# Patient Record
Sex: FEMALE | Race: WHITE | NOT HISPANIC OR LATINO | Employment: OTHER | URBAN - METROPOLITAN AREA
[De-identification: names, ages, dates, MRNs, and addresses within clinical notes are randomized per-mention and may not be internally consistent; named-entity substitution may affect disease eponyms.]

---

## 2017-02-10 RX ORDER — SITAGLIPTIN 100 MG/1
TABLET, FILM COATED ORAL
Qty: 30 TABLET | Refills: 1 | Status: SHIPPED | OUTPATIENT
Start: 2017-02-10 | End: 2017-06-22 | Stop reason: SDUPTHER

## 2017-02-15 ENCOUNTER — RESULTS ENCOUNTER (OUTPATIENT)
Dept: INTERNAL MEDICINE | Facility: CLINIC | Age: 82
End: 2017-02-15

## 2017-02-15 DIAGNOSIS — E11.9 TYPE 2 DIABETES MELLITUS WITHOUT COMPLICATION, WITHOUT LONG-TERM CURRENT USE OF INSULIN (HCC): ICD-10-CM

## 2017-02-15 DIAGNOSIS — E78.5 HYPERLIPIDEMIA, UNSPECIFIED HYPERLIPIDEMIA TYPE: ICD-10-CM

## 2017-02-16 LAB
CHOLEST SERPL-MCNC: 149 MG/DL (ref 100–199)
HBA1C MFR BLD: 7.6 % (ref 4.8–5.6)
HDLC SERPL-MCNC: 51 MG/DL
LDLC SERPL CALC-MCNC: 65 MG/DL (ref 0–99)
TRIGL SERPL-MCNC: 163 MG/DL (ref 0–149)
VLDLC SERPL CALC-MCNC: 33 MG/DL (ref 5–40)

## 2017-02-21 ENCOUNTER — OFFICE VISIT (OUTPATIENT)
Dept: INTERNAL MEDICINE | Facility: CLINIC | Age: 82
End: 2017-02-21

## 2017-02-21 ENCOUNTER — HOSPITAL ENCOUNTER (INPATIENT)
Facility: HOSPITAL | Age: 82
LOS: 2 days | Discharge: HOME | End: 2017-02-24
Attending: INTERNAL MEDICINE | Admitting: INTERNAL MEDICINE

## 2017-02-21 ENCOUNTER — APPOINTMENT (OUTPATIENT)
Dept: GENERAL RADIOLOGY | Facility: HOSPITAL | Age: 82
End: 2017-02-21
Attending: INTERNAL MEDICINE

## 2017-02-21 ENCOUNTER — APPOINTMENT (OUTPATIENT)
Dept: CARDIOLOGY | Facility: HOSPITAL | Age: 82
End: 2017-02-21
Attending: INTERNAL MEDICINE

## 2017-02-21 VITALS
DIASTOLIC BLOOD PRESSURE: 62 MMHG | TEMPERATURE: 97.9 F | WEIGHT: 133.6 LBS | SYSTOLIC BLOOD PRESSURE: 142 MMHG | BODY MASS INDEX: 26.23 KG/M2 | HEIGHT: 60 IN | HEART RATE: 94 BPM | RESPIRATION RATE: 16 BRPM

## 2017-02-21 DIAGNOSIS — K21.9 GASTROESOPHAGEAL REFLUX DISEASE, ESOPHAGITIS PRESENCE NOT SPECIFIED: ICD-10-CM

## 2017-02-21 DIAGNOSIS — Z79.899 MEDICATION MANAGEMENT: ICD-10-CM

## 2017-02-21 DIAGNOSIS — E11.9 TYPE 2 DIABETES MELLITUS WITHOUT COMPLICATION, WITHOUT LONG-TERM CURRENT USE OF INSULIN (HCC): Primary | ICD-10-CM

## 2017-02-21 DIAGNOSIS — I10 ESSENTIAL HYPERTENSION: ICD-10-CM

## 2017-02-21 DIAGNOSIS — N18.30 CKD (CHRONIC KIDNEY DISEASE) STAGE 3, GFR 30-59 ML/MIN (HCC): ICD-10-CM

## 2017-02-21 DIAGNOSIS — R55 NEAR SYNCOPE: ICD-10-CM

## 2017-02-21 DIAGNOSIS — E78.5 HYPERLIPIDEMIA, UNSPECIFIED HYPERLIPIDEMIA TYPE: ICD-10-CM

## 2017-02-21 DIAGNOSIS — I25.10 CORONARY ARTERY DISEASE INVOLVING NATIVE CORONARY ARTERY OF NATIVE HEART WITHOUT ANGINA PECTORIS: ICD-10-CM

## 2017-02-21 PROBLEM — I49.5 SICK SINUS SYNDROME: Status: ACTIVE | Noted: 2017-02-21

## 2017-02-21 PROBLEM — I49.8 JUNCTIONAL RHYTHM: Status: ACTIVE | Noted: 2017-02-21

## 2017-02-21 LAB
ALBUMIN SERPL-MCNC: 4.3 G/DL (ref 3.5–5.2)
ALBUMIN/GLOB SERPL: 1.4 G/DL
ALP SERPL-CCNC: 178 U/L (ref 39–117)
ALT SERPL W P-5'-P-CCNC: 37 U/L (ref 1–33)
ANION GAP SERPL CALCULATED.3IONS-SCNC: 14 MMOL/L
AST SERPL-CCNC: 37 U/L (ref 1–32)
BASOPHILS # BLD AUTO: 0.04 10*3/MM3 (ref 0–0.2)
BASOPHILS NFR BLD AUTO: 0.6 % (ref 0–1.5)
BILIRUB SERPL-MCNC: 0.6 MG/DL (ref 0.1–1.2)
BUN BLD-MCNC: 18 MG/DL (ref 8–23)
BUN/CREAT SERPL: 17.5 (ref 7–25)
CALCIUM SPEC-SCNC: 9.8 MG/DL (ref 8.6–10.5)
CHLORIDE SERPL-SCNC: 101 MMOL/L (ref 98–107)
CO2 SERPL-SCNC: 27 MMOL/L (ref 22–29)
CREAT BLD-MCNC: 1.03 MG/DL (ref 0.57–1)
D DIMER PPP FEU-MCNC: 0.5 MCGFEU/ML (ref 0–0.49)
DEPRECATED RDW RBC AUTO: 45.1 FL (ref 37–54)
EOSINOPHIL # BLD AUTO: 0.28 10*3/MM3 (ref 0–0.7)
EOSINOPHIL NFR BLD AUTO: 3.9 % (ref 0.3–6.2)
ERYTHROCYTE [DISTWIDTH] IN BLOOD BY AUTOMATED COUNT: 13.3 % (ref 11.7–13)
GFR SERPL CREATININE-BSD FRML MDRD: 52 ML/MIN/1.73
GLOBULIN UR ELPH-MCNC: 3.1 GM/DL
GLUCOSE BLD-MCNC: 142 MG/DL (ref 65–99)
GLUCOSE BLDC GLUCOMTR-MCNC: 253 MG/DL (ref 70–130)
HCT VFR BLD AUTO: 43.9 % (ref 35.6–45.5)
HGB BLD-MCNC: 14.7 G/DL (ref 11.9–15.5)
IMM GRANULOCYTES # BLD: 0.02 10*3/MM3 (ref 0–0.03)
IMM GRANULOCYTES NFR BLD: 0.3 % (ref 0–0.5)
LYMPHOCYTES # BLD AUTO: 1.54 10*3/MM3 (ref 0.9–4.8)
LYMPHOCYTES NFR BLD AUTO: 21.3 % (ref 19.6–45.3)
MAGNESIUM SERPL-MCNC: 2 MG/DL (ref 1.6–2.4)
MCH RBC QN AUTO: 31.5 PG (ref 26.9–32)
MCHC RBC AUTO-ENTMCNC: 33.5 G/DL (ref 32.4–36.3)
MCV RBC AUTO: 94 FL (ref 80.5–98.2)
MONOCYTES # BLD AUTO: 0.5 10*3/MM3 (ref 0.2–1.2)
MONOCYTES NFR BLD AUTO: 6.9 % (ref 5–12)
NEUTROPHILS # BLD AUTO: 4.86 10*3/MM3 (ref 1.9–8.1)
NEUTROPHILS NFR BLD AUTO: 67 % (ref 42.7–76)
PLATELET # BLD AUTO: 236 10*3/MM3 (ref 140–500)
PMV BLD AUTO: 10.8 FL (ref 6–12)
POTASSIUM BLD-SCNC: 4.1 MMOL/L (ref 3.5–5.2)
PROT SERPL-MCNC: 7.4 G/DL (ref 6–8.5)
RBC # BLD AUTO: 4.67 10*6/MM3 (ref 3.9–5.2)
SODIUM BLD-SCNC: 142 MMOL/L (ref 136–145)
T4 FREE SERPL-MCNC: 1.22 NG/DL (ref 0.93–1.7)
TSH SERPL DL<=0.05 MIU/L-ACNC: 1.97 MIU/ML (ref 0.27–4.2)
WBC NRBC COR # BLD: 7.24 10*3/MM3 (ref 4.5–10.7)

## 2017-02-21 PROCEDURE — 71020 HC CHEST PA AND LATERAL: CPT

## 2017-02-21 PROCEDURE — 84443 ASSAY THYROID STIM HORMONE: CPT | Performed by: INTERNAL MEDICINE

## 2017-02-21 PROCEDURE — G0378 HOSPITAL OBSERVATION PER HR: HCPCS

## 2017-02-21 PROCEDURE — 93226 XTRNL ECG REC<48 HR SCAN A/R: CPT

## 2017-02-21 PROCEDURE — 99220 PR INITIAL OBSERVATION CARE/DAY 70 MINUTES: CPT | Performed by: INTERNAL MEDICINE

## 2017-02-21 PROCEDURE — 93000 ELECTROCARDIOGRAM COMPLETE: CPT | Performed by: INTERNAL MEDICINE

## 2017-02-21 PROCEDURE — 85379 FIBRIN DEGRADATION QUANT: CPT | Performed by: INTERNAL MEDICINE

## 2017-02-21 PROCEDURE — 99203 OFFICE O/P NEW LOW 30 MIN: CPT | Performed by: INTERNAL MEDICINE

## 2017-02-21 PROCEDURE — 93010 ELECTROCARDIOGRAM REPORT: CPT | Performed by: INTERNAL MEDICINE

## 2017-02-21 PROCEDURE — 85025 COMPLETE CBC W/AUTO DIFF WBC: CPT | Performed by: INTERNAL MEDICINE

## 2017-02-21 PROCEDURE — 93005 ELECTROCARDIOGRAM TRACING: CPT | Performed by: INTERNAL MEDICINE

## 2017-02-21 PROCEDURE — 80053 COMPREHEN METABOLIC PANEL: CPT | Performed by: INTERNAL MEDICINE

## 2017-02-21 PROCEDURE — 84439 ASSAY OF FREE THYROXINE: CPT | Performed by: INTERNAL MEDICINE

## 2017-02-21 PROCEDURE — 93225 XTRNL ECG REC<48 HRS REC: CPT

## 2017-02-21 PROCEDURE — 83735 ASSAY OF MAGNESIUM: CPT | Performed by: INTERNAL MEDICINE

## 2017-02-21 PROCEDURE — 82962 GLUCOSE BLOOD TEST: CPT | Performed by: INTERNAL MEDICINE

## 2017-02-21 RX ORDER — ALLOPURINOL 300 MG/1
300 TABLET ORAL DAILY
Status: DISCONTINUED | OUTPATIENT
Start: 2017-02-21 | End: 2017-02-24 | Stop reason: HOSPADM

## 2017-02-21 RX ORDER — PROMETHAZINE HYDROCHLORIDE 25 MG/ML
12.5 INJECTION, SOLUTION INTRAMUSCULAR; INTRAVENOUS EVERY 6 HOURS PRN
Status: DISCONTINUED | OUTPATIENT
Start: 2017-02-21 | End: 2017-02-24 | Stop reason: HOSPADM

## 2017-02-21 RX ORDER — HYDROCODONE BITARTRATE AND ACETAMINOPHEN 5; 325 MG/1; MG/1
1 TABLET ORAL EVERY 4 HOURS PRN
Status: DISCONTINUED | OUTPATIENT
Start: 2017-02-21 | End: 2017-02-24 | Stop reason: HOSPADM

## 2017-02-21 RX ORDER — CLOPIDOGREL BISULFATE 75 MG/1
75 TABLET ORAL DAILY
Status: DISCONTINUED | OUTPATIENT
Start: 2017-02-21 | End: 2017-02-24 | Stop reason: HOSPADM

## 2017-02-21 RX ORDER — PROMETHAZINE HYDROCHLORIDE 25 MG/1
12.5 TABLET ORAL EVERY 6 HOURS PRN
Status: DISCONTINUED | OUTPATIENT
Start: 2017-02-21 | End: 2017-02-24 | Stop reason: HOSPADM

## 2017-02-21 RX ORDER — PROMETHAZINE HYDROCHLORIDE 12.5 MG/1
12.5 SUPPOSITORY RECTAL EVERY 6 HOURS PRN
Status: DISCONTINUED | OUTPATIENT
Start: 2017-02-21 | End: 2017-02-24 | Stop reason: HOSPADM

## 2017-02-21 RX ORDER — LORAZEPAM 1 MG/1
1 TABLET ORAL EVERY 6 HOURS PRN
Status: DISCONTINUED | OUTPATIENT
Start: 2017-02-21 | End: 2017-02-24 | Stop reason: HOSPADM

## 2017-02-21 RX ORDER — DOCUSATE SODIUM 100 MG/1
100 CAPSULE, LIQUID FILLED ORAL 2 TIMES DAILY
Status: DISCONTINUED | OUTPATIENT
Start: 2017-02-21 | End: 2017-02-24 | Stop reason: HOSPADM

## 2017-02-21 RX ORDER — ACETAMINOPHEN 325 MG/1
650 TABLET ORAL EVERY 4 HOURS PRN
Status: DISCONTINUED | OUTPATIENT
Start: 2017-02-21 | End: 2017-02-24 | Stop reason: HOSPADM

## 2017-02-21 RX ORDER — MAGNESIUM HYDROXIDE/ALUMINUM HYDROXICE/SIMETHICONE 120; 1200; 1200 MG/30ML; MG/30ML; MG/30ML
30 SUSPENSION ORAL EVERY 6 HOURS PRN
Status: DISCONTINUED | OUTPATIENT
Start: 2017-02-21 | End: 2017-02-24 | Stop reason: HOSPADM

## 2017-02-21 RX ORDER — ASPIRIN 325 MG
325 TABLET ORAL DAILY
Status: DISCONTINUED | OUTPATIENT
Start: 2017-02-21 | End: 2017-02-24 | Stop reason: HOSPADM

## 2017-02-21 RX ORDER — LOSARTAN POTASSIUM AND HYDROCHLOROTHIAZIDE 25; 100 MG/1; MG/1
1 TABLET ORAL DAILY
Status: DISCONTINUED | OUTPATIENT
Start: 2017-02-21 | End: 2017-02-24 | Stop reason: HOSPADM

## 2017-02-21 RX ORDER — SODIUM CHLORIDE 0.9 % (FLUSH) 0.9 %
1-10 SYRINGE (ML) INJECTION AS NEEDED
Status: DISCONTINUED | OUTPATIENT
Start: 2017-02-21 | End: 2017-02-24 | Stop reason: HOSPADM

## 2017-02-21 RX ORDER — ATORVASTATIN CALCIUM 80 MG/1
80 TABLET, FILM COATED ORAL DAILY
Status: DISCONTINUED | OUTPATIENT
Start: 2017-02-21 | End: 2017-02-24 | Stop reason: HOSPADM

## 2017-02-21 RX ADMIN — METFORMIN HYDROCHLORIDE 1000 MG: 1000 TABLET ORAL at 17:34

## 2017-02-21 RX ADMIN — DOCUSATE SODIUM 100 MG: 100 CAPSULE, LIQUID FILLED ORAL at 17:34

## 2017-02-21 NOTE — CONSULTS
[facility]iology [facility] Consult            Patient Name: Tamie Peter  Age/Sex: 81 y.o. female  : 1935  MRN: [id number]    Date of Admission: 2017  Date of Encounter Visit: 17  Encounter Provider: Juvenal Zhong MD  Referring Provider: Mohinder Lester MD  Place of Service: [facility] CARDIOLOGY  Patient Care Team:  Mohinder Lester MD as PCP - General (Internal Medicine)  No Known Provider as PCP - Family Medicine      Subjective:   EP Consultation for: napoleon    Chief Complaint: faint and decreased exercise capacity     History of Present Illness:  Tamie Peter is a 81 y.o. female who is adm by dr lester for napoleon and syncope in his office       Palp none  Dur na   Triggers na   Chest pain none   Bonilla a little   Soa none   Syncope yes see dr lester's note -- also had near syncope recently at home    Exercise tolerance gradual decline over months     On metoprolol but very low dose.              Past Medical History:  Past Medical History   Diagnosis Date   • Coronary artery disease    • Diabetes mellitus    • Hypertension        Past Surgical History   Procedure Laterality Date   • Cataract extraction     • Coronary artery bypass graft       ,    • Tubal abdominal ligation     • Cardiac surgery     • Abdominal surgery         Home Medications:   Prescriptions Prior to Admission   Medication Sig Dispense Refill Last Dose   • allopurinol (ZYLOPRIM) 300 MG tablet Take 300 mg by mouth daily.   2017 at Unknown time   • aspirin 325 MG tablet Take 325 mg by mouth daily.   Taking   • atorvastatin (LIPITOR) 80 MG tablet TAKE ONE TABLET BY MOUTH DAILY 90 tablet 0 Taking   • clopidogrel (PLAVIX) 75 MG tablet Take 75 mg by mouth daily.   Taking   • JANUVIA 100 MG tablet TAKE ONE TABLET BY MOUTH DAILY 30 tablet 1 Taking   • losartan-hydrochlorothiazide (HYZAAR) 100-25 MG per tablet Take 1 tablet by mouth daily.   Taking   • metFORMIN (GLUCOPHAGE) 500 MG tablet  Take 2 tablets by mouth 2 (Two) Times a Day With Meals. 360 tablet 1 Taking   • metoprolol tartrate (LOPRESSOR) 50 MG tablet Take 50 mg by mouth 2 (two) times a day.   Taking       Allergies:  Allergies   Allergen Reactions   • Pneumococcal Vaccines    • Potassium-Containing Compounds        Past Social History:  Social History     Social History   • Marital status: Unknown     Spouse name: N/A   • Number of children: N/A   • Years of education: N/A     Occupational History   • Not on file.     Social History Main Topics   • Smoking status: Never Smoker   • Smokeless tobacco: Not on file   • Alcohol use No   • Drug use: Not on file   • Sexual activity: Not on file     Other Topics Concern   • Not on file     Social History Narrative       Past Family History:  Family History   Problem Relation Age of Onset   • Coronary artery disease Mother    • Heart attack Sister        Review of Systems: All systems reviewed. Pertinent positives identified in HPI. All other systems are negative.     14 point ROS was performed and is negative except as outlined in HPI.     Objective:     Objective:  Vital Signs (last 24 hours)       02/20 0700  -  02/21 0659 02/21 0700  -  02/21 1837   Most Recent    Temp (°F)   97.9 -  98     98 (36.7)    Heart Rate   (!)37 -  94     (!) 40    Resp   16 -  20     20    BP   142/62 -  (!) 198/58     (!) 198/58    SpO2 (%)   96 -  98     96        Temp:  [97.9 °F (36.6 °C)-98 °F (36.7 °C)] 98 °F (36.7 °C)  Heart Rate:  [37-94] 40  Resp:  [16-20] 20  BP: (142-198)/(58-78) 198/58  Body mass index is 25.19 kg/(m^2).        Physical Exam:   Physical Exam   Constitutional: She is oriented to person, place, and time.   HENT:   Head: Normocephalic and atraumatic.   Eyes: Right eye exhibits no discharge. Left eye exhibits no discharge.   Neck: No JVD present. No thyromegaly present.   Cardiovascular: Regular rhythm.  Bradycardia present.  Exam reveals no gallop and no friction rub.    No murmur  heard.  Pulmonary/Chest: Effort normal and breath sounds normal. She has no rales.   Abdominal: Soft. Bowel sounds are normal. There is no tenderness.   Musculoskeletal: Normal range of motion. She exhibits no edema or deformity.   Neurological: She is alert and oriented to person, place, and time. She exhibits normal muscle tone.   Skin: Skin is warm and dry. No erythema.   Psychiatric: She has a normal mood and affect. Her behavior is normal. Thought content normal.        Labs:   Lab Review:       Results from last 7 days  Lab Units 02/21/17  1251   SODIUM mmol/L 142   POTASSIUM mmol/L 4.1   CHLORIDE mmol/L 101   TOTAL CO2 mmol/L 27.0   BUN mg/dL 18   CREATININE mg/dL 1.03*   GLUCOSE mg/dL 142*   CALCIUM mg/dL 9.8   AST (SGOT) U/L 37*   ALT (SGPT) U/L 37*           Results from last 7 days  Lab Units 02/21/17  1251   WBC 10*3/mm3 7.24   HEMOGLOBIN g/dL 14.7   HEMATOCRIT % 43.9   PLATELETS 10*3/mm3 236               Results from last 7 days  Lab Units 02/21/17  1251   MAGNESIUM mg/dL 2.0       Results from last 7 days  Lab Units 02/15/17  1208   TRIGLYCERIDES mg/dL 163*   HDL CHOL mg/dL 51   LDL CHOL mg/dL 65                       Imaging:     Imaging Results (most recent)     Procedure Component Value Units Date/Time    XR Chest PA & Lateral [[id number]] Collected:  02/21/17 1736     Updated:  02/21/17 1743    Narrative:       CHEST X-RAY     HISTORY: Near syncope     Chest x-ray consisting of 2 images is provided. There is no previous  exam for comparison.     FINDINGS: There are sternal wires with vascular stent material  projecting over the chest. The cardiomediastinal silhouette is normal.  Vascular volume is normal. The lungs are clear and the costophrenic  sulci are dry.       Impression:       Negative chest x-ray.     This report was finalized on 2/21/2017 5:40 PM by Dr. Juvenal Rose MD.                EKG:   Junctional napoleon       I personally viewed and interpreted the patient's EKG/Telemetry  data.    Assessment:     Principal Problem:    Near syncope  Active Problems:    CAD (coronary artery disease)    Hypertension    GERD (gastroesophageal reflux disease)    Diabetes mellitus    CKD (chronic kidney disease) stage 3, GFR 30-59 ml/min    Junctional rhythm    Sick sinus syndrome        Plan:   She has symptomatic bradycardia and syncope     BB did not do this     Birthdays and multiple heart surgeries did     Clear indic for pacing     Risks disc     Thank you.     Will proceed in the afternoon tomorrow      Thank you for allowing me to participate in the care of Tamie Peter. Feel free to contact me directly with any further questions or concerns.    Juvenal Zhong MD  [facility]  02/21/17  6:40 PM

## 2017-02-21 NOTE — H&P
Patient Care Team:  Mohinder Barbosa MD as PCP - General (Internal Medicine)  No Known Provider as PCP - Family Medicine    Chief complaint     Subjective     Patient is a 81 y.o. female presents for a routine in three-month visit to the office to monitor her hyperlipidemia, diabetes mellitus, hypertension, and CAD.  She mentioned that one week prior to admission she was at the kitchen sink and felt that her vision went black.  She was lightheaded.  The symptoms just lasted a few seconds.  He did not fall to the floor or lose posture.  Lose consciousness.  Has had no further spells until today.  In the office when her blood pressure is being checked at check-in she had another more severe spell.  Her vision dimmed and went black.  He was lightheaded.  She thought she might faint.  The symptoms are resolved within a few seconds.  An EKG was later performed which demonstrated a pretty bradycardia that was felt to be a zenaida bradycardia.  Heart rate was 37.  She is now socially admitted for further observation.  She's got a history of CAD with a distant history of bypass surgery ×3 grafts.  She had a positive Cardiolite test in November 2015 at which time a cardiac catheterization was performed.  The bypass vessels were all patent.  There was some narrowing and then obtuse marginal and circumflex that were felt to perhaps lead to some functional ischemia and the false positive stress test just based upon their small size.  She's had no prior history of syncope.      Review of Systems  Denies any chest pain or shortness of breath.  No abdominal symptoms.  No extremity symptoms.  All other pertinent items are noted in HPI, all other systems reviewed and negative    History    Current Facility-Administered Medications:   •  acetaminophen (TYLENOL) tablet 650 mg, 650 mg, Oral, Q4H PRN, Mohinder Barbosa MD  •  allopurinol (ZYLOPRIM) tablet 300 mg, 300 mg, Oral, Daily, Mohinder Barbosa MD  •  aluminum-magnesium  hydroxide-simethicone (MAALOX/MYLANTA) suspension 30 mL, 30 mL, Oral, Q6H PRN, Mohinder Barbosa MD  •  aspirin tablet 325 mg, 325 mg, Oral, Daily, Mohinder Barbosa MD  •  atorvastatin (LIPITOR) tablet 80 mg, 80 mg, Oral, Daily, Mohinder Barbosa MD  •  clopidogrel (PLAVIX) tablet 75 mg, 75 mg, Oral, Daily, Mohinder Barbosa MD  •  docusate sodium (COLACE) capsule 100 mg, 100 mg, Oral, BID, Mohinder Barbosa MD  •  HYDROcodone-acetaminophen (NORCO) 5-325 MG per tablet 1 tablet, 1 tablet, Oral, Q4H PRN, Mohinder Barbosa MD  •  LORazepam (ATIVAN) tablet 1 mg, 1 mg, Oral, Q6H PRN, Mohinder Barbosa MD  •  losartan-hydrochlorothiazide (HYZAAR) 100-25 MG per tablet 1 tablet, 1 tablet, Oral, Daily, Mohinder Barbosa MD  •  magnesium hydroxide (MILK OF MAGNESIA) suspension 2400 mg/10mL 10 mL, 10 mL, Oral, Daily PRN, Mohinder Barbosa MD  •  metFORMIN (GLUCOPHAGE) tablet 1,000 mg, 1,000 mg, Oral, BID With Meals, Mohinder Barbosa MD  •  promethazine (PHENERGAN) tablet 12.5 mg, 12.5 mg, Oral, Q6H PRN **OR** promethazine (PHENERGAN) injection 12.5 mg, 12.5 mg, Intramuscular, Q6H PRN **OR** promethazine (PHENERGAN) suppository 12.5 mg, 12.5 mg, Rectal, Q6H PRN, Mohinder Barbosa MD  •  SITagliptin (JANUVIA) tablet 100 mg, 100 mg, Oral, Daily, Mohinder Barbosa MD  •  sodium chloride 0.9 % flush 1-10 mL, 1-10 mL, Intravenous, PRN, Mohinder Barbosa MD  Past Medical History   Diagnosis Date   • Coronary artery disease    • Diabetes mellitus    • Hypertension      Past Surgical History   Procedure Laterality Date   • Cataract extraction     • Coronary artery bypass graft       1992, 2009   • Tubal abdominal ligation     • Cardiac surgery     • Abdominal surgery       Family History   Problem Relation Age of Onset   • Coronary artery disease Mother    • Heart attack Sister      Social History   Substance Use Topics   • Smoking status: Never Smoker   • Smokeless tobacco: Not on file   • Alcohol use No     Prescriptions Prior to Admission   Medication Sig Dispense  Refill Last Dose   • allopurinol (ZYLOPRIM) 300 MG tablet Take 300 mg by mouth daily.   Taking   • aspirin 325 MG tablet Take 325 mg by mouth daily.   Taking   • atorvastatin (LIPITOR) 80 MG tablet TAKE ONE TABLET BY MOUTH DAILY 90 tablet 0 Taking   • clopidogrel (PLAVIX) 75 MG tablet Take 75 mg by mouth daily.   Taking   • JANUVIA 100 MG tablet TAKE ONE TABLET BY MOUTH DAILY 30 tablet 1 Taking   • losartan-hydrochlorothiazide (HYZAAR) 100-25 MG per tablet Take 1 tablet by mouth daily.   Taking   • metFORMIN (GLUCOPHAGE) 500 MG tablet Take 2 tablets by mouth 2 (Two) Times a Day With Meals. 360 tablet 1 Taking   • metoprolol tartrate (LOPRESSOR) 50 MG tablet Take 50 mg by mouth 2 (two) times a day.   Taking     Allergies:  Pneumococcal vaccines and Potassium-containing compounds    Objective     Vital Signs  Temp:  [97.9 °F (36.6 °C)-98 °F (36.7 °C)] 98 °F (36.7 °C)  Heart Rate:  [37-94] 37  Resp:  [16] 16  BP: (142-195)/(62-78) 195/78    Physical Exam:      General Appearance:    Alert, cooperative, in no acute distress   Head:    Normocephalic, without obvious abnormality, atraumatic   Eyes:            Lids and lashes normal, conjunctivae and sclerae normal, no   icterus, no pallor, corneas clear, PERRLA   Ears:    Ears appear intact with no abnormalities noted   Throat:   No oral lesions, no thrush, oral mucosa moist   Neck:   No adenopathy, supple, trachea midline, no thyromegaly, no     carotid bruit, no JVD   Back:     No kyphosis present, no scoliosis present, no skin lesions,       erythema or scars, no tenderness to percussion or                   palpation,   range of motion normal   Lungs:     Clear to auscultation,respirations regular, even and                   unlabored    Heart:    Regular rhythm and normal rate, normal S1 and S2, no            murmur, no gallop, no rub, no click   Breast Exam:    Deferred   Abdomen:     Normal bowel sounds, no masses, no organomegaly, soft        non-tender,  non-distended, no guarding, no rebound                 tenderness   Genitalia:    Deferred   Extremities:   Moves all extremities well, no edema, no cyanosis, no              redness   Pulses:   Pulses palpable and equal bilaterally   Skin:   No bleeding, bruising or rash   Lymph nodes:   No palpable adenopathy   Neurologic:   Cranial nerves 2 - 12 grossly intact, sensation intact, DTR        present and equal bilaterally         Results Review:    I reviewed the patient's new clinical results.  I personally viewed and interpreted the patient's EKG/Telemetry data  Discussed with The patient    Assessment/Plan     Active Problems:    Near syncope    Genaro bradycardia on EKG.  Symptoms certainly suspicious for cardiac near-syncope with this symptoms occurring in the seated position in the office and the slow heart rate.  The plan is to admit her for further observation.  She'll be monitored.  We'll hold her metoprolol.  Recheck her labs and electrolytes.  Get cardiology opinion.     Mohinder Barbosa MD  02/21/17  12:41 PM    Dragon disclaimer:   Much of this encounter note is an electronic transcription/translation of spoken language to printed text. The electronic translation of spoken language may permit erroneous, or at times, nonsensical words or phrases to be inadvertently transcribed; Although I have reviewed the note for such errors, some may still exist.

## 2017-02-21 NOTE — PLAN OF CARE
Problem: Patient Care Overview (Adult)  Goal: Plan of Care Review  Outcome: Ongoing (interventions implemented as appropriate)    02/21/17 1221   Coping/Psychosocial Response Interventions   Plan Of Care Reviewed With patient       Goal: Adult Individualization and Mutuality  Outcome: Ongoing (interventions implemented as appropriate)  Goal: Discharge Needs Assessment  Outcome: Ongoing (interventions implemented as appropriate)    Problem: Arrhythmia/Dysrhythmia (Symptomatic) (Adult)  Goal: Signs and Symptoms of Listed Potential Problems Will be Absent or Manageable (Arrhythmia/Dysrhythmia)  Outcome: Ongoing (interventions implemented as appropriate)    Problem: Fall Risk (Adult)  Goal: Identify Related Risk Factors and Signs and Symptoms  Outcome: Ongoing (interventions implemented as appropriate)  Goal: Absence of Falls  Outcome: Ongoing (interventions implemented as appropriate)

## 2017-02-21 NOTE — PROGRESS NOTES
Subjective   Tamie Peter is a 81 y.o. female.     Chief Complaint   Patient presents with   • Hypertension   • Hyperlipidemia   • Diabetes   • Dizziness     Pt reports of having episodes of dizziness where her vision goes black         Hypertension   This is a chronic problem. The current episode started more than 1 year ago. The problem is unchanged. The problem is controlled. Associated symptoms include peripheral edema (right foot in summer only). Pertinent negatives include no chest pain, orthopnea, palpitations or shortness of breath (in evenings only). There are no associated agents to hypertension. Risk factors for coronary artery disease include diabetes mellitus, dyslipidemia and post-menopausal state. Past treatments include ACE inhibitors, beta blockers and diuretics. Hypertensive end-organ damage includes kidney disease and CAD/MI. There is no history of angina, CVA, heart failure or retinopathy. Identifiable causes of hypertension include chronic renal disease.   Hyperlipidemia   This is a chronic problem. The current episode started more than 1 year ago. The problem is controlled. Recent lipid tests were reviewed and are normal. Exacerbating diseases include chronic renal disease. Factors aggravating her hyperlipidemia include thiazides and beta blockers. Pertinent negatives include no chest pain, myalgias or shortness of breath (in evenings only). Current antihyperlipidemic treatment includes statins and bile acid squestrants. The current treatment provides significant improvement of lipids. There are no compliance problems.  Risk factors for coronary artery disease include dyslipidemia and post-menopausal.   Diabetes   She presents for her follow-up diabetic visit. She has type 2 diabetes mellitus. No MedicAlert identification noted. Her disease course has been stable. Hypoglycemia symptoms include dizziness. Pertinent negatives for diabetes include no chest pain and no fatigue. Symptoms are  stable. Diabetic complications include nephropathy. Pertinent negatives for diabetic complications include no autonomic neuropathy, CVA, heart disease, peripheral neuropathy or retinopathy. Risk factors for coronary artery disease include diabetes mellitus, dyslipidemia and post-menopausal. Current diabetic treatment includes oral agent (dual therapy). She is compliant with treatment all of the time. Meal planning includes ADA exchanges. She has had a previous visit with a dietitian. She participates in exercise daily. An ACE inhibitor/angiotensin II receptor blocker is being taken. She does not see a podiatrist.Eye exam is current.   Dizziness   Pertinent negatives include no abdominal pain, chest pain, chills, coughing, fatigue, fever, myalgias, nausea or vomiting.        The following portions of the patient's history were reviewed and updated as appropriate: allergies, current medications, past social history and problem list.    Outpatient Prescriptions Marked as Taking for the 2/21/17 encounter (Office Visit) with Mohinder Barbosa MD   Medication Sig Dispense Refill   • allopurinol (ZYLOPRIM) 300 MG tablet Take 300 mg by mouth daily.     • aspirin 325 MG tablet Take 325 mg by mouth daily.     • atorvastatin (LIPITOR) 80 MG tablet TAKE ONE TABLET BY MOUTH DAILY 90 tablet 0   • clopidogrel (PLAVIX) 75 MG tablet Take 75 mg by mouth daily.     • JANUVIA 100 MG tablet TAKE ONE TABLET BY MOUTH DAILY 30 tablet 1   • losartan-hydrochlorothiazide (HYZAAR) 100-25 MG per tablet Take 1 tablet by mouth daily.     • metFORMIN (GLUCOPHAGE) 500 MG tablet Take 2 tablets by mouth 2 (Two) Times a Day With Meals. 360 tablet 1   • metoprolol tartrate (LOPRESSOR) 50 MG tablet Take 50 mg by mouth 2 (two) times a day.         Review of Systems   Constitutional: Negative for chills, fatigue and fever.   Respiratory: Negative for cough, chest tightness, shortness of breath (in evenings only) and wheezing.    Cardiovascular: Negative for  chest pain, palpitations, orthopnea and leg swelling.   Gastrointestinal: Negative for abdominal pain, constipation, diarrhea, nausea and vomiting.   Musculoskeletal: Negative for myalgias.   Neurological: Positive for dizziness.       Objective   Vitals:    02/21/17 0953   BP: 142/62   Pulse: 94   Resp: 16   Temp: 97.9 °F (36.6 °C)      Last Weight    02/21/17  0953   Weight: 133 lb 9.6 oz (60.6 kg)    [unfilled]  Body mass index is 26.09 kg/(m^2).      Physical Exam   Constitutional: She appears well-developed and well-nourished. No distress.   HENT:   Head: Normocephalic and atraumatic.   Neck: Carotid bruit is not present. No thyromegaly present.   Cardiovascular: Normal rate, regular rhythm, normal heart sounds and intact distal pulses.  Exam reveals no gallop.    No murmur heard.  Pulmonary/Chest: Effort normal and breath sounds normal. No respiratory distress. She has no wheezes. She has no rales.   Abdominal: Soft. Bowel sounds are normal. She exhibits no mass. There is no tenderness. There is no guarding.   Musculoskeletal: She exhibits no edema.         Problem List Items Addressed This Visit        Cardiovascular and Mediastinum    CAD (coronary artery disease)    Hypertension    Hyperlipidemia    Relevant Orders    Lipid Panel       Digestive    GERD (gastroesophageal reflux disease)       Endocrine    Diabetes mellitus - Primary    Relevant Orders    POCT Glucose (Completed)    Hemoglobin A1c       Genitourinary    CKD (chronic kidney disease) stage 3, GFR 30-59 ml/min      Other Visit Diagnoses     Medication management        Relevant Orders    CBC & Differential    Comprehensive Metabolic Panel    Near syncope            Assessment/Plan   In for recheck of htn, lipids, DM, Ckd and CAD.  Saw cardiologist 4/16 with good check up.  Pretty much asymptomatic at present.  Annual labs 4/16.  Glu, a1c, lipids q 3 mos.  Still due C scope.  Having some intermittent pain down left lateral leg and slight  sciatica.  She has had two use lightheaded spells.  One was a week ago while standing at the kitchen sink.  One was here in the office in a seated position getting blood pressure taken.  Vision went black for just a second.  Did not lose posture or consciousness however.  EKG checked today.  Heart rate is 37 on EKG and what looks like a genaro rhythm.  We'll need to admit for some monitoring and to stop her beta blocker.  Potentially may need a pacemaker depending on how things evolve..      ECG 12 Lead  Date/Time: 2/21/2017 12:00 PM  Performed by: MADISYN SINGLETARY  Authorized by: MADISYN SINGLETARY   Comparison: not compared with previous ECG   Previous ECG: no previous ECG available  Rate: bradycardic  Conduction: conduction normal  ST Segments: ST segments normal  T Waves: T waves normal  QRS axis: normal  Other: no other findings  Clinical impression: abnormal ECG  Comments: Genaro bradycardia with heart rate of 37.  Nonspecific ST changes diffusely but mostly anterolateral.                 Dragon disclaimer:   Much of this encounter note is an electronic transcription/translation of spoken language to printed text. The electronic translation of spoken language may permit erroneous, or at times, nonsensical words or phrases to be inadvertently transcribed; Although I have reviewed the note for such errors, some may still exist.

## 2017-02-22 ENCOUNTER — APPOINTMENT (OUTPATIENT)
Dept: GENERAL RADIOLOGY | Facility: HOSPITAL | Age: 82
End: 2017-02-22

## 2017-02-22 LAB
CHOLEST SERPL-MCNC: 132 MG/DL (ref 0–200)
HBA1C MFR BLD: 7.3 % (ref 4.8–5.6)
HDLC SERPL-MCNC: 42 MG/DL (ref 40–60)
LDLC SERPL CALC-MCNC: 57 MG/DL (ref 0–100)
LDLC/HDLC SERPL: 1.35 {RATIO}
TRIGL SERPL-MCNC: 167 MG/DL (ref 0–150)
VLDLC SERPL-MCNC: 33.4 MG/DL (ref 5–40)

## 2017-02-22 PROCEDURE — 25010000002 VANCOMYCIN PER 500 MG: Performed by: INTERNAL MEDICINE

## 2017-02-22 PROCEDURE — 02HK3JZ INSERTION OF PACEMAKER LEAD INTO RIGHT VENTRICLE, PERCUTANEOUS APPROACH: ICD-10-PCS | Performed by: INTERNAL MEDICINE

## 2017-02-22 PROCEDURE — 71010 HC CHEST PA OR AP: CPT

## 2017-02-22 PROCEDURE — G0378 HOSPITAL OBSERVATION PER HR: HCPCS

## 2017-02-22 PROCEDURE — 99232 SBSQ HOSP IP/OBS MODERATE 35: CPT | Performed by: INTERNAL MEDICINE

## 2017-02-22 PROCEDURE — 33208 INSRT HEART PM ATRIAL & VENT: CPT | Performed by: INTERNAL MEDICINE

## 2017-02-22 PROCEDURE — 25010000002 MIDAZOLAM PER 1 MG: Performed by: INTERNAL MEDICINE

## 2017-02-22 PROCEDURE — 83036 HEMOGLOBIN GLYCOSYLATED A1C: CPT | Performed by: INTERNAL MEDICINE

## 2017-02-22 PROCEDURE — 02H63JZ INSERTION OF PACEMAKER LEAD INTO RIGHT ATRIUM, PERCUTANEOUS APPROACH: ICD-10-PCS | Performed by: INTERNAL MEDICINE

## 2017-02-22 PROCEDURE — C1894 INTRO/SHEATH, NON-LASER: HCPCS | Performed by: INTERNAL MEDICINE

## 2017-02-22 PROCEDURE — C1892 INTRO/SHEATH,FIXED,PEEL-AWAY: HCPCS | Performed by: INTERNAL MEDICINE

## 2017-02-22 PROCEDURE — C1898 LEAD, PMKR, OTHER THAN TRANS: HCPCS | Performed by: INTERNAL MEDICINE

## 2017-02-22 PROCEDURE — 0JH606Z INSERTION OF PACEMAKER, DUAL CHAMBER INTO CHEST SUBCUTANEOUS TISSUE AND FASCIA, OPEN APPROACH: ICD-10-PCS | Performed by: INTERNAL MEDICINE

## 2017-02-22 PROCEDURE — 25010000002 FENTANYL CITRATE (PF) 100 MCG/2ML SOLUTION: Performed by: INTERNAL MEDICINE

## 2017-02-22 PROCEDURE — C1785 PMKR, DUAL, RATE-RESP: HCPCS | Performed by: INTERNAL MEDICINE

## 2017-02-22 PROCEDURE — 93005 ELECTROCARDIOGRAM TRACING: CPT | Performed by: INTERNAL MEDICINE

## 2017-02-22 PROCEDURE — 93010 ELECTROCARDIOGRAM REPORT: CPT | Performed by: INTERNAL MEDICINE

## 2017-02-22 PROCEDURE — 80061 LIPID PANEL: CPT | Performed by: INTERNAL MEDICINE

## 2017-02-22 PROCEDURE — C1887 CATHETER, GUIDING: HCPCS | Performed by: INTERNAL MEDICINE

## 2017-02-22 DEVICE — IMPLANTABLE DEVICE: Type: IMPLANTABLE DEVICE | Status: FUNCTIONAL

## 2017-02-22 DEVICE — GEN PM ADAPTA DR MVP 45.4MM ADDRL1: Type: IMPLANTABLE DEVICE | Status: FUNCTIONAL

## 2017-02-22 DEVICE — LD PM CAPSUREFIX NOVUS5076 45CM: Type: IMPLANTABLE DEVICE | Status: FUNCTIONAL

## 2017-02-22 RX ORDER — MIDAZOLAM HYDROCHLORIDE 1 MG/ML
INJECTION INTRAMUSCULAR; INTRAVENOUS AS NEEDED
Status: DISCONTINUED | OUTPATIENT
Start: 2017-02-22 | End: 2017-02-22 | Stop reason: HOSPADM

## 2017-02-22 RX ORDER — VANCOMYCIN HYDROCHLORIDE 1 G/200ML
1000 INJECTION, SOLUTION INTRAVENOUS
Status: DISCONTINUED | OUTPATIENT
Start: 2017-02-22 | End: 2017-02-22 | Stop reason: HOSPADM

## 2017-02-22 RX ORDER — FENTANYL CITRATE 50 UG/ML
INJECTION, SOLUTION INTRAMUSCULAR; INTRAVENOUS AS NEEDED
Status: DISCONTINUED | OUTPATIENT
Start: 2017-02-22 | End: 2017-02-22 | Stop reason: HOSPADM

## 2017-02-22 RX ORDER — SODIUM CHLORIDE 0.9 % (FLUSH) 0.9 %
1-10 SYRINGE (ML) INJECTION AS NEEDED
Status: DISCONTINUED | OUTPATIENT
Start: 2017-02-22 | End: 2017-02-24 | Stop reason: HOSPADM

## 2017-02-22 RX ORDER — METOPROLOL TARTRATE 50 MG/1
50 TABLET, FILM COATED ORAL 2 TIMES DAILY
Status: DISCONTINUED | OUTPATIENT
Start: 2017-02-22 | End: 2017-02-23

## 2017-02-22 RX ORDER — HYDROCODONE BITARTRATE AND ACETAMINOPHEN 10; 325 MG/1; MG/1
1 TABLET ORAL EVERY 4 HOURS PRN
Status: DISCONTINUED | OUTPATIENT
Start: 2017-02-22 | End: 2017-02-24 | Stop reason: HOSPADM

## 2017-02-22 RX ORDER — OXYCODONE AND ACETAMINOPHEN 7.5; 325 MG/1; MG/1
1 TABLET ORAL EVERY 4 HOURS PRN
Status: DISCONTINUED | OUTPATIENT
Start: 2017-02-22 | End: 2017-02-24 | Stop reason: HOSPADM

## 2017-02-22 RX ORDER — LIDOCAINE HYDROCHLORIDE AND EPINEPHRINE 10; 10 MG/ML; UG/ML
INJECTION, SOLUTION INFILTRATION; PERINEURAL AS NEEDED
Status: DISCONTINUED | OUTPATIENT
Start: 2017-02-22 | End: 2017-02-22 | Stop reason: HOSPADM

## 2017-02-22 RX ORDER — VANCOMYCIN HYDROCHLORIDE 1 G/200ML
INJECTION, SOLUTION INTRAVENOUS CONTINUOUS PRN
Status: DISCONTINUED | OUTPATIENT
Start: 2017-02-22 | End: 2017-02-22 | Stop reason: HOSPADM

## 2017-02-22 RX ORDER — NALOXONE HCL 0.4 MG/ML
0.4 VIAL (ML) INJECTION
Status: DISCONTINUED | OUTPATIENT
Start: 2017-02-22 | End: 2017-02-24 | Stop reason: HOSPADM

## 2017-02-22 RX ORDER — ONDANSETRON 2 MG/ML
4 INJECTION INTRAMUSCULAR; INTRAVENOUS EVERY 6 HOURS PRN
Status: DISCONTINUED | OUTPATIENT
Start: 2017-02-22 | End: 2017-02-24 | Stop reason: HOSPADM

## 2017-02-22 RX ORDER — METOPROLOL TARTRATE 5 MG/5ML
INJECTION INTRAVENOUS AS NEEDED
Status: DISCONTINUED | OUTPATIENT
Start: 2017-02-22 | End: 2017-02-22 | Stop reason: HOSPADM

## 2017-02-22 RX ORDER — SODIUM CHLORIDE 9 MG/ML
INJECTION, SOLUTION INTRAVENOUS CONTINUOUS PRN
Status: DISCONTINUED | OUTPATIENT
Start: 2017-02-22 | End: 2017-02-22 | Stop reason: HOSPADM

## 2017-02-22 RX ADMIN — METOPROLOL TARTRATE 50 MG: 50 TABLET ORAL at 20:25

## 2017-02-22 RX ADMIN — DOCUSATE SODIUM 100 MG: 100 CAPSULE, LIQUID FILLED ORAL at 09:34

## 2017-02-22 RX ADMIN — LOSARTAN POTASSIUM AND HYDROCHLOROTHIAZIDE 1 TABLET: 100; 25 TABLET, FILM COATED ORAL at 09:35

## 2017-02-22 RX ADMIN — ATORVASTATIN CALCIUM 80 MG: 80 TABLET, FILM COATED ORAL at 09:35

## 2017-02-22 RX ADMIN — HYDROCODONE BITARTRATE AND ACETAMINOPHEN 1 TABLET: 10; 325 TABLET ORAL at 19:28

## 2017-02-22 RX ADMIN — DOCUSATE SODIUM 100 MG: 100 CAPSULE, LIQUID FILLED ORAL at 20:24

## 2017-02-22 RX ADMIN — ALLOPURINOL 300 MG: 300 TABLET ORAL at 09:35

## 2017-02-22 NOTE — POST-PROCEDURE NOTE
PTX noted on Xray  I used micropuncture needle for access. It wasn't super easy   She is not symptomatic at this point.   I have discussed with Dr Ward.   This PTX is small but it 's not super small.

## 2017-02-22 NOTE — PROGRESS NOTES
"  Internal Medicine Note    Tamie Peter  81 y.o.      Chief Complaint:  No chief complaint on file.      Subjective       Patient Complaints: No complaints today.  Feels fine.  No more syncope or presyncope.  No other cardiovascular complaints.  She's been seen and evaluated by Dr. hammond.    Objective     Vital Signs  Temp:  [97.4 °F (36.3 °C)-98.1 °F (36.7 °C)] 98.1 °F (36.7 °C)  Heart Rate:  [37-94] 41  Resp:  [16-20] 16  BP: (142-198)/(58-78) 168/67    Flowsheet Rows         First Filed Value    Admission Height  60\" (152.4 cm) Documented at 02/21/2017 1216    Admission Weight  129 lb (58.5 kg) Documented at 02/21/2017 1216        134 lb 3.2 oz (60.9 kg)    Intake/Output Summary (Last 24 hours) at 02/22/17 0748  Last data filed at 02/22/17 0557   Gross per 24 hour   Intake    120 ml   Output    700 ml   Net   -580 ml       Physical Exam:   General Appearance:    Alert, cooperative, in no acute distress       Eyes:            Conjunctivae and sclerae normal, no   icterus, PERRLA   Neck:   No adenopathy, supple, trachea midline, no thyromegaly, no   carotid bruit, no JVD       Lungs:     Clear to auscultation,respirations regular, even and                  unlabored    Heart:    bradycardic rhythm, normal S1 and S2, no murmur, no gallop, no rub, no click       Abdomen:     Normal bowel sounds, no masses, no organomegaly, soft        non-tender, non-distended, no guarding, no rebound                tenderness       Extremities:   Moves all extremities well, no edema, no cyanosis   Pulses:   Pulses palpable and equal bilaterally       Lymph nodes:   No palpable adenopathy        Results Review:     I reviewed the patient's new clinical results.  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  Thyroid function tests are normal.  Absolute no change off of metoprolol.  Appreciate Dr. hammond's input.  We have clear indications for a permanent pacemaker and had his to be placed today.  Discharge planning will be per Dr. hammond, likely tomorrow morning.    Mohinder Barbosa MD  02/22/17  7:48 AM    Dragon disclaimer:   Much of this encounter note is an electronic transcription/translation of spoken language to printed text. The electronic translation of spoken language may permit erroneous, or at times, nonsensical words or phrases to be inadvertently transcribed; Although I have reviewed the note for such errors, some may still exist.

## 2017-02-22 NOTE — PLAN OF CARE
Problem: Patient Care Overview (Adult)  Goal: Plan of Care Review  Outcome: Ongoing (interventions implemented as appropriate)    02/22/17 1700   Coping/Psychosocial Response Interventions   Plan Of Care Reviewed With patient;family   Patient Care Overview   Progress improving   Outcome Evaluation   Outcome Summary/Follow up Plan ready for transfer       Goal: Adult Individualization and Mutuality  Outcome: Ongoing (interventions implemented as appropriate)  Goal: Discharge Needs Assessment  Outcome: Ongoing (interventions implemented as appropriate)    Problem: Arrhythmia/Dysrhythmia (Symptomatic) (Adult)  Goal: Signs and Symptoms of Listed Potential Problems Will be Absent or Manageable (Arrhythmia/Dysrhythmia)  Outcome: Ongoing (interventions implemented as appropriate)

## 2017-02-22 NOTE — PROGRESS NOTES
Discharge Planning Assessment  [facility]     Patient Name: Tamie Peter  MRN: [id number]  Today's Date: 2/22/2017    Admit Date: 2/21/2017          Discharge Needs Assessment       02/22/17 1104    Living Environment    Lives With alone    Living Arrangements house    Provides Primary Care For no one    Quality Of Family Relationships supportive    Able to Return to Prior Living Arrangements yes    Discharge Needs Assessment    Concerns To Be Addressed no discharge needs identified;denies needs/concerns at this time    Readmission Within The Last 30 Days no previous admission in last 30 days    Anticipated Changes Related to Illness none    Equipment Currently Used at Home grab bar;other (see comments)   currently has shower seat, BSC, rolling walker, cane that she isn't using    Equipment Needed After Discharge none    Transportation Available car;family or friend will provide            Discharge Plan       02/22/17 1105    Case Management/Social Work Plan    Plan Home with family support    Patient/Family In Agreement With Plan yes    Additional Comments Introduced self and explained role of CCP, facesheet verified and Obs acknowlegment explained to patient and form signed by patient who is alert and oreinted.   Patient states she lives alone and has a son with his wife and a daughter that live next to her and checks on her frequently during the day.  Patient states she currently has a rolling walker, BSC, cane, grab bars and a shower chair that she is not using and denies need for any equipment at [facility] and denies need for home health at discharge.  Patient states she is independent with all ADLs.  Patient states she plans on returning home at discharge with her children's support/assistance if needed.  Patient states she  uses [facility] on [address], [place] and denies any issues with affording or obtaining medications.  CCP will continue to follow and assist as needed....Latisha  HAVEN Duarte,[facility]         Discharge Placement     No information found                Demographic Summary       02/22/17 1103    Referral Information    Admission Type observation    Arrived From home or self-care    Contact Information    Permission Granted to Share Information With family/designee   Melinda Ochoa(daughter) [phone]    Primary Care Physician Information    Name Mohinder Barbosa MD            Functional Status       02/22/17 1103    Functional Status Current    Ambulation 2-->assistive person    Transferring 0-->independent    Toileting 0-->independent    Bathing 0-->independent    Dressing 0-->independent    Eating 0-->independent    Communication 0-->understands/communicates without difficulty    Change in Functional Status Since Onset of Current Illness/Injury no    Functional Status Prior    Ambulation 0-->independent    Transferring 0-->independent    Toileting 0-->independent    Bathing 0-->independent    Dressing 0-->independent    Eating 0-->independent    Communication 0-->understands/communicates without difficulty    IADL    Medications independent    Meal Preparation independent    Housekeeping independent    Laundry independent    Shopping independent    Oral Care independent    Activity Tolerance    Current Activity Limitations none    Usual Activity Tolerance good    Current Activity Tolerance moderate    Cognitive/Perceptual/Developmental    Current Mental Status/Cognitive Functioning no deficits noted    Recent Changes in Mental Status/Cognitive Functioning no changes    Developmental Stage (Eriksson's Stages of Development) Stage 8 (65 years-death/Late Adulthood) Integrity vs. Despair            Psychosocial     None            Abuse/Neglect     None            Legal     None            Substance Abuse     None            Patient Forms     None          Mary Duarte RN

## 2017-02-22 NOTE — PLAN OF CARE
Problem: Patient Care Overview (Adult)  Goal: Plan of Care Review  Outcome: Ongoing (interventions implemented as appropriate)    02/22/17 4211   Coping/Psychosocial Response Interventions   Plan Of Care Reviewed With patient   Outcome Evaluation   Outcome Summary/Follow up Plan arrived this evening from cath lab post pacemaker placement. Denies pain at present. Incision line left chest/liquid skin. Taking PO food and fluids well. Paced rhythm and sinus rhythm. AMB from stretcher to bed and tolerated it very well.       Goal: Adult Individualization and Mutuality  Outcome: Ongoing (interventions implemented as appropriate)  Goal: Discharge Needs Assessment  Outcome: Ongoing (interventions implemented as appropriate)    Problem: Arrhythmia/Dysrhythmia (Symptomatic) (Adult)  Goal: Signs and Symptoms of Listed Potential Problems Will be Absent or Manageable (Arrhythmia/Dysrhythmia)  Outcome: Ongoing (interventions implemented as appropriate)    Problem: Fall Risk (Adult)  Goal: Absence of Falls  Outcome: Ongoing (interventions implemented as appropriate)

## 2017-02-22 NOTE — PLAN OF CARE
Problem: Patient Care Overview (Adult)  Goal: Plan of Care Review  Outcome: Ongoing (interventions implemented as appropriate)    02/22/17 0424   Coping/Psychosocial Response Interventions   Plan Of Care Reviewed With patient   Patient Care Overview   Progress no change   Outcome Evaluation   Outcome Summary/Follow up Plan patient is on the schedule for a pacemaker implantation tomorrow       Goal: Adult Individualization and Mutuality  Outcome: Ongoing (interventions implemented as appropriate)  Goal: Discharge Needs Assessment  Outcome: Ongoing (interventions implemented as appropriate)    Problem: Arrhythmia/Dysrhythmia (Symptomatic) (Adult)  Goal: Signs and Symptoms of Listed Potential Problems Will be Absent or Manageable (Arrhythmia/Dysrhythmia)  Outcome: Ongoing (interventions implemented as appropriate)    Problem: Fall Risk (Adult)  Goal: Identify Related Risk Factors and Signs and Symptoms  Outcome: Outcome(s) achieved Date Met:  02/22/17  Goal: Absence of Falls  Outcome: Ongoing (interventions implemented as appropriate)

## 2017-02-23 ENCOUNTER — APPOINTMENT (OUTPATIENT)
Dept: GENERAL RADIOLOGY | Facility: HOSPITAL | Age: 82
End: 2017-02-23

## 2017-02-23 ENCOUNTER — APPOINTMENT (OUTPATIENT)
Dept: GENERAL RADIOLOGY | Facility: HOSPITAL | Age: 82
End: 2017-02-23
Attending: INTERNAL MEDICINE

## 2017-02-23 PROCEDURE — 71010 HC CHEST PA OR AP: CPT

## 2017-02-23 PROCEDURE — 93010 ELECTROCARDIOGRAM REPORT: CPT | Performed by: INTERNAL MEDICINE

## 2017-02-23 PROCEDURE — 93005 ELECTROCARDIOGRAM TRACING: CPT | Performed by: INTERNAL MEDICINE

## 2017-02-23 PROCEDURE — 93227 XTRNL ECG REC<48 HR R&I: CPT | Performed by: INTERNAL MEDICINE

## 2017-02-23 PROCEDURE — 99232 SBSQ HOSP IP/OBS MODERATE 35: CPT | Performed by: INTERNAL MEDICINE

## 2017-02-23 RX ORDER — METOPROLOL TARTRATE 100 MG/1
100 TABLET ORAL 2 TIMES DAILY
Status: DISCONTINUED | OUTPATIENT
Start: 2017-02-23 | End: 2017-02-24 | Stop reason: HOSPADM

## 2017-02-23 RX ADMIN — HYDROCODONE BITARTRATE AND ACETAMINOPHEN 1 TABLET: 5; 325 TABLET ORAL at 08:31

## 2017-02-23 RX ADMIN — METOPROLOL TARTRATE 50 MG: 50 TABLET ORAL at 08:24

## 2017-02-23 RX ADMIN — DOCUSATE SODIUM 100 MG: 100 CAPSULE, LIQUID FILLED ORAL at 17:30

## 2017-02-23 RX ADMIN — ASPIRIN 325 MG: 325 TABLET ORAL at 08:24

## 2017-02-23 RX ADMIN — METFORMIN HYDROCHLORIDE 1000 MG: 1000 TABLET ORAL at 08:24

## 2017-02-23 RX ADMIN — SITAGLIPTIN 100 MG: 100 TABLET, FILM COATED ORAL at 08:24

## 2017-02-23 RX ADMIN — ATORVASTATIN CALCIUM 80 MG: 80 TABLET, FILM COATED ORAL at 08:24

## 2017-02-23 RX ADMIN — CLOPIDOGREL 75 MG: 75 TABLET, FILM COATED ORAL at 08:24

## 2017-02-23 RX ADMIN — METOPROLOL TARTRATE 100 MG: 100 TABLET ORAL at 18:57

## 2017-02-23 RX ADMIN — LOSARTAN POTASSIUM AND HYDROCHLOROTHIAZIDE 1 TABLET: 100; 25 TABLET, FILM COATED ORAL at 08:24

## 2017-02-23 RX ADMIN — ALLOPURINOL 300 MG: 300 TABLET ORAL at 08:24

## 2017-02-23 RX ADMIN — DOCUSATE SODIUM 100 MG: 100 CAPSULE, LIQUID FILLED ORAL at 08:24

## 2017-02-23 RX ADMIN — METFORMIN HYDROCHLORIDE 1000 MG: 1000 TABLET ORAL at 17:30

## 2017-02-23 NOTE — PROGRESS NOTES
"  Internal Medicine Note    Tamie Peter  81 y.o.      Chief Complaint:  No chief complaint on file.      Subjective       Patient Complaints: No complaints today.  Feels fine.  No more syncope or presyncope.  No other cardiovascular complaints.  She's been seen and evaluated by Dr. hamomnd.  Pacemaker placed last evening.  Procedure complicated by a small pneumothorax.  Is now being seen by pulmonary.  She has minimal chest pain in the left lower lateral chest.  Hard to know if this is new or old.  She is not at all short of breath.    Objective     Vital Signs  Temp:  [97.8 °F (36.6 °C)-98.5 °F (36.9 °C)] 97.8 °F (36.6 °C)  Heart Rate:  [60-68] 60  Resp:  [15-16] 16  BP: (158-167)/(68-82) 158/81    Flowsheet Rows         First Filed Value    Admission Height  60\" (152.4 cm) Documented at 02/21/2017 1216    Admission Weight  129 lb (58.5 kg) Documented at 02/21/2017 1216        129 lb 3 oz (58.6 kg)  No intake or output data in the 24 hours ending 02/23/17 1645    Physical Exam:   General Appearance:    Alert, cooperative, in no acute distress       Eyes:            Conjunctivae and sclerae normal, no   icterus, PERRLA   Neck:   No adenopathy, supple, trachea midline, no thyromegaly, no   carotid bruit, no JVD       Lungs:     Clear to auscultation,respirations regular, even and                  unlabored    Heart:    bradycardic rhythm, normal S1 and S2, no murmur, no gallop, no rub, no click       Abdomen:     Normal bowel sounds, no masses, no organomegaly, soft        non-tender, non-distended, no guarding, no rebound                tenderness       Extremities:   Moves all extremities well, no edema, no cyanosis   Pulses:   Pulses palpable and equal bilaterally       Lymph nodes:   No palpable adenopathy        Results Review:     I reviewed the patient's new clinical results.  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  Now status post permanent demand pacemaker.  She is pacing nicely at 60 bpm.  Pacemaker has been complicated by a small pneumothorax.  It was fractionally bigger this morning than last night.  Follow-up chest x-rays pending late this afternoon.  Discharge planning dependent upon any progressive worsening.  Likely will be discharged in the morning if she is stable overnight.  Blood pressures been running a little high and will adjust her antihypertensives.    Mohinder Barbosa MD  02/23/17  4:45 PM    Dragon disclaimer:   Much of this encounter note is an electronic transcription/translation of spoken language to printed text. The electronic translation of spoken language may permit erroneous, or at times, nonsensical words or phrases to be inadvertently transcribed; Although I have reviewed the note for such errors, some may still exist.

## 2017-02-23 NOTE — PROGRESS NOTES
Pulmonary follow-up chest x-rays a little more lordotic view but I think overall it is stable from this morning radiology's review is pending I think I would like to just keep her overnight and check an x-ray again in the morning

## 2017-02-23 NOTE — PROGRESS NOTES
LOS: 1 day   Patient Care Team:  Mohinder Barbosa MD as PCP - General (Internal Medicine)  No Known Provider as PCP - Family Medicine    Subjective: no SOB only pain at incision site and better than yesterday            Objective     Vital Signs  Temp:  [97.8 °F (36.6 °C)-98.5 °F (36.9 °C)] 97.8 °F (36.6 °C)  Heart Rate:  [44-68] 60  Resp:  [15-17] 16  BP: (134-199)/(64-93) 161/81  Body mass index is 25.23 kg/(m^2).    Intake/Output Summary (Last 24 hours) at 02/23/17 0714  Last data filed at 02/22/17 1630   Gross per 24 hour   Intake    740 ml   Output    400 ml   Net    340 ml          Physical Exam:  General Appearance: resting in bed no acute distress  ENT: mmdry  Neck: trachea midline now JVD no crepitance  Lungs: few crackle dry left base otherwise equal and symetric expansion nonlabored  Cardiac: RRR pacer site looks good  Abdomen: S/NT no organomegaly  Extremities/P Vascular: left arm in sling  MSE: good spirits        Labs:  WBC No results found for: WBCS   HGB HEMOGLOBIN   Date Value Ref Range Status   02/21/2017 14.7 11.9 - 15.5 g/dL Final      HCT HEMATOCRIT   Date Value Ref Range Status   02/21/2017 43.9 35.6 - 45.5 % Final      Platlets No results found for: LABPLAT     PT/INR:  No results found for: PROTIME/No results found for: INR    Sodium SODIUM   Date Value Ref Range Status   02/21/2017 142 136 - 145 mmol/L Final      Potassium POTASSIUM   Date Value Ref Range Status   02/21/2017 4.1 3.5 - 5.2 mmol/L Final      Chloride CHLORIDE   Date Value Ref Range Status   02/21/2017 101 98 - 107 mmol/L Final      Bicarbonate No results found for: PLASMABICARB   BUN BUN   Date Value Ref Range Status   02/21/2017 18 8 - 23 mg/dL Final      Creatinine CREATININE   Date Value Ref Range Status   02/21/2017 1.03 (H) 0.57 - 1.00 mg/dL Final      Calcium CALCIUM   Date Value Ref Range Status   02/21/2017 9.8 8.6 - 10.5 mg/dL Final      Magnesium MAGNESIUM   Date Value Ref Range Status   02/21/2017 2.0 1.6 -  2.4 mg/dL Final            pH No results found for: PHART   pO2 No results found for: PO2ART   pCO2 No results found for: OXF0KFF   HCO3 No results found for: EUF9TJG       allopurinol 300 mg Oral Daily   aspirin 325 mg Oral Daily   atorvastatin 80 mg Oral Daily   clopidogrel 75 mg Oral Daily   docusate sodium 100 mg Oral BID   losartan-hydrochlorothiazide 1 tablet Oral Daily   metFORMIN 1,000 mg Oral BID With Meals   metoprolol tartrate 50 mg Oral BID   SITagliptin 100 mg Oral Daily          Diagnostics:  Imaging Results (last 24 hours)     Procedure Component Value Units Date/Time    XR Chest 1 View [[id number]] Collected:  02/22/17 1741     Updated:  02/22/17 2126    Narrative:       PORTABLE CHEST X-RAY     HISTORY: Postoperative pacemaker placement.      Portable chest x-ray is correlated with a chest x-ray from yesterday.     FINDINGS: There is a new dual-lead cardiac pacing device. Sternal wires  and left upper mediastinal vascular stent material is again observed.     There is a 12 mm left apical pneumothorax. The pneumothorax measures  about 7 mm laterally at the left lung base. There is no pneumothorax on  the right. The lungs are clear.       Impression:       There is a new cardiac pacing device and a small left  pneumothorax as described.     I notified Dr. Zhong at 4:54 PM.     This report was finalized on 2/22/2017 9:23 PM by Dr. Juvenal Rose MD.       XR Chest 1 View [[id number]] Collected:  02/23/17 0538     Updated:  02/23/17 0538    Narrative:       X-RAY CHEST 1 VIEW.     HISTORY: Pneumothorax, follow-up.     COMPARISON: 2/22/2017.     FINDINGS:  Cardiomediastinal silhouette is within normal limits. Chronic lung  changes are stable.     Small left pneumothorax, there may be slight increase in the  pneumothorax at the left lung base, apical portion of the pneumothorax  is probably unchanged.          Impression:       Slight interval increase in previously seen left pneumothorax.     Findings  were called to patient's nurse Kayy, 5:38 AM.                 CXR reviewed and agree with radiology above        Assessment/Plan     Patient Active Problem List   Diagnosis Code   • CAD (coronary artery disease) I25.10   • Hypertension I10   • GERD (gastroesophageal reflux disease) K21.9   • Diabetes mellitus E11.9   • CKD (chronic kidney disease) stage 3, GFR 30-59 ml/min N18.3   • Gout M10.9   • Hyperlipidemia E78.5   • PUD (peptic ulcer disease) K27.9   • Near syncope R55   • Junctional rhythm I49.8   • Sick sinus syndrome I49.5       Impression #1 iatrogenic small left pneumothorax patient is asymptomatic no history of lung disease she is oxygenating well slight increase size overnight but non need therapy just observe would like to see stable for at least 12 hours before DC home and patient fine with this repeat CXR this afternoon and possibly in am depending on folow and if wants to go home this evening  #2 symptomatic bradycardia post permanent pacemaker placement to 2/22/17  #3 coronary artery disease  #4 diabetes mellitus  #5 hypertension    Plan for disposition:    Ruben Ward MD  02/23/17  7:14 AM    Time:

## 2017-02-23 NOTE — PLAN OF CARE
Problem: Patient Care Overview (Adult)  Goal: Plan of Care Review  Outcome: Ongoing (interventions implemented as appropriate)  Goal: Adult Individualization and Mutuality  Outcome: Ongoing (interventions implemented as appropriate)  Goal: Discharge Needs Assessment  Outcome: Ongoing (interventions implemented as appropriate)    Problem: Arrhythmia/Dysrhythmia (Symptomatic) (Adult)  Goal: Signs and Symptoms of Listed Potential Problems Will be Absent or Manageable (Arrhythmia/Dysrhythmia)  Outcome: Ongoing (interventions implemented as appropriate)    Problem: Fall Risk (Adult)  Goal: Absence of Falls  Outcome: Ongoing (interventions implemented as appropriate)

## 2017-02-23 NOTE — CONSULTS
Patient Care Team:  Mohinder Barbosa MD as PCP - General (Internal Medicine)  No Known Provider as PCP - Family Medicine    Chief complaint near syncope    Subjective     Patient is a 81 y.o. female.  Whom I'm asked to see regarding pneumothorax.  Patient was in seeing her primary care physician Dr. Barbosa and was getting her vital signs and had a near syncopal episode EKG showed a junctional bradycardia.  She had history of some similar but less severe episodes prior.  She was admitted to the hospital and evaluated and Dr. López placed the pacemaker today and post procedure she was found to have a small left pneumothorax.  She has no history of lung disease she was never smoker she is expressing no shortness of breath she has some pain but it's mostly in her left arm.  An pain is not worse with deep respiration.      Review of Systems  Constitution: No recent fevers chills sweats  Eyes: No Acute visual changes  ENT: No sore throat or runny nose  Respiratory: History of lung disease or asthma and the only time she's had shortness of breath really ordered she had her MIs years ago  Cardiovascular: Unaware disease she's had several MIs she's had coronary bypass grafting about 25 years ago and about 7 years ago.  She has long-standing hypertension  Gastrointestinal: No ulcers no liver disease  Genitourinary: Reviewed  Integument: Not reviewed  Hematologic/Lymphatic: She had a history of severe thrombophlebitis following her third pregnancy no clots since  Musculoskeletal: Not reviewed  Neurological: No Seizure strokes  Behavioral/Psych: Her smoker  Endocrine: Straight diabetes  Allergies/Immunologic: Reaction to the pneumococcal vaccine reported    History  Past Medical History   Diagnosis Date   • Coronary artery disease    • Diabetes mellitus    • Hypertension      Past Surgical History   Procedure Laterality Date   • Cataract extraction     • Coronary artery bypass graft       1992, 2009   • Tubal  abdominal ligation     • Cardiac surgery     • Abdominal surgery       Family History   Problem Relation Age of Onset   • Coronary artery disease Mother    • Heart attack Sister      Social History     Social History   • Marital status: Unknown     Spouse name: N/A   • Number of children: N/A   • Years of education: N/A     Social History Main Topics   • Smoking status: Never Smoker   • Smokeless tobacco: Not on file   • Alcohol use No   • Drug use: Not on file   • Sexual activity: Not on file     Other Topics Concern   • Not on file     Social History Narrative       Allergies:  Pneumococcal vaccines and Potassium-containing compounds    Medications:  Prior to Admission medications    Medication Sig Start Date End Date Taking? Authorizing Provider   allopurinol (ZYLOPRIM) 300 MG tablet Take 300 mg by mouth daily.   Yes Historical Provider, MD   aspirin 325 MG tablet Take 325 mg by mouth daily.    Historical Provider, MD   atorvastatin (LIPITOR) 80 MG tablet TAKE ONE TABLET BY MOUTH DAILY 9/12/16   Mohinder Barbosa MD   clopidogrel (PLAVIX) 75 MG tablet Take 75 mg by mouth daily.    Historical Provider, MD   JANUVIA 100 MG tablet TAKE ONE TABLET BY MOUTH DAILY 2/10/17   Mohinder Barbosa MD   losartan-hydrochlorothiazide (HYZAAR) 100-25 MG per tablet Take 1 tablet by mouth daily.    Historical Provider, MD   metFORMIN (GLUCOPHAGE) 500 MG tablet Take 2 tablets by mouth 2 (Two) Times a Day With Meals. 10/6/16   Mohinder Barbosa MD   metoprolol tartrate (LOPRESSOR) 50 MG tablet Take 50 mg by mouth 2 (two) times a day.    Historical Provider, MD       allopurinol 300 mg Oral Daily   aspirin 325 mg Oral Daily   atorvastatin 80 mg Oral Daily   clopidogrel 75 mg Oral Daily   docusate sodium 100 mg Oral BID   losartan-hydrochlorothiazide 1 tablet Oral Daily   metFORMIN 1,000 mg Oral BID With Meals   metoprolol tartrate 50 mg Oral BID   SITagliptin 100 mg Oral Daily          Objective     Vital Signs  Temp:  [98.1 °F (36.7 °C)-98.5  "°F (36.9 °C)] 98.5 °F (36.9 °C)  Heart Rate:  [41-68] 68  Resp:  [16-17] 16  BP: (134-199)/(64-93) 167/68  Body mass index is 25.23 kg/(m^2).    Intake/Output Summary (Last 24 hours) at 02/22/17 2152  Last data filed at 02/22/17 1630   Gross per 24 hour   Intake    740 ml   Output    700 ml   Net     40 ml          Flowsheet Rows         First Filed Value    Admission Height  60\" (152.4 cm) Documented at 02/21/2017 1216    Admission Weight  129 lb (58.5 kg) Documented at 02/21/2017 1216           Physical Exam:  General Appearance: Developed white female she was sleeping on my arrival saturations are 97% on room air  Eyes: Conjunctiva are clear and anicteric  ENT: Mucous membranes are moist no erythema or exudates  Neck: Adenopathy or thyromegaly no jugular venous distention trachea is midline there is no crepitance in the neck are symmetrical clavicular region  Lungs: Breath sounds sound pretty equal bilaterally I can't tell a difference.  She is nonlabored  Cardiac: Rate and rhythm on the monitor she is paced she does have a left upper anterior chest the incision a subcutaneous foreign body consistent with a pacemaker  Abdomen: Soft nontender no palpable organomegaly  : Not examined  Musc/Skel: Grossly normal  Skin: No jaundice, no rashes, no petechiae  Neuro: Oriented pleasant cooperative doesn't appear in any acute distress  Extremities/P Vascular: Left arm is in a sling radial pulses bilaterally  MSE: Seems to be in reasonably good spirits      Labs:    Results from last 7 days  Lab Units 02/21/17  1251   WBC 10*3/mm3 7.24   HEMOGLOBIN g/dL 14.7   PLATELETS 10*3/mm3 236         Radiographic Imaging:  Imaging Results (last 24 hours)     Procedure Component Value Units Date/Time    XR Chest 1 View [[id number]] Collected:  02/22/17 1741     Updated:  02/22/17 2126    Narrative:       PORTABLE CHEST X-RAY     HISTORY: Postoperative pacemaker placement.      Portable chest x-ray is correlated with a chest x-ray from " yesterday.     FINDINGS: There is a new dual-lead cardiac pacing device. Sternal wires  and left upper mediastinal vascular stent material is again observed.     There is a 12 mm left apical pneumothorax. The pneumothorax measures  about 7 mm laterally at the left lung base. There is no pneumothorax on  the right. The lungs are clear.       Impression:       There is a new cardiac pacing device and a small left  pneumothorax as described.     I notified Dr. Zhong at 4:54 PM.     This report was finalized on 2/22/2017 9:23 PM by Dr. Juvenal Rose MD.             Reviewed the chest x-ray and there is a small pneumothorax is noted above    Assessment/Plan     Impression #1 iatrogenic small left pneumothorax patient is asymptomatic no history of lung disease she is oxygenating well we will discontinue follow will check a chest x-ray in the morning and hopefully she will not need a chest tube  #2 symptomatic bradycardia post permanent pacemaker placement to 2/22/17   #3 coronary artery disease  #4 diabetes mellitus  #5 hypertension      Ruben Ward MD  02/22/17  9:52 PM    Time:

## 2017-02-23 NOTE — PLAN OF CARE
Problem: Patient Care Overview (Adult)  Goal: Plan of Care Review  Outcome: Ongoing (interventions implemented as appropriate)    02/23/17 1811   Coping/Psychosocial Response Interventions   Plan Of Care Reviewed With patient   Patient Care Overview   Progress improving   Outcome Evaluation   Outcome Summary/Follow up Plan S/P PPM. Left chest incision JOYCE with liquid skin. No swelling, edema, or drainage present. Repeat chest x-ray done, results pending. No complaints during the day today. VSS. Will continue to monitor.

## 2017-02-24 ENCOUNTER — APPOINTMENT (OUTPATIENT)
Dept: GENERAL RADIOLOGY | Facility: HOSPITAL | Age: 82
End: 2017-02-24

## 2017-02-24 VITALS
HEART RATE: 61 BPM | OXYGEN SATURATION: 94 % | DIASTOLIC BLOOD PRESSURE: 69 MMHG | TEMPERATURE: 98 F | WEIGHT: 129.19 LBS | BODY MASS INDEX: 25.36 KG/M2 | HEIGHT: 60 IN | SYSTOLIC BLOOD PRESSURE: 125 MMHG | RESPIRATION RATE: 18 BRPM

## 2017-02-24 PROCEDURE — 93005 ELECTROCARDIOGRAM TRACING: CPT | Performed by: INTERNAL MEDICINE

## 2017-02-24 PROCEDURE — 93010 ELECTROCARDIOGRAM REPORT: CPT | Performed by: INTERNAL MEDICINE

## 2017-02-24 PROCEDURE — 71010 HC CHEST PA OR AP: CPT

## 2017-02-24 PROCEDURE — 99239 HOSP IP/OBS DSCHRG MGMT >30: CPT | Performed by: INTERNAL MEDICINE

## 2017-02-24 RX ORDER — METOPROLOL TARTRATE 50 MG/1
100 TABLET, FILM COATED ORAL 2 TIMES DAILY
Qty: 120 TABLET | Refills: 0 | Status: SHIPPED | OUTPATIENT
Start: 2017-02-24 | End: 2017-04-03 | Stop reason: SDUPTHER

## 2017-02-24 RX ADMIN — CLOPIDOGREL 75 MG: 75 TABLET, FILM COATED ORAL at 08:05

## 2017-02-24 RX ADMIN — SITAGLIPTIN 100 MG: 100 TABLET, FILM COATED ORAL at 08:06

## 2017-02-24 RX ADMIN — LOSARTAN POTASSIUM AND HYDROCHLOROTHIAZIDE 1 TABLET: 100; 25 TABLET, FILM COATED ORAL at 08:05

## 2017-02-24 RX ADMIN — ASPIRIN 325 MG: 325 TABLET ORAL at 08:05

## 2017-02-24 RX ADMIN — ATORVASTATIN CALCIUM 80 MG: 80 TABLET, FILM COATED ORAL at 08:05

## 2017-02-24 RX ADMIN — METOPROLOL TARTRATE 100 MG: 100 TABLET ORAL at 08:05

## 2017-02-24 RX ADMIN — DOCUSATE SODIUM 100 MG: 100 CAPSULE, LIQUID FILLED ORAL at 08:06

## 2017-02-24 RX ADMIN — ALLOPURINOL 300 MG: 300 TABLET ORAL at 08:05

## 2017-02-24 RX ADMIN — METFORMIN HYDROCHLORIDE 1000 MG: 1000 TABLET ORAL at 08:05

## 2017-02-24 NOTE — PLAN OF CARE
Problem: Patient Care Overview (Adult)  Goal: Plan of Care Review  Outcome: Ongoing (interventions implemented as appropriate)    02/23/17 2013   Coping/Psychosocial Response Interventions   Plan Of Care Reviewed With patient;family   Patient Care Overview   Progress progress toward functional goals as expected       Goal: Adult Individualization and Mutuality  Outcome: Ongoing (interventions implemented as appropriate)  Goal: Discharge Needs Assessment  Outcome: Ongoing (interventions implemented as appropriate)    02/22/17 1104 02/23/17 2013   Discharge Needs Assessment   Concerns To Be Addressed no discharge needs identified;denies needs/concerns at this time --    Readmission Within The Last 30 Days no previous admission in last 30 days --    Equipment Needed After Discharge none --    Discharge Disposition --  still a patient   Current Health   Anticipated Changes Related to Illness none --    Living Environment   Transportation Available --  family or friend will provide   Self-Care   Equipment Currently Used at Home grab bar;other (see comments)  (currently has shower seat, BSC, rolling walker, cane that she isn't using) --          Problem: Arrhythmia/Dysrhythmia (Symptomatic) (Adult)  Goal: Signs and Symptoms of Listed Potential Problems Will be Absent or Manageable (Arrhythmia/Dysrhythmia)  Outcome: Ongoing (interventions implemented as appropriate)    02/23/17 2013   Arrhythmia/Dysrhythmia (Symptomatic)   Problems Assessed (Arrhythmia/Dysrhythmia) all   Problems Present (Arrhythmia/Dysrhythmia) none         Problem: Fall Risk (Adult)  Goal: Absence of Falls  Outcome: Ongoing (interventions implemented as appropriate)    02/23/17 2013   Fall Risk (Adult)   Absence of Falls making progress toward outcome

## 2017-02-24 NOTE — PROGRESS NOTES
LOS: 1 day   Patient Care Team:  Mohinder Barbosa MD as PCP - General (Internal Medicine)  No Known Provider as PCP - Family Medicine    Subjective: no SOB only pain at incision site and better than yesterday she's been up walking with no shortness of breath            Objective     Vital Signs  Temp:  [97.8 °F (36.6 °C)-98 °F (36.7 °C)] 98 °F (36.7 °C)  Heart Rate:  [60] 60  Resp:  [15-16] 16  BP: (137-158)/(66-81) 157/75  Body mass index is 25.23 kg/(m^2).  No intake or output data in the 24 hours ending 02/24/17 0640       Physical Exam:  General Appearance: resting in bed no acute distress  ENT: mmdry  Neck: trachea midline, no JVD no crepitance  Lungs: Nonlabored symmetric expansion I hear no wheezes rales or rhonchi there is no difference between the left and right chest on auscultation.    Cardiac: RRR pacer site looks good  Abdomen: S/NT no organomegaly  Extremities/P Vascular: No clubbing cyanosis or edema palpable radial dorsalis pedis pulses  MSE: good spirits        Labs:  WBC No results found for: WBCS   HGB HEMOGLOBIN   Date Value Ref Range Status   02/21/2017 14.7 11.9 - 15.5 g/dL Final      HCT HEMATOCRIT   Date Value Ref Range Status   02/21/2017 43.9 35.6 - 45.5 % Final      Platlets No results found for: LABPLAT     PT/INR:  No results found for: PROTIME/No results found for: INR    Sodium SODIUM   Date Value Ref Range Status   02/21/2017 142 136 - 145 mmol/L Final      Potassium POTASSIUM   Date Value Ref Range Status   02/21/2017 4.1 3.5 - 5.2 mmol/L Final      Chloride CHLORIDE   Date Value Ref Range Status   02/21/2017 101 98 - 107 mmol/L Final      Bicarbonate No results found for: PLASMABICARB   BUN BUN   Date Value Ref Range Status   02/21/2017 18 8 - 23 mg/dL Final      Creatinine CREATININE   Date Value Ref Range Status   02/21/2017 1.03 (H) 0.57 - 1.00 mg/dL Final      Calcium CALCIUM   Date Value Ref Range Status   02/21/2017 9.8 8.6 - 10.5 mg/dL Final      Magnesium MAGNESIUM    Date Value Ref Range Status   02/21/2017 2.0 1.6 - 2.4 mg/dL Final            pH No results found for: PHART   pO2 No results found for: PO2ART   pCO2 No results found for: WMG8WPS   HCO3 No results found for: EXJ4XXS       allopurinol 300 mg Oral Daily   aspirin 325 mg Oral Daily   atorvastatin 80 mg Oral Daily   clopidogrel 75 mg Oral Daily   docusate sodium 100 mg Oral BID   losartan-hydrochlorothiazide 1 tablet Oral Daily   metFORMIN 1,000 mg Oral BID With Meals   metoprolol tartrate 100 mg Oral BID   SITagliptin 100 mg Oral Daily          Diagnostics:  Imaging Results (last 24 hours)     Procedure Component Value Units Date/Time    XR Chest 1 View [[id number]] Collected:  02/22/17 1741     Updated:  02/22/17 2126    Narrative:       PORTABLE CHEST X-RAY     HISTORY: Postoperative pacemaker placement.      Portable chest x-ray is correlated with a chest x-ray from yesterday.     FINDINGS: There is a new dual-lead cardiac pacing device. Sternal wires  and left upper mediastinal vascular stent material is again observed.     There is a 12 mm left apical pneumothorax. The pneumothorax measures  about 7 mm laterally at the left lung base. There is no pneumothorax on  the right. The lungs are clear.       Impression:       There is a new cardiac pacing device and a small left  pneumothorax as described.     I notified Dr. Zhong at 4:54 PM.     This report was finalized on 2/22/2017 9:23 PM by Dr. Juvenal Rose MD.       XR Chest 1 View [[id number]] Collected:  02/23/17 0538     Updated:  02/23/17 0538    Narrative:       X-RAY CHEST 1 VIEW.     HISTORY: Pneumothorax, follow-up.     COMPARISON: 2/22/2017.     FINDINGS:  Cardiomediastinal silhouette is within normal limits. Chronic lung  changes are stable.     Small left pneumothorax, there may be slight increase in the  pneumothorax at the left lung base, apical portion of the pneumothorax  is probably unchanged.          Impression:       Slight interval  increase in previously seen left pneumothorax.     Findings were called to patient's nurse Kayy, 5:38 AM.                 CXR reviewed and agree with radiology above        Assessment/Plan     Patient Active Problem List   Diagnosis Code   • CAD (coronary artery disease) I25.10   • Hypertension I10   • GERD (gastroesophageal reflux disease) K21.9   • Diabetes mellitus E11.9   • CKD (chronic kidney disease) stage 3, GFR 30-59 ml/min N18.3   • Gout M10.9   • Hyperlipidemia E78.5   • PUD (peptic ulcer disease) K27.9   • Near syncope R55   • Junctional rhythm I49.8   • Sick sinus syndrome I49.5       Impression #1 iatrogenic small left pneumothorax patient is asymptomatic no history of lung disease she is oxygenating well slight.  Pneumothorax is stable to improved over the last 24 hours.  It's okay from a pulmonary standpoint to discharge home I'll recommend a follow-up chest x-ray in about 1 week to 10 days to ensure resolution.  I recommend she return immediately to the emergency room for any acute shortness of breath or chest pain.  To avoid Valsalva type maneuvers for the next 1-2 weeks and no airline flights or major barometric pressure change activities for 6 weeks.  Patient can follow-up with Dr. Barbosa or we will be glad to see her in the office for a follow-up chest x-ray I will defer this to Dr. Barbosa and the patient  #2 symptomatic bradycardia post permanent pacemaker placement  2/22/17  #3 coronary artery disease  #4 diabetes mellitus  #5 hypertension    Plan for disposition:    Ruben Ward MD  02/24/17  6:40 AM    Time:

## 2017-02-24 NOTE — DISCHARGE SUMMARY
Date of Admission: 2/21/2017  Date of Discharge:  2/24/2017    Discharge Diagnosis:   Other Diagnoses: Principal Problem:    Near syncope  Active Problems:    CAD (coronary artery disease)    Hypertension    GERD (gastroesophageal reflux disease)    Diabetes mellitus    CKD (chronic kidney disease) stage 3, GFR 30-59 ml/min    Junctional rhythm    Sick sinus syndrome    Pneumothorax related to pacemaker insertion    Consults:   Consults     Date and Time Order Name Status Description    2/22/2017 1711 Inpatient Consult to Pulmonology      2/21/2017 1539 Inpatient Consult to Cardiology            Procedures Performed  Procedure(s):  Pacemaker DC new       DETAILS OF HOSPITAL STAY     Hospital Course  Patient is a 81 y.o. female presented with near syncope on 2 occasions and evidence of a junctional rhythm on EKG.  Heart rate was mid 30s.  She was seen in consultation by Dr. hammond and a permanent pacemaker was felt to be dictated.  This was placed with difficulty.  The cephalic vein could not be cannulated and despite micropuncture techniques a small pneumothorax complicating the procedure.  The pacemaker is working well.  The pneumothorax was observed for 36 hours without change.  She had some mild pleuritic left chest pain from this.  She was seen in pulmonary consultation by Dr. Ward health the patient was stable to be discharged observed at home.  She's instructed to come to the emergency room immediately for severe pain or shortness of breath.  Otherwise she'll follow-up in the office in one week at which time I will do a chest x-ray and see if the pneumothorax is resolved.  She will be followed in the pacemaker clinic by Dr. hammond.  Ultimately the pacemaker care may be transferred to her regular attending cardiologist in [facility].      Discharge Medications   Tamie Peter   Home Medication Instructions TABITHA:[id number]    Printed on:02/24/17 1231   Medication Information                     "  allopurinol (ZYLOPRIM) 300 MG tablet  Take 300 mg by mouth daily.             aspirin 325 MG tablet  Take 325 mg by mouth daily.             atorvastatin (LIPITOR) 80 MG tablet  TAKE ONE TABLET BY MOUTH DAILY             clopidogrel (PLAVIX) 75 MG tablet  Take 75 mg by mouth daily.             JANUVIA 100 MG tablet  TAKE ONE TABLET BY MOUTH DAILY             losartan-hydrochlorothiazide (HYZAAR) 100-25 MG per tablet  Take 1 tablet by mouth daily.             metFORMIN (GLUCOPHAGE) 500 MG tablet  Take 2 tablets by mouth 2 (Two) Times a Day With Meals.             metoprolol tartrate (LOPRESSOR) 50 MG tablet  Take 2 tablets by mouth 2 (Two) Times a Day.                   Pertinent Test Results:     Condition on Discharge:  Good.  Stable.      Vital Signs  Temp:  [97.8 °F (36.6 °C)-98.1 °F (36.7 °C)] 98 °F (36.7 °C)  Heart Rate:  [60-61] 61  Resp:  [15-18] 18  BP: (125-169)/(66-86) 125/69    Flowsheet Rows         First Filed Value    Admission Height  60\" (152.4 cm) Documented at 02/21/2017 1216    Admission Weight  129 lb (58.5 kg) Documented at 02/21/2017 1216            Discharge Disposition  Home or Self Care      Discharge Diet:   1800-calorie ADA no added salt diet    Activity at Discharge:   Okay for low level activity.  No lifting.  No flying or scuba diving for 6 weeks.  No Valsalva maneuvers.    Follow-up Appointments  Future Appointments  Date Time Provider Department Center   3/3/2017 10:30 AM [facility] None     Additional Instructions for the Follow-ups that You Need to Schedule     Discharge Follow-up with Specialty    As directed    Follow Up Details:  Follow up with [facility] on 3/3- Please call and arrange at [phone]                 Test Results Pending at Discharge       Greater than 30 minutes spent coordinating patient's discharge.    Mohinder Barbosa MD  02/24/17  12:31 PM    Dragon disclaimer:   Much of this encounter note is an electronic transcription/translation of " spoken language to printed text. The electronic translation of spoken language may permit erroneous, or at times, nonsensical words or phrases to be inadvertently transcribed; Although I have reviewed the note for such errors, some may still exist.

## 2017-02-24 NOTE — PLAN OF CARE
Problem: Patient Care Overview (Adult)  Goal: Plan of Care Review  Outcome: Ongoing (interventions implemented as appropriate)    02/24/17 8222   Coping/Psychosocial Response Interventions   Plan Of Care Reviewed With patient   Patient Care Overview   Progress improving   Outcome Evaluation   Outcome Summary/Follow up Plan Repeat chest x-ray shows a decrease in size of the pneumothorax. Pt with no c/o's of SOA or chest pain. VSS. Will continue to monitor.

## 2017-03-03 ENCOUNTER — CLINICAL SUPPORT NO REQUIREMENTS (OUTPATIENT)
Dept: CARDIOLOGY | Facility: CLINIC | Age: 82
End: 2017-03-03

## 2017-03-03 ENCOUNTER — HOSPITAL ENCOUNTER (OUTPATIENT)
Dept: GENERAL RADIOLOGY | Facility: HOSPITAL | Age: 82
Discharge: HOME | End: 2017-03-03
Attending: INTERNAL MEDICINE | Admitting: INTERNAL MEDICINE

## 2017-03-03 ENCOUNTER — OFFICE VISIT (OUTPATIENT)
Dept: INTERNAL MEDICINE | Facility: CLINIC | Age: 82
End: 2017-03-03

## 2017-03-03 VITALS
OXYGEN SATURATION: 96 % | HEART RATE: 68 BPM | DIASTOLIC BLOOD PRESSURE: 60 MMHG | BODY MASS INDEX: 25.25 KG/M2 | TEMPERATURE: 97.2 F | WEIGHT: 128.6 LBS | HEIGHT: 60 IN | RESPIRATION RATE: 16 BRPM | SYSTOLIC BLOOD PRESSURE: 112 MMHG

## 2017-03-03 DIAGNOSIS — I10 ESSENTIAL HYPERTENSION: Primary | ICD-10-CM

## 2017-03-03 DIAGNOSIS — J95.811 POSTPROCEDURAL PNEUMOTHORAX: ICD-10-CM

## 2017-03-03 DIAGNOSIS — R79.89 ABNORMAL LIVER FUNCTION TESTS: ICD-10-CM

## 2017-03-03 DIAGNOSIS — I25.10 CORONARY ARTERY DISEASE INVOLVING NATIVE CORONARY ARTERY OF NATIVE HEART WITHOUT ANGINA PECTORIS: ICD-10-CM

## 2017-03-03 DIAGNOSIS — E78.5 HYPERLIPIDEMIA, UNSPECIFIED HYPERLIPIDEMIA TYPE: ICD-10-CM

## 2017-03-03 DIAGNOSIS — E11.9 TYPE 2 DIABETES MELLITUS WITHOUT COMPLICATION, WITHOUT LONG-TERM CURRENT USE OF INSULIN (HCC): ICD-10-CM

## 2017-03-03 DIAGNOSIS — R55 NEAR SYNCOPE: ICD-10-CM

## 2017-03-03 DIAGNOSIS — I49.5 SICK SINUS SYNDROME (HCC): Primary | ICD-10-CM

## 2017-03-03 PROCEDURE — 99213 OFFICE O/P EST LOW 20 MIN: CPT | Performed by: INTERNAL MEDICINE

## 2017-03-03 PROCEDURE — 71020 HC CHEST PA AND LATERAL: CPT

## 2017-03-03 PROCEDURE — 93280 PM DEVICE PROGR EVAL DUAL: CPT | Performed by: INTERNAL MEDICINE

## 2017-03-03 RX ORDER — AMLODIPINE BESYLATE 5 MG/1
5 TABLET ORAL DAILY
COMMUNITY
Start: 2017-02-10 | End: 2020-09-14

## 2017-03-03 NOTE — PROGRESS NOTES
Subjective   Tamie Peter is a 81 y.o. female.     Chief Complaint   Patient presents with   • Loss of Consciousness     follow up hospital d/c         HPI Comments: In for recheck from hospitalization.  She presented with near syncope.  Had a junctional heart rhythm.  Got an atrial sensed permanent demand pacemaker.  She's had no symptoms since then.  She has small pneumothorax complicating the procedure.  It was a difficult insertion.  A lot of scar tissue from her previous surgeries  Cephalic vein was atretic.  Yesterday she started feeling tremendously better.  Her pleuritic chest pain has resolved.         The following portions of the patient's history were reviewed and updated as appropriate: allergies, current medications, past social history and problem list.    Outpatient Prescriptions Marked as Taking for the 3/3/17 encounter (Office Visit) with Mohinder Barbosa MD   Medication Sig Dispense Refill   • allopurinol (ZYLOPRIM) 300 MG tablet Take 300 mg by mouth daily.     • amLODIPine (NORVASC) 5 MG tablet Take 5 mg by mouth Daily.     • aspirin 325 MG tablet Take 325 mg by mouth daily.     • atorvastatin (LIPITOR) 80 MG tablet TAKE ONE TABLET BY MOUTH DAILY 90 tablet 0   • clopidogrel (PLAVIX) 75 MG tablet Take 75 mg by mouth daily.     • JANUVIA 100 MG tablet TAKE ONE TABLET BY MOUTH DAILY 30 tablet 1   • losartan-hydrochlorothiazide (HYZAAR) 100-25 MG per tablet Take 1 tablet by mouth daily.     • metFORMIN (GLUCOPHAGE) 500 MG tablet Take 2 tablets by mouth 2 (Two) Times a Day With Meals. 360 tablet 1   • metoprolol tartrate (LOPRESSOR) 50 MG tablet Take 2 tablets by mouth 2 (Two) Times a Day. 120 tablet 0       Review of Systems   Constitutional: Negative for fatigue and fever.   Respiratory: Positive for cough. Negative for shortness of breath.    Cardiovascular: Negative for chest pain, palpitations and leg swelling.       Objective   Vitals:    03/03/17 1448   BP: 112/60   Pulse: 68   Resp: 16    Temp: 97.2 °F (36.2 °C)   SpO2: 96%      Last Weight    03/03/17  1448   Weight: 128 lb 9.6 oz (58.3 kg)    [unfilled]  Body mass index is 25.12 kg/(m^2).      Physical Exam   Constitutional: She appears well-developed and well-nourished. No distress.   HENT:   Head: Normocephalic and atraumatic.   Neck: Carotid bruit is not present.   Cardiovascular: Normal rate, regular rhythm, normal heart sounds and intact distal pulses.  Exam reveals no gallop.    No murmur heard.  Pulmonary/Chest: Effort normal and breath sounds normal. No respiratory distress. She has no wheezes. She has no rales.   Skin: Skin is warm and dry.         Problem List Items Addressed This Visit        Cardiovascular and Mediastinum    CAD (coronary artery disease)    Relevant Medications    amLODIPine (NORVASC) 5 MG tablet    Hypertension - Primary    Relevant Medications    amLODIPine (NORVASC) 5 MG tablet    Hyperlipidemia    Relevant Orders    Lipid Panel    Near syncope       Endocrine    Diabetes mellitus    Relevant Orders    Hemoglobin A1c      Other Visit Diagnoses     Abnormal liver function tests        Relevant Orders    Hepatic Function Panel (6) ([facility])    Postprocedural pneumothorax        Relevant Orders    XR Chest 2 View        Assessment/Plan   In for follow-up from hospitalization.  Near-syncope.  Got a permanent demand pacemaker.  Atrial sensed.  This was complicated by a tiny pneumothorax.  Her chest symptoms per se have completely resolved since her pacemaker one week ago.  States she just started feeling very good.  We will check a follow-up chest x-ray today to follow-up on the pneumothorax.  He had a discussion on her CKD which is minimal and she is reassured about that.  Follow-up 3 months with glucose, A1c, and lipids.  We'll also check a hepatic profile.  She had a few minor liver function test abnormalities in the hospital.  Annual lab work in the hospital February 2017.         Dragon disclaimer:   Much of  this encounter note is an electronic transcription/translation of spoken language to printed text. The electronic translation of spoken language may permit erroneous, or at times, nonsensical words or phrases to be inadvertently transcribed; Although I have reviewed the note for such errors, some may still exist.

## 2017-03-03 NOTE — NURSING NOTE
"Phone call to Dr. Barbosa with Chest Xray results read per Dr. Graff as \"essentially resolved.\" This RN to notify Dr. Barbosa.  "

## 2017-03-13 ENCOUNTER — CLINICAL SUPPORT NO REQUIREMENTS (OUTPATIENT)
Dept: CARDIOLOGY | Facility: CLINIC | Age: 82
End: 2017-03-13

## 2017-03-13 DIAGNOSIS — I49.5 SICK SINUS SYNDROME (HCC): Primary | ICD-10-CM

## 2017-03-29 RX ORDER — ATORVASTATIN CALCIUM 80 MG/1
TABLET, FILM COATED ORAL
Qty: 90 TABLET | Refills: 0 | Status: SHIPPED | OUTPATIENT
Start: 2017-03-29 | End: 2017-09-07 | Stop reason: SDUPTHER

## 2017-04-03 RX ORDER — METOPROLOL TARTRATE 50 MG/1
100 TABLET, FILM COATED ORAL 2 TIMES DAILY
Qty: 120 TABLET | Refills: 1 | Status: SHIPPED | OUTPATIENT
Start: 2017-04-03 | End: 2018-08-05 | Stop reason: SDUPTHER

## 2017-05-07 ENCOUNTER — RESULTS ENCOUNTER (OUTPATIENT)
Dept: INTERNAL MEDICINE | Facility: CLINIC | Age: 82
End: 2017-05-07

## 2017-05-07 DIAGNOSIS — Z79.899 MEDICATION MANAGEMENT: ICD-10-CM

## 2017-05-07 DIAGNOSIS — E78.5 HYPERLIPIDEMIA, UNSPECIFIED HYPERLIPIDEMIA TYPE: ICD-10-CM

## 2017-05-07 DIAGNOSIS — E11.9 TYPE 2 DIABETES MELLITUS WITHOUT COMPLICATION, WITHOUT LONG-TERM CURRENT USE OF INSULIN (HCC): ICD-10-CM

## 2017-05-19 ENCOUNTER — RESULTS ENCOUNTER (OUTPATIENT)
Dept: INTERNAL MEDICINE | Facility: CLINIC | Age: 82
End: 2017-05-19

## 2017-05-19 DIAGNOSIS — E78.5 HYPERLIPIDEMIA, UNSPECIFIED HYPERLIPIDEMIA TYPE: ICD-10-CM

## 2017-05-19 DIAGNOSIS — E11.9 TYPE 2 DIABETES MELLITUS WITHOUT COMPLICATION, WITHOUT LONG-TERM CURRENT USE OF INSULIN (HCC): ICD-10-CM

## 2017-05-19 DIAGNOSIS — R79.89 ABNORMAL LIVER FUNCTION TESTS: ICD-10-CM

## 2017-05-31 LAB
ALBUMIN SERPL-MCNC: 4.1 G/DL (ref 3.5–4.8)
ALBUMIN/GLOB SERPL: 1.5 {RATIO} (ref 1.2–2.2)
ALP SERPL-CCNC: 109 IU/L (ref 39–117)
ALT SERPL-CCNC: 19 IU/L (ref 0–32)
AST SERPL-CCNC: 21 IU/L (ref 0–40)
BASOPHILS # BLD AUTO: 0.1 X10E3/UL (ref 0–0.2)
BASOPHILS NFR BLD AUTO: 1 %
BILIRUB SERPL-MCNC: 0.5 MG/DL (ref 0–1.2)
BUN SERPL-MCNC: 20 MG/DL (ref 8–27)
BUN/CREAT SERPL: 18 (ref 12–28)
CALCIUM SERPL-MCNC: 9.7 MG/DL (ref 8.7–10.3)
CHLORIDE SERPL-SCNC: 97 MMOL/L (ref 96–106)
CHOLEST SERPL-MCNC: 180 MG/DL (ref 100–199)
CO2 SERPL-SCNC: 23 MMOL/L (ref 18–29)
CREAT SERPL-MCNC: 1.11 MG/DL (ref 0.57–1)
EOSINOPHIL # BLD AUTO: 0.2 X10E3/UL (ref 0–0.4)
EOSINOPHIL NFR BLD AUTO: 3 %
ERYTHROCYTE [DISTWIDTH] IN BLOOD BY AUTOMATED COUNT: 14.4 % (ref 12.3–15.4)
GLOBULIN SER CALC-MCNC: 2.7 G/DL (ref 1.5–4.5)
GLUCOSE SERPL-MCNC: 350 MG/DL (ref 65–99)
HBA1C MFR BLD: 7.8 % (ref 4.8–5.6)
HCT VFR BLD AUTO: 39.6 % (ref 34–46.6)
HDLC SERPL-MCNC: 54 MG/DL
HGB BLD-MCNC: 13.6 G/DL (ref 11.1–15.9)
IMM GRANULOCYTES # BLD: 0 X10E3/UL (ref 0–0.1)
IMM GRANULOCYTES NFR BLD: 0 %
LDLC SERPL CALC-MCNC: 82 MG/DL (ref 0–99)
LYMPHOCYTES # BLD AUTO: 1.3 X10E3/UL (ref 0.7–3.1)
LYMPHOCYTES NFR BLD AUTO: 22 %
MCH RBC QN AUTO: 31.3 PG (ref 26.6–33)
MCHC RBC AUTO-ENTMCNC: 34.3 G/DL (ref 31.5–35.7)
MCV RBC AUTO: 91 FL (ref 79–97)
MONOCYTES # BLD AUTO: 0.4 X10E3/UL (ref 0.1–0.9)
MONOCYTES NFR BLD AUTO: 7 %
NEUTROPHILS # BLD AUTO: 4 X10E3/UL (ref 1.4–7)
NEUTROPHILS NFR BLD AUTO: 67 %
PLATELET # BLD AUTO: 246 X10E3/UL (ref 150–379)
POTASSIUM SERPL-SCNC: 5.1 MMOL/L (ref 3.5–5.2)
PROT SERPL-MCNC: 6.8 G/DL (ref 6–8.5)
RBC # BLD AUTO: 4.34 X10E6/UL (ref 3.77–5.28)
SODIUM SERPL-SCNC: 139 MMOL/L (ref 134–144)
TRIGL SERPL-MCNC: 218 MG/DL (ref 0–149)
VLDLC SERPL CALC-MCNC: 44 MG/DL (ref 5–40)
WBC # BLD AUTO: 6.1 X10E3/UL (ref 3.4–10.8)

## 2017-06-05 ENCOUNTER — OFFICE VISIT (OUTPATIENT)
Dept: INTERNAL MEDICINE | Facility: CLINIC | Age: 82
End: 2017-06-05

## 2017-06-05 VITALS
OXYGEN SATURATION: 95 % | BODY MASS INDEX: 26.03 KG/M2 | TEMPERATURE: 97.9 F | HEART RATE: 71 BPM | SYSTOLIC BLOOD PRESSURE: 132 MMHG | DIASTOLIC BLOOD PRESSURE: 64 MMHG | RESPIRATION RATE: 16 BRPM | WEIGHT: 132.6 LBS | HEIGHT: 60 IN

## 2017-06-05 DIAGNOSIS — N18.30 CKD (CHRONIC KIDNEY DISEASE) STAGE 3, GFR 30-59 ML/MIN (HCC): ICD-10-CM

## 2017-06-05 DIAGNOSIS — I25.10 CORONARY ARTERY DISEASE INVOLVING NATIVE CORONARY ARTERY OF NATIVE HEART WITHOUT ANGINA PECTORIS: ICD-10-CM

## 2017-06-05 DIAGNOSIS — E78.5 HYPERLIPIDEMIA, UNSPECIFIED HYPERLIPIDEMIA TYPE: ICD-10-CM

## 2017-06-05 DIAGNOSIS — I10 ESSENTIAL HYPERTENSION: Primary | ICD-10-CM

## 2017-06-05 DIAGNOSIS — E11.9 TYPE 2 DIABETES MELLITUS WITHOUT COMPLICATION, WITHOUT LONG-TERM CURRENT USE OF INSULIN (HCC): ICD-10-CM

## 2017-06-05 PROCEDURE — 99214 OFFICE O/P EST MOD 30 MIN: CPT | Performed by: INTERNAL MEDICINE

## 2017-06-05 NOTE — PROGRESS NOTES
Subjective   Tamie Peter is a 81 y.o. female.     Chief Complaint   Patient presents with   • Diabetes     CAD   • Hyperlipidemia   • Hypertension         Diabetes   She presents for her follow-up diabetic visit. She has type 2 diabetes mellitus. No MedicAlert identification noted. Her disease course has been stable. Hypoglycemia symptoms include dizziness. Pertinent negatives for diabetes include no chest pain and no fatigue. Symptoms are stable. Diabetic complications include nephropathy. Pertinent negatives for diabetic complications include no autonomic neuropathy, CVA, heart disease, peripheral neuropathy or retinopathy. Risk factors for coronary artery disease include diabetes mellitus, dyslipidemia and post-menopausal. Current diabetic treatment includes oral agent (dual therapy). She is compliant with treatment all of the time. Meal planning includes ADA exchanges. She has had a previous visit with a dietitian. She participates in exercise daily. An ACE inhibitor/angiotensin II receptor blocker is being taken. She does not see a podiatrist.Eye exam is current.   Hyperlipidemia   This is a chronic problem. The current episode started more than 1 year ago. The problem is controlled. Recent lipid tests were reviewed and are normal. Exacerbating diseases include chronic renal disease. Factors aggravating her hyperlipidemia include thiazides and beta blockers. Pertinent negatives include no chest pain, myalgias or shortness of breath (in evenings only). Current antihyperlipidemic treatment includes statins and bile acid squestrants. The current treatment provides significant improvement of lipids. There are no compliance problems.  Risk factors for coronary artery disease include dyslipidemia and post-menopausal.   Hypertension   This is a chronic problem. The current episode started more than 1 year ago. The problem is unchanged. The problem is controlled. Associated symptoms include peripheral edema (right  foot in summer only). Pertinent negatives include no chest pain, orthopnea, palpitations or shortness of breath (in evenings only). There are no associated agents to hypertension. Risk factors for coronary artery disease include diabetes mellitus, dyslipidemia and post-menopausal state. Past treatments include ACE inhibitors, beta blockers and diuretics. Hypertensive end-organ damage includes kidney disease and CAD/MI. There is no history of angina, CVA, heart failure or retinopathy. Identifiable causes of hypertension include chronic renal disease.   Dizziness   Pertinent negatives include no abdominal pain, chest pain, chills, coughing, fatigue, fever, myalgias, nausea or vomiting.        The following portions of the patient's history were reviewed and updated as appropriate: allergies, current medications, past social history and problem list.    Outpatient Prescriptions Marked as Taking for the 6/5/17 encounter (Office Visit) with Mohinder Barbosa MD   Medication Sig Dispense Refill   • allopurinol (ZYLOPRIM) 300 MG tablet Take 300 mg by mouth daily.     • amLODIPine (NORVASC) 5 MG tablet Take 5 mg by mouth Daily.     • aspirin 325 MG tablet Take 325 mg by mouth daily.     • atorvastatin (LIPITOR) 80 MG tablet TAKE ONE TABLET BY MOUTH DAILY 90 tablet 0   • clopidogrel (PLAVIX) 75 MG tablet Take 75 mg by mouth daily.     • JANUVIA 100 MG tablet TAKE ONE TABLET BY MOUTH DAILY 30 tablet 1   • losartan-hydrochlorothiazide (HYZAAR) 100-25 MG per tablet Take 1 tablet by mouth daily.     • metFORMIN (GLUCOPHAGE) 500 MG tablet Take 2 tablets by mouth 2 (Two) Times a Day With Meals. 360 tablet 1   • metoprolol tartrate (LOPRESSOR) 50 MG tablet Take 2 tablets by mouth 2 (Two) Times a Day. 120 tablet 1       Review of Systems   Constitutional: Negative for chills, fatigue and fever.   Respiratory: Negative for cough, chest tightness, shortness of breath (in evenings only) and wheezing.    Cardiovascular: Negative for chest  pain, palpitations, orthopnea and leg swelling.   Gastrointestinal: Negative for abdominal pain, constipation, diarrhea, nausea and vomiting.   Musculoskeletal: Negative for myalgias.   Neurological: Positive for dizziness.       Objective   Vitals:    06/05/17 1015   BP: 132/64   Pulse: 71   Resp: 16   Temp: 97.9 °F (36.6 °C)   SpO2: 95%      Last Weight    06/05/17  1015   Weight: 132 lb 9.6 oz (60.1 kg)    [unfilled]  Body mass index is 25.9 kg/(m^2).      Physical Exam   Constitutional: She appears well-developed and well-nourished. No distress.   HENT:   Head: Normocephalic and atraumatic.   Neck: Carotid bruit is not present. No thyromegaly present.   Cardiovascular: Normal rate, regular rhythm, normal heart sounds and intact distal pulses.  Exam reveals no gallop.    No murmur heard.  Pulmonary/Chest: Effort normal and breath sounds normal. No respiratory distress. She has no wheezes. She has no rales.   Abdominal: Soft. Bowel sounds are normal. She exhibits no mass. There is no tenderness. There is no guarding.   Musculoskeletal: She exhibits no edema.    Tamie had a diabetic foot exam performed today.   During the foot exam she had a monofilament test performed.    Neurological Sensory Findings -  Unaltered sharp/dull right ankle/foot discrimination and unaltered sharp/dull left ankle/foot discrimination. No right ankle/foot altered proprioception and no left ankle/foot altered proprioception    Vascular Status -  Her exam exhibits right foot vasculature abnormal and no right foot edema. Her exam exhibits left foot vasculature abnormal and no left foot edema.   Skin Integrity  -  Her right foot skin is not intact.   Tamie's left foot skin is not intact. .        Problem List Items Addressed This Visit        Cardiovascular and Mediastinum    CAD (coronary artery disease)    Hypertension - Primary    Hyperlipidemia       Endocrine    Diabetes mellitus       Genitourinary    CKD (chronic kidney disease)  stage 3, GFR 30-59 ml/min        Assessment/Plan   In for recheck of htn, lipids, DM, Ckd and CAD.  Pretty much asymptomatic at present.  Annual labs today 6/17.  Glu, a1c, lipids q 3 mos.  Still due C scope.  Having some intermittent pain down left lateral leg and slight sciatica.       Procedures         Dragon disclaimer:   Much of this encounter note is an electronic transcription/translation of spoken language to printed text. The electronic translation of spoken language may permit erroneous, or at times, nonsensical words or phrases to be inadvertently transcribed; Although I have reviewed the note for such errors, some may still exist.

## 2017-06-22 RX ORDER — SITAGLIPTIN 100 MG/1
TABLET, FILM COATED ORAL
Qty: 30 TABLET | Refills: 0 | Status: SHIPPED | OUTPATIENT
Start: 2017-06-22 | End: 2017-07-17 | Stop reason: SDUPTHER

## 2017-07-17 RX ORDER — SITAGLIPTIN 100 MG/1
TABLET, FILM COATED ORAL
Qty: 30 TABLET | Refills: 0 | Status: SHIPPED | OUTPATIENT
Start: 2017-07-17 | End: 2017-09-07 | Stop reason: SDUPTHER

## 2017-08-15 ENCOUNTER — APPOINTMENT (OUTPATIENT)
Dept: WOMENS IMAGING | Facility: HOSPITAL | Age: 82
End: 2017-08-15

## 2017-08-15 PROCEDURE — G0202 SCR MAMMO BI INCL CAD: HCPCS | Performed by: RADIOLOGY

## 2017-08-15 PROCEDURE — 77063 BREAST TOMOSYNTHESIS BI: CPT | Performed by: RADIOLOGY

## 2017-08-22 ENCOUNTER — RESULTS ENCOUNTER (OUTPATIENT)
Dept: INTERNAL MEDICINE | Facility: CLINIC | Age: 82
End: 2017-08-22

## 2017-08-22 DIAGNOSIS — E78.5 HYPERLIPIDEMIA, UNSPECIFIED HYPERLIPIDEMIA TYPE: ICD-10-CM

## 2017-08-22 DIAGNOSIS — E11.9 TYPE 2 DIABETES MELLITUS WITHOUT COMPLICATION, WITHOUT LONG-TERM CURRENT USE OF INSULIN (HCC): ICD-10-CM

## 2017-09-07 RX ORDER — SITAGLIPTIN 100 MG/1
TABLET, FILM COATED ORAL
Qty: 30 TABLET | Refills: 0 | Status: SHIPPED | OUTPATIENT
Start: 2017-09-07 | End: 2017-11-06 | Stop reason: SDUPTHER

## 2017-09-07 RX ORDER — ATORVASTATIN CALCIUM 80 MG/1
TABLET, FILM COATED ORAL
Qty: 90 TABLET | Refills: 0 | Status: SHIPPED | OUTPATIENT
Start: 2017-09-07 | End: 2018-01-18 | Stop reason: SDUPTHER

## 2017-09-08 LAB
CHOLEST SERPL-MCNC: 160 MG/DL (ref 100–199)
HBA1C MFR BLD: 7.5 % (ref 4.8–5.6)
HDLC SERPL-MCNC: 46 MG/DL
LDLC SERPL CALC-MCNC: 57 MG/DL (ref 0–99)
TRIGL SERPL-MCNC: 287 MG/DL (ref 0–149)
VLDLC SERPL CALC-MCNC: 57 MG/DL (ref 5–40)

## 2017-09-14 ENCOUNTER — OFFICE VISIT (OUTPATIENT)
Dept: INTERNAL MEDICINE | Facility: CLINIC | Age: 82
End: 2017-09-14

## 2017-09-14 VITALS
TEMPERATURE: 98.3 F | HEIGHT: 60 IN | RESPIRATION RATE: 16 BRPM | SYSTOLIC BLOOD PRESSURE: 126 MMHG | OXYGEN SATURATION: 94 % | WEIGHT: 130.8 LBS | DIASTOLIC BLOOD PRESSURE: 70 MMHG | BODY MASS INDEX: 25.68 KG/M2 | HEART RATE: 66 BPM

## 2017-09-14 DIAGNOSIS — E11.9 TYPE 2 DIABETES MELLITUS WITHOUT COMPLICATION, WITHOUT LONG-TERM CURRENT USE OF INSULIN (HCC): ICD-10-CM

## 2017-09-14 DIAGNOSIS — E78.5 HYPERLIPIDEMIA, UNSPECIFIED HYPERLIPIDEMIA TYPE: ICD-10-CM

## 2017-09-14 DIAGNOSIS — I25.10 CORONARY ARTERY DISEASE INVOLVING NATIVE CORONARY ARTERY OF NATIVE HEART WITHOUT ANGINA PECTORIS: ICD-10-CM

## 2017-09-14 DIAGNOSIS — N18.30 CKD (CHRONIC KIDNEY DISEASE) STAGE 3, GFR 30-59 ML/MIN (HCC): ICD-10-CM

## 2017-09-14 DIAGNOSIS — I10 ESSENTIAL HYPERTENSION: Primary | ICD-10-CM

## 2017-09-14 DIAGNOSIS — Z23 NEED FOR INFLUENZA VACCINATION: ICD-10-CM

## 2017-09-14 PROBLEM — M81.0 OSTEOPOROSIS: Status: ACTIVE | Noted: 2017-09-14

## 2017-09-14 PROCEDURE — 99214 OFFICE O/P EST MOD 30 MIN: CPT | Performed by: INTERNAL MEDICINE

## 2017-09-14 PROCEDURE — G0008 ADMIN INFLUENZA VIRUS VAC: HCPCS | Performed by: INTERNAL MEDICINE

## 2017-09-14 RX ORDER — ALENDRONATE SODIUM 70 MG/1
70 TABLET ORAL WEEKLY
COMMUNITY
Start: 2017-08-12

## 2017-09-14 NOTE — PROGRESS NOTES
Subjective   Tamie Peter is a 81 y.o. female.     Chief Complaint   Patient presents with   • Hypertension   • Hyperlipidemia         Hypertension   This is a chronic problem. The current episode started more than 1 year ago. The problem is unchanged. The problem is controlled. Associated symptoms include peripheral edema (right foot in summer only). Pertinent negatives include no chest pain, headaches, orthopnea, palpitations or shortness of breath (in evenings only). There are no associated agents to hypertension. Risk factors for coronary artery disease include diabetes mellitus, dyslipidemia and post-menopausal state. Past treatments include ACE inhibitors, beta blockers and diuretics. Hypertensive end-organ damage includes kidney disease and CAD/MI. There is no history of angina, CVA, heart failure or retinopathy. Identifiable causes of hypertension include chronic renal disease.   Hyperlipidemia   This is a chronic problem. The current episode started more than 1 year ago. The problem is controlled. Recent lipid tests were reviewed and are normal. Exacerbating diseases include chronic renal disease. Factors aggravating her hyperlipidemia include thiazides and beta blockers. Pertinent negatives include no chest pain or shortness of breath (in evenings only). Current antihyperlipidemic treatment includes statins and bile acid squestrants. The current treatment provides significant improvement of lipids. There are no compliance problems.  Risk factors for coronary artery disease include dyslipidemia and post-menopausal.   Diabetes   She presents for her follow-up diabetic visit. She has type 2 diabetes mellitus. No MedicAlert identification noted. Her disease course has been stable. Pertinent negatives for hypoglycemia include no headaches. Pertinent negatives for diabetes include no chest pain. Symptoms are stable. Diabetic complications include nephropathy. Pertinent negatives for diabetic complications  include no autonomic neuropathy, CVA, heart disease, peripheral neuropathy or retinopathy. Risk factors for coronary artery disease include diabetes mellitus, dyslipidemia and post-menopausal. Current diabetic treatment includes oral agent (dual therapy). She is compliant with treatment all of the time. Meal planning includes ADA exchanges. She has had a previous visit with a dietitian. She participates in exercise daily. An ACE inhibitor/angiotensin II receptor blocker is being taken. She does not see a podiatrist.Eye exam is current.        The following portions of the patient's history were reviewed and updated as appropriate: allergies, current medications, past social history and problem list.    Outpatient Prescriptions Marked as Taking for the 9/14/17 encounter (Office Visit) with Mohinder Barbosa MD   Medication Sig Dispense Refill   • alendronate (FOSAMAX) 70 MG tablet Take 70 mg by mouth 1 (One) Time Per Week.     • allopurinol (ZYLOPRIM) 300 MG tablet Take 300 mg by mouth daily.     • amLODIPine (NORVASC) 5 MG tablet Take 5 mg by mouth Daily.     • aspirin 325 MG tablet Take 325 mg by mouth daily.     • atorvastatin (LIPITOR) 80 MG tablet TAKE ONE TABLET BY MOUTH DAILY 90 tablet 0   • JANUVIA 100 MG tablet TAKE ONE TABLET BY MOUTH DAILY 30 tablet 0   • losartan-hydrochlorothiazide (HYZAAR) 100-25 MG per tablet Take 1 tablet by mouth daily.     • metFORMIN (GLUCOPHAGE) 500 MG tablet TAKE TWO TABLETS BY MOUTH TWICE A DAY WITH MEALS 360 tablet 0   • metoprolol tartrate (LOPRESSOR) 50 MG tablet Take 2 tablets by mouth 2 (Two) Times a Day. 120 tablet 1       Review of Systems   Respiratory: Negative for chest tightness, shortness of breath (in evenings only) and wheezing.    Cardiovascular: Negative for chest pain, palpitations, orthopnea and leg swelling.   Gastrointestinal: Negative for constipation and diarrhea.   Neurological: Negative for headaches.       Objective   Vitals:    09/14/17 1058   BP: 126/70    Pulse: 66   Resp: 16   Temp: 98.3 °F (36.8 °C)   SpO2: 94%      Last Weight    09/14/17  1058   Weight: 130 lb 12.8 oz (59.3 kg)    [unfilled]  Body mass index is 25.55 kg/(m^2).      Physical Exam   Constitutional: She appears well-developed and well-nourished. No distress.   HENT:   Head: Normocephalic and atraumatic.   Neck: Carotid bruit is not present.   Cardiovascular: Normal rate, regular rhythm, normal heart sounds and intact distal pulses.  Exam reveals no gallop.    No murmur heard.  Pulmonary/Chest: Effort normal and breath sounds normal. No respiratory distress. She has no wheezes. She has no rales.   Skin: Skin is warm and dry.         Problem List Items Addressed This Visit        Cardiovascular and Mediastinum    CAD (coronary artery disease)    Hypertension - Primary    Hyperlipidemia       Endocrine    Diabetes mellitus       Genitourinary    CKD (chronic kidney disease) stage 3, GFR 30-59 ml/min        Assessment/Plan   In for recheck of htn, lipids, DM, Ckd and CAD.  Pretty much asymptomatic at present.  Blood pressure control is excellent.  Diabetes needs improvement.  A1c is 7.5.  CKD is stable.  CAD is asymptomatic.  Annual labs 6/17.  Glu, a1c, lipids q 3 mos.  Still due C scope.  Having some intermittent pain down left lateral leg and slight sciatica.  Annual wellness visit today.  Flu shot today.  She's going to try to push her diet for now.  If no response will consider adding Farxiga.         Dragon disclaimer:   Much of this encounter note is an electronic transcription/translation of spoken language to printed text. The electronic translation of spoken language may permit erroneous, or at times, nonsensical words or phrases to be inadvertently transcribed; Although I have reviewed the note for such errors, some may still exist.

## 2017-09-14 NOTE — PATIENT INSTRUCTIONS
"Influenza (Flu) Vaccine (Inactivated or Recombinant):   1. Why get vaccinated?  Influenza (\"flu\") is a contagious disease that spreads around the [place] every year, usually between October and May.  Flu is caused by influenza viruses, and is spread mainly by coughing, sneezing, and close contact.  Anyone can get flu. Flu strikes suddenly and can last several days. Symptoms vary by age, but can include:  · fever/chills  · sore throat  · muscle aches  · fatigue  · cough  · headache  · runny or stuffy nose  Flu can also lead to pneumonia and blood infections, and cause diarrhea and seizures in children. If you have a medical condition, such as heart or lung disease, flu can make it worse.  Flu is more dangerous for some people. Infants and young children, people 65 years of age and older, pregnant women, and people with certain health conditions or a weakened immune system are at greatest risk.  Each year thousands of people in the [place] die from flu, and many more are hospitalized.  Flu vaccine can:  · keep you from getting flu,  · make flu less severe if you do get it, and  · keep you from spreading flu to your family and other people.  2. Inactivated and recombinant flu vaccines  A dose of flu vaccine is recommended every flu season. Children 6 months through 8 years of age may need two doses during the same flu season. Everyone else needs only one dose each flu season.  Some inactivated flu vaccines contain a very small amount of a mercury-based preservative called thimerosal. Studies have not shown thimerosal in vaccines to be harmful, but flu vaccines that do not contain thimerosal are available.  There is no live flu virus in flu shots. They cannot cause the flu.  There are many flu viruses, and they are always changing. Each year a new flu vaccine is made to protect against three or four viruses that are likely to cause disease in the upcoming flu season. But even when the vaccine doesn't exactly " match these viruses, it may still provide some protection.  Flu vaccine cannot prevent:  · flu that is caused by a virus not covered by the vaccine, or  · illnesses that look like flu but are not.  It takes about 2 weeks for protection to develop after vaccination, and protection lasts through the flu season.  3. Some people should not get this vaccine  Tell the person who is giving you the vaccine:  · If you have any severe, life-threatening allergies. If you ever had a life-threatening allergic reaction after a dose of flu vaccine, or have a severe allergy to any part of this vaccine, you may be advised not to get vaccinated. Most, but not all, types of flu vaccine contain a small amount of egg protein.  · If you ever had Guillain-[place] Syndrome (also called GBS). Some people with a history of GBS should not get this vaccine. This should be discussed with your doctor.  · If you are not feeling well. It is usually okay to get flu vaccine when you have a mild illness, but you might be asked to come back when you feel better.  4. Risks of a vaccine reaction  With any medicine, including vaccines, there is a chance of reactions. These are usually mild and go away on their own, but serious reactions are also possible.  Most people who get a flu shot do not have any problems with it.  Minor problems following a flu shot include:  · soreness, redness, or swelling where the shot was given  · hoarseness  · sore, red or itchy eyes  · cough  · fever  · aches  · headache  · itching  · fatigue  If these problems occur, they usually begin soon after the shot and last 1 or 2 days.  More serious problems following a flu shot can include the following:  · There may be a small increased risk of Guillain-[place] Syndrome (GBS) after inactivated flu vaccine. This risk has been estimated at 1 or 2 additional cases per million people vaccinated. This is much lower than the risk of severe complications from flu, which can be prevented by  flu vaccine.  · Young children who get the flu shot along with pneumococcal vaccine (PCV13) and/or DTaP vaccine at the same time might be slightly more likely to have a seizure caused by fever. Ask your doctor for more information. Tell your doctor if a child who is getting flu vaccine has ever had a seizure.  Problems that could happen after any injected vaccine:  · People sometimes faint after a medical procedure, including vaccination. Sitting or lying down for about 15 minutes can help prevent fainting, and injuries caused by a fall. Tell your doctor if you feel dizzy, or have vision changes or ringing in the ears.  · Some people get severe pain in the shoulder and have difficulty moving the arm where a shot was given. This happens very rarely.  · Any medication can cause a severe allergic reaction. Such reactions from a vaccine are very rare, estimated at about 1 in a million doses, and would happen within a few minutes to a few hours after the vaccination.  As with any medicine, there is a very remote chance of a vaccine causing a serious injury or death.  The safety of vaccines is always being monitored. For more information, visit: www.cdc.gov/vaccinesafety/  5. What if there is a serious reaction?  What should I look for?  · Look for anything that concerns you, such as signs of a severe allergic reaction, very high fever, or unusual behavior.  Signs of a severe allergic reaction can include hives, swelling of the face and throat, difficulty breathing, a fast heartbeat, dizziness, and weakness. These would start a few minutes to a few hours after the vaccination.  What should I do?  · If you think it is a severe allergic reaction or other emergency that can't wait, call 9-1-1 and get the person to the nearest hospital. Otherwise, call your doctor.  · Reactions should be reported to the Vaccine Adverse Event Reporting System (VAERS). Your doctor should file this report, or you can do it yourself through the  VAERS web site at www.vaers.[facility].gov, or by calling 1-627.388.7626.  VAERS does not give medical advice.  6. The National Vaccine Injury Compensation Program  The National Vaccine Injury Compensation Program (VICP) is a federal program that was created to compensate people who may have been injured by certain vaccines.  Persons who believe they may have been injured by a vaccine can learn about the program and about filing a claim by calling 1-957.528.2196 or visiting the VICP website at www.[facility].gov/vaccinecompensation. There is a time limit to file a claim for compensation.  7. How can I learn more?  · Ask your healthcare provider. He or she can give you the vaccine package insert or suggest other sources of information.  · Call your local or state health department.  · Contact the Centers for Disease Control and Prevention (CDC):    Call 1-275.689.6288 (2-818-JIB-INFO) or    Visit CDC's website at www.cdc.gov/flu  Vaccine Information Statement Inactivated Influenza Vaccine (08/07/2015)     This information is not intended to replace advice given to you by your health care provider. Make sure you discuss any questions you have with your health care provider.     Document Released: 10/12/2007 Document Revised: 01/08/2016 Document Reviewed: 08/10/2015  [employer] Interactive Patient Education ©2017 [employer]

## 2017-11-06 RX ORDER — SITAGLIPTIN 100 MG/1
TABLET, FILM COATED ORAL
Qty: 30 TABLET | Refills: 0 | Status: SHIPPED | OUTPATIENT
Start: 2017-11-06 | End: 2017-12-06 | Stop reason: SDUPTHER

## 2017-12-01 ENCOUNTER — RESULTS ENCOUNTER (OUTPATIENT)
Dept: INTERNAL MEDICINE | Facility: CLINIC | Age: 82
End: 2017-12-01

## 2017-12-01 DIAGNOSIS — E11.9 TYPE 2 DIABETES MELLITUS WITHOUT COMPLICATION, WITHOUT LONG-TERM CURRENT USE OF INSULIN (HCC): ICD-10-CM

## 2017-12-01 DIAGNOSIS — E78.5 HYPERLIPIDEMIA, UNSPECIFIED HYPERLIPIDEMIA TYPE: ICD-10-CM

## 2017-12-06 RX ORDER — SITAGLIPTIN 100 MG/1
TABLET, FILM COATED ORAL
Qty: 30 TABLET | Refills: 0 | Status: SHIPPED | OUTPATIENT
Start: 2017-12-06 | End: 2018-01-18 | Stop reason: SDUPTHER

## 2018-01-18 RX ORDER — SITAGLIPTIN 100 MG/1
TABLET, FILM COATED ORAL
Qty: 30 TABLET | Refills: 0 | Status: SHIPPED | OUTPATIENT
Start: 2018-01-18 | End: 2018-03-11 | Stop reason: SDUPTHER

## 2018-01-18 RX ORDER — ATORVASTATIN CALCIUM 80 MG/1
TABLET, FILM COATED ORAL
Qty: 90 TABLET | Refills: 0 | Status: SHIPPED | OUTPATIENT
Start: 2018-01-18 | End: 2018-04-18 | Stop reason: SDUPTHER

## 2018-01-26 LAB
CHOLEST SERPL-MCNC: 212 MG/DL (ref 100–199)
HBA1C MFR BLD: 7.9 % (ref 4.8–5.6)
HDLC SERPL-MCNC: 49 MG/DL
LDLC SERPL CALC-MCNC: 106 MG/DL (ref 0–99)
TRIGL SERPL-MCNC: 287 MG/DL (ref 0–149)
VLDLC SERPL CALC-MCNC: 57 MG/DL (ref 5–40)

## 2018-01-30 ENCOUNTER — OFFICE VISIT (OUTPATIENT)
Dept: INTERNAL MEDICINE | Facility: CLINIC | Age: 83
End: 2018-01-30

## 2018-01-30 VITALS
SYSTOLIC BLOOD PRESSURE: 134 MMHG | RESPIRATION RATE: 16 BRPM | OXYGEN SATURATION: 94 % | HEIGHT: 60 IN | TEMPERATURE: 98.4 F | DIASTOLIC BLOOD PRESSURE: 70 MMHG | HEART RATE: 72 BPM | WEIGHT: 133 LBS | BODY MASS INDEX: 26.11 KG/M2

## 2018-01-30 DIAGNOSIS — K21.9 GASTROESOPHAGEAL REFLUX DISEASE, ESOPHAGITIS PRESENCE NOT SPECIFIED: ICD-10-CM

## 2018-01-30 DIAGNOSIS — E11.9 TYPE 2 DIABETES MELLITUS WITHOUT COMPLICATION, WITHOUT LONG-TERM CURRENT USE OF INSULIN (HCC): ICD-10-CM

## 2018-01-30 DIAGNOSIS — I10 ESSENTIAL HYPERTENSION: Primary | ICD-10-CM

## 2018-01-30 DIAGNOSIS — E78.5 HYPERLIPIDEMIA, UNSPECIFIED HYPERLIPIDEMIA TYPE: ICD-10-CM

## 2018-01-30 DIAGNOSIS — I25.10 CORONARY ARTERY DISEASE INVOLVING NATIVE CORONARY ARTERY OF NATIVE HEART WITHOUT ANGINA PECTORIS: ICD-10-CM

## 2018-01-30 DIAGNOSIS — N18.30 CKD (CHRONIC KIDNEY DISEASE) STAGE 3, GFR 30-59 ML/MIN (HCC): ICD-10-CM

## 2018-01-30 PROBLEM — I49.8 JUNCTIONAL RHYTHM: Status: RESOLVED | Noted: 2017-02-21 | Resolved: 2018-01-30

## 2018-01-30 PROBLEM — R55 NEAR SYNCOPE: Status: RESOLVED | Noted: 2017-02-21 | Resolved: 2018-01-30

## 2018-01-30 PROBLEM — Z95.0 PACEMAKER: Status: ACTIVE | Noted: 2018-01-30

## 2018-01-30 LAB
BILIRUB BLD-MCNC: NEGATIVE MG/DL
CLARITY, POC: CLEAR
COLOR UR: YELLOW
GLUCOSE UR STRIP-MCNC: ABNORMAL MG/DL
KETONES UR QL: NEGATIVE
LEUKOCYTE EST, POC: NEGATIVE
NITRITE UR-MCNC: NEGATIVE MG/ML
PH UR: 5.5 [PH] (ref 5–8)
POC CREATININE URINE: 200
POC MICROALBUMIN URINE: 150
PROT UR STRIP-MCNC: ABNORMAL MG/DL
RBC # UR STRIP: NEGATIVE /UL
SP GR UR: 1.03 (ref 1–1.03)
UROBILINOGEN UR QL: NORMAL

## 2018-01-30 PROCEDURE — G0439 PPPS, SUBSEQ VISIT: HCPCS | Performed by: INTERNAL MEDICINE

## 2018-01-30 PROCEDURE — 99214 OFFICE O/P EST MOD 30 MIN: CPT | Performed by: INTERNAL MEDICINE

## 2018-01-30 PROCEDURE — 81003 URINALYSIS AUTO W/O SCOPE: CPT | Performed by: INTERNAL MEDICINE

## 2018-01-30 PROCEDURE — 82044 UR ALBUMIN SEMIQUANTITATIVE: CPT | Performed by: INTERNAL MEDICINE

## 2018-01-30 RX ORDER — EZETIMIBE 10 MG/1
10 TABLET ORAL DAILY
Qty: 90 TABLET | Refills: 1 | Status: SHIPPED | OUTPATIENT
Start: 2018-01-30 | End: 2019-03-27 | Stop reason: SDUPTHER

## 2018-01-30 RX ORDER — GLIPIZIDE 5 MG/1
5 TABLET ORAL DAILY
Qty: 90 TABLET | Refills: 1 | Status: SHIPPED | OUTPATIENT
Start: 2018-01-30 | End: 2018-11-30 | Stop reason: SDUPTHER

## 2018-01-30 NOTE — PROGRESS NOTES
QUICK REFERENCE INFORMATION:  The ABCs of the Annual Wellness Visit    Subsequent Medicare Wellness Visit    HEALTH RISK ASSESSMENT    1935    Recent Hospitalizations:  No hospitalization(s) within the last year..        Current Medical Providers:  Patient Care Team:  Mohinder Barbosa MD as PCP - Internal Medicine (Internal Medicine)  José Miguel Fam MD as PCP - Claims Attributed        Smoking Status:  History   Smoking Status   • Never Smoker   Smokeless Tobacco   • Never Used       Alcohol Consumption:  History   Alcohol Use No       Depression Screen:   PHQ-2/PHQ-9 Depression Screening 1/30/2018   Little interest or pleasure in doing things 0   Feeling down, depressed, or hopeless 0   Trouble falling or staying asleep, or sleeping too much 0   Feeling tired or having little energy 0   Poor appetite or overeating 0   Feeling bad about yourself - or that you are a failure or have let yourself or your family down 0   Trouble concentrating on things, such as reading the newspaper or watching television 0   Moving or speaking so slowly that other people could have noticed. Or the opposite - being so fidgety or restless that you have been moving around a lot more than usual 0   Thoughts that you would be better off dead, or of hurting yourself in some way 0   Total Score 0   If you checked off any problems, how difficult have these problems made it for you to do your work, take care of things at home, or get along with other people? -       Health Habits and Functional and Cognitive Screening:  Functional & Cognitive Status 1/30/2018   Do you have difficulty preparing food and eating? No   Do you have difficulty bathing yourself, getting dressed or grooming yourself? No   Do you have difficulty using the toilet? No   Do you have difficulty moving around from place to place? No   Do you have trouble with steps or getting out of a bed or a chair? No   In the past year have you fallen or experienced a near fall? No    Current Diet Well Balanced Diet   Dental Exam Up to date   Eye Exam Up to date   Exercise (times per week) 7 times per week   Current Exercise Activities Include Walking   Do you need help using the phone?  No   Are you deaf or do you have serious difficulty hearing?  No   Do you need help with transportation? No   Do you need help shopping? No   Do you need help preparing meals?  No   Do you need help with housework?  No   Do you need help with laundry? No   Do you need help taking your medications? No   Do you need help managing money? No   Have you felt unusual stress, anger or loneliness in the last month? No   Who do you live with? Alone   If you need help, do you have trouble finding someone available to you? No   Have you been bothered in the last four weeks by sexual problems? No   Do you have difficulty concentrating, remembering or making decisions? No           Does the patient have evidence of cognitive impairment? No    Aspirin use counseling: Taking ASA appropriately as indicated      Recent Lab Results:  CMP:  Lab Results   Component Value Date     (H) 05/30/2017    BUN 20 05/30/2017    CREATININE 1.11 (H) 05/30/2017    EGFRIFNONA 47 (L) 05/30/2017    EGFRIFAFRI 55 (L) 05/30/2017    BCR 18 05/30/2017     05/30/2017    K 5.1 05/30/2017    CO2 23 05/30/2017    CALCIUM 9.7 05/30/2017    PROTENTOTREF 6.8 05/30/2017    ALBUMIN 4.1 05/30/2017    LABGLOBREF 2.7 05/30/2017    LABIL2 1.5 05/30/2017    BILITOT 0.5 05/30/2017    ALKPHOS 109 05/30/2017    AST 21 05/30/2017    ALT 19 05/30/2017     Lipid Panel:  Lab Results   Component Value Date    CHOL 132 02/22/2017    TRIG 287 (H) 01/25/2018    HDL 49 01/25/2018    VLDL 57 (H) 01/25/2018    LDLCALC 57 02/22/2017    LDLHDL 1.35 02/22/2017     HbA1c:  Lab Results   Component Value Date    HGBA1C 7.9 (H) 01/25/2018       Visual Acuity:  No exam data present    Age-appropriate Screening Schedule:  Refer to the list below for future screening  recommendations based on patient's age, sex and/or medical conditions. Orders for these recommended tests are listed in the plan section. The patient has been provided with a written plan.    Health Maintenance   Topic Date Due   • URINE MICROALBUMIN  07/26/2016   • DIABETIC FOOT EXAM  06/05/2018   • DIABETIC EYE EXAM  06/19/2018   • HEMOGLOBIN A1C  07/25/2018   • LIPID PANEL  01/25/2019   • DXA SCAN  08/11/2019   • TDAP/TD VACCINES (2 - Td) 07/21/2025   • INFLUENZA VACCINE  Completed   • PNEUMOCOCCAL VACCINES (65+ LOW/MEDIUM RISK)  Completed   • ZOSTER VACCINE  Completed        Subjective   History of Present Illness    Tamie Peter is a 82 y.o. female who presents for an Subsequent Wellness Visit.    The following portions of the patient's history were reviewed and updated as appropriate: allergies, current medications, past family history, past medical history, past social history, past surgical history and problem list.    Outpatient Medications Prior to Visit   Medication Sig Dispense Refill   • alendronate (FOSAMAX) 70 MG tablet Take 70 mg by mouth 1 (One) Time Per Week.     • allopurinol (ZYLOPRIM) 300 MG tablet Take 300 mg by mouth daily.     • amLODIPine (NORVASC) 5 MG tablet Take 5 mg by mouth Daily.     • aspirin 325 MG tablet Take 325 mg by mouth daily.     • atorvastatin (LIPITOR) 80 MG tablet TAKE ONE TABLET BY MOUTH DAILY 90 tablet 0   • clopidogrel (PLAVIX) 75 MG tablet Take 75 mg by mouth daily.     • diclofenac (VOLTAREN) 1 % gel gel Apply 4 g topically 4 (Four) Times a Day. 1 tube 3   • JANUVIA 100 MG tablet TAKE ONE TABLET BY MOUTH DAILY 30 tablet 0   • losartan-hydrochlorothiazide (HYZAAR) 100-25 MG per tablet Take 1 tablet by mouth daily.     • metFORMIN (GLUCOPHAGE) 500 MG tablet TAKE TWO TABLETS BY MOUTH TWICE A DAY WITH MEALS 360 tablet 0   • metoprolol tartrate (LOPRESSOR) 50 MG tablet Take 2 tablets by mouth 2 (Two) Times a Day. 120 tablet 1     No facility-administered medications  "prior to visit.        Patient Active Problem List   Diagnosis   • CAD (coronary artery disease)   • Hypertension   • GERD (gastroesophageal reflux disease)   • Diabetes mellitus   • CKD (chronic kidney disease) stage 3, GFR 30-59 ml/min   • Gout   • Hyperlipidemia   • PUD (peptic ulcer disease)   • Near syncope   • Junctional rhythm   • Sick sinus syndrome   • Disorder of rotator cuff   • Osteoporosis       Advance Care Planning:  has an advance directive - a copy HAS NOT been provided    Identification of Risk Factors:  Risk factors include: hypertension, diabetes.    Review of Systems    Compared to one year ago, the patient feels her physical health is the same.  Compared to one year ago, the patient feels her mental health is the same.    Objective     Physical Exam    Vitals:    01/30/18 0924   BP: 134/70   BP Location: Left arm   Patient Position: Sitting   Pulse: 72   Resp: 16   SpO2: 94%   Weight: 60.3 kg (133 lb)   Height: 152.4 cm (60\")       Body mass index is 25.97 kg/(m^2).  Discussed the patient's BMI with her. BMI is within normal parameters. No follow-up required.    Assessment/Plan   Patient Self-Management and Personalized Health Advice  The patient has been provided with information about: managing htn and preventive services including:   · Diabetes screening, see lab orders, Fall Risk assessment done.    Visit Diagnoses:  No diagnosis found.    No orders of the defined types were placed in this encounter.      Outpatient Encounter Prescriptions as of 1/30/2018   Medication Sig Dispense Refill   • alendronate (FOSAMAX) 70 MG tablet Take 70 mg by mouth 1 (One) Time Per Week.     • allopurinol (ZYLOPRIM) 300 MG tablet Take 300 mg by mouth daily.     • amLODIPine (NORVASC) 5 MG tablet Take 5 mg by mouth Daily.     • aspirin 325 MG tablet Take 325 mg by mouth daily.     • atorvastatin (LIPITOR) 80 MG tablet TAKE ONE TABLET BY MOUTH DAILY 90 tablet 0   • clopidogrel (PLAVIX) 75 MG tablet Take 75 mg by " mouth daily.     • diclofenac (VOLTAREN) 1 % gel gel Apply 4 g topically 4 (Four) Times a Day. 1 tube 3   • JANUVIA 100 MG tablet TAKE ONE TABLET BY MOUTH DAILY 30 tablet 0   • losartan-hydrochlorothiazide (HYZAAR) 100-25 MG per tablet Take 1 tablet by mouth daily.     • metFORMIN (GLUCOPHAGE) 500 MG tablet TAKE TWO TABLETS BY MOUTH TWICE A DAY WITH MEALS 360 tablet 0   • metoprolol tartrate (LOPRESSOR) 50 MG tablet Take 2 tablets by mouth 2 (Two) Times a Day. 120 tablet 1     No facility-administered encounter medications on file as of 1/30/2018.        Reviewed use of high risk medication in the elderly: yes  Reviewed for potential of harmful drug interactions in the elderly: yes    Follow Up:  No Follow-up on file.     An After Visit Summary and PPPS with all of these plans were given to the patient.

## 2018-01-30 NOTE — PROGRESS NOTES
Subjective   Tamie Peter is a 82 y.o. female.     Chief Complaint   Patient presents with   • Hypertension   • Hyperlipidemia   • Diabetes         Hypertension   This is a chronic problem. The current episode started more than 1 year ago. The problem is unchanged. The problem is controlled. Associated symptoms include peripheral edema (right foot in summer only). Pertinent negatives include no chest pain, headaches, orthopnea, palpitations or shortness of breath (in evenings only). There are no associated agents to hypertension. Risk factors for coronary artery disease include diabetes mellitus, dyslipidemia and post-menopausal state. Past treatments include ACE inhibitors, beta blockers and diuretics. Hypertensive end-organ damage includes kidney disease and CAD/MI. There is no history of angina, CVA, heart failure or retinopathy. Identifiable causes of hypertension include chronic renal disease.   Hyperlipidemia   This is a chronic problem. The current episode started more than 1 year ago. The problem is controlled. Recent lipid tests were reviewed and are normal. Exacerbating diseases include chronic renal disease. Factors aggravating her hyperlipidemia include thiazides and beta blockers. Pertinent negatives include no chest pain or shortness of breath (in evenings only). Current antihyperlipidemic treatment includes statins and bile acid squestrants. The current treatment provides significant improvement of lipids. There are no compliance problems.  Risk factors for coronary artery disease include dyslipidemia and post-menopausal.   Diabetes   She presents for her follow-up diabetic visit. She has type 2 diabetes mellitus. No MedicAlert identification noted. Her disease course has been stable. Pertinent negatives for hypoglycemia include no headaches. Pertinent negatives for diabetes include no chest pain. Symptoms are stable. Diabetic complications include nephropathy. Pertinent negatives for diabetic  complications include no autonomic neuropathy, CVA, heart disease, peripheral neuropathy or retinopathy. Risk factors for coronary artery disease include diabetes mellitus, dyslipidemia and post-menopausal. Current diabetic treatment includes oral agent (dual therapy). She is compliant with treatment all of the time. Meal planning includes ADA exchanges. She has had a previous visit with a dietitian. She participates in exercise daily. An ACE inhibitor/angiotensin II receptor blocker is being taken. She does not see a podiatrist.Eye exam is current.        The following portions of the patient's history were reviewed and updated as appropriate: allergies, current medications, past social history and problem list.    Outpatient Prescriptions Marked as Taking for the 1/30/18 encounter (Office Visit) with Mohinder Barbosa MD   Medication Sig Dispense Refill   • alendronate (FOSAMAX) 70 MG tablet Take 70 mg by mouth 1 (One) Time Per Week.     • allopurinol (ZYLOPRIM) 300 MG tablet Take 300 mg by mouth daily.     • amLODIPine (NORVASC) 5 MG tablet Take 5 mg by mouth Daily.     • aspirin 325 MG tablet Take 325 mg by mouth daily.     • atorvastatin (LIPITOR) 80 MG tablet TAKE ONE TABLET BY MOUTH DAILY 90 tablet 0   • clopidogrel (PLAVIX) 75 MG tablet Take 75 mg by mouth daily.     • diclofenac (VOLTAREN) 1 % gel gel Apply 4 g topically 4 (Four) Times a Day. 1 tube 3   • JANUVIA 100 MG tablet TAKE ONE TABLET BY MOUTH DAILY 30 tablet 0   • losartan-hydrochlorothiazide (HYZAAR) 100-25 MG per tablet Take 1 tablet by mouth daily.     • metFORMIN (GLUCOPHAGE) 500 MG tablet TAKE TWO TABLETS BY MOUTH TWICE A DAY WITH MEALS 360 tablet 0   • metoprolol tartrate (LOPRESSOR) 50 MG tablet Take 2 tablets by mouth 2 (Two) Times a Day. 120 tablet 1       Review of Systems   Respiratory: Negative for chest tightness, shortness of breath (in evenings only) and wheezing.    Cardiovascular: Negative for chest pain, palpitations, orthopnea and  leg swelling.   Gastrointestinal: Negative for constipation and diarrhea.   Neurological: Negative for headaches.       Objective   Vitals:    01/30/18 0924   BP: 134/70   Pulse: 72   Resp: 16   Temp: 98.4 °F (36.9 °C)   SpO2: 94%      Last Weight    01/30/18 0924   Weight: 60.3 kg (133 lb)    [unfilled]  Body mass index is 25.97 kg/(m^2).      Physical Exam   Constitutional: She appears well-developed and well-nourished. No distress.   HENT:   Head: Normocephalic and atraumatic.   Neck: Carotid bruit is not present.   Cardiovascular: Normal rate, regular rhythm, normal heart sounds and intact distal pulses.  Exam reveals no gallop.    No murmur heard.  Pulmonary/Chest: Effort normal and breath sounds normal. No respiratory distress. She has no wheezes. She has no rales.   Skin: Skin is warm and dry.         Problem List Items Addressed This Visit        Cardiovascular and Mediastinum    CAD (coronary artery disease)    Hypertension - Primary    Hyperlipidemia       Digestive    GERD (gastroesophageal reflux disease)       Endocrine    Diabetes mellitus       Genitourinary    CKD (chronic kidney disease) stage 3, GFR 30-59 ml/min        Assessment/Plan   In for recheck of htn, lipids, DM, Ckd and CAD.  Pretty much asymptomatic at present.  Blood pressure control is excellent.  Diabetes needs improvement.  A1c is 7.9.  CKD is stable.  CAD is asymptomatic.  Lipids elevated today.  Annual labs 6/17.  Glu, a1c, lipids q 3 mos.  Still due C scope.  She's due for urinalysis and microalbumin today.  We'll plan to add for glipizide 5 mg daily to improve glycemic control.  Add Zetia 10 mg per day to improve lipid control.         Dragon disclaimer:   Much of this encounter note is an electronic transcription/translation of spoken language to printed text. The electronic translation of spoken language may permit erroneous, or at times, nonsensical words or phrases to be inadvertently transcribed; Although I have reviewed  the note for such errors, some may still exist.

## 2018-01-30 NOTE — PATIENT INSTRUCTIONS
Type 2 Diabetes Mellitus, Self Care, Adult  When you have type 2 diabetes (type 2 diabetes mellitus), you must keep your blood sugar (glucose) under control. You can do this with:  · Nutrition.  · Exercise.  · Lifestyle changes.  · Medicines or insulin, if needed.  · Support from your doctors and others.  How do I manage my blood sugar?  · Check your blood sugar level every day, as often as told.  · Call your doctor if your blood sugar is above your goal numbers for 2 tests in a row.  · Have your A1c (hemoglobin A1c) level checked at least two times a year. Have it checked more often if your doctor tells you to.  Your doctor will set treatment goals for you. Generally, you should have these blood sugar levels:  · Before meals (preprandial):  mg/dL (4.4-7.2 mmol/L).  · After meals (postprandial): lower than 180 mg/dL (10 mmol/L).  · A1c level: less than 7%.  What do I need to know about high blood sugar?  High blood sugar is called hyperglycemia. Know the signs of high blood sugar. Signs may include:  · Feeling:  ¨ Thirsty.  ¨ Hungry.  ¨ Very tired.  · Needing to pee (urinate) more than usual.  · Blurry vision.  What do I need to know about low blood sugar?  Low blood sugar is called hypoglycemia. This is when blood sugar is at or below 70 mg/dL (3.9 mmol/L). Symptoms may include:  · Feeling:  ¨ Hungry.  ¨ Worried or nervous (anxious).  ¨ Sweaty and clammy.  ¨ Confused.  ¨ Dizzy.  ¨ Sleepy.  ¨ Sick to your stomach (nauseous).  · Having:  ¨ A fast heartbeat (palpitations).  ¨ A headache.  ¨ A change in your vision.  ¨ Jerky movements that you cannot control (seizure).  ¨ Nightmares.  ¨ Tingling or no feeling (numbness) around the mouth, lips, or tongue.  · Having trouble with:  ¨ Talking.  ¨ Paying attention (concentrating).  ¨ Moving (coordination).  ¨ Sleeping.  · Shaking.  · Passing out (fainting).  · Getting upset easily (irritability).  Treating low blood sugar   To treat low blood sugar, eat or drink  something sugary right away. If you can think clearly and swallow safely, follow the 15:15 rule:  · Take 15 grams of a fast-acting carb (carbohydrate). Some fast-acting carbs are:  ¨ 1 tube of glucose gel.  ¨ 3 sugar tablets (glucose pills).  ¨ 6-8 pieces of hard candy.  ¨ 4 oz (120 mL) of fruit juice.  ¨ 4 oz (120 mL) regular (not diet) soda.  · Check your blood sugar 15 minutes after you take the carb.  · If your blood sugar is still at or below 70 mg/dL (3.9 mmol/L), take 15 grams of a carb again.  · If your blood sugar does not go above 70 mg/dL (3.9 mmol/L) after 3 tries, get help right away.  · After your blood sugar goes back to normal, eat a meal or a snack within 1 hour.  Treating very low blood sugar   If your blood sugar is at or below 54 mg/dL (3 mmol/L), you have very low blood sugar (severe hypoglycemia). This is an emergency. Do not wait to see if the symptoms will go away. Get medical help right away. Call your local emergency services (911 in the [place] Do not drive yourself to the hospital.  If you have very low blood sugar and you cannot eat or drink, you may need a glucagon shot (injection). A family member or friend should learn how to check your blood sugar and how to give you a glucagon shot. Ask your doctor if you need to have a glucagon shot kit at home.  What else is important to manage my diabetes?  Medicine   Follow these instructions about insulin and diabetes medicines:  · Take them as told by your doctor.  · Adjust them as told by your doctor.  · Do not run out of them.  Having diabetes can raise your risk for other long-term conditions. These include heart or kidney disease. Your doctor may prescribe medicines to help prevent problems from diabetes.  Food   · Make healthy food choices. These include:  ¨ Chicken, fish, egg whites, and beans.  ¨ Oats, whole wheat, bulgur, brown rice, quinoa, and millet.  ¨ Fresh fruits and vegetables.  ¨ Low-fat dairy products.  ¨ Nuts, avocado, olive  oil, and canola oil.  · Make a food plan with a specialist (dietitian).  · Follow instructions from your doctor about what you cannot eat or drink.  · Drink enough fluid to keep your pee (urine) clear or pale yellow.  · Eat healthy snacks between healthy meals.  · Keep track of carbs that you eat. Read food labels. Learn food serving sizes.  · Follow your sick day plan when you cannot eat or drink normally. Make this plan with your doctor so it is ready to use.  Activity  · Exercise at least 3 times a week.  · Do not go more than 2 days without exercising.  · Talk with your doctor before you start a new exercise. Your doctor may need to adjust your insulin, medicines, or food.  Lifestyle   · Do not use any tobacco products. These include cigarettes, chewing tobacco, and e-cigarettes.If you need help quitting, ask your doctor.  · Ask your doctor how much alcohol is safe for you.  · Learn to deal with stress. If you need help with this, ask your doctor.  Body care  · Stay up to date with your shots (immunizations).  · Have your eyes and feet checked by a doctor as often as told.  · Check your skin and feet every day. Check for cuts, bruises, redness, blisters, or sores.  · Brush your teeth and gums two times a day.  · Floss at least one time a day.  · Go to the dentist least one time every 6 months.  · Stay at a healthy weight.  General instructions   · Take over-the-counter and prescription medicines only as told by your doctor.  · Share your diabetes care plan with:  ¨ Your work or school.  ¨ People you live with.  · Check your pee (urine) for ketones:  ¨ When you are sick.  ¨ As told by your doctor.  · Carry a card or wear jewelry that says that you have diabetes.  · Ask your doctor:  ¨ Do I need to meet with a diabetes educator?  ¨ Where can I find a support group for people with diabetes?  · Keep all follow-up visits as told by your doctor. This is important.  Where to find more information:  To learn more about  "diabetes, visit:  · American Diabetes Association: www.diabetes.org  · American Association of Diabetes Educators: www.diabeteseducator.org/patient-resources  This information is not intended to replace advice given to you by your health care provider. Make sure you discuss any questions you have with your health care provider.  Document Released: 04/10/2017 Document Revised: 05/25/2017 Document Reviewed: 01/20/2017  Elsevier Interactive Patient Education © 2017 [employer]  Managing Your Hypertension  Hypertension is commonly called high blood pressure. Blood pressure is a measurement of how strongly your blood is pressing against the walls of your arteries. Arteries are blood vessels that carry blood from your heart throughout your body. Blood pressure does not stay the same. It rises when you are active, excited, or nervous. It lowers when you are sleeping or relaxed.  If the numbers that measure your blood pressure stay above normal most of the time, you are at risk for health problems. Hypertension is a long-term (chronic) condition in which blood pressure is elevated. This condition often has no signs or symptoms. The cause of the condition is usually not known.  What are blood pressure readings?  A blood pressure reading is recorded as two numbers, such as \"120 over 80\" (or 120/80). The first (\"top\") number is called the systolic pressure. It is a measure of the pressure in your arteries as the heart beats. The second (\"bottom\") number is called the diastolic pressure. It is a measure of the pressure in your arteries as the heart relaxes between beats.  What does my blood pressure reading mean?  Blood pressure is classified into four stages. Based on your blood pressure reading, your health care provider may use the following stages to determine what type of treatment, if any, is needed. Systolic pressure and diastolic pressure are measured in a unit called mm Hg.  Normal  · Systolic pressure: below " 120.  · Diastolic pressure: below 80.  Prehypertension  · Systolic pressure: 120-139.  · Diastolic pressure: 80-89.  Hypertension stage 1  · Systolic pressure: 140-159.  · Diastolic pressure: 90-99.  Hypertension stage 2  · Systolic pressure: 160 or above.  · Diastolic pressure: 100 or above.  What health risks are associated with hypertension?  Managing your hypertension is an important responsibility. Uncontrolled hypertension can lead to:  · A heart attack.  · A stroke.  · A weakened blood vessel (aneurysm).  · Heart failure.  · Kidney damage.  · Eye damage.  · Metabolic syndrome.  · Memory and concentration problems.  What changes can I make to manage my hypertension?  Hypertension can be managed effectively by making lifestyle changes and possibly by taking medicines. Your health care provider will help you come up with a plan to bring your blood pressure within a normal range. Your plan should include the following:  Monitoring  · Monitor your blood pressure at home as told by your health care provider. Your personal target blood pressure may vary depending on your medical conditions, your age, and other factors.  · Have your blood pressure rechecked as told by your health care provider.  Lifestyle  · Lose weight if necessary.  · Get at least 30-45 minutes of aerobic exercise at least 4 times a week.  · Do not use any products that contain nicotine or tobacco, such as cigarettes and e-cigarettes. If you need help quitting, ask your health care provider.  · Learn ways to reduce stress.  · Control any chronic conditions, such as high cholesterol or diabetes.  Eating and drinking  · Follow the DASH diet. This diet is high in fruits, vegetables, and whole grains. It is low in salt, red meat, and added sugars.  · Keep your sodium intake below 2,300 mg per day.  · Limit alcoholic beverages.  Communication  · Review all the medicines you take with your health care provider because there may be side effects or  interactions.  · Talk with your health care provider about your diet, exercise habits, and other lifestyle factors that may be contributing to hypertension.  · See your health care provider regularly. Your health care provider can help you create and adjust your plan for managing hypertension.  Will I need medicine to control my blood pressure?  Your health care provider may prescribe medicine if lifestyle changes are not enough to get your blood pressure under control, and if one of the following is true:  · You are 18-59 years of age, and your systolic blood pressure is 140 or higher.  · You are 60 years of age or older, and your systolic blood pressure is 150 or higher.  · Your diastolic blood pressure is 90 or higher.  · You have diabetes, and your systolic blood pressure is over 140 or your diastolic blood pressure is over 90.  · You have kidney disease, and your blood pressure is above 140/90.  · You have heart disease or a history of stroke, and your blood pressure is 140/90 or higher.  Take medicines only as told by your health care provider. Follow the directions carefully. Blood pressure medicines must be taken as prescribed. The medicine does not work as well when you skip doses. Skipping doses also puts you at risk for problems.  Contact a health care provider if:  · You think you are having a reaction to medicines you have taken.  · You have repeated (recurrent) headaches.  · You feel dizzy.  · You have swelling in your ankles.  · You have trouble with your vision.  Get help right away if:  · You develop a severe headache or confusion.  · You have unusual weakness or numbness, or you feel faint.  · You have severe pain in your chest or abdomen.  · You vomit repeatedly.  · You have trouble breathing.  This information is not intended to replace advice given to you by your health care provider. Make sure you discuss any questions you have with your health care provider.  Document Released: 09/11/2013  Document Revised: 08/22/2017 Document Reviewed: 03/17/2017  [employer] Interactive Patient Education © 2017 [employer] Inc[employer]

## 2018-03-12 RX ORDER — SITAGLIPTIN 100 MG/1
TABLET, FILM COATED ORAL
Qty: 30 TABLET | Refills: 0 | Status: SHIPPED | OUTPATIENT
Start: 2018-03-12 | End: 2018-04-16 | Stop reason: SDUPTHER

## 2018-04-05 RX ORDER — CLOPIDOGREL BISULFATE 75 MG/1
75 TABLET ORAL DAILY
Qty: 90 TABLET | Refills: 1 | Status: SHIPPED | OUTPATIENT
Start: 2018-04-05 | End: 2018-12-19 | Stop reason: SDUPTHER

## 2018-04-16 RX ORDER — SITAGLIPTIN 100 MG/1
TABLET, FILM COATED ORAL
Qty: 30 TABLET | Refills: 0 | Status: SHIPPED | OUTPATIENT
Start: 2018-04-16 | End: 2018-06-09 | Stop reason: SDUPTHER

## 2018-04-18 RX ORDER — ATORVASTATIN CALCIUM 80 MG/1
TABLET, FILM COATED ORAL
Qty: 90 TABLET | Refills: 0 | Status: SHIPPED | OUTPATIENT
Start: 2018-04-18 | End: 2019-03-27 | Stop reason: SDUPTHER

## 2018-05-18 DIAGNOSIS — E11.9 TYPE 2 DIABETES MELLITUS WITHOUT COMPLICATION, WITHOUT LONG-TERM CURRENT USE OF INSULIN (HCC): Primary | ICD-10-CM

## 2018-05-18 DIAGNOSIS — E78.5 HYPERLIPIDEMIA, UNSPECIFIED HYPERLIPIDEMIA TYPE: ICD-10-CM

## 2018-05-19 LAB
CHOLEST SERPL-MCNC: 198 MG/DL (ref 100–199)
GLUCOSE P FAST SERPL-MCNC: 143 MG/DL (ref 65–99)
HBA1C MFR BLD: 7.2 % (ref 4.8–5.6)
HDLC SERPL-MCNC: 53 MG/DL
LDLC SERPL CALC-MCNC: 106 MG/DL (ref 0–99)
TRIGL SERPL-MCNC: 197 MG/DL (ref 0–149)
VLDLC SERPL CALC-MCNC: 39 MG/DL (ref 5–40)

## 2018-05-22 ENCOUNTER — OFFICE VISIT (OUTPATIENT)
Dept: INTERNAL MEDICINE | Facility: CLINIC | Age: 83
End: 2018-05-22

## 2018-05-22 VITALS
BODY MASS INDEX: 26.31 KG/M2 | OXYGEN SATURATION: 96 % | HEART RATE: 76 BPM | DIASTOLIC BLOOD PRESSURE: 64 MMHG | HEIGHT: 60 IN | SYSTOLIC BLOOD PRESSURE: 110 MMHG | RESPIRATION RATE: 16 BRPM | WEIGHT: 134 LBS | TEMPERATURE: 98.7 F

## 2018-05-22 DIAGNOSIS — I25.10 CORONARY ARTERY DISEASE INVOLVING NATIVE CORONARY ARTERY OF NATIVE HEART WITHOUT ANGINA PECTORIS: ICD-10-CM

## 2018-05-22 DIAGNOSIS — N18.30 CKD (CHRONIC KIDNEY DISEASE) STAGE 3, GFR 30-59 ML/MIN (HCC): ICD-10-CM

## 2018-05-22 DIAGNOSIS — E78.5 HYPERLIPIDEMIA, UNSPECIFIED HYPERLIPIDEMIA TYPE: ICD-10-CM

## 2018-05-22 DIAGNOSIS — E11.9 TYPE 2 DIABETES MELLITUS WITHOUT COMPLICATION, WITHOUT LONG-TERM CURRENT USE OF INSULIN (HCC): ICD-10-CM

## 2018-05-22 DIAGNOSIS — I10 ESSENTIAL HYPERTENSION: Primary | ICD-10-CM

## 2018-05-22 DIAGNOSIS — Z79.899 MEDICATION MANAGEMENT: ICD-10-CM

## 2018-05-22 PROCEDURE — 99214 OFFICE O/P EST MOD 30 MIN: CPT | Performed by: INTERNAL MEDICINE

## 2018-05-22 NOTE — PROGRESS NOTES
Subjective   Tamie Peter is a 82 y.o. female.     Chief Complaint   Patient presents with   • Hypertension   • Hyperlipidemia         Hypertension   This is a chronic problem. The current episode started more than 1 year ago. The problem is unchanged. The problem is controlled. Associated symptoms include peripheral edema (right foot in summer only). Pertinent negatives include no chest pain, headaches, orthopnea, palpitations or shortness of breath (in evenings only). There are no associated agents to hypertension. Risk factors for coronary artery disease include diabetes mellitus, dyslipidemia and post-menopausal state. Current antihypertension treatment includes ACE inhibitors, beta blockers and diuretics. Hypertensive end-organ damage includes kidney disease and CAD/MI. There is no history of angina, CVA, heart failure or retinopathy. Identifiable causes of hypertension include chronic renal disease.   Hyperlipidemia   This is a chronic problem. The current episode started more than 1 year ago. The problem is controlled. Recent lipid tests were reviewed and are normal. Exacerbating diseases include chronic renal disease. Factors aggravating her hyperlipidemia include thiazides and beta blockers. Pertinent negatives include no chest pain or shortness of breath (in evenings only). Current antihyperlipidemic treatment includes statins and bile acid squestrants. The current treatment provides significant improvement of lipids. There are no compliance problems.  Risk factors for coronary artery disease include dyslipidemia and post-menopausal.   Diabetes   She presents for her follow-up diabetic visit. She has type 2 diabetes mellitus. No MedicAlert identification noted. Her disease course has been stable. Pertinent negatives for hypoglycemia include no headaches. Pertinent negatives for diabetes include no chest pain. Symptoms are stable. Diabetic complications include nephropathy. Pertinent negatives for  diabetic complications include no autonomic neuropathy, CVA, heart disease, peripheral neuropathy or retinopathy. Risk factors for coronary artery disease include diabetes mellitus, dyslipidemia and post-menopausal. Current diabetic treatment includes oral agent (dual therapy). She is compliant with treatment all of the time. Meal planning includes ADA exchanges. She has had a previous visit with a dietitian. She participates in exercise daily. An ACE inhibitor/angiotensin II receptor blocker is being taken. She does not see a podiatrist.Eye exam is current.        The following portions of the patient's history were reviewed and updated as appropriate: allergies, current medications, past social history and problem list.    Outpatient Prescriptions Marked as Taking for the 5/22/18 encounter (Office Visit) with Mohinder Barbosa MD   Medication Sig Dispense Refill   • alendronate (FOSAMAX) 70 MG tablet Take 70 mg by mouth 1 (One) Time Per Week.     • allopurinol (ZYLOPRIM) 300 MG tablet Take 300 mg by mouth daily.     • amLODIPine (NORVASC) 5 MG tablet Take 5 mg by mouth Daily.     • aspirin 325 MG tablet Take 325 mg by mouth daily.     • atorvastatin (LIPITOR) 80 MG tablet TAKE ONE TABLET BY MOUTH DAILY 90 tablet 0   • clopidogrel (PLAVIX) 75 MG tablet Take 1 tablet by mouth Daily. 90 tablet 1   • diclofenac (VOLTAREN) 1 % gel gel Apply 4 g topically 4 (Four) Times a Day. 1 tube 3   • ezetimibe (ZETIA) 10 MG tablet Take 1 tablet by mouth Daily. 90 tablet 1   • glipiZIDE (GLUCOTROL) 5 MG tablet Take 1 tablet by mouth Daily. 90 tablet 1   • JANUVIA 100 MG tablet TAKE ONE TABLET BY MOUTH DAILY 30 tablet 0   • losartan-hydrochlorothiazide (HYZAAR) 100-25 MG per tablet Take 1 tablet by mouth daily.     • metFORMIN (GLUCOPHAGE) 500 MG tablet TAKE TWO TABLETS BY MOUTH TWICE A DAY WITH MEALS 360 tablet 0   • metoprolol tartrate (LOPRESSOR) 50 MG tablet Take 2 tablets by mouth 2 (Two) Times a Day. 120 tablet 1       Review of  Systems   Respiratory: Negative for chest tightness, shortness of breath (in evenings only) and wheezing.    Cardiovascular: Negative for chest pain, palpitations, orthopnea and leg swelling.   Gastrointestinal: Negative for constipation and diarrhea.   Neurological: Negative for headaches.       Objective   Vitals:    05/22/18 0851   BP: 110/64   Pulse: 76   Resp: 16   Temp: 98.7 °F (37.1 °C)   SpO2: 96%      1    05/22/18  0851   Weight: 60.8 kg (134 lb)    [unfilled]  Body mass index is 26.17 kg/m².      Physical Exam   Constitutional: She appears well-developed and well-nourished. No distress.   HENT:   Head: Normocephalic and atraumatic.   Neck: Carotid bruit is not present.   Cardiovascular: Normal rate, regular rhythm, normal heart sounds and intact distal pulses.  Exam reveals no gallop.    No murmur heard.  Pulmonary/Chest: Effort normal and breath sounds normal. No respiratory distress. She has no wheezes. She has no rales.   Skin: Skin is warm and dry.         Problem List Items Addressed This Visit        Cardiovascular and Mediastinum    CAD (coronary artery disease)    Hypertension - Primary    Hyperlipidemia       Endocrine    Diabetes mellitus       Genitourinary    CKD (chronic kidney disease) stage 3, GFR 30-59 ml/min        Assessment/Plan   In for recheck of htn, lipids, DM, Ckd and CAD.  Pretty much asymptomatic at present.  Blood pressure control is excellent.  Diabetes is much better.  A1c is 7.2.  CAD is asymptomatic.  Lipids better today.  Annual labs next time 8/18.  Glu, a1c, lipids q 3 mos.  Due for [place] disclaimer:   Much of this encounter note is an electronic transcription/translation of spoken language to printed text. The electronic translation of spoken language may permit erroneous, or at times, nonsensical words or phrases to be inadvertently transcribed; Although I have reviewed the note for such errors, some may still exist.

## 2018-06-11 RX ORDER — SITAGLIPTIN 100 MG/1
TABLET, FILM COATED ORAL
Qty: 90 TABLET | Refills: 0 | Status: SHIPPED | OUTPATIENT
Start: 2018-06-11 | End: 2018-10-19 | Stop reason: SDUPTHER

## 2018-06-18 RX ORDER — METOPROLOL TARTRATE 50 MG/1
TABLET, FILM COATED ORAL
Qty: 120 TABLET | Refills: 0 | Status: SHIPPED | OUTPATIENT
Start: 2018-06-18 | End: 2018-08-28 | Stop reason: SDUPTHER

## 2018-08-06 RX ORDER — METOPROLOL TARTRATE 50 MG/1
TABLET, FILM COATED ORAL
Qty: 120 TABLET | Refills: 0 | Status: SHIPPED | OUTPATIENT
Start: 2018-08-06 | End: 2018-10-24 | Stop reason: SDUPTHER

## 2018-08-08 ENCOUNTER — RESULTS ENCOUNTER (OUTPATIENT)
Dept: INTERNAL MEDICINE | Facility: CLINIC | Age: 83
End: 2018-08-08

## 2018-08-08 DIAGNOSIS — E78.5 HYPERLIPIDEMIA, UNSPECIFIED HYPERLIPIDEMIA TYPE: ICD-10-CM

## 2018-08-08 DIAGNOSIS — Z79.899 MEDICATION MANAGEMENT: ICD-10-CM

## 2018-08-08 DIAGNOSIS — E11.9 TYPE 2 DIABETES MELLITUS WITHOUT COMPLICATION, WITHOUT LONG-TERM CURRENT USE OF INSULIN (HCC): ICD-10-CM

## 2018-08-28 ENCOUNTER — APPOINTMENT (OUTPATIENT)
Dept: WOMENS IMAGING | Facility: HOSPITAL | Age: 83
End: 2018-08-28

## 2018-08-28 ENCOUNTER — OFFICE VISIT (OUTPATIENT)
Dept: INTERNAL MEDICINE | Facility: CLINIC | Age: 83
End: 2018-08-28

## 2018-08-28 VITALS
BODY MASS INDEX: 26.55 KG/M2 | HEART RATE: 78 BPM | OXYGEN SATURATION: 96 % | TEMPERATURE: 98.7 F | SYSTOLIC BLOOD PRESSURE: 122 MMHG | HEIGHT: 60 IN | RESPIRATION RATE: 16 BRPM | DIASTOLIC BLOOD PRESSURE: 62 MMHG | WEIGHT: 135.2 LBS

## 2018-08-28 DIAGNOSIS — I10 ESSENTIAL HYPERTENSION: Primary | ICD-10-CM

## 2018-08-28 DIAGNOSIS — N18.30 CKD (CHRONIC KIDNEY DISEASE) STAGE 3, GFR 30-59 ML/MIN (HCC): ICD-10-CM

## 2018-08-28 DIAGNOSIS — K21.9 GASTROESOPHAGEAL REFLUX DISEASE, ESOPHAGITIS PRESENCE NOT SPECIFIED: ICD-10-CM

## 2018-08-28 DIAGNOSIS — E78.2 MIXED HYPERLIPIDEMIA: ICD-10-CM

## 2018-08-28 DIAGNOSIS — I25.10 CORONARY ARTERY DISEASE INVOLVING NATIVE CORONARY ARTERY OF NATIVE HEART WITHOUT ANGINA PECTORIS: ICD-10-CM

## 2018-08-28 DIAGNOSIS — E11.9 TYPE 2 DIABETES MELLITUS WITHOUT COMPLICATION, WITHOUT LONG-TERM CURRENT USE OF INSULIN (HCC): ICD-10-CM

## 2018-08-28 LAB
ALBUMIN SERPL-MCNC: 4.3 G/DL (ref 3.5–4.7)
ALBUMIN/GLOB SERPL: 1.5 {RATIO} (ref 1.2–2.2)
ALP SERPL-CCNC: 120 IU/L (ref 39–117)
ALT SERPL-CCNC: 25 IU/L (ref 0–32)
AST SERPL-CCNC: 29 IU/L (ref 0–40)
BASOPHILS # BLD AUTO: 0.1 X10E3/UL (ref 0–0.2)
BASOPHILS NFR BLD AUTO: 1 %
BILIRUB SERPL-MCNC: 0.5 MG/DL (ref 0–1.2)
BUN SERPL-MCNC: 21 MG/DL (ref 8–27)
BUN/CREAT SERPL: 18 (ref 12–28)
CALCIUM SERPL-MCNC: 9.5 MG/DL (ref 8.7–10.3)
CHLORIDE SERPL-SCNC: 100 MMOL/L (ref 96–106)
CHOLEST SERPL-MCNC: 194 MG/DL (ref 100–199)
CO2 SERPL-SCNC: 24 MMOL/L (ref 20–29)
CREAT SERPL-MCNC: 1.15 MG/DL (ref 0.57–1)
EOSINOPHIL # BLD AUTO: 0.3 X10E3/UL (ref 0–0.4)
EOSINOPHIL NFR BLD AUTO: 5 %
ERYTHROCYTE [DISTWIDTH] IN BLOOD BY AUTOMATED COUNT: 14.1 % (ref 12.3–15.4)
GLOBULIN SER CALC-MCNC: 2.8 G/DL (ref 1.5–4.5)
GLUCOSE SERPL-MCNC: 180 MG/DL (ref 65–99)
HBA1C MFR BLD: 7.3 % (ref 4.8–5.6)
HCT VFR BLD AUTO: 42.2 % (ref 34–46.6)
HDLC SERPL-MCNC: 46 MG/DL
HGB BLD-MCNC: 13.9 G/DL (ref 11.1–15.9)
IMM GRANULOCYTES # BLD: 0 X10E3/UL (ref 0–0.1)
IMM GRANULOCYTES NFR BLD: 0 %
LDLC SERPL CALC-MCNC: 87 MG/DL (ref 0–99)
LYMPHOCYTES # BLD AUTO: 1.4 X10E3/UL (ref 0.7–3.1)
LYMPHOCYTES NFR BLD AUTO: 20 %
MCH RBC QN AUTO: 31.5 PG (ref 26.6–33)
MCHC RBC AUTO-ENTMCNC: 32.9 G/DL (ref 31.5–35.7)
MCV RBC AUTO: 96 FL (ref 79–97)
MONOCYTES # BLD AUTO: 0.4 X10E3/UL (ref 0.1–0.9)
MONOCYTES NFR BLD AUTO: 6 %
NEUTROPHILS # BLD AUTO: 4.9 X10E3/UL (ref 1.4–7)
NEUTROPHILS NFR BLD AUTO: 68 %
PLATELET # BLD AUTO: 249 X10E3/UL (ref 150–379)
POTASSIUM SERPL-SCNC: 5.2 MMOL/L (ref 3.5–5.2)
PROT SERPL-MCNC: 7.1 G/DL (ref 6–8.5)
RBC # BLD AUTO: 4.41 X10E6/UL (ref 3.77–5.28)
SODIUM SERPL-SCNC: 139 MMOL/L (ref 134–144)
TRIGL SERPL-MCNC: 304 MG/DL (ref 0–149)
VLDLC SERPL CALC-MCNC: 61 MG/DL (ref 5–40)
WBC # BLD AUTO: 7.1 X10E3/UL (ref 3.4–10.8)

## 2018-08-28 PROCEDURE — 77063 BREAST TOMOSYNTHESIS BI: CPT | Performed by: RADIOLOGY

## 2018-08-28 PROCEDURE — 77067 SCR MAMMO BI INCL CAD: CPT | Performed by: RADIOLOGY

## 2018-08-28 PROCEDURE — 99214 OFFICE O/P EST MOD 30 MIN: CPT | Performed by: INTERNAL MEDICINE

## 2018-08-28 NOTE — PATIENT INSTRUCTIONS
BMI for Adults  Body mass index (BMI) is a number that is calculated from a person's weight and height. In most adults, the number is used to find how much of an adult's weight is made up of fat. BMI is not as accurate as a direct measure of body fat.  How is BMI calculated?  BMI is calculated by dividing weight in kilograms by height in meters squared. It can also be calculated by dividing weight in pounds by height in inches squared, then multiplying the resulting number by 703. Charts are available to help you find your BMI quickly and easily without doing this calculation.  How is BMI interpreted?  Health care professionals use BMI charts to identify whether an adult is underweight, at a normal weight, or overweight based on the following guidelines:  · Underweight: BMI less than 18.5.  · Normal weight: BMI between 18.5 and 24.9.  · Overweight: BMI between 25 and 29.9.  · Obese: BMI of 30 and above.    BMI is usually interpreted the same for males and females.  Weight includes both fat and muscle, so someone with a muscular build, such as an athlete, may have a BMI that is higher than 24.9. In cases like these, BMI may not accurately depict body fat. To determine if excess body fat is the cause of a BMI of 25 or higher, further assessments may need to be done by a health care provider.  Why is BMI a useful tool?  BMI is used to identify a possible weight problem that may be related to a medical problem or may increase the risk for medical problems. BMI can also be used to promote changes to reach a healthy weight.  This information is not intended to replace advice given to you by your health care provider. Make sure you discuss any questions you have with your health care provider.  Document Released: 08/29/2005 Document Revised: 04/27/2017 Document Reviewed: 05/15/2015  [employer] Interactive Patient Education © 2018 [employer]  Exercising to Lose Weight  Exercising can help you to lose weight. In order to  lose weight through exercise, you need to do vigorous-intensity exercise. You can tell that you are exercising with vigorous intensity if you are breathing very hard and fast and cannot hold a conversation while exercising.  Moderate-intensity exercise helps to maintain your current weight. You can tell that you are exercising at a moderate level if you have a higher heart rate and faster breathing, but you are still able to hold a conversation.  How often should I exercise?  Choose an activity that you enjoy and set realistic goals. Your health care provider can help you to make an activity plan that works for you. Exercise regularly as directed by your health care provider. This may include:  · Doing resistance training twice each week, such as:  ? Push-ups.  ? Sit-ups.  ? Lifting weights.  ? Using resistance bands.  · Doing a given intensity of exercise for a given amount of time. Choose from these options:  ? 150 minutes of moderate-intensity exercise every week.  ? 75 minutes of vigorous-intensity exercise every week.  ? A mix of moderate-intensity and vigorous-intensity exercise every week.    Children, pregnant women, people who are out of shape, people who are overweight, and older adults may need to consult a health care provider for individual recommendations. If you have any sort of medical condition, be sure to consult your health care provider before starting a new exercise program.  What are some activities that can help me to lose weight?  · Walking at a rate of at least 4.5 miles an hour.  · Jogging or running at a rate of 5 miles per hour.  · Biking at a rate of at least 10 miles per hour.  · Lap swimming.  · Roller-skating or in-line skating.  · Cross-country skiing.  · Vigorous competitive sports, such as football, basketball, and soccer.  · Jumping rope.  · Aerobic dancing.  How can I be more active in my day-to-day activities?  · Use the stairs instead of the elevator.  · Take a walk during your  lunch break.  · If you drive, park your car farther away from work or school.  · If you take public transportation, get off one stop early and walk the rest of the way.  · Make all of your phone calls while standing up and walking around.  · Get up, stretch, and walk around every 30 minutes throughout the day.  What guidelines should I follow while exercising?  · Do not exercise so much that you hurt yourself, feel dizzy, or get very short of breath.  · Consult your health care provider prior to starting a new exercise program.  · Wear comfortable clothes and shoes with good support.  · Drink plenty of water while you exercise to prevent dehydration or heat stroke. Body water is lost during exercise and must be replaced.  · Work out until you breathe faster and your heart beats faster.  This information is not intended to replace advice given to you by your health care provider. Make sure you discuss any questions you have with your health care provider.  Document Released: 01/20/2012 Document Revised: 05/25/2017 Document Reviewed: 05/21/2015  [employer] Interactive Patient Education © 2018 [employer]

## 2018-08-28 NOTE — PROGRESS NOTES
Subjective   Tamie Peter is a 82 y.o. female.     Chief Complaint   Patient presents with   • Hypertension     CAD, CKD   • Hyperlipidemia   • Diabetes         Hypertension   This is a chronic problem. The current episode started more than 1 year ago. The problem is unchanged. The problem is controlled. Associated symptoms include peripheral edema (right foot in summer only). Pertinent negatives include no chest pain, headaches, orthopnea, palpitations or shortness of breath (in evenings only). There are no associated agents to hypertension. Risk factors for coronary artery disease include diabetes mellitus, dyslipidemia and post-menopausal state. Current antihypertension treatment includes ACE inhibitors, beta blockers and diuretics. Hypertensive end-organ damage includes kidney disease and CAD/MI. There is no history of angina, CVA, heart failure or retinopathy. Identifiable causes of hypertension include chronic renal disease.   Hyperlipidemia   This is a chronic problem. The current episode started more than 1 year ago. The problem is controlled. Recent lipid tests were reviewed and are normal. Exacerbating diseases include chronic renal disease. Factors aggravating her hyperlipidemia include thiazides and beta blockers. Pertinent negatives include no chest pain or shortness of breath (in evenings only). Current antihyperlipidemic treatment includes statins and bile acid squestrants. The current treatment provides significant improvement of lipids. There are no compliance problems.  Risk factors for coronary artery disease include dyslipidemia and post-menopausal.   Diabetes   She presents for her follow-up diabetic visit. She has type 2 diabetes mellitus. No MedicAlert identification noted. Her disease course has been stable. Pertinent negatives for hypoglycemia include no headaches. Pertinent negatives for diabetes include no chest pain. Symptoms are stable. Diabetic complications include nephropathy.  Pertinent negatives for diabetic complications include no autonomic neuropathy, CVA, heart disease, peripheral neuropathy or retinopathy. Risk factors for coronary artery disease include diabetes mellitus, dyslipidemia and post-menopausal. Current diabetic treatment includes oral agent (dual therapy). She is compliant with treatment all of the time. Meal planning includes ADA exchanges. She has had a previous visit with a dietitian. She participates in exercise daily. An ACE inhibitor/angiotensin II receptor blocker is being taken. She does not see a podiatrist.Eye exam is current.        The following portions of the patient's history were reviewed and updated as appropriate: allergies, current medications, past social history and problem list.    Outpatient Prescriptions Marked as Taking for the 8/28/18 encounter (Office Visit) with Mohinder Barbosa MD   Medication Sig Dispense Refill   • alendronate (FOSAMAX) 70 MG tablet Take 70 mg by mouth 1 (One) Time Per Week.     • allopurinol (ZYLOPRIM) 300 MG tablet Take 300 mg by mouth daily.     • amLODIPine (NORVASC) 5 MG tablet Take 5 mg by mouth Daily.     • aspirin 325 MG tablet Take 325 mg by mouth daily.     • atorvastatin (LIPITOR) 80 MG tablet TAKE ONE TABLET BY MOUTH DAILY 90 tablet 0   • clopidogrel (PLAVIX) 75 MG tablet Take 1 tablet by mouth Daily. 90 tablet 1   • diclofenac (VOLTAREN) 1 % gel gel Apply 4 g topically 4 (Four) Times a Day. 1 tube 3   • ezetimibe (ZETIA) 10 MG tablet Take 1 tablet by mouth Daily. 90 tablet 1   • glipiZIDE (GLUCOTROL) 5 MG tablet Take 1 tablet by mouth Daily. 90 tablet 1   • JANUVIA 100 MG tablet TAKE ONE TABLET BY MOUTH DAILY 90 tablet 0   • losartan-hydrochlorothiazide (HYZAAR) 100-25 MG per tablet Take 1 tablet by mouth daily.     • metFORMIN (GLUCOPHAGE) 500 MG tablet TAKE TWO TABLETS BY MOUTH TWICE A DAY WITH MEALS 360 tablet 0   • metoprolol tartrate (LOPRESSOR) 50 MG tablet TAKE TWO TABLETS BY MOUTH TWICE A  tablet  0       Review of Systems   Respiratory: Negative for chest tightness, shortness of breath (in evenings only) and wheezing.    Cardiovascular: Negative for chest pain, palpitations, orthopnea and leg swelling.   Gastrointestinal: Negative for constipation and diarrhea.   Neurological: Negative for headaches.       Objective   Vitals:    08/28/18 0902   BP: 122/62   Pulse: 78   Resp: 16   Temp: 98.7 °F (37.1 °C)   SpO2: 96%      1    08/28/18 0902   Weight: 61.3 kg (135 lb 3.2 oz)    [unfilled]  Body mass index is 26.4 kg/m².      Physical Exam   Constitutional: She appears well-developed and well-nourished. No distress.   HENT:   Head: Normocephalic and atraumatic.   Neck: Carotid bruit is not present.   Cardiovascular: Normal rate, regular rhythm, normal heart sounds and intact distal pulses.  Exam reveals no gallop.    No murmur heard.  Pulmonary/Chest: Effort normal and breath sounds normal. No respiratory distress. She has no wheezes. She has no rales.    Tamie had a diabetic foot exam performed today.   During the foot exam she had a monofilament test performed.    Neurological Sensory Findings -  Unaltered sharp/dull right ankle/foot discrimination and unaltered sharp/dull left ankle/foot discrimination. No right ankle/foot altered proprioception and no left ankle/foot altered proprioception  Vascular Status -  Her right foot exhibits abnormal foot edema. Her right foot exhibits normal foot vasculature . Her left foot exhibits abnormal foot edema. Her left foot exhibits normal foot vasculature .   Foot Structure and Biomechanics -   Her left foot exhibits hammer toes.  Skin: Skin is warm and dry.         Problem List Items Addressed This Visit        Cardiovascular and Mediastinum    CAD (coronary artery disease)    Hypertension - Primary    Hyperlipidemia       Digestive    GERD (gastroesophageal reflux disease)       Endocrine    Diabetes mellitus (CMS/[facility])       Genitourinary    CKD (chronic kidney  disease) stage 3, GFR 30-59 ml/min        Assessment/Plan   In for recheck of htn, lipids, DM, Ckd and CAD.  Pretty much asymptomatic at present.  Blood pressure control is excellent.  Diabetes is much better.  A1c is 7.3.  CAD is asymptomatic.  Lipids worse today.  Annual labs today 8/18.  Glu, a1c, lipids q 3 mos.  Due for [place] disclaimer:   Much of this encounter note is an electronic transcription/translation of spoken language to printed text. The electronic translation of spoken language may permit erroneous, or at times, nonsensical words or phrases to be inadvertently transcribed; Although I have reviewed the note for such errors, some may still exist.

## 2018-10-04 ENCOUNTER — CLINICAL SUPPORT (OUTPATIENT)
Dept: INTERNAL MEDICINE | Facility: CLINIC | Age: 83
End: 2018-10-04

## 2018-10-04 DIAGNOSIS — Z23 NEED FOR IMMUNIZATION AGAINST INFLUENZA: Primary | ICD-10-CM

## 2018-10-04 PROCEDURE — 90674 CCIIV4 VAC NO PRSV 0.5 ML IM: CPT | Performed by: INTERNAL MEDICINE

## 2018-10-04 PROCEDURE — G0008 ADMIN INFLUENZA VIRUS VAC: HCPCS | Performed by: INTERNAL MEDICINE

## 2018-10-04 NOTE — PATIENT INSTRUCTIONS
Influenza Virus Vaccine injection  What is this medicine?  INFLUENZA VIRUS VACCINE (in [facility] EN [facility] SEEN) helps to reduce the risk of getting influenza also known as the flu. The vaccine only helps protect you against some strains of the flu.  This medicine may be used for other purposes; ask your health care provider or pharmacist if you have questions.  COMMON BRAND NAME(S): Afluria, Agriflu, Alfuria, FLUAD, Fluarix, Fluarix Quadrivalent, Flublok, Flublok Quadrivalent, FLUCELVAX, Flulaval, Fluvirin, Fluzone, Fluzone High-Dose, Fluzone Intradermal  What should I tell my health care provider before I take this medicine?  They need to know if you have any of these conditions:  -bleeding disorder like hemophilia  -fever or infection  -Guillain-[place] syndrome or other neurological problems  -immune system problems  -infection with the human immunodeficiency virus (HIV) or AIDS  -low blood platelet counts  -multiple sclerosis  -an unusual or allergic reaction to influenza virus vaccine, latex, other medicines, foods, dyes, or preservatives. Different brands of vaccines contain different allergens. Some may contain latex or eggs. Talk to your doctor about your allergies to make sure that you get the right vaccine.  -pregnant or trying to get pregnant  -breast-feeding  How should I use this medicine?  This vaccine is for injection into a muscle or under the skin. It is given by a health care professional.  A copy of Vaccine Information Statements will be given before each vaccination. Read this sheet carefully each time. The sheet may change frequently.  Talk to your healthcare provider to see which vaccines are right for you. Some vaccines should not be used in all age groups.  Overdosage: If you think you have taken too much of this medicine contact a poison control center or emergency room at once.  NOTE: This medicine is only for you. Do not share this medicine with others.  What if I miss a dose?  This  does not apply.  What may interact with this medicine?  -chemotherapy or radiation therapy  -medicines that lower your immune system like etanercept, anakinra, infliximab, and adalimumab  -medicines that treat or prevent blood clots like warfarin  -phenytoin  -steroid medicines like prednisone or cortisone  -theophylline  -vaccines  This list may not describe all possible interactions. Give your health care provider a list of all the medicines, herbs, non-prescription drugs, or dietary supplements you use. Also tell them if you smoke, drink alcohol, or use illegal drugs. Some items may interact with your medicine.  What should I watch for while using this medicine?  Report any side effects that do not go away within 3 days to your doctor or health care professional. Call your health care provider if any unusual symptoms occur within 6 weeks of receiving this vaccine.  You may still catch the flu, but the illness is not usually as bad. You cannot get the flu from the vaccine. The vaccine will not protect against colds or other illnesses that may cause fever. The vaccine is needed every year.  What side effects may I notice from receiving this medicine?  Side effects that you should report to your doctor or health care professional as soon as possible:  -allergic reactions like skin rash, itching or hives, swelling of the face, lips, or tongue  Side effects that usually do not require medical attention (report to your doctor or health care professional if they continue or are bothersome):  -fever  -headache  -muscle aches and pains  -pain, tenderness, redness, or swelling at the injection site  -tiredness  This list may not describe all possible side effects. Call your doctor for medical advice about side effects. You may report side effects to FDA at 0-512-FDA-0936.  Where should I keep my medicine?  The vaccine will be given by a health care professional in a clinic, pharmacy, doctor's office, or other health care  setting. You will not be given vaccine doses to store at home.  NOTE: This sheet is a summary. It may not cover all possible information. If you have questions about this medicine, talk to your doctor, pharmacist, or health care provider.  © 2018 [employer] (2016-07-08 10:07:28)

## 2018-10-19 RX ORDER — SITAGLIPTIN 100 MG/1
TABLET, FILM COATED ORAL
Qty: 30 TABLET | Refills: 0 | Status: SHIPPED | OUTPATIENT
Start: 2018-10-19 | End: 2018-11-28 | Stop reason: SDUPTHER

## 2018-10-24 RX ORDER — METOPROLOL TARTRATE 50 MG/1
TABLET, FILM COATED ORAL
Qty: 120 TABLET | Refills: 0 | Status: SHIPPED | OUTPATIENT
Start: 2018-10-24 | End: 2018-11-30 | Stop reason: SDUPTHER

## 2018-11-14 ENCOUNTER — RESULTS ENCOUNTER (OUTPATIENT)
Dept: INTERNAL MEDICINE | Facility: CLINIC | Age: 83
End: 2018-11-14

## 2018-11-14 DIAGNOSIS — E11.9 TYPE 2 DIABETES MELLITUS WITHOUT COMPLICATION, WITHOUT LONG-TERM CURRENT USE OF INSULIN (HCC): ICD-10-CM

## 2018-11-14 DIAGNOSIS — E78.2 MIXED HYPERLIPIDEMIA: ICD-10-CM

## 2018-11-28 RX ORDER — SITAGLIPTIN 100 MG/1
TABLET, FILM COATED ORAL
Qty: 30 TABLET | Refills: 2 | Status: SHIPPED | OUTPATIENT
Start: 2018-11-28 | End: 2019-01-10 | Stop reason: SDUPTHER

## 2018-11-30 RX ORDER — GLIPIZIDE 5 MG/1
TABLET ORAL
Qty: 90 TABLET | Refills: 0 | Status: SHIPPED | OUTPATIENT
Start: 2018-11-30 | End: 2019-01-08 | Stop reason: SDUPTHER

## 2018-11-30 RX ORDER — METOPROLOL TARTRATE 50 MG/1
TABLET, FILM COATED ORAL
Qty: 120 TABLET | Refills: 1 | Status: SHIPPED | OUTPATIENT
Start: 2018-11-30 | End: 2019-03-03 | Stop reason: SDUPTHER

## 2018-12-19 RX ORDER — CLOPIDOGREL BISULFATE 75 MG/1
TABLET ORAL
Qty: 90 TABLET | Refills: 0 | Status: SHIPPED | OUTPATIENT
Start: 2018-12-19 | End: 2019-03-20 | Stop reason: SDUPTHER

## 2019-01-08 ENCOUNTER — OFFICE VISIT (OUTPATIENT)
Dept: INTERNAL MEDICINE | Facility: CLINIC | Age: 84
End: 2019-01-08

## 2019-01-08 VITALS
HEART RATE: 80 BPM | DIASTOLIC BLOOD PRESSURE: 64 MMHG | TEMPERATURE: 98.9 F | OXYGEN SATURATION: 96 % | HEIGHT: 60 IN | RESPIRATION RATE: 16 BRPM | SYSTOLIC BLOOD PRESSURE: 116 MMHG | BODY MASS INDEX: 26.31 KG/M2 | WEIGHT: 134 LBS

## 2019-01-08 DIAGNOSIS — I25.10 CORONARY ARTERY DISEASE INVOLVING NATIVE CORONARY ARTERY OF NATIVE HEART WITHOUT ANGINA PECTORIS: ICD-10-CM

## 2019-01-08 DIAGNOSIS — E11.9 TYPE 2 DIABETES MELLITUS WITHOUT COMPLICATION, WITHOUT LONG-TERM CURRENT USE OF INSULIN (HCC): ICD-10-CM

## 2019-01-08 DIAGNOSIS — E78.2 MIXED HYPERLIPIDEMIA: ICD-10-CM

## 2019-01-08 DIAGNOSIS — I49.5 SICK SINUS SYNDROME (HCC): ICD-10-CM

## 2019-01-08 DIAGNOSIS — N18.30 CKD (CHRONIC KIDNEY DISEASE) STAGE 3, GFR 30-59 ML/MIN (HCC): ICD-10-CM

## 2019-01-08 DIAGNOSIS — I10 ESSENTIAL HYPERTENSION: Primary | ICD-10-CM

## 2019-01-08 DIAGNOSIS — K21.9 GASTROESOPHAGEAL REFLUX DISEASE, ESOPHAGITIS PRESENCE NOT SPECIFIED: ICD-10-CM

## 2019-01-08 LAB
CHOLEST SERPL-MCNC: 155 MG/DL (ref 100–199)
GLUCOSE P FAST SERPL-MCNC: 318 MG/DL (ref 65–99)
HBA1C MFR BLD: 7.9 % (ref 4.8–5.6)
HDLC SERPL-MCNC: 43 MG/DL
LDLC SERPL CALC-MCNC: 62 MG/DL (ref 0–99)
TRIGL SERPL-MCNC: 249 MG/DL (ref 0–149)
VLDLC SERPL CALC-MCNC: 50 MG/DL (ref 5–40)

## 2019-01-08 PROCEDURE — 99214 OFFICE O/P EST MOD 30 MIN: CPT | Performed by: INTERNAL MEDICINE

## 2019-01-08 RX ORDER — GLIPIZIDE 5 MG/1
10 TABLET ORAL DAILY
Qty: 180 TABLET | Refills: 0 | Status: SHIPPED | OUTPATIENT
Start: 2019-01-08 | End: 2019-07-15 | Stop reason: SDUPTHER

## 2019-01-08 NOTE — PROGRESS NOTES
Subjective   Tamie Peter is a 83 y.o. female.     Chief Complaint   Patient presents with   • Hypertension   • Hyperlipidemia   • Diabetes         Hypertension   This is a chronic problem. The current episode started more than 1 year ago. The problem is unchanged. The problem is controlled. Associated symptoms include peripheral edema (right foot in summer only). Pertinent negatives include no chest pain, headaches, orthopnea, palpitations or shortness of breath (in evenings only). There are no associated agents to hypertension. Risk factors for coronary artery disease include diabetes mellitus, dyslipidemia and post-menopausal state. Current antihypertension treatment includes ACE inhibitors, beta blockers and diuretics. Hypertensive end-organ damage includes kidney disease and CAD/MI. There is no history of angina, CVA, heart failure or retinopathy. Identifiable causes of hypertension include chronic renal disease.   Hyperlipidemia   This is a chronic problem. The current episode started more than 1 year ago. The problem is controlled. Recent lipid tests were reviewed and are normal. Exacerbating diseases include chronic renal disease. Factors aggravating her hyperlipidemia include thiazides and beta blockers. Pertinent negatives include no chest pain or shortness of breath (in evenings only). Current antihyperlipidemic treatment includes statins and bile acid squestrants. The current treatment provides significant improvement of lipids. There are no compliance problems.  Risk factors for coronary artery disease include dyslipidemia and post-menopausal.   Diabetes   She presents for her follow-up diabetic visit. She has type 2 diabetes mellitus. No MedicAlert identification noted. Her disease course has been stable. Pertinent negatives for hypoglycemia include no headaches. Pertinent negatives for diabetes include no chest pain. Symptoms are stable. Diabetic complications include nephropathy. Pertinent  negatives for diabetic complications include no autonomic neuropathy, CVA, heart disease, peripheral neuropathy or retinopathy. Risk factors for coronary artery disease include diabetes mellitus, dyslipidemia and post-menopausal. Current diabetic treatment includes oral agent (dual therapy). She is compliant with treatment all of the time. Meal planning includes ADA exchanges. She has had a previous visit with a dietitian. She participates in exercise daily. An ACE inhibitor/angiotensin II receptor blocker is being taken. She does not see a podiatrist.Eye exam is current.        The following portions of the patient's history were reviewed and updated as appropriate: allergies, current medications, past social history and problem list.    Outpatient Medications Marked as Taking for the 1/8/19 encounter (Office Visit) with Mohinder Barbosa MD   Medication Sig Dispense Refill   • alendronate (FOSAMAX) 70 MG tablet Take 70 mg by mouth 1 (One) Time Per Week.     • allopurinol (ZYLOPRIM) 300 MG tablet Take 300 mg by mouth daily.     • amLODIPine (NORVASC) 5 MG tablet Take 5 mg by mouth Daily.     • aspirin 325 MG tablet Take 325 mg by mouth daily.     • atorvastatin (LIPITOR) 80 MG tablet TAKE ONE TABLET BY MOUTH DAILY 90 tablet 0   • clopidogrel (PLAVIX) 75 MG tablet TAKE ONE TABLET BY MOUTH DAILY 90 tablet 0   • diclofenac (VOLTAREN) 1 % gel gel Apply 4 g topically 4 (Four) Times a Day. 1 tube 3   • ezetimibe (ZETIA) 10 MG tablet Take 1 tablet by mouth Daily. 90 tablet 1   • glipiZIDE (GLUCOTROL) 5 MG tablet TAKE ONE TABLET BY MOUTH DAILY 90 tablet 0   • losartan-hydrochlorothiazide (HYZAAR) 100-25 MG per tablet Take 1 tablet by mouth daily.     • metFORMIN (GLUCOPHAGE) 500 MG tablet TAKE TWO TABLETS BY MOUTH TWICE A DAY WITH MEALS 360 tablet 0   • metoprolol tartrate (LOPRESSOR) 50 MG tablet TAKE TWO TABLETS BY MOUTH TWICE A  tablet 1       Review of Systems   Respiratory: Negative for chest tightness, shortness  of breath (in evenings only) and wheezing.    Cardiovascular: Negative for chest pain, palpitations, orthopnea and leg swelling.   Gastrointestinal: Negative for constipation and diarrhea.   Neurological: Negative for headaches.       Objective   Vitals:    01/08/19 1012   BP: 116/64   Pulse: 80   Resp: 16   Temp: 98.9 °F (37.2 °C)   SpO2: 96%          01/08/19  1012   Weight: 60.8 kg (134 lb)    [unfilled]  Body mass index is 26.17 kg/m².      Physical Exam   Constitutional: She appears well-developed and well-nourished. No distress.   HENT:   Head: Normocephalic and atraumatic.   Neck: Carotid bruit is not present.   Cardiovascular: Normal rate, regular rhythm, normal heart sounds and intact distal pulses. Exam reveals no gallop.   No murmur heard.  Pulmonary/Chest: Effort normal and breath sounds normal. No respiratory distress. She has no wheezes. She has no rales.   Skin: Skin is warm and dry.         Problem List Items Addressed This Visit        Cardiovascular and Mediastinum    CAD (coronary artery disease)    Hypertension - Primary    Hyperlipidemia    Sick sinus syndrome (CMS/HCC)       Digestive    GERD (gastroesophageal reflux disease)       Endocrine    Diabetes mellitus (CMS/HCC)       Genitourinary    CKD (chronic kidney disease) stage 3, GFR 30-59 ml/min (CMS/[facility])        Assessment/Plan   In for recheck of htn, lipids, DM, Ckd and CAD.  Pretty much asymptomatic at present.  Blood pressure control is excellent.  Diabetes is much better.  A1c is 7.3.  CAD is asymptomatic.  Lipids worse today.  Annual labs today 8/18.  Glu, a1c, lipids q 3 mos.  Due for Shingrix.  Blood sugar is worse this time but she ran out of Januvia 1 month ago due to her doughnut hole.  She will now resume the Januvia.  We'll also begin an increase of glipizide from 5 mg daily to 10 mg daily.         [employer] disclaimer:   Much of this encounter note is an electronic transcription/translation of spoken language to printed text.  The electronic translation of spoken language may permit erroneous, or at times, nonsensical words or phrases to be inadvertently transcribed; Although I have reviewed the note for such errors, some may still exist.

## 2019-03-04 RX ORDER — METOPROLOL TARTRATE 50 MG/1
TABLET, FILM COATED ORAL
Qty: 120 TABLET | Refills: 0 | Status: SHIPPED | OUTPATIENT
Start: 2019-03-04 | End: 2019-04-14 | Stop reason: SDUPTHER

## 2019-03-20 RX ORDER — CLOPIDOGREL BISULFATE 75 MG/1
TABLET ORAL
Qty: 90 TABLET | Refills: 0 | Status: SHIPPED | OUTPATIENT
Start: 2019-03-20 | End: 2019-07-10 | Stop reason: SDUPTHER

## 2019-03-27 RX ORDER — ATORVASTATIN CALCIUM 80 MG/1
TABLET, FILM COATED ORAL
Qty: 90 TABLET | Refills: 0 | Status: SHIPPED | OUTPATIENT
Start: 2019-03-27 | End: 2020-03-31

## 2019-03-27 RX ORDER — EZETIMIBE 10 MG/1
TABLET ORAL
Qty: 90 TABLET | Refills: 0 | Status: SHIPPED | OUTPATIENT
Start: 2019-03-27 | End: 2020-03-31

## 2019-04-15 RX ORDER — METOPROLOL TARTRATE 50 MG/1
TABLET, FILM COATED ORAL
Qty: 120 TABLET | Refills: 0 | Status: SHIPPED | OUTPATIENT
Start: 2019-04-15 | End: 2019-06-16 | Stop reason: SDUPTHER

## 2019-04-23 ENCOUNTER — OFFICE VISIT (OUTPATIENT)
Dept: INTERNAL MEDICINE | Facility: CLINIC | Age: 84
End: 2019-04-23

## 2019-04-23 VITALS
DIASTOLIC BLOOD PRESSURE: 68 MMHG | RESPIRATION RATE: 16 BRPM | SYSTOLIC BLOOD PRESSURE: 128 MMHG | HEIGHT: 60 IN | BODY MASS INDEX: 26.46 KG/M2 | WEIGHT: 134.8 LBS | OXYGEN SATURATION: 98 % | HEART RATE: 52 BPM | TEMPERATURE: 98.4 F

## 2019-04-23 DIAGNOSIS — M1A.00X0 IDIOPATHIC CHRONIC GOUT WITHOUT TOPHUS, UNSPECIFIED SITE: ICD-10-CM

## 2019-04-23 DIAGNOSIS — E11.9 TYPE 2 DIABETES MELLITUS WITHOUT COMPLICATION, WITHOUT LONG-TERM CURRENT USE OF INSULIN (HCC): ICD-10-CM

## 2019-04-23 DIAGNOSIS — N18.30 CKD (CHRONIC KIDNEY DISEASE) STAGE 3, GFR 30-59 ML/MIN (HCC): ICD-10-CM

## 2019-04-23 DIAGNOSIS — I25.10 CORONARY ARTERY DISEASE INVOLVING NATIVE CORONARY ARTERY OF NATIVE HEART WITHOUT ANGINA PECTORIS: ICD-10-CM

## 2019-04-23 DIAGNOSIS — K21.9 GASTROESOPHAGEAL REFLUX DISEASE, ESOPHAGITIS PRESENCE NOT SPECIFIED: ICD-10-CM

## 2019-04-23 DIAGNOSIS — E78.2 MIXED HYPERLIPIDEMIA: ICD-10-CM

## 2019-04-23 DIAGNOSIS — I10 ESSENTIAL HYPERTENSION: Primary | ICD-10-CM

## 2019-04-23 DIAGNOSIS — Z79.899 MEDICATION MANAGEMENT: ICD-10-CM

## 2019-04-23 LAB
BILIRUB BLD-MCNC: NEGATIVE MG/DL
CHOLEST SERPL-MCNC: 185 MG/DL (ref 100–199)
CLARITY, POC: CLEAR
COLOR UR: YELLOW
GLUCOSE P FAST SERPL-MCNC: 175 MG/DL (ref 65–99)
GLUCOSE UR STRIP-MCNC: ABNORMAL MG/DL
HBA1C MFR BLD: 7.2 % (ref 4.8–5.6)
HDLC SERPL-MCNC: 49 MG/DL
KETONES UR QL: NEGATIVE
LDLC SERPL CALC-MCNC: 97 MG/DL (ref 0–99)
LEUKOCYTE EST, POC: NEGATIVE
NITRITE UR-MCNC: NEGATIVE MG/ML
PH UR: 5 [PH] (ref 5–8)
POC CREATININE URINE: 300
POC MICROALBUMIN URINE: 80
PROT UR STRIP-MCNC: ABNORMAL MG/DL
RBC # UR STRIP: NEGATIVE /UL
SP GR UR: 1.02 (ref 1–1.03)
TRIGL SERPL-MCNC: 196 MG/DL (ref 0–149)
UROBILINOGEN UR QL: NORMAL
VLDLC SERPL CALC-MCNC: 39 MG/DL (ref 5–40)

## 2019-04-23 PROCEDURE — 82044 UR ALBUMIN SEMIQUANTITATIVE: CPT | Performed by: INTERNAL MEDICINE

## 2019-04-23 PROCEDURE — G0439 PPPS, SUBSEQ VISIT: HCPCS | Performed by: INTERNAL MEDICINE

## 2019-04-23 PROCEDURE — 99214 OFFICE O/P EST MOD 30 MIN: CPT | Performed by: INTERNAL MEDICINE

## 2019-04-23 PROCEDURE — 81003 URINALYSIS AUTO W/O SCOPE: CPT | Performed by: INTERNAL MEDICINE

## 2019-04-23 NOTE — PROGRESS NOTES
QUICK REFERENCE INFORMATION:  The ABCs of the Annual Wellness Visit    Subsequent Medicare Wellness Visit     HEALTH RISK ASSESSMENT    : 1935    Recent Hospitalizations:  No hospitalization(s) within the last year..  ccc      Current Medical Providers:  Patient Care Team:  Mohinder Barbosa MD as PCP - General (Internal Medicine)  Mohinder Barbosa MD as PCP - Internal Medicine (Internal Medicine)  Mohinder Barbosa MD as PCP - Claims [facility]  Doc Parekh MD as Consulting Physician (Cardiology)        Smoking Status:  Social History     Tobacco Use   Smoking Status Never Smoker   Smokeless Tobacco Never Used       Alcohol Consumption:  Social History     Substance and Sexual Activity   Alcohol Use No       Depression Screen:   PHQ-2/PHQ-9 Depression Screening 2019   Little interest or pleasure in doing things 0   Feeling down, depressed, or hopeless 0   Trouble falling or staying asleep, or sleeping too much 0   Feeling tired or having little energy 0   Poor appetite or overeating 0   Feeling bad about yourself - or that you are a failure or have let yourself or your family down 0   Trouble concentrating on things, such as reading the newspaper or watching television 0   Moving or speaking so slowly that other people could have noticed. Or the opposite - being so fidgety or restless that you have been moving around a lot more than usual 0   Thoughts that you would be better off dead, or of hurting yourself in some way 0   Total Score 0   If you checked off any problems, how difficult have these problems made it for you to do your work, take care of things at home, or get along with other people? -       Health Habits and Functional and Cognitive Screening:  Functional & Cognitive Status 2019   Do you have difficulty preparing food and eating? No   Do you have difficulty bathing yourself, getting dressed or grooming yourself? No   Do you have difficulty using the toilet? No   Do you have  difficulty moving around from place to place? No   Do you have trouble with steps or getting out of a bed or a chair? No   In the past year have you fallen or experienced a near fall? No   Current Diet Well Balanced Diet   Dental Exam Up to date   Eye Exam Up to date   Exercise (times per week) 7 times per week   Current Exercise Activities Include Walking   Do you need help using the phone?  No   Are you deaf or do you have serious difficulty hearing?  No   Do you need help with transportation? No   Do you need help shopping? No   Do you need help preparing meals?  No   Do you need help with housework?  No   Do you need help with laundry? No   Do you need help taking your medications? No   Do you need help managing money? No   Do you ever drive or ride in a car without wearing a seat belt? No   Have you felt unusual stress, anger or loneliness in the last month? No   Who do you live with? Alone   If you need help, do you have trouble finding someone available to you? No   Have you been bothered in the last four weeks by sexual problems? No   Do you have difficulty concentrating, remembering or making decisions? No           Does the patient have evidence of cognitive impairment? No    Asiprin use counseling: Taking ASA appropriately as indicated      Recent Lab Results:    Lab Results   Component Value Date     (H) 08/27/2018     Lab Results   Component Value Date    HGBA1C 7.2 (H) 04/22/2019     Lab Results   Component Value Date    CHOL 132 02/22/2017    TRIG 196 (H) 04/22/2019    HDL 49 04/22/2019    VLDL 39 04/22/2019    LDLHDL 1.35 02/22/2017           Age-appropriate Screening Schedule:  Refer to the list below for future screening recommendations based on patient's age, sex and/or medical conditions. Orders for these recommended tests are listed in the plan section. The patient has been provided with a written plan.    Health Maintenance   Topic Date Due   • URINE MICROALBUMIN  01/30/2019   • DIABETIC  EYE EXAM  07/17/2019   • INFLUENZA VACCINE  08/01/2019   • DXA SCAN  08/11/2019   • DIABETIC FOOT EXAM  08/28/2019   • HEMOGLOBIN A1C  10/22/2019   • LIPID PANEL  04/22/2020   • TDAP/TD VACCINES (2 - Td) 07/21/2025   • PNEUMOCOCCAL VACCINES (65+ LOW/MEDIUM RISK)  Completed   • ZOSTER VACCINE  Completed        Subjective   History of Present Illness    Tamie Peter is a 83 y.o. female who presents for an Annual Wellness Visit.    The following portions of the patient's history were reviewed and updated as appropriate: allergies, current medications, past family history, past medical history, past social history, past surgical history and problem list.    Outpatient Medications Prior to Visit   Medication Sig Dispense Refill   • alendronate (FOSAMAX) 70 MG tablet Take 70 mg by mouth 1 (One) Time Per Week.     • allopurinol (ZYLOPRIM) 300 MG tablet Take 300 mg by mouth daily.     • amLODIPine (NORVASC) 5 MG tablet Take 5 mg by mouth Daily.     • aspirin 325 MG tablet Take 325 mg by mouth daily.     • atorvastatin (LIPITOR) 80 MG tablet TAKE ONE TABLET BY MOUTH DAILY 90 tablet 0   • clopidogrel (PLAVIX) 75 MG tablet TAKE ONE TABLET BY MOUTH DAILY 90 tablet 0   • diclofenac (VOLTAREN) 1 % gel gel Apply 4 g topically 4 (Four) Times a Day. 1 tube 3   • ezetimibe (ZETIA) 10 MG tablet TAKE ONE TABLET BY MOUTH DAILY 90 tablet 0   • glipiZIDE (GLUCOTROL) 5 MG tablet Take 2 tablets by mouth Daily. 180 tablet 0   • losartan-hydrochlorothiazide (HYZAAR) 100-25 MG per tablet Take 1 tablet by mouth daily.     • metFORMIN (GLUCOPHAGE) 500 MG tablet TAKE TWO TABLETS BY MOUTH TWICE A DAY WITH MEALS 360 tablet 0   • metoprolol tartrate (LOPRESSOR) 50 MG tablet TAKE TWO TABLETS BY MOUTH TWICE A  tablet 0   • SITagliptin (JANUVIA) 100 MG tablet Take 1 tablet by mouth Daily. 30 tablet 2     No facility-administered medications prior to visit.        Patient Active Problem List   Diagnosis   • CAD (coronary artery disease)   •  "Hypertension   • GERD (gastroesophageal reflux disease)   • Diabetes mellitus (CMS/[facility])   • CKD (chronic kidney disease) stage 3, GFR 30-59 ml/min (CMS/[facility])   • Gout   • Hyperlipidemia   • PUD (peptic ulcer disease)   • Sick sinus syndrome (CMS/[facility])   • Disorder of rotator cuff   • Osteoporosis   • Pacemaker       Advance Care Planning:  Patient has an advance directive - a copy has not been provided. Have asked the patient to send this to us to add to record    Identification of Risk Factors:  Risk factors include: CAD & CKD.    Review of Systems    Compared to one year ago, the patient feels her physical health is better.  Compared to one year ago, the patient feels her mental health is better.    Objective     Physical Exam    Vitals:    04/23/19 1037   BP: 128/68   BP Location: Left arm   Patient Position: Sitting   Pulse: 52   Resp: 16   Temp: 98.4 °F (36.9 °C)   SpO2: 98%   Weight: 61.1 kg (134 lb 12.8 oz)   Height: 152.4 cm (60\")   PainSc: 0-No pain       Patient's Body mass index is 26.33 kg/m². BMI is within normal parameters. No follow-up required..      Assessment/Plan   Patient Self-Management and Personalized Health Advice  The patient has been provided with information about: N/A and preventive services including:   · Fall Risk assessment done.    Visit Diagnoses:  No diagnosis found.    No orders of the defined types were placed in this encounter.      Outpatient Encounter Medications as of 4/23/2019   Medication Sig Dispense Refill   • alendronate (FOSAMAX) 70 MG tablet Take 70 mg by mouth 1 (One) Time Per Week.     • allopurinol (ZYLOPRIM) 300 MG tablet Take 300 mg by mouth daily.     • amLODIPine (NORVASC) 5 MG tablet Take 5 mg by mouth Daily.     • aspirin 325 MG tablet Take 325 mg by mouth daily.     • atorvastatin (LIPITOR) 80 MG tablet TAKE ONE TABLET BY MOUTH DAILY 90 tablet 0   • clopidogrel (PLAVIX) 75 MG tablet TAKE ONE TABLET BY MOUTH DAILY 90 tablet 0   • diclofenac (VOLTAREN) 1 % gel gel " Apply 4 g topically 4 (Four) Times a Day. 1 tube 3   • ezetimibe (ZETIA) 10 MG tablet TAKE ONE TABLET BY MOUTH DAILY 90 tablet 0   • glipiZIDE (GLUCOTROL) 5 MG tablet Take 2 tablets by mouth Daily. 180 tablet 0   • losartan-hydrochlorothiazide (HYZAAR) 100-25 MG per tablet Take 1 tablet by mouth daily.     • metFORMIN (GLUCOPHAGE) 500 MG tablet TAKE TWO TABLETS BY MOUTH TWICE A DAY WITH MEALS 360 tablet 0   • metoprolol tartrate (LOPRESSOR) 50 MG tablet TAKE TWO TABLETS BY MOUTH TWICE A  tablet 0   • SITagliptin (JANUVIA) 100 MG tablet Take 1 tablet by mouth Daily. 30 tablet 2     No facility-administered encounter medications on file as of 4/23/2019.        Reviewed use of high risk medication in the elderly: yes  Reviewed for potential of harmful drug interactions in the elderly: yes    Follow Up:  No Follow-up on file.     An After Visit Summary and PPPS with all of these plans were given to the patient.

## 2019-04-23 NOTE — PROGRESS NOTES
Subjective   Tamie Peter is a 83 y.o. female.     Chief Complaint   Patient presents with   • Hypertension   • Hyperlipidemia   • Coronary Artery Disease   • Diabetes   • Chronic Kidney Disease         Hypertension   This is a chronic problem. The current episode started more than 1 year ago. The problem is unchanged. The problem is controlled. Associated symptoms include peripheral edema (right foot in summer only). Pertinent negatives include no headaches or orthopnea. There are no associated agents to hypertension. Risk factors for coronary artery disease include diabetes mellitus, dyslipidemia and post-menopausal state. Current antihypertension treatment includes ACE inhibitors, beta blockers and diuretics. Hypertensive end-organ damage includes kidney disease and CAD/MI. There is no history of angina, CVA, heart failure or retinopathy. Identifiable causes of hypertension include chronic renal disease.   Hyperlipidemia   This is a chronic problem. The current episode started more than 1 year ago. The problem is controlled. Recent lipid tests were reviewed and are normal. Exacerbating diseases include chronic renal disease. Factors aggravating her hyperlipidemia include thiazides and beta blockers. Current antihyperlipidemic treatment includes statins and bile acid squestrants. The current treatment provides significant improvement of lipids. There are no compliance problems.  Risk factors for coronary artery disease include dyslipidemia and post-menopausal.   Coronary Artery Disease   Risk factors include hyperlipidemia.   Diabetes   She presents for her follow-up diabetic visit. She has type 2 diabetes mellitus. No MedicAlert identification noted. Her disease course has been stable. Pertinent negatives for hypoglycemia include no headaches. Symptoms are stable. Diabetic complications include nephropathy. Pertinent negatives for diabetic complications include no autonomic neuropathy, CVA, heart disease,  peripheral neuropathy or retinopathy. Risk factors for coronary artery disease include diabetes mellitus, dyslipidemia and post-menopausal. Current diabetic treatment includes oral agent (dual therapy). She is compliant with treatment all of the time. Meal planning includes ADA exchanges. She has had a previous visit with a dietitian. She participates in exercise daily. An ACE inhibitor/angiotensin II receptor blocker is being taken. She does not see a podiatrist.Eye exam is current.   Chronic Kidney Disease   Pertinent negatives include no headaches.        The following portions of the patient's history were reviewed and updated as appropriate: allergies, current medications, past social history and problem list.    Outpatient Medications Marked as Taking for the 4/23/19 encounter (Office Visit) with Mohinder Barbosa MD   Medication Sig Dispense Refill   • alendronate (FOSAMAX) 70 MG tablet Take 70 mg by mouth 1 (One) Time Per Week.     • allopurinol (ZYLOPRIM) 300 MG tablet Take 300 mg by mouth daily.     • amLODIPine (NORVASC) 5 MG tablet Take 5 mg by mouth Daily.     • aspirin 325 MG tablet Take 325 mg by mouth daily.     • atorvastatin (LIPITOR) 80 MG tablet TAKE ONE TABLET BY MOUTH DAILY 90 tablet 0   • clopidogrel (PLAVIX) 75 MG tablet TAKE ONE TABLET BY MOUTH DAILY 90 tablet 0   • diclofenac (VOLTAREN) 1 % gel gel Apply 4 g topically 4 (Four) Times a Day. 1 tube 3   • ezetimibe (ZETIA) 10 MG tablet TAKE ONE TABLET BY MOUTH DAILY 90 tablet 0   • glipiZIDE (GLUCOTROL) 5 MG tablet Take 2 tablets by mouth Daily. 180 tablet 0   • losartan-hydrochlorothiazide (HYZAAR) 100-25 MG per tablet Take 1 tablet by mouth daily.     • metFORMIN (GLUCOPHAGE) 500 MG tablet TAKE TWO TABLETS BY MOUTH TWICE A DAY WITH MEALS 360 tablet 0   • metoprolol tartrate (LOPRESSOR) 50 MG tablet TAKE TWO TABLETS BY MOUTH TWICE A  tablet 0   • SITagliptin (JANUVIA) 100 MG tablet Take 1 tablet by mouth Daily. 30 tablet 2       Review  of Systems   Respiratory: Negative for wheezing.    Cardiovascular: Negative for orthopnea.   Gastrointestinal: Negative for constipation and diarrhea.   Neurological: Negative for headaches.       Objective   Vitals:    04/23/19 1037   BP: 128/68   Pulse: 52   Resp: 16   Temp: 98.4 °F (36.9 °C)   SpO2: 98%          04/23/19  1037   Weight: 61.1 kg (134 lb 12.8 oz)    [unfilled]  Body mass index is 26.33 kg/m².      Physical Exam   Constitutional: She appears well-developed and well-nourished. No distress.   HENT:   Head: Normocephalic and atraumatic.   Neck: Carotid bruit is not present.   Cardiovascular: Normal rate, regular rhythm, normal heart sounds and intact distal pulses. Exam reveals no gallop.   No murmur heard.  Pulmonary/Chest: Effort normal and breath sounds normal. No respiratory distress. She has no wheezes. She has no rales.   Skin: Skin is warm and dry.         Problem List Items Addressed This Visit        Cardiovascular and Mediastinum    CAD (coronary artery disease)    Hypertension - Primary    Hyperlipidemia       Digestive    GERD (gastroesophageal reflux disease)       Endocrine    Diabetes mellitus (CMS/[facility])       Genitourinary    CKD (chronic kidney disease) stage 3, GFR 30-59 ml/min (CMS/[facility])       Other    Gout        Assessment/Plan   In for recheck of htn, lipids, DM, Ckd and CAD.  Pretty much asymptomatic at present.  Blood pressure control is excellent.  Diabetes is much better.  A1c is 7.2.  CAD is asymptomatic.  Lipids worse today.  Annual labs 8/18.  Glu, a1c, lipids q 3 mos.  Blood sugar was worse last time when she ran out of Januvia due to her doughnut hole.  She is now back on the [employer] and a higher dose of glipizide.  Urine microalbumin today.  Annual wellness visit today.       Dragon disclaimer:   Much of this encounter note is an electronic transcription/translation of spoken language to printed text. The electronic translation of spoken language may permit erroneous,  or at times, nonsensical words or phrases to be inadvertently transcribed; Although I have reviewed the note for such errors, some may still exist.

## 2019-06-17 RX ORDER — METOPROLOL TARTRATE 50 MG/1
TABLET, FILM COATED ORAL
Qty: 120 TABLET | Refills: 0 | Status: SHIPPED | OUTPATIENT
Start: 2019-06-17 | End: 2019-08-02 | Stop reason: SDUPTHER

## 2019-07-10 RX ORDER — CLOPIDOGREL BISULFATE 75 MG/1
TABLET ORAL
Qty: 90 TABLET | Refills: 0 | Status: SHIPPED | OUTPATIENT
Start: 2019-07-10 | End: 2019-10-29 | Stop reason: SDUPTHER

## 2019-07-15 RX ORDER — GLIPIZIDE 5 MG/1
TABLET ORAL
Qty: 180 TABLET | Refills: 0 | Status: SHIPPED | OUTPATIENT
Start: 2019-07-15 | End: 2021-08-20 | Stop reason: SDUPTHER

## 2019-07-15 RX ORDER — SITAGLIPTIN 100 MG/1
TABLET, FILM COATED ORAL
Qty: 90 TABLET | Refills: 1 | Status: SHIPPED | OUTPATIENT
Start: 2019-07-15 | End: 2020-03-31

## 2019-07-27 LAB
ALBUMIN SERPL-MCNC: 4.5 G/DL (ref 3.5–4.7)
ALBUMIN/GLOB SERPL: 1.3 {RATIO} (ref 1.2–2.2)
ALP SERPL-CCNC: 107 IU/L (ref 39–117)
ALT SERPL-CCNC: 17 IU/L (ref 0–32)
AST SERPL-CCNC: 24 IU/L (ref 0–40)
BASOPHILS # BLD AUTO: 0.1 X10E3/UL (ref 0–0.2)
BASOPHILS NFR BLD AUTO: 1 %
BILIRUB SERPL-MCNC: 0.5 MG/DL (ref 0–1.2)
BUN SERPL-MCNC: 21 MG/DL (ref 8–27)
BUN/CREAT SERPL: 19 (ref 12–28)
CALCIUM SERPL-MCNC: 10 MG/DL (ref 8.7–10.3)
CHLORIDE SERPL-SCNC: 100 MMOL/L (ref 96–106)
CHOLEST SERPL-MCNC: 180 MG/DL (ref 100–199)
CO2 SERPL-SCNC: 23 MMOL/L (ref 20–29)
CREAT SERPL-MCNC: 1.12 MG/DL (ref 0.57–1)
EOSINOPHIL # BLD AUTO: 0.3 X10E3/UL (ref 0–0.4)
EOSINOPHIL NFR BLD AUTO: 4 %
ERYTHROCYTE [DISTWIDTH] IN BLOOD BY AUTOMATED COUNT: 14.8 % (ref 12.3–15.4)
GLOBULIN SER CALC-MCNC: 3.4 G/DL (ref 1.5–4.5)
GLUCOSE SERPL-MCNC: 121 MG/DL (ref 65–99)
HBA1C MFR BLD: 7.2 % (ref 4.8–5.6)
HCT VFR BLD AUTO: 45 % (ref 34–46.6)
HDLC SERPL-MCNC: 56 MG/DL
HGB BLD-MCNC: 14.6 G/DL (ref 11.1–15.9)
IMM GRANULOCYTES # BLD AUTO: 0 X10E3/UL (ref 0–0.1)
IMM GRANULOCYTES NFR BLD AUTO: 0 %
LDLC SERPL CALC-MCNC: 87 MG/DL (ref 0–99)
LYMPHOCYTES # BLD AUTO: 1.8 X10E3/UL (ref 0.7–3.1)
LYMPHOCYTES NFR BLD AUTO: 21 %
MCH RBC QN AUTO: 30.4 PG (ref 26.6–33)
MCHC RBC AUTO-ENTMCNC: 32.4 G/DL (ref 31.5–35.7)
MCV RBC AUTO: 94 FL (ref 79–97)
MONOCYTES # BLD AUTO: 0.7 X10E3/UL (ref 0.1–0.9)
MONOCYTES NFR BLD AUTO: 7 %
NEUTROPHILS # BLD AUTO: 5.9 X10E3/UL (ref 1.4–7)
NEUTROPHILS NFR BLD AUTO: 67 %
PLATELET # BLD AUTO: 276 X10E3/UL (ref 150–450)
POTASSIUM SERPL-SCNC: 4.6 MMOL/L (ref 3.5–5.2)
PROT SERPL-MCNC: 7.9 G/DL (ref 6–8.5)
RBC # BLD AUTO: 4.81 X10E6/UL (ref 3.77–5.28)
SODIUM SERPL-SCNC: 139 MMOL/L (ref 134–144)
TRIGL SERPL-MCNC: 185 MG/DL (ref 0–149)
URATE SERPL-MCNC: 5 MG/DL (ref 2.5–7.1)
VLDLC SERPL CALC-MCNC: 37 MG/DL (ref 5–40)
WBC # BLD AUTO: 8.8 X10E3/UL (ref 3.4–10.8)

## 2019-07-30 ENCOUNTER — OFFICE VISIT (OUTPATIENT)
Dept: INTERNAL MEDICINE | Facility: CLINIC | Age: 84
End: 2019-07-30

## 2019-07-30 VITALS
HEIGHT: 60 IN | WEIGHT: 132.6 LBS | RESPIRATION RATE: 16 BRPM | SYSTOLIC BLOOD PRESSURE: 148 MMHG | HEART RATE: 71 BPM | DIASTOLIC BLOOD PRESSURE: 72 MMHG | OXYGEN SATURATION: 98 % | BODY MASS INDEX: 26.03 KG/M2 | TEMPERATURE: 98.3 F

## 2019-07-30 DIAGNOSIS — E78.2 MIXED HYPERLIPIDEMIA: ICD-10-CM

## 2019-07-30 DIAGNOSIS — I25.10 CORONARY ARTERY DISEASE INVOLVING NATIVE CORONARY ARTERY OF NATIVE HEART WITHOUT ANGINA PECTORIS: ICD-10-CM

## 2019-07-30 DIAGNOSIS — N18.30 CKD (CHRONIC KIDNEY DISEASE) STAGE 3, GFR 30-59 ML/MIN (HCC): ICD-10-CM

## 2019-07-30 DIAGNOSIS — E11.9 TYPE 2 DIABETES MELLITUS WITHOUT COMPLICATION, WITHOUT LONG-TERM CURRENT USE OF INSULIN (HCC): ICD-10-CM

## 2019-07-30 DIAGNOSIS — I10 ESSENTIAL HYPERTENSION: Primary | ICD-10-CM

## 2019-07-30 PROCEDURE — 99214 OFFICE O/P EST MOD 30 MIN: CPT | Performed by: INTERNAL MEDICINE

## 2019-07-30 NOTE — PROGRESS NOTES
Subjective   Tamie Peter is a 83 y.o. female.     No chief complaint on file.        Hypertension   This is a chronic problem. The current episode started more than 1 year ago. The problem is unchanged. The problem is controlled. Associated symptoms include peripheral edema (right foot in summer only). Pertinent negatives include no headaches or orthopnea. There are no associated agents to hypertension. Risk factors for coronary artery disease include diabetes mellitus, dyslipidemia and post-menopausal state. Current antihypertension treatment includes ACE inhibitors, beta blockers and diuretics. Hypertensive end-organ damage includes kidney disease and CAD/MI. There is no history of angina, CVA, heart failure or retinopathy. Identifiable causes of hypertension include chronic renal disease.   Hyperlipidemia   This is a chronic problem. The current episode started more than 1 year ago. The problem is controlled. Recent lipid tests were reviewed and are normal. Exacerbating diseases include chronic renal disease. Factors aggravating her hyperlipidemia include thiazides and beta blockers. Current antihyperlipidemic treatment includes statins and bile acid squestrants. The current treatment provides significant improvement of lipids. There are no compliance problems.  Risk factors for coronary artery disease include dyslipidemia and post-menopausal.   Coronary Artery Disease   Risk factors include hyperlipidemia.   Diabetes   She presents for her follow-up diabetic visit. She has type 2 diabetes mellitus. No MedicAlert identification noted. Her disease course has been stable. Pertinent negatives for hypoglycemia include no headaches. Symptoms are stable. Diabetic complications include nephropathy. Pertinent negatives for diabetic complications include no autonomic neuropathy, CVA, heart disease, peripheral neuropathy or retinopathy. Risk factors for coronary artery disease include diabetes mellitus, dyslipidemia  and post-menopausal. Current diabetic treatment includes oral agent (dual therapy). She is compliant with treatment all of the time. Meal planning includes ADA exchanges. She has had a previous visit with a dietitian. She participates in exercise daily. An ACE inhibitor/angiotensin II receptor blocker is being taken. She does not see a podiatrist.Eye exam is current.   Chronic Kidney Disease   Pertinent negatives include no headaches.        The following portions of the patient's history were reviewed and updated as appropriate: allergies, current medications, past social history and problem list.    Outpatient Medications Marked as Taking for the 7/30/19 encounter (Office Visit) with Mohinder Barbosa MD   Medication Sig Dispense Refill   • alendronate (FOSAMAX) 70 MG tablet Take 70 mg by mouth 1 (One) Time Per Week.     • allopurinol (ZYLOPRIM) 300 MG tablet Take 300 mg by mouth daily.     • amLODIPine (NORVASC) 5 MG tablet Take 5 mg by mouth Daily.     • aspirin 325 MG tablet Take 325 mg by mouth daily.     • atorvastatin (LIPITOR) 80 MG tablet TAKE ONE TABLET BY MOUTH DAILY 90 tablet 0   • clopidogrel (PLAVIX) 75 MG tablet TAKE ONE TABLET BY MOUTH DAILY 90 tablet 0   • diclofenac (VOLTAREN) 1 % gel gel Apply 4 g topically 4 (Four) Times a Day. 1 tube 3   • ezetimibe (ZETIA) 10 MG tablet TAKE ONE TABLET BY MOUTH DAILY 90 tablet 0   • glipiZIDE (GLUCOTROL) 5 MG tablet TAKE TWO TABLETS BY MOUTH DAILY 180 tablet 0   • JANUVIA 100 MG tablet TAKE ONE TABLET BY MOUTH DAILY 90 tablet 1   • losartan-hydrochlorothiazide (HYZAAR) 100-25 MG per tablet Take 1 tablet by mouth daily.     • metFORMIN (GLUCOPHAGE) 500 MG tablet TAKE TWO TABLETS BY MOUTH TWICE A DAY WITH MEALS 360 tablet 0   • metoprolol tartrate (LOPRESSOR) 50 MG tablet TAKE TWO TABLETS BY MOUTH TWICE A  tablet 0       Review of Systems   Respiratory: Negative for wheezing.    Cardiovascular: Negative for orthopnea.   Gastrointestinal: Negative for  constipation and diarrhea.   Neurological: Negative for headaches.       Objective   Vitals:    07/30/19 0924   BP: 150/70   Pulse: 71   Resp: 16   Temp: 98.3 °F (36.8 °C)   SpO2: 98%          07/30/19 0924   Weight: 60.1 kg (132 lb 9.6 oz)    [unfilled]  Body mass index is 25.9 kg/m².      Physical Exam   Constitutional: She appears well-developed and well-nourished. No distress.   HENT:   Head: Normocephalic and atraumatic.   Neck: Carotid bruit is not present.   Cardiovascular: Normal rate, regular rhythm, normal heart sounds and intact distal pulses. Exam reveals no gallop.   No murmur heard.  Pulmonary/Chest: Effort normal and breath sounds normal. No respiratory distress. She has no wheezes. She has no rales.   Skin: Skin is warm and dry.         Problem List Items Addressed This Visit        Cardiovascular and Mediastinum    CAD (coronary artery disease)    Hyperlipidemia    Hypertension - Primary       Endocrine    Diabetes mellitus (CMS/[facility])       Genitourinary    CKD (chronic kidney disease) stage 3, GFR 30-59 ml/min (CMS/[facility])        Assessment/Plan   In for recheck of htn, lipids, DM, Ckd and CAD.  Pretty much asymptomatic at present.  Blood pressure control is worse but generally excellent.  We will give her a past today.  There is been a lot of increased activity at home.  Diabetes is much better.  A1c is 7.2.  CAD is asymptomatic.  Lipids worse today.  Annual labs today, 7/2019.  Glu, a1c, lipids q 3 mos.         Malorie disclaimer:   Much of this encounter note is an electronic transcription/translation of spoken language to printed text. The electronic translation of spoken language may permit erroneous, or at times, nonsensical words or phrases to be inadvertently transcribed; Although I have reviewed the note for such errors, some may still exist.

## 2019-08-02 RX ORDER — METOPROLOL TARTRATE 50 MG/1
TABLET, FILM COATED ORAL
Qty: 120 TABLET | Refills: 0 | Status: SHIPPED | OUTPATIENT
Start: 2019-08-02 | End: 2019-09-25 | Stop reason: SDUPTHER

## 2019-08-22 ENCOUNTER — OFFICE VISIT (OUTPATIENT)
Dept: INTERNAL MEDICINE | Facility: CLINIC | Age: 84
End: 2019-08-22

## 2019-08-22 VITALS
OXYGEN SATURATION: 93 % | TEMPERATURE: 97.9 F | SYSTOLIC BLOOD PRESSURE: 138 MMHG | HEART RATE: 65 BPM | WEIGHT: 132.4 LBS | HEIGHT: 60 IN | RESPIRATION RATE: 16 BRPM | BODY MASS INDEX: 26 KG/M2 | DIASTOLIC BLOOD PRESSURE: 64 MMHG

## 2019-08-22 DIAGNOSIS — M54.2 NECK ACHE: Primary | ICD-10-CM

## 2019-08-22 PROCEDURE — 99213 OFFICE O/P EST LOW 20 MIN: CPT | Performed by: INTERNAL MEDICINE

## 2019-08-22 NOTE — PROGRESS NOTES
Subjective   Tamie Peter is a 83 y.o. female.     Chief Complaint   Patient presents with   • Neck Pain   • Shoulder Pain   • Jaw Pain         Neck Pain    This is a new problem. The current episode started in the past 7 days. The problem occurs constantly. The pain is present in the occipital region. The quality of the pain is described as aching and stabbing. The pain is severe. The symptoms are aggravated by twisting and bending. The pain is same all the time. Associated symptoms include headaches. Pertinent negatives include no fever, numbness, paresis, tingling or weakness. She has tried acetaminophen for the symptoms. The treatment provided no relief.        The following portions of the patient's history were reviewed and updated as appropriate: allergies, current medications, past social history and problem list.    Outpatient Medications Marked as Taking for the 8/22/19 encounter (Office Visit) with Mohinder Barbosa MD   Medication Sig Dispense Refill   • alendronate (FOSAMAX) 70 MG tablet Take 70 mg by mouth 1 (One) Time Per Week.     • allopurinol (ZYLOPRIM) 300 MG tablet Take 300 mg by mouth daily.     • amLODIPine (NORVASC) 5 MG tablet Take 5 mg by mouth Daily.     • aspirin 325 MG tablet Take 325 mg by mouth daily.     • atorvastatin (LIPITOR) 80 MG tablet TAKE ONE TABLET BY MOUTH DAILY 90 tablet 0   • clopidogrel (PLAVIX) 75 MG tablet TAKE ONE TABLET BY MOUTH DAILY 90 tablet 0   • diclofenac (VOLTAREN) 1 % gel gel Apply 4 g topically 4 (Four) Times a Day. 1 tube 3   • ezetimibe (ZETIA) 10 MG tablet TAKE ONE TABLET BY MOUTH DAILY 90 tablet 0   • glipiZIDE (GLUCOTROL) 5 MG tablet TAKE TWO TABLETS BY MOUTH DAILY 180 tablet 0   • JANUVIA 100 MG tablet TAKE ONE TABLET BY MOUTH DAILY 90 tablet 1   • losartan-hydrochlorothiazide (HYZAAR) 100-25 MG per tablet Take 1 tablet by mouth daily.     • metFORMIN (GLUCOPHAGE) 500 MG tablet TAKE TWO TABLETS BY MOUTH TWICE A DAY WITH MEALS 360 tablet 0   • metoprolol  tartrate (LOPRESSOR) 50 MG tablet TAKE TWO TABLETS BY MOUTH TWICE A  tablet 0       Review of Systems   Constitutional: Negative for chills and fever.   Musculoskeletal: Positive for neck pain.   Neurological: Positive for headaches. Negative for tingling, speech difficulty, weakness, light-headedness and numbness.       Objective   Vitals:    08/22/19 1424   BP: 138/64   Pulse: 65   Resp: 16   Temp: 97.9 °F (36.6 °C)   SpO2: 93%      Wt Readings from Last 3 Encounters:   08/22/19 60.1 kg (132 lb 6.4 oz)   07/30/19 60.1 kg (132 lb 9.6 oz)   04/23/19 61.1 kg (134 lb 12.8 oz)    Body mass index is 25.86 kg/m².      Physical Exam   Constitutional: She is oriented to person, place, and time. She appears well-developed and well-nourished.   HENT:   Head: Normocephalic and atraumatic.   Neurological: She is alert and oriented to person, place, and time. She has normal strength and normal reflexes. She exhibits normal muscle tone.         Problem List Items Addressed This Visit     None      Visit Diagnoses     Neck ache    -  Primary        Assessment/Plan   In today with 4 days of severe headache and neck ache.  It is all posterior.  She has difficulty moving her neck in any direction.  She is developed some ache in the left shoulder and scapular area.  It is hard to open her mouth completely due to pain in her jaw all regions.  No fever or rash.  She had some insect bites about 2 weeks ago.  She is concerned about West Nile virus infection.  Certainly that would be in the differential.  Some type of encephalomeningitis.  May all be related to cervical disc disease as well.  We will check a titer today.  CBC.  Sed rate and CRP.  For now she will treat with a heating pad and Tylenol.  Foam cervical collar.             Dragon disclaimer:   Much of this encounter note is an electronic transcription/translation of spoken language to printed text. The electronic translation of spoken language may permit erroneous, or  at times, nonsensical words or phrases to be inadvertently transcribed; Although I have reviewed the note for such errors, some may still exist.

## 2019-08-29 ENCOUNTER — APPOINTMENT (OUTPATIENT)
Dept: WOMENS IMAGING | Facility: HOSPITAL | Age: 84
End: 2019-08-29

## 2019-08-29 PROCEDURE — 77063 BREAST TOMOSYNTHESIS BI: CPT | Performed by: RADIOLOGY

## 2019-08-29 PROCEDURE — 77067 SCR MAMMO BI INCL CAD: CPT | Performed by: RADIOLOGY

## 2019-09-08 LAB
BASOPHILS # BLD AUTO: 0.07 10*3/MM3 (ref 0–0.2)
BASOPHILS NFR BLD AUTO: 0.6 % (ref 0–1.5)
CRP SERPL-MCNC: 4.04 MG/DL (ref 0–0.5)
EOSINOPHIL # BLD AUTO: 0.08 10*3/MM3 (ref 0–0.4)
EOSINOPHIL NFR BLD AUTO: 0.7 % (ref 0.3–6.2)
ERYTHROCYTE [DISTWIDTH] IN BLOOD BY AUTOMATED COUNT: 14.5 % (ref 12.3–15.4)
ERYTHROCYTE [SEDIMENTATION RATE] IN BLOOD BY WESTERGREN METHOD: 36 MM/HR (ref 0–30)
HCT VFR BLD AUTO: 44.4 % (ref 34–46.6)
HGB BLD-MCNC: 13.8 G/DL (ref 12–15.9)
IMM GRANULOCYTES # BLD AUTO: 0.04 10*3/MM3 (ref 0–0.05)
IMM GRANULOCYTES NFR BLD AUTO: 0.3 % (ref 0–0.5)
LYMPHOCYTES # BLD AUTO: 1.41 10*3/MM3 (ref 0.7–3.1)
LYMPHOCYTES NFR BLD AUTO: 11.6 % (ref 19.6–45.3)
MCH RBC QN AUTO: 30.3 PG (ref 26.6–33)
MCHC RBC AUTO-ENTMCNC: 31.1 G/DL (ref 31.5–35.7)
MCV RBC AUTO: 97.6 FL (ref 79–97)
MONOCYTES # BLD AUTO: 0.85 10*3/MM3 (ref 0.1–0.9)
MONOCYTES NFR BLD AUTO: 7 % (ref 5–12)
NEUTROPHILS # BLD AUTO: 9.73 10*3/MM3 (ref 1.7–7)
NEUTROPHILS NFR BLD AUTO: 79.8 % (ref 42.7–76)
NRBC BLD AUTO-RTO: 0 /100 WBC (ref 0–0.2)
PCR AMPLIFICATION + DETECTION: NORMAL
PLATELET # BLD AUTO: 281 10*3/MM3 (ref 140–450)
RBC # BLD AUTO: 4.55 10*6/MM3 (ref 3.77–5.28)
WBC # BLD AUTO: 12.18 10*3/MM3 (ref 3.4–10.8)
WNV RNA SPEC QL NAA+PROBE: NEGATIVE

## 2019-09-25 RX ORDER — ALLOPURINOL 300 MG/1
300 TABLET ORAL DAILY
Qty: 90 TABLET | Refills: 1 | Status: SHIPPED | OUTPATIENT
Start: 2019-09-25 | End: 2020-12-09

## 2019-09-25 RX ORDER — METOPROLOL TARTRATE 50 MG/1
TABLET, FILM COATED ORAL
Qty: 360 TABLET | Refills: 1 | Status: SHIPPED | OUTPATIENT
Start: 2019-09-25 | End: 2020-06-22 | Stop reason: SDUPTHER

## 2019-10-29 RX ORDER — CLOPIDOGREL BISULFATE 75 MG/1
TABLET ORAL
Qty: 90 TABLET | Refills: 0 | Status: SHIPPED | OUTPATIENT
Start: 2019-10-29 | End: 2020-01-22

## 2019-11-09 LAB
CHOLEST SERPL-MCNC: 192 MG/DL (ref 100–199)
GLUCOSE P FAST SERPL-MCNC: 145 MG/DL (ref 65–99)
HBA1C MFR BLD: 7.4 % (ref 4.8–5.6)
HDLC SERPL-MCNC: 46 MG/DL
LDLC SERPL CALC-MCNC: 99 MG/DL (ref 0–99)
TRIGL SERPL-MCNC: 237 MG/DL (ref 0–149)
VLDLC SERPL CALC-MCNC: 47 MG/DL (ref 5–40)

## 2019-11-22 ENCOUNTER — OFFICE VISIT (OUTPATIENT)
Dept: INTERNAL MEDICINE | Facility: CLINIC | Age: 84
End: 2019-11-22

## 2019-11-22 VITALS
RESPIRATION RATE: 16 BRPM | DIASTOLIC BLOOD PRESSURE: 86 MMHG | TEMPERATURE: 97.7 F | WEIGHT: 131.2 LBS | SYSTOLIC BLOOD PRESSURE: 142 MMHG | OXYGEN SATURATION: 95 % | HEIGHT: 60 IN | HEART RATE: 86 BPM | BODY MASS INDEX: 25.76 KG/M2

## 2019-11-22 DIAGNOSIS — E11.9 TYPE 2 DIABETES MELLITUS WITHOUT COMPLICATION, WITHOUT LONG-TERM CURRENT USE OF INSULIN (HCC): ICD-10-CM

## 2019-11-22 DIAGNOSIS — I10 ESSENTIAL HYPERTENSION: Primary | ICD-10-CM

## 2019-11-22 DIAGNOSIS — N18.30 CKD (CHRONIC KIDNEY DISEASE) STAGE 3, GFR 30-59 ML/MIN (HCC): ICD-10-CM

## 2019-11-22 DIAGNOSIS — K21.9 GASTROESOPHAGEAL REFLUX DISEASE, ESOPHAGITIS PRESENCE NOT SPECIFIED: ICD-10-CM

## 2019-11-22 DIAGNOSIS — E78.2 MIXED HYPERLIPIDEMIA: ICD-10-CM

## 2019-11-22 DIAGNOSIS — I25.10 CORONARY ARTERY DISEASE INVOLVING NATIVE CORONARY ARTERY OF NATIVE HEART WITHOUT ANGINA PECTORIS: ICD-10-CM

## 2019-11-22 DIAGNOSIS — M81.0 AGE-RELATED OSTEOPOROSIS WITHOUT CURRENT PATHOLOGICAL FRACTURE: ICD-10-CM

## 2019-11-22 PROCEDURE — 99214 OFFICE O/P EST MOD 30 MIN: CPT | Performed by: INTERNAL MEDICINE

## 2019-11-22 PROCEDURE — G0008 ADMIN INFLUENZA VIRUS VAC: HCPCS | Performed by: INTERNAL MEDICINE

## 2019-11-22 PROCEDURE — 90653 IIV ADJUVANT VACCINE IM: CPT | Performed by: INTERNAL MEDICINE

## 2019-11-22 NOTE — PROGRESS NOTES
Subjective   Tamie Peter is a 83 y.o. female.     Chief Complaint   Patient presents with   • Hypertension   • Hyperlipidemia   • Chronic Kidney Disease   • Diabetes         Hypertension   This is a chronic problem. The current episode started more than 1 year ago. The problem is unchanged. The problem is controlled. Associated symptoms include peripheral edema (right foot in summer only). Pertinent negatives include no headaches or orthopnea. There are no associated agents to hypertension. Risk factors for coronary artery disease include diabetes mellitus, dyslipidemia and post-menopausal state. Current antihypertension treatment includes ACE inhibitors, beta blockers and diuretics. Hypertensive end-organ damage includes kidney disease and CAD/MI. There is no history of angina, CVA, heart failure or retinopathy. Identifiable causes of hypertension include chronic renal disease.   Hyperlipidemia   This is a chronic problem. The current episode started more than 1 year ago. The problem is controlled. Recent lipid tests were reviewed and are normal. Exacerbating diseases include chronic renal disease. Factors aggravating her hyperlipidemia include thiazides and beta blockers. Current antihyperlipidemic treatment includes statins and bile acid squestrants. The current treatment provides significant improvement of lipids. There are no compliance problems.  Risk factors for coronary artery disease include dyslipidemia and post-menopausal.   Coronary Artery Disease   Presents for follow-up visit. Risk factors include hyperlipidemia.   Diabetes   She presents for her follow-up diabetic visit. She has type 2 diabetes mellitus. No MedicAlert identification noted. Her disease course has been stable. Pertinent negatives for hypoglycemia include no headaches. Symptoms are stable. Diabetic complications include nephropathy. Pertinent negatives for diabetic complications include no autonomic neuropathy, CVA, heart disease,  peripheral neuropathy or retinopathy. Risk factors for coronary artery disease include diabetes mellitus, dyslipidemia and post-menopausal. Current diabetic treatment includes oral agent (dual therapy). She is compliant with treatment all of the time. Meal planning includes ADA exchanges. She has had a previous visit with a dietitian. She participates in exercise daily. An ACE inhibitor/angiotensin II receptor blocker is being taken. She does not see a podiatrist.Eye exam is current.   Chronic Kidney Disease   This is a chronic problem. The current episode started more than 1 year ago. The problem occurs constantly. The problem has been unchanged. Pertinent negatives include no headaches.        The following portions of the patient's history were reviewed and updated as appropriate: allergies, current medications, past social history and problem list.    Outpatient Medications Marked as Taking for the 11/22/19 encounter (Office Visit) with Mohinder Barbosa MD   Medication Sig Dispense Refill   • alendronate (FOSAMAX) 70 MG tablet Take 70 mg by mouth 1 (One) Time Per Week.     • allopurinol (ZYLOPRIM) 300 MG tablet Take 1 tablet by mouth Daily. 90 tablet 1   • amLODIPine (NORVASC) 5 MG tablet Take 5 mg by mouth Daily.     • aspirin 325 MG tablet Take 325 mg by mouth daily.     • atorvastatin (LIPITOR) 80 MG tablet TAKE ONE TABLET BY MOUTH DAILY 90 tablet 0   • clopidogrel (PLAVIX) 75 MG tablet TAKE ONE TABLET BY MOUTH DAILY 90 tablet 0   • diclofenac (VOLTAREN) 1 % gel gel Apply 4 g topically 4 (Four) Times a Day. 1 tube 3   • ezetimibe (ZETIA) 10 MG tablet TAKE ONE TABLET BY MOUTH DAILY 90 tablet 0   • glipiZIDE (GLUCOTROL) 5 MG tablet TAKE TWO TABLETS BY MOUTH DAILY 180 tablet 0   • JANUVIA 100 MG tablet TAKE ONE TABLET BY MOUTH DAILY 90 tablet 1   • losartan-hydrochlorothiazide (HYZAAR) 100-25 MG per tablet Take 1 tablet by mouth daily.     • metFORMIN (GLUCOPHAGE) 500 MG tablet TAKE TWO TABLETS BY MOUTH TWICE A  DAY WITH MEALS 360 tablet 1   • metoprolol tartrate (LOPRESSOR) 50 MG tablet TAKE TWO TABLETS BY MOUTH TWICE A  tablet 1       Review of Systems   Respiratory: Negative for wheezing.    Cardiovascular: Negative for orthopnea.   Gastrointestinal: Negative for constipation and diarrhea.   Neurological: Negative for headaches.       Objective   Vitals:    11/22/19 1009   BP: 142/86   Pulse: 86   Resp: 16   Temp: 97.7 °F (36.5 °C)   SpO2: 95%          11/22/19  1009   Weight: 59.5 kg (131 lb 3.2 oz)    [unfilled]  Body mass index is 25.62 kg/m².      Physical Exam   Constitutional: She appears well-developed and well-nourished. No distress.   HENT:   Head: Normocephalic and atraumatic.   Neck: Carotid bruit is not present.   Cardiovascular: Normal rate, regular rhythm, normal heart sounds and intact distal pulses. Exam reveals no gallop.   No murmur heard.  Pulmonary/Chest: Effort normal and breath sounds normal. No respiratory distress. She has no wheezes. She has no rales.    Tamie had a diabetic foot exam performed today.   During the foot exam she had a monofilament test performed.    Neurological Sensory Findings -  Unaltered sharp/dull right ankle/foot discrimination and unaltered sharp/dull left ankle/foot discrimination. No right ankle/foot altered proprioception and no left ankle/foot altered proprioception  Vascular Status -  Her right foot exhibits normal foot vasculature  and no edema. Her left foot exhibits normal foot vasculature  and no edema.  Skin Integrity  -  Her right foot skin is intact.Her left foot skin is intact..   Foot Structure and Biomechanics -   Her left foot exhibits hammer toes.  Skin: Skin is warm and dry.         Problem List Items Addressed This Visit        Cardiovascular and Mediastinum    CAD (coronary artery disease)    Hyperlipidemia    Relevant Orders    Lipid Panel    Hypertension - Primary       Digestive    GERD (gastroesophageal reflux disease)       Endocrine     Diabetes mellitus (CMS/[facility])    Relevant Orders    Glucose, Plasma ([facility])    Hemoglobin A1c       Musculoskeletal and Integument    Osteoporosis       Genitourinary    CKD (chronic kidney disease) stage 3, GFR 30-59 ml/min (CMS/[facility])        Assessment/Plan   In for recheck of htn, lipids, DM, Ckd and CAD.  Pretty much asymptomatic at present.  Blood pressure control is worse but generally excellent.  We will give her a past today.  Diabetes is a little worse.  A1c is 7.4.  Needs to do better job with her diet.  CAD is asymptomatic.  Lipids worse today.  Annual labs 7/2019.  Glu, a1c, lipids q 3 mos.  She has a hammertoe deformity of the left second toe that may be posttraumatic.  She is due for a DEXA scan regarding her osteoporosis.       Dragon disclaimer:   Much of this encounter note is an electronic transcription/translation of spoken language to printed text. The electronic translation of spoken language may permit erroneous, or at times, nonsensical words or phrases to be inadvertently transcribed; Although I have reviewed the note for such errors, some may still exist.

## 2019-11-22 NOTE — PATIENT INSTRUCTIONS
Influenza (Flu) Vaccine (Inactivated or Recombinant): What You Need to Know  1. Why get vaccinated?  Influenza vaccine can prevent influenza (flu).  Flu is a contagious disease that spreads around the [place] every year, usually between October and May. Anyone can get the flu, but it is more dangerous for some people. Infants and young children, people 65 years of age and older, pregnant women, and people with certain health conditions or a weakened immune system are at greatest risk of flu complications.  Pneumonia, bronchitis, sinus infections and ear infections are examples of flu-related complications. If you have a medical condition, such as heart disease, cancer or diabetes, flu can make it worse.  Flu can cause fever and chills, sore throat, muscle aches, fatigue, cough, headache, and runny or stuffy nose. Some people may have vomiting and diarrhea, though this is more common in children than adults.  Each year thousands of people in the [place] die from flu, and many more are hospitalized. Flu vaccine prevents millions of illnesses and flu-related visits to the doctor each year.  2. Influenza vaccine  CDC recommends everyone 6 months of age and older get vaccinated every flu season. Children 6 months through 8 years of age may need 2 doses during a single flu season. Everyone else needs only 1 dose each flu season.  It takes about 2 weeks for protection to develop after vaccination.  There are many flu viruses, and they are always changing. Each year a new flu vaccine is made to protect against three or four viruses that are likely to cause disease in the upcoming flu season. Even when the vaccine doesn't exactly match these viruses, it may still provide some protection.  Influenza vaccine does not cause flu.  Influenza vaccine may be given at the same time as other vaccines.  3. Talk with your health care provider  Tell your vaccine provider if the person getting the vaccine:  · Has had an  allergic reaction after a previous dose of influenza vaccine, or has any severe, life-threatening allergies.  · Has ever had Guillain-Barré Syndrome (also called GBS).  In some cases, your health care provider may decide to postpone influenza vaccination to a future visit.  People with minor illnesses, such as a cold, may be vaccinated. People who are moderately or severely ill should usually wait until they recover before getting influenza vaccine.  Your health care provider can give you more information.  4. Risks of a vaccine reaction  · Soreness, redness, and swelling where shot is given, fever, muscle aches, and headache can happen after influenza vaccine.  · There may be a very small increased risk of Guillain-Barré Syndrome (GBS) after inactivated influenza vaccine (the flu shot).  Young children who get the flu shot along with pneumococcal vaccine (PCV13), and/or DTaP vaccine at the same time might be slightly more likely to have a seizure caused by fever. Tell your health care provider if a child who is getting flu vaccine has ever had a seizure.  People sometimes faint after medical procedures, including vaccination. Tell your provider if you feel dizzy or have vision changes or ringing in the ears.  As with any medicine, there is a very remote chance of a vaccine causing a severe allergic reaction, other serious injury, or death.  5. What if there is a serious problem?  An allergic reaction could occur after the vaccinated person leaves the clinic. If you see signs of a severe allergic reaction (hives, swelling of the face and throat, difficulty breathing, a fast heartbeat, dizziness, or weakness), call 9-1-1 and get the person to the nearest hospital.  For other signs that concern you, call your health care provider.  Adverse reactions should be reported to the Vaccine Adverse Event Reporting System (VAERS). Your health care provider will usually file this report, or you can do it yourself. Visit the  VAERS website at www.vaers.[facility].gov or call 1-907.248.3935.VAERS is only for reporting reactions, and VAERS staff do not give medical advice.  6. The National Vaccine Injury Compensation Program  The National Vaccine Injury Compensation Program (VICP) is a federal program that was created to compensate people who may have been injured by certain vaccines. Visit the VICP website at www[facility].gov/vaccinecompensation or call 1-822.544.3701 to learn about the program and about filing a claim. There is a time limit to file a claim for compensation.  7. How can I learn more?  · Ask your healthcare provider.  · Call your local or state health department.  · Contact the Centers for Disease Control and Prevention (CDC):  ? Call 1-990.295.4086 (2-599-BNX-INFO) or  ? Visit CDC's www.cdc.gov/flu  Vaccine Information Statement (Interim) Inactivated Influenza Vaccine (8/15/2019)  This information is not intended to replace advice given to you by your health care provider. Make sure you discuss any questions you have with your health care provider.  Document Released: 10/12/2007 Document Revised: 08/19/2019 Document Reviewed: 08/19/2019  Elsevier Interactive Patient Education © 2019 [employer]

## 2020-01-16 ENCOUNTER — APPOINTMENT (OUTPATIENT)
Dept: BONE DENSITY | Facility: HOSPITAL | Age: 85
End: 2020-01-16

## 2020-01-22 RX ORDER — CLOPIDOGREL BISULFATE 75 MG/1
TABLET ORAL
Qty: 90 TABLET | Refills: 1 | Status: SHIPPED | OUTPATIENT
Start: 2020-01-22 | End: 2020-06-29

## 2020-03-31 RX ORDER — ATORVASTATIN CALCIUM 80 MG/1
TABLET, FILM COATED ORAL
Qty: 90 TABLET | Refills: 0 | Status: SHIPPED | OUTPATIENT
Start: 2020-03-31 | End: 2020-06-29

## 2020-03-31 RX ORDER — SITAGLIPTIN 100 MG/1
TABLET, FILM COATED ORAL
Qty: 30 TABLET | Refills: 0 | Status: SHIPPED | OUTPATIENT
Start: 2020-03-31 | End: 2020-05-08

## 2020-03-31 RX ORDER — EZETIMIBE 10 MG/1
TABLET ORAL
Qty: 90 TABLET | Refills: 0 | Status: SHIPPED | OUTPATIENT
Start: 2020-03-31 | End: 2020-06-29

## 2020-04-13 ENCOUNTER — OFFICE VISIT (OUTPATIENT)
Dept: INTERNAL MEDICINE | Facility: CLINIC | Age: 85
End: 2020-04-13

## 2020-04-13 DIAGNOSIS — N30.01 ACUTE CYSTITIS WITH HEMATURIA: Primary | ICD-10-CM

## 2020-04-13 PROCEDURE — 99441 PR PHYS/QHP TELEPHONE EVALUATION 5-10 MIN: CPT | Performed by: INTERNAL MEDICINE

## 2020-04-13 RX ORDER — CEPHALEXIN 500 MG/1
500 CAPSULE ORAL 3 TIMES DAILY
Qty: 15 CAPSULE | Refills: 0 | Status: SHIPPED | OUTPATIENT
Start: 2020-04-13 | End: 2020-09-14

## 2020-04-13 NOTE — PROGRESS NOTES
You have chosen to receive care through a telephone visit. Do you consent to use a telephone visit for your medical care today? Yes    Advance Care Planning   ACP discussion was held with the patient during this visit. Patient has an advance directive (not in EMR), copy requested.

## 2020-04-13 NOTE — PROGRESS NOTES
Subjective   Tamie Peter is a 84 y.o. female.     Chief Complaint   Patient presents with   • Difficulty Urinating   • Blood in Urine     x since 1 day         Difficulty Urinating   This is a new problem. The current episode started yesterday. The problem occurs constantly. The problem has been unchanged. Associated symptoms include urinary symptoms. Pertinent negatives include no chills, fever, nausea or vomiting. She has tried nothing for the symptoms.   Blood in Urine   This is a new problem. The current episode started today. The problem is unchanged. She describes the hematuria as gross hematuria. The pain is mild. She describes her urine color as light pink. Irritative symptoms include frequency. Pertinent negatives include no chills, fever, nausea or vomiting.        The following portions of the patient's history were reviewed and updated as appropriate: allergies, current medications, past social history and problem list.    Outpatient Medications Marked as Taking for the 4/13/20 encounter (Office Visit) with Mohinder Barbosa MD   Medication Sig Dispense Refill   • alendronate (FOSAMAX) 70 MG tablet Take 70 mg by mouth 1 (One) Time Per Week.     • allopurinol (ZYLOPRIM) 300 MG tablet Take 1 tablet by mouth Daily. 90 tablet 1   • amLODIPine (NORVASC) 5 MG tablet Take 5 mg by mouth Daily.     • aspirin 325 MG tablet Take 325 mg by mouth daily.     • atorvastatin (LIPITOR) 80 MG tablet TAKE ONE TABLET BY MOUTH DAILY 90 tablet 0   • clopidogrel (PLAVIX) 75 MG tablet TAKE ONE TABLET BY MOUTH DAILY 90 tablet 1   • diclofenac (VOLTAREN) 1 % gel gel Apply 4 g topically 4 (Four) Times a Day. 1 tube 3   • ezetimibe (ZETIA) 10 MG tablet TAKE ONE TABLET BY MOUTH DAILY 90 tablet 0   • glipiZIDE (GLUCOTROL) 5 MG tablet TAKE TWO TABLETS BY MOUTH DAILY 180 tablet 0   • JANUVIA 100 MG tablet TAKE ONE TABLET BY MOUTH DAILY 30 tablet 0   • losartan-hydrochlorothiazide (HYZAAR) 100-25 MG per tablet Take 1 tablet by mouth  daily.     • metFORMIN (GLUCOPHAGE) 500 MG tablet TAKE TWO TABLETS BY MOUTH TWICE A DAY WITH MEALS 360 tablet 1   • metoprolol tartrate (LOPRESSOR) 50 MG tablet TAKE TWO TABLETS BY MOUTH TWICE A  tablet 1       Review of Systems   Constitutional: Negative for chills and fever.   Gastrointestinal: Negative for nausea and vomiting.   Genitourinary: Positive for difficulty urinating, frequency and hematuria.       Objective   There were no vitals filed for this visit.   Wt Readings from Last 3 Encounters:   11/22/19 59.5 kg (131 lb 3.2 oz)   08/22/19 60.1 kg (132 lb 6.4 oz)   07/30/19 60.1 kg (132 lb 9.6 oz)    There is no height or weight on file to calculate BMI.      Physical Exam   Psychiatric: She has a normal mood and affect. Her behavior is normal. Judgment and thought content normal.         Problem List Items Addressed This Visit     None      Visit Diagnoses     Acute cystitis with hematuria    -  Primary        Assessment/Plan   Audio visit today due to the COVID-19 pandemic.  Developed frequency and dysuria yesterday.  This morning she had some gross hematuria.  Little bit of pink blood on her pad.  Has had no systemic symptoms.  Will begin on Keflex 500 mg 3 times daily for 5 days.  Skip any urinalysis or culture at this point in time.  Spent 8 minutes with patient today on the phone call..  Advise she can also add some Pyridium as needed.             Dragon disclaimer:   Much of this encounter note is an electronic transcription/translation of spoken language to printed text. The electronic translation of spoken language may permit erroneous, or at times, nonsensical words or phrases to be inadvertently transcribed; Although I have reviewed the note for such errors, some may still exist.

## 2020-05-08 RX ORDER — SITAGLIPTIN 100 MG/1
TABLET, FILM COATED ORAL
Qty: 30 TABLET | Refills: 5 | Status: SHIPPED | OUTPATIENT
Start: 2020-05-08 | End: 2020-10-22

## 2020-06-22 RX ORDER — METOPROLOL TARTRATE 50 MG/1
100 TABLET, FILM COATED ORAL 2 TIMES DAILY
Qty: 360 TABLET | Refills: 1 | Status: SHIPPED | OUTPATIENT
Start: 2020-06-22 | End: 2021-06-21

## 2020-06-29 RX ORDER — ATORVASTATIN CALCIUM 80 MG/1
TABLET, FILM COATED ORAL
Qty: 90 TABLET | Refills: 1 | Status: SHIPPED | OUTPATIENT
Start: 2020-06-29

## 2020-06-29 RX ORDER — EZETIMIBE 10 MG/1
TABLET ORAL
Qty: 90 TABLET | Refills: 1 | Status: SHIPPED | OUTPATIENT
Start: 2020-06-29 | End: 2022-12-02

## 2020-06-29 RX ORDER — CLOPIDOGREL BISULFATE 75 MG/1
TABLET ORAL
Qty: 90 TABLET | Refills: 1 | Status: SHIPPED | OUTPATIENT
Start: 2020-06-29 | End: 2021-03-09

## 2020-09-14 ENCOUNTER — OFFICE VISIT (OUTPATIENT)
Dept: INTERNAL MEDICINE | Facility: CLINIC | Age: 85
End: 2020-09-14

## 2020-09-14 VITALS
OXYGEN SATURATION: 98 % | HEIGHT: 60 IN | SYSTOLIC BLOOD PRESSURE: 142 MMHG | HEART RATE: 75 BPM | BODY MASS INDEX: 25.32 KG/M2 | WEIGHT: 129 LBS | DIASTOLIC BLOOD PRESSURE: 92 MMHG | RESPIRATION RATE: 16 BRPM | TEMPERATURE: 96.9 F

## 2020-09-14 DIAGNOSIS — N18.30 CKD (CHRONIC KIDNEY DISEASE) STAGE 3, GFR 30-59 ML/MIN (HCC): ICD-10-CM

## 2020-09-14 DIAGNOSIS — E11.9 TYPE 2 DIABETES MELLITUS WITHOUT COMPLICATION, WITHOUT LONG-TERM CURRENT USE OF INSULIN (HCC): ICD-10-CM

## 2020-09-14 DIAGNOSIS — Z79.899 MEDICATION MANAGEMENT: ICD-10-CM

## 2020-09-14 DIAGNOSIS — I10 ESSENTIAL HYPERTENSION: ICD-10-CM

## 2020-09-14 DIAGNOSIS — E78.2 MIXED HYPERLIPIDEMIA: Primary | ICD-10-CM

## 2020-09-14 DIAGNOSIS — I25.10 CORONARY ARTERY DISEASE INVOLVING NATIVE CORONARY ARTERY OF NATIVE HEART WITHOUT ANGINA PECTORIS: ICD-10-CM

## 2020-09-14 DIAGNOSIS — Z23 NEED FOR VACCINATION: ICD-10-CM

## 2020-09-14 LAB
ALBUMIN SERPL-MCNC: 4.4 G/DL (ref 3.5–5.2)
ALBUMIN/GLOB SERPL: 1.8 G/DL
ALP SERPL-CCNC: 128 U/L (ref 39–117)
ALT SERPL-CCNC: 18 U/L (ref 1–33)
AST SERPL-CCNC: 21 U/L (ref 1–32)
BASOPHILS # BLD AUTO: 0.1 10*3/MM3 (ref 0–0.2)
BASOPHILS NFR BLD AUTO: 1 % (ref 0–1.5)
BILIRUB SERPL-MCNC: 0.3 MG/DL (ref 0–1.2)
BUN SERPL-MCNC: 21 MG/DL (ref 8–23)
BUN/CREAT SERPL: 17.8 (ref 7–25)
CALCIUM SERPL-MCNC: 9.8 MG/DL (ref 8.6–10.5)
CHLORIDE SERPL-SCNC: 101 MMOL/L (ref 98–107)
CHOLEST SERPL-MCNC: 260 MG/DL (ref 0–200)
CHOLEST/HDLC SERPL: 5 {RATIO}
CLARITY, POC: CLEAR
CO2 SERPL-SCNC: 25.7 MMOL/L (ref 22–29)
COLOR UR: YELLOW
CREAT SERPL-MCNC: 1.18 MG/DL (ref 0.57–1)
EOSINOPHIL # BLD AUTO: 0.22 10*3/MM3 (ref 0–0.4)
EOSINOPHIL NFR BLD AUTO: 2.2 % (ref 0.3–6.2)
ERYTHROCYTE [DISTWIDTH] IN BLOOD BY AUTOMATED COUNT: 13.1 % (ref 12.3–15.4)
GLOBULIN SER CALC-MCNC: 2.5 GM/DL
GLUCOSE SERPL-MCNC: 142 MG/DL (ref 65–99)
HBA1C MFR BLD: 6.7 % (ref 4.8–5.6)
HCT VFR BLD AUTO: 44.1 % (ref 34–46.6)
HDLC SERPL-MCNC: 52 MG/DL (ref 40–60)
HGB BLD-MCNC: 14.4 G/DL (ref 12–15.9)
IMM GRANULOCYTES # BLD AUTO: 0.03 10*3/MM3 (ref 0–0.05)
IMM GRANULOCYTES NFR BLD AUTO: 0.3 % (ref 0–0.5)
LDLC SERPL CALC-MCNC: 137 MG/DL (ref 0–100)
LYMPHOCYTES # BLD AUTO: 1.67 10*3/MM3 (ref 0.7–3.1)
LYMPHOCYTES NFR BLD AUTO: 16.5 % (ref 19.6–45.3)
MCH RBC QN AUTO: 30.8 PG (ref 26.6–33)
MCHC RBC AUTO-ENTMCNC: 32.7 G/DL (ref 31.5–35.7)
MCV RBC AUTO: 94.4 FL (ref 79–97)
MONOCYTES # BLD AUTO: 0.65 10*3/MM3 (ref 0.1–0.9)
MONOCYTES NFR BLD AUTO: 6.4 % (ref 5–12)
NEUTROPHILS # BLD AUTO: 7.46 10*3/MM3 (ref 1.7–7)
NEUTROPHILS NFR BLD AUTO: 73.6 % (ref 42.7–76)
NRBC BLD AUTO-RTO: 0 /100 WBC (ref 0–0.2)
PH UR: 6 [PH] (ref 5–8)
PLATELET # BLD AUTO: 330 10*3/MM3 (ref 140–450)
POC CREATININE URINE: 100
POC MICROALBUMIN URINE: 150
POTASSIUM SERPL-SCNC: 4.7 MMOL/L (ref 3.5–5.2)
PROT SERPL-MCNC: 6.9 G/DL (ref 6–8.5)
PROT UR STRIP-MCNC: ABNORMAL MG/DL
RBC # BLD AUTO: 4.67 10*6/MM3 (ref 3.77–5.28)
RBC # UR STRIP: NEGATIVE /UL
SODIUM SERPL-SCNC: 139 MMOL/L (ref 136–145)
SP GR UR: 1.02 (ref 1–1.03)
TRIGL SERPL-MCNC: 353 MG/DL (ref 0–150)
VLDLC SERPL CALC-MCNC: 70.6 MG/DL
WBC # BLD AUTO: 10.13 10*3/MM3 (ref 3.4–10.8)

## 2020-09-14 PROCEDURE — 90471 IMMUNIZATION ADMIN: CPT | Performed by: INTERNAL MEDICINE

## 2020-09-14 PROCEDURE — 81003 URINALYSIS AUTO W/O SCOPE: CPT | Performed by: INTERNAL MEDICINE

## 2020-09-14 PROCEDURE — 90686 IIV4 VACC NO PRSV 0.5 ML IM: CPT | Performed by: INTERNAL MEDICINE

## 2020-09-14 PROCEDURE — G0439 PPPS, SUBSEQ VISIT: HCPCS | Performed by: INTERNAL MEDICINE

## 2020-09-14 PROCEDURE — 82044 UR ALBUMIN SEMIQUANTITATIVE: CPT | Performed by: INTERNAL MEDICINE

## 2020-09-14 PROCEDURE — 99214 OFFICE O/P EST MOD 30 MIN: CPT | Performed by: INTERNAL MEDICINE

## 2020-09-14 RX ORDER — AMLODIPINE BESYLATE 10 MG/1
10 TABLET ORAL DAILY
Qty: 90 TABLET | Refills: 1 | Status: SHIPPED | OUTPATIENT
Start: 2020-09-14

## 2020-09-14 RX ORDER — AMLODIPINE BESYLATE 10 MG/1
10 TABLET ORAL DAILY
Qty: 90 TABLET | Refills: 1 | Status: SHIPPED | OUTPATIENT
Start: 2020-09-14 | End: 2020-09-14 | Stop reason: SDUPTHER

## 2020-09-14 NOTE — PROGRESS NOTES
Subjective   Tamie Peter is a 84 y.o. female.     Chief Complaint   Patient presents with   • Hypertension   • Hyperlipidemia   • Diabetes   • Medicare Wellness-subsequent   • Chronic Kidney Disease         Hypertension  This is a chronic problem. The current episode started more than 1 year ago. The problem is unchanged. The problem is controlled. Associated symptoms include headaches. Pertinent negatives include no orthopnea or peripheral edema (right foot in summer only). There are no associated agents to hypertension. Risk factors for coronary artery disease include diabetes mellitus, dyslipidemia and post-menopausal state. Current antihypertension treatment includes ACE inhibitors, beta blockers and diuretics. Hypertensive end-organ damage includes kidney disease and CAD/MI. There is no history of angina, CVA, heart failure or retinopathy. Identifiable causes of hypertension include chronic renal disease.   Hyperlipidemia  This is a chronic problem. The current episode started more than 1 year ago. The problem is controlled. Recent lipid tests were reviewed and are normal. Exacerbating diseases include chronic renal disease. Factors aggravating her hyperlipidemia include thiazides and beta blockers. Current antihyperlipidemic treatment includes statins and bile acid squestrants. The current treatment provides significant improvement of lipids. There are no compliance problems.  Risk factors for coronary artery disease include dyslipidemia and post-menopausal.   Diabetes  She presents for her follow-up diabetic visit. She has type 2 diabetes mellitus. No MedicAlert identification noted. Her disease course has been stable. Hypoglycemia symptoms include headaches. Symptoms are stable. Diabetic complications include nephropathy. Pertinent negatives for diabetic complications include no autonomic neuropathy, CVA, heart disease, peripheral neuropathy or retinopathy. Risk factors for coronary artery disease  include diabetes mellitus, dyslipidemia and post-menopausal. Current diabetic treatment includes oral agent (dual therapy). She is compliant with treatment all of the time. Meal planning includes ADA exchanges. She has had a previous visit with a dietitian. She participates in exercise daily. An ACE inhibitor/angiotensin II receptor blocker is being taken. She does not see a podiatrist.Eye exam is current.   Chronic Kidney Disease  This is a chronic problem. The current episode started more than 1 year ago. The problem occurs constantly. The problem has been unchanged. Associated symptoms include headaches.   Coronary Artery Disease  Presents for follow-up visit. Risk factors include hyperlipidemia.        The following portions of the patient's history were reviewed and updated as appropriate: allergies, current medications, past social history and problem list.    Outpatient Medications Marked as Taking for the 9/14/20 encounter (Office Visit) with Mohinder Barbosa MD   Medication Sig Dispense Refill   • alendronate (FOSAMAX) 70 MG tablet Take 70 mg by mouth 1 (One) Time Per Week.     • allopurinol (ZYLOPRIM) 300 MG tablet Take 1 tablet by mouth Daily. 90 tablet 1   • amLODIPine (NORVASC) 5 MG tablet Take 5 mg by mouth Daily.     • aspirin 325 MG tablet Take 325 mg by mouth daily.     • atorvastatin (LIPITOR) 80 MG tablet TAKE ONE TABLET BY MOUTH DAILY 90 tablet 1   • clopidogrel (PLAVIX) 75 MG tablet TAKE ONE TABLET BY MOUTH DAILY 90 tablet 1   • diclofenac (VOLTAREN) 1 % gel gel Apply 4 g topically 4 (Four) Times a Day. 1 tube 3   • ezetimibe (ZETIA) 10 MG tablet TAKE ONE TABLET BY MOUTH DAILY 90 tablet 1   • glipiZIDE (GLUCOTROL) 5 MG tablet TAKE TWO TABLETS BY MOUTH DAILY 180 tablet 0   • JANUVIA 100 MG tablet TAKE ONE TABLET BY MOUTH DAILY 30 tablet 5   • metFORMIN (GLUCOPHAGE) 500 MG tablet TAKE TWO TABLETS BY MOUTH TWICE A DAY WITH MEALS 360 tablet 1   • metoprolol tartrate (LOPRESSOR) 50 MG tablet Take 2  tablets by mouth 2 (Two) Times a Day. 360 tablet 1       Review of Systems   Respiratory: Negative for wheezing.    Cardiovascular: Negative for orthopnea.   Gastrointestinal: Negative for constipation and diarrhea.   Neurological: Positive for headaches.       Objective   Vitals:    09/14/20 0911   BP: 142/92   Pulse: 75   Resp: 16   Temp: 96.9 °F (36.1 °C)   SpO2: 98%          09/14/20 0911   Weight: 58.5 kg (129 lb)    [unfilled]  Body mass index is 25.19 kg/m².      Physical Exam   Constitutional: She appears well-developed. No distress.   HENT:   Head: Normocephalic and atraumatic.   Neck: Carotid bruit is not present.   Cardiovascular: Normal rate, regular rhythm and normal heart sounds. Exam reveals no gallop.   No murmur heard.  Pulmonary/Chest: Effort normal and breath sounds normal. No respiratory distress. She has no wheezes. She has no rales.   Skin: Skin is warm and dry.         Problem List Items Addressed This Visit        Cardiovascular and Mediastinum    CAD (coronary artery disease)    Hyperlipidemia - Primary    Relevant Orders    Lipid Panel With / Chol / HDL Ratio    Hypertension       Endocrine    Diabetes mellitus (CMS/HCC)    Relevant Orders    Hemoglobin A1c       Genitourinary    CKD (chronic kidney disease) stage 3, GFR 30-59 ml/min (CMS/[facility])      Other Visit Diagnoses     Need for vaccination        Relevant Orders    Fluarix/Fluzone/Afluria Quad>6 Months (Completed)    Medication management        Relevant Orders    CBC & Differential    Comprehensive Metabolic Panel        Assessment/Plan   In for recheck of htn, lipids, DM, Ckd and CAD.  Pretty much asymptomatic at present.  Blood pressure control is worse but generally excellent.  We will increase amlodipine from 5 mg daily to 10 mg today.  Pretty well maxed out on her other blood pressure meds.  Next step would be to add some hydralazine oral clonidine.  Probably the clonidine on high-dose hydralazine.  Labs are pending today.   She is full needs to do better job with her diet.  CAD is asymptomatic.  Annual labs today September 2020 including CBC, CMP, lipids, A1c, UA.  Glu, a1c, lipids q 3 mos. flu shot updated today.  She has been having headaches for 2 weeks it sounds like cervical in origin.  Rotating from side to side and into the upper neck.  Annual wellness visit today September 2020.  Urine microalbumin today.  Flu shot updated today.  She is 3.45 HPP today.    PPE today included face mask and eye shield.         Dragon disclaimer:   Much of this encounter note is an electronic transcription/translation of spoken language to printed text. The electronic translation of spoken language may permit erroneous, or at times, nonsensical words or phrases to be inadvertently transcribed; Although I have reviewed the note for such errors, some may still exist.

## 2020-09-14 NOTE — PATIENT INSTRUCTIONS
Fall Prevention in the Home, Adult  Falls can cause injuries and can affect people from all age groups. There are many simple things that you can do to make your home safe and to help prevent falls. Ask for help when making these changes, if needed.  What actions can I take to prevent falls?  General instructions  · Use good lighting in all rooms. Replace any light bulbs that burn out.  · Turn on lights if it is dark. Use night-lights.  · Place frequently used items in easy-to-reach places. Lower the shelves around your home if necessary.  · Set up furniture so that there are clear paths around it. Avoid moving your furniture around.  · Remove throw rugs and other tripping hazards from the floor.  · Avoid walking on wet floors.  · Fix any uneven floor surfaces.  · Add color or contrast paint or tape to grab bars and handrails in your home. Place contrasting color strips on the first and last steps of stairways.  · When you use a stepladder, make sure that it is completely opened and that the sides are firmly locked. Have someone hold the ladder while you are using it. Do not climb a closed stepladder.  · Be aware of any and all pets.  What can I do in the bathroom?         · Keep the floor dry. Immediately clean up any water that spills onto the floor.  · Remove soap buildup in the tub or shower on a regular basis.  · Use non-skid mats or decals on the floor of the tub or shower.  · Attach bath mats securely with double-sided, non-slip rug tape.  · If you need to sit down while you are in the shower, use a plastic, non-slip stool.  · Install grab bars by the toilet and in the tub and shower. Do not use towel bars as grab bars.  What can I do in the bedroom?  · Make sure that a bedside light is easy to reach.  · Do not use oversized bedding that drapes onto the floor.  · Have a firm chair that has side arms to use for getting dressed.  What can I do in the kitchen?  · Clean up any spills right away.  · If you need to  reach for something above you, use a sturdy step stool that has a grab bar.  · Keep electrical cables out of the way.  · Do not use floor polish or wax that makes floors slippery. If you must use wax, make sure that it is non-skid floor wax.  What can I do in the stairways?  · Do not leave any items on the stairs.  · Make sure that you have a light switch at the top of the stairs and the bottom of the stairs. Have them installed if you do not have them.  · Make sure that there are handrails on both sides of the stairs. Fix handrails that are broken or loose. Make sure that handrails are as long as the stairways.  · Install non-slip stair treads on all stairs in your home.  · Avoid having throw rugs at the top or bottom of stairways, or secure the rugs with carpet tape to prevent them from moving.  · Choose a carpet design that does not hide the edge of steps on the stairway.  · Check any carpeting to make sure that it is firmly attached to the stairs. Fix any carpet that is loose or worn.  What can I do on the outside of my home?  · Use bright outdoor lighting.  · Regularly repair the edges of walkways and driveways and fix any cracks.  · Remove high doorway thresholds.  · Trim any shrubbery on the main path into your home.  · Regularly check that handrails are securely fastened and in good repair. Both sides of any steps should have handrails.  · Install guardrails along the edges of any raised decks or porches.  · Clear walkways of debris and clutter, including tools and rocks.  · Have leaves, snow, and ice cleared regularly.  · Use sand or salt on walkways during winter months.  · In the garage, clean up any spills right away, including grease or oil spills.  What other actions can I take?  · Wear closed-toe shoes that fit well and support your feet. Wear shoes that have rubber soles or low heels.  · Use mobility aids as needed, such as canes, walkers, scooters, and crutches.  · Review your medicines with your  health care provider. Some medicines can cause dizziness or changes in blood pressure, which increase your risk of falling.  Talk with your health care provider about other ways that you can decrease your risk of falls. This may include working with a physical therapist or  to improve your strength, balance, and endurance.  Where to find more information  · Centers for Disease Control and Prevention, [place]: https://www.cdc.gov  · National [place] on Aging: https://mx8ejzj.cassidy.nih.gov  Contact a health care provider if:  · You are afraid of falling at home.  · You feel weak, drowsy, or dizzy at home.  · You fall at home.  Summary  · There are many simple things that you can do to make your home safe and to help prevent falls.  · Ways to make your home safe include removing tripping hazards and installing grab bars in the bathroom.  · Ask for help when making these changes in your home.  This information is not intended to replace advice given to you by your health care provider. Make sure you discuss any questions you have with your health care provider.  Document Released: 12/08/2003 Document Revised: 11/30/2018 Document Reviewed: 08/02/2018  [employer] Patient Education © 2020 [employer]  Aspirin and Your Heart    Aspirin is a medicine that prevents the cells in the blood that are used for clotting, called platelets, from sticking together. Aspirin can be used to help reduce the risk of blood clots, heart attacks, and other heart-related problems.  Can I take aspirin?  Your health care provider will help you determine whether it is safe and beneficial for you to take aspirin daily. Taking aspirin daily may be helpful if you:  · Have had a heart attack or chest pain.  · Are at risk for a heart attack.  · Have undergone open-heart surgery, such as coronary artery bypass surgery (CABG).  · Have had coronary angioplasty or a stent.  · Have had certain types of stroke or transient ischemic attack  (TIA).  · Have peripheral artery disease (PAD).  · Have chronic heart rhythm problems such as atrial fibrillation and cannot take an anticoagulant.  · Have valve disease or have had surgery on a valve.  What are the risks?  Daily use of aspirin can cause side effects. Some of these include:  · Bleeding. Bleeding problems can be minor or serious. An example of a minor problem is a cut that does not stop bleeding. An example of a more serious problem is stomach bleeding or, rarely, bleeding into the brain. Your risk of bleeding is increased if you are also taking non-steroidal anti-inflammatory drugs (NSAIDs).  · Increased bruising.  · Upset stomach.  · An allergic reaction. People who have nasal polyps have an increased risk of developing an aspirin allergy.  General guidelines  · Take aspirin only as told by your health care provider. Make sure that you understand how much you should take and what form you should take. The two forms of aspirin are:  ? Non-enteric-coated.This type of aspirin does not have a coating and is absorbed quickly. This type of aspirin also comes in a chewable form.  ? Enteric-coated. This type of aspirin has a coating that releases the medicine very slowly. Enteric-coated aspirin might cause less stomach upset than non-enteric-coated aspirin. This type of aspirin should not be chewed or crushed.  · Limit alcohol intake to no more than 1 drink a day for nonpregnant women and 2 drinks a day for men. Drinking alcohol increases your risk of bleeding. One drink equals 12 oz of beer, 5 oz of wine, or 1½ oz of hard liquor.  Contact a health care provider if you:  · Have unusual bleeding or bruising.  · Have stomach pain or nausea.  · Have ringing in your ears.  · Have an allergic reaction that causes:  ? Hives.  ? Itchy skin.  ? Swelling of the lips, tongue, or face.  Get help right away if you:  · Notice that your bowel movements are bloody, dark red, or black in color.  · Vomit or cough up  blood.  · Have blood in your urine.  · Cough, have noisy breathing (wheeze), or feel short of breath.  · Have chest pain, especially if the pain spreads to the arms, back, neck, or jaw.  · Have a severe headache, or a headache with confusion, or dizziness.  These symptoms may represent a serious problem that is an emergency. Do not wait to see if the symptoms will go away. Get medical help right away. Call your local emergency services (911 in the [place] Do not drive yourself to the hospital.  Summary  · Aspirin can be used to help reduce the risk of blood clots, heart attacks, and other heart-related problems.  · Daily use of aspirin can increase your risk of side effects. Your health care provider will help you determine whether it is safe and beneficial for you to take aspirin daily.  · Take aspirin only as told by your health care provider. Make sure that you understand how much you can take and what form you can take.  This information is not intended to replace advice given to you by your health care provider. Make sure you discuss any questions you have with your health care provider.  Document Released: 11/30/2009 Document Revised: 10/18/2018 Document Reviewed: 10/18/2018  [employer] Patient Education © 2020 [employer]  Advance Directive    Advance directives are legal documents that let you make choices ahead of time about your health care and medical treatment in case you become unable to communicate for yourself. Advance directives are a way for you to communicate your wishes to family, friends, and health care providers. This can help convey your decisions about end-of-life care if you become unable to communicate.  Discussing and writing advance directives should happen over time rather than all at once. Advance directives can be changed depending on your situation and what you want, even after you have signed the advance directives.  If you do not have an advance directive, some states assign family  decision makers to act on your behalf based on how closely you are related to them. Each state has its own laws regarding advance directives. You may want to check with your health care provider, , or state representative about the laws in your state. There are different types of advance directives, such as:  · Medical power of .  · Living will.  · Do not resuscitate (DNR) or do not attempt resuscitation (DNAR) order.  Health care proxy and medical power of   A health care proxy, also called a health care agent, is a person who is appointed to make medical decisions for you in cases in which you are unable to make the decisions yourself. Generally, people choose someone they know well and trust to represent their preferences. Make sure to ask this person for an agreement to act as your proxy. A proxy may have to exercise judgment in the event of a medical decision for which your wishes are not known.  A medical power of  is a legal document that names your health care proxy. Depending on the laws in your state, after the document is written, it may also need to be:  · Signed.  · Notarized.  · Dated.  · Copied.  · Witnessed.  · Incorporated into your medical record.  You may also want to appoint someone to manage your financial affairs in a situation in which you are unable to do so. This is called a durable power of  for finances. It is a separate legal document from the durable power of  for health care. You may choose the same person or someone different from your health care proxy to act as your agent in financial matters.  If you do not appoint a proxy, or if there is a concern that the proxy is not acting in your best interests, a court-appointed guardian may be designated to act on your behalf.  Living will  A living will is a set of instructions documenting your wishes about medical care when you cannot express them yourself. Health care providers should keep a  copy of your living will in your medical record. You may want to give a copy to family members or friends. To alert caregivers in case of an emergency, you can place a card in your wallet to let them know that you have a living will and where they can find it. A living will is used if you become:  · Terminally ill.  · Incapacitated.  · Unable to communicate or make decisions.  Items to consider in your living will include:  · The use or non-use of life-sustaining equipment, such as dialysis machines and breathing machines (ventilators).  · A DNR or DNAR order, which is the instruction not to use cardiopulmonary resuscitation (CPR) if breathing or heartbeat stops.  · The use or non-use of tube feeding.  · Withholding of food and fluids.  · Comfort (palliative) care when the goal becomes comfort rather than a cure.  · Organ and tissue donation.  A living will does not give instructions for distributing your money and property if you should pass away. It is recommended that you seek the advice of a  when writing a will. Decisions about taxes, beneficiaries, and asset distribution will be legally binding. This process can relieve your family and friends of any concerns surrounding disputes or questions that may come up about the distribution of your assets.  DNR or DNAR  A DNR or DNAR order is a request not to have CPR in the event that your heart stops beating or you stop breathing. If a DNR or DNAR order has not been made and shared, a health care provider will try to help any patient whose heart has stopped or who has stopped breathing. If you plan to have surgery, talk with your health care provider about how your DNR or DNAR order will be followed if problems occur.  Summary  · Advance directives are the legal documents that allow you to make choices ahead of time about your health care and medical treatment in case you become unable to communicate for yourself.  · The process of discussing and writing  advance directives should happen over time. You can change the advance directives, even after you have signed them.  · Advance directives include DNR or DNAR orders, living maldonado, and designating an agent as your medical power of .  This information is not intended to replace advice given to you by your health care provider. Make sure you discuss any questions you have with your health care provider.  Document Released: 03/26/2009 Document Revised: 01/22/2020 Document Reviewed: 11/06/2017  [employer] Patient Education © 2020 [employer]  Influenza Virus Vaccine injection  What is this medicine?  INFLUENZA VIRUS VACCINE (in [facility]oo EN [facility] SEEN) helps to reduce the risk of getting influenza also known as the flu. The vaccine only helps protect you against some strains of the flu.  This medicine may be used for other purposes; ask your health care provider or pharmacist if you have questions.  COMMON BRAND NAME(S): Afluria, Afluria Quadrivalent, Agriflu, Alfuria, FLUAD, FLUAD Quadrivalent, Fluarix, Fluarix Quadrivalent, Flublok, Flublok Quadrivalent, FLUCELVAX, Flulaval, Flulaval Quadrivalent, Fluvirin, Fluzone, Fluzone High-Dose, Fluzone Intradermal  What should I tell my health care provider before I take this medicine?  They need to know if you have any of these conditions:  · bleeding disorder like hemophilia  · fever or infection  · Guillain-Humarock syndrome or other neurological problems  · immune system problems  · infection with the human immunodeficiency virus (HIV) or AIDS  · low blood platelet counts  · multiple sclerosis  · an unusual or allergic reaction to influenza virus vaccine, latex, other medicines, foods, dyes, or preservatives. Different brands of vaccines contain different allergens. Some may contain latex or eggs. Talk to your doctor about your allergies to make sure that you get the right vaccine.  · pregnant or trying to get pregnant  · breast-feeding  How should I use this  medicine?  This vaccine is for injection into a muscle or under the skin. It is given by a health care professional.  A copy of Vaccine Information Statements will be given before each vaccination. Read this sheet carefully each time. The sheet may change frequently.  Talk to your healthcare provider to see which vaccines are right for you. Some vaccines should not be used in all age groups.  Overdosage: If you think you have taken too much of this medicine contact a poison control center or emergency room at once.  NOTE: This medicine is only for you. Do not share this medicine with others.  What if I miss a dose?  This does not apply.  What may interact with this medicine?  · chemotherapy or radiation therapy  · medicines that lower your immune system like etanercept, anakinra, infliximab, and adalimumab  · medicines that treat or prevent blood clots like warfarin  · phenytoin  · steroid medicines like prednisone or cortisone  · theophylline  · vaccines  This list may not describe all possible interactions. Give your health care provider a list of all the medicines, herbs, non-prescription drugs, or dietary supplements you use. Also tell them if you smoke, drink alcohol, or use illegal drugs. Some items may interact with your medicine.  What should I watch for while using this medicine?  Report any side effects that do not go away within 3 days to your doctor or health care professional. Call your health care provider if any unusual symptoms occur within 6 weeks of receiving this vaccine.  You may still catch the flu, but the illness is not usually as bad. You cannot get the flu from the vaccine. The vaccine will not protect against colds or other illnesses that may cause fever. The vaccine is needed every year.  What side effects may I notice from receiving this medicine?  Side effects that you should report to your doctor or health care professional as soon as possible:  · allergic reactions like skin rash,  itching or hives, swelling of the face, lips, or tongue  Side effects that usually do not require medical attention (report to your doctor or health care professional if they continue or are bothersome):  · fever  · headache  · muscle aches and pains  · pain, tenderness, redness, or swelling at the injection site  · tiredness  This list may not describe all possible side effects. Call your doctor for medical advice about side effects. You may report side effects to FDA at 7-768-OGG-9937.  Where should I keep my medicine?  The vaccine will be given by a health care professional in a clinic, pharmacy, doctor's office, or other health care setting. You will not be given vaccine doses to store at home.  NOTE: This sheet is a summary. It may not cover all possible information. If you have questions about this medicine, talk to your doctor, pharmacist, or health care provider.  © 2020 [employer] (2019-11-12 08:45:43)

## 2020-09-14 NOTE — PROGRESS NOTES
The ABCs of the Annual Wellness Visit  Subsequent Medicare Wellness Visit    Chief Complaint   Patient presents with   • Hypertension   • Hyperlipidemia   • Diabetes   • Coronary Artery Disease   • Medicare Wellness-subsequent       Subjective   History of Present Illness:  Tamie Peter is a 84 y.o. female who presents for a Subsequent Medicare Wellness Visit.    HEALTH RISK ASSESSMENT    Recent Hospitalizations:  No hospitalization(s) within the last year.    Current Medical Providers:  Patient Care Team:  Mohinder Barbosa MD as PCP - General (Internal Medicine)  Mohinder Barbosa MD as PCP - Internal Medicine (Internal Medicine)  Mohinder Barbosa MD as PCP - Claims Attributed  Doc Parekh MD as Consulting Physician (Cardiology)    Smoking Status:  Social History     Tobacco Use   Smoking Status Never Smoker   Smokeless Tobacco Never Used       Alcohol Consumption:  Social History     Substance and Sexual Activity   Alcohol Use Never   • Frequency: Never       Depression Screen:   PHQ-2/PHQ-9 Depression Screening 9/14/2020   Little interest or pleasure in doing things 0   Feeling down, depressed, or hopeless 0   Trouble falling or staying asleep, or sleeping too much 0   Feeling tired or having little energy 0   Poor appetite or overeating 0   Feeling bad about yourself - or that you are a failure or have let yourself or your family down 0   Trouble concentrating on things, such as reading the newspaper or watching television 0   Moving or speaking so slowly that other people could have noticed. Or the opposite - being so fidgety or restless that you have been moving around a lot more than usual 0   Thoughts that you would be better off dead, or of hurting yourself in some way 0   Total Score 0   If you checked off any problems, how difficult have these problems made it for you to do your work, take care of things at home, or get along with other people? Not difficult at all       Fall Risk Screen:  NOAH  Fall Risk Assessment was completed, and patient is at LOW risk for falls.Assessment completed on:9/14/2020    Health Habits and Functional and Cognitive Screening:  Functional & Cognitive Status 9/14/2020   Do you have difficulty preparing food and eating? No   Do you have difficulty bathing yourself, getting dressed or grooming yourself? No   Do you have difficulty using the toilet? No   Do you have difficulty moving around from place to place? No   Do you have trouble with steps or getting out of a bed or a chair? No   Current Diet Well Balanced Diet   Dental Exam Up to date   Eye Exam Up to date   Exercise (times per week) 7 times per week   Current Exercise Activities Include Walking   Do you need help using the phone?  No   Are you deaf or do you have serious difficulty hearing?  No   Do you need help with transportation? No   Do you need help shopping? No   Do you need help preparing meals?  No   Do you need help with housework?  No   Do you need help with laundry? No   Do you need help taking your medications? No   Do you need help managing money? No   Do you ever drive or ride in a car without wearing a seat belt? No   Have you felt unusual stress, anger or loneliness in the last month? Yes   Who do you live with? Alone   If you need help, do you have trouble finding someone available to you? No   Have you been bothered in the last four weeks by sexual problems? No   Do you have difficulty concentrating, remembering or making decisions? No         Does the patient have evidence of cognitive impairment? No    Asprin use counseling:Taking ASA appropriately as indicated    Age-appropriate Screening Schedule:  Refer to the list below for future screening recommendations based on patient's age, sex and/or medical conditions. Orders for these recommended tests are listed in the plan section. The patient has been provided with a written plan.    Health Maintenance   Topic Date Due   • DXA SCAN  08/11/2019   • URINE  MICROALBUMIN  04/23/2020   • HEMOGLOBIN A1C  05/08/2020   • INFLUENZA VACCINE  08/01/2020   • LIPID PANEL  11/08/2020   • DIABETIC FOOT EXAM  11/22/2020   • DIABETIC EYE EXAM  07/27/2021   • TDAP/TD VACCINES (2 - Td) 07/21/2025   • ZOSTER VACCINE  Completed          The following portions of the patient's history were reviewed and updated as appropriate: allergies, current medications, past family history, past medical history, past social history, past surgical history and problem list.    Outpatient Medications Prior to Visit   Medication Sig Dispense Refill   • alendronate (FOSAMAX) 70 MG tablet Take 70 mg by mouth 1 (One) Time Per Week.     • allopurinol (ZYLOPRIM) 300 MG tablet Take 1 tablet by mouth Daily. 90 tablet 1   • amLODIPine (NORVASC) 5 MG tablet Take 5 mg by mouth Daily.     • aspirin 325 MG tablet Take 325 mg by mouth daily.     • atorvastatin (LIPITOR) 80 MG tablet TAKE ONE TABLET BY MOUTH DAILY 90 tablet 1   • clopidogrel (PLAVIX) 75 MG tablet TAKE ONE TABLET BY MOUTH DAILY 90 tablet 1   • diclofenac (VOLTAREN) 1 % gel gel Apply 4 g topically 4 (Four) Times a Day. 1 tube 3   • ezetimibe (ZETIA) 10 MG tablet TAKE ONE TABLET BY MOUTH DAILY 90 tablet 1   • glipiZIDE (GLUCOTROL) 5 MG tablet TAKE TWO TABLETS BY MOUTH DAILY 180 tablet 0   • JANUVIA 100 MG tablet TAKE ONE TABLET BY MOUTH DAILY 30 tablet 5   • metFORMIN (GLUCOPHAGE) 500 MG tablet TAKE TWO TABLETS BY MOUTH TWICE A DAY WITH MEALS 360 tablet 1   • metoprolol tartrate (LOPRESSOR) 50 MG tablet Take 2 tablets by mouth 2 (Two) Times a Day. 360 tablet 1   • losartan-hydrochlorothiazide (HYZAAR) 100-25 MG per tablet Take 1 tablet by mouth daily.     • cephalexin (Keflex) 500 MG capsule Take 1 capsule by mouth 3 (Three) Times a Day. 15 capsule 0     No facility-administered medications prior to visit.        Patient Active Problem List   Diagnosis   • CAD (coronary artery disease)   • Hypertension   • GERD (gastroesophageal reflux disease)   •  "Diabetes mellitus (CMS/[facility])   • CKD (chronic kidney disease) stage 3, GFR 30-59 ml/min (CMS/[facility])   • Gout   • Hyperlipidemia   • PUD (peptic ulcer disease)   • Sick sinus syndrome (CMS/[facility])   • Disorder of rotator cuff   • Osteoporosis   • Pacemaker       Advanced Care Planning:  ACP discussion was held with the patient during this visit. Patient has an advance directive (not in EMR), copy requested.    Review of Systems    Compared to one year ago, the patient feels her physical health is the same.  Compared to one year ago, the patient feels her mental health is the same.    Reviewed chart for potential of high risk medication in the elderly: yes  Reviewed chart for potential of harmful drug interactions in the elderly:yes    Objective         Vitals:    09/14/20 0911   BP: 142/92   Pulse: 75   Resp: 16   Temp: 96.9 °F (36.1 °C)   TempSrc: Infrared   SpO2: 98%   Weight: 58.5 kg (129 lb)   Height: 152.4 cm (60\")   PainSc: 0-No pain       Body mass index is 25.19 kg/m².  Discussed the patient's BMI with her. The BMI is in the acceptable range.    Physical Exam          Assessment/Plan   Medicare Risks and Personalized Health Plan  CMS Preventative Services Quick Reference  Advance Directive Discussion  Fall Risk  Immunizations Discussed/Encouraged (specific immunizations; Influenza )    The above risks/problems have been discussed with the patient.  Pertinent information has been shared with the patient in the After Visit Summary.  Follow up plans and orders are seen below in the Assessment/Plan Section.    There are no diagnoses linked to this encounter.  Follow Up:  No follow-ups on file.     An After Visit Summary and PPPS were given to the patient.             "

## 2020-09-28 ENCOUNTER — APPOINTMENT (OUTPATIENT)
Dept: WOMENS IMAGING | Facility: HOSPITAL | Age: 85
End: 2020-09-28

## 2020-09-28 PROCEDURE — 77067 SCR MAMMO BI INCL CAD: CPT | Performed by: RADIOLOGY

## 2020-09-28 PROCEDURE — 77063 BREAST TOMOSYNTHESIS BI: CPT | Performed by: RADIOLOGY

## 2020-09-30 ENCOUNTER — TELEPHONE (OUTPATIENT)
Dept: INTERNAL MEDICINE | Facility: CLINIC | Age: 85
End: 2020-09-30

## 2020-09-30 NOTE — TELEPHONE ENCOUNTER
Called patient to follow up on DEXA scan that has not been completed. She did not complete DEXA w/ mammo. She would like to postpone a few months due to COVID. Not comfortable going out and about.

## 2020-10-22 RX ORDER — SITAGLIPTIN 100 MG/1
TABLET, FILM COATED ORAL
Qty: 90 TABLET | Refills: 1 | Status: SHIPPED | OUTPATIENT
Start: 2020-10-22 | End: 2021-05-19

## 2020-12-09 RX ORDER — ALLOPURINOL 300 MG/1
TABLET ORAL
Qty: 90 TABLET | Refills: 1 | Status: SHIPPED | OUTPATIENT
Start: 2020-12-09 | End: 2022-12-02

## 2021-01-26 ENCOUNTER — OFFICE VISIT (OUTPATIENT)
Dept: INTERNAL MEDICINE | Facility: CLINIC | Age: 86
End: 2021-01-26

## 2021-01-26 VITALS
HEIGHT: 60 IN | BODY MASS INDEX: 25.32 KG/M2 | TEMPERATURE: 96.9 F | WEIGHT: 129 LBS | RESPIRATION RATE: 16 BRPM | OXYGEN SATURATION: 98 % | HEART RATE: 84 BPM | DIASTOLIC BLOOD PRESSURE: 96 MMHG | SYSTOLIC BLOOD PRESSURE: 140 MMHG

## 2021-01-26 DIAGNOSIS — I10 ESSENTIAL HYPERTENSION: Primary | Chronic | ICD-10-CM

## 2021-01-26 DIAGNOSIS — E78.2 MIXED HYPERLIPIDEMIA: Chronic | ICD-10-CM

## 2021-01-26 DIAGNOSIS — E11.9 TYPE 2 DIABETES MELLITUS WITHOUT COMPLICATION, WITHOUT LONG-TERM CURRENT USE OF INSULIN (HCC): Chronic | ICD-10-CM

## 2021-01-26 DIAGNOSIS — I25.10 CORONARY ARTERY DISEASE INVOLVING NATIVE CORONARY ARTERY OF NATIVE HEART WITHOUT ANGINA PECTORIS: Chronic | ICD-10-CM

## 2021-01-26 DIAGNOSIS — N18.32 STAGE 3B CHRONIC KIDNEY DISEASE (HCC): Chronic | ICD-10-CM

## 2021-01-26 PROCEDURE — 99214 OFFICE O/P EST MOD 30 MIN: CPT | Performed by: INTERNAL MEDICINE

## 2021-01-26 NOTE — PROGRESS NOTES
Subjective   Tamie Peter is a 85 y.o. female.     Chief Complaint   Patient presents with   • Hypertension   • Hyperlipidemia   • Diabetes   • Chronic Kidney Disease         Hypertension  This is a chronic problem. The current episode started more than 1 year ago. The problem is unchanged. The problem is controlled. Pertinent negatives include no chest pain, headaches, orthopnea, palpitations, peripheral edema (right foot in summer only) or shortness of breath. There are no associated agents to hypertension. Risk factors for coronary artery disease include diabetes mellitus, dyslipidemia and post-menopausal state. Current antihypertension treatment includes ACE inhibitors, beta blockers and diuretics. Hypertensive end-organ damage includes kidney disease and CAD/MI. There is no history of angina, CVA, heart failure or retinopathy. Identifiable causes of hypertension include chronic renal disease.   Hyperlipidemia  This is a chronic problem. The current episode started more than 1 year ago. The problem is controlled. Recent lipid tests were reviewed and are normal. Exacerbating diseases include chronic renal disease. Factors aggravating her hyperlipidemia include thiazides and beta blockers. Pertinent negatives include no chest pain or shortness of breath. Current antihyperlipidemic treatment includes statins and bile acid squestrants. The current treatment provides significant improvement of lipids. There are no compliance problems.  Risk factors for coronary artery disease include dyslipidemia and post-menopausal.   Diabetes  She presents for her follow-up diabetic visit. She has type 2 diabetes mellitus. No MedicAlert identification noted. Her disease course has been stable. Pertinent negatives for hypoglycemia include no headaches. Pertinent negatives for diabetes include no chest pain. Symptoms are stable. Diabetic complications include nephropathy. Pertinent negatives for diabetic complications include no  autonomic neuropathy, CVA, heart disease, peripheral neuropathy or retinopathy. Risk factors for coronary artery disease include diabetes mellitus, dyslipidemia and post-menopausal. Current diabetic treatment includes oral agent (dual therapy). She is compliant with treatment all of the time. Meal planning includes ADA exchanges. She has had a previous visit with a dietitian. She participates in exercise daily. An ACE inhibitor/angiotensin II receptor blocker is being taken. She does not see a podiatrist.Eye exam is current.   Chronic Kidney Disease  This is a chronic problem. The current episode started more than 1 year ago. The problem occurs constantly. The problem has been unchanged. Pertinent negatives include no chest pain or headaches.   Coronary Artery Disease  Presents for follow-up visit. Pertinent negatives include no chest pain, palpitations or shortness of breath. Risk factors include hyperlipidemia.        The following portions of the patient's history were reviewed and updated as appropriate: allergies, current medications, past social history and problem list.    Outpatient Medications Marked as Taking for the 1/26/21 encounter (Office Visit) with Mohinder Barbosa MD   Medication Sig Dispense Refill   • alendronate (FOSAMAX) 70 MG tablet Take 70 mg by mouth 1 (One) Time Per Week.     • allopurinol (ZYLOPRIM) 300 MG tablet TAKE ONE TABLET BY MOUTH DAILY 90 tablet 1   • amLODIPine (NORVASC) 10 MG tablet Take 1 tablet by mouth Daily. 90 tablet 1   • aspirin 325 MG tablet Take 325 mg by mouth daily.     • atorvastatin (LIPITOR) 80 MG tablet TAKE ONE TABLET BY MOUTH DAILY 90 tablet 1   • clopidogrel (PLAVIX) 75 MG tablet TAKE ONE TABLET BY MOUTH DAILY 90 tablet 1   • diclofenac (VOLTAREN) 1 % gel gel APPLY 4 GRAMS OF GEL TO AFFECTED AREA FOUR TIMES A  g 2   • ezetimibe (ZETIA) 10 MG tablet TAKE ONE TABLET BY MOUTH DAILY 90 tablet 1   • glipiZIDE (GLUCOTROL) 5 MG tablet TAKE TWO TABLETS BY MOUTH  DAILY 180 tablet 0   • Januvia 100 MG tablet TAKE ONE TABLET BY MOUTH DAILY 90 tablet 1   • losartan-hydrochlorothiazide (HYZAAR) 100-25 MG per tablet Take 1 tablet by mouth daily.     • metFORMIN (GLUCOPHAGE) 500 MG tablet TAKE TWO TABLETS BY MOUTH TWICE A DAY WITH MEALS 360 tablet 1   • metoprolol tartrate (LOPRESSOR) 50 MG tablet Take 2 tablets by mouth 2 (Two) Times a Day. 360 tablet 1       Review of Systems   Respiratory: Negative for shortness of breath and wheezing.    Cardiovascular: Negative for chest pain, palpitations and orthopnea.   Gastrointestinal: Negative for constipation and diarrhea.   Neurological: Negative for headaches.       Objective   Vitals:    01/26/21 0853   BP: 140/96   Pulse: 84   Resp: 16   Temp: 96.9 °F (36.1 °C)   SpO2: 98%          01/26/21 0853   Weight: 58.5 kg (129 lb)    [unfilled]  Body mass index is 25.19 kg/m².      Physical Exam   Constitutional: She appears well-developed. No distress.   Cardiovascular: Normal rate, regular rhythm and normal heart sounds. Exam reveals no gallop.   No murmur heard.  Pulmonary/Chest: Effort normal and breath sounds normal. No respiratory distress. She has no wheezes. She has no rales.   Abdominal: Normal appearance.    Tamie had a diabetic foot exam performed today.   During the foot exam she had a monofilament test performed.    Neurological Sensory Findings -  Unaltered sharp/dull right ankle/foot discrimination and unaltered sharp/dull left ankle/foot discrimination. No right ankle/foot altered proprioception and no left ankle/foot altered proprioception  Vascular Status -  Her right foot exhibits normal foot vasculature  and no edema. Her left foot exhibits normal foot vasculature  and no edema.   Foot Structure and Biomechanics -   Her left foot exhibits hammer toes.  Neurological: She is alert.   Skin: Skin is warm and dry.         Problems Addressed this Visit        Cardiac and Vasculature    CAD (coronary artery disease)  (Chronic)    Hypertension - Primary (Chronic)    Hyperlipidemia (Chronic)       Endocrine and Metabolic    Diabetes mellitus (CMS/[facility]) (Chronic)       Genitourinary and Reproductive     CKD (chronic kidney disease) stage 3, GFR 30-59 ml/min (CMS/[facility]) (Chronic)      Diagnoses       Codes Comments    Essential hypertension    -  Primary ICD-10-CM: I10  ICD-9-CM: 401.9     Mixed hyperlipidemia     ICD-10-CM: E78.2  ICD-9-CM: 272.2     Coronary artery disease involving native coronary artery of native heart without angina pectoris     ICD-10-CM: I25.10  ICD-9-CM: 414.01     Type 2 diabetes mellitus without complication, without long-term current use of insulin (CMS/[facility])     ICD-10-CM: E11.9  ICD-9-CM: 250.00     Stage 3b chronic kidney disease (CMS/[facility])     ICD-10-CM: N18.32  ICD-9-CM: 585.3         Assessment/Plan   In for recheck of htn, lipids, DM, Ckd and CAD.  Pretty much asymptomatic at present.  Blood pressure control is still up but generally excellent.  Pretty well maxed out on her other blood pressure meds.  Next step would be to add some hydralazine or clonidine.  Could also go up on metoprolol.  We will add hydralazine 25 mg twice daily today.  Labs are pending today.  She is full needs to do better job with her diet.  CAD is asymptomatic.  Annual labs September 2020 including CBC, CMP, lipids, A1c, UA.  Glu, a1c, lipids q 3 mos. she is 4.5 HPP today.  Annual wellness visit September 2020.      PPE today included face mask and eye shield.         Dragon disclaimer:   Much of this encounter note is an electronic transcription/translation of spoken language to printed text. The electronic translation of spoken language may permit erroneous, or at times, nonsensical words or phrases to be inadvertently transcribed; Although I have reviewed the note for such errors, some may still exist.

## 2021-01-27 LAB
CHOLEST SERPL-MCNC: 231 MG/DL (ref 0–200)
CHOLEST/HDLC SERPL: 4.91 {RATIO}
GLUCOSE SERPL-MCNC: 151 MG/DL (ref 65–99)
HBA1C MFR BLD: 7.1 % (ref 4.8–5.6)
HDLC SERPL-MCNC: 47 MG/DL (ref 40–60)
LDLC SERPL CALC-MCNC: 125 MG/DL (ref 0–100)
TRIGL SERPL-MCNC: 332 MG/DL (ref 0–150)
VLDLC SERPL CALC-MCNC: 59 MG/DL (ref 5–40)

## 2021-01-28 ENCOUNTER — TELEPHONE (OUTPATIENT)
Dept: INTERNAL MEDICINE | Facility: CLINIC | Age: 86
End: 2021-01-28

## 2021-01-28 RX ORDER — HYDRALAZINE HYDROCHLORIDE 25 MG/1
25 TABLET, FILM COATED ORAL 2 TIMES DAILY
Qty: 60 TABLET | Refills: 1 | Status: SHIPPED | OUTPATIENT
Start: 2021-01-28 | End: 2021-05-19

## 2021-01-28 NOTE — TELEPHONE ENCOUNTER
PT CALLED STATING THE RX THAT DR SINGLETARY WAS SENDING IN FOR HER BLOOD PRESSURE HAS NOT ARRIVED AT HER PHARMACY.    CONFIRMED PHARMACY: [address] AT [address] [place] [address] - [phone]  - [phone] [phone]    CALLBACK NUMBER: [phone]

## 2021-01-28 NOTE — TELEPHONE ENCOUNTER
Done.    You started patient on Hydralazine 25 mg bid at last office visit. Medication mistakenly was never sent in. Med is attached pending your approval.

## 2021-02-05 ENCOUNTER — TELEPHONE (OUTPATIENT)
Dept: INTERNAL MEDICINE | Facility: CLINIC | Age: 86
End: 2021-02-05

## 2021-02-05 NOTE — TELEPHONE ENCOUNTER
Overdue Result Notes: DEXA Scan     2/5/21: Patient is still postponing due to Covid. She has no interest in completing the Dexa scan any time soon. Will complete off of overdue results and make a note on her next appointment in April.     9/30/20: Patient did not complete DEXA w/ mammo. She would like to postpone a few months due to COVID. Not comfortable going out and about. alc    1/20/20: Spoke w/ patient. She plans to complete DEXA along with Mammo which is due 8/2020. Nothing scheduled yet. Will postpone out to 8/2020

## 2021-03-09 RX ORDER — CLOPIDOGREL BISULFATE 75 MG/1
TABLET ORAL
Qty: 90 TABLET | Refills: 1 | Status: SHIPPED | OUTPATIENT
Start: 2021-03-09 | End: 2021-09-27

## 2021-04-27 ENCOUNTER — OFFICE VISIT (OUTPATIENT)
Dept: INTERNAL MEDICINE | Facility: CLINIC | Age: 86
End: 2021-04-27

## 2021-04-27 VITALS
HEIGHT: 60 IN | WEIGHT: 131 LBS | SYSTOLIC BLOOD PRESSURE: 160 MMHG | OXYGEN SATURATION: 99 % | TEMPERATURE: 96.9 F | RESPIRATION RATE: 16 BRPM | BODY MASS INDEX: 25.72 KG/M2 | DIASTOLIC BLOOD PRESSURE: 92 MMHG | HEART RATE: 91 BPM

## 2021-04-27 DIAGNOSIS — N18.32 STAGE 3B CHRONIC KIDNEY DISEASE (HCC): Chronic | ICD-10-CM

## 2021-04-27 DIAGNOSIS — I10 ESSENTIAL HYPERTENSION: Primary | Chronic | ICD-10-CM

## 2021-04-27 DIAGNOSIS — I25.10 CORONARY ARTERY DISEASE INVOLVING NATIVE CORONARY ARTERY OF NATIVE HEART WITHOUT ANGINA PECTORIS: Chronic | ICD-10-CM

## 2021-04-27 DIAGNOSIS — E78.2 MIXED HYPERLIPIDEMIA: Chronic | ICD-10-CM

## 2021-04-27 DIAGNOSIS — E11.9 TYPE 2 DIABETES MELLITUS WITHOUT COMPLICATION, WITHOUT LONG-TERM CURRENT USE OF INSULIN (HCC): Chronic | ICD-10-CM

## 2021-04-27 PROCEDURE — 99214 OFFICE O/P EST MOD 30 MIN: CPT | Performed by: INTERNAL MEDICINE

## 2021-04-27 NOTE — PROGRESS NOTES
Subjective   Tmaie Peter is a 85 y.o. female.     Chief Complaint   Patient presents with   • Hypertension   • Hyperlipidemia   • Diabetes   • Chronic Kidney Disease         Hypertension  This is a chronic problem. The current episode started more than 1 year ago. The problem is unchanged. The problem is controlled. Pertinent negatives include no chest pain, headaches, orthopnea, palpitations, peripheral edema (right foot in summer only) or shortness of breath. There are no associated agents to hypertension. Risk factors for coronary artery disease include diabetes mellitus, dyslipidemia and post-menopausal state. Current antihypertension treatment includes ACE inhibitors, beta blockers and diuretics. Hypertensive end-organ damage includes kidney disease and CAD/MI. There is no history of angina, CVA, heart failure or retinopathy. Identifiable causes of hypertension include chronic renal disease.   Hyperlipidemia  This is a chronic problem. The current episode started more than 1 year ago. The problem is controlled. Recent lipid tests were reviewed and are normal. Exacerbating diseases include chronic renal disease. Factors aggravating her hyperlipidemia include thiazides and beta blockers. Pertinent negatives include no chest pain or shortness of breath. Current antihyperlipidemic treatment includes statins and bile acid squestrants. The current treatment provides significant improvement of lipids. There are no compliance problems.  Risk factors for coronary artery disease include dyslipidemia and post-menopausal.   Diabetes  She presents for her follow-up diabetic visit. She has type 2 diabetes mellitus. No MedicAlert identification noted. Her disease course has been stable. Pertinent negatives for hypoglycemia include no headaches. Pertinent negatives for diabetes include no chest pain. Symptoms are stable. Diabetic complications include nephropathy. Pertinent negatives for diabetic complications include no  autonomic neuropathy, CVA, heart disease, peripheral neuropathy or retinopathy. Risk factors for coronary artery disease include diabetes mellitus, dyslipidemia and post-menopausal. Current diabetic treatment includes oral agent (dual therapy). She is compliant with treatment all of the time. Meal planning includes ADA exchanges. She has had a previous visit with a dietitian. She participates in exercise daily. An ACE inhibitor/angiotensin II receptor blocker is being taken. She does not see a podiatrist.Eye exam is current.   Chronic Kidney Disease  This is a chronic problem. The current episode started more than 1 year ago. The problem occurs constantly. The problem has been unchanged. Pertinent negatives include no chest pain or headaches.   Coronary Artery Disease  Presents for follow-up visit. Pertinent negatives include no chest pain, palpitations or shortness of breath. Risk factors include hyperlipidemia.        The following portions of the patient's history were reviewed and updated as appropriate: allergies, current medications, past social history and problem list.    Outpatient Medications Marked as Taking for the 4/27/21 encounter (Office Visit) with Mohinder Barbosa MD   Medication Sig Dispense Refill   • alendronate (FOSAMAX) 70 MG tablet Take 70 mg by mouth 1 (One) Time Per Week.     • allopurinol (ZYLOPRIM) 300 MG tablet TAKE ONE TABLET BY MOUTH DAILY 90 tablet 1   • amLODIPine (NORVASC) 10 MG tablet Take 1 tablet by mouth Daily. 90 tablet 1   • aspirin 325 MG tablet Take 325 mg by mouth daily.     • atorvastatin (LIPITOR) 80 MG tablet TAKE ONE TABLET BY MOUTH DAILY 90 tablet 1   • clopidogrel (PLAVIX) 75 MG tablet TAKE ONE TABLET BY MOUTH DAILY 90 tablet 1   • diclofenac (VOLTAREN) 1 % gel gel APPLY 4 GRAMS OF GEL TO AFFECTED AREA FOUR TIMES A  g 2   • ezetimibe (ZETIA) 10 MG tablet TAKE ONE TABLET BY MOUTH DAILY 90 tablet 1   • glipiZIDE (GLUCOTROL) 5 MG tablet TAKE TWO TABLETS BY MOUTH  DAILY 180 tablet 0   • hydrALAZINE (APRESOLINE) 25 MG tablet Take 1 tablet by mouth 2 (two) times a day. 60 tablet 1   • Januvia 100 MG tablet TAKE ONE TABLET BY MOUTH DAILY 90 tablet 1   • losartan-hydrochlorothiazide (HYZAAR) 100-25 MG per tablet Take 1 tablet by mouth daily.     • metFORMIN (GLUCOPHAGE) 500 MG tablet TAKE TWO TABLETS BY MOUTH TWICE A DAY WITH MEALS 360 tablet 1   • metoprolol tartrate (LOPRESSOR) 50 MG tablet Take 2 tablets by mouth 2 (Two) Times a Day. 360 tablet 1       Review of Systems   Respiratory: Negative for shortness of breath and wheezing.    Cardiovascular: Negative for chest pain, palpitations and orthopnea.   Gastrointestinal: Negative for constipation and diarrhea.   Neurological: Negative for headaches.       Objective   Vitals:    04/27/21 0859   BP: 160/92   Pulse: 91   Resp: 16   Temp: 96.9 °F (36.1 °C)   SpO2: 99%          04/27/21  0859   Weight: 59.4 kg (131 lb)    [unfilled]  Body mass index is 25.58 kg/m².      Physical Exam   Constitutional: She appears well-developed.   Cardiovascular: Normal rate, regular rhythm and normal heart sounds. Exam reveals no gallop.   No murmur heard.  Pulmonary/Chest: Effort normal and breath sounds normal. No respiratory distress. She has no wheezes. She has no rales.   Abdominal: Normal appearance.    Tamie had a diabetic foot exam performed today.   During the foot exam she had a monofilament test performed.    Neurological Sensory Findings -  Unaltered sharp/dull right ankle/foot discrimination and unaltered sharp/dull left ankle/foot discrimination. No right ankle/foot altered proprioception and no left ankle/foot altered proprioception  Vascular Status -  Her right foot exhibits normal foot vasculature  and no edema. Her left foot exhibits normal foot vasculature  and no edema.   Foot Structure and Biomechanics -   Her left foot exhibits hammer toes.  Neurological: She is alert.   Skin: Skin is warm and dry.         Problems  Addressed this Visit        Cardiac and Vasculature    CAD (coronary artery disease) (Chronic)    Hypertension - Primary (Chronic)    Hyperlipidemia (Chronic)       Endocrine and Metabolic    Diabetes mellitus (CMS/[facility]) (Chronic)       Genitourinary and Reproductive     CKD (chronic kidney disease) stage 3, GFR 30-59 ml/min (CMS/[facility]) (Chronic)      Diagnoses       Codes Comments    Essential hypertension    -  Primary ICD-10-CM: I10  ICD-9-CM: 401.9     Mixed hyperlipidemia     ICD-10-CM: E78.2  ICD-9-CM: 272.2     Coronary artery disease involving native coronary artery of native heart without angina pectoris     ICD-10-CM: I25.10  ICD-9-CM: 414.01     Type 2 diabetes mellitus without complication, without long-term current use of insulin (CMS/[facility])     ICD-10-CM: E11.9  ICD-9-CM: 250.00     Stage 3b chronic kidney disease (CMS/[facility])     ICD-10-CM: N18.32  ICD-9-CM: 585.3         Assessment/Plan   In for recheck of htn, lipids, DM, Ckd and CAD.  Pretty much asymptomatic at present.  Blood pressure control is still up.  However she has had some lightheadedness on and off since starting hydralazine last time which is concerning for some low blood pressure readings.  We will try to get a better cuff for home monitoring.  Right now she is using a wrist cuff.  Next step would be to increase hydralazine or switch to clonidine.  Could also go up on metoprolol.  We added hydralazine 25 mg twice in January 2021.  Labs are pending today.  CAD is asymptomatic.  Annual labs September 2020 including CBC, CMP, lipids, A1c, UA.  Glu, a1c, lipids q 3 mos. she is 4.5 HPP today.  Annual wellness visit September 2021.  She is 3 HPP today.    The above information was reviewed again today 04/27/21.  It continues to be accurate as reflected above and is unchanged.  History, physical and review of systems all reviewed and are unchanged.  Medications were reviewed today and continue the current dosing.    PPE today includes face mask and  eye shield.         Dragon disclaimer:   Much of this encounter note is an electronic transcription/translation of spoken language to printed text. The electronic translation of spoken language may permit erroneous, or at times, nonsensical words or phrases to be inadvertently transcribed; Although I have reviewed the note for such errors, some may still exist.

## 2021-04-28 LAB
CHOLEST SERPL-MCNC: 256 MG/DL (ref 0–200)
CHOLEST/HDLC SERPL: 5.45 {RATIO}
GLUCOSE SERPL-MCNC: 198 MG/DL (ref 65–99)
HBA1C MFR BLD: 7 % (ref 4.8–5.6)
HDLC SERPL-MCNC: 47 MG/DL (ref 40–60)
LDLC SERPL CALC-MCNC: 152 MG/DL (ref 0–100)
TRIGL SERPL-MCNC: 306 MG/DL (ref 0–150)
VLDLC SERPL CALC-MCNC: 57 MG/DL (ref 5–40)

## 2021-05-19 RX ORDER — HYDRALAZINE HYDROCHLORIDE 25 MG/1
TABLET, FILM COATED ORAL
Qty: 60 TABLET | Refills: 1 | Status: SHIPPED | OUTPATIENT
Start: 2021-05-19 | End: 2021-11-23

## 2021-05-19 RX ORDER — SITAGLIPTIN 100 MG/1
TABLET, FILM COATED ORAL
Qty: 90 TABLET | Refills: 1 | Status: SHIPPED | OUTPATIENT
Start: 2021-05-19 | End: 2021-08-20

## 2021-06-21 RX ORDER — METOPROLOL TARTRATE 50 MG/1
TABLET, FILM COATED ORAL
Qty: 360 TABLET | Refills: 1 | Status: SHIPPED | OUTPATIENT
Start: 2021-06-21 | End: 2022-07-12

## 2021-07-27 ENCOUNTER — OFFICE VISIT (OUTPATIENT)
Dept: INTERNAL MEDICINE | Facility: CLINIC | Age: 86
End: 2021-07-27

## 2021-07-27 VITALS
WEIGHT: 128.34 LBS | OXYGEN SATURATION: 99 % | BODY MASS INDEX: 25.19 KG/M2 | SYSTOLIC BLOOD PRESSURE: 130 MMHG | HEART RATE: 87 BPM | RESPIRATION RATE: 16 BRPM | HEIGHT: 60 IN | DIASTOLIC BLOOD PRESSURE: 86 MMHG | TEMPERATURE: 96.8 F

## 2021-07-27 DIAGNOSIS — E11.9 TYPE 2 DIABETES MELLITUS WITHOUT COMPLICATION, WITHOUT LONG-TERM CURRENT USE OF INSULIN (HCC): Chronic | ICD-10-CM

## 2021-07-27 DIAGNOSIS — I10 ESSENTIAL HYPERTENSION: Chronic | ICD-10-CM

## 2021-07-27 DIAGNOSIS — E78.2 MIXED HYPERLIPIDEMIA: Chronic | ICD-10-CM

## 2021-07-27 DIAGNOSIS — K21.9 GASTROESOPHAGEAL REFLUX DISEASE, UNSPECIFIED WHETHER ESOPHAGITIS PRESENT: Chronic | ICD-10-CM

## 2021-07-27 DIAGNOSIS — Z78.0 MENOPAUSE: Primary | ICD-10-CM

## 2021-07-27 DIAGNOSIS — I25.10 CORONARY ARTERY DISEASE INVOLVING NATIVE CORONARY ARTERY OF NATIVE HEART WITHOUT ANGINA PECTORIS: Chronic | ICD-10-CM

## 2021-07-27 PROCEDURE — 99214 OFFICE O/P EST MOD 30 MIN: CPT | Performed by: INTERNAL MEDICINE

## 2021-07-27 NOTE — PROGRESS NOTES
Subjective   Tamie Peter is a 85 y.o. female.     Chief Complaint   Patient presents with   • Hypertension   • Hyperlipidemia   • Chronic Kidney Disease   • Coronary Artery Disease   • Sciatica     Left side x 2 wks         Hypertension  This is a chronic problem. The current episode started more than 1 year ago. The problem is unchanged. The problem is controlled. Pertinent negatives include no chest pain, headaches, orthopnea, palpitations, peripheral edema (right foot in summer only) or shortness of breath. There are no associated agents to hypertension. Risk factors for coronary artery disease include diabetes mellitus, dyslipidemia and post-menopausal state. Current antihypertension treatment includes ACE inhibitors, beta blockers and diuretics. Hypertensive end-organ damage includes kidney disease and CAD/MI. There is no history of angina, CVA, heart failure or retinopathy. Identifiable causes of hypertension include chronic renal disease.   Hyperlipidemia  This is a chronic problem. The current episode started more than 1 year ago. The problem is controlled. Recent lipid tests were reviewed and are normal. Exacerbating diseases include chronic renal disease. Factors aggravating her hyperlipidemia include thiazides and beta blockers. Pertinent negatives include no chest pain or shortness of breath. Current antihyperlipidemic treatment includes statins and bile acid squestrants. The current treatment provides significant improvement of lipids. There are no compliance problems.  Risk factors for coronary artery disease include dyslipidemia and post-menopausal.   Chronic Kidney Disease  This is a chronic problem. The current episode started more than 1 year ago. The problem occurs constantly. The problem has been unchanged. Pertinent negatives include no chest pain or headaches.   Coronary Artery Disease  Presents for follow-up visit. Pertinent negatives include no chest pain, palpitations or shortness of  breath. Risk factors include hyperlipidemia.   Sciatica  This is a recurrent problem. Pertinent negatives include no chest pain or headaches.   Diabetes  She presents for her follow-up diabetic visit. She has type 2 diabetes mellitus. No MedicAlert identification noted. Her disease course has been stable. Pertinent negatives for hypoglycemia include no headaches. Pertinent negatives for diabetes include no chest pain. Symptoms are stable. Diabetic complications include nephropathy. Pertinent negatives for diabetic complications include no autonomic neuropathy, CVA, heart disease, peripheral neuropathy or retinopathy. Risk factors for coronary artery disease include diabetes mellitus, dyslipidemia and post-menopausal. Current diabetic treatment includes oral agent (dual therapy). She is compliant with treatment all of the time. Meal planning includes ADA exchanges. She has had a previous visit with a dietitian. She participates in exercise daily. An ACE inhibitor/angiotensin II receptor blocker is being taken. She does not see a podiatrist.Eye exam is current.        The following portions of the patient's history were reviewed and updated as appropriate: allergies, current medications, past social history and problem list.    Outpatient Medications Marked as Taking for the 7/27/21 encounter (Office Visit) with Mohinder Barbosa MD   Medication Sig Dispense Refill   • alendronate (FOSAMAX) 70 MG tablet Take 70 mg by mouth 1 (One) Time Per Week.     • allopurinol (ZYLOPRIM) 300 MG tablet TAKE ONE TABLET BY MOUTH DAILY 90 tablet 1   • amLODIPine (NORVASC) 10 MG tablet Take 1 tablet by mouth Daily. 90 tablet 1   • aspirin 325 MG tablet Take 325 mg by mouth daily.     • atorvastatin (LIPITOR) 80 MG tablet TAKE ONE TABLET BY MOUTH DAILY 90 tablet 1   • clopidogrel (PLAVIX) 75 MG tablet TAKE ONE TABLET BY MOUTH DAILY 90 tablet 1   • diclofenac (VOLTAREN) 1 % gel gel APPLY 4 GRAMS OF GEL TO AFFECTED AREA FOUR TIMES A   g 2   • ezetimibe (ZETIA) 10 MG tablet TAKE ONE TABLET BY MOUTH DAILY 90 tablet 1   • glipiZIDE (GLUCOTROL) 5 MG tablet TAKE TWO TABLETS BY MOUTH DAILY 180 tablet 0   • hydrALAZINE (APRESOLINE) 25 MG tablet TAKE ONE TABLET BY MOUTH TWICE A DAY 60 tablet 1   • Januvia 100 MG tablet TAKE ONE TABLET BY MOUTH DAILY 90 tablet 1   • losartan-hydrochlorothiazide (HYZAAR) 100-25 MG per tablet Take 1 tablet by mouth daily.     • metFORMIN (GLUCOPHAGE) 500 MG tablet TAKE TWO TABLETS BY MOUTH TWICE A DAY WITH MEALS 360 tablet 1   • metoprolol tartrate (LOPRESSOR) 50 MG tablet TAKE TWO TABLETS BY MOUTH TWICE A  tablet 1       Review of Systems   Respiratory: Negative for shortness of breath and wheezing.    Cardiovascular: Negative for chest pain, palpitations and orthopnea.   Gastrointestinal: Negative for constipation and diarrhea.   Neurological: Negative for headaches.       Objective   Vitals:    07/27/21 1004   BP: 130/86   Pulse: 87   Resp: 16   Temp: 96.8 °F (36 °C)   SpO2: 99%          07/27/21  1004   Weight: 58.2 kg (128 lb 5.4 oz)    [unfilled]  Body mass index is 25.06 kg/m².      Physical Exam   Constitutional: She appears well-developed.   Cardiovascular: Normal rate, regular rhythm and normal heart sounds. Exam reveals no gallop.   No murmur heard.  Pulmonary/Chest: Effort normal and breath sounds normal. No respiratory distress. She has no wheezes. She has no rales.   Abdominal: Normal appearance.    Tamie had a diabetic foot exam performed today.   During the foot exam she had a monofilament test performed.    Neurological Sensory Findings -  Unaltered sharp/dull right ankle/foot discrimination and unaltered sharp/dull left ankle/foot discrimination. No right ankle/foot altered proprioception and no left ankle/foot altered proprioception  Vascular Status -  Her right foot exhibits normal foot vasculature  and no edema. Her left foot exhibits normal foot vasculature  and no edema.   Foot Structure  and Biomechanics -   Her left foot exhibits hammer toes.  Neurological: She is alert.   Skin: Skin is warm and dry.         Problems Addressed this Visit        Cardiac and Vasculature    CAD (coronary artery disease) (Chronic)    Hypertension (Chronic)    Hyperlipidemia (Chronic)       Endocrine and Metabolic    Diabetes mellitus (CMS/HCC) (Chronic)       Gastrointestinal Abdominal     GERD (gastroesophageal reflux disease) (Chronic)      Other Visit Diagnoses     Menopause    -  Primary    Relevant Orders    DEXA Bone Density Axial      Diagnoses       Codes Comments    Menopause    -  Primary ICD-10-CM: Z78.0  ICD-9-CM: 627.2     Essential hypertension     ICD-10-CM: I10  ICD-9-CM: 401.9     Mixed hyperlipidemia     ICD-10-CM: E78.2  ICD-9-CM: 272.2     Coronary artery disease involving native coronary artery of native heart without angina pectoris     ICD-10-CM: I25.10  ICD-9-CM: 414.01     Type 2 diabetes mellitus without complication, without long-term current use of insulin (CMS/HCC)     ICD-10-CM: E11.9  ICD-9-CM: 250.00     Gastroesophageal reflux disease, unspecified whether esophagitis present     ICD-10-CM: K21.9  ICD-9-CM: 530.81         Assessment/Plan   In for recheck of htn, lipids, DM, Ckd and CAD.  Pretty much asymptomatic at present.  Blood pressure control is good.  Next step would be to increase hydralazine or switch to clonidine.  Could also go up on metoprolol.  We added hydralazine 25 mg twice in January 2021.  Labs are pending today.  CAD is asymptomatic.  Annual labs September 2020 including CBC, CMP, lipids, A1c, UA.  Gets Glu, a1c, lipids q 3 mos. she is 3.5 HPP today.  Annual wellness visit September 2021. Recent eye check up was fantastic with Dr. Dow as well as cardiology visit.. Recent flareup of her sciatica. She is working on stretches and can add some Tylenol for that. Medication management is going to be tricky with her CKD and diabetes. Dancing for 7 days straight may have  caused the flare.    The above information was reviewed again today 07/27/21.  It continues to be accurate as reflected above and is unchanged.  History, physical and review of systems all reviewed and are unchanged.  Medications were reviewed today and continue the current dosing.    PPE today includes face mask and eye shield.           Dragon disclaimer:   Much of this encounter note is an electronic transcription/translation of spoken language to printed text. The electronic translation of spoken language may permit erroneous, or at times, nonsensical words or phrases to be inadvertently transcribed; Although I have reviewed the note for such errors, some may still exist.

## 2021-07-27 NOTE — PATIENT INSTRUCTIONS
Bone Density Test  The bone density test uses a special type of X-ray to measure the amount of calcium and other minerals in your bones. It can measure bone density in the hip and the spine. The test procedure is similar to having a regular X-ray. This test may also be called:  · Bone densitometry.  · Bone mineral density test.  · Dual-energy X-ray absorptiometry (DEXA).  You may have this test to:  · Diagnose a condition that causes weak or thin bones (osteoporosis).  · Screen you for osteoporosis.  · Predict your risk for a broken bone (fracture).  · Determine how well your osteoporosis treatment is working.  Tell a health care provider about:  · Any allergies you have.  · All medicines you are taking, including vitamins, herbs, eye drops, creams, and over-the-counter medicines.  · Any problems you or family members have had with anesthetic medicines.  · Any blood disorders you have.  · Any surgeries you have had.  · Any medical conditions you have.  · Whether you are pregnant or may be pregnant.  · Any medical tests you have had within the past 14 days that used contrast material.  What are the risks?  Generally, this is a safe procedure. However, it does expose you to a small amount of radiation, which can slightly increase your cancer risk.  What happens before the procedure?  · Do not take any calcium supplements starting 24 hours before your test.  · Remove all metal jewelry, eyeglasses, dental appliances, and any other metal objects.  What happens during the procedure?    · You will lie down on an exam table. There will be an X-ray generator below you and an imaging device above you.  · Other devices, such as boxes or braces, may be used to position your body properly for the scan.  · The machine will slowly scan your body. You will need to keep still.  · The images will show up on a screen in the room. Images will be examined by a specialist after your test is done.  The procedure may vary among health care  providers and hospitals.  What happens after the procedure?  · It is up to you to get your test results. Ask your health care provider, or the department that is doing the test, when your results will be ready.  Summary  · A bone density test is an imaging test that uses a type of X-ray to measure the amount of calcium and other minerals in your bones.  · The test may be used to diagnose or screen you for a condition that causes weak or thin bones (osteoporosis), predict your risk for a broken bone (fracture), or determine how well your osteoporosis treatment is working.  Do not take any calcium suppleme  Exercising to Stay Healthy  To become healthy and stay healthy, it is recommended that you do moderate-intensity and vigorous-intensity exercise. You can tell that you are exercising at a moderate intensity if your heart starts beating faster and you start breathing faster but can still hold a conversation. You can tell that you are exercising at a vigorous intensity if you are breathing much harder and faster and cannot hold a conversation while exercising.  Exercising regularly is important. It has many health benefits, such as:  · Improving overall fitness, flexibility, and endurance.  · Increasing bone density.  · Helping with weight control.  · Decreasing body fat.  · Increasing muscle strength.  · Reducing stress and tension.  · Improving overall health.  How often should I exercise?  Choose an activity that you enjoy, and set realistic goals. Your health care provider can help you make an activity plan that works for you.  Exercise regularly as told by your health care provider. This may include:  · Doing strength training two times a week, such as:  ? Lifting weights.  ? Using resistance bands.  ? Push-ups.  ? Sit-ups.  ? Yoga.  · Doing a certain intensity of exercise for a given amount of time. Choose from these options:  ? A total of 150 minutes of moderate-intensity exercise every week.  ? A total of 75  minutes of vigorous-intensity exercise every week.  ? A mix of moderate-intensity and vigorous-intensity exercise every week.  Children, pregnant women, people who have not exercised regularly, people who are overweight, and older adults may need to talk with a health care provider about what activities are safe to do. If you have a medical condition, be sure to talk with your health care provider before you start a new exercise program.  What are some exercise ideas?  Moderate-intensity exercise ideas include:  · Walking 1 mile (1.6 km) in about 15 minutes.  · Biking.  · Hiking.  · Golfing.  · Dancing.  · Water aerobics.  Vigorous-intensity exercise ideas include:  · Walking 4.5 miles (7.2 km) or more in about 1 hour.  · Jogging or running 5 miles (8 km) in about 1 hour.  · Biking 10 miles (16.1 km) or more in about 1 hour.  · Lap swimming.  · Roller-skating or in-line skating.  · Cross-country skiing.  · Vigorous competitive sports, such as football, basketball, and soccer.  · Jumping rope.  · Aerobic dancing.  What are some everyday activities that can help me to get exercise?  · Yard work, such as:  ? Pushing a .  ? Raking and bagging leaves.  · Washing your car.  · Pushing a stroller.  · Shoveling snow.  · Gardening.  · Washing windows or floors.  How can I be more active in my day-to-day activities?  · Use stairs instead of an elevator.  · Take a walk during your lunch break.  · If you drive, park your car farther away from your work or school.  · If you take public transportation, get off one stop early and walk the rest of the way.  · Stand up or walk around during all of your indoor phone calls.  · Get up, stretch, and walk around every 30 minutes throughout the day.  · Enjoy exercise with a friend. Support to continue exercising will help you keep a regular routine of activity.  What guidelines can I follow while exercising?  · Before you start a new exercise program, talk with your health care  provider.  · Do not exercise so much that you hurt yourself, feel dizzy, or get very short of breath.  · Wear comfortable clothes and wear shoes with good support.  · Drink plenty of water while you exercise to prevent dehydration or heat stroke.  · Work out until your breathing and your heartbeat get faster.  Where to find more information  · [place] Department of Health and Human Services: www.hhs.gov  · Centers for Disease Control and Prevention (CDC): www.cdc.gov  Summary  · Exercising regularly is important. It will improve your overall fitness, flexibility, and endurance.  · Regular exercise also will improve your overall health. It can help you control your weight, reduce stress, and improve your bone density.  · Do not exercise so much that you hurt yourself, feel dizzy, or get very short of breath.  · Before you start a new exercise program, talk with your health care provider.  This information is not intended to replace advice given to you by your health care provider. Make sure you discuss any questions you have with your health care provider.  Document Revised: 11/30/2018 Document Reviewed: 11/08/2018  [employer] Patient Education © 2021 [employer]    Calorie Counting for Weight Loss  Calories are units of energy. Your body needs a certain number of calories from food to keep going throughout the day. When you eat or drink more calories than your body needs, your body stores the extra calories mostly as fat. When you eat or drink fewer calories than your body needs, your body burns fat to get the energy it needs.  Calorie counting means keeping track of how many calories you eat and drink each day. Calorie counting can be helpful if you need to lose weight. If you eat fewer calories than your body needs, you should lose weight. Ask your health care provider what a healthy weight is for you.  For calorie counting to work, you will need to eat the right number of calories each day to lose a healthy amount  of weight per week. A dietitian can help you figure out how many calories you need in a day and will suggest ways to reach your calorie goal.  · A healthy amount of weight to lose each week is usually 1-2 lb (0.5-0.9 kg). This usually means that your daily calorie intake should be reduced by 500-750 calories.  · Eating 1,200-1,500 calories a day can help most women lose weight.  · Eating 1,500-1,800 calories a day can help most men lose weight.  What do I need to know about calorie counting?  Work with your health care provider or dietitian to determine how many calories you should get each day. To meet your daily calorie goal, you will need to:  · Find out how many calories are in each food that you would like to eat. Try to do this before you eat.  · Decide how much of the food you plan to eat.  · Keep a food log. Do this by writing down what you ate and how many calories it had.  To successfully lose weight, it is important to balance calorie counting with a healthy lifestyle that includes regular activity.  Where do I find calorie information?    The number of calories in a food can be found on a Nutrition Facts label. If a food does not have a Nutrition Facts label, try to look up the calories online or ask your dietitian for help.  Remember that calories are listed per serving. If you choose to have more than one serving of a food, you will have to multiply the calories per serving by the number of servings you plan to eat. For example, the label on a package of bread might say that a serving size is 1 slice and that there are 90 calories in a serving. If you eat 1 slice, you will have eaten 90 calories. If you eat 2 slices, you will have eaten 180 calories.  How do I keep a food log?  After each time that you eat, record the following in your food log as soon as possible:  · What you ate. Be sure to include toppings, sauces, and other extras on the food.  · How much you ate. This can be measured in cups,  ounces, or number of items.  · How many calories were in each food and drink.  · The total number of calories in the food you ate.  Keep your food log near you, such as in a pocket-sized notebook or on an esteban or website on your mobile phone. Some programs will calculate calories for you and show you how many calories you have left to meet your daily goal.  What are some portion-control tips?  · Know how many calories are in a serving. This will help you know how many servings you can have of a certain food.  · Use a measuring cup to measure serving sizes. You could also try weighing out portions on a kitchen scale. With time, you will be able to estimate serving sizes for some foods.  · Take time to put servings of different foods on your favorite plates or in your favorite bowls and cups so you know what a serving looks like.  · Try not to eat straight from a food's packaging, such as from a bag or box. Eating straight from the package makes it hard to see how much you are eating and can lead to overeating. Put the amount you would like to eat in a cup or on a plate to make sure you are eating the right portion.  · Use smaller plates, glasses, and bowls for smaller portions and to prevent overeating.  · Try not to multitask. For example, avoid watching TV or using your computer while eating. If it is time to eat, sit down at a table and enjoy your food. This will help you recognize when you are full. It will also help you be more mindful of what and how much you are eating.  What are tips for following this plan?  Reading food labels  · Check the calorie count compared with the serving size. The serving size may be smaller than what you are used to eating.  · Check the source of the calories. Try to choose foods that are high in protein, fiber, and vitamins, and low in saturated fat, trans fat, and sodium.  Shopping  · Read nutrition labels while you shop. This will help you make healthy decisions about which foods  to buy.  · Pay attention to nutrition labels for low-fat or fat-free foods. These foods sometimes have the same number of calories or more calories than the full-fat versions. They also often have added sugar, starch, or salt to make up for flavor that was removed with the fat.  · Make a grocery list of lower-calorie foods and stick to it.  Cooking  · Try to cook your favorite foods in a healthier way. For example, try baking instead of frying.  · Use low-fat dairy products.  Meal planning  · Use more fruits and vegetables. One-half of your plate should be fruits and vegetables.  · Include lean proteins, such as chicken, turkey, and fish.  Lifestyle  Each week, aim to do one of the following:  · 150 minutes of moderate exercise, such as walking.  · 75 minutes of vigorous exercise, such as running.  General information  · Know how many calories are in the foods you eat most often. This will help you calculate calorie counts faster.  · Find a way of tracking calories that works for you. Get creative. Try different apps or programs if writing down calories does not work for you.  What foods should I eat?    · Eat nutritious foods. It is better to have a nutritious, high-calorie food, such as an avocado, than a food with few nutrients, such as a bag of potato chips.  · Use your calories on foods and drinks that will fill you up and will not leave you hungry soon after eating.  ? Examples of foods that fill you up are nuts and nut butters, vegetables, lean proteins, and high-fiber foods such as whole grains. High-fiber foods are foods with more than 5 g of fiber per serving.  · Pay attention to calories in drinks. Low-calorie drinks include water and unsweetened drinks.  The items listed above may not be a complete list of foods and beverages you can eat. Contact a dietitian for more information.  What foods should I limit?  Limit foods or drinks that are not good sources of vitamins, minerals, or protein or that are  high in unhealthy fats. These include:  · Candy.  · Other sweets.  · Sodas, specialty coffee drinks, alcohol, and juice.  The items listed above may not be a complete list of foods and beverages you should avoid. Contact a dietitian for more information.  How do I count calories when eating out?  · Pay attention to portions. Often, portions are much larger when eating out. Try these tips to keep portions smaller:  ? Consider sharing a meal instead of getting your own.  ? If you get your own meal, eat only half of it. Before you start eating, ask for a container and put half of your meal into it.  ? When available, consider ordering smaller portions from the menu instead of full portions.  · Pay attention to your food and drink choices. Knowing the way food is cooked and what is included with the meal can help you eat fewer calories.  ? If calories are listed on the menu, choose the lower-calorie options.  ? Choose dishes that include vegetables, fruits, whole grains, low-fat dairy products, and lean proteins.  ? Choose items that are boiled, broiled, grilled, or steamed. Avoid items that are buttered, battered, fried, or served with cream sauce. Items labeled as crispy are usually fried, unless stated otherwise.  ? Choose water, low-fat milk, unsweetened iced tea, or other drinks without added sugar. If you want an alcoholic beverage, choose a lower-calorie option, such as a glass of wine or light beer.  ? Ask for dressings, sauces, and syrups on the side. These are usually high in calories, so you should limit the amount you eat.  ? If you want a salad, choose a garden salad and ask for grilled meats. Avoid extra toppings such as rose, cheese, or fried items. Ask for the dressing on the side, or ask for olive oil and vinegar or lemon to use as dressing.  · Estimate how many servings of a food you are given. Knowing serving sizes will help you be aware of how much food you are eating at restaurants.  Where to find  more information  · Centers for Disease Control and Prevention: www.cdc.gov  · [place] Department of Agriculture: myplate.gov  Summary  · Calorie counting means keeping track of how many calories you eat and drink each day. If you eat fewer calories than your body needs, you should lose weight.  · A healthy amount of weight to lose per week is usually 1-2 lb (0.5-0.9 kg). This usually means reducing your daily calorie intake by 500-750 calories.  · The number of calories in a food can be found on a Nutrition Facts label. If a food does not have a Nutrition Facts label, try to look up the calories online or ask your dietitian for help.  · Use smaller plates, glasses, and bowls for smaller portions and to prevent overeating.  · Use your calories on foods and drinks that will fill you up and not leave you hungry shortly after a meal.  This information is not intended to replace advice given to you by your health care provider. Make sure you discuss any questions you have with your health care provider.  Document Revised: 01/28/2021 Document Reviewed: 01/28/2021  [employer] Patient Education © 2021 [employer]    BMI for Adults  What is BMI?  Body mass index (BMI) is a number that is calculated from a person's weight and height. BMI can help estimate how much of a person's weight is composed of fat. BMI does not measure body fat directly. Rather, it is an alternative to procedures that directly measure body fat, which can be difficult and expensive.  BMI can help identify people who may be at higher risk for certain medical problems.  What are BMI measurements used for?  BMI is used as a screening tool to identify possible weight problems. It helps determine whether a person is obese, overweight, a healthy weight, or underweight.  BMI is useful for:  · Identifying a weight problem that may be related to a medical condition or may increase the risk for medical problems.  · Promoting changes, such as changes in diet and  "exercise, to help reach a healthy weight. BMI screening can be repeated to see if these changes are working.  How is BMI calculated?  BMI involves measuring your weight in relation to your height. Both height and weight are measured, and the BMI is calculated from those numbers. This can be done either in English ([place] or metric measurements. Note that charts and online BMI calculators are available to help you find your BMI quickly and easily without having to do these calculations yourself.  To calculate your BMI in English ([place] measurements:    1. Measure your weight in pounds (lb).  2. Multiply the number of pounds by 703.  ? For example, for a person who weighs 180 lb, multiply that number by 703, which equals 126,540.  3. Measure your height in inches. Then multiply that number by itself to get a measurement called \"inches squared.\"  ? For example, for a person who is 70 inches tall, the \"inches squared\" measurement is 70 inches x 70 inches, which equals 4,900 inches squared.  4. Divide the total from step 2 (number of lb x 703) by the total from step 3 (inches squared): 126,540 ÷ 4,900 = 25.8. This is your BMI.  To calculate your BMI in metric measurements:  1. Measure your weight in kilograms (kg).  2. Measure your height in meters (m). Then multiply that number by itself to get a measurement called \"meters squared.\"  ? For example, for a person who is 1.75 m tall, the \"meters squared\" measurement is 1.75 m x 1.75 m, which is equal to 3.1 meters squared.  3. Divide the number of kilograms (your weight) by the meters squared number. In this example: 70 ÷ 3.1 = 22.6. This is your BMI.  What do the results mean?  BMI charts are used to identify whether you are underweight, normal weight, overweight, or obese. The following guidelines will be used:  · Underweight: BMI less than 18.5.  · Normal weight: BMI between 18.5 and 24.9.  · Overweight: BMI between 25 and 29.9.  · Obese: BMI of 30 or above.  Keep these " notes in mind:  · Weight includes both fat and muscle, so someone with a muscular build, such as an athlete, may have a BMI that is higher than 24.9. In cases like these, BMI is not an accurate measure of body fat.  · To determine if excess body fat is the cause of a BMI of 25 or higher, further assessments may need to be done by a health care provider.  · BMI is usually interpreted in the same way for men and women.  Where to find more information  For more information about BMI, including tools to quickly calculate your BMI, go to these websites:  · Centers for Disease Control and Prevention: www.cdc.gov  · American Heart Association: www.heart.org  · National Heart, Lung, [facility] [place]: www.nhlbi.nih.gov  Summary  · Body mass index (BMI) is a number that is calculated from a person's weight and height.  · BMI may help estimate how much of a person's weight is composed of fat. BMI can help identify those who may be at higher risk for certain medical problems.  · BMI can be measured using English measurements or metric measurements.  · BMI charts are used to identify whether you are underweight, normal weight, overweight, or obese.  This information is not intended to replace advice given to you by your health care provider. Make sure you discuss any questions you have with your health care provider.  Document Revised: 09/09/2020 Document Reviewed: 07/17/2020  [employer] Patient Education © 2021 [employer]  · ts starting 24 hours before your test.  · Ask your health care provider, or the department that is doing the test, when your results will be ready.  This information is not intended to replace advice given to you by your health care provider. Make sure you discuss any questions you have with your health care provider.  Document Revised: 01/03/2019 Document Reviewed: 10/22/2018  [employer] Patient Education © 2021 [employer]

## 2021-07-28 LAB
CHOLEST SERPL-MCNC: 254 MG/DL (ref 0–200)
CHOLEST/HDLC SERPL: 6.35 {RATIO}
GLUCOSE SERPL-MCNC: 179 MG/DL (ref 65–99)
HBA1C MFR BLD: 7.1 % (ref 4.8–5.6)
HDLC SERPL-MCNC: 40 MG/DL (ref 40–60)
LDLC SERPL CALC-MCNC: 141 MG/DL (ref 0–100)
TRIGL SERPL-MCNC: 399 MG/DL (ref 0–150)
VLDLC SERPL CALC-MCNC: 73 MG/DL (ref 5–40)

## 2021-08-20 ENCOUNTER — TELEPHONE (OUTPATIENT)
Dept: INTERNAL MEDICINE | Facility: CLINIC | Age: 86
End: 2021-08-20

## 2021-08-20 RX ORDER — GLIPIZIDE 5 MG/1
TABLET ORAL
Qty: 270 TABLET | Refills: 1 | Status: SHIPPED | OUTPATIENT
Start: 2021-08-20 | End: 2022-07-08

## 2021-08-20 NOTE — TELEPHONE ENCOUNTER
All of the drug choices going forward are expensive.  Nonetheless we have a little room to move with her glipizide.  Currently she is on 5 mg twice a day.  She can go to 3 tablets a day, 2 with breakfast and 1 with supper.  Top dose of glipizide would be 4 tablets a day.  20 mg total.  Also she can work with improving her diet which would help.

## 2021-08-20 NOTE — TELEPHONE ENCOUNTER
Caller: Tamie Peter    Relationship: Self    Best call back number: [phone] (H)    What medication are you requesting: SOMETHING ELSE IN THE SAME FAMILY OF Januvia 100 MG tablet    What are your current symptoms:     How long have you been experiencing symptoms:     Have you had these symptoms before:    [] Yes  [] No    Have you been treated for these symptoms before:   [] Yes  [] No    If a prescription is needed, what is your preferred pharmacy and phone number:  [address] AT [address] [place] [address] [phone] [facility] [phone]         Additional notes: PATIENT CALLED TO ADVISE THAT THE JANUVIA IS TOO EXPENSIVE FOR TO AFFORD IT WILL COST HER $365.10. PATIENT IS WANTING TO KNOW IF THERE IS SOMETHING ELSE THAT CAN BE PRESCRIBED THAT SHE CAN AFFORD IN THE SAME FAMILY AS JANUVIA.      THANKS

## 2021-08-26 ENCOUNTER — TELEPHONE (OUTPATIENT)
Dept: INTERNAL MEDICINE | Facility: CLINIC | Age: 86
End: 2021-08-26

## 2021-08-26 DIAGNOSIS — M54.50 LOW BACK PAIN, UNSPECIFIED BACK PAIN LATERALITY, UNSPECIFIED CHRONICITY, UNSPECIFIED WHETHER SCIATICA PRESENT: Primary | ICD-10-CM

## 2021-08-26 NOTE — TELEPHONE ENCOUNTER
PATIENT CALLED FOR ORTHO REFERRAL FOR THE PAIN IN HER LEFT SIDE LOWER BACK RADIATING  DOWN HER LEFT LEG TO HER FOOT. SHE HAS TINGLING WITH THIS AS WELL.   SHE HAS SPOKEN TO DR. SINGLETARY ABOUT THIS AND THE TYLENOL IS NOT HELPING.     SHE WOULD LIKE TO SEE SOMEONE WHO IS CLOSE TO DR. SINGLETARY'S OFFICE    CALL BACK NUMBER [phone]

## 2021-09-10 NOTE — PROGRESS NOTES
"Subjective   Patient ID: Tamie Peter is a 85 y.o. female is being seen for consultation today at the request of Mohinder Barbosa MD for back and leg pain.      History of Present Illness  Patient reports in the past she had sciatica on the right side and was relieved with some exercises. Patient reports 2.5 months of left lower back with a \"knot\" and posterior left leg that began insideously. Patient reports numbness and tingling from the hip down to toes. Patient denies any weakness. Patient denies any bowel/bladder incontinence. Patient denies any recent conservative treatments except HEP and Voltaren gel on low back with some improvement. Patient continues to do the exercises from the past.  Non-smoker.  No history of cancer. No prior spine surgery. She is on Fosamax for OP. 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 She has a non MRI compatible pacemaker.     The following portions of the patient's history were reviewed and updated as appropriate: allergies, current medications, past family history, past medical history, past social history, past surgical history and problem list.    Review of Systems   Constitutional: Positive for activity change. Negative for fever.   HENT: Negative for congestion.    Eyes: Negative for visual disturbance.   Respiratory: Negative for chest tightness and shortness of breath.    Cardiovascular: Negative for chest pain.   Gastrointestinal: Negative for nausea.   Endocrine: Negative for cold intolerance and heat intolerance.   Genitourinary: Negative for difficulty urinating and enuresis.   Musculoskeletal: Positive for back pain.   Skin: Negative for rash.   Allergic/Immunologic: Negative for environmental allergies.   Neurological: Positive for numbness. Negative for weakness.   Hematological: Bruises/bleeds easily.   Psychiatric/Behavioral: Negative for sleep disturbance.         Objective     Vitals:    09/22/21 0835   BP: 140/84   Temp: 98.4 °F (36.9 °C) " "  Weight: 58.1 kg (128 lb)   Height: 152.4 cm (60\")     Body mass index is 25 kg/m².      Physical Exam  Vitals reviewed.   Constitutional:       Appearance: Normal appearance.   Pulmonary:      Effort: Pulmonary effort is normal.   Musculoskeletal:      Thoracic back: No tenderness or bony tenderness. Scoliosis present.      Lumbar back: Tenderness ( low back ) present. No bony tenderness. Normal range of motion. Negative right straight leg raise test and negative left straight leg raise test. Scoliosis present.   Skin:     General: Skin is warm and dry.   Neurological:      General: No focal deficit present.      Mental Status: She is alert.      Coordination: Romberg Test normal.      Gait: Gait is intact.      Deep Tendon Reflexes:      Reflex Scores:       Patellar reflexes are 2+ on the right side and 2+ on the left side.       Achilles reflexes are 2+ on the right side and 2+ on the left side.  Psychiatric:         Mood and Affect: Mood normal.         Speech: Speech normal.         Thought Content: Thought content normal.       Neurologic Exam     Mental Status   Speech: speech is normal   Level of consciousness: alert  Knowledge: good.   Normal comprehension.     Motor Exam   Muscle bulk: normal  Right leg tone: normal  Left leg tone: normal  5/5 garland LEs     Sensory Exam   Right leg light touch: normal  Left leg light touch: normal    Gait, Coordination, and Reflexes     Gait  Gait: normal    Coordination   Romberg: negative    Reflexes   Right patellar: 2+  Left patellar: 2+  Right achilles: 2+  Left achilles: 2+  Right ankle clonus: absent  Left pendular knee jerk: absent          Assessment/Plan   Independent Review of Radiographic Studies:      I personally reviewed the images from the following studies.    No recent spine imaging    Medical Decision Making:      Resents for evaluation of back and left leg pain has been present for about 2 and half months.  In the past she has had some pain in the right " leg that she was able to resolve with some exercises.  Unfortunately this time her home exercise program has not helped.  She is on Voltaren gel for pain foot issue and tried it on her low back and has seemed to help her some.  On exam she has no neurologic red flags.  Good strength, sensation and gait.  Negative straight leg raise.  Tenderness in her low back with some scoliosis both thoracic and lumbar spine.  Suspect she has some nerve root compression that is resulting in L5/S1 compression based on her complaints.  We will try some physical therapy, Medrol Dosepak, (with GI coverage recommended Pepcid), and lumbar x-rays.  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  Advised her that I do not have any reason for her to not dance but that she will need to adjust her activity to her discomfort level.  We will see her back in 6 weeks or sooner if her symptoms progress.  She has a non-MRI compatible pacemaker.  Consider further imaging if symptoms persist    Diagnoses and all orders for this visit:    1. Acute left-sided low back pain with left-sided sciatica (Primary)  -     XR Spine Lumbar Complete With Flex & Ext; Future  -     Ambulatory Referral to Physical Therapy Evaluate and treat    Other orders  -     methylPREDNISolone (MEDROL) 4 MG dose pack; Take as directed on package instructions.  Dispense: 21 tablet; Refill: 0      Return in about 6 weeks (around 11/3/2021) for Follow-up with [facility], with imaging, After PT.

## 2021-09-21 ENCOUNTER — TELEPHONE (OUTPATIENT)
Dept: INTERNAL MEDICINE | Facility: CLINIC | Age: 86
End: 2021-09-21

## 2021-09-21 NOTE — TELEPHONE ENCOUNTER
Pharmacy Name:  [facility] [address] AT [address] [place] [address] - [phone] [facility] [phone]  (Pharmacy)    Pharmacy representative name: TOÑO    Pharmacy representative phone number:  [phone]    What medication are you calling in regards to: Diclofenac Sodium (VOLTAREN) 1 % gel gel    What question does the pharmacy have: PHARMACY NEEDS A VERIFICATION ON THE QUANTITY OF THIS MEDICATION    Who is the provider that prescribed the medication: DR SINGLETARY    Additional notes: PLEASE CALL AND ADVISE

## 2021-09-21 NOTE — TELEPHONE ENCOUNTER
Spoke to pharmacy to clarify dispensing quantity from #30 g to #100 g, since that is the only size tube available.

## 2021-09-22 ENCOUNTER — OFFICE VISIT (OUTPATIENT)
Dept: NEUROSURGERY | Facility: CLINIC | Age: 86
End: 2021-09-22

## 2021-09-22 ENCOUNTER — TELEPHONE (OUTPATIENT)
Dept: NEUROSURGERY | Facility: CLINIC | Age: 86
End: 2021-09-22

## 2021-09-22 VITALS
DIASTOLIC BLOOD PRESSURE: 84 MMHG | HEIGHT: 60 IN | BODY MASS INDEX: 25.13 KG/M2 | TEMPERATURE: 98.4 F | WEIGHT: 128 LBS | SYSTOLIC BLOOD PRESSURE: 140 MMHG

## 2021-09-22 DIAGNOSIS — M54.42 ACUTE LEFT-SIDED LOW BACK PAIN WITH LEFT-SIDED SCIATICA: Primary | ICD-10-CM

## 2021-09-22 PROCEDURE — 99204 OFFICE O/P NEW MOD 45 MIN: CPT | Performed by: NURSE PRACTITIONER

## 2021-09-22 RX ORDER — METHYLPREDNISOLONE 4 MG/1
TABLET ORAL
Qty: 21 TABLET | Refills: 0 | Status: SHIPPED | OUTPATIENT
Start: 2021-09-22 | End: 2021-10-01

## 2021-09-27 RX ORDER — CLOPIDOGREL BISULFATE 75 MG/1
TABLET ORAL
Qty: 90 TABLET | Refills: 1 | Status: SHIPPED | OUTPATIENT
Start: 2021-09-27 | End: 2022-05-16

## 2021-09-30 ENCOUNTER — TELEPHONE (OUTPATIENT)
Dept: NEUROSURGERY | Facility: CLINIC | Age: 86
End: 2021-09-30

## 2021-09-30 ENCOUNTER — HOSPITAL ENCOUNTER (OUTPATIENT)
Dept: GENERAL RADIOLOGY | Facility: HOSPITAL | Age: 86
Discharge: HOME | End: 2021-09-30
Admitting: NURSE PRACTITIONER

## 2021-09-30 ENCOUNTER — APPOINTMENT (OUTPATIENT)
Dept: WOMENS IMAGING | Facility: HOSPITAL | Age: 86
End: 2021-09-30

## 2021-09-30 DIAGNOSIS — M54.42 ACUTE LEFT-SIDED LOW BACK PAIN WITH LEFT-SIDED SCIATICA: ICD-10-CM

## 2021-09-30 PROCEDURE — 72114 X-RAY EXAM L-S SPINE BENDING: CPT

## 2021-09-30 PROCEDURE — 77063 BREAST TOMOSYNTHESIS BI: CPT | Performed by: RADIOLOGY

## 2021-09-30 PROCEDURE — 77067 SCR MAMMO BI INCL CAD: CPT | Performed by: RADIOLOGY

## 2021-09-30 NOTE — TELEPHONE ENCOUNTER
----- Message from DEIRDRE Anthony sent at 9/30/2021 11:34 AM EDT -----  Please advise patient degenerative changes seen at several disc spaces and joints.  Nothing unexpected.  Normal motion with flexion and extension.  We will follow with the plan as discussed at the office visit.

## 2021-10-01 ENCOUNTER — TELEPHONE (OUTPATIENT)
Dept: INTERNAL MEDICINE | Facility: CLINIC | Age: 86
End: 2021-10-01

## 2021-10-01 RX ORDER — HYDROCHLOROTHIAZIDE 12.5 MG/1
12.5 TABLET ORAL DAILY
Qty: 90 TABLET | Refills: 1 | Status: SHIPPED | OUTPATIENT
Start: 2021-10-01 | End: 2022-07-08

## 2021-10-01 NOTE — TELEPHONE ENCOUNTER
PATIENT IS CALLING TO GIVE HER MEDICATION LIST.    METFORMIN 500MG 2X DAY    METOPROLOL 50MG 2X DAY    CLOPIDOGREL 75MG 1X DAY    GLIPIZIDE 5MG 2X DAY    HYDRALAZINE 25MG 2X DAY    EZETIMIBE 10MG 1X DAY    AMLODIPINE 10MG 1X DAY    ALLOPURINOL 300MG 1X DAY    ATORVASTATIN 80MG 1X DAY    ASPIRIN 325MG

## 2021-10-01 NOTE — TELEPHONE ENCOUNTER
She needs to be on a diuretic like HCTZ 12.5 mg once daily #90x1.  Is very difficult to control significant hypertension without a diuretic.

## 2021-10-01 NOTE — TELEPHONE ENCOUNTER
"Pt is at having elevated BP during PT today at \"210/67\" & \"205/82\" & \"185/79\"  No chest pain, dizziness. Pt advised to go home & call us back to review med list. If she becomes symptomatic she is to go to ER.  "

## 2021-10-14 ENCOUNTER — TELEPHONE (OUTPATIENT)
Dept: INTERNAL MEDICINE | Facility: CLINIC | Age: 86
End: 2021-10-14

## 2021-10-14 NOTE — TELEPHONE ENCOUNTER
Melinda notified that we do not have any samples. She will try one other way, but knows to call the office if still having issues.

## 2021-10-14 NOTE — TELEPHONE ENCOUNTER
Caller: Melinda Ochoa    Relationship: Emergency Contact    Best call back number: [phone] (H)    What is the best time to reach you: ANY TIME     Who are you requesting to speak with (clinical staff, provider,  specific staff member): CLINICAL STAFF     What was the call regarding:     PATIENT'S DAUGHTER CALLED REQUESTING SAMPLES FOR JANUVIA 100 MG TABLETS.  PATIENT CANT AFFORD THESE, AND DOESN'T QUALIFY FOR THE DISCOUNTS THROUGH PHARMACY AND WE NORMALLY SET HER UP WITH SAMPLES. PATIENT HAS A WELLNESS VISIT ON 10/28/21 AND DAUGHTERS WANTING TO COME GRAB SOME IF SHE CAN IF WE HAVE THEM IN STOCK, EITHER TODAY, OR FIRST AVAILABLE, OR DURING THE APPOINTMENT ON THE 28 TH.     PLEASE CALL AND ADVISE MELINDA AT PHONE NUMBER ABOVE OR THROUGH HER JOB:[phone] (NEUROSURGERY WHERE MELINDA WORKS)    Do you require a callback: YES

## 2021-10-28 ENCOUNTER — OFFICE VISIT (OUTPATIENT)
Dept: INTERNAL MEDICINE | Facility: CLINIC | Age: 86
End: 2021-10-28

## 2021-10-28 VITALS
DIASTOLIC BLOOD PRESSURE: 72 MMHG | SYSTOLIC BLOOD PRESSURE: 142 MMHG | RESPIRATION RATE: 16 BRPM | HEIGHT: 60 IN | BODY MASS INDEX: 25.52 KG/M2 | TEMPERATURE: 97.1 F | HEART RATE: 86 BPM | OXYGEN SATURATION: 98 % | WEIGHT: 130 LBS

## 2021-10-28 DIAGNOSIS — I25.10 CORONARY ARTERY DISEASE INVOLVING NATIVE CORONARY ARTERY OF NATIVE HEART WITHOUT ANGINA PECTORIS: Chronic | ICD-10-CM

## 2021-10-28 DIAGNOSIS — E78.2 MIXED HYPERLIPIDEMIA: Chronic | ICD-10-CM

## 2021-10-28 DIAGNOSIS — I10 PRIMARY HYPERTENSION: Chronic | ICD-10-CM

## 2021-10-28 DIAGNOSIS — Z79.899 MEDICATION MANAGEMENT: ICD-10-CM

## 2021-10-28 DIAGNOSIS — E11.9 TYPE 2 DIABETES MELLITUS WITHOUT COMPLICATION, WITHOUT LONG-TERM CURRENT USE OF INSULIN (HCC): Chronic | ICD-10-CM

## 2021-10-28 DIAGNOSIS — Z23 NEED FOR VACCINATION: Primary | ICD-10-CM

## 2021-10-28 DIAGNOSIS — N18.32 STAGE 3B CHRONIC KIDNEY DISEASE (HCC): Chronic | ICD-10-CM

## 2021-10-28 LAB
POC CREATININE URINE: 50
POC MICROALBUMIN URINE: 80

## 2021-10-28 PROCEDURE — 90662 IIV NO PRSV INCREASED AG IM: CPT | Performed by: INTERNAL MEDICINE

## 2021-10-28 PROCEDURE — 1170F FXNL STATUS ASSESSED: CPT | Performed by: INTERNAL MEDICINE

## 2021-10-28 PROCEDURE — 1160F RVW MEDS BY RX/DR IN RCRD: CPT | Performed by: INTERNAL MEDICINE

## 2021-10-28 PROCEDURE — G0439 PPPS, SUBSEQ VISIT: HCPCS | Performed by: INTERNAL MEDICINE

## 2021-10-28 PROCEDURE — 99214 OFFICE O/P EST MOD 30 MIN: CPT | Performed by: INTERNAL MEDICINE

## 2021-10-28 PROCEDURE — 1126F AMNT PAIN NOTED NONE PRSNT: CPT | Performed by: INTERNAL MEDICINE

## 2021-10-28 PROCEDURE — G0008 ADMIN INFLUENZA VIRUS VAC: HCPCS | Performed by: INTERNAL MEDICINE

## 2021-10-28 PROCEDURE — 82044 UR ALBUMIN SEMIQUANTITATIVE: CPT | Performed by: INTERNAL MEDICINE

## 2021-10-28 NOTE — PROGRESS NOTES
Subjective   Tamie Peter is a 85 y.o. female.     Chief Complaint   Patient presents with   • Medicare Wellness-subsequent   • Hypertension   • Hyperlipidemia   • Diabetes   • Chronic Kidney Disease         Hypertension  This is a chronic problem. The current episode started more than 1 year ago. The problem is unchanged. The problem is controlled. Pertinent negatives include no chest pain, headaches, orthopnea, palpitations, peripheral edema (right foot in summer only) or shortness of breath. Hypertensive end-organ damage includes kidney disease and CAD/MI. There is no history of angina or heart failure.   Hyperlipidemia  This is a chronic problem. The current episode started more than 1 year ago. The problem is controlled. Recent lipid tests were reviewed and are normal. Pertinent negatives include no chest pain or shortness of breath.   Diabetes  She presents for her follow-up diabetic visit. She has type 2 diabetes mellitus. No MedicAlert identification noted. Her disease course has been stable. Pertinent negatives for hypoglycemia include no headaches. Pertinent negatives for diabetes include no chest pain. Symptoms are stable.   Chronic Kidney Disease  This is a chronic problem. The current episode started more than 1 year ago. The problem occurs constantly. The problem has been unchanged. Pertinent negatives include no chest pain or headaches.   Coronary Artery Disease  Presents for follow-up visit. Pertinent negatives include no chest pain, leg swelling, palpitations or shortness of breath. Risk factors include hyperlipidemia.   Sciatica  This is a recurrent problem. Pertinent negatives include no chest pain or headaches.        The following portions of the patient's history were reviewed and updated as appropriate: allergies, current medications, past social history and problem list.    Outpatient Medications Marked as Taking for the 10/28/21 encounter (Office Visit) with Mohinder Barbosa MD    Medication Sig Dispense Refill   • allopurinol (ZYLOPRIM) 300 MG tablet TAKE ONE TABLET BY MOUTH DAILY 90 tablet 1   • amLODIPine (NORVASC) 10 MG tablet Take 1 tablet by mouth Daily. 90 tablet 1   • aspirin 325 MG tablet Take 325 mg by mouth daily.     • atorvastatin (LIPITOR) 80 MG tablet TAKE ONE TABLET BY MOUTH DAILY 90 tablet 1   • clopidogrel (PLAVIX) 75 MG tablet TAKE ONE TABLET BY MOUTH DAILY 90 tablet 1   • Diclofenac Sodium (VOLTAREN) 1 % gel gel APPLY 4 GRAMS OF GEL TO AFFECTED AREA FOUR TIMES A DAY 30 g 1   • ezetimibe (ZETIA) 10 MG tablet TAKE ONE TABLET BY MOUTH DAILY 90 tablet 1   • glipizide (GLUCOTROL) 5 MG tablet Take 2 tablets by mouth Daily With Breakfast AND 1 tablet Every Evening. 270 tablet 1   • hydrALAZINE (APRESOLINE) 25 MG tablet TAKE ONE TABLET BY MOUTH TWICE A DAY 60 tablet 1   • hydroCHLOROthiazide (HYDRODIURIL) 12.5 MG tablet Take 1 tablet by mouth Daily. 90 tablet 1   • metFORMIN (GLUCOPHAGE) 500 MG tablet TAKE TWO TABLETS BY MOUTH TWICE A DAY WITH MEALS 360 tablet 1   • metoprolol tartrate (LOPRESSOR) 50 MG tablet TAKE TWO TABLETS BY MOUTH TWICE A  tablet 1       Review of Systems   Respiratory: Negative for shortness of breath and wheezing.    Cardiovascular: Negative for chest pain, palpitations, orthopnea and leg swelling.   Gastrointestinal: Negative for constipation and diarrhea.   Neurological: Negative for headaches.       Objective   Vitals:    10/28/21 0843   BP: 142/72   Pulse: 86   Resp: 16   Temp: 97.1 °F (36.2 °C)   SpO2: 98%          10/28/21  0843   Weight: 59 kg (130 lb)    [unfilled]  Body mass index is 25.39 kg/m².      Physical Exam   Constitutional: She appears well-developed.   Cardiovascular: Normal rate, regular rhythm and normal heart sounds. Exam reveals no gallop.   No murmur heard.  Pulmonary/Chest: Effort normal and breath sounds normal. No respiratory distress. She has no wheezes. She has no rales.   Abdominal: Normal appearance.    Tamie had  a diabetic foot exam performed today.   During the foot exam she had a monofilament test performed.    Neurological Sensory Findings -  Unaltered sharp/dull right ankle/foot discrimination and unaltered sharp/dull left ankle/foot discrimination. No right ankle/foot altered proprioception and no left ankle/foot altered proprioception  Vascular Status -  Her right foot exhibits normal foot vasculature  and no edema. Her left foot exhibits normal foot vasculature  and no edema.   Foot Structure and Biomechanics -   Her left foot exhibits hammer toes.  Neurological: She is alert.         Problems Addressed this Visit        Cardiac and Vasculature    CAD (coronary artery disease) (Chronic)    Hypertension (Chronic)    Hyperlipidemia (Chronic)       Endocrine and Metabolic    Diabetes mellitus (HCC) (Chronic)       Genitourinary and Reproductive     CKD (chronic kidney disease) stage 3, GFR 30-59 ml/min (CMS/HCC) (Chronic)      Other Visit Diagnoses     Need for vaccination    -  Primary    Relevant Orders    Fluzone High-Dose 65+yrs (1836-1415) (Completed)      Diagnoses       Codes Comments    Need for vaccination    -  Primary ICD-10-CM: Z23  ICD-9-CM: V05.9     Primary hypertension     ICD-10-CM: I10  ICD-9-CM: 401.9     Mixed hyperlipidemia     ICD-10-CM: E78.2  ICD-9-CM: 272.2     Coronary artery disease involving native coronary artery of native heart without angina pectoris     ICD-10-CM: I25.10  ICD-9-CM: 414.01     Type 2 diabetes mellitus without complication, without long-term current use of insulin (HCC)     ICD-10-CM: E11.9  ICD-9-CM: 250.00     Stage 3b chronic kidney disease (HCC)     ICD-10-CM: N18.32  ICD-9-CM: 585.3         Assessment/Plan   In for recheck of htn, lipids, DM, Ckd and CAD.  Pretty much asymptomatic at present.  Blood pressure control is OK.  Next step would be to increase hydralazine or switch to clonidine.  Could also go up on metoprolol.  Labs are pending today.  CAD is asymptomatic.   Annual labs today October 2021 including CBC, CMP, lipids, A1c, UA.  Gets Glu, a1c, lipids q 3 mos.  Annual wellness visit today October 2021. Eye check up this year was fantastic with Dr. Dow as well as cardiology visit.. Flu shot updated today.  Annual wellness performed today October 2021.  Urine microalbumin today.  She is 3 HPP today.    The above information was reviewed again today 10/28/21.  It continues to be accurate as reflected above and is unchanged.  History, physical and review of systems all reviewed and are unchanged.  Medications were reviewed today and continue the current dosing.    PPE today includes face mask and eye shield.         Dragon disclaimer:   Much of this encounter note is an electronic transcription/translation of spoken language to printed text. The electronic translation of spoken language may permit erroneous, or at times, nonsensical words or phrases to be inadvertently transcribed; Although I have reviewed the note for such errors, some may still exist.

## 2021-10-28 NOTE — PATIENT INSTRUCTIONS
Today's To Do List:  -Check if you are taking Fosamax 70 mg once a week.  -Complete Bone density test    Bone Density Test  A bone density test uses a type of X-ray to measure the amount of calcium and other minerals in a person's bones. It can measure bone density in the hip and the spine. The test is similar to having a regular X-ray. This test may also be called:  · Bone densitometry.  · Bone mineral density test.  · Dual-energy X-ray absorptiometry (DEXA).  You may have this test to:  · Diagnose a condition that causes weak or thin bones (osteoporosis).  · Screen you for osteoporosis.  · Predict your risk for a broken bone (fracture).  · Determine how well your osteoporosis treatment is working.  Tell a health care provider about:  · Any allergies you have.  · All medicines you are taking, including vitamins, herbs, eye drops, creams, and over-the-counter medicines.  · Any problems you or family members have had with anesthetic medicines.  · Any blood disorders you have.  · Any surgeries you have had.  · Any medical conditions you have.  · Whether you are pregnant or may be pregnant.  · Any medical tests you have had within the past 14 days that used contrast material.  What are the risks?  Generally, this is a safe test. However, it does expose you to a small amount of radiation, which can slightly increase your cancer risk.  What happens before the test?  · Do not take any calcium supplements within the 24 hours before your test.  · You will need to remove all metal jewelry, eyeglasses, removable dental appliances, and any other metal objects on your body.  What happens during the test?    · You will lie down on an exam table. There will be an X-ray generator below you and an imaging device above you.  · Other devices, such as boxes or braces, may be used to position your body properly for the scan.  · The machine will slowly scan your body. You will need to keep very still while the machine does the  scan.  · The images will show up on a screen in the room. Images will be examined by a specialist after your test is finished.  The procedure may vary among health care providers and hospitals.  What can I expect after the test?  It is up to you to get the results of your test. Ask your health care provider, or the department that is doing the test, when your results will be ready.  Summary  · A bone density test is an imaging test that uses a type of X-ray to measure the amount of calcium and other minerals in your bones.  · The test may be used to diagnose or screen you for a condition that causes weak or thin bones (osteoporosis), predict your risk for a broken bone (fracture), or determine how well your osteoporosis treatment is working.  · Do not take any calcium supplements within 24 hours before your test.  · Ask your health care provider, or the department that is doing the test, when your results will be ready.  This information is not intended to replace advice given to you by your health care provider. Make sure you discuss any questions you have with your health care provider.  Document Revised: 06/03/2021 Document Reviewed: 06/03/2021  [employer] Patient Education © 2021 [employer]  Influenza (Flu) Vaccine (Inactivated or Recombinant): What You Need to Know  1. Why get vaccinated?  Influenza vaccine can prevent influenza (flu).  Flu is a contagious disease that spreads around the [place] every year, usually between October and May. Anyone can get the flu, but it is more dangerous for some people. Infants and young children, people 65 years of age and older, pregnant women, and people with certain health conditions or a weakened immune system are at greatest risk of flu complications.  Pneumonia, bronchitis, sinus infections and ear infections are examples of flu-related complications. If you have a medical condition, such as heart disease, cancer or diabetes, flu can make it worse.  Flu can cause  fever and chills, sore throat, muscle aches, fatigue, cough, headache, and runny or stuffy nose. Some people may have vomiting and diarrhea, though this is more common in children than adults.  Each year thousands of people in the [place] die from flu, and many more are hospitalized. Flu vaccine prevents millions of illnesses and flu-related visits to the doctor each year.  2. Influenza vaccine  CDC recommends everyone 6 months of age and older get vaccinated every flu season. Children 6 months through 8 years of age may need 2 doses during a single flu season. Everyone else needs only 1 dose each flu season.  It takes about 2 weeks for protection to develop after vaccination.  There are many flu viruses, and they are always changing. Each year a new flu vaccine is made to protect against three or four viruses that are likely to cause disease in the upcoming flu season. Even when the vaccine doesn't exactly match these viruses, it may still provide some protection.  Influenza vaccine does not cause flu.  Influenza vaccine may be given at the same time as other vaccines.  3. Talk with your health care provider  Tell your vaccine provider if the person getting the vaccine:  · Has had an allergic reaction after a previous dose of influenza vaccine, or has any severe, life-threatening allergies.  · Has ever had Guillain-Barré Syndrome (also called GBS).  In some cases, your health care provider may decide to postpone influenza vaccination to a future visit.  People with minor illnesses, such as a cold, may be vaccinated. People who are moderately or severely ill should usually wait until they recover before getting influenza vaccine.  Your health care provider can give you more information.  4. Risks of a vaccine reaction  · Soreness, redness, and swelling where shot is given, fever, muscle aches, and headache can happen after influenza vaccine.  · There may be a very small increased risk of Guillain-Barré  Syndrome (GBS) after inactivated influenza vaccine (the flu shot).  Young children who get the flu shot along with pneumococcal vaccine (PCV13), and/or DTaP vaccine at the same time might be slightly more likely to have a seizure caused by fever. Tell your health care provider if a child who is getting flu vaccine has ever had a seizure.  People sometimes faint after medical procedures, including vaccination. Tell your provider if you feel dizzy or have vision changes or ringing in the ears.  As with any medicine, there is a very remote chance of a vaccine causing a severe allergic reaction, other serious injury, or death.  5. What if there is a serious problem?  An allergic reaction could occur after the vaccinated person leaves the clinic. If you see signs of a severe allergic reaction (hives, swelling of the face and throat, difficulty breathing, a fast heartbeat, dizziness, or weakness), call 9-1-1 and get the person to the nearest hospital.  For other signs that concern you, call your health care provider.  Adverse reactions should be reported to the Vaccine Adverse Event Reporting System (VAERS). Your health care provider will usually file this report, or you can do it yourself. Visit the VAERS website at www.vaers.hhs.gov or call 1-500.614.1487. VAERS is only for reporting reactions, and VAERS staff do not give medical advice.  6. The National Vaccine Injury Compensation Program  The National Vaccine Injury Compensation Program (VICP) is a federal program that was created to compensate people who may have been injured by certain vaccines. Visit the VICP website at www.hrsa.gov/vaccinecompensation or call 1-357.239.9708 to learn about the program and about filing a claim. There is a time limit to file a claim for compensation.  7. How can I learn more?  · Ask your healthcare provider.  · Call your local or state health department.  · Contact the Centers for Disease Control and Prevention (CDC):  ? Call  6-204-895-6373 (3-450-BYO-INFO) or  ? Visit CDC's www.cdc.gov/flu  Vaccine Information Statement (Interim) Inactivated Influenza Vaccine (8/15/2019)  This information is not intended to replace advice given to you by your health care provider. Make sure you discuss any questions you have with your health care provider.  Document Revised: 12/09/2020 Document Reviewed: 12/09/2020  [employer] Patient Education © 2021 [employer]  Fall Prevention in the Home, Adult  Falls can cause injuries and can affect people from all age groups. There are many simple things that you can do to make your home safe and to help prevent falls. Ask for help when making these changes, if needed.  What actions can I take to prevent falls?  General instructions  · Use good lighting in all rooms. Replace any light bulbs that burn out.  · Turn on lights if it is dark. Use night-lights.  · Place frequently used items in easy-to-reach places. Lower the shelves around your home if necessary.  · Set up furniture so that there are clear paths around it. Avoid moving your furniture around.  · Remove throw rugs and other tripping hazards from the floor.  · Avoid walking on wet floors.  · Fix any uneven floor surfaces.  · Add color or contrast paint or tape to grab bars and handrails in your home. Place contrasting color strips on the first and last steps of stairways.  · When you use a stepladder, make sure that it is completely opened and that the sides are firmly locked. Have someone hold the ladder while you are using it. Do not climb a closed stepladder.  · Be aware of any and all pets.  What can I do in the bathroom?         · Keep the floor dry. Immediately clean up any water that spills onto the floor.  · Remove soap buildup in the tub or shower on a regular basis.  · Use non-skid mats or decals on the floor of the tub or shower.  · Attach bath mats securely with double-sided, non-slip rug tape.  · If you need to sit down while you are in the  shower, use a plastic, non-slip stool.  · Install grab bars by the toilet and in the tub and shower. Do not use towel bars as grab bars.  What can I do in the bedroom?  · Make sure that a bedside light is easy to reach.  · Do not use oversized bedding that drapes onto the floor.  · Have a firm chair that has side arms to use for getting dressed.  What can I do in the kitchen?  · Clean up any spills right away.  · If you need to reach for something above you, use a sturdy step stool that has a grab bar.  · Keep electrical cables out of the way.  · Do not use floor polish or wax that makes floors slippery. If you must use wax, make sure that it is non-skid floor wax.  What can I do in the stairways?  · Do not leave any items on the stairs.  · Make sure that you have a light switch at the top of the stairs and the bottom of the stairs. Have them installed if you do not have them.  · Make sure that there are handrails on both sides of the stairs. Fix handrails that are broken or loose. Make sure that handrails are as long as the stairways.  · Install non-slip stair treads on all stairs in your home.  · Avoid having throw rugs at the top or bottom of stairways, or secure the rugs with carpet tape to prevent them from moving.  · Choose a carpet design that does not hide the edge of steps on the stairway.  · Check any carpeting to make sure that it is firmly attached to the stairs. Fix any carpet that is loose or worn.  What can I do on the outside of my home?  · Use bright outdoor lighting.  · Regularly repair the edges of walkways and driveways and fix any cracks.  · Remove high doorway thresholds.  · Trim any shrubbery on the main path into your home.  · Regularly check that handrails are securely fastened and in good repair. Both sides of any steps should have handrails.  · Install guardrails along the edges of any raised decks or porches.  · Clear walkways of debris and clutter, including tools and rocks.  · Have  leaves, snow, and ice cleared regularly.  · Use sand or salt on walkways during winter months.  · In the garage, clean up any spills right away, including grease or oil spills.  What other actions can I take?  · Wear closed-toe shoes that fit well and support your feet. Wear shoes that have rubber soles or low heels.  · Use mobility aids as needed, such as canes, walkers, scooters, and crutches.  · Review your medicines with your health care provider. Some medicines can cause dizziness or changes in blood pressure, which increase your risk of falling.  Talk with your health care provider about other ways that you can decrease your risk of falls. This may include working with a physical therapist or  to improve your strength, balance, and endurance.  Where to find more information  · Centers for Disease Control and Prevention, [place]: https://www.cdc.gov  · National [place] on Aging: https://yh1xbxa.cassidy.nih.gov  Contact a health care provider if:  · You are afraid of falling at home.  · You feel weak, drowsy, or dizzy at home.  · You fall at home.  Summary  · There are many simple things that you can do to make your home safe and to help prevent falls.  · Ways to make your home safe include removing tripping hazards and installing grab bars in the bathroom.  · Ask for help when making these changes in your home.  This information is not intended to replace advice given to you by your health care provider. Make sure you discuss any questions you have with your health care provider.  Document Revised: 11/30/2018 Document Reviewed: 08/02/2018  [employer] Patient Education © 2021 [employer]  Exercising to Stay Healthy  To become healthy and stay healthy, it is recommended that you do moderate-intensity and vigorous-intensity exercise. You can tell that you are exercising at a moderate intensity if your heart starts beating faster and you start breathing faster but can still hold a conversation. You can tell  that you are exercising at a vigorous intensity if you are breathing much harder and faster and cannot hold a conversation while exercising.  Exercising regularly is important. It has many health benefits, such as:  · Improving overall fitness, flexibility, and endurance.  · Increasing bone density.  · Helping with weight control.  · Decreasing body fat.  · Increasing muscle strength.  · Reducing stress and tension.  · Improving overall health.  How often should I exercise?  Choose an activity that you enjoy, and set realistic goals. Your health care provider can help you make an activity plan that works for you.  Exercise regularly as told by your health care provider. This may include:  · Doing strength training two times a week, such as:  ? Lifting weights.  ? Using resistance bands.  ? Push-ups.  ? Sit-ups.  ? Yoga.  · Doing a certain intensity of exercise for a given amount of time. Choose from these options:  ? A total of 150 minutes of moderate-intensity exercise every week.  ? A total of 75 minutes of vigorous-intensity exercise every week.  ? A mix of moderate-intensity and vigorous-intensity exercise every week.  Children, pregnant women, people who have not exercised regularly, people who are overweight, and older adults may need to talk with a health care provider about what activities are safe to do. If you have a medical condition, be sure to talk with your health care provider before you start a new exercise program.  What are some exercise ideas?  Moderate-intensity exercise ideas include:  · Walking 1 mile (1.6 km) in about 15 minutes.  · Biking.  · Hiking.  · Golfing.  · Dancing.  · Water aerobics.  Vigorous-intensity exercise ideas include:  · Walking 4.5 miles (7.2 km) or more in about 1 hour.  · Jogging or running 5 miles (8 km) in about 1 hour.  · Biking 10 miles (16.1 km) or more in about 1 hour.  · Lap swimming.  · Roller-skating or in-line skating.  · Cross-country skiing.  · Vigorous  competitive sports, such as football, basketball, and soccer.  · Jumping rope.  · Aerobic dancing.  What are some everyday activities that can help me to get exercise?  · Yard work, such as:  ? Pushing a .  ? Raking and bagging leaves.  · Washing your car.  · Pushing a stroller.  · Shoveling snow.  · Gardening.  · Washing windows or floors.  How can I be more active in my day-to-day activities?  · Use stairs instead of an elevator.  · Take a walk during your lunch break.  · If you drive, park your car farther away from your work or school.  · If you take public transportation, get off one stop early and walk the rest of the way.  · Stand up or walk around during all of your indoor phone calls.  · Get up, stretch, and walk around every 30 minutes throughout the day.  · Enjoy exercise with a friend. Support to continue exercising will help you keep a regular routine of activity.  What guidelines can I follow while exercising?  · Before you start a new exercise program, talk with your health care provider.  · Do not exercise so much that you hurt yourself, feel dizzy, or get very short of breath.  · Wear comfortable clothes and wear shoes with good support.  · Drink plenty of water while you exercise to prevent dehydration or heat stroke.  · Work out until your breathing and your heartbeat get faster.  Where to find more information  · [place] Department of Health and Human Services: www.hhs.gov  · Centers for Disease Control and Prevention (CDC): www.cdc.gov  Summary  · Exercising regularly is important. It will improve your overall fitness, flexibility, and endurance.  · Regular exercise also will improve your overall health. It can help you control your weight, reduce stress, and improve your bone density.  · Do not exercise so much that you hurt yourself, feel dizzy, or get very short of breath.  · Before you start a new exercise program, talk with your health care provider.  This information is not intended  to replace advice given to you by your health care provider. Make sure you discuss any questions you have with your health care provider.  Document Revised: 11/30/2018 Document Reviewed: 11/08/2018  [employer] Patient Education © 2021 [employer]  Calorie Counting for Weight Loss  Calories are units of energy. Your body needs a certain number of calories from food to keep going throughout the day. When you eat or drink more calories than your body needs, your body stores the extra calories mostly as fat. When you eat or drink fewer calories than your body needs, your body burns fat to get the energy it needs.  Calorie counting means keeping track of how many calories you eat and drink each day. Calorie counting can be helpful if you need to lose weight. If you eat fewer calories than your body needs, you should lose weight. Ask your health care provider what a healthy weight is for you.  For calorie counting to work, you will need to eat the right number of calories each day to lose a healthy amount of weight per week. A dietitian can help you figure out how many calories you need in a day and will suggest ways to reach your calorie goal.  · A healthy amount of weight to lose each week is usually 1-2 lb (0.5-0.9 kg). This usually means that your daily calorie intake should be reduced by 500-750 calories.  · Eating 1,200-1,500 calories a day can help most women lose weight.  · Eating 1,500-1,800 calories a day can help most men lose weight.  What do I need to know about calorie counting?  Work with your health care provider or dietitian to determine how many calories you should get each day. To meet your daily calorie goal, you will need to:  · Find out how many calories are in each food that you would like to eat. Try to do this before you eat.  · Decide how much of the food you plan to eat.  · Keep a food log. Do this by writing down what you ate and how many calories it had.  To successfully lose weight, it is  important to balance calorie counting with a healthy lifestyle that includes regular activity.  Where do I find calorie information?    The number of calories in a food can be found on a Nutrition Facts label. If a food does not have a Nutrition Facts label, try to look up the calories online or ask your dietitian for help.  Remember that calories are listed per serving. If you choose to have more than one serving of a food, you will have to multiply the calories per serving by the number of servings you plan to eat. For example, the label on a package of bread might say that a serving size is 1 slice and that there are 90 calories in a serving. If you eat 1 slice, you will have eaten 90 calories. If you eat 2 slices, you will have eaten 180 calories.  How do I keep a food log?  After each time that you eat, record the following in your food log as soon as possible:  · What you ate. Be sure to include toppings, sauces, and other extras on the food.  · How much you ate. This can be measured in cups, ounces, or number of items.  · How many calories were in each food and drink.  · The total number of calories in the food you ate.  Keep your food log near you, such as in a pocket-sized notebook or on an esteban or website on your mobile phone. Some programs will calculate calories for you and show you how many calories you have left to meet your daily goal.  What are some portion-control tips?  · Know how many calories are in a serving. This will help you know how many servings you can have of a certain food.  · Use a measuring cup to measure serving sizes. You could also try weighing out portions on a kitchen scale. With time, you will be able to estimate serving sizes for some foods.  · Take time to put servings of different foods on your favorite plates or in your favorite bowls and cups so you know what a serving looks like.  · Try not to eat straight from a food's packaging, such as from a bag or box. Eating straight  from the package makes it hard to see how much you are eating and can lead to overeating. Put the amount you would like to eat in a cup or on a plate to make sure you are eating the right portion.  · Use smaller plates, glasses, and bowls for smaller portions and to prevent overeating.  · Try not to multitask. For example, avoid watching TV or using your computer while eating. If it is time to eat, sit down at a table and enjoy your food. This will help you recognize when you are full. It will also help you be more mindful of what and how much you are eating.  What are tips for following this plan?  Reading food labels  · Check the calorie count compared with the serving size. The serving size may be smaller than what you are used to eating.  · Check the source of the calories. Try to choose foods that are high in protein, fiber, and vitamins, and low in saturated fat, trans fat, and sodium.  Shopping  · Read nutrition labels while you shop. This will help you make healthy decisions about which foods to buy.  · Pay attention to nutrition labels for low-fat or fat-free foods. These foods sometimes have the same number of calories or more calories than the full-fat versions. They also often have added sugar, starch, or salt to make up for flavor that was removed with the fat.  · Make a grocery list of lower-calorie foods and stick to it.  Cooking  · Try to cook your favorite foods in a healthier way. For example, try baking instead of frying.  · Use low-fat dairy products.  Meal planning  · Use more fruits and vegetables. One-half of your plate should be fruits and vegetables.  · Include lean proteins, such as chicken, turkey, and fish.  Lifestyle  Each week, aim to do one of the following:  · 150 minutes of moderate exercise, such as walking.  · 75 minutes of vigorous exercise, such as running.  General information  · Know how many calories are in the foods you eat most often. This will help you calculate calorie  counts faster.  · Find a way of tracking calories that works for you. Get creative. Try different apps or programs if writing down calories does not work for you.  What foods should I eat?    · Eat nutritious foods. It is better to have a nutritious, high-calorie food, such as an avocado, than a food with few nutrients, such as a bag of potato chips.  · Use your calories on foods and drinks that will fill you up and will not leave you hungry soon after eating.  ? Examples of foods that fill you up are nuts and nut butters, vegetables, lean proteins, and high-fiber foods such as whole grains. High-fiber foods are foods with more than 5 g of fiber per serving.  · Pay attention to calories in drinks. Low-calorie drinks include water and unsweetened drinks.  The items listed above may not be a complete list of foods and beverages you can eat. Contact a dietitian for more information.  What foods should I limit?  Limit foods or drinks that are not good sources of vitamins, minerals, or protein or that are high in unhealthy fats. These include:  · Candy.  · Other sweets.  · Sodas, specialty coffee drinks, alcohol, and juice.  The items listed above may not be a complete list of foods and beverages you should avoid. Contact a dietitian for more information.  How do I count calories when eating out?  · Pay attention to portions. Often, portions are much larger when eating out. Try these tips to keep portions smaller:  ? Consider sharing a meal instead of getting your own.  ? If you get your own meal, eat only half of it. Before you start eating, ask for a container and put half of your meal into it.  ? When available, consider ordering smaller portions from the menu instead of full portions.  · Pay attention to your food and drink choices. Knowing the way food is cooked and what is included with the meal can help you eat fewer calories.  ? If calories are listed on the menu, choose the lower-calorie options.  ? Choose  dishes that include vegetables, fruits, whole grains, low-fat dairy products, and lean proteins.  ? Choose items that are boiled, broiled, grilled, or steamed. Avoid items that are buttered, battered, fried, or served with cream sauce. Items labeled as crispy are usually fried, unless stated otherwise.  ? Choose water, low-fat milk, unsweetened iced tea, or other drinks without added sugar. If you want an alcoholic beverage, choose a lower-calorie option, such as a glass of wine or light beer.  ? Ask for dressings, sauces, and syrups on the side. These are usually high in calories, so you should limit the amount you eat.  ? If you want a salad, choose a garden salad and ask for grilled meats. Avoid extra toppings such as rose, cheese, or fried items. Ask for the dressing on the side, or ask for olive oil and vinegar or lemon to use as dressing.  · Estimate how many servings of a food you are given. Knowing serving sizes will help you be aware of how much food you are eating at restaurants.  Where to find more information  · Centers for Disease Control and Prevention: www.cdc.gov  · [place] Department of Agriculture: myplate.gov  Summary  · Calorie counting means keeping track of how many calories you eat and drink each day. If you eat fewer calories than your body needs, you should lose weight.  · A healthy amount of weight to lose per week is usually 1-2 lb (0.5-0.9 kg). This usually means reducing your daily calorie intake by 500-750 calories.  · The number of calories in a food can be found on a Nutrition Facts label. If a food does not have a Nutrition Facts label, try to look up the calories online or ask your dietitian for help.  · Use smaller plates, glasses, and bowls for smaller portions and to prevent overeating.  · Use your calories on foods and drinks that will fill you up and not leave you hungry shortly after a meal.  This information is not intended to replace advice given to you by your health care  "provider. Make sure you discuss any questions you have with your health care provider.  Document Revised: 01/28/2021 Document Reviewed: 01/28/2021  [employer] Patient Education © 2021 [employer]  BMI for Adults  What is BMI?  Body mass index (BMI) is a number that is calculated from a person's weight and height. BMI can help estimate how much of a person's weight is composed of fat. BMI does not measure body fat directly. Rather, it is an alternative to procedures that directly measure body fat, which can be difficult and expensive.  BMI can help identify people who may be at higher risk for certain medical problems.  What are BMI measurements used for?  BMI is used as a screening tool to identify possible weight problems. It helps determine whether a person is obese, overweight, a healthy weight, or underweight.  BMI is useful for:  · Identifying a weight problem that may be related to a medical condition or may increase the risk for medical problems.  · Promoting changes, such as changes in diet and exercise, to help reach a healthy weight. BMI screening can be repeated to see if these changes are working.  How is BMI calculated?  BMI involves measuring your weight in relation to your height. Both height and weight are measured, and the BMI is calculated from those numbers. This can be done either in English ([place] or metric measurements. Note that charts and online BMI calculators are available to help you find your BMI quickly and easily without having to do these calculations yourself.  To calculate your BMI in English ([place] measurements:    1. Measure your weight in pounds (lb).  2. Multiply the number of pounds by 703.  ? For example, for a person who weighs 180 lb, multiply that number by 703, which equals 126,540.  3. Measure your height in inches. Then multiply that number by itself to get a measurement called \"inches squared.\"  ? For example, for a person who is 70 inches tall, the \"inches squared\" " "measurement is 70 inches x 70 inches, which equals 4,900 inches squared.  4. Divide the total from step 2 (number of lb x 703) by the total from step 3 (inches squared): 126,540 ÷ 4,900 = 25.8. This is your BMI.    To calculate your BMI in metric measurements:  1. Measure your weight in kilograms (kg).  2. Measure your height in meters (m). Then multiply that number by itself to get a measurement called \"meters squared.\"  ? For example, for a person who is 1.75 m tall, the \"meters squared\" measurement is 1.75 m x 1.75 m, which is equal to 3.1 meters squared.  3. Divide the number of kilograms (your weight) by the meters squared number. In this example: 70 ÷ 3.1 = 22.6. This is your BMI.  What do the results mean?  BMI charts are used to identify whether you are underweight, normal weight, overweight, or obese. The following guidelines will be used:  · Underweight: BMI less than 18.5.  · Normal weight: BMI between 18.5 and 24.9.  · Overweight: BMI between 25 and 29.9.  · Obese: BMI of 30 or above.  Keep these notes in mind:  · Weight includes both fat and muscle, so someone with a muscular build, such as an athlete, may have a BMI that is higher than 24.9. In cases like these, BMI is not an accurate measure of body fat.  · To determine if excess body fat is the cause of a BMI of 25 or higher, further assessments may need to be done by a health care provider.  · BMI is usually interpreted in the same way for men and women.  Where to find more information  For more information about BMI, including tools to quickly calculate your BMI, go to these websites:  · Centers for Disease Control and Prevention: www.cdc.gov  · American Heart Association: www.heart.org  · National Heart, Lung, and Blood [place]: www.nhlbi.nih.gov  Summary  · Body mass index (BMI) is a number that is calculated from a person's weight and height.  · BMI may help estimate how much of a person's weight is composed of fat. BMI can help identify those " who may be at higher risk for certain medical problems.  · BMI can be measured using English measurements or metric measurements.  · BMI charts are used to identify whether you are underweight, normal weight, overweight, or obese.  This information is not intended to replace advice given to you by your health care provider. Make sure you discuss any questions you have with your health care provider.  Document Revised: 09/09/2020 Document Reviewed: 07/17/2020  Elsevier Patient Education © 2021 [employer]  Aspirin and Your Heart  Aspirin is a medicine that prevents the platelets in your blood from sticking together. Platelets are the cells that your blood uses for clotting. Aspirin can be used to help reduce the risk of blood clots, heart attacks, and other heart-related problems.  What are the risks?  Daily use of aspirin can cause side effects. Some of these include:  · Bleeding. Bleeding can be minor or serious. An example of minor bleeding is bleeding from a cut, and the bleeding does not stop. An example of more serious bleeding is stomach bleeding or, rarely, bleeding into the brain. Your risk of bleeding increases if you are also taking NSAIDs, such as ibuprofen.  · Increased bruising.  · Upset stomach.  · An allergic reaction. People who have growths inside the nose (nasal polyps) have an increased risk of developing an aspirin allergy.  How to use aspirin to care for your heart    · Take aspirin only as told by your health care provider. Make sure that you understand how much to take and what form to take. The two forms of aspirin are:  ? Non-enteric-coated.This type of aspirin does not have a coating and is absorbed quickly. This type of aspirin also comes in a chewable form.  ? Enteric-coated. This type of aspirin has a coating that releases the medicine very slowly. Enteric-coated aspirin might cause less stomach upset than non-enteric-coated aspirin. This type of aspirin should not be chewed or  crushed.  · Work with your health care provider to find out whether it is safe and beneficial for you to take aspirin daily. Taking aspirin daily may be helpful if:  ? You have had a heart attack or chest pain, or you are at risk for a heart attack.  ? You have a condition in which certain heart vessels are blocked (coronary artery disease), and you have had a procedure to treat it. Examples are:  § Open-heart surgery, such as coronary artery bypass surgery (CABG).  § Coronary angioplasty,which is done to widen a blood vessel of your heart.  § Having a small mesh tube, or stent, placed in your coronary artery.  ? You have had certain types of stroke or a mini-stroke known as a transient ischemic attack (TIA).  ? You have a narrowing of the arteries that supply the limbs (peripheral artery disease, or PAD).  ? You have long-term (chronic) heart rhythm problems, such as atrial fibrillation, and your health care provider thinks aspirin may help.  ? You have valve disease or have had surgery on a valve.  ? You are considered at increased risk of developing coronary artery disease or PAD.  Follow these instructions at home  Medicines  · Take over-the-counter and prescription medicines only as told by your health care provider.  · If you are taking blood thinners:  ? Talk with your health care provider before you take any medicines that contain aspirin or NSAIDs, such as ibuprofen. These medicines increase your risk for dangerous bleeding.  ? Take your medicine exactly as told, at the same time every day.  ? Avoid activities that could cause injury or bruising, and follow instructions about how to prevent falls.  ? Wear a medical alert bracelet or carry a card that lists what medicines you take.  General instructions  · Do not drink alcohol if:  ? Your health care provider tells you not to drink.  ? You are pregnant, may be pregnant, or are planning to become pregnant.  · If you drink alcohol:  ? Limit how much you use  "to:  § 0-1 drink a day for women.  § 0-2 drinks a day for men.  ? Be aware of how much alcohol is in your drink. In the [place] one drink equals one 12 oz bottle of beer (355 mL), one 5 oz glass of wine (148 mL), or one 1½ oz glass of hard liquor (44 mL).  · Keep all follow-up visits as told by your health care provider. This is important.  Where to find more information  · The American Heart Association: www.heart.org  Contact a health care provider if you have:  · Unusual bleeding or bruising.  · Stomach pain or nausea.  · Ringing in your ears.  · An allergic reaction that causes hives, itchy skin, or swelling of the lips, tongue, or face.  Get help right away if:  · You notice that your bowel movements are bloody, or dark red or black in color.  · You vomit or cough up blood.  · You have blood in your urine.  · You cough, breathe loudly (wheeze), or feel short of breath.  · You have chest pain, especially if the pain spreads to your arms, back, neck, or jaw.  · You have a headache with confusion.  You have any symptoms of a stroke. \"BE FAST\" is an easy way to remember the main warning signs of a stroke:  · B - Balance. Signs are dizziness, sudden trouble walking, or loss of balance.  · E - Eyes. Signs are trouble seeing or a sudden change in vision.  · F - Face. Signs are sudden weakness or numbness of the face, or the face or eyelid drooping on one side.  · A - Arms. Signs are weakness or numbness in an arm. This happens suddenly and usually on one side of the body.  · S - Speech. Signs are sudden trouble speaking, slurred speech, or trouble understanding what people say.  · T - Time. Time to call emergency services. Write down what time symptoms started.  You have other signs of a stroke, such as:  · A sudden, severe headache with no known cause.  · Nausea or vomiting.  · Seizure.  These symptoms may represent a serious problem that is an emergency. Do not wait to see if the symptoms will go away. Get medical " help right away. Call your local emergency services (911 in the [place] Do not drive yourself to the hospital.  Summary  · Aspirin use can help reduce the risk of blood clots, heart attacks, and other heart-related problems.  · Daily use of aspirin can cause side effects.  · Take aspirin only as told by your health care provider. Make sure that you understand how much to take and what form to take.  · Your health care provider will help you determine whether it is safe and beneficial for you to take aspirin daily.  This information is not intended to replace advice given to you by your health care provider. Make sure you discuss any questions you have with your health care provider.  Document Revised: 09/21/2020 Document Reviewed: 09/21/2020  [employer] Patient Education © 2021 [employer]    Advance Care Planning and Advance Directives     You make decisions on a daily basis - decisions about where you want to live, your career, your home, your life. Perhaps one of the most important decisions you face is your choice for future medical care. Take time to talk with your family and your healthcare team and start planning today.  Advance Care Planning is a process that can help you:  · Understand possible future healthcare decisions in light of your own experiences  · Reflect on those decision in light of your goals and values  · Discuss your decisions with those closest to you and the healthcare professionals that care for you  · Make a plan by creating a document that reflects your wishes    Surrogate Decision Maker  In the event of a medical emergency, which has left you unable to communicate or to make your own decisions, you would need someone to make decisions for you.  It is important to discuss your preferences for medical treatment with this person while you are in good health.     Qualities of a surrogate decision maker:  • Willing to take on this role and responsibility  • Knows what you want for future  medical care  • Willing to follow your wishes even if they don't agree with them  • Able to make difficult medical decisions under stressful circumstances    Advance Directives  These are legal documents you can create that will guide your healthcare team and decision maker(s) when needed. These documents can be stored in the electronic medical record.    · Living Will - a legal document to guide your care if you have a terminal condition or a serious illness and are unable to communicate. States vary by statute in document names/types, but most forms may include one or more of the following:        -  Directions regarding life-prolonging treatments        -  Directions regarding artificially provided nutrition/hydration        -  Choosing a healthcare decision maker        -  Direction regarding organ/tissue donation    · Durable Power of  for Healthcare - this document names an -in-fact to make medical decisions for you, but it may also allow this person to make personal and financial decisions for you. Please seek the advice of an  if you need this type of document.    **Advance Directives are not required and no one may discriminate against you if you do not sign one.    Medical Orders  Many states allow specific forms/orders signed by your physician to record your wishes for medical treatment in your current state of health. This form, signed in personal communication with your physician, addresses resuscitation and other medical interventions that you may or may not want.      For more information or to schedule a time with a [facility] Advance Care Planning Facilitator contact: [email or web address] or call [phone] and someone will contact you directly.

## 2021-10-28 NOTE — PROGRESS NOTES
The ABCs of the Annual Wellness Visit  Subsequent Medicare Wellness Visit    Chief Complaint   Patient presents with   • Medicare Wellness-subsequent   • Hypertension   • Hyperlipidemia   • Diabetes   • Chronic Kidney Disease      Subjective    History of Present Illness:  Tamie Peter is a 85 y.o. female who presents for a Subsequent Medicare Wellness Visit.    The following portions of the patient's history were reviewed and   updated as appropriate: allergies, current medications, past family history, past medical history, past social history, past surgical history and problem list.    Compared to one year ago, the patient feels her physical   health is the same.    Compared to one year ago, the patient feels her mental   health is the same.    Recent Hospitalizations:  She was not admitted to the hospital during the last year.       Current Medical Providers:  Patient Care Team:  Mohinder Barbosa MD as PCP - General (Internal Medicine)  Mohinder Barbosa MD as PCP - Internal Medicine (Internal Medicine)  Doc Parekh MD as Consulting Physician (Cardiology)    Outpatient Medications Prior to Visit   Medication Sig Dispense Refill   • allopurinol (ZYLOPRIM) 300 MG tablet TAKE ONE TABLET BY MOUTH DAILY 90 tablet 1   • amLODIPine (NORVASC) 10 MG tablet Take 1 tablet by mouth Daily. 90 tablet 1   • aspirin 325 MG tablet Take 325 mg by mouth daily.     • atorvastatin (LIPITOR) 80 MG tablet TAKE ONE TABLET BY MOUTH DAILY 90 tablet 1   • clopidogrel (PLAVIX) 75 MG tablet TAKE ONE TABLET BY MOUTH DAILY 90 tablet 1   • Diclofenac Sodium (VOLTAREN) 1 % gel gel APPLY 4 GRAMS OF GEL TO AFFECTED AREA FOUR TIMES A DAY 30 g 1   • ezetimibe (ZETIA) 10 MG tablet TAKE ONE TABLET BY MOUTH DAILY 90 tablet 1   • glipizide (GLUCOTROL) 5 MG tablet Take 2 tablets by mouth Daily With Breakfast AND 1 tablet Every Evening. 270 tablet 1   • hydrALAZINE (APRESOLINE) 25 MG tablet TAKE ONE TABLET BY MOUTH TWICE A DAY 60 tablet 1   •  "hydroCHLOROthiazide (HYDRODIURIL) 12.5 MG tablet Take 1 tablet by mouth Daily. 90 tablet 1   • metFORMIN (GLUCOPHAGE) 500 MG tablet TAKE TWO TABLETS BY MOUTH TWICE A DAY WITH MEALS 360 tablet 1   • metoprolol tartrate (LOPRESSOR) 50 MG tablet TAKE TWO TABLETS BY MOUTH TWICE A  tablet 1   • alendronate (FOSAMAX) 70 MG tablet Take 70 mg by mouth 1 (One) Time Per Week.       No facility-administered medications prior to visit.       No opioid medication identified on active medication list. I have reviewed chart for other potential  high risk medication/s and harmful drug interactions in the elderly.          Aspirin is on active medication list. Aspirin use is indicated based on review of current medical condition/s. Pros and cons of this therapy have been discussed today. Benefits of this medication outweigh potential harm.  Patient has been encouraged to continue taking this medication.  .      Patient Active Problem List   Diagnosis   • CAD (coronary artery disease)   • Hypertension   • GERD (gastroesophageal reflux disease)   • Diabetes mellitus ([facility])   • CKD (chronic kidney disease) stage 3, GFR 30-59 ml/min (CMS/[facility])   • Gout   • Hyperlipidemia   • PUD (peptic ulcer disease)   • Sick sinus syndrome ([facility])   • Disorder of rotator cuff   • Osteoporosis   • Pacemaker   • Acute left-sided low back pain with left-sided sciatica     Advance Care Planning  Advance Directive is not on file.  ACP discussion was held with the patient during this visit. Patient has an advance directive (not in EMR), copy requested.          Objective    Vitals:    10/28/21 0843   BP: 142/72   Pulse: 86   Resp: 16   Temp: 97.1 °F (36.2 °C)   TempSrc: Infrared   SpO2: 98%   Weight: 59 kg (130 lb)   Height: 152.4 cm (60\")   PainSc: 0-No pain   PainLoc: Back     BMI Readings from Last 1 Encounters:   10/28/21 25.39 kg/m²   BMI is above normal parameters. Recommendations include: educational material, exercise counseling and nutrition " counseling    Does the patient have evidence of cognitive impairment? No    Physical Exam            HEALTH RISK ASSESSMENT    Smoking Status:  Social History     Tobacco Use   Smoking Status Never Smoker   Smokeless Tobacco Never Used     Alcohol Consumption:  Social History     Substance and Sexual Activity   Alcohol Use Never     Fall Risk Screen:    DEALUIS ENRIQUE Fall Risk Assessment was completed, and patient is at LOW risk for falls.Assessment completed on:10/28/2021    Depression Screening:  PHQ-2/PHQ-9 Depression Screening 10/28/2021   Little interest or pleasure in doing things 0   Feeling down, depressed, or hopeless 0   Trouble falling or staying asleep, or sleeping too much 1   Feeling tired or having little energy 0   Poor appetite or overeating 0   Feeling bad about yourself - or that you are a failure or have let yourself or your family down 0   Trouble concentrating on things, such as reading the newspaper or watching television 0   Moving or speaking so slowly that other people could have noticed. Or the opposite - being so fidgety or restless that you have been moving around a lot more than usual 0   Thoughts that you would be better off dead, or of hurting yourself in some way 0   Total Score 1   If you checked off any problems, how difficult have these problems made it for you to do your work, take care of things at home, or get along with other people? Not difficult at all       Health Habits and Functional and Cognitive Screening:  Functional & Cognitive Status 10/28/2021   Do you have difficulty preparing food and eating? No   Do you have difficulty bathing yourself, getting dressed or grooming yourself? No   Do you have difficulty using the toilet? No   Do you have difficulty moving around from place to place? No   Do you have trouble with steps or getting out of a bed or a chair? No   Current Diet Well Balanced Diet   Dental Exam Up to date   Eye Exam Up to date   Exercise (times per week) 7 times  per week   Current Exercises Include Walking;Dancing;House Cleaning   Current Exercise Activities Include -   Do you need help using the phone?  No   Are you deaf or do you have serious difficulty hearing?  No   Do you need help with transportation? No   Do you need help shopping? Yes   Do you need help preparing meals?  No   Do you need help with housework?  No   Do you need help with laundry? No   Do you need help taking your medications? No   Do you need help managing money? No   Do you ever drive or ride in a car without wearing a seat belt? No   Have you felt unusual stress, anger or loneliness in the last month? No   Who do you live with? Alone   If you need help, do you have trouble finding someone available to you? No   Have you been bothered in the last four weeks by sexual problems? No   Do you have difficulty concentrating, remembering or making decisions? No       Age-appropriate Screening Schedule:  Refer to the list below for future screening recommendations based on patient's age, sex and/or medical conditions. Orders for these recommended tests are listed in the plan section. The patient has been provided with a written plan.    Health Maintenance   Topic Date Due   • DXA SCAN  08/11/2019   • URINE MICROALBUMIN  09/14/2021   • HEMOGLOBIN A1C  01/27/2022   • DIABETIC EYE EXAM  07/06/2022   • DIABETIC FOOT EXAM  07/27/2022   • LIPID PANEL  07/27/2022   • TDAP/TD VACCINES (2 - Td or Tdap) 07/21/2025   • INFLUENZA VACCINE  Completed   • ZOSTER VACCINE  Completed              Assessment/Plan   CMS Preventative Services Quick Reference  Risk Factors Identified During Encounter  Fall Risk-High or Moderate  Immunizations Discussed/Encouraged (specific Immunizations; Influenza  Inactivity/Sedentary  The above risks/problems have been discussed with the patient.  Follow up actions/plans if indicated are seen below in the Assessment/Plan Section.  Pertinent information has been shared with the patient in the  After Visit Summary.    Diagnoses and all orders for this visit:    1. Need for vaccination (Primary)  -     Fluzone High-Dose 65+yrs ([id number])        Follow Up:   No follow-ups on file.     An After Visit Summary and PPPS were made available to the patient.        I spent 15 minutes caring for Tamie on this date of service. This time includes time spent by me in the following activities:preparing for the visit and reviewing tests

## 2021-10-29 DIAGNOSIS — E87.5 POTASSIUM SERUM INCREASED: Primary | ICD-10-CM

## 2021-10-29 LAB
ALBUMIN SERPL-MCNC: 4.7 G/DL (ref 3.6–4.6)
ALBUMIN/GLOB SERPL: 1.7 {RATIO} (ref 1.2–2.2)
ALP SERPL-CCNC: 87 IU/L (ref 44–121)
ALT SERPL-CCNC: 16 IU/L (ref 0–32)
AST SERPL-CCNC: 22 IU/L (ref 0–40)
BASOPHILS # BLD AUTO: 0.1 X10E3/UL (ref 0–0.2)
BASOPHILS NFR BLD AUTO: 1 %
BILIRUB SERPL-MCNC: 0.6 MG/DL (ref 0–1.2)
BUN SERPL-MCNC: 23 MG/DL (ref 8–27)
BUN/CREAT SERPL: 19 (ref 12–28)
CALCIUM SERPL-MCNC: 10.7 MG/DL (ref 8.7–10.3)
CHLORIDE SERPL-SCNC: 99 MMOL/L (ref 96–106)
CHOLEST SERPL-MCNC: 148 MG/DL (ref 100–199)
CHOLEST/HDLC SERPL: 3 RATIO (ref 0–4.4)
CO2 SERPL-SCNC: 24 MMOL/L (ref 20–29)
CREAT SERPL-MCNC: 1.18 MG/DL (ref 0.57–1)
EOSINOPHIL # BLD AUTO: 0.3 X10E3/UL (ref 0–0.4)
EOSINOPHIL NFR BLD AUTO: 3 %
ERYTHROCYTE [DISTWIDTH] IN BLOOD BY AUTOMATED COUNT: 13.3 % (ref 11.7–15.4)
GLOBULIN SER CALC-MCNC: 2.7 G/DL (ref 1.5–4.5)
GLUCOSE SERPL-MCNC: 201 MG/DL (ref 65–99)
HBA1C MFR BLD: 7.2 % (ref 4.8–5.6)
HCT VFR BLD AUTO: 45.6 % (ref 34–46.6)
HDLC SERPL-MCNC: 49 MG/DL
HGB BLD-MCNC: 15.1 G/DL (ref 11.1–15.9)
IMM GRANULOCYTES # BLD AUTO: 0 X10E3/UL (ref 0–0.1)
IMM GRANULOCYTES NFR BLD AUTO: 0 %
LDLC SERPL CALC-MCNC: 72 MG/DL (ref 0–99)
LYMPHOCYTES # BLD AUTO: 1.6 X10E3/UL (ref 0.7–3.1)
LYMPHOCYTES NFR BLD AUTO: 18 %
MCH RBC QN AUTO: 31.2 PG (ref 26.6–33)
MCHC RBC AUTO-ENTMCNC: 33.1 G/DL (ref 31.5–35.7)
MCV RBC AUTO: 94 FL (ref 79–97)
MONOCYTES # BLD AUTO: 0.5 X10E3/UL (ref 0.1–0.9)
MONOCYTES NFR BLD AUTO: 5 %
NEUTROPHILS # BLD AUTO: 6.8 X10E3/UL (ref 1.4–7)
NEUTROPHILS NFR BLD AUTO: 73 %
PLATELET # BLD AUTO: 338 X10E3/UL (ref 150–450)
POTASSIUM SERPL-SCNC: 5.7 MMOL/L (ref 3.5–5.2)
PROT SERPL-MCNC: 7.4 G/DL (ref 6–8.5)
RBC # BLD AUTO: 4.84 X10E6/UL (ref 3.77–5.28)
SODIUM SERPL-SCNC: 137 MMOL/L (ref 134–144)
TRIGL SERPL-MCNC: 159 MG/DL (ref 0–149)
VLDLC SERPL CALC-MCNC: 27 MG/DL (ref 5–40)
WBC # BLD AUTO: 9.2 X10E3/UL (ref 3.4–10.8)

## 2021-11-01 ENCOUNTER — LAB (OUTPATIENT)
Dept: LAB | Facility: HOSPITAL | Age: 86
End: 2021-11-01

## 2021-11-01 LAB
ALBUMIN SERPL-MCNC: 4.5 G/DL (ref 3.5–5.2)
ANION GAP SERPL CALCULATED.3IONS-SCNC: 12.4 MMOL/L (ref 5–15)
BUN SERPL-MCNC: 23 MG/DL (ref 8–23)
BUN/CREAT SERPL: 21.1 (ref 7–25)
CALCIUM SPEC-SCNC: 9.8 MG/DL (ref 8.6–10.5)
CHLORIDE SERPL-SCNC: 101 MMOL/L (ref 98–107)
CO2 SERPL-SCNC: 25.6 MMOL/L (ref 22–29)
CREAT SERPL-MCNC: 1.09 MG/DL (ref 0.57–1)
GFR SERPL CREATININE-BSD FRML MDRD: 48 ML/MIN/1.73
GLUCOSE SERPL-MCNC: 192 MG/DL (ref 65–99)
PHOSPHATE SERPL-MCNC: 3.9 MG/DL (ref 2.5–4.5)
POTASSIUM SERPL-SCNC: 4.6 MMOL/L (ref 3.5–5.2)
SODIUM SERPL-SCNC: 139 MMOL/L (ref 136–145)

## 2021-11-01 PROCEDURE — 36415 COLL VENOUS BLD VENIPUNCTURE: CPT | Performed by: INTERNAL MEDICINE

## 2021-11-01 PROCEDURE — 80069 RENAL FUNCTION PANEL: CPT | Performed by: INTERNAL MEDICINE

## 2021-11-02 NOTE — PROGRESS NOTES
"Subjective   Patient ID: Tamie Peter is a 85 y.o. female is here today for follow-up for back and leg pain. Her last office visit was with Karen on 9/22/21 and she was referred to physical therapy. Imaging was also ordered at that visit. She presents to the office today for a follow-up visit following physical therapy with new XRs.     History of Present Illness     Today Ms. Peter reports physical therapy is providing relief of back and left buttock/leg pain, but not 100% resolved. She reports being discharged from physical therapy on 10/29/21.  She is still doing the exercises at home. She reports increased pain in the left buttock with prolonged walking however she has been able to resume tap dancing and is tolerating it well.  She reports intermittent numbness and tingling in her back that radiates down her left leg and into her left foot.     The following portions of the patient's history were reviewed and updated as appropriate: allergies, current medications, past family history, past medical history, past social history, past surgical history and problem list.    Review of Systems   Constitutional: Positive for activity change.   Musculoskeletal: Positive for back pain.   Neurological: Positive for numbness. Negative for weakness.   Psychiatric/Behavioral: Negative for sleep disturbance.     Objective     Vitals:    11/05/21 0932   BP: 140/70   Pulse: 85   Temp: 97.8 °F (36.6 °C)   SpO2: 99%   Weight: 59 kg (130 lb)   Height: 152.4 cm (60\")     Body mass index is 25.39 kg/m².  Patient's Body mass index is 25.39 kg/m². indicating that she is within normal range (BMI 18.5-24.9). No BMI management plan needed..      Physical Exam  Vitals reviewed.   Constitutional:       General: She is not in acute distress.     Appearance: She is well-developed. She is not diaphoretic.   HENT:      Head: Normocephalic and atraumatic.   Eyes:      General:         Right eye: No discharge.         Left eye: No " discharge.      Conjunctiva/sclera: Conjunctivae normal.   Neck:      Trachea: No tracheal deviation.   Cardiovascular:      Rate and Rhythm: Normal rate.   Pulmonary:      Effort: Pulmonary effort is normal. No respiratory distress.   Abdominal:      General: There is no distension.      Palpations: Abdomen is soft.      Tenderness: There is no abdominal tenderness.   Musculoskeletal:         General: No tenderness. Normal range of motion.      Cervical back: Normal range of motion and neck supple.      Comments: Positive Gaenslen's on the left.   Skin:     General: Skin is warm and dry.      Findings: No erythema.   Neurological:      Mental Status: She is alert and oriented to person, place, and time.      GCS: GCS eye subscore is 4. GCS verbal subscore is 5. GCS motor subscore is 6.      Sensory: No sensory deficit.      Motor: No abnormal muscle tone.      Coordination: Coordination normal.      Deep Tendon Reflexes: Reflexes are normal and symmetric. Reflexes normal.      Comments: No motor or sensory deficits. DTR's normal. SLR and Edson's negative bilaterally.  Able to bear weight on heels and toes bilaterally.     Psychiatric:         Behavior: Behavior is cooperative.         Thought Content: Thought content normal.         Assessment/Plan   Independent Review of Radiographic Studies:      I personally reviewed the images from the following studies.    Lumbar x-rays completed with flexion and extension views dated September 30, 2021 reviewed today in the office.  There is degenerative changes with disc space narrowing, osteophytic spurs at L2-3 and L5-S1.  The disc space narrowing is a bit less severe at L4-5.  There is also involvement of the facet joints throughout the lumbar spine.  Is no evidence of motion with flexion or extension views.  There is also evidence of degenerative inflammation in the sacroiliac joints.    Medical Decision Making:      Ms. Peter returns the office today for  reevaluation with new lumbar x-rays.  I went over the x-ray results with her.  She is quite tender on exam over the sacroiliac joint on the left.  Her x-rays also show inflammation of the iliac joint in addition to degenerative changes in the lumbar spine.    I mention the idea of sacroiliac injection however Ms. Peter prefers to hold off on that for now.  She stated that she had an elevation in her blood pressure while taking the oral steroid pack.  Ms. Peter prefers to continue with her home exercises and call us if her symptoms worsen.  We will see her as needed from here.       Diagnoses and all orders for this visit:    1. Lumbar degenerative disc disease (Primary)    2. Sacroiliac joint pain      Return if symptoms worsen or fail to improve.

## 2021-11-05 ENCOUNTER — OFFICE VISIT (OUTPATIENT)
Dept: NEUROSURGERY | Facility: CLINIC | Age: 86
End: 2021-11-05

## 2021-11-05 VITALS
DIASTOLIC BLOOD PRESSURE: 70 MMHG | HEIGHT: 60 IN | BODY MASS INDEX: 25.52 KG/M2 | OXYGEN SATURATION: 99 % | SYSTOLIC BLOOD PRESSURE: 140 MMHG | TEMPERATURE: 97.8 F | HEART RATE: 85 BPM | WEIGHT: 130 LBS

## 2021-11-05 DIAGNOSIS — M51.36 LUMBAR DEGENERATIVE DISC DISEASE: Primary | ICD-10-CM

## 2021-11-05 DIAGNOSIS — M53.3 SACROILIAC JOINT PAIN: ICD-10-CM

## 2021-11-05 PROCEDURE — 99212 OFFICE O/P EST SF 10 MIN: CPT | Performed by: NURSE PRACTITIONER

## 2021-11-23 RX ORDER — HYDRALAZINE HYDROCHLORIDE 25 MG/1
TABLET, FILM COATED ORAL
Qty: 60 TABLET | Refills: 1 | Status: SHIPPED | OUTPATIENT
Start: 2021-11-23 | End: 2022-05-16

## 2022-03-25 ENCOUNTER — OFFICE VISIT (OUTPATIENT)
Dept: INTERNAL MEDICINE | Facility: CLINIC | Age: 87
End: 2022-03-25

## 2022-03-25 VITALS
HEIGHT: 60 IN | OXYGEN SATURATION: 99 % | TEMPERATURE: 97.6 F | RESPIRATION RATE: 16 BRPM | WEIGHT: 126 LBS | SYSTOLIC BLOOD PRESSURE: 112 MMHG | BODY MASS INDEX: 24.74 KG/M2 | DIASTOLIC BLOOD PRESSURE: 80 MMHG | HEART RATE: 67 BPM

## 2022-03-25 DIAGNOSIS — E11.9 TYPE 2 DIABETES MELLITUS WITHOUT COMPLICATION, WITHOUT LONG-TERM CURRENT USE OF INSULIN: ICD-10-CM

## 2022-03-25 DIAGNOSIS — E78.2 MIXED HYPERLIPIDEMIA: Chronic | ICD-10-CM

## 2022-03-25 DIAGNOSIS — I10 PRIMARY HYPERTENSION: Primary | Chronic | ICD-10-CM

## 2022-03-25 DIAGNOSIS — M54.42 ACUTE LEFT-SIDED LOW BACK PAIN WITH LEFT-SIDED SCIATICA: ICD-10-CM

## 2022-03-25 DIAGNOSIS — N18.32 STAGE 3B CHRONIC KIDNEY DISEASE: Chronic | ICD-10-CM

## 2022-03-25 DIAGNOSIS — I25.10 CORONARY ARTERY DISEASE INVOLVING NATIVE CORONARY ARTERY OF NATIVE HEART WITHOUT ANGINA PECTORIS: Chronic | ICD-10-CM

## 2022-03-25 PROCEDURE — 99214 OFFICE O/P EST MOD 30 MIN: CPT | Performed by: INTERNAL MEDICINE

## 2022-03-25 NOTE — PROGRESS NOTES
Subjective   Tamie Peter is a 86 y.o. female.     Chief Complaint   Patient presents with   • Hyperlipidemia   • Hypertension   • Diabetes   • Chronic Kidney Disease   • Back Pain     Patient states that she is having back pain for months now, and she is unable to sleep due to the pain   • Difficulty Swallowing     Patient states that at time she is choking on her own saliva with drinking fluids and  eating foods for about 1 month.          Hyperlipidemia  This is a chronic problem. The current episode started more than 1 year ago. The problem is controlled. Recent lipid tests were reviewed and are normal. Pertinent negatives include no chest pain or shortness of breath.   Hypertension  This is a chronic problem. The current episode started more than 1 year ago. The problem is unchanged. The problem is controlled. Pertinent negatives include no chest pain, headaches, palpitations, peripheral edema (right foot in summer only) or shortness of breath.   Diabetes  She presents for her follow-up diabetic visit. She has type 2 diabetes mellitus. No MedicAlert identification noted. Her disease course has been stable. Pertinent negatives for hypoglycemia include no headaches. Pertinent negatives for diabetes include no chest pain. Symptoms are stable.   Chronic Kidney Disease  This is a chronic problem. The current episode started more than 1 year ago. The problem occurs constantly. The problem has been unchanged. Pertinent negatives include no chest pain or headaches.   Coronary Artery Disease  Presents for follow-up visit. Pertinent negatives include no chest pain, leg swelling, palpitations or shortness of breath. Risk factors include hyperlipidemia.   Sciatica  This is a recurrent problem. Pertinent negatives include no chest pain or headaches.        The following portions of the patient's history were reviewed and updated as appropriate: allergies, current medications, past social history and problem  list.    Outpatient Medications Marked as Taking for the 3/25/22 encounter (Office Visit) with Mohinder Barbosa MD   Medication Sig Dispense Refill   • alendronate (FOSAMAX) 70 MG tablet Take 70 mg by mouth 1 (One) Time Per Week.     • allopurinol (ZYLOPRIM) 300 MG tablet TAKE ONE TABLET BY MOUTH DAILY 90 tablet 1   • amLODIPine (NORVASC) 10 MG tablet Take 1 tablet by mouth Daily. 90 tablet 1   • aspirin 325 MG tablet Take 325 mg by mouth daily.     • atorvastatin (LIPITOR) 80 MG tablet TAKE ONE TABLET BY MOUTH DAILY 90 tablet 1   • clopidogrel (PLAVIX) 75 MG tablet TAKE ONE TABLET BY MOUTH DAILY 90 tablet 1   • Diclofenac Sodium (VOLTAREN) 1 % gel gel APPLY 4 GRAMS OF GEL TO AFFECTED AREA FOUR TIMES A DAY 30 g 1   • ezetimibe (ZETIA) 10 MG tablet TAKE ONE TABLET BY MOUTH DAILY 90 tablet 1   • glipizide (GLUCOTROL) 5 MG tablet Take 2 tablets by mouth Daily With Breakfast AND 1 tablet Every Evening. 270 tablet 1   • hydrALAZINE (APRESOLINE) 25 MG tablet TAKE ONE TABLET BY MOUTH TWICE A DAY 60 tablet 1   • hydroCHLOROthiazide (HYDRODIURIL) 12.5 MG tablet Take 1 tablet by mouth Daily. 90 tablet 1   • metFORMIN (GLUCOPHAGE) 500 MG tablet TAKE TWO TABLETS BY MOUTH TWICE A DAY WITH MEALS 360 tablet 1   • metoprolol tartrate (LOPRESSOR) 50 MG tablet TAKE TWO TABLETS BY MOUTH TWICE A  tablet 1       Review of Systems   Respiratory: Negative for shortness of breath and wheezing.    Cardiovascular: Negative for chest pain, palpitations and leg swelling.   Gastrointestinal: Negative for constipation and diarrhea.   Neurological: Negative for headaches.       Objective   Vitals:    03/25/22 1053   BP: 112/80   Pulse: 67   Resp: 16   Temp: 97.6 °F (36.4 °C)   SpO2: 99%          03/25/22  1053   Weight: 57.2 kg (126 lb)    [unfilled]  Body mass index is 24.61 kg/m².      Physical Exam   Constitutional: She appears well-developed.   Cardiovascular: Normal rate, regular rhythm and normal heart sounds. Exam reveals no  gallop.   No murmur heard.  Pulmonary/Chest: Effort normal and breath sounds normal. No respiratory distress. She has no wheezes. She has no rales.   Abdominal: Normal appearance.    Tamie had a diabetic foot exam performed today.   During the foot exam she had a monofilament test performed.    Neurological Sensory Findings -  Unaltered sharp/dull right ankle/foot discrimination and unaltered sharp/dull left ankle/foot discrimination. No right ankle/foot altered proprioception and no left ankle/foot altered proprioception  Vascular Status -  Her right foot exhibits normal foot vasculature  and no edema. Her left foot exhibits normal foot vasculature  and no edema.   Foot Structure and Biomechanics -   Her left foot exhibits hammer toes.  Neurological: She is alert.         Problems Addressed this Visit        Cardiac and Vasculature    CAD (coronary artery disease) (Chronic)    Hypertension - Primary (Chronic)    Hyperlipidemia (Chronic)       Genitourinary and Reproductive     CKD (chronic kidney disease) stage 3, GFR 30-59 ml/min (CMS/[facility]) (Chronic)      Diagnoses       Codes Comments    Primary hypertension    -  Primary ICD-10-CM: I10  ICD-9-CM: 401.9     Mixed hyperlipidemia     ICD-10-CM: E78.2  ICD-9-CM: 272.2     Coronary artery disease involving native coronary artery of native heart without angina pectoris     ICD-10-CM: I25.10  ICD-9-CM: 414.01     Stage 3b chronic kidney disease (HCC)     ICD-10-CM: N18.32  ICD-9-CM: 585.3         Assessment/Plan   In for recheck of hypertension, hyperlipidemia, diabetes mellitus, CKD and CAD.  Occasionally she gets choked on her saliva for the past 2 months or so.  No problem at all with dysphagia or taking solids and.  She has had some problems with sciatica on the left side for 8 months.  Started July 2021.  She feels a knot near the SI region and pain going down the left leg.  She is actually feeling the superior iliac landmark area.  Physical therapy helped a little  bit as far as massage.  She has been seen by neurosurgery in the past year.  Having trouble hearing out of left ear.  Needs some relief for her leg pain prior to an upcoming trip to [place] in October 2022.  Neurosurgery recommended an injection which she refused at the time but I think that is the way to go for her.  Blood pressure control is OK.  CAD is asymptomatic.  Annual labs October 2021 including CBC, CMP, lipids, A1c, UA.  Gets BMP, a1c, lipids today and q 3 mos.  Annual wellness visit October 2021. Eye check up this year was fantastic with Dr. Dow as well as cardiology visit.  Annual wellness performed today October 2021.  She is 5 HPP today.    The above information was reviewed again today 03/25/22.  It continues to be accurate as reflected above and is unchanged.  History, physical and review of systems all reviewed and are unchanged.  Medications were reviewed today and continue the current dosing.    PPE today includes face mask and eye shield.           Dragon disclaimer:   Much of this encounter note is an electronic transcription/translation of spoken language to printed text. The electronic translation of spoken language may permit erroneous, or at times, nonsensical words or phrases to be inadvertently transcribed; Although I have reviewed the note for such errors, some may still exist.

## 2022-03-26 LAB
BUN SERPL-MCNC: 28 MG/DL (ref 8–27)
BUN/CREAT SERPL: 24 (ref 12–28)
CALCIUM SERPL-MCNC: 10.5 MG/DL (ref 8.7–10.3)
CHLORIDE SERPL-SCNC: 99 MMOL/L (ref 96–106)
CHOLEST SERPL-MCNC: 250 MG/DL (ref 100–199)
CHOLEST/HDLC SERPL: 5.4 RATIO (ref 0–4.4)
CO2 SERPL-SCNC: 23 MMOL/L (ref 20–29)
CREAT SERPL-MCNC: 1.18 MG/DL (ref 0.57–1)
EGFRCR SERPLBLD CKD-EPI 2021: 46 ML/MIN/1.73
GLUCOSE SERPL-MCNC: 102 MG/DL (ref 65–99)
HBA1C MFR BLD: 7.4 % (ref 4.8–5.6)
HDLC SERPL-MCNC: 46 MG/DL
LDLC SERPL CALC-MCNC: 138 MG/DL (ref 0–99)
POTASSIUM SERPL-SCNC: 4.4 MMOL/L (ref 3.5–5.2)
SODIUM SERPL-SCNC: 139 MMOL/L (ref 134–144)
TRIGL SERPL-MCNC: 365 MG/DL (ref 0–149)
VLDLC SERPL CALC-MCNC: 66 MG/DL (ref 5–40)

## 2022-05-04 NOTE — H&P (VIEW-ONLY)
"The patient has a pain history of the following:  Acute left-sided low back pain    Previous interventions that the patient has received include:   None    Pain medications include:  Voltaren gel     Other conservative modalities which the patient reports using include:  Physical Therapy: yes  Chiropractor: no  Massage Therapy: yes  TENS: yes  Neck or back surgery: no  Past pain management: no    Past Significant Surgical History:  None     HPI:       CHIEF COMPLAINT: Back Pain    Tamie Peter is a 86 y.o. female referred here by Mohinder Barbosa MD. Tamie Peter presents to the office for evaluation and treatment of Back Pain      Onset:  July 2021  Inciting Event:  Nothing in particular  Location:  Left low back/buttock  Pain: Pain described as numbness, tingling, burning and \"Paralyzing\". Located in the left low back and does radiate into the posterior lower extremity to the foot.  Severity:  Pain rated as a 8 /10.  Apportions pain as 90%  back pain and 10% extremity pain.  Symptoms have been constant.  Exacerbation:  Walking, standing, lying flat in bed.   Alleviation:  Sitting.  Associated Symptoms:   She denies any new onset of bowel or bladder weakness, significant leg weakness or saddle anesthesia. Admits to balance problems or lower extremity incoordination.  Ambulates: Without assistive device   Limitations: This pain limits the patient from walking and tap dancing performances.     She is here today with her daughter who helps provide history.        PEG Assessment   What number best describes your pain on average in the past week?7  What number best describes how, during the past week, pain has interfered with your enjoyment of life?9  What number best describes how, during the past week, pain has interfered with your general activity?  9        Current Outpatient Medications:   •  alendronate (FOSAMAX) 70 MG tablet, Take 70 mg by mouth 1 (One) Time Per Week., Disp: , Rfl:   •  allopurinol (ZYLOPRIM) " 300 MG tablet, TAKE ONE TABLET BY MOUTH DAILY, Disp: 90 tablet, Rfl: 1  •  amLODIPine (NORVASC) 10 MG tablet, Take 1 tablet by mouth Daily., Disp: 90 tablet, Rfl: 1  •  aspirin 325 MG tablet, Take 325 mg by mouth daily., Disp: , Rfl:   •  atorvastatin (LIPITOR) 80 MG tablet, TAKE ONE TABLET BY MOUTH DAILY, Disp: 90 tablet, Rfl: 1  •  clopidogrel (PLAVIX) 75 MG tablet, TAKE ONE TABLET BY MOUTH DAILY, Disp: 90 tablet, Rfl: 1  •  Diclofenac Sodium (VOLTAREN) 1 % gel gel, APPLY 4 GRAMS OF GEL TO AFFECTED AREA FOUR TIMES A DAY, Disp: 30 g, Rfl: 1  •  ezetimibe (ZETIA) 10 MG tablet, TAKE ONE TABLET BY MOUTH DAILY, Disp: 90 tablet, Rfl: 1  •  glipizide (GLUCOTROL) 5 MG tablet, Take 2 tablets by mouth Daily With Breakfast AND 1 tablet Every Evening., Disp: 270 tablet, Rfl: 1  •  hydrALAZINE (APRESOLINE) 25 MG tablet, TAKE ONE TABLET BY MOUTH TWICE A DAY, Disp: 60 tablet, Rfl: 1  •  hydroCHLOROthiazide (HYDRODIURIL) 12.5 MG tablet, Take 1 tablet by mouth Daily., Disp: 90 tablet, Rfl: 1  •  metFORMIN (GLUCOPHAGE) 500 MG tablet, TAKE TWO TABLETS BY MOUTH TWICE A DAY WITH MEALS, Disp: 360 tablet, Rfl: 1  •  metoprolol tartrate (LOPRESSOR) 50 MG tablet, TAKE TWO TABLETS BY MOUTH TWICE A DAY, Disp: 360 tablet, Rfl: 1    The following portions of the patient's history were reviewed and updated as appropriate: allergies, current medications, past family history, past medical history, past social history, past surgical history and problem list.      REVIEW OF PERTINENT MEDICAL DATA    9/30/21 SEVEN VIEW LUMBAR SPINE WITH LATERAL FLEXION AND EXTENSION     HISTORY: Low back pain and left sciatica.     FINDINGS: There is considerable disc space narrowing and spurring at  L2-3 and L5-S1 and some minimal disc space narrowing at L4-5. There is  some spurring of the posterior facets throughout the lumbar spine but no  subluxation occurs during flexion and extension. There are very slight  degenerative changes at both sacroiliac joints.    "  This report was finalized on 9/30/2021 10:51 AM by Dr. Jose R Haider M.D.    3/25/22 Creatinine 1.18    10/28/21 Platelets 338 (10*3)    Review of Systems   Constitutional: Negative for fatigue.   HENT: Negative for congestion.    Eyes: Negative for visual disturbance.   Respiratory: Negative for shortness of breath.    Cardiovascular: Negative for chest pain.   Gastrointestinal: Negative for constipation and diarrhea.   Genitourinary: Negative for difficulty urinating.   Musculoskeletal: Positive for back pain.   Neurological: Positive for numbness (left leg). Negative for weakness.   Psychiatric/Behavioral: Negative for sleep disturbance and suicidal ideas. The patient is nervous/anxious.      I have reviewed and confirmed the accuracy of the ROS as documented by the MA/LPN/RN Yasmine Gaming MD      Vitals:    05/05/22 0940   BP: 174/86   Pulse: 85   Resp: 18   Temp: 96.8 °F (36 °C)   SpO2: 98%   Weight: 57.6 kg (127 lb)   Height: 152.4 cm (60\")   PainSc:   8   PainLoc: Back         Objective   Physical Exam  Vitals reviewed.   Constitutional:       General: She is not in acute distress.  Pulmonary:      Effort: Pulmonary effort is normal. No respiratory distress.   Musculoskeletal:      Comments: Ambulation: Without assistive device   Lumbar Exam:  Appearance: Scoliotic curve absent and scarring absent  Palpated over lumbosacral paravertebral regions and transverse processes with negative tenderness appreciated, Bilateral.   Sacroiliac joints are tender, Left.  Trochanteric bursa are not tender, Bilateral.  Straight leg raise is positive radiculopathy, Left. Facet loading is negative for pain, Bilateral.  Paraspinal/adjacent lumbar musculature are not tender to palpation, Bilateral.   Skin:     General: Skin is warm and dry.   Neurological:      General: No focal deficit present.      Mental Status: She is alert.      Sensory: Sensory deficit:  Left lower posterior leg.   Psychiatric:         Mood and " Affect: Mood normal.         Thought Content: Thought content normal.         Assessment/Plan   Diagnoses and all orders for this visit:    1. Arthritis of left sacroiliac joint (Primary)  -     Case Request        - Pertinent labs reviewed.   - Pertinent imaging reviewed.   - Will schedule for left sacroiliac joint injection.  Risks discussed including but not limited to bleeding, bruising, infection, damage to surrounding structures, headache, and rare things such as being paralyzed, seizure, stroke, heart attack and death.  The risk of steroid medications include but are not limited to immunosuppression, which can increase the risk of jhon an infectious disease as well as decrease the immune response to a vaccine.    - Advised to continue blood thinner for injection.   - Tamie Peter reports a pain score of 8.  Given her pain assessment as noted, treatment options were discussed and the following options were decided upon as a follow-up plan to address the patient's pain: steroid injections.  - If she has no results, we will obtain lumbar CT to evaluate for cause of left S1 radicular symptom and consider Epidural steroid injection.  If she has short-term relief, we will determine whether to repeat SI joint injection versus Radiofrequency ablation (thermal, non-pulsed) for lateral branches.     --- Follow-up for left sacroiliac joint injection and office visit 4-6 weeks.        While examining this patient, I wore protective equipment including a mask, eye shield and gloves.  I washed my hands before and after this patient encounter.  The patient wore a mask throughout the visit as well.     Yasmine Gaming MD  Pain Management

## 2022-05-05 ENCOUNTER — OFFICE VISIT (OUTPATIENT)
Dept: PAIN MEDICINE | Facility: CLINIC | Age: 87
End: 2022-05-05

## 2022-05-05 ENCOUNTER — PREP FOR SURGERY (OUTPATIENT)
Dept: SURGERY | Facility: SURGERY CENTER | Age: 87
End: 2022-05-05

## 2022-05-05 ENCOUNTER — TRANSCRIBE ORDERS (OUTPATIENT)
Dept: SURGERY | Facility: SURGERY CENTER | Age: 87
End: 2022-05-05

## 2022-05-05 VITALS
RESPIRATION RATE: 18 BRPM | DIASTOLIC BLOOD PRESSURE: 86 MMHG | HEIGHT: 60 IN | HEART RATE: 85 BPM | OXYGEN SATURATION: 98 % | BODY MASS INDEX: 24.94 KG/M2 | SYSTOLIC BLOOD PRESSURE: 174 MMHG | WEIGHT: 127 LBS | TEMPERATURE: 96.8 F

## 2022-05-05 DIAGNOSIS — M47.818 ARTHRITIS OF LEFT SACROILIAC JOINT: Primary | ICD-10-CM

## 2022-05-05 PROBLEM — M46.1 ARTHRITIS OF LEFT SACROILIAC JOINT: Status: ACTIVE | Noted: 2022-05-05

## 2022-05-05 PROCEDURE — 99204 OFFICE O/P NEW MOD 45 MIN: CPT | Performed by: ANESTHESIOLOGY

## 2022-05-05 RX ORDER — SODIUM CHLORIDE 0.9 % (FLUSH) 0.9 %
10 SYRINGE (ML) INJECTION AS NEEDED
Status: CANCELLED | OUTPATIENT
Start: 2022-05-05

## 2022-05-05 RX ORDER — SODIUM CHLORIDE 0.9 % (FLUSH) 0.9 %
10 SYRINGE (ML) INJECTION EVERY 12 HOURS SCHEDULED
Status: CANCELLED | OUTPATIENT
Start: 2022-05-05

## 2022-05-05 NOTE — PATIENT INSTRUCTIONS
What to expect if we're setting up an injection/procedure    - I have placed the order today, we'll start speaking to your insurance for authorization (this can sometimes take a few weeks).   - You should be scheduled for your procedure before you leave the office.  If you were not, please call our office to schedule.   - A COVID test may be required for your procedure.  We will let you know if this needs to be scheduled.  - LIGHT Intravenous (IV) sedation is offered for some procedures: you will NOT be put to sleep.  If you plan to have sedation, do not eat or drink anything on the day of your injection.   - Most procedures require having someone drive you.  Please make sure you arrange a  unless told otherwise.   - If you take a blood thinner and you were not instructed whether to continue or hold it, please contact us with any questions.      If you have NO RELIEF from the injection after 2 weeks, call us and ask for the CT scan.

## 2022-05-05 NOTE — DISCHARGE INSTRUCTIONS
[facility] - Post-procedure Instructions          --  While there are no absolute restrictions, it is recommended that you do not perform strenuous activity today. In the morning, you may resume your level of activity as before your block.    --  If you have a band-aid at your injection site, please remove it later today. Observe the area for any redness, swelling, pus-like drainage, or a temperature over 101°. If any of these symptoms occur, please call your doctor at [phone]. If after office hours, leave a message and the on-call provider will return your call.    --  Ice may be applied to your injection site. It is recommended you avoid direct heat (heating pad; hot tub) for 1-2 days.    --  Call [facility] at [phone] if you experience persistent headache, persistent bleeding from the injection site, or severe pain not relieved by heat or oral medication.    --  Do not make important decisions today.    --  Due to the effects of the block and/or the I.V. Sedation, DO NOT drive or operate hazardous machinery for 12 hours.  Local anesthetics may cause numbness after procedure and precautions must be taken with regards to operating equipment as well as with walking, even if ambulating with assistance of another person or with an assistive device.    --  Do not drink alcohol for 12 hours.    -- You may return to work tomorrow, or as directed by your referring doctor.    --  Occasionally you may notice a slight increase in your pain after the procedure. This should start to improve within the next 24-48 hours. Radiofrequency ablation procedure pain may last 3-4 weeks.    --  It may take as long as 3-4 days before you notice a gradual improvement in your pain and/or other symptoms.    -- You may continue to take your prescribed pain medication as needed.    --  Some normal possible side effects of steroid use could include fluid retention, increased blood sugar, dull headache,  increased sweating, increased appetite, mood swings and flushing.    --  Diabetics are recommended to watch their blood glucose level closely for 24-48 hours after the injection.    --  Must stay in PACU for 20 min upon arrival and prove no leg weakness before being discharged.    --  IN THE EVENT OF A LIFE THREATENING EMERGENCY, (CHEST PAIN, BREATHING DIFFICULTIES, PARALYSIS…) YOU SHOULD GO TO YOUR NEAREST EMERGENCY ROOM.    --  You should be contacted by our office within 2-3 days to schedule follow up or next appointment date.  If not contacted within 7 days, please call the office at [phone]

## 2022-05-09 ENCOUNTER — HOSPITAL ENCOUNTER (OUTPATIENT)
Facility: SURGERY CENTER | Age: 87
Setting detail: HOSPITAL OUTPATIENT SURGERY
Discharge: HOME | End: 2022-05-09
Attending: ANESTHESIOLOGY | Admitting: ANESTHESIOLOGY

## 2022-05-09 ENCOUNTER — HOSPITAL ENCOUNTER (OUTPATIENT)
Dept: GENERAL RADIOLOGY | Facility: SURGERY CENTER | Age: 87
Setting detail: HOSPITAL OUTPATIENT SURGERY
End: 2022-05-09

## 2022-05-09 VITALS
TEMPERATURE: 97.8 F | SYSTOLIC BLOOD PRESSURE: 196 MMHG | OXYGEN SATURATION: 96 % | DIASTOLIC BLOOD PRESSURE: 87 MMHG | HEART RATE: 64 BPM | BODY MASS INDEX: 24.94 KG/M2 | HEIGHT: 60 IN | RESPIRATION RATE: 16 BRPM | WEIGHT: 127 LBS

## 2022-05-09 DIAGNOSIS — M47.818 ARTHRITIS OF LEFT SACROILIAC JOINT: ICD-10-CM

## 2022-05-09 PROCEDURE — 25010000002 DEXAMETHASONE SODIUM PHOSPHATE 100 MG/10ML SOLUTION 10 ML VIAL: Performed by: ANESTHESIOLOGY

## 2022-05-09 PROCEDURE — G0260 INJ FOR SACROILIAC JT ANESTH: HCPCS | Performed by: ANESTHESIOLOGY

## 2022-05-09 PROCEDURE — 76000 FLUOROSCOPY <1 HR PHYS/QHP: CPT

## 2022-05-09 PROCEDURE — 27096 INJECT SACROILIAC JOINT: CPT | Performed by: ANESTHESIOLOGY

## 2022-05-09 PROCEDURE — 77002 NEEDLE LOCALIZATION BY XRAY: CPT

## 2022-05-09 RX ORDER — SODIUM CHLORIDE 0.9 % (FLUSH) 0.9 %
10 SYRINGE (ML) INJECTION AS NEEDED
Status: DISCONTINUED | OUTPATIENT
Start: 2022-05-09 | End: 2022-05-09 | Stop reason: HOSPADM

## 2022-05-09 RX ORDER — SODIUM CHLORIDE 0.9 % (FLUSH) 0.9 %
10 SYRINGE (ML) INJECTION EVERY 12 HOURS SCHEDULED
Status: DISCONTINUED | OUTPATIENT
Start: 2022-05-09 | End: 2022-05-09 | Stop reason: HOSPADM

## 2022-05-09 NOTE — OP NOTE
Left Sacroiliac Joint Injection  [facility]      PREOPERATIVE DIAGNOSIS:   Sacroiliac joint dysfunction    POSTOPERATIVE DIAGNOSIS:  Sacroiliac joint dysfunction    PROCEDURE:  Sacroiliac Joint Injection, with fluoroscopic guidance [facility] [id number] -LT    PRE-PROCEDURE DISCUSSION WITH PATIENT:    Risks and complications were discussed with the patient prior to starting the procedure and informed consent was obtained.  We discussed various topics including but not limited to bleeding, infection, injury, postprocedural site soreness, painful flareup, worsening of clinical picture, paralysis, coma, and death.     SURGEON:  Yasmine Gaming MD    REASON FOR PROCEDURE:    Patient has pain consistent with SI pathology on history and physical exam.    SEDATION:  Patient declined administration of moderate sedation    ANESTHETIC AGENT:  Lidocaine 1%  STEROID AGENT:  7.5mg Dexamethasone    DESCRIPTON OF PROCEDURE:  After obtaining informed consent, IV access was obtained in the preoperative area.  The patient was transported to the operative suite and placed in the prone position with a pillow under the pelvic area. EKG, blood pressure, and pulse oximeter were monitored. The lumbosacral area was prepped with Chloraprep and draped in a sterile fashion. Under fluoroscopic guidance the inferior most portion of the left SI joint was identified. The overlying skin and subcutaneous tissue was anesthetized with 1% lidocaine. A 22-gauge spinal needle was then advanced under fluoroscopic guidance in a coaxial view into the joint space.  After negative aspiration, a volume of 3ml of the above medication was injected.    Needle was removed intact.      Vital signs remained stable.  The onset of analgesia was noted.      ESTIMATED BLOOD LOSS:  minimal  SPECIMENS:  None  COMPLICATIONS:  No complications were noted. and positive aspiration for blood.  Needle was redirected until negative aspiration and the medication was  injected.    TOLERANCE & DISCHARGE CONDITION:    The patient tolerated the procedure well.  The patient was transported to the recovery area without difficulties.  The patient was discharged to home under the care of family in stable and satisfactory condition.    PLAN OF CARE:  1. The patient was given our standard instruction sheet and will resume all medications as per the medication reconciliation sheet.  2. The patient will Return to clinic 4-6 wks.  3. The patient is instructed to keep an account of pain relief after the procedure.

## 2022-05-16 RX ORDER — CLOPIDOGREL BISULFATE 75 MG/1
TABLET ORAL
Qty: 90 TABLET | Refills: 1 | Status: SHIPPED | OUTPATIENT
Start: 2022-05-16 | End: 2022-08-15

## 2022-05-16 RX ORDER — HYDRALAZINE HYDROCHLORIDE 25 MG/1
TABLET, FILM COATED ORAL
Qty: 60 TABLET | Refills: 1 | Status: SHIPPED | OUTPATIENT
Start: 2022-05-16 | End: 2022-12-02

## 2022-05-20 ENCOUNTER — TELEPHONE (OUTPATIENT)
Dept: PAIN MEDICINE | Facility: CLINIC | Age: 87
End: 2022-05-20

## 2022-05-20 DIAGNOSIS — M54.10 RADICULITIS: Primary | ICD-10-CM

## 2022-05-23 ENCOUNTER — TELEPHONE (OUTPATIENT)
Dept: INTERNAL MEDICINE | Facility: CLINIC | Age: 87
End: 2022-05-23

## 2022-05-23 NOTE — TELEPHONE ENCOUNTER
Called spoke with patient, instructed to continue our her 5 day quarantine period, followed by 10 days of masking, patient stated no current symptoms, verbally understood

## 2022-05-23 NOTE — TELEPHONE ENCOUNTER
Caller: Tamie Petre    Relationship to patient: Self    Best call back number: [phone]       Date of exposure: N/A    Date of positive COVID19 test: 5/21/22    Date if possible COVID19 exposure: N/A    COVID19 symptoms: COUGH    Date of initial quarantine: 5/21/22    Additional information or concerns: PATIENT TESTED POSITIVE 5/21/22 AND ISN'T SHOWING ANY SYMPTOMS AT THIS TIME BESIDES A COUGH.     PATIENTS COUGH  STARTED 5/18/22      PATIENT WOULD LIKE TO KNOW WHAT HER NEXT STEPS SHOULD BE     PLEASE CALL AND ADVISE     What is the patients preferred pharmacy: [facility] [address] AT [address] [place] [address] [phone] [facility] [phone] FX

## 2022-06-03 ENCOUNTER — HOSPITAL ENCOUNTER (OUTPATIENT)
Dept: CT IMAGING | Facility: HOSPITAL | Age: 87
Discharge: HOME | End: 2022-06-03
Admitting: ANESTHESIOLOGY

## 2022-06-03 DIAGNOSIS — M54.10 RADICULITIS: ICD-10-CM

## 2022-06-03 PROCEDURE — 72131 CT LUMBAR SPINE W/O DYE: CPT

## 2022-06-06 ENCOUNTER — PREP FOR SURGERY (OUTPATIENT)
Dept: SURGERY | Facility: SURGERY CENTER | Age: 87
End: 2022-06-06

## 2022-06-06 ENCOUNTER — OFFICE VISIT (OUTPATIENT)
Dept: PAIN MEDICINE | Facility: CLINIC | Age: 87
End: 2022-06-06

## 2022-06-06 VITALS
OXYGEN SATURATION: 98 % | TEMPERATURE: 96.4 F | BODY MASS INDEX: 24.23 KG/M2 | RESPIRATION RATE: 12 BRPM | WEIGHT: 123.4 LBS | HEART RATE: 87 BPM | HEIGHT: 60 IN | SYSTOLIC BLOOD PRESSURE: 170 MMHG | DIASTOLIC BLOOD PRESSURE: 70 MMHG

## 2022-06-06 DIAGNOSIS — M47.818 ARTHRITIS OF LEFT SACROILIAC JOINT: Primary | ICD-10-CM

## 2022-06-06 DIAGNOSIS — M48.061 SPINAL STENOSIS OF LUMBAR REGION, UNSPECIFIED WHETHER NEUROGENIC CLAUDICATION PRESENT: ICD-10-CM

## 2022-06-06 DIAGNOSIS — G57.02 PIRIFORMIS SYNDROME OF LEFT SIDE: ICD-10-CM

## 2022-06-06 PROCEDURE — 99215 OFFICE O/P EST HI 40 MIN: CPT | Performed by: NURSE PRACTITIONER

## 2022-06-06 RX ORDER — SODIUM CHLORIDE 0.9 % (FLUSH) 0.9 %
10 SYRINGE (ML) INJECTION AS NEEDED
Status: CANCELLED | OUTPATIENT
Start: 2022-06-06

## 2022-06-06 RX ORDER — SODIUM CHLORIDE 0.9 % (FLUSH) 0.9 %
10 SYRINGE (ML) INJECTION EVERY 12 HOURS SCHEDULED
Status: CANCELLED | OUTPATIENT
Start: 2022-06-06

## 2022-06-06 NOTE — PROGRESS NOTES
"CHIEF COMPLAINT  PROCEDURE FOLLOW UP SACROILIAC INJECTION - Left 5/09/2022.    Subjective   Tamie Peter is a 86 y.o. female  who presents to the office for follow-up of procedure.  She completed a Left SI joint injection    on  5/9/2022 performed by Dr. Gaming for management of back pain. Patient reports 80% relief from the procedure x 2 days \"the first 2 days I was really happy and thought this is going to work\".     Today her pain is 8/10VAS in severity. She has no pain with sitting.  Her pain is worsened by standing and walking.  Her pain remains in her left low back as well as left buttock and numbness and tingling radiating into her posterior left thigh, calf, and foot.     Her primary complaint is pain that is radiating down her posterior left leg into the posterior thigh, calf, and into the foot. She reports numbness and tingling downt he back of her leg as well.  She denies any weakness in her left lower extremity, she denies any changes to bowel or bladder.     Patient remained masked during entire encounter. No cough present. I donned a mask and eye protection throughout entire visit. Prior to donning mask and eye protection, hand hygiene was performed, as well as when it was doffed.  I was closer than 6 feet, but not for an extended period of time. No obvious exposure to any bodily fluids.    Back Pain  This is a chronic problem. The current episode started more than 1 year ago. The problem occurs intermittently. The pain is present in the lumbar spine and sacro-iliac. The quality of the pain is described as burning (numbing, tingling pain). The pain radiates to the left thigh and left foot (left calf). The pain is at a severity of 5/10 (this pain will increase to a 9/10VAS in severity). The symptoms are aggravated by standing (walking). Associated symptoms include numbness (left leg). Pertinent negatives include no abdominal pain, chest pain, dysuria, fever, headaches or weakness. She has tried heat " "and ice (PT (not helpful), Tylenol) for the symptoms.      PEG Assessment   What number best describes your pain on average in the past week?9  What number best describes how, during the past week, pain has interfered with your enjoyment of life?9  What number best describes how, during the past week, pain has interfered with your general activity?  9    Review of Pertinent Medical Data ---  Office visit from 5/5/2022 with Dr. Gaming reviewed.  Patient was referred by Dr. CARTER for evaluation treatment of back pain.  Her pain started in July 2021 with no inciting event.  She describes her pain as numbing, tingling, burning, and \"paralyzing\" pain.  This is 90% back pain, 10% extremity pain.  Pain is worsened by walking, standing, lying flat in bed.  She has completed physical therapy, massage therapy, use of TENS unit.  She has not seen a chiropractor, had neck or back surgery, or been to pain management in the past.  Plan for left SI joint injection.  If diagnostically negative obtain lumbar CT to evaluate because of left SI 1 radicular symptoms and consider an epidural steroid injection.  If she has short-term relief consider repeating SI joint injection versus RFA.    CT LUMBAR SPINE WITHOUT CONTRAST     HISTORY: Back pain, radiculopathy.     COMPARISON: None.     FINDINGS:  Extensive vascular calcification is noted. There is mild  fusiform enlargement of the infrarenal abdominal aorta which measures  approximately 2.5 cm in diameter.     There is severe loss of disc height at L2-L3 and to a slightly lesser  extent L5-S1. Moderate loss of disc height is appreciated at L4-L5.  Vacuum disc effect is noted at the same levels. There is a grade 1  retrolisthesis of L2 upon L3 and L4 upon L5. Moderate endplate  degenerative changes are present at L2-L3, more prominent laterally to  the right. Moderate endplate degenerative changes involving the superior  endplate of S1 are noted. Mild levoscoliosis of the lumbar spine " is  appreciated with apex at the level of L2-L3.     L1-L2: There is no evidence of disc bulge or herniation.     L2-L3: A mild central disc osteophyte complex is present resulting in  mild flattening of the ventral surface of the thecal sac. Mild foraminal  stenosis is appreciated bilaterally secondary to loss of disc height and  extension of a disc osteophyte complex into the neural foramen.     L3-L4: There is a mild broad-based disc osteophyte complex with no  evidence of herniation. Mild foraminal stenosis is present on the left  secondary to extension of a disc osteophyte complex into the neural  foramen.     L4-L5: There is moderate to severe if not severe central canal stenosis  secondary to the retrolisthesis of L4 upon L5 and a central disc  osteophyte complex when combined with moderate facet and ligamentum  flavum hypertrophy. Mild foraminal stenosis is present bilaterally  secondary to extension of a disc osteophyte complex into the neural  foramen.     L5-S1: A central disc osteophyte complex is present with no evidence of  herniation. Mild-to-moderate and moderate foraminal stenosis is present  on the right and left respectively secondary to loss of disc height and  extension of a disc osteophyte complex into the neural foramen.     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 Mild grade 1 retrolisthesis of  L2 upon L3 and L4 upon L5 is appreciated. There is no evidence of a  focal herniation. The degenerative disease does includes moderate to  severe if not severe canal stenosis at L4-L5 secondary to a central disc  osteophyte complex, posterior element of degenerative disease and a mild  grade 1 retrolisthesis of L4 upon L5. Mild-to-moderate and moderate  foraminal stenosis is present on the right and left respectively at  L5-S1. See above. A mild eccentric  "infrarenal abdominal aortic aneurysm  measuring 2.5 cm in size is noted.     Radiation dose reduction techniques were utilized, including automated  exposure control and exposure modulation based on body size.         This result has not been signed. Information might be incomplete.         The following portions of the patient's history were reviewed and updated as appropriate: allergies, current medications, past family history, past medical history, past social history, past surgical history and problem list.    Review of Systems   Constitutional: Negative for activity change, fatigue and fever.   HENT: Negative for congestion.    Eyes: Negative for visual disturbance.   Respiratory: Positive for cough. Negative for chest tightness.    Cardiovascular: Negative for chest pain.   Gastrointestinal: Negative for abdominal pain, constipation and diarrhea.   Genitourinary: Negative for difficulty urinating and dysuria.   Musculoskeletal: Positive for back pain.   Neurological: Positive for numbness (left leg). Negative for dizziness, weakness, light-headedness and headaches.   Psychiatric/Behavioral: Positive for agitation. Negative for sleep disturbance and suicidal ideas. The patient is not nervous/anxious.      --  The aforementioned information the Chief Complaint section and above subjective data including any HPI data, and also the Review of Systems data, has been personally reviewed and affirmed.  --     Vitals:    06/06/22 1355   BP: 170/70   BP Location: Left arm   Patient Position: Sitting   Pulse: 87   Resp: 12   Temp: 96.4 °F (35.8 °C)   SpO2: 98%   Weight: 56 kg (123 lb 6.4 oz)   Height: 152.4 cm (60\")   PainSc:   8   PainLoc: Back     Objective   Physical Exam  Vitals and nursing note reviewed.   Constitutional:       Appearance: Normal appearance. She is well-developed.   Eyes:      General: Lids are normal.   Cardiovascular:      Rate and Rhythm: Normal rate.   Pulmonary:      Effort: Pulmonary effort " is normal.   Musculoskeletal:      Cervical back: Normal range of motion.      Lumbar back: Tenderness present. Decreased range of motion. Negative right straight leg raise test and negative left straight leg raise test.      Comments: Neg slump test, Left  Equivocal Tay, Left  Equivocal thigh thrust, Left  Pain present with compression of left piriformis   Neurological:      Mental Status: She is alert and oriented to person, place, and time.      Motor: No weakness.      Gait: Gait normal.   Psychiatric:         Attention and Perception: Attention normal.         Mood and Affect: Mood normal.         Speech: Speech normal.         Behavior: Behavior normal.         Judgment: Judgment normal.       Assessment & Plan   Diagnoses and all orders for this visit:    1. Arthritis of left sacroiliac joint (Primary)    2. Piriformis syndrome of left side    3. Spinal stenosis of lumbar region, unspecified whether neurogenic claudication present      --- I spent 43 minutes caring for Tamie on this date of service. This time includes time spent by me in the following activities: preparing for the visit, reviewing tests, obtaining and/or reviewing a separately obtained history, performing a medically appropriate examination and/or evaluation, counseling and educating the patient/family/caregiver, ordering medications, tests, or procedures and documenting information in the medical record       --- Repeat Left SI injection with goal of achieving therapeutic benefit, will also add on a Left Piriformis injection   Reviewed the procedure at length with the patient.  Included in the review was expectations, complications, risk and benefits.The procedure was described in detail and the risks, benefits and alternatives were discussed with the patient (including but not limited to: bleeding, infection, nerve damage, worsening of pain, inability to perform injection, paralysis, seizures, coma, no pain relief and death) who agreed to  proceed.  Discussed the potential for sedation if warranted/wanted.  The procedure will plan to be performed at [facility] with fluoroscopic guidance(unless ultrasound is indicated) and could potentially have steroids and contrast dye used. Questions were answered and in a way the patient could understand.  Patient verbalized understanding and wishes to proceed.  This intervention will be ordered.  Discussed with patient that all procedures are part of a multimodal plan of care and include either formal PT or a home exercise program.  Patient has no evidence of coagulopathy or current infection.    --- If left lower extremity symptoms are not improved with the piriformis injection then recommend a LESI at L5-S1 left of midline vs. Left S1 TFESI   --- Follow-up after procedure     PREMA REPORT    As the clinician, I personally reviewed the PREMA from 6/6/2022 while the patient was in the office today.    Dictated utilizing Dragon dictation.      This document is intended for medical expert use only. Reading of this document by patients and/or patient's family without participating medical staff guidance may result in misinterpretation and unintended morbidity.   Any interpretation of such data is the responsibility of the patient and/or family member responsible for the patient in concert with their primary or specialist providers, not to be left for sources of online searches such as [employer], [employer] or similar queries. Relying on these approaches to knowledge may result in misinterpretation, misguided goals of care and even death should patients or family members try recommendations outside of the realm of professional medical care in a supervised way.

## 2022-06-20 ENCOUNTER — OFFICE VISIT (OUTPATIENT)
Dept: INTERNAL MEDICINE | Facility: CLINIC | Age: 87
End: 2022-06-20

## 2022-06-20 ENCOUNTER — TELEPHONE (OUTPATIENT)
Dept: PAIN MEDICINE | Facility: CLINIC | Age: 87
End: 2022-06-20

## 2022-06-20 ENCOUNTER — LAB (OUTPATIENT)
Dept: LAB | Facility: HOSPITAL | Age: 87
End: 2022-06-20

## 2022-06-20 VITALS
RESPIRATION RATE: 16 BRPM | OXYGEN SATURATION: 98 % | WEIGHT: 123 LBS | TEMPERATURE: 97.9 F | HEART RATE: 69 BPM | BODY MASS INDEX: 24.15 KG/M2 | DIASTOLIC BLOOD PRESSURE: 76 MMHG | SYSTOLIC BLOOD PRESSURE: 134 MMHG | HEIGHT: 60 IN

## 2022-06-20 DIAGNOSIS — E78.2 MIXED HYPERLIPIDEMIA: Chronic | ICD-10-CM

## 2022-06-20 DIAGNOSIS — E11.9 TYPE 2 DIABETES MELLITUS WITHOUT COMPLICATION, WITHOUT LONG-TERM CURRENT USE OF INSULIN: Chronic | ICD-10-CM

## 2022-06-20 DIAGNOSIS — I10 PRIMARY HYPERTENSION: Primary | Chronic | ICD-10-CM

## 2022-06-20 DIAGNOSIS — I25.10 CORONARY ARTERY DISEASE INVOLVING NATIVE CORONARY ARTERY OF NATIVE HEART WITHOUT ANGINA PECTORIS: Chronic | ICD-10-CM

## 2022-06-20 DIAGNOSIS — Z79.899 MEDICATION MANAGEMENT: ICD-10-CM

## 2022-06-20 DIAGNOSIS — N18.32 STAGE 3B CHRONIC KIDNEY DISEASE: ICD-10-CM

## 2022-06-20 LAB
ALBUMIN SERPL-MCNC: 4.4 G/DL (ref 3.5–5.2)
ALBUMIN/GLOB SERPL: 1.6 G/DL
ALP SERPL-CCNC: 99 U/L (ref 39–117)
ALT SERPL W P-5'-P-CCNC: 14 U/L (ref 1–33)
ANION GAP SERPL CALCULATED.3IONS-SCNC: 11 MMOL/L (ref 5–15)
AST SERPL-CCNC: 18 U/L (ref 1–32)
BASOPHILS # BLD AUTO: 0.05 10*3/MM3 (ref 0–0.2)
BASOPHILS NFR BLD AUTO: 0.5 % (ref 0–1.5)
BILIRUB SERPL-MCNC: 0.4 MG/DL (ref 0–1.2)
BUN SERPL-MCNC: 28 MG/DL (ref 8–23)
BUN/CREAT SERPL: 24.8 (ref 7–25)
CALCIUM SPEC-SCNC: 10 MG/DL (ref 8.6–10.5)
CHLORIDE SERPL-SCNC: 101 MMOL/L (ref 98–107)
CHOLEST SERPL-MCNC: 174 MG/DL (ref 0–200)
CO2 SERPL-SCNC: 27 MMOL/L (ref 22–29)
CREAT SERPL-MCNC: 1.13 MG/DL (ref 0.57–1)
DEPRECATED RDW RBC AUTO: 43.3 FL (ref 37–54)
EGFRCR SERPLBLD CKD-EPI 2021: 48.1 ML/MIN/1.73
EOSINOPHIL # BLD AUTO: 0.29 10*3/MM3 (ref 0–0.4)
EOSINOPHIL NFR BLD AUTO: 3.2 % (ref 0.3–6.2)
ERYTHROCYTE [DISTWIDTH] IN BLOOD BY AUTOMATED COUNT: 13.1 % (ref 12.3–15.4)
GLOBULIN UR ELPH-MCNC: 2.8 GM/DL
GLUCOSE SERPL-MCNC: 113 MG/DL (ref 65–99)
HBA1C MFR BLD: 7.4 % (ref 4.8–5.6)
HCT VFR BLD AUTO: 42.3 % (ref 34–46.6)
HDLC SERPL QL: 3.7
HDLC SERPL-MCNC: 47 MG/DL (ref 40–60)
HGB BLD-MCNC: 14.1 G/DL (ref 12–15.9)
IMM GRANULOCYTES # BLD AUTO: 0.03 10*3/MM3 (ref 0–0.05)
IMM GRANULOCYTES NFR BLD AUTO: 0.3 % (ref 0–0.5)
LDLC SERPL CALC-MCNC: 77 MG/DL (ref 0–100)
LYMPHOCYTES # BLD AUTO: 1.62 10*3/MM3 (ref 0.7–3.1)
LYMPHOCYTES NFR BLD AUTO: 17.7 % (ref 19.6–45.3)
MCH RBC QN AUTO: 30.6 PG (ref 26.6–33)
MCHC RBC AUTO-ENTMCNC: 33.3 G/DL (ref 31.5–35.7)
MCV RBC AUTO: 91.8 FL (ref 79–97)
MONOCYTES # BLD AUTO: 0.56 10*3/MM3 (ref 0.1–0.9)
MONOCYTES NFR BLD AUTO: 6.1 % (ref 5–12)
NEUTROPHILS NFR BLD AUTO: 6.59 10*3/MM3 (ref 1.7–7)
NEUTROPHILS NFR BLD AUTO: 72.2 % (ref 42.7–76)
NRBC BLD AUTO-RTO: 0 /100 WBC (ref 0–0.2)
PLATELET # BLD AUTO: 277 10*3/MM3 (ref 140–450)
PMV BLD AUTO: 10.1 FL (ref 6–12)
POTASSIUM SERPL-SCNC: 3.7 MMOL/L (ref 3.5–5.2)
PROT SERPL-MCNC: 7.2 G/DL (ref 6–8.5)
RBC # BLD AUTO: 4.61 10*6/MM3 (ref 3.77–5.28)
SODIUM SERPL-SCNC: 139 MMOL/L (ref 136–145)
TRIGL SERPL-MCNC: 314 MG/DL (ref 0–150)
VLDLC SERPL-MCNC: 50 MG/DL (ref 5–40)
WBC NRBC COR # BLD: 9.14 10*3/MM3 (ref 3.4–10.8)

## 2022-06-20 PROCEDURE — 83036 HEMOGLOBIN GLYCOSYLATED A1C: CPT | Performed by: INTERNAL MEDICINE

## 2022-06-20 PROCEDURE — 36415 COLL VENOUS BLD VENIPUNCTURE: CPT | Performed by: INTERNAL MEDICINE

## 2022-06-20 PROCEDURE — 99214 OFFICE O/P EST MOD 30 MIN: CPT | Performed by: INTERNAL MEDICINE

## 2022-06-20 PROCEDURE — 80061 LIPID PANEL: CPT | Performed by: INTERNAL MEDICINE

## 2022-06-20 PROCEDURE — 80053 COMPREHEN METABOLIC PANEL: CPT

## 2022-06-20 PROCEDURE — 85025 COMPLETE CBC W/AUTO DIFF WBC: CPT

## 2022-06-20 NOTE — TELEPHONE ENCOUNTER
Caller: Mandi Mcghee    Relationship to patient: Emergency Contact    Best call back number:     Requested date: ASAP       Additional notes:PATIENT IS CALLING FOR HER MOM WHO IS PAIN AND IS NEED A CORTISONE SHOT IN HER SI JOINT. IS WILLING TO SEE ANYONE! ALSO WANTING TO KNOW WHEN KATY IS GOING TO BE RETURNING SO THEY CAN GET ON THE LIST TO SEE HER. PLEASE CALL TO ASSIST, THANK YOU!

## 2022-06-20 NOTE — PROGRESS NOTES
Subjective   Tamie Peter is a 86 y.o. female.     Chief Complaint   Patient presents with   • Hypertension   • Hyperlipidemia   • Coronary Artery Disease   • Chronic Kidney Disease         Hypertension  This is a chronic problem. The current episode started more than 1 year ago. The problem is unchanged. The problem is controlled. Pertinent negatives include no chest pain, headaches, palpitations, peripheral edema (right foot in summer only) or shortness of breath.   Hyperlipidemia  This is a chronic problem. The current episode started more than 1 year ago. The problem is controlled. Recent lipid tests were reviewed and are normal. Pertinent negatives include no chest pain or shortness of breath.   Coronary Artery Disease  Presents for follow-up visit. Pertinent negatives include no chest pain, leg swelling, palpitations or shortness of breath. Risk factors include hyperlipidemia.   Chronic Kidney Disease  This is a chronic problem. The current episode started more than 1 year ago. The problem occurs constantly. The problem has been unchanged. Pertinent negatives include no chest pain or headaches.   Diabetes  She presents for her follow-up diabetic visit. She has type 2 diabetes mellitus. No MedicAlert identification noted. Her disease course has been stable. Pertinent negatives for hypoglycemia include no headaches. Pertinent negatives for diabetes include no chest pain. Symptoms are stable.   Sciatica  This is a recurrent problem. Pertinent negatives include no chest pain or headaches.        The following portions of the patient's history were reviewed and updated as appropriate: allergies, current medications, past social history and problem list.    Outpatient Medications Marked as Taking for the 6/20/22 encounter (Office Visit) with Mohinder Barbosa MD   Medication Sig Dispense Refill   • alendronate (FOSAMAX) 70 MG tablet Take 70 mg by mouth 1 (One) Time Per Week.     • allopurinol (ZYLOPRIM) 300 MG  tablet TAKE ONE TABLET BY MOUTH DAILY 90 tablet 1   • amLODIPine (NORVASC) 10 MG tablet Take 1 tablet by mouth Daily. 90 tablet 1   • aspirin 325 MG tablet Take 325 mg by mouth daily.     • atorvastatin (LIPITOR) 80 MG tablet TAKE ONE TABLET BY MOUTH DAILY 90 tablet 1   • clopidogrel (PLAVIX) 75 MG tablet TAKE ONE TABLET BY MOUTH DAILY 90 tablet 1   • Diclofenac Sodium (VOLTAREN) 1 % gel gel APPLY 4 GRAMS OF GEL TO AFFECTED AREA FOUR TIMES A DAY 30 g 1   • ezetimibe (ZETIA) 10 MG tablet TAKE ONE TABLET BY MOUTH DAILY 90 tablet 1   • glipizide (GLUCOTROL) 5 MG tablet Take 2 tablets by mouth Daily With Breakfast AND 1 tablet Every Evening. 270 tablet 1   • hydrALAZINE (APRESOLINE) 25 MG tablet TAKE ONE TABLET BY MOUTH TWICE A DAY 60 tablet 1   • hydroCHLOROthiazide (HYDRODIURIL) 12.5 MG tablet Take 1 tablet by mouth Daily. 90 tablet 1   • metFORMIN (GLUCOPHAGE) 500 MG tablet TAKE TWO TABLETS BY MOUTH TWICE A DAY WITH MEALS 360 tablet 1   • metoprolol tartrate (LOPRESSOR) 50 MG tablet TAKE TWO TABLETS BY MOUTH TWICE A  tablet 1       Review of Systems   Respiratory: Negative for shortness of breath and wheezing.    Cardiovascular: Negative for chest pain, palpitations and leg swelling.   Gastrointestinal: Negative for constipation and diarrhea.   Musculoskeletal: Positive for back pain.   Neurological: Negative for headaches.       Objective   Vitals:    06/20/22 1111   BP: 134/76   Pulse: 69   Resp: 16   Temp: 97.9 °F (36.6 °C)   SpO2: 98%          06/20/22  1111   Weight: 55.8 kg (123 lb)    [unfilled]  Body mass index is 24.02 kg/m².      Physical Exam   Constitutional: She appears well-developed.   Cardiovascular: Normal rate, regular rhythm and normal heart sounds. Exam reveals no gallop.   No murmur heard.  Pulmonary/Chest: Effort normal and breath sounds normal. No respiratory distress. She has no wheezes. She has no rales.   Abdominal: Normal appearance.    Tamie had a diabetic foot exam performed  today.   During the foot exam she had a monofilament test performed.    Neurological Sensory Findings -  Unaltered sharp/dull right ankle/foot discrimination and unaltered sharp/dull left ankle/foot discrimination. No right ankle/foot altered proprioception and no left ankle/foot altered proprioception  Vascular Status -  Her right foot exhibits normal foot vasculature  and no edema. Her left foot exhibits normal foot vasculature  and no edema.   Foot Structure and Biomechanics -   Her left foot exhibits hammer toes.  Neurological: She is alert.         Problems Addressed this Visit        Cardiac and Vasculature    CAD (coronary artery disease) (Chronic)    Hypertension - Primary (Chronic)    Hyperlipidemia (Chronic)       Endocrine and Metabolic    Diabetes mellitus (HCC) (Chronic)      Diagnoses       Codes Comments    Primary hypertension    -  Primary ICD-10-CM: I10  ICD-9-CM: 401.9     Mixed hyperlipidemia     ICD-10-CM: E78.2  ICD-9-CM: 272.2     Coronary artery disease involving native coronary artery of native heart without angina pectoris     ICD-10-CM: I25.10  ICD-9-CM: 414.01     Type 2 diabetes mellitus without complication, without long-term current use of insulin (HCC)     ICD-10-CM: E11.9  ICD-9-CM: 250.00         Assessment & Plan   In for recheck of hypertension, hyperlipidemia and diabetes mellitus.  CKD and CAD.  She has had some problems with sciatica on the left side for 8 months.  Started July 2021.  She feels a knot near the SI region and pain going down the left leg.  She is actually feeling the superior iliac landmark area.  Physical therapy helped a little bit as far as massage.  She has been seen by neurosurgery in the past year.  Neurosurgery recommended an injection which she refused at the time but I think that is the way to go for her.  She did get an injection in the left SI joint which helped for only 2 days.  Blood pressure control is OK.  CAD is asymptomatic.  Annual labs October  2021 including CBC, CMP, lipids, A1c, UA.  Gets BMP, a1c, lipids today and q 3 mos.  Annual wellness visit October 2021. Eye check up this year was fantastic with Dr. Dow as well as cardiology visit.  Annual wellness performed October 2021.  She is 5.5 HPP today.    The above information was reviewed again today 06/20/22.  It continues to be accurate as reflected above and is unchanged.  History, physical and review of systems all reviewed and are unchanged.  Medications were reviewed today and continue the current dosing.    PPE today includes face mask and eye shield.         Dragon disclaimer:   Much of this encounter note is an electronic transcription/translation of spoken language to printed text. The electronic translation of spoken language may permit erroneous, or at times, nonsensical words or phrases to be inadvertently transcribed; Although I have reviewed the note for such errors, some may still exist.

## 2022-06-21 RX ORDER — METHYLPREDNISOLONE 4 MG/1
TABLET ORAL
Qty: 21 TABLET | Refills: 0 | Status: SHIPPED | OUTPATIENT
Start: 2022-06-21 | End: 2022-08-16

## 2022-06-21 NOTE — TELEPHONE ENCOUNTER
Dr. Gaming will be returning in July, I am not sure of the exact date. At this time we could trial a steroid dose pack to try and decrease her pain. If this is not helpful then will need to be re-evaluated in the office.

## 2022-06-22 NOTE — TELEPHONE ENCOUNTER
If we were to consider any opioid for her pain she would need to be seen in office first. We could trial low dose gabapentin first. Please review common side effects of drowsiness, confusion, and balance issues as well as swelling and weight gain with patient. If she would like to trial this low dose then I will start her on 100 mg nightly x 7 nights, then increase to BID x 7 days, then increase to TID (as long as not having side effects). Let me know

## 2022-06-22 NOTE — TELEPHONE ENCOUNTER
Called and informed pt of your msg. She sts she will call back this afternoon and schedule a f/u with you. Called pharmacy spoke to Farideh, she deleted MDP Rx.

## 2022-06-22 NOTE — TELEPHONE ENCOUNTER
Please cancel MDP at pharmacy. Will need follow up in office to consider other medications to help control her pain. Thanks

## 2022-06-22 NOTE — TELEPHONE ENCOUNTER
I spoke with Ms. Peter today and she wanted to know if she could do a telephone visit instead of coming in the office. She states that the daughter that brings her in for her appts just had a cast put on her foot yesterday and lives in [place], [place]. She's not sure if she would be able to bring her in for an appt. She also states that she doesn't know how to access her My Chart to get on an do a video visit. Please advise.

## 2022-06-22 NOTE — TELEPHONE ENCOUNTER
Myrtle, can we please add this patient to Dr. Gaming's to be scheduled for a procedure ASAP once she returns? Thanks

## 2022-06-22 NOTE — TELEPHONE ENCOUNTER
Called and spoke with pt. Informed her of your msg. She verbalized understanding.  She sts MDP raised her bp to 200's in the past so she would prefer not to take it. Would you like to offer any other suggestions? Please advise. Thank you.

## 2022-06-27 NOTE — TELEPHONE ENCOUNTER
Patient states that she lives alone and she doesn't feel safe taking the medication. She would like to have the injection as soon as Dr. Gaming has availability.

## 2022-06-30 ENCOUNTER — TRANSCRIBE ORDERS (OUTPATIENT)
Dept: SURGERY | Facility: SURGERY CENTER | Age: 87
End: 2022-06-30

## 2022-06-30 DIAGNOSIS — Z41.9 SURGERY, ELECTIVE: Primary | ICD-10-CM

## 2022-07-08 RX ORDER — HYDROCHLOROTHIAZIDE 12.5 MG/1
TABLET ORAL
Qty: 90 TABLET | Refills: 1 | Status: SHIPPED | OUTPATIENT
Start: 2022-07-08 | End: 2022-12-02

## 2022-07-08 RX ORDER — GLIPIZIDE 5 MG/1
TABLET ORAL
Qty: 270 TABLET | Refills: 1 | Status: SHIPPED | OUTPATIENT
Start: 2022-07-08 | End: 2023-01-04

## 2022-07-12 RX ORDER — METOPROLOL TARTRATE 50 MG/1
TABLET, FILM COATED ORAL
Qty: 360 TABLET | Refills: 1 | Status: SHIPPED | OUTPATIENT
Start: 2022-07-12 | End: 2022-12-02 | Stop reason: DRUGHIGH

## 2022-07-14 NOTE — H&P
[facility]   HISTORY AND PHYSICAL    Patient Name: Tamie Peter  : 1935  MRN: [id number]  Primary Care Physician:  Mohinder Barbosa MD  Date of admission: (Not on file)    Subjective   Subjective     Chief Complaint: Left sacroiliac joint pain    Continued pain in the area of the left sacroiliac joint as well as pain radiating into the left posterior lower extremity described as burning - worse with walking.      Review of Systems   Constitutional: Negative for chills and fever.   Respiratory: Negative for cough and shortness of breath.    Musculoskeletal: Positive for back pain.        Personal History     Past Medical History:   Diagnosis Date   • Arthritis of left sacroiliac joint 2022   • CKD (chronic kidney disease), stage III (HCC)    • Coronary artery disease     cath 11/15. CABG x3 and all grafts widely patent. Very small OM and Cfx and that may be the cause of lateral ischemia on stress test. Non bipassable   • Diabetes mellitus, type II (HCC)    • GERD (gastroesophageal reflux disease)    • Gout    • Hyperlipidemia    • Hypertension    • PUD (peptic ulcer disease)        Past Surgical History:   Procedure Laterality Date   • ABDOMINAL SURGERY     • CARDIAC ELECTROPHYSIOLOGY PROCEDURE N/A 2017    Procedure: Pacemaker DC new;  Surgeon: Juvenal Zhong MD;  Location: [facility] INVASIVE LOCATION;  Service:    • CARDIAC SURGERY     • CATARACT EXTRACTION     • CORONARY ARTERY BYPASS GRAFT      ,    • PACEMAKER IMPLANTATION     • SACROILIAC JOINT INJECTION Left 2022    Procedure: SACROILIAC INJECTION - Left;  Surgeon: Yasmine Gaming MD;  Location: [facility] OR;  Service: Pain Management;  Laterality: Left;   • TUBAL ABDOMINAL LIGATION         Family History: family history includes Coronary artery disease in her mother; Heart attack in her sister. Otherwise pertinent FHx was reviewed and not pertinent to current issue.    Social History:  reports that she has never smoked.  She has never used smokeless tobacco. She reports that she does not drink alcohol and does not use drugs.    Home Medications:  Diclofenac Sodium, alendronate, allopurinol, amLODIPine, aspirin, atorvastatin, clopidogrel, ezetimibe, glipizide, hydrALAZINE, hydroCHLOROthiazide, metFORMIN, methylPREDNISolone, and metoprolol tartrate    Allergies:  Allergies   Allergen Reactions   • Pneumococcal Vaccines Paresthesia       Objective    Objective     Vitals:        Physical Exam  Constitutional:       General: She is not in acute distress.  Pulmonary:      Effort: Pulmonary effort is normal. No respiratory distress.   Neurological:      Mental Status: She is alert.   Psychiatric:         Mood and Affect: Mood normal.         Thought Content: Thought content normal.         Result Review    Result Review:  I have personally reviewed the results from the time of this admission to 7/14/2022 14:10 EDT and agree with these findings:  []  Laboratory list / accordion  []  Microbiology  []  Radiology  []  EKG/Telemetry   []  Cardiology/Vascular   []  Pathology  []  Old records  []  Other:  Most notable findings include: no new       Assessment & Plan   Assessment / Plan     Brief Patient Summary:  Tamie Peter is a 86 y.o. female who continues to have left sacroiliac joint pain as well as pain radiating into the left lower extremity.    Active Hospital Problems:  Active Hospital Problems    Diagnosis    • Piriformis syndrome of left side    • Arthritis of left sacroiliac joint      Added automatically from request for surgery [id number]       Plan: Left sacroiliac joint injection and left piriformis injection      DVT prophylaxis:  No DVT prophylaxis order currently exists.    CODE STATUS:         Yasmine Gaming MD

## 2022-07-14 NOTE — DISCHARGE INSTRUCTIONS
[facility] – [place], [place] Pain Management - Post-procedure Instructions          --  While there are no absolute restrictions, it is recommended that you do not perform strenuous activity today. In the morning, you may resume your level of activity as before your block.    --  If you have a band-aid at your injection site, please remove it later today. Observe the area for any redness, swelling, pus-like drainage, or a temperature over 101°. If any of these symptoms occur, please call your doctor at [phone]. If after office hours, leave a message and the on-call provider will return your call.    --  Ice may be applied to your injection site. It is recommended you avoid direct heat (heating pad; hot tub) for 1-2 days.    --  Call [facility] [place], [place]-Pain Management at [phone] if you experience persistent headache, persistent bleeding from the injection site, or severe pain not relieved by heat or oral medication.    --  Do not make important decisions today.    --  Due to the effects of the block and/or the I.V. Sedation, DO NOT drive or operate hazardous machinery for 12 hours.  Local anesthetics may cause numbness after procedure and precautions must be taken with regards to operating equipment as well as with walking, even if ambulating with assistance of another person or with an assistive device.    --  Do not drink alcohol for 12 hours.    -- You may return to work tomorrow, or as directed by your referring doctor.    --  Occasionally you may notice a slight increase in your pain after the procedure. This should start to improve within the next 24-48 hours. Radiofrequency ablation procedure pain may last 3-4 weeks.    --  It may take as long as 3-4 days before you notice a gradual improvement in your pain and/or other symptoms.    -- You may continue to take your prescribed pain medication as needed.    --  Some normal possible side effects of steroid use could include fluid retention, increased blood sugar, dull headache,  increased sweating, increased appetite, mood swings and flushing.    --  Diabetics are recommended to watch their blood glucose level closely for 24-48 hours after the injection.    --  Must stay in PACU for 20 min upon arrival and prove no leg weakness before being discharged.    --  IN THE EVENT OF A LIFE THREATENING EMERGENCY, (CHEST PAIN, BREATHING DIFFICULTIES, PARALYSIS…) YOU SHOULD GO TO YOUR NEAREST EMERGENCY ROOM.    --  You should be contacted by our office within 2-3 days to schedule follow up or next appointment date.  If not contacted within 7 days, please call the office at [phone]

## 2022-07-15 ENCOUNTER — HOSPITAL ENCOUNTER (OUTPATIENT)
Dept: GENERAL RADIOLOGY | Facility: SURGERY CENTER | Age: 87
Setting detail: HOSPITAL OUTPATIENT SURGERY
End: 2022-07-15

## 2022-07-15 ENCOUNTER — HOSPITAL ENCOUNTER (OUTPATIENT)
Facility: SURGERY CENTER | Age: 87
Setting detail: HOSPITAL OUTPATIENT SURGERY
Discharge: HOME | End: 2022-07-15
Attending: ANESTHESIOLOGY | Admitting: ANESTHESIOLOGY

## 2022-07-15 VITALS
OXYGEN SATURATION: 96 % | HEIGHT: 60 IN | WEIGHT: 125 LBS | HEART RATE: 70 BPM | RESPIRATION RATE: 20 BRPM | BODY MASS INDEX: 24.54 KG/M2 | DIASTOLIC BLOOD PRESSURE: 92 MMHG | SYSTOLIC BLOOD PRESSURE: 192 MMHG | TEMPERATURE: 97.3 F

## 2022-07-15 DIAGNOSIS — G57.02 PIRIFORMIS SYNDROME OF LEFT SIDE: ICD-10-CM

## 2022-07-15 DIAGNOSIS — M47.818 ARTHRITIS OF LEFT SACROILIAC JOINT: ICD-10-CM

## 2022-07-15 DIAGNOSIS — Z41.9 SURGERY, ELECTIVE: ICD-10-CM

## 2022-07-15 LAB — GLUCOSE BLDC GLUCOMTR-MCNC: 188 MG/DL (ref 70–130)

## 2022-07-15 PROCEDURE — 77002 NEEDLE LOCALIZATION BY XRAY: CPT

## 2022-07-15 PROCEDURE — 25010000002 DEXAMETHASONE SODIUM PHOSPHATE 100 MG/10ML SOLUTION 10 ML VIAL: Performed by: ANESTHESIOLOGY

## 2022-07-15 PROCEDURE — 27096 INJECT SACROILIAC JOINT: CPT | Performed by: ANESTHESIOLOGY

## 2022-07-15 PROCEDURE — G0260 INJ FOR SACROILIAC JT ANESTH: HCPCS | Performed by: ANESTHESIOLOGY

## 2022-07-15 PROCEDURE — 20552 NJX 1/MLT TRIGGER POINT 1/2: CPT | Performed by: ANESTHESIOLOGY

## 2022-07-15 RX ORDER — SODIUM CHLORIDE 0.9 % (FLUSH) 0.9 %
10 SYRINGE (ML) INJECTION AS NEEDED
Status: DISCONTINUED | OUTPATIENT
Start: 2022-07-15 | End: 2022-07-15 | Stop reason: HOSPADM

## 2022-07-15 RX ORDER — SODIUM CHLORIDE 0.9 % (FLUSH) 0.9 %
10 SYRINGE (ML) INJECTION EVERY 12 HOURS SCHEDULED
Status: DISCONTINUED | OUTPATIENT
Start: 2022-07-15 | End: 2022-07-15 | Stop reason: HOSPADM

## 2022-07-15 NOTE — OP NOTE
Left Sacroiliac Joint Injection & Left Piriformis Injection  [facility]      PREOPERATIVE DIAGNOSIS:   Sacroiliac joint dysfunction, Piriformis Syndrome    POSTOPERATIVE DIAGNOSIS:  Sacroiliac joint dysfunction, Piriformis Syndrome    PROCEDURE:  Sacroiliac Joint Injection, with fluoroscopic guidance CTP 42987 -LT, Piriformis Muscle Injection 41497, 59364    PRE-PROCEDURE DISCUSSION WITH PATIENT:    Risks and complications were discussed with the patient prior to starting the procedure and informed consent was obtained.  We discussed various topics including but not limited to bleeding, infection, injury, postprocedural site soreness, painful flareup, worsening of clinical picture, paralysis, coma, and death.     SURGEON:  Yasmine Gaming MD    REASON FOR PROCEDURE:    Patient has pain consistent with SI pathology on history and physical exam. Tender to palpation over the affected SI joint. Pain with stretching the piriformis muscle    SEDATION:  Patient declined administration of moderate sedation    ANESTHETIC AGENT:  Lidocaine 1% for Sacroiliac joint, 0.75% Bupivacaine for Piriformis  STEROID AGENT:  10mg Dexamethasone    DESCRIPTON OF PROCEDURE:  After obtaining informed consent, IV access was not obtained in the preoperative area.  The patient was transported to the operative suite and placed in the prone position with a pillow under the pelvic area. EKG, blood pressure, and pulse oximeter were monitored. The lumbosacral area was prepped with Chloraprep and draped in a sterile fashion. Under fluoroscopic guidance the inferior most portion of the Left SI joint was identified. The overlying skin and subcutaneous tissue was anesthetized with 1% lidocaine. A 22-gauge spinal needle was then advanced under fluoroscopic guidance in a coaxial view into the joint space.  After negative aspiration, a volume of 3ml of the above medication was injected.    Needle was removed intact.      Attention was  turned to the left piriformis muscle.  The femoral head on the left side was identified in an AP fluoroscopic image.  The skin and subcutaneous tissue superior to that target was anesthetized with 1% lidocaine. A 22-gauge spinal needle was then advanced percutaneously through the anesthetized skin tract under fluoroscopic guidance into the belly of the piriformis muscle.  After negative aspiration for blood a volume of 2mL of air was injected, producing a pneumogram of the piriformis muscle.  Subsequently, a volume of 5mL consisting of 2.5mg Dexamethasone with 0.75% Bupivacaine was injected without resistance.    Vital signs remained stable.  The onset of analgesia was noted.      ESTIMATED BLOOD LOSS:  minimal  SPECIMENS:  None  COMPLICATIONS:  No complications were noted.    TOLERANCE & DISCHARGE CONDITION:    The patient tolerated the procedure well.  The patient was transported to the recovery area without difficulties.  The patient was discharged to home under the care of family in stable and satisfactory condition.    PLAN OF CARE:  1. The patient was given our standard instruction sheet and will resume all medications as per the medication reconciliation sheet.  2. The patient will Return to clinic 4-6 wks.  3. The patient is instructed to keep an account of pain relief after the procedure.

## 2022-07-19 ENCOUNTER — TELEPHONE (OUTPATIENT)
Dept: PAIN MEDICINE | Facility: CLINIC | Age: 87
End: 2022-07-19

## 2022-08-15 RX ORDER — CLOPIDOGREL BISULFATE 75 MG/1
TABLET ORAL
Qty: 59 TABLET | Refills: 2 | Status: SHIPPED | OUTPATIENT
Start: 2022-08-15 | End: 2022-12-02

## 2022-08-16 ENCOUNTER — OFFICE VISIT (OUTPATIENT)
Dept: PAIN MEDICINE | Facility: CLINIC | Age: 87
End: 2022-08-16

## 2022-08-16 VITALS
SYSTOLIC BLOOD PRESSURE: 149 MMHG | TEMPERATURE: 97.3 F | OXYGEN SATURATION: 99 % | HEART RATE: 61 BPM | WEIGHT: 126.2 LBS | BODY MASS INDEX: 24.77 KG/M2 | DIASTOLIC BLOOD PRESSURE: 77 MMHG | HEIGHT: 60 IN | RESPIRATION RATE: 16 BRPM

## 2022-08-16 DIAGNOSIS — G57.02 PIRIFORMIS SYNDROME OF LEFT SIDE: ICD-10-CM

## 2022-08-16 DIAGNOSIS — M47.818 ARTHRITIS OF LEFT SACROILIAC JOINT: Primary | ICD-10-CM

## 2022-08-16 DIAGNOSIS — M48.061 SPINAL STENOSIS OF LUMBAR REGION, UNSPECIFIED WHETHER NEUROGENIC CLAUDICATION PRESENT: ICD-10-CM

## 2022-08-16 PROCEDURE — 99214 OFFICE O/P EST MOD 30 MIN: CPT | Performed by: NURSE PRACTITIONER

## 2022-08-16 RX ORDER — SPIRONOLACTONE 25 MG/1
TABLET ORAL
COMMUNITY
Start: 2022-08-08 | End: 2022-09-20 | Stop reason: SDUPTHER

## 2022-08-16 NOTE — PROGRESS NOTES
CHIEF COMPLAINT  Procedure follow up     Subjective   Tamie Peter is a 86 y.o. female  who presents to the office for follow-up of procedure.  She completed a left Sacroiliac joint injection and left piriformis injection  on  7/15/2022 performed by Dr. Gaming for management of back pain. Patient reports 80% relief from the procedure x 3-4 days and then her pain became severe, then her pain improved significantly.     Today her pain is 0/10VAS     Procedure list:  5/9/2022-left SI joint injection-80% relief x2 days    Back Pain  This is a chronic problem. The current episode started more than 1 year ago. The problem occurs intermittently. The pain is present in the lumbar spine and sacro-iliac. The quality of the pain is described as burning (numbing, tingling pain). The pain radiates to the left thigh and left foot (left calf). The pain is at a severity of 0/10. The symptoms are aggravated by standing (walking). Pertinent negatives include no abdominal pain, chest pain, dysuria, fever, headaches, numbness (left leg occasionally) or weakness. She has tried heat and ice (PT (not helpful), Tylenol) for the symptoms.      PEG Assessment   What number best describes your pain on average in the past week?8  What number best describes how, during the past week, pain has interfered with your enjoyment of life?9  What number best describes how, during the past week, pain has interfered with your general activity?  9    The following portions of the patient's history were reviewed and updated as appropriate: allergies, current medications, past family history, past medical history, past social history, past surgical history and problem list.    Review of Systems   Constitutional: Negative for fever.   HENT: Negative for congestion.    Eyes: Negative for visual disturbance.   Respiratory: Negative for shortness of breath.    Cardiovascular: Negative for chest pain.   Gastrointestinal: Negative for abdominal pain,  "constipation and diarrhea.   Genitourinary: Negative for difficulty urinating, dyspareunia and dysuria.   Musculoskeletal: Negative for back pain.   Neurological: Negative for weakness, numbness (left leg occasionally) and headaches.   Psychiatric/Behavioral: Positive for sleep disturbance. Negative for suicidal ideas.     --  The aforementioned information the Chief Complaint section and above subjective data including any HPI data, and also the Review of Systems data, has been personally reviewed and affirmed.  --     Vitals:    08/16/22 1138   BP: 149/77   Pulse: 61   Resp: 16   Temp: 97.3 °F (36.3 °C)   SpO2: 99%   Weight: 57.2 kg (126 lb 3.2 oz)   Height: 152.4 cm (60\")   PainSc: 0-No pain     Objective   Physical Exam  Vitals and nursing note reviewed.   Constitutional:       Appearance: Normal appearance. She is well-developed.   Eyes:      General: Lids are normal.   Cardiovascular:      Rate and Rhythm: Normal rate.   Pulmonary:      Effort: Pulmonary effort is normal.   Musculoskeletal:      Lumbar back: Tenderness (sore) present.   Neurological:      Mental Status: She is alert and oriented to person, place, and time.   Psychiatric:         Attention and Perception: Attention normal.         Mood and Affect: Mood normal.         Speech: Speech normal.         Behavior: Behavior normal.         Judgment: Judgment normal.       Assessment & Plan   Diagnoses and all orders for this visit:    1. Arthritis of left sacroiliac joint (Primary)    2. Piriformis syndrome of left side    3. Spinal stenosis of lumbar region, unspecified whether neurogenic claudication present      --- I spent 32 minutes caring for Tamie on this date of service. This time includes time spent by me in the following activities: preparing for the visit, obtaining and/or reviewing a separately obtained history, counseling and educating the patient/family/caregiver and documenting information in the medical record     --- Extensive " discussion with both the patient and her daughter regarding Ms. Peter's pain relief.  Discussed that there is no way to know how long this will last.  Discussed that if her left leg pain were to worsen significantly my recommendation would be to proceed with an LESI as she has significant stenosis, we would need to get clearance to hold her Plavix x7 days Dr. Barbosa prior.      ---Discussed that if it is the left low back pain/sacroiliac joint pain that returns then I would recommend proceeding with left S1-S3 RFA.  Discussed that with regards to increasing activity level I recommend increasing this slowly and listening to her body.  Discussed that if her pain worsens recommend that she contact her office prior to this becoming severe so that we can work towards further interventions if needed.  Patient and daughter state understanding.      --- Follow-up if pain fails to improve or worsen.      Dictated utilizing Dragon dictation.      This document is intended for medical expert use only. Reading of this document by patients and/or patient's family without participating medical staff guidance may result in misinterpretation and unintended morbidity.   Any interpretation of such data is the responsibility of the patient and/or family member responsible for the patient in concert with their primary or specialist providers, not to be left for sources of online searches such as [employer], [employer] or similar queries. Relying on these approaches to knowledge may result in misinterpretation, misguided goals of care and even death should patients or family members try recommendations outside of the realm of professional medical care in a supervised way.    Patient remained masked during entire encounter. No cough present. I donned a mask and eye protection throughout entire visit. Prior to donning mask and eye protection, hand hygiene was performed, as well as when it was doffed.  I was closer than 6 feet, but not for an  extended period of time. No obvious exposure to any bodily fluids.

## 2022-09-12 ENCOUNTER — TELEPHONE (OUTPATIENT)
Dept: INTERNAL MEDICINE | Facility: CLINIC | Age: 87
End: 2022-09-12

## 2022-09-12 NOTE — TELEPHONE ENCOUNTER
Caller: Tamie Peter    Relationship: Self    Best call back number: [phone]      What orders are you requesting (i.e. lab or imaging): LAB ORDERS     In what timeframe would the patient need to come in: WEEK BEFORE APPT 09/22/22    Where will you receive your lab/imaging services: OFFICE    Additional notes: PATIENT IS ASKING TO GET CALLBACK TO SCHEDULE APPT AS SOON AS ORDERS ARE IN.          
Called scheduled labs 9/22  
no

## 2022-09-14 NOTE — PROGRESS NOTES
The patient has a pain history of the following:  Acute left-sided low back pain     Previous interventions that the patient has received include:   Left sacroiliac joint and left piriformis injection 7/15/2022 -80% relief  Left sacroiliac joint injection 5/9/2022 -80% x 2 days     Pain medications include:  Voltaren gel      Other conservative modalities which the patient reports using include:  Physical Therapy: yes  Chiropractor: no  Massage Therapy: yes  TENS: yes  Neck or back surgery: no  Past pain management: no     Past Significant Surgical History:  None     HPI:     CHIEF COMPLAINT Back Pain  F/u back pain. Pt sts pain has worsened since last ov.     Subjective   Tamie Peter is a 86 y.o. female  who presents for follow-up.  She has a history of chronic left-sided low back pain.  At her previous visit it was explained that there is no way to know how long the injections will last.  It was discussed that if her left lower extremity pain were to worsen significantly, consideration will be made for a lumbar epidural steroid injection to treat her significant stenosis.  She would need clearance to hold her Plavix prior to proceeding with this injection.  If her left sacroiliac joint pain returns, may consider left S1-S3 radiofrequency ablation..    History of Present Illness  Ms. Peter presents today with her daughter.  She states that her pain has worsened since her last visit.  The injection lasted approximately 1 week, then the pain returned.  She describes the pain as a burning, stinging sensation that starts in the low back wraps around through her buttocks and hip into the left lower extremity traveling into all of her toes.  The pain is been so severe that it has woken her from sleep at times.  She has a trip planned to [place] next month and would like to have the pain better controlled by then.  She has bought a quad cane to use for this trip because walking is the most painful activity.  She is  hesitant to take any medications, including over-the-counter Tylenol.       PEG Assessment   What number best describes your pain on average in the past week?7  What number best describes how, during the past week, pain has interfered with your enjoyment of life?7  What number best describes how, during the past week, pain has interfered with your general activity?  7    REVIEW OF PERTINENT MEDICAL DATA  6/3/22 CT LUMBAR SPINE WITHOUT CONTRAST     HISTORY: Back pain, radiculopathy.     COMPARISON: None.     FINDINGS:  Extensive vascular calcification is noted. There is mild  fusiform enlargement of the infrarenal abdominal aorta which measures  approximately 2.5 cm in diameter.     There is severe loss of disc height at L2-L3 and to a slightly lesser  extent L5-S1. Moderate loss of disc height is appreciated at L4-L5.  Vacuum disc effect is noted at the same levels. There is a grade 1  retrolisthesis of L2 upon L3 and L4 upon L5. Moderate endplate  degenerative changes are present at L2-L3, more prominent laterally to  the right. Moderate endplate degenerative changes involving the superior  endplate of S1 are noted. Mild levoscoliosis of the lumbar spine is  appreciated with apex at the level of L2-L3.     L1-L2: There is no evidence of disc bulge or herniation.     L2-L3: A mild central disc osteophyte complex is present resulting in  mild flattening of the ventral surface of the thecal sac. Mild foraminal  stenosis is appreciated bilaterally secondary to loss of disc height and  extension of a disc osteophyte complex into the neural foramen.     L3-L4: There is a mild broad-based disc osteophyte complex with no  evidence of herniation. Mild foraminal stenosis is present on the left  secondary to extension of a disc osteophyte complex into the neural  foramen.     L4-L5: There is moderate to severe if not severe central canal stenosis  secondary to the retrolisthesis of L4 upon L5 and a central disc  osteophyte  complex when combined with moderate facet and ligamentum  flavum hypertrophy. Mild foraminal stenosis is present bilaterally  secondary to extension of a disc osteophyte complex into the neural  foramen.     L5-S1: A central disc osteophyte complex is present with no evidence of  herniation. Mild-to-moderate and moderate foraminal stenosis is present  on the right and left respectively secondary to loss of disc height and  extension of a disc osteophyte complex into the neural foramen.     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 Mild grade 1 retrolisthesis of  L2 upon L3 and L4 upon L5 is appreciated. There is no evidence of a  focal herniation. The degenerative disease does includes moderate to  severe if not severe canal stenosis at L4-L5 secondary to a central disc  osteophyte complex, posterior element of degenerative disease and a mild  grade 1 retrolisthesis of L4 upon L5. Mild-to-moderate and moderate  foraminal stenosis is present on the right and left respectively at  L5-S1. See above. A mild eccentric infrarenal abdominal aortic aneurysm  measuring 2.5 cm in size is noted.     Radiation dose reduction techniques were utilized, including automated  exposure control and exposure modulation based on body size.     This report was finalized on 6/7/2022 7:11 AM by Dr. Lawrence Jason M.D.    The following portions of the patient's history were reviewed and updated as appropriate: allergies, current medications, past family history, past medical history, past social history, past surgical history and problem list.    Review of Systems   Constitutional: Positive for activity change (dec). Negative for fatigue and fever.   HENT: Negative for congestion.    Eyes: Negative for visual disturbance.   Respiratory: Negative for cough and shortness of breath.   "  Cardiovascular: Negative for chest pain.   Gastrointestinal: Negative for constipation and diarrhea.   Genitourinary: Negative for difficulty urinating.   Musculoskeletal: Positive for back pain.   Neurological: Negative for dizziness, weakness, light-headedness, numbness and headaches.   Psychiatric/Behavioral: Negative for agitation, sleep disturbance and suicidal ideas. The patient is not nervous/anxious.      I have reviewed and confirmed the accuracy of the ROS as documented by the MA/LPN/RN Yasmine Gaming MD    Vitals:    09/20/22 1138   BP: 175/93   Pulse: 83   Resp: 20   Temp: 97.7 °F (36.5 °C)   SpO2: 97%   Weight: 56.2 kg (124 lb)   Height: 152.4 cm (60\")   PainSc:   7   PainLoc: Back         Objective   Physical Exam  Vitals reviewed.   Constitutional:       General: She is not in acute distress.  Pulmonary:      Effort: Pulmonary effort is normal. No respiratory distress.   Musculoskeletal:      Comments: Positive straight leg test on the left.  Positive Edson Tay's test on the left.   Skin:     General: Skin is warm and dry.   Neurological:      General: No focal deficit present.      Mental Status: She is alert.   Psychiatric:         Mood and Affect: Mood normal.         Thought Content: Thought content normal.             Assessment & Plan   Diagnoses and all orders for this visit:    1. Radiculitis (Primary)  -     Case Request    2. Spinal stenosis of lumbar region, unspecified whether neurogenic claudication present  -     Case Request          - Will reach out to Dr. Barbosa to determine if she can hold her plavix for 7 days prior to and resume 24 hours following an epidural steroid injection.   - ADDENDUM: 9/20/22 Dr. Chelsey castroed plavix hold.  Order placed    **ADDENDUM 9/22/22-- Dr. Barbosa recommends cardiologist approval to hold plavix.  [facility]   -ADDENDUM 9/28/22 -- Dr. Parekh okorion'ed plavix & aspirin hold.  She needs to hold only plavix for this procedure. "   - If she has authorization to hold plavix, we will schedule her for a left L4-5 and left L5-S1 Transforaminal epidural steroid injection.  Risks discussed including but not limited to bleeding, bruising, infection, damage to surrounding structures, headache, and rare things such as being paralyzed, seizure, stroke, heart attack and death.  The risk of steroid medications include but are not limited to immunosuppression, which can increase the risk of jhon an infectious disease as well as decrease the immune response to a vaccine.    - May consider either gabapentin or short-term hydrocodone if needed after injection for pain control for her trip.   - Tamie Peter reports a pain score of 7.  Given her pain assessment as noted, treatment options were discussed and the following options were decided upon as a follow-up plan to address the patient's pain: steroid injections.      --- Follow-up for left L4-5 and left L5-S1 Transforaminal epidural steroid injection if approved to hold plavix and office visit 4-6 weeks after.            While examining this patient, I wore protective equipment including a mask, eye shield and gloves.  I washed my hands before and after this patient encounter.  The patient wore a mask throughout the visit as well.     Yasmine Gaming MD  Pain Management

## 2022-09-20 ENCOUNTER — OFFICE VISIT (OUTPATIENT)
Dept: PAIN MEDICINE | Facility: CLINIC | Age: 87
End: 2022-09-20

## 2022-09-20 ENCOUNTER — PREP FOR SURGERY (OUTPATIENT)
Dept: SURGERY | Facility: SURGERY CENTER | Age: 87
End: 2022-09-20

## 2022-09-20 VITALS
BODY MASS INDEX: 24.35 KG/M2 | HEIGHT: 60 IN | OXYGEN SATURATION: 97 % | TEMPERATURE: 97.7 F | SYSTOLIC BLOOD PRESSURE: 175 MMHG | DIASTOLIC BLOOD PRESSURE: 93 MMHG | RESPIRATION RATE: 20 BRPM | HEART RATE: 83 BPM | WEIGHT: 124 LBS

## 2022-09-20 DIAGNOSIS — M48.061 SPINAL STENOSIS OF LUMBAR REGION, UNSPECIFIED WHETHER NEUROGENIC CLAUDICATION PRESENT: ICD-10-CM

## 2022-09-20 DIAGNOSIS — M54.10 RADICULITIS: Primary | ICD-10-CM

## 2022-09-20 PROCEDURE — 99214 OFFICE O/P EST MOD 30 MIN: CPT | Performed by: ANESTHESIOLOGY

## 2022-09-20 RX ORDER — SODIUM CHLORIDE 0.9 % (FLUSH) 0.9 %
10 SYRINGE (ML) INJECTION EVERY 12 HOURS SCHEDULED
Status: CANCELLED | OUTPATIENT
Start: 2022-09-20

## 2022-09-20 RX ORDER — SPIRONOLACTONE 25 MG/1
25 TABLET ORAL DAILY
COMMUNITY
Start: 2022-08-08 | End: 2022-12-02 | Stop reason: SDUPTHER

## 2022-09-20 RX ORDER — SODIUM CHLORIDE 0.9 % (FLUSH) 0.9 %
10 SYRINGE (ML) INJECTION AS NEEDED
Status: CANCELLED | OUTPATIENT
Start: 2022-09-20

## 2022-09-20 NOTE — PATIENT INSTRUCTIONS
What to expect if we're setting up an injection/procedure    - Once I hear back from Dr. Barbosa, if you're authorized to hold your Plavix, we will call you to schedule the injection.    - You should be scheduled for your procedure before you leave the office.  If you were not, please call our office to schedule.   - If you have any symptoms of an infection or of COVID, please reschedule your procedure.   - LIGHT Intravenous (IV) sedation is offered for some procedures: you will NOT be put to sleep.  If you plan to have sedation, do not eat or drink anything on the day of your injection.   - Most procedures require having someone drive you.  Please make sure you arrange a  unless told otherwise.   - If you take a blood thinner and you were not instructed whether to continue or hold it, please contact us with any questions.     May consider either gabapentin or pain medication for your trip if your pain is not better controlled.

## 2022-09-22 ENCOUNTER — OFFICE VISIT (OUTPATIENT)
Dept: INTERNAL MEDICINE | Facility: CLINIC | Age: 87
End: 2022-09-22

## 2022-09-22 VITALS
WEIGHT: 127 LBS | HEIGHT: 60 IN | HEART RATE: 87 BPM | RESPIRATION RATE: 16 BRPM | DIASTOLIC BLOOD PRESSURE: 72 MMHG | BODY MASS INDEX: 24.94 KG/M2 | SYSTOLIC BLOOD PRESSURE: 130 MMHG | OXYGEN SATURATION: 97 % | TEMPERATURE: 97.9 F

## 2022-09-22 DIAGNOSIS — E78.2 MIXED HYPERLIPIDEMIA: Chronic | ICD-10-CM

## 2022-09-22 DIAGNOSIS — I25.10 CORONARY ARTERY DISEASE INVOLVING NATIVE CORONARY ARTERY OF NATIVE HEART WITHOUT ANGINA PECTORIS: Chronic | ICD-10-CM

## 2022-09-22 DIAGNOSIS — N18.32 STAGE 3B CHRONIC KIDNEY DISEASE: Chronic | ICD-10-CM

## 2022-09-22 DIAGNOSIS — I10 PRIMARY HYPERTENSION: Primary | Chronic | ICD-10-CM

## 2022-09-22 DIAGNOSIS — E11.9 TYPE 2 DIABETES MELLITUS WITHOUT COMPLICATION, WITHOUT LONG-TERM CURRENT USE OF INSULIN: Chronic | ICD-10-CM

## 2022-09-22 DIAGNOSIS — Z79.899 MEDICATION MANAGEMENT: ICD-10-CM

## 2022-09-22 PROCEDURE — 99214 OFFICE O/P EST MOD 30 MIN: CPT | Performed by: INTERNAL MEDICINE

## 2022-09-22 NOTE — PROGRESS NOTES
Subjective   Tamie Peter is a 86 y.o. female.     Chief Complaint   Patient presents with   • Hyperlipidemia   • Hypertension   • Coronary Artery Disease         Hyperlipidemia  This is a chronic problem. The current episode started more than 1 year ago. The problem is controlled. Recent lipid tests were reviewed and are normal. Pertinent negatives include no chest pain or shortness of breath.   Hypertension  This is a chronic problem. The current episode started more than 1 year ago. The problem is unchanged. The problem is controlled. Pertinent negatives include no chest pain, headaches, palpitations, peripheral edema (right foot in summer only) or shortness of breath.   Coronary Artery Disease  Presents for follow-up visit. Pertinent negatives include no chest pain, leg swelling, palpitations or shortness of breath. Risk factors include hyperlipidemia.   Chronic Kidney Disease  This is a chronic problem. The current episode started more than 1 year ago. The problem occurs constantly. The problem has been unchanged. Pertinent negatives include no chest pain or headaches.   Diabetes  She presents for her follow-up diabetic visit. She has type 2 diabetes mellitus. No MedicAlert identification noted. Her disease course has been stable. Pertinent negatives for hypoglycemia include no headaches. Pertinent negatives for diabetes include no chest pain. Symptoms are stable.   Sciatica  This is a recurrent problem. Pertinent negatives include no chest pain or headaches.        The following portions of the patient's history were reviewed and updated as appropriate: allergies, current medications, past social history and problem list.    Outpatient Medications Marked as Taking for the 9/22/22 encounter (Office Visit) with Mohinder Barbosa MD   Medication Sig Dispense Refill   • alendronate (FOSAMAX) 70 MG tablet Take 70 mg by mouth 1 (One) Time Per Week.     • allopurinol (ZYLOPRIM) 300 MG tablet TAKE ONE TABLET BY MOUTH  DAILY 90 tablet 1   • amLODIPine (NORVASC) 10 MG tablet Take 1 tablet by mouth Daily. 90 tablet 1   • aspirin 325 MG tablet Take 325 mg by mouth daily.     • atorvastatin (LIPITOR) 80 MG tablet TAKE ONE TABLET BY MOUTH DAILY 90 tablet 1   • clopidogrel (PLAVIX) 75 MG tablet TAKE ONE TABLET BY MOUTH DAILY 59 tablet 2   • Diclofenac Sodium (VOLTAREN) 1 % gel gel APPLY 4 GRAMS OF GEL TO AFFECTED AREA FOUR TIMES A DAY 30 g 1   • ezetimibe (ZETIA) 10 MG tablet TAKE ONE TABLET BY MOUTH DAILY 90 tablet 1   • glipizide (GLUCOTROL) 5 MG tablet TAKE TWO TABLETS BY MOUTH DAILY WITH BREAKFAST AND TAKE ONE TABLET BY MOUTH EVERY EVENING 270 tablet 1   • hydrALAZINE (APRESOLINE) 25 MG tablet TAKE ONE TABLET BY MOUTH TWICE A DAY 60 tablet 1   • hydroCHLOROthiazide (HYDRODIURIL) 12.5 MG tablet TAKE ONE TABLET BY MOUTH DAILY 90 tablet 1   • metFORMIN (GLUCOPHAGE) 500 MG tablet TAKE TWO TABLETS BY MOUTH TWICE A DAY WITH MEALS 360 tablet 1   • metoprolol tartrate (LOPRESSOR) 50 MG tablet TAKE TWO TABLETS BY MOUTH TWICE A  tablet 1   • spironolactone (ALDACTONE) 25 MG tablet Take 25 mg by mouth Daily.         Review of Systems   Respiratory: Negative for shortness of breath and wheezing.    Cardiovascular: Negative for chest pain, palpitations and leg swelling.   Gastrointestinal: Negative for constipation and diarrhea.   Musculoskeletal: Positive for back pain.   Neurological: Negative for headaches.       Objective   Vitals:    09/22/22 0954   BP: 130/72   Pulse: 87   Resp: 16   Temp: 97.9 °F (36.6 °C)   SpO2: 97%          09/22/22  0954   Weight: 57.6 kg (127 lb)    [unfilled]  Body mass index is 24.8 kg/m².      Physical Exam   Constitutional: She appears well-developed.   Cardiovascular: Normal rate, regular rhythm and normal heart sounds. Exam reveals no gallop.   No murmur heard.  Pulmonary/Chest: Effort normal and breath sounds normal. No respiratory distress. She has no wheezes. She has no rales.   Abdominal: Normal  appearance.    Tamie had a diabetic foot exam performed today.   During the foot exam she had a monofilament test performed.    Neurological Sensory Findings -  Unaltered sharp/dull right ankle/foot discrimination and unaltered sharp/dull left ankle/foot discrimination. No right ankle/foot altered proprioception and no left ankle/foot altered proprioception  Vascular Status -  Her right foot exhibits normal foot vasculature  and no edema. Her left foot exhibits normal foot vasculature  and no edema.   Foot Structure and Biomechanics -   Her left foot exhibits hammer toes.  Neurological: She is alert.         Problems Addressed this Visit        Cardiac and Vasculature    CAD (coronary artery disease) (Chronic)    Hypertension - Primary (Chronic)    Hyperlipidemia (Chronic)       Endocrine and Metabolic    Diabetes mellitus (HCC) (Chronic)       Genitourinary and Reproductive     CKD (chronic kidney disease) stage 3, GFR 30-59 ml/min (CMS/HCC) (Chronic)      Diagnoses       Codes Comments    Primary hypertension    -  Primary ICD-10-CM: I10  ICD-9-CM: 401.9     Mixed hyperlipidemia     ICD-10-CM: E78.2  ICD-9-CM: 272.2     Coronary artery disease involving native coronary artery of native heart without angina pectoris     ICD-10-CM: I25.10  ICD-9-CM: 414.01     Type 2 diabetes mellitus without complication, without long-term current use of insulin (HCC)     ICD-10-CM: E11.9  ICD-9-CM: 250.00     Stage 3b chronic kidney disease (HCC)     ICD-10-CM: N18.32  ICD-9-CM: 585.3         Assessment & Plan   In for recheck of hypertension, hyperlipidemia and diabetes mellitus.  CKD and CAD.  She has had some problems with sciatica on the left side for 8 months.  Started July 2021.  She feels a knot near the SI region and pain going down the left leg.  She is actually feeling the superior iliac landmark area.  Physical therapy helped a little bit as far as massage.  She has been seen by neurosurgery in the past year.   Neurosurgery recommended an injection which she refused at the time but I think that is the way to go for her.  She did get an injection in the left SI joint which helped for only 2 days.  Pain management is now looking in an epidural and wants to know if she can stop her Plavix.  She has had 2 different bypass surgeries as well as 9 different stents over the years.  We will need to have cardiologist sign off on the Plavix.  Blood pressure control is good with the addition of spironolactone recently.  CAD is asymptomatic.  Annual labs October 2021 including CBC, CMP, lipids, A1c, UA.  Gets BMP, a1c, lipids today and q 3 mos.  Eye check up this year was fantastic with Dr. Dow as well as cardiology visit Dr. Iglesia Bryant at [phone].  Annual wellness performed October 2021.  She is 2.5 HPP today.    The above information was reviewed again today 09/22/22.  It continues to be accurate as reflected above and is unchanged.  History, physical and review of systems all reviewed and are unchanged.  Medications were reviewed today and continue the current dosing.    PPE today includes face mask and eye shield.         Dragon disclaimer:   Much of this encounter note is an electronic transcription/translation of spoken language to printed text. The electronic translation of spoken language may permit erroneous, or at times, nonsensical words or phrases to be inadvertently transcribed; Although I have reviewed the note for such errors, some may still exist.

## 2022-09-23 ENCOUNTER — TRANSCRIBE ORDERS (OUTPATIENT)
Dept: SURGERY | Facility: SURGERY CENTER | Age: 87
End: 2022-09-23

## 2022-09-23 DIAGNOSIS — Z41.9 SURGERY, ELECTIVE: Primary | ICD-10-CM

## 2022-09-23 PROBLEM — M54.10 RADICULITIS: Status: ACTIVE | Noted: 2022-09-23

## 2022-09-28 ENCOUNTER — TELEPHONE (OUTPATIENT)
Dept: PAIN MEDICINE | Facility: CLINIC | Age: 87
End: 2022-09-28

## 2022-09-28 NOTE — TELEPHONE ENCOUNTER
We've received cardiac clearance from Dr. Parekh today to hold plavix and aspirin x 7 days prior to procedures. Scanned in media for your review. Please advise. Thank you.

## 2022-10-10 ENCOUNTER — TELEPHONE (OUTPATIENT)
Dept: INTERNAL MEDICINE | Facility: CLINIC | Age: 87
End: 2022-10-10

## 2022-10-10 NOTE — TELEPHONE ENCOUNTER
Pt wanted to confirm which blood thinners she is taking. Informed her that based on med list: plavix and aspirin are the blood thinners.

## 2022-10-10 NOTE — TELEPHONE ENCOUNTER
Caller: Tamie Peter    Relationship: Self    Best call back number: [phone]     What is the best time to reach you: ASAP    Who are you requesting to speak with (clinical staff, provider,  specific staff member): DR SINGLETARY OR HIS MA    Do you know the name of the person who called: TAMIE PETER    What was the call regarding: MED CONCERN:  PT ALSO WANTED TO LET DR SINGLETARY KNOW SHE GOT HIGH DOSE OF FLU SHOT AT [facility] ON 10/07/22.    Do you require a callback: YES

## 2022-10-13 NOTE — DISCHARGE INSTRUCTIONS
[facility] - Post-procedure Instructions          --  While there are no absolute restrictions, it is recommended that you do not perform strenuous activity today. In the morning, you may resume your level of activity as before your block.    --  If you have a band-aid at your injection site, please remove it later today. Observe the area for any redness, swelling, pus-like drainage, or a temperature over 101°. If any of these symptoms occur, please call your doctor at [phone]. If after office hours, leave a message and the on-call provider will return your call.    --  Ice may be applied to your injection site. It is recommended you avoid direct heat (heating pad; hot tub) for 1-2 days.    --  Call [facility] at [phone] if you experience persistent headache, persistent bleeding from the injection site, or severe pain not relieved by heat or oral medication.    --  Do not make important decisions today.    --  Due to the effects of the block and/or the I.V. Sedation, DO NOT drive or operate hazardous machinery for 12 hours.  Local anesthetics may cause numbness after procedure and precautions must be taken with regards to operating equipment as well as with walking, even if ambulating with assistance of another person or with an assistive device.    --  Do not drink alcohol for 12 hours.    -- You may return to work tomorrow, or as directed by your referring doctor.    --  Occasionally you may notice a slight increase in your pain after the procedure. This should start to improve within the next 24-48 hours. Radiofrequency ablation procedure pain may last 3-4 weeks.    --  It may take as long as 3-4 days before you notice a gradual improvement in your pain and/or other symptoms.    -- You may continue to take your prescribed pain medication as needed.    --  Some normal possible side effects of steroid use could include fluid retention, increased blood sugar, dull headache,  increased sweating, increased appetite, mood swings and flushing.    --  Diabetics are recommended to watch their blood glucose level closely for 24-48 hours after the injection.    --  Must stay in PACU for 20 min upon arrival and prove no leg weakness before being discharged.    --  IN THE EVENT OF A LIFE THREATENING EMERGENCY, (CHEST PAIN, BREATHING DIFFICULTIES, PARALYSIS…) YOU SHOULD GO TO YOUR NEAREST EMERGENCY ROOM.    --  You should be contacted by our office within 2-3 days to schedule follow up or next appointment date.  If not contacted within 7 days, please call the office at [phone]    Resume Plavix in 24 hours.

## 2022-10-17 ENCOUNTER — HOSPITAL ENCOUNTER (OUTPATIENT)
Dept: GENERAL RADIOLOGY | Facility: SURGERY CENTER | Age: 87
Setting detail: HOSPITAL OUTPATIENT SURGERY
End: 2022-10-17

## 2022-10-17 ENCOUNTER — HOSPITAL ENCOUNTER (OUTPATIENT)
Facility: SURGERY CENTER | Age: 87
Setting detail: HOSPITAL OUTPATIENT SURGERY
Discharge: HOME | End: 2022-10-17
Attending: ANESTHESIOLOGY | Admitting: ANESTHESIOLOGY

## 2022-10-17 VITALS
SYSTOLIC BLOOD PRESSURE: 169 MMHG | WEIGHT: 124 LBS | BODY MASS INDEX: 24.35 KG/M2 | DIASTOLIC BLOOD PRESSURE: 83 MMHG | TEMPERATURE: 97.5 F | HEIGHT: 60 IN | OXYGEN SATURATION: 98 % | HEART RATE: 66 BPM | RESPIRATION RATE: 16 BRPM

## 2022-10-17 DIAGNOSIS — Z41.9 SURGERY, ELECTIVE: ICD-10-CM

## 2022-10-17 DIAGNOSIS — M54.10 RADICULITIS: ICD-10-CM

## 2022-10-17 DIAGNOSIS — M48.061 SPINAL STENOSIS OF LUMBAR REGION, UNSPECIFIED WHETHER NEUROGENIC CLAUDICATION PRESENT: ICD-10-CM

## 2022-10-17 PROCEDURE — 25010000002 DEXAMETHASONE SODIUM PHOSPHATE 100 MG/10ML SOLUTION 10 ML VIAL: Performed by: ANESTHESIOLOGY

## 2022-10-17 PROCEDURE — 64483 NJX AA&/STRD TFRM EPI L/S 1: CPT | Performed by: ANESTHESIOLOGY

## 2022-10-17 PROCEDURE — 64484 NJX AA&/STRD TFRM EPI L/S EA: CPT | Performed by: ANESTHESIOLOGY

## 2022-10-17 PROCEDURE — 25010000002 IOPAMIDOL 61 % SOLUTION 30 ML VIAL: Performed by: ANESTHESIOLOGY

## 2022-10-17 PROCEDURE — 77002 NEEDLE LOCALIZATION BY XRAY: CPT

## 2022-10-17 PROCEDURE — 76000 FLUOROSCOPY <1 HR PHYS/QHP: CPT

## 2022-10-17 RX ORDER — SODIUM CHLORIDE 0.9 % (FLUSH) 0.9 %
10 SYRINGE (ML) INJECTION EVERY 12 HOURS SCHEDULED
Status: DISCONTINUED | OUTPATIENT
Start: 2022-10-17 | End: 2022-10-17 | Stop reason: HOSPADM

## 2022-10-17 RX ORDER — SODIUM CHLORIDE 0.9 % (FLUSH) 0.9 %
10 SYRINGE (ML) INJECTION AS NEEDED
Status: DISCONTINUED | OUTPATIENT
Start: 2022-10-17 | End: 2022-10-17 | Stop reason: HOSPADM

## 2022-10-17 NOTE — OP NOTE
Lumbar Transforaminal Epidural Steroid Injection Left L3-4 and Left L4-5 Levels  [facility]    PREOPERATIVE DIAGNOSIS:  Lumbar radicular pain, Lumbar Spinal Stenosis unspecified regarding Neurogenic Claudication and Lumbar radiculitis    POSTOPERATIVE DIAGNOSIS:  Same as preop diagnosis    PROCEDURE:    1. CPT 33368 --  Diagnostic & Therapeutic Transforaminal Epidural Steroid Injection at the L4 level, on the left   2. CPT 69958 --  Diagnostic & Therapeutic Transforaminal Epidural Steroid Injection at the L5 level, on the left     PRE-PROCEDURE DISCUSSION WITH PATIENT:    Risks and complications were discussed with the patient prior to starting the procedure and informed consent was obtained.  We discussed various topics including but not limited to bleeding, infection, injury, nerve injury, paralysis, coma, death, postprocedural painful flare-up, postprocedural site soreness, and a lack of pain relief.  We discussed the diagnostic aspect of transforaminal epidural / selective nerve root blockade.    SURGEON:  Yasmine Gaming MD    SEDATION:  Patient declined administration of moderate sedation    ANESTHETIC:  0.5% bupivacaine  STEROID:  15mg dexamethasone    DESCRIPTON OF PROCEDURE:  After obtaining informed consent, an I.V. was not started in the preoperative area. The patient taken to the operating room and was placed in the prone position with a pillow under the abdomen.  All pressure points were well padded.  EKG, blood pressure, and pulse oximeter were monitored.  The lumbar area was prepped with Chloraprep and draped in a sterile fashion.     The AP fluoroscopic image was used to visualize the L4 vertebral body.  The superior endplate was squared off radiographically.  The image was then obliqued towards the patient's left side to maximize visualization of the pedicle. The skin and subcutaneous tissue at the 6 o'clock position of the pedicle was anesthetized with 1% lidocaine. A 22-gauge  spinal needle was then advanced percutaneously through the anesthetized skin tract under fluoroscopic guidance in AP and lateral views until the needle tip lie in the bulmaro-lateral aspect of the epidural space.  After negative aspiration of the needle for blood or CSF, a volume of 1 mL of Isovue was injected producing good epidural spread with no evidence of loculation, vascular run-off, or intrathecal spread. Subsequently, a volume of 3 mL of injectate was administered without resistance.  The needle was removed intact.  Vital signs were stable throughout.      The same procedure was then performed at the left L5-S1 foramen in the exact same fashion.       The total volume injected consisted of 15 mg of dexamethasone with 1 mL of 0.5% bupivacaine and preservative-free normal saline.       ESTIMATED BLOOD LOSS:  <5 mL  SPECIMENS:  none    COMPLICATIONS:   No complications were noted., There was no indication of vascular uptake on live injection of contrast dye. and There was no indication of intrathecal uptake on live injection of contrast dye.    TOLERANCE & DISCHARGE CONDITION:    The patient tolerated the procedure well.  The patient was transported to the recovery area without difficulties.  The patient was discharged to home under the care of family in stable and satisfactory condition.    PLAN OF CARE:  1. The patient was given our standard instruction sheet.  2. The patient will Return to clinic 4-6 wks.  3. The patient will resume all medications as per the medication reconciliation sheet.

## 2022-10-20 ENCOUNTER — TELEPHONE (OUTPATIENT)
Dept: PAIN MEDICINE | Facility: CLINIC | Age: 87
End: 2022-10-20

## 2022-12-02 ENCOUNTER — OFFICE VISIT (OUTPATIENT)
Dept: INTERNAL MEDICINE | Facility: CLINIC | Age: 87
End: 2022-12-02

## 2022-12-02 VITALS
SYSTOLIC BLOOD PRESSURE: 116 MMHG | OXYGEN SATURATION: 97 % | HEART RATE: 63 BPM | TEMPERATURE: 98.1 F | RESPIRATION RATE: 16 BRPM | WEIGHT: 115 LBS | BODY MASS INDEX: 22.58 KG/M2 | HEIGHT: 60 IN | DIASTOLIC BLOOD PRESSURE: 56 MMHG

## 2022-12-02 DIAGNOSIS — E11.9 TYPE 2 DIABETES MELLITUS WITHOUT COMPLICATION, WITHOUT LONG-TERM CURRENT USE OF INSULIN: ICD-10-CM

## 2022-12-02 DIAGNOSIS — Z00.00 ENCOUNTER FOR ANNUAL WELLNESS EXAM IN MEDICARE PATIENT: Primary | ICD-10-CM

## 2022-12-02 PROCEDURE — 1159F MED LIST DOCD IN RCRD: CPT | Performed by: INTERNAL MEDICINE

## 2022-12-02 PROCEDURE — G0439 PPPS, SUBSEQ VISIT: HCPCS | Performed by: INTERNAL MEDICINE

## 2022-12-02 PROCEDURE — 1170F FXNL STATUS ASSESSED: CPT | Performed by: INTERNAL MEDICINE

## 2022-12-02 PROCEDURE — 1160F RVW MEDS BY RX/DR IN RCRD: CPT | Performed by: INTERNAL MEDICINE

## 2022-12-02 PROCEDURE — 1125F AMNT PAIN NOTED PAIN PRSNT: CPT | Performed by: INTERNAL MEDICINE

## 2022-12-02 RX ORDER — NITROGLYCERIN 0.4 MG/1
0.4 TABLET SUBLINGUAL
COMMUNITY
Start: 2022-11-19

## 2022-12-02 RX ORDER — ASPIRIN 81 MG/1
81 TABLET ORAL DAILY
COMMUNITY
Start: 2022-10-20

## 2022-12-02 RX ORDER — METOPROLOL TARTRATE 50 MG/1
50 TABLET, FILM COATED ORAL 2 TIMES DAILY
COMMUNITY
Start: 2022-10-18

## 2022-12-02 RX ORDER — TICAGRELOR 90 MG/1
90 TABLET ORAL DAILY
COMMUNITY
Start: 2022-11-16 | End: 2022-12-13

## 2022-12-02 NOTE — PROGRESS NOTES
The ABCs of the Annual Wellness Visit  Subsequent Medicare Wellness Visit    Chief Complaint   Patient presents with   • Medicare Wellness-subsequent      Subjective    History of Present Illness:  Tamie Peter is a 86 y.o. female who presents for a Subsequent Medicare Wellness Visit.    The following portions of the patient's history were reviewed and   updated as appropriate: allergies, current medications, past family history, past medical history, past social history, past surgical history and problem list.    Compared to one year ago, the patient feels her physical   health is worse.    Compared to one year ago, the patient feels her mental   health is the same.    Recent Hospitalizations:  She was admitted within the past 365 days at [facility].       Current Medical Providers:  Patient Care Team:  Mohinder Barbosa MD as PCP - General (Internal Medicine)  Mohinder Barbosa MD as PCP - Internal Medicine (Internal Medicine)  Doc Parekh MD as Consulting Physician (Cardiology)  Yasmine Gaming MD as Consulting Physician (Pain Medicine)  Earl Castañeda MD as Surgeon (Otolaryngology)  Nahid Hsieh OD (Optometry)    Outpatient Medications Prior to Visit   Medication Sig Dispense Refill   • alendronate (FOSAMAX) 70 MG tablet Take 70 mg by mouth 1 (One) Time Per Week.     • amLODIPine (NORVASC) 10 MG tablet Take 1 tablet by mouth Daily. 90 tablet 1   • aspirin 81 MG EC tablet Take 81 mg by mouth Daily.     • atorvastatin (LIPITOR) 80 MG tablet TAKE ONE TABLET BY MOUTH DAILY 90 tablet 1   • Brilinta 90 MG tablet tablet Take 90 mg by mouth Daily.     • Diclofenac Sodium (VOLTAREN) 1 % gel gel APPLY 4 GRAMS OF GEL TO AFFECTED AREA FOUR TIMES A DAY 30 g 1   • glipizide (GLUCOTROL) 5 MG tablet TAKE TWO TABLETS BY MOUTH DAILY WITH BREAKFAST AND TAKE ONE TABLET BY MOUTH EVERY EVENING 270 tablet 1   • metFORMIN (GLUCOPHAGE) 500 MG tablet Take 500 mg by mouth 2 (Two) Times a Day.     • metoprolol  tartrate (LOPRESSOR) 50 MG tablet Take 50 mg by mouth 2 (Two) Times a Day.     • nitroglycerin (NITROSTAT) 0.4 MG SL tablet 0.4 mg.     • allopurinol (ZYLOPRIM) 300 MG tablet TAKE ONE TABLET BY MOUTH DAILY 90 tablet 1   • aspirin 325 MG tablet Take 325 mg by mouth daily.     • clopidogrel (PLAVIX) 75 MG tablet TAKE ONE TABLET BY MOUTH DAILY 59 tablet 2   • ezetimibe (ZETIA) 10 MG tablet TAKE ONE TABLET BY MOUTH DAILY 90 tablet 1   • hydrALAZINE (APRESOLINE) 25 MG tablet TAKE ONE TABLET BY MOUTH TWICE A DAY 60 tablet 1   • hydroCHLOROthiazide (HYDRODIURIL) 12.5 MG tablet TAKE ONE TABLET BY MOUTH DAILY 90 tablet 1   • metFORMIN (GLUCOPHAGE) 500 MG tablet TAKE TWO TABLETS BY MOUTH TWICE A DAY WITH MEALS 360 tablet 1   • metoprolol tartrate (LOPRESSOR) 50 MG tablet TAKE TWO TABLETS BY MOUTH TWICE A  tablet 1   • spironolactone (ALDACTONE) 25 MG tablet Take 25 mg by mouth Daily.       No facility-administered medications prior to visit.       No opioid medication identified on active medication list. I have reviewed chart for other potential  high risk medication/s and harmful drug interactions in the elderly.          Aspirin is on active medication list. Aspirin use is indicated based on review of current medical condition/s. Pros and cons of this therapy have been discussed today. Benefits of this medication outweigh potential harm.  Patient has been encouraged to continue taking this medication.  .      Patient Active Problem List   Diagnosis   • CAD (coronary artery disease)   • Hypertension   • GERD (gastroesophageal reflux disease)   • Diabetes mellitus ([facility])   • CKD (chronic kidney disease) stage 3, GFR 30-59 ml/min (CMS/[facility])   • Gout   • Hyperlipidemia   • PUD (peptic ulcer disease)   • Sick sinus syndrome (HCC)   • Disorder of rotator cuff   • Osteoporosis   • Pacemaker   • Acute left-sided low back pain with left-sided sciatica   • Arthritis of left sacroiliac joint   • Piriformis syndrome of left side  "  • Spinal stenosis of lumbar region   • Radiculitis     Advance Care Planning  Advance Directive is not on file.  ACP discussion was held with the patient during this visit. Patient has an advance directive (not in EMR), copy requested.          Objective    Vitals:    12/02/22 0938   BP: 116/56   Pulse: 63   Resp: 16   Temp: 98.1 °F (36.7 °C)   TempSrc: Infrared   SpO2: 97%   Weight: 52.2 kg (115 lb)   Height: 152.4 cm (60\")   PainSc:   8     Estimated body mass index is 22.46 kg/m² as calculated from the following:    Height as of this encounter: 152.4 cm (60\").    Weight as of this encounter: 52.2 kg (115 lb).    BMI is within normal parameters. No other follow-up for BMI required.      Does the patient have evidence of cognitive impairment? No    Physical Exam  Lab Results   Component Value Date    CHLPL 229 (H) 09/15/2022    TRIG 237 (H) 09/15/2022    HDL 48 09/15/2022     (H) 09/15/2022    VLDL 43 (H) 09/15/2022    HGBA1C 7.90 (H) 09/15/2022            HEALTH RISK ASSESSMENT    Smoking Status:  Social History     Tobacco Use   Smoking Status Never   Smokeless Tobacco Never     Alcohol Consumption:  Social History     Substance and Sexual Activity   Alcohol Use Never     Fall Risk Screen:    DAEADI Fall Risk Assessment was completed, and patient is at MODERATE risk for falls. Assessment completed on:12/2/2022    Depression Screening:  PHQ-2/PHQ-9 Depression Screening 12/2/2022   Retired PHQ-9 Total Score -   Retired Total Score -   Little Interest or Pleasure in Doing Things 0-->not at all   Feeling Down, Depressed or Hopeless 0-->not at all   Trouble Falling or Staying Asleep, or Sleeping Too Much 1-->several days   Feeling Tired or Having Little Energy 3-->nearly every day   Poor Appetite or Overeating 0-->not at all   Feeling Bad about Yourself - or that You are a Failure or Have Let Yourself or Your Family Down 0-->not at all   Trouble Concentrating on Things, Such as Reading the Newspaper or " Watching Television 0-->not at all   Moving or Speaking So Slowly that Other People Could Have Noticed? Or the Opposite - Being So Fidgety 0-->not at all   Thoughts that You Would be Better Off Dead or of Hurting Yourself in Some Way 0-->not at all   PHQ-9: Brief Depression Severity Measure Score 4   If You Checked Off Any Problems, How Difficult Have These Problems Made It For You to Do Your Work, Take Care of Things at Home, or Get Along with Other People? not difficult at all       Health Habits and Functional and Cognitive Screening:  Functional & Cognitive Status 12/2/2022   Do you have difficulty preparing food and eating? No   Do you have difficulty bathing yourself, getting dressed or grooming yourself? No   Do you have difficulty using the toilet? No   Do you have difficulty moving around from place to place? No   Do you have trouble with steps or getting out of a bed or a chair? No   Current Diet Well Balanced Diet   Dental Exam Up to date   Eye Exam Not up to date   Exercise (times per week) 1 times per week   Current Exercises Include Dancing   Current Exercise Activities Include -   Do you need help using the phone?  No   Are you deaf or do you have serious difficulty hearing?  Yes   Do you need help with transportation? No   Do you need help shopping? No   Do you need help preparing meals?  No   Do you need help with housework?  No   Do you need help with laundry? No   Do you need help taking your medications? No   Do you need help managing money? No   Do you ever drive or ride in a car without wearing a seat belt? No   Have you felt unusual stress, anger or loneliness in the last month? Yes   Who do you live with? Alone   If you need help, do you have trouble finding someone available to you? No   Have you been bothered in the last four weeks by sexual problems? No   Do you have difficulty concentrating, remembering or making decisions? No       Age-appropriate Screening Schedule:  Refer to the list  below for future screening recommendations based on patient's age, sex and/or medical conditions. Orders for these recommended tests are listed in the plan section. The patient has been provided with a written plan.    Health Maintenance   Topic Date Due   • DXA SCAN  08/11/2019   • URINE MICROALBUMIN  10/28/2022   • HEMOGLOBIN A1C  03/15/2023   • DIABETIC EYE EXAM  07/12/2023   • LIPID PANEL  09/15/2023   • DIABETIC FOOT EXAM  09/22/2023   • TDAP/TD VACCINES (2 - Td or Tdap) 07/21/2025   • INFLUENZA VACCINE  Completed   • ZOSTER VACCINE  Completed              Assessment & Plan   CMS Preventative Services Quick Reference  Risk Factors Identified During Encounter  Cardiovascular Disease  Fall Risk-High or Moderate  The above risks/problems have been discussed with the patient.  Follow up actions/plans if indicated are seen below in the Assessment/Plan Section.  Pertinent information has been shared with the patient in the After Visit Summary.    Diagnoses and all orders for this visit:    1. Encounter for annual wellness exam in Medicare patient (Primary)    2. Type 2 diabetes mellitus without complication, without long-term current use of insulin ([facility])  -     POC Microalbumin        Follow Up:   No follow-ups on file.     An After Visit Summary and PPPS were made available to the patient.          I spent 15 minutes caring for Tamie on this date of service. This time includes time spent by me in the following activities:preparing for the visit and reviewing tests

## 2022-12-06 ENCOUNTER — OFFICE VISIT (OUTPATIENT)
Dept: INTERNAL MEDICINE | Facility: CLINIC | Age: 87
End: 2022-12-06

## 2022-12-06 VITALS
BODY MASS INDEX: 22.97 KG/M2 | TEMPERATURE: 97.7 F | OXYGEN SATURATION: 98 % | RESPIRATION RATE: 16 BRPM | WEIGHT: 117 LBS | SYSTOLIC BLOOD PRESSURE: 110 MMHG | DIASTOLIC BLOOD PRESSURE: 60 MMHG | HEART RATE: 83 BPM | HEIGHT: 60 IN

## 2022-12-06 DIAGNOSIS — N18.32 STAGE 3B CHRONIC KIDNEY DISEASE: Chronic | ICD-10-CM

## 2022-12-06 DIAGNOSIS — I25.10 CORONARY ARTERY DISEASE INVOLVING NATIVE CORONARY ARTERY OF NATIVE HEART WITHOUT ANGINA PECTORIS: Chronic | ICD-10-CM

## 2022-12-06 DIAGNOSIS — E78.2 MIXED HYPERLIPIDEMIA: Chronic | ICD-10-CM

## 2022-12-06 DIAGNOSIS — E11.9 TYPE 2 DIABETES MELLITUS WITHOUT COMPLICATION, WITHOUT LONG-TERM CURRENT USE OF INSULIN: ICD-10-CM

## 2022-12-06 DIAGNOSIS — I10 PRIMARY HYPERTENSION: Primary | Chronic | ICD-10-CM

## 2022-12-06 LAB
A/C: 30
POC CREATININE URINE: 200
POC MICROALBUMIN URINE: 80

## 2022-12-06 PROCEDURE — 99214 OFFICE O/P EST MOD 30 MIN: CPT | Performed by: INTERNAL MEDICINE

## 2022-12-06 PROCEDURE — 82044 UR ALBUMIN SEMIQUANTITATIVE: CPT | Performed by: INTERNAL MEDICINE

## 2022-12-06 NOTE — PROGRESS NOTES
Subjective   Tamie Peter is a 86 y.o. female.     Chief Complaint   Patient presents with   • Hypertension   • Hyperlipidemia   • Diabetes         Hypertension  This is a chronic problem. The current episode started more than 1 year ago. The problem is unchanged. The problem is controlled. Pertinent negatives include no chest pain, headaches, palpitations, peripheral edema (right foot in summer only) or shortness of breath.   Hyperlipidemia  This is a chronic problem. The current episode started more than 1 year ago. The problem is controlled. Recent lipid tests were reviewed and are normal. Pertinent negatives include no chest pain or shortness of breath.   Diabetes  She presents for her follow-up diabetic visit. She has type 2 diabetes mellitus. No MedicAlert identification noted. Her disease course has been stable. Pertinent negatives for hypoglycemia include no headaches. Pertinent negatives for diabetes include no chest pain. Symptoms are stable.   Coronary Artery Disease  Presents for follow-up visit. Pertinent negatives include no chest pain, leg swelling, palpitations or shortness of breath. Risk factors include hyperlipidemia.   Chronic Kidney Disease  This is a chronic problem. The current episode started more than 1 year ago. The problem occurs constantly. The problem has been unchanged. Pertinent negatives include no chest pain or headaches.   Sciatica  This is a recurrent problem. Pertinent negatives include no chest pain or headaches.        The following portions of the patient's history were reviewed and updated as appropriate: allergies, current medications, past social history and problem list.    Outpatient Medications Marked as Taking for the 12/6/22 encounter (Office Visit) with Mohinder Barbosa MD   Medication Sig Dispense Refill   • alendronate (FOSAMAX) 70 MG tablet Take 70 mg by mouth 1 (One) Time Per Week.     • amLODIPine (NORVASC) 10 MG tablet Take 1 tablet by mouth Daily. 90 tablet 1    • aspirin 81 MG EC tablet Take 81 mg by mouth Daily.     • atorvastatin (LIPITOR) 80 MG tablet TAKE ONE TABLET BY MOUTH DAILY 90 tablet 1   • Diclofenac Sodium (VOLTAREN) 1 % gel gel APPLY 4 GRAMS OF GEL TO AFFECTED AREA FOUR TIMES A DAY 30 g 1   • glipizide (GLUCOTROL) 5 MG tablet TAKE TWO TABLETS BY MOUTH DAILY WITH BREAKFAST AND TAKE ONE TABLET BY MOUTH EVERY EVENING 270 tablet 1   • metFORMIN (GLUCOPHAGE) 500 MG tablet Take 500 mg by mouth 2 (Two) Times a Day.     • metoprolol tartrate (LOPRESSOR) 50 MG tablet Take 50 mg by mouth 2 (Two) Times a Day.     • nitroglycerin (NITROSTAT) 0.4 MG SL tablet 0.4 mg.         Review of Systems   Respiratory: Negative for shortness of breath and wheezing.    Cardiovascular: Negative for chest pain, palpitations and leg swelling.   Gastrointestinal: Negative for constipation and diarrhea.   Musculoskeletal: Positive for back pain.   Neurological: Negative for headaches.       Objective   Vitals:    12/06/22 0922   BP: 110/60   Pulse: 83   Resp: 16   Temp: 97.7 °F (36.5 °C)   SpO2: 98%          12/06/22 0922   Weight: 53.1 kg (117 lb)    [unfilled]  Body mass index is 22.85 kg/m².      Physical Exam   Constitutional: She appears well-developed.   Cardiovascular: Normal rate, regular rhythm and normal heart sounds. Exam reveals no gallop.   No murmur heard.  Pulmonary/Chest: Effort normal and breath sounds normal. No respiratory distress. She has no wheezes. She has no rales.   Abdominal: Normal appearance.    Tamie had a diabetic foot exam performed today.   During the foot exam she had a monofilament test performed.    Neurological Sensory Findings -  Unaltered sharp/dull right ankle/foot discrimination and unaltered sharp/dull left ankle/foot discrimination. No right ankle/foot altered proprioception and no left ankle/foot altered proprioception  Vascular Status -  Her right foot exhibits normal foot vasculature  and no edema. Her left foot exhibits normal foot  vasculature  and no edema.   Foot Structure and Biomechanics -   Her left foot exhibits hammer toes.  Neurological: She is alert.         Problems Addressed this Visit        Cardiac and Vasculature    CAD (coronary artery disease) (Chronic)    Hypertension - Primary (Chronic)    Hyperlipidemia (Chronic)    Relevant Orders    Lipid Panel With / Chol / HDL Ratio       Endocrine and Metabolic    Diabetes mellitus (HCC) (Chronic)    Relevant Orders    Hemoglobin A1c       Genitourinary and Reproductive     CKD (chronic kidney disease) stage 3, GFR 30-59 ml/min (CMS/HCC) (Chronic)    Relevant Orders    Basic Metabolic Panel   Diagnoses       Codes Comments    Primary hypertension    -  Primary ICD-10-CM: I10  ICD-9-CM: 401.9     Mixed hyperlipidemia     ICD-10-CM: E78.2  ICD-9-CM: 272.2     Coronary artery disease involving native coronary artery of native heart without angina pectoris     ICD-10-CM: I25.10  ICD-9-CM: 414.01     Stage 3b chronic kidney disease (HCC)     ICD-10-CM: N18.32  ICD-9-CM: 585.3     Type 2 diabetes mellitus without complication, without long-term current use of insulin ([facility])     ICD-10-CM: E11.9  ICD-9-CM: 250.00         Assessment & Plan   In for recheck of hypertension, hyperlipidemia and diabetes mellitus.  She had 3 more stents placed in her heart in October 2022.  He had an MI later in the day after an epidural.  She had 13 stents many years ago.  The current 3 stents were placed in a staged fashion 1 week apart.  Has CKD and CAD.  She has had some problems with sciatica on the left side for 17 months.  Started July 2021.  She feels a knot near the SI region and pain going down the left leg.  She is actually feeling the superior iliac landmark area.  Physical therapy helped a little bit as far as massage.  She has been seen by neurosurgery in the past year.  Neurosurgery recommended an injection which she refused at the time.  She did get an injection in the left SI joint which helped for  only 2 days.  She has had 2 different bypass surgeries as well as 13 different stents over the years.  Blood pressure control is good with the addition of spironolactone recently.  CAD is asymptomatic.  She has had a lot of fatigue since the recent MI and stents.  Annual labs October 2022 including CBC, CMP, lipids, A1c, UA.  Gets BMP, a1c, lipids today and q 3 mos.  Eye check up this year was fantastic with Dr. Dow as well as cardiology visit Dr. Iglesia Bryant at [phone].  Annual wellness performed December 2022.  Urine microalbumin today.  Creatinine is up a tad and bicarb is down a tad.  We will add baking soda 1/2 teaspoon daily to her regimen.    The above information was reviewed again today 12/06/22.  It continues to be accurate as reflected above and is unchanged.  History, physical and review of systems all reviewed and are unchanged.  Medications were reviewed today and continue the current dosing.    PPE today includes face mask and eye shield.           Dragon disclaimer:   Much of this encounter note is an electronic transcription/translation of spoken language to printed text. The electronic translation of spoken language may permit erroneous, or at times, nonsensical words or phrases to be inadvertently transcribed; Although I have reviewed the note for such errors, some may still exist.

## 2022-12-12 NOTE — PROGRESS NOTES
The patient has a pain history of the following:  Acute left-sided low back pain  Lumbar radiculopathy  Lumbar stenosis  Sacroiliitis      Previous interventions that the patient has received include:   Left L3-4 and left L4-5 lumbar transforaminal epidural steroid injection 10/17/2022 - no relief   Left sacroiliac joint and left piriformis injection 7/15/2022 -80% relief  Left sacroiliac joint injection 5/9/2022 -80% x 2 days     Pain medications include:  Voltaren gel      Other conservative modalities which the patient reports using include:  Physical Therapy: yes  Chiropractor: no  Massage Therapy: yes  TENS: yes  Neck or back surgery: no  Past pain management: no  Back brace      Past Significant Surgical History:  None     HPI:     CHIEF COMPLAINT Back Pain  F/U back pain-LUMBAR/SACRAL TRANSFORAMINAL EPIDURAL Left L4-5 and left L5-S1- patient states that she had no improvement from the procedure.    Subjective   Tamie Peter is a 87 y.o. female  who presents to the office for follow-up of procedure.  She completed a Left L3-4 and left L4-5 lumbar transforaminal epidural steroid injection 10/17/2022 for management of left lumbar radiculopathy. Patient reports no relief from the procedure.     At her previous visit we discussed either gabapentin or short-term hydrocodone if needed for pain control for her trip.    History of Present Illness  Mrs. Peter presents today with the same complaints of pain.  The evening following her procedure she developed chest pain.  On the 19th she was in the hospital and underwent a cardiac catheterization with PCI placement.  She was still able to go on her trip to [place].  She stated that her left buttock and left lower extremity pain was quite significant during her trip, however she had people help carry her so that she could site see.  When she returned from her trip she underwent cardiac catheterization again, for stent placement.    She is here with her daughter today,  who states that she felt the previous injections prior to the epidural were more beneficial.  She asks what other options she has today.  They tell me that Dr. Barbosa has recommended she avoid injections for the time being, until she has recovered from her stent placement.       PEG Assessment   What number best describes your pain on average in the past week?8  What number best describes how, during the past week, pain has interfered with your enjoyment of life?10  What number best describes how, during the past week, pain has interfered with your general activity?  9    REVIEW OF PERTINENT MEDICAL DATA  6/3/22 CT LUMBAR SPINE WITHOUT CONTRAST     HISTORY: Back pain, radiculopathy.     COMPARISON: None.     FINDINGS:  Extensive vascular calcification is noted. There is mild  fusiform enlargement of the infrarenal abdominal aorta which measures  approximately 2.5 cm in diameter.     There is severe loss of disc height at L2-L3 and to a slightly lesser  extent L5-S1. Moderate loss of disc height is appreciated at L4-L5.  Vacuum disc effect is noted at the same levels. There is a grade 1  retrolisthesis of L2 upon L3 and L4 upon L5. Moderate endplate  degenerative changes are present at L2-L3, more prominent laterally to  the right. Moderate endplate degenerative changes involving the superior  endplate of S1 are noted. Mild levoscoliosis of the lumbar spine is  appreciated with apex at the level of L2-L3.     L1-L2: There is no evidence of disc bulge or herniation.     L2-L3: A mild central disc osteophyte complex is present resulting in  mild flattening of the ventral surface of the thecal sac. Mild foraminal  stenosis is appreciated bilaterally secondary to loss of disc height and  extension of a disc osteophyte complex into the neural foramen.     L3-L4: There is a mild broad-based disc osteophyte complex with no  evidence of herniation. Mild foraminal stenosis is present on the left  secondary to extension of a disc  osteophyte complex into the neural  foramen.     L4-L5: There is moderate to severe if not severe central canal stenosis  secondary to the retrolisthesis of L4 upon L5 and a central disc  osteophyte complex when combined with moderate facet and ligamentum  flavum hypertrophy. Mild foraminal stenosis is present bilaterally  secondary to extension of a disc osteophyte complex into the neural  foramen.     L5-S1: A central disc osteophyte complex is present with no evidence of  herniation. Mild-to-moderate and moderate foraminal stenosis is present  on the right and left respectively secondary to loss of disc height and  extension of a disc osteophyte complex into the neural foramen.     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 Mild grade 1 retrolisthesis of  L2 upon L3 and L4 upon L5 is appreciated. There is no evidence of a  focal herniation. The degenerative disease does includes moderate to  severe if not severe canal stenosis at L4-L5 secondary to a central disc  osteophyte complex, posterior element of degenerative disease and a mild  grade 1 retrolisthesis of L4 upon L5. Mild-to-moderate and moderate  foraminal stenosis is present on the right and left respectively at  L5-S1. See above. A mild eccentric infrarenal abdominal aortic aneurysm  measuring 2.5 cm in size is noted.     Radiation dose reduction techniques were utilized, including automated  exposure control and exposure modulation based on body size.     This report was finalized on 6/7/2022 7:11 AM by Dr. Lawrence Jason M.D.    The following portions of the patient's history were reviewed and updated as appropriate: allergies, current medications, past family history, past medical history, past social history, past surgical history and problem list.    Review of Systems   Constitutional: Positive for  "activity change (decreased) and fatigue. Negative for chills and fever.   HENT: Negative for congestion.    Eyes: Negative for visual disturbance.   Respiratory: Negative for chest tightness and shortness of breath.    Cardiovascular: Negative for chest pain.   Gastrointestinal: Negative for abdominal pain, constipation and diarrhea.   Genitourinary: Negative for difficulty urinating, dyspareunia and dysuria.   Musculoskeletal: Positive for back pain.   Neurological: Positive for numbness (left leg and left foot). Negative for dizziness, weakness, light-headedness and headaches.   Psychiatric/Behavioral: Positive for agitation. Negative for sleep disturbance. The patient is not nervous/anxious.        Vitals:    12/13/22 0753   BP: 143/80   Pulse: 98   Temp: 97.5 °F (36.4 °C)   SpO2: 100%   Weight: 54.2 kg (119 lb 6.4 oz)   Height: 152.4 cm (60\")   PainSc:   8   PainLoc: Back         Objective   Physical Exam  Vitals reviewed.   Constitutional:       General: She is not in acute distress.  Pulmonary:      Effort: Pulmonary effort is normal. No respiratory distress.   Musculoskeletal:      Comments: Tenderness to palpation of the left sacroiliac joint.  Positive straight leg test on the left.  Negative pain with Edson Tay's test on the left.   Skin:     General: Skin is warm and dry.   Neurological:      Mental Status: She is alert.   Psychiatric:         Mood and Affect: Mood normal.         Thought Content: Thought content normal.             Assessment & Plan   Diagnoses and all orders for this visit:    1. Spinal stenosis of lumbar region, unspecified whether neurogenic claudication present (Primary)  -     traMADol (ULTRAM) 50 MG tablet; Take 0.5-1 tablets by mouth 2 (Two) Times a Day As Needed for Moderate Pain.  Dispense: 12 tablet; Refill: 0    2. Radiculitis  -     traMADol (ULTRAM) 50 MG tablet; Take 0.5-1 tablets by mouth 2 (Two) Times a Day As Needed for Moderate Pain.  Dispense: 12 tablet; Refill: " 0    3. Arthritis of left sacroiliac joint  -     traMADol (ULTRAM) 50 MG tablet; Take 0.5-1 tablets by mouth 2 (Two) Times a Day As Needed for Moderate Pain.  Dispense: 12 tablet; Refill: 0        - Will prescribe a back brace to use a few hours a day when she wants to be active. Ordering a custom-fitted back brace to reduce pain by restricting mobility of the spine.  - After long discussion of options and side effects, we will start Tramadol 0.5-1 tablet as needed twice daily for pain.  Discussed medication with the patient.  Included in this discussion was the potential for side effects and adverse events.  Patient verbalized understanding and wished to proceed.  Prescription will be sent to pharmacy.  If she has severe side effects, she will discontinue this medication.  If she benefits from it, she will call for refill if needed.  We will sign a controlled substance agreement at her next visit if she benefits from this medication.  -Briefly discussed gabapentin, however we will hold at this time.  - Tamie Peter reports a pain score of 8.  Given her pain assessment as noted, treatment options were discussed and the following options were decided upon as a follow-up plan to address the patient's pain: prescription for opiod analgesics.    --- Follow-up 1 month office visit                PREMA REPORT    As part of the patient's treatment plan, I am prescribing controlled substances. The patient has been made aware of appropriate use of such medications, including potential risk of somnolence, limited ability to drive and/or work safely, and the potential for dependence or overdose. It has also bee made clear that these medications are for use by this patient only, without concomitant use of alcohol or other substances unless prescribed.     As the clinician, I personally reviewed the PREMA from 12/13/22 while the patient was in the office today.    History and physical exam exhibit continued safe and  appropriate use of controlled substances.       While examining this patient, I wore protective equipment including a mask, eye sheild and gloves.  I washed my hands before and after this patient encounter.  The patient wore a mask throughout the visit as well.     Yasmine Gaming MD  Pain Management

## 2022-12-13 ENCOUNTER — OFFICE VISIT (OUTPATIENT)
Dept: PAIN MEDICINE | Facility: CLINIC | Age: 87
End: 2022-12-13

## 2022-12-13 VITALS
HEART RATE: 98 BPM | BODY MASS INDEX: 23.44 KG/M2 | HEIGHT: 60 IN | WEIGHT: 119.4 LBS | OXYGEN SATURATION: 100 % | SYSTOLIC BLOOD PRESSURE: 143 MMHG | TEMPERATURE: 97.5 F | DIASTOLIC BLOOD PRESSURE: 80 MMHG

## 2022-12-13 DIAGNOSIS — M54.10 RADICULITIS: ICD-10-CM

## 2022-12-13 DIAGNOSIS — M47.818 ARTHRITIS OF LEFT SACROILIAC JOINT: ICD-10-CM

## 2022-12-13 DIAGNOSIS — M48.061 SPINAL STENOSIS OF LUMBAR REGION, UNSPECIFIED WHETHER NEUROGENIC CLAUDICATION PRESENT: Primary | ICD-10-CM

## 2022-12-13 PROCEDURE — 99214 OFFICE O/P EST MOD 30 MIN: CPT | Performed by: ANESTHESIOLOGY

## 2022-12-13 RX ORDER — TRAMADOL HYDROCHLORIDE 50 MG/1
25-50 TABLET ORAL 2 TIMES DAILY PRN
Qty: 12 TABLET | Refills: 0 | Status: SHIPPED | OUTPATIENT
Start: 2022-12-13 | End: 2023-01-26 | Stop reason: SDUPTHER

## 2023-01-03 RX ORDER — HYDROCHLOROTHIAZIDE 12.5 MG/1
TABLET ORAL
Qty: 90 TABLET | Refills: 1 | Status: SHIPPED | OUTPATIENT
Start: 2023-01-03

## 2023-01-04 RX ORDER — GLIPIZIDE 5 MG/1
TABLET ORAL
Qty: 270 TABLET | Refills: 1 | Status: SHIPPED | OUTPATIENT
Start: 2023-01-04

## 2023-01-25 NOTE — PROGRESS NOTES
The patient has a pain history of the following:  Left-sided low back pain  Lumbar radiculopathy  Lumbar stenosis  Sacroiliitis      Previous interventions that the patient has received include:   Left L3-4 and left L4-5 lumbar transforaminal epidural steroid injection 10/17/2022 - no relief   Left sacroiliac joint and left piriformis injection 7/15/2022 -80% relief  Left sacroiliac joint injection 5/9/2022 -80% x 2 days     Pain medications include:  Voltaren gel   Tylenol   Tramadol      Other conservative modalities which the patient reports using include:  Physical Therapy: yes  Chiropractor: no  Massage Therapy: yes  TENS: yes  Neck or back surgery: no  Past pain management: no  Back brace      Past Significant Surgical History:  None     HPI:     CHIEF COMPLAINT Back Pain  F/U back pain- patient states that her pain has worsened since her last visit.     Subjective   Tamie Peter is a 87 y.o. female  who presents for follow-up.  She has a history of chronic, left-sided low back pain.  She did not have significant relief from left lumbar transforaminal epidural steroid injections, however the left sacroiliac joint injections and piriformis injections were beneficial to her.  After her procedure in October, she developed chest pain and underwent cardiac stent placement.  She is now taking Eliquis.  At her visit in December, I prescribed tramadol for her to try for pain management.  We briefly discussed gabapentin, however she wanted to hold at that time.  I also ordered her a back brace.    History of Present Illness  She's taking Tylenol twice a day 1000mg at a time and that helps.  She's taking Tramadol and sometimes it helps and sometimes it doesn't. She denies any side effects from the medication.     Her pain is located in her low back and her left buttock and it throbs and radiates along the back of her thigh and along both sides of her shin to her foot.      Her daughter is with her today to help  provide history.  She states that she was cleared by her cardiologist to hold her Eliquis for injections at any time.       PEG Assessment   What number best describes your pain on average in the past week?8  What number best describes how, during the past week, pain has interfered with your enjoyment of life?9  What number best describes how, during the past week, pain has interfered with your general activity?  9    REVIEW OF PERTINENT MEDICAL DATA  6/3/22 CT LUMBAR SPINE WITHOUT CONTRAST     HISTORY: Back pain, radiculopathy.     COMPARISON: None.     FINDINGS:  Extensive vascular calcification is noted. There is mild  fusiform enlargement of the infrarenal abdominal aorta which measures  approximately 2.5 cm in diameter.     There is severe loss of disc height at L2-L3 and to a slightly lesser  extent L5-S1. Moderate loss of disc height is appreciated at L4-L5.  Vacuum disc effect is noted at the same levels. There is a grade 1  retrolisthesis of L2 upon L3 and L4 upon L5. Moderate endplate  degenerative changes are present at L2-L3, more prominent laterally to  the right. Moderate endplate degenerative changes involving the superior  endplate of S1 are noted. Mild levoscoliosis of the lumbar spine is  appreciated with apex at the level of L2-L3.     L1-L2: There is no evidence of disc bulge or herniation.     L2-L3: A mild central disc osteophyte complex is present resulting in  mild flattening of the ventral surface of the thecal sac. Mild foraminal  stenosis is appreciated bilaterally secondary to loss of disc height and  extension of a disc osteophyte complex into the neural foramen.     L3-L4: There is a mild broad-based disc osteophyte complex with no  evidence of herniation. Mild foraminal stenosis is present on the left  secondary to extension of a disc osteophyte complex into the neural  foramen.     L4-L5: There is moderate to severe if not severe central canal stenosis  secondary to the retrolisthesis  of L4 upon L5 and a central disc  osteophyte complex when combined with moderate facet and ligamentum  flavum hypertrophy. Mild foraminal stenosis is present bilaterally  secondary to extension of a disc osteophyte complex into the neural  foramen.     L5-S1: A central disc osteophyte complex is present with no evidence of  herniation. Mild-to-moderate and moderate foraminal stenosis is present  on the right and left respectively secondary to loss of disc height and  extension of a disc osteophyte complex into the neural foramen.     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 Mild grade 1 retrolisthesis of  L2 upon L3 and L4 upon L5 is appreciated. There is no evidence of a  focal herniation. The degenerative disease does includes moderate to  severe if not severe canal stenosis at L4-L5 secondary to a central disc  osteophyte complex, posterior element of degenerative disease and a mild  grade 1 retrolisthesis of L4 upon L5. Mild-to-moderate and moderate  foraminal stenosis is present on the right and left respectively at  L5-S1. See above. A mild eccentric infrarenal abdominal aortic aneurysm  measuring 2.5 cm in size is noted.     Radiation dose reduction techniques were utilized, including automated  exposure control and exposure modulation based on body size.     This report was finalized on 6/7/2022 7:11 AM by Dr. Lawrence Jason M.D.    The following portions of the patient's history were reviewed and updated as appropriate: allergies, current medications, past family history, past medical history, past social history, past surgical history and problem list.    Review of Systems   Constitutional: Negative for activity change, chills, fatigue and fever.   HENT: Negative for congestion.    Eyes: Negative for visual disturbance.   Respiratory: Negative for chest  "tightness and shortness of breath.    Cardiovascular: Negative for chest pain.   Gastrointestinal: Negative for abdominal pain, constipation and diarrhea.   Genitourinary: Negative for difficulty urinating, dyspareunia and dysuria.   Musculoskeletal: Positive for back pain.   Neurological: Positive for light-headedness, numbness (left leg) and headaches. Negative for dizziness and weakness.   Psychiatric/Behavioral: Positive for agitation and sleep disturbance. Negative for self-injury and suicidal ideas. The patient is nervous/anxious.      I have reviewed and confirmed the accuracy of the ROS as documented by the MA/LPN/RN Yasmine Gaming MD    Vitals:    01/26/23 0917   BP: 130/68   Pulse: 93   Temp: 97.7 °F (36.5 °C)   SpO2: 99%   Weight: 55.2 kg (121 lb 12.8 oz)   Height: 152.4 cm (60\")   PainSc:   8   PainLoc: Back  Comment: and left leg         Objective   Physical Exam  Vitals reviewed.   Constitutional:       General: She is not in acute distress.  Pulmonary:      Effort: Pulmonary effort is normal. No respiratory distress.   Musculoskeletal:      Comments: Tenderness to palpation of the left sacroiliac joint.   Skin:     General: Skin is warm and dry.   Neurological:      General: No focal deficit present.      Mental Status: She is alert.   Psychiatric:         Mood and Affect: Mood normal.         Thought Content: Thought content normal.             Assessment & Plan   Diagnoses and all orders for this visit:    1. Arthritis of left sacroiliac joint (Primary)  -     traMADol (ULTRAM) 50 MG tablet; Take 0.5-1 tablets by mouth 2 (Two) Times a Day As Needed for Moderate Pain.  Dispense: 12 tablet; Refill: 0  -     Case Request    2. Spinal stenosis of lumbar region, unspecified whether neurogenic claudication present  -     traMADol (ULTRAM) 50 MG tablet; Take 0.5-1 tablets by mouth 2 (Two) Times a Day As Needed for Moderate Pain.  Dispense: 12 tablet; Refill: 0    3. Radiculitis  -     traMADol (ULTRAM) 50 " MG tablet; Take 0.5-1 tablets by mouth 2 (Two) Times a Day As Needed for Moderate Pain.  Dispense: 12 tablet; Refill: 0          -Refill provided for tramadol.  Patient appears stable with current regimen. No adverse effects. Regarding continuation of opioids, there is no evidence of aberrant behavior or any red flags.  The patient continues with appropriate response to opioid therapy. ADL's remain intact by self.    -Compounded pain cream sent to pharmacy to use for radicular symptoms.  -Briefly discussed use of TENS unit.  I recommended that she reach out to her cardiologist to determine if it is safe for her to use this on her leg (she has a pacemaker).  -We will schedule for left sacroiliac joint radiofrequency ablation (lateral branch).  Risks discussed including but not limited to bleeding, bruising, infection, damage to surrounding structures, headache, and rare things such as being paralyzed, seizure, stroke, heart attack and death.    -Briefly discussed use of gabapentin.  She is wanting to hold on trying this medication at this time.  - Tamie R Rome reports a pain score of 8.  Given her pain assessment as noted, treatment options were discussed and the following options were decided upon as a follow-up plan to address the patient's pain: prescription for opiod analgesics and ablation.      --- Follow-up for left sacral lateral branch Radiofrequency ablation (thermal, non-pulsed) and office visit 6 weeks following.            PREMA REPORT    As part of the patient's treatment plan, I am prescribing controlled substances. The patient has been made aware of appropriate use of such medications, including potential risk of somnolence, limited ability to drive and/or work safely, and the potential for dependence or overdose. It has also bee made clear that these medications are for use by this patient only, without concomitant use of alcohol or other substances unless prescribed.     As the clinician, I  personally reviewed the PREMA from 1/26/23 .    History and physical exam exhibit continued safe and appropriate use of controlled substances.    While examining this patient, I wore protective equipment including a mask and gloves.  I washed my hands before and after this patient encounter.  The patient wore a mask throughout the visit as well.     Yasmine Gaming MD  Pain Management    EMR Dragon/Transcription disclaimer:   Much of this encounter note is an electronic transcription/translation of spoken language to printed text. The electronic translation of spoken language may permit erroneous, or at times, nonsensical words or phrases to be inadvertently transcribed; Although I have reviewed the note for such errors, some may still exist.

## 2023-01-26 ENCOUNTER — PREP FOR SURGERY (OUTPATIENT)
Dept: SURGERY | Facility: SURGERY CENTER | Age: 88
End: 2023-01-26
Payer: MEDICARE

## 2023-01-26 ENCOUNTER — OFFICE VISIT (OUTPATIENT)
Dept: PAIN MEDICINE | Facility: CLINIC | Age: 88
End: 2023-01-26
Payer: MEDICARE

## 2023-01-26 VITALS
OXYGEN SATURATION: 99 % | BODY MASS INDEX: 23.91 KG/M2 | SYSTOLIC BLOOD PRESSURE: 130 MMHG | HEART RATE: 93 BPM | HEIGHT: 60 IN | DIASTOLIC BLOOD PRESSURE: 68 MMHG | TEMPERATURE: 97.7 F | WEIGHT: 121.8 LBS

## 2023-01-26 DIAGNOSIS — M48.061 SPINAL STENOSIS OF LUMBAR REGION, UNSPECIFIED WHETHER NEUROGENIC CLAUDICATION PRESENT: ICD-10-CM

## 2023-01-26 DIAGNOSIS — M47.818 ARTHRITIS OF LEFT SACROILIAC JOINT: Primary | ICD-10-CM

## 2023-01-26 DIAGNOSIS — M54.10 RADICULITIS: ICD-10-CM

## 2023-01-26 PROCEDURE — 99214 OFFICE O/P EST MOD 30 MIN: CPT | Performed by: ANESTHESIOLOGY

## 2023-01-26 RX ORDER — SODIUM CHLORIDE 0.9 % (FLUSH) 0.9 %
10 SYRINGE (ML) INJECTION AS NEEDED
Status: CANCELLED | OUTPATIENT
Start: 2023-01-26

## 2023-01-26 RX ORDER — TRAMADOL HYDROCHLORIDE 50 MG/1
25-50 TABLET ORAL 2 TIMES DAILY PRN
Qty: 12 TABLET | Refills: 0 | Status: SHIPPED | OUTPATIENT
Start: 2023-01-26 | End: 2023-04-04 | Stop reason: SDUPTHER

## 2023-01-26 RX ORDER — SODIUM CHLORIDE 0.9 % (FLUSH) 0.9 %
10 SYRINGE (ML) INJECTION EVERY 12 HOURS SCHEDULED
Status: CANCELLED | OUTPATIENT
Start: 2023-01-26

## 2023-01-26 NOTE — PATIENT INSTRUCTIONS
"What to expect if we're setting up an injection/procedure    - I have placed the order today, we'll start speaking to your insurance for authorization (this can sometimes take a few weeks).   - You should be scheduled for your procedure before you leave the office.  If you were not, please call our office to schedule.   - If you have any symptoms of an infection or of COVID, please reschedule your procedure.   - LIGHT Intravenous (IV) sedation is offered for some procedures: you will NOT be put to sleep.  If you plan to have sedation, do not eat or drink anything on the day of your injection.   - Most procedures require having someone drive you.  Please make sure you arrange a  unless told otherwise.   - If you take a blood thinner and you were not instructed whether to continue or hold it, please contact us with any questions.  Continue taking your Eliquis - DO NOT HOLD.    Radiofrequency ablation (RFA):     - Radiofrequency ablation is a term used to describe cauterization or \"burning.\"   - In pain management, we can use this technique with a special needle to target and destroy areas that are causing your pain.     After your RFA:   - Because heat is used in this technique, it is common to have soreness after the procedure.  Sometimes \"neuritis\" occurs, which feels like tingling, prickly, or sunburn under the skin sensations.   - Ice packs are helpful in decreasing this soreness and preventing post-procedure \"neuritis\" pain.  Use an ice pack for 20 minutes at a time at least 3 times the day of and the day after your procedure.   - It is common to have arm/leg numbness or weakness the day of your procedure, but this should wear off by the following day.   - It may take up to 6 weeks to gain full benefit from this procedure.    "

## 2023-01-30 ENCOUNTER — TRANSCRIBE ORDERS (OUTPATIENT)
Dept: SURGERY | Facility: SURGERY CENTER | Age: 88
End: 2023-01-30
Payer: MEDICARE

## 2023-01-30 DIAGNOSIS — Z41.9 SURGERY, ELECTIVE: Primary | ICD-10-CM

## 2023-02-17 NOTE — DISCHARGE INSTRUCTIONS
[facility] - Post-procedure Instructions          --  While there are no absolute restrictions, it is recommended that you do not perform strenuous activity today. In the morning, you may resume your level of activity as before your block.    --  If you have a band-aid at your injection site, please remove it later today. Observe the area for any redness, swelling, pus-like drainage, or a temperature over 101°. If any of these symptoms occur, please call your doctor at [phone]. If after office hours, leave a message and the on-call provider will return your call.    --  Ice may be applied to your injection site. It is recommended you avoid direct heat (heating pad; hot tub) for 1-2 days.    --  Call [facility] at [phone] if you experience persistent headache, persistent bleeding from the injection site, or severe pain not relieved by heat or oral medication.    --  Do not make important decisions today.    --  Due to the effects of the block and/or the I.V. Sedation, DO NOT drive or operate hazardous machinery for 12 hours.  Local anesthetics may cause numbness after procedure and precautions must be taken with regards to operating equipment as well as with walking, even if ambulating with assistance of another person or with an assistive device.    --  Do not drink alcohol for 12 hours.    -- You may return to work tomorrow, or as directed by your referring doctor.    --  Occasionally you may notice a slight increase in your pain after the procedure. This should start to improve within the next 24-48 hours. Radiofrequency ablation procedure pain may last 3-4 weeks.    --  It may take as long as 3-4 days before you notice a gradual improvement in your pain and/or other symptoms.    -- You may continue to take your prescribed pain medication as needed.    --  Some normal possible side effects of steroid use could include fluid retention, increased blood sugar, dull headache,  "increased sweating, increased appetite, mood swings and flushing.    --  Diabetics are recommended to watch their blood glucose level closely for 24-48 hours after the injection.    --  Must stay in PACU for 20 min upon arrival and prove no leg weakness before being discharged.    --  IN THE EVENT OF A LIFE THREATENING EMERGENCY, (CHEST PAIN, BREATHING DIFFICULTIES, PARALYSIS…) YOU SHOULD GO TO YOUR NEAREST EMERGENCY ROOM.    --  You should be contacted by our office within 2-3 days to schedule follow up or next appointment date.  If not contacted within 7 days, please call the office at [phone]    Radiofrequency ablation (RFA):     - Radiofrequency ablation is a term used to describe cauterization or \"burning.\"   - In pain management, we can use this technique with a special needle to target and destroy areas that are causing your pain.   - In most cases, you must have TWO successful \"test injections\" before you are a candidate for RFA.    After your RFA:   - Because heat is used in this technique, it is common to have soreness after the procedure.  Sometimes \"neuritis\" occurs, which feels like tingling, prickly, or sunburn under the skin sensations.   - Ice packs are helpful in decreasing this soreness and preventing post-procedure \"neuritis\" pain.  Use an ice pack for 20 minutes at a time at least 3 times the day of and the day after your procedure.   - It is common to have arm/leg numbness or weakness the day of your procedure, but this should wear off by the following day.   - It may take up to 6 weeks to gain full benefit from this procedure.   "

## 2023-02-20 ENCOUNTER — HOSPITAL ENCOUNTER (OUTPATIENT)
Dept: GENERAL RADIOLOGY | Facility: SURGERY CENTER | Age: 88
Setting detail: HOSPITAL OUTPATIENT SURGERY
End: 2023-02-20
Payer: MEDICARE

## 2023-02-20 ENCOUNTER — HOSPITAL ENCOUNTER (OUTPATIENT)
Facility: SURGERY CENTER | Age: 88
Setting detail: HOSPITAL OUTPATIENT SURGERY
Discharge: HOME | End: 2023-02-20
Attending: ANESTHESIOLOGY | Admitting: ANESTHESIOLOGY
Payer: MEDICARE

## 2023-02-20 VITALS
BODY MASS INDEX: 22.58 KG/M2 | SYSTOLIC BLOOD PRESSURE: 123 MMHG | DIASTOLIC BLOOD PRESSURE: 65 MMHG | TEMPERATURE: 98.3 F | RESPIRATION RATE: 20 BRPM | HEIGHT: 60 IN | HEART RATE: 62 BPM | WEIGHT: 115 LBS | OXYGEN SATURATION: 98 %

## 2023-02-20 DIAGNOSIS — M47.818 ARTHRITIS OF LEFT SACROILIAC JOINT: ICD-10-CM

## 2023-02-20 DIAGNOSIS — Z41.9 SURGERY, ELECTIVE: ICD-10-CM

## 2023-02-20 PROCEDURE — 64625 RF ABLTJ NRV NRVTG SI JT: CPT | Performed by: ANESTHESIOLOGY

## 2023-02-20 PROCEDURE — 76000 FLUOROSCOPY <1 HR PHYS/QHP: CPT

## 2023-02-20 RX ORDER — SODIUM CHLORIDE 0.9 % (FLUSH) 0.9 %
10 SYRINGE (ML) INJECTION EVERY 12 HOURS SCHEDULED
Status: DISCONTINUED | OUTPATIENT
Start: 2023-02-20 | End: 2023-02-20 | Stop reason: HOSPADM

## 2023-02-20 RX ORDER — SODIUM CHLORIDE 0.9 % (FLUSH) 0.9 %
10 SYRINGE (ML) INJECTION AS NEEDED
Status: DISCONTINUED | OUTPATIENT
Start: 2023-02-20 | End: 2023-02-20 | Stop reason: HOSPADM

## 2023-02-20 NOTE — OP NOTE
Left Sacral Lateral Branch Radiofrequency Ablation  [facility]      PREOPERATIVE DIAGNOSIS:  Sacroiliac joint dysfunction/pain    POSTOPERATIVE DIAGNOSIS:  Sacroiliac joint dysfunction/pain    PROCEDURE:   Left Sacral Lateral Branch Nerve Radiofrequency ablation, with fluoroscopy:  at the S1, S2, S3 Foramen)   1. [id number] - Sacral lateral branch nerve radiofrequency ablation for the sacroiliac joint    PRE-PROCEDURE DISCUSSION WITH PATIENT:    Risks and complications were discussed with the patient prior to starting the procedure and informed consent was obtained.      SURGEON:  Yasmine Gaming MD    REASON FOR PROCEDURE:    Sacroiliac joint dysfunction, tenderness to palpation    SEDATION:  No sedation was used for this procedure  ANESTHETIC:  2% lidocaine, 0.5% bupivacaine   STEROID:  None  TOTAL VOLUME OF SOLUTION:  4ml    TIME OF PROCEDURE:  The Interoperative procedure time, after administration of the IV sedative, was N/A minutes.    DESCRIPTON OF PROCEDURE:  After obtaining informed consent, IV access was not obtained in the preoperative area.   The patient was taken to the operating room.  The patient was placed in the prone position with a pillow under the abdomen. All pressure points were well padded.  EKG, blood pressure, and pulse oximeter were monitored.  The patient was monitored and sedated by the RN under my direction. The lumbosacral area was prepped with Chloraprep and draped in a sterile fashion.     AP fluoroscopic image was used to visualize the S1, S2 and S3 foramen on the left.  The skin and subcutaneous tissue inferior to each foramen was anesthetized with 1% lidocaine.  A 20-gauge 100mm RF Pruitt needle was then advanced percutaneously through the anesthetized skin tract under fluoroscopic guidance until the non-insulated portion of the needle lie at the lateral edge of each foramen.  An additional needle was placed midway between the S1 and S2 foramen.   Lateral fluoroscopy  was used to verify that the needle tips had not entered the sacral foramen.  Both sensory and motor stimulation were then performed and noted to be within acceptable limits.  There was no motor stimulation of the lower extremities or perineum with motor stimulation up to 2V at any level.  Each level was then anesthetized with 1mL of 2% lidocaine prior to treatment with pulsed radiofrequency thermocoagulation at 42 degrees Celsius.  A parallel thermal lesion was then performed between the two upper and two lower needles at 80 degrees celsius for 60 seconds.  An additional parallel lesion was then created between the 2nd and 3rd needles at the same settings.  Prior to the removal of each needle, a volume of 0.75 mL of 0.5% bupivacaine injectate was administered at each site.      Onset of analgesia was noted.  Vital signs remained stable throughout.      ESTIMATED BLOOD LOSS:  <5 mL  SPECIMENS:  none    COMPLICATIONS:   No complications were noted.    TOLERANCE & DISCHARGE CONDITION:    The patient tolerated the procedure well.  The patient was transported to the recovery area without difficulties.  The patient was discharged to home under the care of family in stable and satisfactory condition.    PLAN:  1.  The patient was given our standard instruction sheet.  2.  The patient will resume all medications per the medication reconciliation sheet.  3.  The patient will return to the [facility] for reevaluation in approximately 6 weeks.

## 2023-02-28 LAB
BUN SERPL-MCNC: 22 MG/DL (ref 8–23)
BUN/CREAT SERPL: 16.5 (ref 7–25)
CALCIUM SERPL-MCNC: 10.1 MG/DL (ref 8.6–10.5)
CHLORIDE SERPL-SCNC: 99 MMOL/L (ref 98–107)
CHOLEST SERPL-MCNC: 205 MG/DL (ref 0–200)
CHOLEST/HDLC SERPL: 3.94 {RATIO}
CO2 SERPL-SCNC: 26.4 MMOL/L (ref 22–29)
CREAT SERPL-MCNC: 1.33 MG/DL (ref 0.57–1)
EGFRCR SERPLBLD CKD-EPI 2021: 39.3 ML/MIN/1.73
GLUCOSE SERPL-MCNC: 148 MG/DL (ref 65–99)
HBA1C MFR BLD: 7.7 % (ref 4.8–5.6)
HDLC SERPL-MCNC: 52 MG/DL (ref 40–60)
LDLC SERPL CALC-MCNC: 117 MG/DL (ref 0–100)
POTASSIUM SERPL-SCNC: 4.2 MMOL/L (ref 3.5–5.2)
SODIUM SERPL-SCNC: 138 MMOL/L (ref 136–145)
TRIGL SERPL-MCNC: 209 MG/DL (ref 0–150)
VLDLC SERPL CALC-MCNC: 36 MG/DL (ref 5–40)

## 2023-03-07 ENCOUNTER — OFFICE VISIT (OUTPATIENT)
Dept: INTERNAL MEDICINE | Facility: CLINIC | Age: 88
End: 2023-03-07
Payer: MEDICARE

## 2023-03-07 VITALS
TEMPERATURE: 97.9 F | DIASTOLIC BLOOD PRESSURE: 68 MMHG | SYSTOLIC BLOOD PRESSURE: 120 MMHG | OXYGEN SATURATION: 99 % | HEIGHT: 60 IN | BODY MASS INDEX: 23.56 KG/M2 | RESPIRATION RATE: 16 BRPM | HEART RATE: 66 BPM | WEIGHT: 120 LBS

## 2023-03-07 DIAGNOSIS — E11.9 TYPE 2 DIABETES MELLITUS WITHOUT COMPLICATION, WITHOUT LONG-TERM CURRENT USE OF INSULIN: Chronic | ICD-10-CM

## 2023-03-07 DIAGNOSIS — I10 PRIMARY HYPERTENSION: Primary | Chronic | ICD-10-CM

## 2023-03-07 DIAGNOSIS — E78.2 MIXED HYPERLIPIDEMIA: Chronic | ICD-10-CM

## 2023-03-07 DIAGNOSIS — I25.10 CORONARY ARTERY DISEASE INVOLVING NATIVE CORONARY ARTERY OF NATIVE HEART WITHOUT ANGINA PECTORIS: Chronic | ICD-10-CM

## 2023-03-07 DIAGNOSIS — N18.32 STAGE 3B CHRONIC KIDNEY DISEASE: Chronic | ICD-10-CM

## 2023-03-07 PROBLEM — M81.0 OSTEOPOROSIS: Chronic | Status: ACTIVE | Noted: 2017-09-14

## 2023-03-07 PROCEDURE — 99214 OFFICE O/P EST MOD 30 MIN: CPT | Performed by: INTERNAL MEDICINE

## 2023-03-07 RX ORDER — DAPAGLIFLOZIN 5 MG/1
5 TABLET, FILM COATED ORAL DAILY
Qty: 30 TABLET | Refills: 2 | Status: SHIPPED | OUTPATIENT
Start: 2023-03-07

## 2023-03-07 NOTE — PROGRESS NOTES
Subjective   Tamie Peter is a 87 y.o. female.     Chief Complaint   Patient presents with   • Hyperlipidemia   • Hypertension   • Diabetes   • Back Pain         Hyperlipidemia  This is a chronic problem. The current episode started more than 1 year ago. The problem is controlled. Recent lipid tests were reviewed and are normal. Pertinent negatives include no chest pain or shortness of breath.   Hypertension  This is a chronic problem. The current episode started more than 1 year ago. The problem is unchanged. The problem is controlled. Pertinent negatives include no chest pain, headaches, palpitations, peripheral edema (right foot in summer only) or shortness of breath.   Diabetes  She presents for her follow-up diabetic visit. She has type 2 diabetes mellitus. No MedicAlert identification noted. Her disease course has been stable. Pertinent negatives for hypoglycemia include no headaches. Pertinent negatives for diabetes include no chest pain. Symptoms are stable.   Back Pain  Pertinent negatives include no chest pain or headaches.   Coronary Artery Disease  Presents for follow-up visit. Pertinent negatives include no chest pain, leg swelling, palpitations or shortness of breath. Risk factors include hyperlipidemia.   Chronic Kidney Disease  This is a chronic problem. The current episode started more than 1 year ago. The problem occurs constantly. The problem has been unchanged. Pertinent negatives include no chest pain or headaches.   Sciatica  This is a recurrent problem. Pertinent negatives include no chest pain or headaches.        The following portions of the patient's history were reviewed and updated as appropriate: allergies, current medications, past social history and problem list.    Outpatient Medications Marked as Taking for the 3/7/23 encounter (Office Visit) with Mohinder Barbosa MD   Medication Sig Dispense Refill   • alendronate (FOSAMAX) 70 MG tablet Take 1 tablet by mouth 1 (One) Time  Per Week.     • amLODIPine (NORVASC) 10 MG tablet Take 1 tablet by mouth Daily. 90 tablet 1   • apixaban (ELIQUIS) 5 MG tablet tablet Take 1 tablet by mouth 2 (Two) Times a Day.     • aspirin 81 MG EC tablet Take 1 tablet by mouth Daily.     • atorvastatin (LIPITOR) 80 MG tablet TAKE ONE TABLET BY MOUTH DAILY 90 tablet 1   • Diclofenac Sodium (VOLTAREN) 1 % gel gel APPLY 4 GRAMS OF GEL TO AFFECTED AREA FOUR TIMES A DAY 30 g 1   • glipizide (GLUCOTROL) 5 MG tablet TAKE TWO TABLETS BY MOUTH DAILY WITH BREAKFAST AND TAKE ONE TABLET BY MOUTH EVERY EVENING 270 tablet 1   • hydroCHLOROthiazide (HYDRODIURIL) 12.5 MG tablet TAKE ONE TABLET BY MOUTH DAILY 90 tablet 1   • metFORMIN (GLUCOPHAGE) 500 MG tablet TAKE TWO TABLETS BY MOUTH TWICE A DAY WITH MEALS 360 tablet 1   • metoprolol tartrate (LOPRESSOR) 50 MG tablet Take 1 tablet by mouth 2 (Two) Times a Day.     • nitroglycerin (NITROSTAT) 0.4 MG SL tablet 1 tablet.     • traMADol (ULTRAM) 50 MG tablet Take 0.5-1 tablets by mouth 2 (Two) Times a Day As Needed for Moderate Pain. 12 tablet 0       Review of Systems   Respiratory: Negative for shortness of breath and wheezing.    Cardiovascular: Negative for chest pain, palpitations and leg swelling.   Gastrointestinal: Negative for constipation and diarrhea.   Musculoskeletal: Positive for back pain.   Neurological: Negative for headaches.       Objective   Vitals:    03/07/23 1416   BP: 120/68   Pulse: 66   Resp: 16   Temp: 97.9 °F (36.6 °C)   SpO2: 99%      Wt Readings from Last 3 Encounters:   03/07/23 54.4 kg (120 lb)   02/20/23 52.2 kg (115 lb)   01/26/23 55.2 kg (121 lb 12.8 oz)    Body mass index is 23.44 kg/m².      Physical Exam  Constitutional:       Appearance: Normal appearance. She is well-developed.   Neck:      Thyroid: No thyromegaly.   Cardiovascular:      Rate and Rhythm: Normal rate and regular rhythm.      Heart sounds: Normal heart sounds. No murmur heard.    No gallop.   Pulmonary:      Effort: Pulmonary  effort is normal. No respiratory distress.      Breath sounds: Normal breath sounds. No wheezing or rales.   Abdominal:      General: Bowel sounds are normal.      Palpations: Abdomen is soft. There is no mass.      Tenderness: There is no abdominal tenderness. There is no guarding.   Neurological:      Mental Status: She is alert.           Problems Addressed this Visit        Cardiac and Vasculature    CAD (coronary artery disease) (Chronic)    Hypertension - Primary (Chronic)    Hyperlipidemia (Chronic)       Endocrine and Metabolic    Diabetes mellitus (HCC) (Chronic)       Genitourinary and Reproductive     CKD (chronic kidney disease) stage 3, GFR 30-59 ml/min (CMS/HCC) (Chronic)   Diagnoses       Codes Comments    Primary hypertension    -  Primary ICD-10-CM: I10  ICD-9-CM: 401.9     Mixed hyperlipidemia     ICD-10-CM: E78.2  ICD-9-CM: 272.2     Coronary artery disease involving native coronary artery of native heart without angina pectoris     ICD-10-CM: I25.10  ICD-9-CM: 414.01     Type 2 diabetes mellitus without complication, without long-term current use of insulin ([facility])     ICD-10-CM: E11.9  ICD-9-CM: 250.00     Stage 3b chronic kidney disease (HCC)     ICD-10-CM: N18.32  ICD-9-CM: 585.3         Assessment & Plan   In for recheck of hypertension, hyperlipidemia and diabetes mellitus today March 2023.  She had 3 more stents placed in her heart in October 2022.  She had an MI later in the day after an epidural.  She had 13 stents many years ago.  The current 3 stents were placed in a staged fashion 1 week apart.  She has CKD and CAD.  She has had some problems with sciatica on the left side for 17 months.  Started July 2021.  She feels a knot near the SI region and pain going down the left leg.  She is actually feeling the superior iliac landmark area.  Physical therapy helped a little bit as far as massage.  She has been seen by neurosurgery in the past year.  Neurosurgery recommended an injection which  she refused at the time.  She did get an injection in the left SI joint which helped for only 2 days.  Since last visit she had a radio frequency ablation which is helped.  She has had 2 different bypass surgeries as well as 13 different stents over the years.  Blood pressure control is good with the addition of spironolactone recently.  CAD is asymptomatic.  Fatigue has improved.  Annual labs October 2022 including CBC, CMP, lipids, A1c, UA.  Gets BMP, a1c, lipids today and q 3 mos.  Eye check up this year was fantastic with Dr. Dow as well as cardiology visit Dr. Iglesia Bryant at [phone].  Annual wellness performed December 2022.  We will add Farxiga 5 mg daily and see if it helps.  It may also have some cardiac protection.    The above information was reviewed again today 03/07/23.  It continues to be accurate as reflected above and is unchanged.  History, physical and review of systems all reviewed and are unchanged.  Medications were reviewed today and continue the current dosing.    PPE today includes face mask and eye shield.             Dragon disclaimer:   Part of this note may be an electronic transcription/translation of spoken language to printed text using the Dragon Dictation System.

## 2023-04-03 NOTE — PROGRESS NOTES
The patient has a pain history of the following:  Left-sided low back pain  Lumbar radiculopathy  Lumbar stenosis  Sacroiliitis   Chronic pain syndrome     Previous interventions that the patient has received include:   Left sacral lateral branch Radiofrequency ablation (thermal, non-pulsed)  2/20/23  Left L3-4 and left L4-5 lumbar transforaminal epidural steroid injection 10/17/2022 - no relief   Left sacroiliac joint and left piriformis injection 7/15/2022 -80% relief  Left sacroiliac joint injection 5/9/2022 -80% x 2 days     Pain medications include:  Voltaren gel   Tylenol   Tramadol   Compounded pain cream - helps      Other conservative modalities which the patient reports using include:  Physical Therapy: yes  Chiropractor: no  Massage Therapy: yes  TENS: yes  Neck or back surgery: no  Past pain management: no  Back brace      Past Significant Surgical History:  None     HPI:     CHIEF COMPLAINT Back Pain      Subjective   Tamie Peter is a 87 y.o. female  who presents to the office for follow-up of procedure.  She completed a Left sacral lateral branch Radiofrequency ablation (thermal, non-pulsed)  2/20/23 for management of left sacroiliac joint pain. Patient reports minimal relief from the procedure.     At her previous visit I refilled her Tramadol and prescribed compounded pain cream.  We briefly discussed gabapentin.     History of Present Illness  She states she has no pain when sitting or lying down.  As soon as she stands, the pain feels like electricity all the way down her left lower extremity.  Previously it was just in the thighs, now it's becoming more frequent traveling below her knee.      She states that she takes the Tramadol when her pain is severe.  She will not drive after taking the medication and she takes it rarely.  She has no side effects of the medication.  She now takes a full tablet (instead of a half a tablet).  She feels that it is not quite as effective as it was when she  first started taking the medication.     She has also been using the compounded pain cream and feels that is helpful for controlling her pain.     She is hoping to participate in dance starting in May, but is concerned because she cannot stand without having pain.         PEG Assessment   What number best describes your pain on average in the past week?8  What number best describes how, during the past week, pain has interfered with your enjoyment of life?9  What number best describes how, during the past week, pain has interfered with your general activity?  9    REVIEW OF PERTINENT MEDICAL DATA  6/3/22 CT LUMBAR SPINE WITHOUT CONTRAST     HISTORY: Back pain, radiculopathy.     COMPARISON: None.     FINDINGS:  Extensive vascular calcification is noted. There is mild  fusiform enlargement of the infrarenal abdominal aorta which measures  approximately 2.5 cm in diameter.     There is severe loss of disc height at L2-L3 and to a slightly lesser  extent L5-S1. Moderate loss of disc height is appreciated at L4-L5.  Vacuum disc effect is noted at the same levels. There is a grade 1  retrolisthesis of L2 upon L3 and L4 upon L5. Moderate endplate  degenerative changes are present at L2-L3, more prominent laterally to  the right. Moderate endplate degenerative changes involving the superior  endplate of S1 are noted. Mild levoscoliosis of the lumbar spine is  appreciated with apex at the level of L2-L3.     L1-L2: There is no evidence of disc bulge or herniation.     L2-L3: A mild central disc osteophyte complex is present resulting in  mild flattening of the ventral surface of the thecal sac. Mild foraminal  stenosis is appreciated bilaterally secondary to loss of disc height and  extension of a disc osteophyte complex into the neural foramen.     L3-L4: There is a mild broad-based disc osteophyte complex with no  evidence of herniation. Mild foraminal stenosis is present on the left  secondary to extension of a disc  osteophyte complex into the neural  foramen.     L4-L5: There is moderate to severe if not severe central canal stenosis  secondary to the retrolisthesis of L4 upon L5 and a central disc  osteophyte complex when combined with moderate facet and ligamentum  flavum hypertrophy. Mild foraminal stenosis is present bilaterally  secondary to extension of a disc osteophyte complex into the neural  foramen.     L5-S1: A central disc osteophyte complex is present with no evidence of  herniation. Mild-to-moderate and moderate foraminal stenosis is present  on the right and left respectively secondary to loss of disc height and  extension of a disc osteophyte complex into the neural foramen.     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 Mild grade 1 retrolisthesis of  L2 upon L3 and L4 upon L5 is appreciated. There is no evidence of a  focal herniation. The degenerative disease does includes moderate to  severe if not severe canal stenosis at L4-L5 secondary to a central disc  osteophyte complex, posterior element of degenerative disease and a mild  grade 1 retrolisthesis of L4 upon L5. Mild-to-moderate and moderate  foraminal stenosis is present on the right and left respectively at  L5-S1. See above. A mild eccentric infrarenal abdominal aortic aneurysm  measuring 2.5 cm in size is noted.     Radiation dose reduction techniques were utilized, including automated  exposure control and exposure modulation based on body size.     This report was finalized on 6/7/2022 7:11 AM by Dr. Lawrence Jason M.D.    The following portions of the patient's history were reviewed and updated as appropriate: allergies, current medications, past family history, past medical history, past social history, past surgical history and problem list.    Review of Systems   Constitutional: Positive for fatigue  "(pain). Negative for activity change and fever.   HENT: Negative for congestion.    Eyes: Negative for visual disturbance.   Respiratory: Negative for cough and choking.    Cardiovascular: Negative for chest pain.   Gastrointestinal: Negative for abdominal pain, constipation and diarrhea.   Genitourinary: Negative for difficulty urinating and dysuria.   Musculoskeletal: Positive for back pain.   Neurological: Positive for light-headedness. Negative for dizziness, weakness, numbness and headaches.   Psychiatric/Behavioral: Positive for agitation (pain) and sleep disturbance (sometimes). Negative for suicidal ideas. The patient is not nervous/anxious.        Vitals:    04/04/23 0943   BP: 118/70   BP Location: Left arm   Patient Position: Sitting   Cuff Size: Adult   Pulse: 79   Resp: 12   Temp: 96.6 °F (35.9 °C)   TempSrc: Temporal   SpO2: 99%   Weight: 54.3 kg (119 lb 12.8 oz)   Height: 152.4 cm (60\")   PainSc:   8         Objective   Physical Exam  Vitals reviewed.   Constitutional:       General: She is not in acute distress.  Pulmonary:      Effort: Pulmonary effort is normal. No respiratory distress.   Musculoskeletal:      Comments: Positive left straight leg test.    Skin:     General: Skin is warm and dry.   Neurological:      General: No focal deficit present.      Mental Status: She is alert.   Psychiatric:         Mood and Affect: Mood normal.         Thought Content: Thought content normal.             Assessment & Plan   Diagnoses and all orders for this visit:    1. Thoracic stenosis (Primary)  -     CT Thoracic Spine Without Contrast; Future    2. Spinal stenosis of lumbar region, unspecified whether neurogenic claudication present  -     traMADol (ULTRAM) 50 MG tablet; Take 1-2 tablets by mouth 2 (Two) Times a Day As Needed for Moderate Pain.  Dispense: 24 tablet; Refill: 2    3. Radiculitis  -     traMADol (ULTRAM) 50 MG tablet; Take 1-2 tablets by mouth 2 (Two) Times a Day As Needed for Moderate " Pain.  Dispense: 24 tablet; Refill: 2    4. Arthritis of left sacroiliac joint  -     traMADol (ULTRAM) 50 MG tablet; Take 1-2 tablets by mouth 2 (Two) Times a Day As Needed for Moderate Pain.  Dispense: 24 tablet; Refill: 2    5. Chronic pain syndrome  -     Ambulatory Referral to Psychology        -CT thoracic spine ordered to evaluate for thoracic stenosis.  -Tramadol refilled today with directions to increase up to 2 tablets as needed for pain.  -Referral placed to psychology for spinal cord stimulator evaluation.  - We will reach out to her cardiologist at [facility] to determine if she can hold her Eliquis for spinal cord stimulator trial.   - We will schedule an education session with [employer], who is the same company that she has for her pacemaker.  -Briefly discussed gabapentin again, however she is still hesitant to try this medication.  - Tamie Peter reports a pain score of 8.  Given her pain assessment as noted, treatment options were discussed and the following options were decided upon as a follow-up plan to address the patient's pain: prescription for opiod analgesics and workup for spinal cord stimulator.    --- Follow-up after imaging, psychology evaluation and clearance to hold eliquis.                PREMA REPORT    As part of the patient's treatment plan, I am prescribing controlled substances. The patient has been made aware of appropriate use of such medications, including potential risk of somnolence, limited ability to drive and/or work safely, and the potential for dependence or overdose. It has also bee made clear that these medications are for use by this patient only, without concomitant use of alcohol or other substances unless prescribed.     Patient has completed prescribing agreement detailing terms of continued prescribing of controlled substances, including monitoring PREMA reports, urine drug screening, and pill counts if necessary. The patient is aware that inappropriate use  will results in cessation of prescribing such medications.    As the clinician, I personally reviewed the PREMA from 4/4/23 while the patient was in the office today.    History and physical exam exhibit continued safe and appropriate use of controlled substances.       While examining this patient, I wore protective equipment including a mask, eye sheild and gloves.  I washed my hands before and after this patient encounter.  The patient wore a mask throughout the visit as well.     Yasmine Gaming MD  Pain Management

## 2023-04-04 ENCOUNTER — OFFICE VISIT (OUTPATIENT)
Dept: PAIN MEDICINE | Facility: CLINIC | Age: 88
End: 2023-04-04
Payer: MEDICARE

## 2023-04-04 VITALS
DIASTOLIC BLOOD PRESSURE: 70 MMHG | OXYGEN SATURATION: 99 % | BODY MASS INDEX: 23.52 KG/M2 | TEMPERATURE: 96.6 F | RESPIRATION RATE: 12 BRPM | HEIGHT: 60 IN | HEART RATE: 79 BPM | SYSTOLIC BLOOD PRESSURE: 118 MMHG | WEIGHT: 119.8 LBS

## 2023-04-04 DIAGNOSIS — M48.061 SPINAL STENOSIS OF LUMBAR REGION, UNSPECIFIED WHETHER NEUROGENIC CLAUDICATION PRESENT: ICD-10-CM

## 2023-04-04 DIAGNOSIS — M48.04 THORACIC STENOSIS: Primary | ICD-10-CM

## 2023-04-04 DIAGNOSIS — M47.818 ARTHRITIS OF LEFT SACROILIAC JOINT: ICD-10-CM

## 2023-04-04 DIAGNOSIS — G89.4 CHRONIC PAIN SYNDROME: ICD-10-CM

## 2023-04-04 DIAGNOSIS — M54.10 RADICULITIS: ICD-10-CM

## 2023-04-04 PROCEDURE — 3078F DIAST BP <80 MM HG: CPT | Performed by: ANESTHESIOLOGY

## 2023-04-04 PROCEDURE — 3074F SYST BP LT 130 MM HG: CPT | Performed by: ANESTHESIOLOGY

## 2023-04-04 PROCEDURE — 99214 OFFICE O/P EST MOD 30 MIN: CPT | Performed by: ANESTHESIOLOGY

## 2023-04-04 PROCEDURE — 1125F AMNT PAIN NOTED PAIN PRSNT: CPT | Performed by: ANESTHESIOLOGY

## 2023-04-04 RX ORDER — TRAMADOL HYDROCHLORIDE 50 MG/1
50-100 TABLET ORAL 2 TIMES DAILY PRN
Qty: 24 TABLET | Refills: 2 | Status: SHIPPED | OUTPATIENT
Start: 2023-04-04

## 2023-04-06 RX ORDER — HYDRALAZINE HYDROCHLORIDE 25 MG/1
TABLET, FILM COATED ORAL
Qty: 118 TABLET | Refills: 0 | Status: SHIPPED | OUTPATIENT
Start: 2023-04-06

## 2023-04-10 ENCOUNTER — TELEPHONE (OUTPATIENT)
Dept: PAIN MEDICINE | Facility: CLINIC | Age: 88
End: 2023-04-10

## 2023-04-10 NOTE — TELEPHONE ENCOUNTER
Caller: Tamie Peter    Relationship: Self    Best call back number:     Who are you requesting to speak with (clinical staff, provider,  specific staff member):DR BURNS/CLINICAL    What was the call regarding: THE PATIENT STATED THAT HER CARDIOLOGIST TOLD HER SHE COULD NOT GO WITHOUT HER BLOOD THINNER. SHE WOULD LIKE TO KNOW IF SHE SHOULD STILL HAVE HER PSYCH EVAL FOR THE SPINAL CORD STIMULATOR IF SHE IS NOT ABLE TO GO OFF HER BLOOD THINNERS.     Do you require a callback: YES

## 2023-04-11 NOTE — TELEPHONE ENCOUNTER
Caller: Tamie Peter    Relationship to patient: Self    Best call back number: [phone]    Patient is needing: PATIENT IS CALLING BACK TO CHECK ON THE STATUS OF HER NOT BEING ABLE TO SEE HER SPINAL CORD STIMULATOR.  PATIENT NEEDS A CALL BACK, BECAUSE SHE WON'T BE ABLE TO SEE DR. CARLIN FOR HER PSYCHOLOGICAL EVALUATION.

## 2023-04-11 NOTE — TELEPHONE ENCOUNTER
She does not need to obtain the thoracic CT scan at this time.  Unfortunately, we have exhausted all interventions.  We have only medications to manage her pain.  I recommend she take the Tramadol that we discussed at her previous visit.  We may consider gabapentin (medication we've previously discussed).  She will need to follow-up in ~1 month for medication assessment.

## 2023-04-11 NOTE — TELEPHONE ENCOUNTER
I called patient and she told me that someone called her from her cardiologist office called her and told her that she cannot hold her plavix. I called Dr. Parekh's office and verifiied with RONALDO Jones. Patient is unable to hold Plavix for at least a year after having stent placed in November. She was wanting to cancel the appointment with Dr. Echeverria because of this so I cancelled the appointment. She would like to know what's the next step now?

## 2023-04-11 NOTE — TELEPHONE ENCOUNTER
This message does not make sense.  Do we need to reschedule her appointment with Dr. Echeverria?  Also, can we check on her blood thinner clearance too?

## 2023-04-20 NOTE — PROGRESS NOTES
Subjective   Patient ID: Tamie Peter is a 87 y.o. female is here today for follow-up. Ms. Peter was last seen on 11-05-21 with complaints of intermittent numbness and tingling in her back that radiates down the left leg into her foot.    Today, Ms. Peter reports lower back pain with radiating pain, numbness and tingling right leg.  She denies loss of bowel/bladder incontinence. She reports use of heat with temporary relief.  She reports current with pain management with lumbar ablation and lumbar epidural injection with no relief.    History of Present Illness     Ms. Peter is an 87-year-old female who was last seen by me in the office November 5, 2021.  At that time she was complaining of severe lumbosacral pain and toward the sacroiliac joints.  She was referred for for SI joint injections.  She has been working with Dr. Yasmine Gaming with [facility].  She is now being seen for recurrent back pain with numbness and tingling in her right leg.  She has been working with Dr. Gaming with pain management.  She has not been able to proceed with any injection therapies due to the cardiologists refusal to permit stopping the Eliquis.  She was given some tramadol by Dr. Gaming however the patient is fearful that will make her too drowsy.  She is very anxious to return to dancing at the [employer] in a few weeks. She has had no new or recent imaging of the lumbar spine.  She is not interested in any surgical considerations nor would she be cleared for those given the inability to come off anticoagulants due to her cardiac history.    The following portions of the patient's history were reviewed and updated as appropriate: allergies, current medications, past family history, past medical history, past social history, past surgical history and problem list.    Review of Systems   Constitutional: Positive for activity change. Negative for chills and fever.   Respiratory: Negative for cough, chest  tightness and shortness of breath.    Cardiovascular: Negative for chest pain, palpitations and leg swelling.   Gastrointestinal: Positive for abdominal pain. Negative for constipation.   Genitourinary: Negative for difficulty urinating and enuresis.   Musculoskeletal: Positive for back pain. Negative for gait problem.   Skin: Negative for rash.   Neurological: Positive for numbness (or tingling). Negative for weakness.   Hematological: Does not bruise/bleed easily.   Psychiatric/Behavioral: Negative for sleep disturbance.   All other systems reviewed and are negative.      Objective     Vitals:    04/24/23 1323   BP: 110/74   Pulse: 66   Temp: 97.2 °F (36.2 °C)   SpO2: 98%   PainSc:   8   PainLoc: Back     There is no height or weight on file to calculate BMI.    Tobacco Use: Low Risk    • Smoking Tobacco Use: Never   • Smokeless Tobacco Use: Never   • Passive Exposure: Not on file        Physical Exam  Vitals reviewed.   Constitutional:       General: She is not in acute distress.     Appearance: She is well-developed. She is not diaphoretic.   HENT:      Head: Normocephalic and atraumatic.   Eyes:      General:         Right eye: No discharge.         Left eye: No discharge.      Conjunctiva/sclera: Conjunctivae normal.   Neck:      Trachea: No tracheal deviation.   Cardiovascular:      Rate and Rhythm: Normal rate.   Pulmonary:      Effort: Pulmonary effort is normal. No respiratory distress.   Abdominal:      General: There is no distension.      Palpations: Abdomen is soft.      Tenderness: There is no abdominal tenderness.   Musculoskeletal:         General: Tenderness (with palpation over the right sacroiliac region. ) present. No signs of injury. Normal range of motion.      Cervical back: Normal range of motion and neck supple.      Right lower leg: No edema.      Left lower leg: No edema.   Skin:     General: Skin is warm and dry.      Findings: No erythema.   Neurological:      General: No focal deficit  present.      Mental Status: She is alert and oriented to person, place, and time.      GCS: GCS eye subscore is 4. GCS verbal subscore is 5. GCS motor subscore is 6.      Sensory: No sensory deficit.      Motor: No weakness or abnormal muscle tone.      Coordination: Coordination normal.      Gait: Gait normal.      Deep Tendon Reflexes: Reflexes are normal and symmetric. Reflexes normal.      Comments: No motor or sensory deficits. DTR's normal. Negative Daniels's; negative clonus. SLR mildly positive on the left at 30 degrees. Edson's negative bilaterally.    Psychiatric:         Behavior: Behavior is cooperative.         Thought Content: Thought content normal.       Assessment & Plan   Independent Review of Radiographic Studies:      I personally reviewed the images from the following studies.    I reviewed the CT lumbar spine from May of last year which revealed multilevel degenerative changes with osteophyte complexes and moderate to severe canal stenosis L4-5.  Mild to moderate right and moderate foraminal stenosis noted on the left at L5-S1.    There has been no new imaging of the spine for review.    Medical Decision Making:      Ms. Peter returns to the office today with her daughter.  She was referred to pain management and has been working with Dr. Gaming for some time.  Due to stent placement, her cardiologist has not allowed her to come off of Eliquis for any type of injection therapies.  The most latest consideration was that of a spinal cord stimulator.  The patient has not been able to undergo work-up for that and is not sure that she is interested in it.  She had been offered gabapentin by Dr. Gaming however the patient was a bit reluctant to consider it.  She has agreed to try tramadol however she is noticing that this is not lasting or completely providing satisfactory relief in her pain.  She was therefore encouraged by Dr. Gaming to try to have the tramadol at a time however the patient has  not yet decided to do that.    I informed both Ms. Sanchez and her daughter that from a neurosurgical standpoint, there is nothing further to offer.  The patient is not at all interested in surgical procedures however I do think that an injection would be of benefit to her may be in the SI joint.  She cannot proceed with that right now until she has been on the Eliquis for 1 year.  In the interim, I encouraged the patient to continue working with Dr. Gaming.  Try increasing the dosage of the tramadol as Dr. Gaming had suggested and even consider mild dosing of the gabapentin such as 100 mg at bedtime to start and slowly increase the dose until the patient's pain is at a tolerable level.  Ms. Peter will discuss these recommendations with Dr. Gaming when she sees her next time.  We will keep it open-ended from here.  Should she change her mind about surgical consideration or have any further questions or concerns, we will be happy to see her again.    Diagnoses and all orders for this visit:    1. Sacroiliac joint pain (Primary)    2. Back pain of lumbosacral region with sciatica      Return if symptoms worsen or fail to improve.

## 2023-04-24 ENCOUNTER — OFFICE VISIT (OUTPATIENT)
Dept: NEUROSURGERY | Facility: CLINIC | Age: 88
End: 2023-04-24
Payer: MEDICARE

## 2023-04-24 VITALS
HEART RATE: 66 BPM | SYSTOLIC BLOOD PRESSURE: 110 MMHG | OXYGEN SATURATION: 98 % | DIASTOLIC BLOOD PRESSURE: 74 MMHG | TEMPERATURE: 97.2 F

## 2023-04-24 DIAGNOSIS — M54.40 BACK PAIN OF LUMBOSACRAL REGION WITH SCIATICA: ICD-10-CM

## 2023-04-24 DIAGNOSIS — M53.3 SACROILIAC JOINT PAIN: Primary | ICD-10-CM

## 2023-04-24 PROCEDURE — 99213 OFFICE O/P EST LOW 20 MIN: CPT | Performed by: NURSE PRACTITIONER

## 2023-04-24 PROCEDURE — 1159F MED LIST DOCD IN RCRD: CPT | Performed by: NURSE PRACTITIONER

## 2023-04-24 PROCEDURE — 3074F SYST BP LT 130 MM HG: CPT | Performed by: NURSE PRACTITIONER

## 2023-04-24 PROCEDURE — 3078F DIAST BP <80 MM HG: CPT | Performed by: NURSE PRACTITIONER

## 2023-04-24 PROCEDURE — 1160F RVW MEDS BY RX/DR IN RCRD: CPT | Performed by: NURSE PRACTITIONER

## 2023-04-24 RX ORDER — CLOPIDOGREL BISULFATE 75 MG/1
TABLET ORAL
COMMUNITY
Start: 2023-04-16 | End: 2023-04-24 | Stop reason: DRUGHIGH

## 2023-05-04 ENCOUNTER — TELEPHONE (OUTPATIENT)
Dept: PAIN MEDICINE | Facility: CLINIC | Age: 88
End: 2023-05-04
Payer: MEDICARE

## 2023-05-04 NOTE — TELEPHONE ENCOUNTER
Patient states that she was taking tramadol. She states that when she took the medication she was vomiting and was dry heaving. So she stopped taking the medication. She has not had any vomiting since she stopped. She also wanted to inform you that she has had not pain for the last 4 days. She has had to cancel her appointment with you on 5/11 because she doesn't have anyone to drive her. When she finds someone to bring her she will reschedule.

## 2023-05-12 ENCOUNTER — TELEPHONE (OUTPATIENT)
Dept: INTERNAL MEDICINE | Facility: CLINIC | Age: 88
End: 2023-05-12

## 2023-05-12 NOTE — TELEPHONE ENCOUNTER
Caller: Tamie Peter    Relationship to patient: Self    Best call back number: [phone]    Patient is needing: PATIENT STATES THAT SHE CANNOT WALK FAR AND IS REQUESTING A HANDICAP STICKER AND NEED TO BE ADVISED ON WHAT SHE NEEDS TO DO TO GET ONE     PATIENT STATES SHE WOULD LIKE A CALLBACK

## 2023-05-16 ENCOUNTER — APPOINTMENT (OUTPATIENT)
Dept: WOMENS IMAGING | Facility: HOSPITAL | Age: 88
End: 2023-05-16
Payer: MEDICARE

## 2023-05-16 PROCEDURE — 77067 SCR MAMMO BI INCL CAD: CPT | Performed by: RADIOLOGY

## 2023-05-16 PROCEDURE — 77063 BREAST TOMOSYNTHESIS BI: CPT | Performed by: RADIOLOGY

## 2023-05-18 ENCOUNTER — OFFICE VISIT (OUTPATIENT)
Dept: FAMILY MEDICINE CLINIC | Facility: CLINIC | Age: 88
End: 2023-05-18
Payer: MEDICARE

## 2023-05-18 VITALS
BODY MASS INDEX: 22.81 KG/M2 | HEIGHT: 60 IN | DIASTOLIC BLOOD PRESSURE: 70 MMHG | SYSTOLIC BLOOD PRESSURE: 110 MMHG | OXYGEN SATURATION: 97 % | WEIGHT: 116.2 LBS | TEMPERATURE: 97.7 F | HEART RATE: 76 BPM

## 2023-05-18 DIAGNOSIS — M54.42 ACUTE LEFT-SIDED LOW BACK PAIN WITH LEFT-SIDED SCIATICA: ICD-10-CM

## 2023-05-18 DIAGNOSIS — E11.9 TYPE 2 DIABETES MELLITUS WITHOUT COMPLICATION, WITHOUT LONG-TERM CURRENT USE OF INSULIN: Primary | Chronic | ICD-10-CM

## 2023-05-18 DIAGNOSIS — Z76.89 ENCOUNTER TO ESTABLISH CARE: ICD-10-CM

## 2023-05-18 DIAGNOSIS — I10 PRIMARY HYPERTENSION: Chronic | ICD-10-CM

## 2023-05-18 NOTE — ASSESSMENT & PLAN NOTE
Pt due for A1C recheck on 5/27/23. Pt reports not taking Farxiga unless cleared with her cardiologist. Continues to take glipizide and metformin.

## 2023-05-18 NOTE — ASSESSMENT & PLAN NOTE
Pt no longer taking tramadol due to GI side effects. States she knows her pain needs to be managed with medications but is curious about options. Pt sees pain management. Says it keeps her from walking at times. Instructed to follow up with pain management.

## 2023-05-18 NOTE — PROGRESS NOTES
"Chief Complaint  Hypertension (Pt is here today to establish care has history of open heart surgeries,diabetes and hypertension. Last heart attack and stints were in Nov 2022. Has questions about farxiga. Does not want to take tramadol possibly gabapentin?.), Diabetes, Back Pain (Pt is here today problems of siatic nerve pain.Wants to know if she should take gabapentin instead of tramadol), and Heart Problem (Pt has had open heart surgery and multiple heart attacks in the pass.)    Subjective        Tamie Peter presents to [facility]     History of Present Illness  87 year old presents to clinic to establish care and discuss medication management. States she does not want to take farxiga unless cleared by cardiologist, has appt set. Pt instructed why Farxiga was prescribed by prior PCP. Labs ordered to re-check A1C and BMP due to diabetes and previous elevated results. Talked with patient about future potential endocrinology referral. States her sciatica is bothering her but did not want to continue with surgery. Knows it needs to be med managed. No longer taking tramadol due to side effects. Instructed to follow up with pain management for control.           Objective   Vital Signs:  /70   Pulse 76   Temp 97.7 °F (36.5 °C)   Ht 152.4 cm (60\")   Wt 52.7 kg (116 lb 3.2 oz)   SpO2 97%   BMI 22.69 kg/m²   Estimated body mass index is 22.69 kg/m² as calculated from the following:    Height as of this encounter: 152.4 cm (60\").    Weight as of this encounter: 52.7 kg (116 lb 3.2 oz).       BMI is within normal parameters. No other follow-up for BMI required.      Physical Exam  HENT:      Head: Normocephalic.   Eyes:      Conjunctiva/sclera: Conjunctivae normal.      Pupils: Pupils are equal, round, and reactive to light.   Cardiovascular:      Rate and Rhythm: Normal rate and regular rhythm.      Pulses: Normal pulses.      Heart sounds: Normal heart sounds.   Pulmonary:    "   Effort: Pulmonary effort is normal.      Breath sounds: Normal breath sounds.   Musculoskeletal:        Feet:    Feet:      Right foot:      Skin integrity: Skin integrity normal.      Toenail Condition: Right toenails are normal.      Left foot:      Skin integrity: Skin integrity normal.      Toenail Condition: Left toenails are normal.      Comments: Sites assessed for pain, numbness, and sensation. Full sensation felt in all areas. No pain or numbness present.   Skin:     General: Skin is warm.   Neurological:      General: No focal deficit present.      Mental Status: She is alert.   Psychiatric:         Mood and Affect: Mood normal.         Behavior: Behavior normal.        Result Review :  The following data was reviewed by: DEIRDRE Kay on 05/18/2023:  Common labs        9/15/2022    08:45 12/2/2022    09:39 2/27/2023    10:10   Common Labs   Glucose 160   163   148     BUN 27   28   22     Creatinine 1.23   1.57   1.33     Sodium 141   136   138     Potassium 5.0   4.8   4.2     Chloride 105   102   99     Calcium 9.7   9.5   10.1     Total Protein  7.2      Albumin  4.50      Total Bilirubin  0.4      Alkaline Phosphatase  111      AST (SGOT)  32      ALT (SGPT)  18      WBC  8.33      Hemoglobin  12.9      Hematocrit  39.2      Platelets  352      Total Cholesterol 229   149   205     Triglycerides 237   204   209     HDL Cholesterol 48   35   52     LDL Cholesterol  138   80   117     Hemoglobin A1C 7.90   7.10   7.70       Most Recent A1C        2/27/2023    10:10   HGBA1C Most Recent   Hemoglobin A1C 7.70       Data reviewed: Radiologic studies CT scan, Cardiology studies echo  and Consultant notes cardiology, pain management              Assessment and Plan   Diagnoses and all orders for this visit:    1. Type 2 diabetes mellitus without complication, without long-term current use of insulin (Primary)  Assessment & Plan:  Pt due for A1C recheck on 5/27/23. Pt reports not taking Farxiga  unless cleared with her cardiologist. Continues to take glipizide and metformin.     Orders:  -     Hemoglobin A1c; Future  -     Basic metabolic panel; Future    2. Primary hypertension  Assessment & Plan:  Hypertension is stable.  Continue current treatment regimen.  Continue current medications.  Blood pressure will be reassessed at the next regular appointment.      3. Encounter to establish care    4. Acute left-sided low back pain with left-sided sciatica  Assessment & Plan:  Pt no longer taking tramadol due to GI side effects. States she knows her pain needs to be managed with medications but is curious about options. Pt sees pain management. Says it keeps her from walking at times. Instructed to follow up with pain management.             Follow Up   Return in about 3 months (around 8/18/2023) for Recheck.  Patient was given instructions and counseling regarding her condition or for health maintenance advice. Please see specific information pulled into the AVS if appropriate.

## 2023-06-06 ENCOUNTER — OFFICE VISIT (OUTPATIENT)
Dept: INTERNAL MEDICINE | Facility: CLINIC | Age: 88
End: 2023-06-06
Payer: MEDICARE

## 2023-06-06 VITALS
OXYGEN SATURATION: 94 % | HEART RATE: 74 BPM | BODY MASS INDEX: 23.75 KG/M2 | DIASTOLIC BLOOD PRESSURE: 60 MMHG | HEIGHT: 60 IN | RESPIRATION RATE: 16 BRPM | SYSTOLIC BLOOD PRESSURE: 102 MMHG | TEMPERATURE: 97.3 F | WEIGHT: 121 LBS

## 2023-06-06 DIAGNOSIS — I10 PRIMARY HYPERTENSION: Primary | Chronic | ICD-10-CM

## 2023-06-06 DIAGNOSIS — N18.32 STAGE 3B CHRONIC KIDNEY DISEASE: Chronic | ICD-10-CM

## 2023-06-06 DIAGNOSIS — E78.2 MIXED HYPERLIPIDEMIA: Chronic | ICD-10-CM

## 2023-06-06 DIAGNOSIS — E11.9 TYPE 2 DIABETES MELLITUS WITHOUT COMPLICATION, WITHOUT LONG-TERM CURRENT USE OF INSULIN: Chronic | ICD-10-CM

## 2023-06-06 DIAGNOSIS — I25.10 CORONARY ARTERY DISEASE INVOLVING NATIVE CORONARY ARTERY OF NATIVE HEART WITHOUT ANGINA PECTORIS: Chronic | ICD-10-CM

## 2023-06-06 RX ORDER — DAPAGLIFLOZIN 10 MG/1
10 TABLET, FILM COATED ORAL DAILY
Qty: 90 TABLET | Refills: 3 | Status: SHIPPED | OUTPATIENT
Start: 2023-06-06

## 2023-06-06 RX ORDER — SPIRONOLACTONE 25 MG/1
TABLET ORAL
COMMUNITY
Start: 2023-05-30

## 2023-06-06 RX ORDER — CLOPIDOGREL BISULFATE 75 MG/1
TABLET ORAL
COMMUNITY
Start: 2023-05-31

## 2023-06-06 NOTE — PROGRESS NOTES
Subjective   Tamie Peter is a 87 y.o. female.     Chief Complaint   Patient presents with    Hypertension    Hyperlipidemia    Coronary Artery Disease    Diabetes         Hypertension  This is a chronic problem. The current episode started more than 1 year ago. The problem is unchanged. The problem is controlled. Associated symptoms include palpitations. Pertinent negatives include no chest pain, headaches, peripheral edema (right foot in summer only) or shortness of breath.   Hyperlipidemia  This is a chronic problem. The current episode started more than 1 year ago. The problem is controlled. Recent lipid tests were reviewed and are normal. Pertinent negatives include no chest pain or shortness of breath.   Coronary Artery Disease  Presents for follow-up visit. Symptoms include palpitations. Pertinent negatives include no chest pain, leg swelling or shortness of breath. Risk factors include hyperlipidemia.   Diabetes  She presents for her follow-up diabetic visit. She has type 2 diabetes mellitus. No MedicAlert identification noted. Her disease course has been stable. Pertinent negatives for hypoglycemia include no headaches. Pertinent negatives for diabetes include no chest pain. Symptoms are stable.   Back Pain  Pertinent negatives include no chest pain or headaches.   Chronic Kidney Disease  This is a chronic problem. The current episode started more than 1 year ago. The problem occurs constantly. The problem has been unchanged. Pertinent negatives include no chest pain or headaches.   Sciatica  This is a recurrent problem. Pertinent negatives include no chest pain or headaches.      The following portions of the patient's history were reviewed and updated as appropriate: allergies, current medications, past social history and problem list.    Outpatient Medications Marked as Taking for the 6/6/23 encounter (Office Visit) with Mohinder Barbosa MD   Medication Sig Dispense Refill    alendronate  (FOSAMAX) 70 MG tablet Take 1 tablet by mouth 1 (One) Time Per Week.      amLODIPine (NORVASC) 10 MG tablet Take 1 tablet by mouth Daily. 90 tablet 1    apixaban (ELIQUIS) 5 MG tablet tablet Take 1 tablet by mouth 2 (Two) Times a Day.      aspirin 81 MG EC tablet Take 1 tablet by mouth Daily.      atorvastatin (LIPITOR) 80 MG tablet TAKE ONE TABLET BY MOUTH DAILY 90 tablet 1    clopidogrel (PLAVIX) 75 MG tablet       dapagliflozin (Farxiga) 5 MG tablet tablet Take 1 tablet by mouth Daily. 30 tablet 2    Diclofenac Sodium (VOLTAREN) 1 % gel gel APPLY 4 GRAMS OF GEL TO AFFECTED AREA FOUR TIMES A DAY 30 g 1    glipizide (GLUCOTROL) 5 MG tablet TAKE TWO TABLETS BY MOUTH DAILY WITH BREAKFAST AND TAKE ONE TABLET BY MOUTH EVERY EVENING 270 tablet 1    hydrALAZINE (APRESOLINE) 25 MG tablet TAKE ONE TABLET BY MOUTH TWICE A  tablet 0    hydroCHLOROthiazide (HYDRODIURIL) 12.5 MG tablet TAKE ONE TABLET BY MOUTH DAILY 90 tablet 1    metFORMIN (GLUCOPHAGE) 500 MG tablet TAKE TWO TABLETS BY MOUTH TWICE A DAY WITH MEALS 360 tablet 1    metoprolol tartrate (LOPRESSOR) 50 MG tablet Take 1 tablet by mouth 2 (Two) Times a Day.      nitroglycerin (NITROSTAT) 0.4 MG SL tablet 1 tablet.      spironolactone (ALDACTONE) 25 MG tablet          Review of Systems   Respiratory:  Negative for shortness of breath and wheezing.    Cardiovascular:  Positive for palpitations. Negative for chest pain and leg swelling.   Gastrointestinal:  Negative for constipation and diarrhea.   Musculoskeletal:  Positive for back pain.   Neurological:  Negative for headaches.     Objective   Vitals:    06/06/23 0900   BP: 102/60   Pulse: 74   Resp: 16   Temp: 97.3 °F (36.3 °C)   SpO2: 94%      Wt Readings from Last 3 Encounters:   06/06/23 54.9 kg (121 lb)   05/18/23 52.7 kg (116 lb 3.2 oz)   04/04/23 54.3 kg (119 lb 12.8 oz)    Body mass index is 23.63 kg/m².      Physical Exam  Constitutional:       Appearance: Normal appearance. She is well-developed.    Neck:      Thyroid: No thyromegaly.   Cardiovascular:      Rate and Rhythm: Normal rate and regular rhythm.      Heart sounds: Normal heart sounds. No murmur heard.    No gallop.   Pulmonary:      Effort: Pulmonary effort is normal. No respiratory distress.      Breath sounds: Normal breath sounds. No wheezing or rales.   Abdominal:      General: Bowel sounds are normal.      Palpations: Abdomen is soft. There is no mass.      Tenderness: There is no abdominal tenderness. There is no guarding.   Neurological:      Mental Status: She is alert.         Problems Addressed this Visit          Cardiac and Vasculature    CAD (coronary artery disease) (Chronic)    Relevant Medications    clopidogrel (PLAVIX) 75 MG tablet    Hypertension - Primary (Chronic)    Relevant Medications    spironolactone (ALDACTONE) 25 MG tablet    Hyperlipidemia (Chronic)       Genitourinary and Reproductive     CKD (chronic kidney disease) stage 3, GFR 30-59 ml/min (CMS/[facility]) (Chronic)    Relevant Medications    spironolactone (ALDACTONE) 25 MG tablet     Diagnoses         Codes Comments    Primary hypertension    -  Primary ICD-10-CM: I10  ICD-9-CM: 401.9     Mixed hyperlipidemia     ICD-10-CM: E78.2  ICD-9-CM: 272.2     Coronary artery disease involving native coronary artery of native heart without angina pectoris     ICD-10-CM: I25.10  ICD-9-CM: 414.01     Stage 3b chronic kidney disease     ICD-10-CM: N18.32  ICD-9-CM: 585.3           Assessment & Plan   In for recheck of hypertension, hyperlipidemia and diabetes mellitus today June 2023.  She had 3 more stents placed in her heart in October 2022.  She had an MI later in the day after an epidural.  She had 13 stents many years ago.  The current 3 stents were placed in a staged fashion 1 week apart.  She has CKD and CAD.  She has had some problems with sciatica on the left side for 20 months.  Started July 2021.  She feels a knot near the SI region and pain going down the left leg.  She is  actually feeling the superior iliac landmark area.  Physical therapy helped a little bit as far as massage.  She has been seen by neurosurgery in the past year.  Neurosurgery recommended an injection which she refused at the time.  She did get an injection in the left SI joint which helped for only 2 days.  She has had 2 different bypass surgeries as well as 13 different stents over the years.  Blood pressure control is good with the addition of spironolactone recently.  CAD is asymptomatic.  Fatigue has improved.  Annual labs December 2022 including CBC, CMP, lipids, A1c, UA.  Gets BMP, a1c, lipids today and q 3 mos.  Eye check up this year was fantastic with Dr. Dow as well as cardiology visit Dr. Iglesia Bryant at [phone].  Annual wellness performed December 2022.  We we will increase Farxiga 10 mg daily which should help her glycemic control as well as underlying coronary disease and CKD.      The above information was reviewed again today 06/06/23.  It continues to be accurate as reflected above and is unchanged.  History, physical and review of systems all reviewed and are unchanged.  Medications were reviewed today and continue the current dosing.    PPE today includes face mask and eye shield.             Dragon disclaimer:   Part of this note may be an electronic transcription/translation of spoken language to printed text using the Dragon Dictation System.

## 2023-06-12 RX ORDER — DAPAGLIFLOZIN 5 MG/1
TABLET, FILM COATED ORAL
Qty: 30 TABLET | Refills: 2 | OUTPATIENT
Start: 2023-06-12

## 2023-08-15 RX ORDER — HYDRALAZINE HYDROCHLORIDE 25 MG/1
TABLET, FILM COATED ORAL
Qty: 180 TABLET | Refills: 1 | Status: SHIPPED | OUTPATIENT
Start: 2023-08-15

## 2023-09-19 ENCOUNTER — OFFICE VISIT (OUTPATIENT)
Dept: INTERNAL MEDICINE | Facility: CLINIC | Age: 88
End: 2023-09-19
Payer: MEDICARE

## 2023-09-19 VITALS
WEIGHT: 122 LBS | HEART RATE: 62 BPM | HEIGHT: 60 IN | SYSTOLIC BLOOD PRESSURE: 122 MMHG | DIASTOLIC BLOOD PRESSURE: 50 MMHG | OXYGEN SATURATION: 98 % | BODY MASS INDEX: 23.95 KG/M2 | RESPIRATION RATE: 16 BRPM | TEMPERATURE: 97.5 F

## 2023-09-19 DIAGNOSIS — Z79.899 MEDICATION MANAGEMENT: ICD-10-CM

## 2023-09-19 DIAGNOSIS — E78.2 MIXED HYPERLIPIDEMIA: Chronic | ICD-10-CM

## 2023-09-19 DIAGNOSIS — E11.9 TYPE 2 DIABETES MELLITUS WITHOUT COMPLICATION, WITHOUT LONG-TERM CURRENT USE OF INSULIN: Chronic | ICD-10-CM

## 2023-09-19 DIAGNOSIS — I25.10 CORONARY ARTERY DISEASE INVOLVING NATIVE CORONARY ARTERY OF NATIVE HEART WITHOUT ANGINA PECTORIS: Chronic | ICD-10-CM

## 2023-09-19 DIAGNOSIS — I10 PRIMARY HYPERTENSION: Primary | Chronic | ICD-10-CM

## 2023-09-19 NOTE — PROGRESS NOTES
Subjective   Tamie Peter is a 87 y.o. female.     Chief Complaint   Patient presents with    Hypertension    Hyperlipidemia    Diabetes         Hypertension  This is a chronic problem. The current episode started more than 1 year ago. The problem is unchanged. The problem is controlled. Pertinent negatives include no chest pain, headaches, palpitations, peripheral edema (right foot in summer only) or shortness of breath.   Hyperlipidemia  This is a chronic problem. The current episode started more than 1 year ago. The problem is controlled. Recent lipid tests were reviewed and are normal. Pertinent negatives include no chest pain or shortness of breath.   Diabetes  She presents for her follow-up diabetic visit. She has type 2 diabetes mellitus. No MedicAlert identification noted. Her disease course has been stable. Pertinent negatives for hypoglycemia include no headaches. Pertinent negatives for diabetes include no chest pain. Symptoms are stable.   Coronary Artery Disease  Presents for follow-up visit. Pertinent negatives include no chest pain, leg swelling, palpitations or shortness of breath. Risk factors include hyperlipidemia.   Back Pain  Pertinent negatives include no chest pain or headaches.   Chronic Kidney Disease  This is a chronic problem. The current episode started more than 1 year ago. The problem occurs constantly. The problem has been unchanged. Pertinent negatives include no chest pain or headaches.   Sciatica  This is a recurrent problem. Pertinent negatives include no chest pain or headaches.      The following portions of the patient's history were reviewed and updated as appropriate: allergies, current medications, past social history and problem list.    Outpatient Medications Marked as Taking for the 9/19/23 encounter (Office Visit) with Mohinder Barbosa MD   Medication Sig Dispense Refill    alendronate (FOSAMAX) 70 MG tablet Take 1 tablet by mouth 1 (One) Time Per Week.       amLODIPine (NORVASC) 10 MG tablet Take 1 tablet by mouth Daily. 90 tablet 1    apixaban (ELIQUIS) 5 MG tablet tablet Take 1 tablet by mouth 2 (Two) Times a Day.      aspirin 81 MG EC tablet Take 1 tablet by mouth Daily.      atorvastatin (LIPITOR) 80 MG tablet TAKE ONE TABLET BY MOUTH DAILY 90 tablet 1    clopidogrel (PLAVIX) 75 MG tablet Take 1 tablet by mouth Daily.      dapagliflozin Propanediol (Farxiga) 10 MG tablet Take 10 mg by mouth Daily. 90 tablet 3    Diclofenac Sodium (VOLTAREN) 1 % gel gel APPLY 4 GRAMS OF GEL TO AFFECTED AREA FOUR TIMES A DAY 30 g 1    glipizide (GLUCOTROL) 5 MG tablet TAKE TWO TABLETS BY MOUTH EVERY MORNING WITH BREAKFAST AND TAKE ONE TABLET BY MOUTH EVERY EVENING 270 tablet 1    hydrALAZINE (APRESOLINE) 25 MG tablet TAKE ONE TABLET BY MOUTH TWICE A  tablet 1    hydroCHLOROthiazide (HYDRODIURIL) 12.5 MG tablet TAKE ONE TABLET BY MOUTH DAILY 90 tablet 1    metFORMIN (GLUCOPHAGE) 500 MG tablet TAKE TWO TABLETS BY MOUTH TWICE A DAY WITH MEALS 360 tablet 1    metoprolol tartrate (LOPRESSOR) 50 MG tablet Take 1 tablet by mouth 2 (Two) Times a Day.      nitroglycerin (NITROSTAT) 0.4 MG SL tablet 1 tablet.      spironolactone (ALDACTONE) 25 MG tablet Take 1 tablet by mouth Daily.         Review of Systems   Respiratory:  Negative for shortness of breath and wheezing.    Cardiovascular:  Negative for chest pain, palpitations and leg swelling.   Gastrointestinal:  Negative for constipation and diarrhea.   Musculoskeletal:  Positive for back pain.   Neurological:  Negative for headaches.     Objective   Vitals:    09/19/23 0941   BP: 122/50   Pulse: 62   Resp: 16   Temp: 97.5 °F (36.4 °C)   SpO2: 98%      Wt Readings from Last 3 Encounters:   09/19/23 55.3 kg (122 lb)   06/06/23 54.9 kg (121 lb)   05/18/23 52.7 kg (116 lb 3.2 oz)    Body mass index is 23.83 kg/m².      Physical Exam  Constitutional:       Appearance: Normal appearance. She is well-developed.   Neck:      Thyroid: No  thyromegaly.   Cardiovascular:      Rate and Rhythm: Normal rate and regular rhythm.      Heart sounds: Normal heart sounds. No murmur heard.    No gallop.   Pulmonary:      Effort: Pulmonary effort is normal. No respiratory distress.      Breath sounds: Normal breath sounds. No wheezing or rales.   Abdominal:      General: Bowel sounds are normal.      Palpations: Abdomen is soft. There is no mass.      Tenderness: There is no abdominal tenderness. There is no guarding.   Neurological:      Mental Status: She is alert.         Problems Addressed this Visit          Cardiac and Vasculature    Hypertension - Primary (Chronic)    Hyperlipidemia (Chronic)       Endocrine and Metabolic    Diabetes mellitus (Chronic)     Diagnoses         Codes Comments    Primary hypertension    -  Primary ICD-10-CM: I10  ICD-9-CM: 401.9     Mixed hyperlipidemia     ICD-10-CM: E78.2  ICD-9-CM: 272.2     Type 2 diabetes mellitus without complication, without long-term current use of insulin     ICD-10-CM: E11.9  ICD-9-CM: 250.00           Assessment & Plan   In for recheck of hypertension, hyperlipidemia and diabetes mellitus today September 2023.  She had 3 more stents placed in her heart in October 2022.  She had an MI later in the day after an epidural.  She had 13 stents many years ago.  The current 3 stents were placed in a staged fashion 1 week apart.  She has CKD and CAD.  She has had some problems with sciatica on the left side for 20 months.  Started July 2021.  She has been seen by neurosurgery in the past year.  Neurosurgery recommended an injection which she refused at the time.  She did get an injection in the left SI joint which helped for only 2 days.  She has had 2 different bypass surgeries as well as 13 different stents over the years.  Blood pressure control is good with the addition of spironolactone recently.  CAD is asymptomatic.  Fatigue has improved.  Annual labs December 2022 including CBC, CMP, lipids, A1c, UA.   Gets BMP, a1c, lipids today and q 3 mos.  Eye check up this year was fantastic with Dr. Dow as well as cardiology visit Dr. Iglesia Bryant at [phone].  Annual wellness performed December 2022.  Now on Farxiga 10 mg daily which has helped her glycemic control as well as underlying coronary disease and CKD.  Unfortunately she is now in the donut hole and Farxiga is going to be very expensive for her.  She might also be a good candidate to consider prophylactic colchicine therapy.  In that regard we will check a CRP.  We will also check a C-peptide next time since her A1c has been increasing over the past 6 months despite addition of Farxiga.  She continues to dance weekly and doing recitals regularly.    The above information was reviewed again today 09/19/23.  It continues to be accurate as reflected above and is unchanged.  History, physical and review of systems all reviewed and are unchanged.  Medications were reviewed today and continue the current dosing.           Dragon disclaimer:   Part of this note may be an electronic transcription/translation of spoken language to printed text using the Dragon Dictation System.

## 2023-10-17 RX ORDER — METOPROLOL TARTRATE 50 MG/1
100 TABLET, FILM COATED ORAL 2 TIMES DAILY
Qty: 360 TABLET | Refills: 1 | Status: SHIPPED | OUTPATIENT
Start: 2023-10-17

## 2023-12-06 LAB
ALBUMIN SERPL-MCNC: 4.1 G/DL (ref 3.7–4.7)
ALBUMIN/GLOB SERPL: 1.4 {RATIO} (ref 1.2–2.2)
ALP SERPL-CCNC: 107 IU/L (ref 44–121)
ALT SERPL-CCNC: 15 IU/L (ref 0–32)
AST SERPL-CCNC: 19 IU/L (ref 0–40)
BASOPHILS # BLD AUTO: 0.1 X10E3/UL (ref 0–0.2)
BASOPHILS NFR BLD AUTO: 1 %
BILIRUB SERPL-MCNC: 0.4 MG/DL (ref 0–1.2)
BUN SERPL-MCNC: 34 MG/DL (ref 8–27)
BUN/CREAT SERPL: 22 (ref 12–28)
C PEPTIDE SERPL-MCNC: 5.4 NG/ML (ref 1.1–4.4)
CALCIUM SERPL-MCNC: 10.2 MG/DL (ref 8.7–10.3)
CHLORIDE SERPL-SCNC: 101 MMOL/L (ref 96–106)
CHOLEST SERPL-MCNC: 269 MG/DL (ref 100–199)
CHOLEST/HDLC SERPL: 6.6 RATIO (ref 0–4.4)
CO2 SERPL-SCNC: 22 MMOL/L (ref 20–29)
CREAT SERPL-MCNC: 1.54 MG/DL (ref 0.57–1)
CRP SERPL-MCNC: 2 MG/L (ref 0–10)
EGFRCR SERPLBLD CKD-EPI 2021: 33 ML/MIN/1.73
EOSINOPHIL # BLD AUTO: 0.4 X10E3/UL (ref 0–0.4)
EOSINOPHIL NFR BLD AUTO: 5 %
ERYTHROCYTE [DISTWIDTH] IN BLOOD BY AUTOMATED COUNT: 13.9 % (ref 11.7–15.4)
GLOBULIN SER CALC-MCNC: 2.9 G/DL (ref 1.5–4.5)
GLUCOSE SERPL-MCNC: 172 MG/DL (ref 70–99)
HBA1C MFR BLD: 7.5 % (ref 4.8–5.6)
HCT VFR BLD AUTO: 44 % (ref 34–46.6)
HDLC SERPL-MCNC: 41 MG/DL
HGB BLD-MCNC: 14.3 G/DL (ref 11.1–15.9)
IMM GRANULOCYTES # BLD AUTO: 0 X10E3/UL (ref 0–0.1)
IMM GRANULOCYTES NFR BLD AUTO: 0 %
LDLC SERPL CALC-MCNC: 166 MG/DL (ref 0–99)
LYMPHOCYTES # BLD AUTO: 1.6 X10E3/UL (ref 0.7–3.1)
LYMPHOCYTES NFR BLD AUTO: 23 %
MCH RBC QN AUTO: 30.4 PG (ref 26.6–33)
MCHC RBC AUTO-ENTMCNC: 32.5 G/DL (ref 31.5–35.7)
MCV RBC AUTO: 93 FL (ref 79–97)
MONOCYTES # BLD AUTO: 0.5 X10E3/UL (ref 0.1–0.9)
MONOCYTES NFR BLD AUTO: 7 %
NEUTROPHILS # BLD AUTO: 4.5 X10E3/UL (ref 1.4–7)
NEUTROPHILS NFR BLD AUTO: 64 %
PLATELET # BLD AUTO: 328 X10E3/UL (ref 150–450)
POTASSIUM SERPL-SCNC: 5.5 MMOL/L (ref 3.5–5.2)
PROT SERPL-MCNC: 7 G/DL (ref 6–8.5)
RBC # BLD AUTO: 4.71 X10E6/UL (ref 3.77–5.28)
SODIUM SERPL-SCNC: 138 MMOL/L (ref 134–144)
TRIGL SERPL-MCNC: 328 MG/DL (ref 0–149)
VLDLC SERPL CALC-MCNC: 62 MG/DL (ref 5–40)
WBC # BLD AUTO: 7 X10E3/UL (ref 3.4–10.8)

## 2023-12-07 ENCOUNTER — OFFICE VISIT (OUTPATIENT)
Dept: INTERNAL MEDICINE | Facility: CLINIC | Age: 88
End: 2023-12-07
Payer: MEDICARE

## 2023-12-07 VITALS
OXYGEN SATURATION: 97 % | SYSTOLIC BLOOD PRESSURE: 130 MMHG | HEART RATE: 71 BPM | WEIGHT: 120 LBS | BODY MASS INDEX: 23.56 KG/M2 | DIASTOLIC BLOOD PRESSURE: 74 MMHG | HEIGHT: 60 IN | TEMPERATURE: 96.9 F | RESPIRATION RATE: 16 BRPM

## 2023-12-07 DIAGNOSIS — I10 PRIMARY HYPERTENSION: Chronic | ICD-10-CM

## 2023-12-07 DIAGNOSIS — N18.32 STAGE 3B CHRONIC KIDNEY DISEASE: ICD-10-CM

## 2023-12-07 DIAGNOSIS — E78.2 MIXED HYPERLIPIDEMIA: Chronic | ICD-10-CM

## 2023-12-07 DIAGNOSIS — Z00.00 ENCOUNTER FOR ANNUAL WELLNESS EXAM IN MEDICARE PATIENT: Primary | ICD-10-CM

## 2023-12-07 DIAGNOSIS — E11.9 TYPE 2 DIABETES MELLITUS WITHOUT COMPLICATION, WITHOUT LONG-TERM CURRENT USE OF INSULIN: Chronic | ICD-10-CM

## 2023-12-07 DIAGNOSIS — I25.10 CORONARY ARTERY DISEASE INVOLVING NATIVE CORONARY ARTERY OF NATIVE HEART WITHOUT ANGINA PECTORIS: Chronic | ICD-10-CM

## 2023-12-07 LAB
ALBUMIN/CREATININE RATIO, URINE: ABNORMAL
EXPIRATION DATE: ABNORMAL
Lab: ABNORMAL
POC CREATININE URINE: 50
POC MICROALBUMIN URINE: 80

## 2023-12-07 NOTE — PROGRESS NOTES
The ABCs of the Annual Wellness Visit  Subsequent Medicare Wellness Visit    Subjective      Tamie Peter is a 87 y.o. female who presents for a Subsequent Medicare Wellness Visit.    The following portions of the patient's history were reviewed and   updated as appropriate: allergies, current medications, past family history, past medical history, past social history, past surgical history, and problem list.    Compared to one year ago, the patient feels her physical   health is the same.    Compared to one year ago, the patient feels her mental   health is the same.    Recent Hospitalizations:  She was not admitted to the hospital during the last year.       Current Medical Providers:  Patient Care Team:  Mohinder Barbosa MD as PCP - General (Internal Medicine)  Mohinder Barbosa MD as PCP - Internal Medicine (Internal Medicine)  Doc Parekh MD as Consulting Physician (Cardiology)  Yasmine Gaming MD as Consulting Physician (Pain Medicine)  Earl Castañeda MD as Surgeon (Otolaryngology)  Nahid Hsieh OD (Optometry)    Outpatient Medications Prior to Visit   Medication Sig Dispense Refill    alendronate (FOSAMAX) 70 MG tablet Take 1 tablet by mouth 1 (One) Time Per Week.      amLODIPine (NORVASC) 10 MG tablet Take 1 tablet by mouth Daily. 90 tablet 1    apixaban (ELIQUIS) 5 MG tablet tablet Take 1 tablet by mouth 2 (Two) Times a Day.      aspirin 81 MG EC tablet Take 1 tablet by mouth Daily.      atorvastatin (LIPITOR) 80 MG tablet TAKE ONE TABLET BY MOUTH DAILY 90 tablet 1    clopidogrel (PLAVIX) 75 MG tablet Take 1 tablet by mouth Daily.      dapagliflozin Propanediol (Farxiga) 10 MG tablet Take 10 mg by mouth Daily. 90 tablet 3    Diclofenac Sodium (VOLTAREN) 1 % gel gel APPLY 4 GRAMS OF GEL TO AFFECTED AREA FOUR TIMES A DAY 30 g 1    glipizide (GLUCOTROL) 5 MG tablet TAKE TWO TABLETS BY MOUTH EVERY MORNING WITH BREAKFAST AND TAKE ONE TABLET BY MOUTH EVERY EVENING 270 tablet 1    hydrALAZINE  (APRESOLINE) 25 MG tablet TAKE ONE TABLET BY MOUTH TWICE A  tablet 1    hydroCHLOROthiazide (HYDRODIURIL) 12.5 MG tablet TAKE ONE TABLET BY MOUTH DAILY 90 tablet 1    metFORMIN (GLUCOPHAGE) 500 MG tablet TAKE TWO TABLETS BY MOUTH TWICE A DAY WITH MEALS 360 tablet 1    metoprolol tartrate (LOPRESSOR) 50 MG tablet TAKE TWO TABLETS BY MOUTH TWICE A  tablet 1    nitroglycerin (NITROSTAT) 0.4 MG SL tablet 1 tablet.      spironolactone (ALDACTONE) 25 MG tablet Take 1 tablet by mouth Daily.       No facility-administered medications prior to visit.       No opioid medication identified on active medication list. I have reviewed chart for other potential  high risk medication/s and harmful drug interactions in the elderly.        Aspirin is on active medication list. Aspirin use is indicated based on review of current medical condition/s. Pros and cons of this therapy have been discussed today. Benefits of this medication outweigh potential harm.  Patient has been encouraged to continue taking this medication.  .      Patient Active Problem List   Diagnosis    CAD (coronary artery disease)    Hypertension    GERD (gastroesophageal reflux disease)    Diabetes mellitus    CKD (chronic kidney disease) stage 3, GFR 30-59 ml/min    Gout    Hyperlipidemia    PUD (peptic ulcer disease)    Sick sinus syndrome    Disorder of rotator cuff    Osteoporosis    Pacemaker    Acute left-sided low back pain with left-sided sciatica    Arthritis of left sacroiliac joint    Piriformis syndrome of left side    Spinal stenosis of lumbar region    Radiculitis     Advance Care Planning   Advance Care Planning     Advance Directive is not on file.  ACP discussion was held with the patient during this visit. Patient has an advance directive (not in EMR), copy requested.     Objective    Vitals:    12/07/23 1011   BP: 130/74   Pulse: 71   Resp: 16   Temp: 96.9 °F (36.1 °C)   TempSrc: Temporal   SpO2: 97%   Weight: 54.4 kg (120 lb)  "  Height: 152.4 cm (60\")     Estimated body mass index is 23.44 kg/m² as calculated from the following:    Height as of this encounter: 152.4 cm (60\").    Weight as of this encounter: 54.4 kg (120 lb).    BMI is within normal parameters. No other follow-up for BMI required.      Does the patient have evidence of cognitive impairment?   No    Lab Results   Component Value Date    CHLPL 269 (H) 2023    TRIG 328 (H) 2023    HDL 41 2023     (H) 2023    VLDL 62 (H) 2023    HGBA1C 7.5 (H) 2023          HEALTH RISK ASSESSMENT    Smoking Status:  Social History     Tobacco Use   Smoking Status Never    Passive exposure: Never   Smokeless Tobacco Never     Alcohol Consumption:  Social History     Substance and Sexual Activity   Alcohol Use Never     Fall Risk Screen:    NOAH Fall Risk Assessment was completed, and patient is at LOW risk for falls.Assessment completed on:2023    Depression Screenin/7/2023    10:12 AM   PHQ-2/PHQ-9 Depression Screening   Little Interest or Pleasure in Doing Things 0-->not at all   Feeling Down, Depressed or Hopeless 0-->not at all   PHQ-9: Brief Depression Severity Measure Score 0       Health Habits and Functional and Cognitive Screenin/7/2023    10:12 AM   Functional & Cognitive Status   Do you have difficulty preparing food and eating? No   Do you have trouble with steps or getting out of a bed or a chair? No   Current Diet Well Balanced Diet   Dental Exam Up to date   Eye Exam Up to date   Exercise (times per week) 1 times per week   Current Exercises Include Dancing   Do you need help using the phone?  No   Are you deaf or do you have serious difficulty hearing?  No   Do you need help to go to places out of walking distance? No   Do you need help shopping? No   Do you need help preparing meals?  No   Do you need help with housework?  No   Do you need help with laundry? No   Do you need help taking your medications? No "   Do you need help managing money? No   Do you ever drive or ride in a car without wearing a seat belt? No       Age-appropriate Screening Schedule:  Refer to the list below for future screening recommendations based on patient's age, sex and/or medical conditions. Orders for these recommended tests are listed in the plan section. The patient has been provided with a written plan.    Health Maintenance   Topic Date Due    DXA SCAN  08/11/2019    ANNUAL WELLNESS VISIT  12/02/2023    DIABETIC FOOT EXAM  12/06/2023    URINE MICROALBUMIN  12/06/2023    HEMOGLOBIN A1C  06/05/2024    DIABETIC EYE EXAM  07/18/2024    LIPID PANEL  12/05/2024    TDAP/TD VACCINES (2 - Td or Tdap) 07/21/2025    COVID-19 Vaccine  Completed    INFLUENZA VACCINE  Completed    ZOSTER VACCINE  Completed                  CMS Preventative Services Quick Reference  Risk Factors Identified During Encounter:    Fall Risk-High or Moderate: Information on Fall Prevention Shared in After Visit Summary    The above risks/problems have been discussed with the patient.  Pertinent information has been shared with the patient in the After Visit Summary.    Diagnoses and all orders for this visit:    1. Encounter for annual wellness exam in Medicare patient (Primary)    2. Primary hypertension    3. Mixed hyperlipidemia    4. Coronary artery disease involving native coronary artery of native heart without angina pectoris    5. Type 2 diabetes mellitus without complication, without long-term current use of insulin        Follow Up:   Next Medicare Wellness visit to be scheduled in 1 year.      An After Visit Summary and PPPS were made available to the patient.

## 2023-12-07 NOTE — PROGRESS NOTES
Subjective   Tamie Peter is a 87 y.o. female.     Chief Complaint   Patient presents with    Hypertension    Hyperlipidemia    Diabetes         History of Present Illness  Has trouble with excessive salivation.  He feels like it chokes her at times.  She has also had some trouble with hot flashes.  No weight loss.  Hypertension  This is a chronic problem. The current episode started more than 1 year ago. The problem is unchanged. The problem is controlled. Pertinent negatives include no palpitations, peripheral edema (right foot in summer only) or shortness of breath.   Hyperlipidemia  This is a chronic problem. The current episode started more than 1 year ago. The problem is controlled. Recent lipid tests were reviewed and are normal. Pertinent negatives include no shortness of breath.   Diabetes  She presents for her follow-up diabetic visit. She has type 2 diabetes mellitus. No MedicAlert identification noted. Her disease course has been stable. Symptoms are stable.   Coronary Artery Disease  Presents for follow-up visit. Pertinent negatives include no leg swelling, palpitations or shortness of breath. Risk factors include hyperlipidemia.   Chronic Kidney Disease  This is a chronic problem. The current episode started more than 1 year ago. The problem occurs constantly. The problem has been unchanged. Associated symptoms include diaphoresis.        The following portions of the patient's history were reviewed and updated as appropriate: allergies, current medications, past social history and problem list.    Outpatient Medications Marked as Taking for the 12/7/23 encounter (Office Visit) with Mohinder Barbosa MD   Medication Sig Dispense Refill    alendronate (FOSAMAX) 70 MG tablet Take 1 tablet by mouth 1 (One) Time Per Week.      amLODIPine (NORVASC) 10 MG tablet Take 1 tablet by mouth Daily. 90 tablet 1    apixaban (ELIQUIS) 5 MG tablet tablet Take 1 tablet by mouth 2 (Two) Times a Day.      aspirin  81 MG EC tablet Take 1 tablet by mouth Daily.      atorvastatin (LIPITOR) 80 MG tablet TAKE ONE TABLET BY MOUTH DAILY 90 tablet 1    clopidogrel (PLAVIX) 75 MG tablet Take 1 tablet by mouth Daily.      dapagliflozin Propanediol (Farxiga) 10 MG tablet Take 10 mg by mouth Daily. 90 tablet 3    Diclofenac Sodium (VOLTAREN) 1 % gel gel APPLY 4 GRAMS OF GEL TO AFFECTED AREA FOUR TIMES A DAY 30 g 1    glipizide (GLUCOTROL) 5 MG tablet TAKE TWO TABLETS BY MOUTH EVERY MORNING WITH BREAKFAST AND TAKE ONE TABLET BY MOUTH EVERY EVENING 270 tablet 1    hydrALAZINE (APRESOLINE) 25 MG tablet TAKE ONE TABLET BY MOUTH TWICE A  tablet 1    hydroCHLOROthiazide (HYDRODIURIL) 12.5 MG tablet TAKE ONE TABLET BY MOUTH DAILY 90 tablet 1    metFORMIN (GLUCOPHAGE) 500 MG tablet TAKE TWO TABLETS BY MOUTH TWICE A DAY WITH MEALS 360 tablet 1    metoprolol tartrate (LOPRESSOR) 50 MG tablet TAKE TWO TABLETS BY MOUTH TWICE A  tablet 1    nitroglycerin (NITROSTAT) 0.4 MG SL tablet 1 tablet.      spironolactone (ALDACTONE) 25 MG tablet Take 1 tablet by mouth Daily.         Review of Systems   Constitutional:  Positive for diaphoresis.   HENT:  Positive for drooling.    Respiratory:  Negative for shortness of breath and wheezing.    Cardiovascular:  Negative for palpitations and leg swelling.   Gastrointestinal:  Negative for constipation and diarrhea.       Objective   Vitals:    12/07/23 1011   BP: 130/74   Pulse: 71   Resp: 16   Temp: 96.9 °F (36.1 °C)   SpO2: 97%      Wt Readings from Last 3 Encounters:   12/07/23 54.4 kg (120 lb)   09/19/23 55.3 kg (122 lb)   06/06/23 54.9 kg (121 lb)    Body mass index is 23.44 kg/m².      Physical Exam  Constitutional:       Appearance: Normal appearance. She is well-developed.   Neck:      Thyroid: No thyromegaly.   Cardiovascular:      Rate and Rhythm: Normal rate and regular rhythm.      Pulses:           Dorsalis pedis pulses are 2+ on the right side and 2+ on the left side.      Heart  sounds: Normal heart sounds. No murmur heard.     No gallop.   Pulmonary:      Effort: Pulmonary effort is normal. No respiratory distress.      Breath sounds: Normal breath sounds. No wheezing or rales.   Abdominal:      General: Bowel sounds are normal.      Palpations: Abdomen is soft. There is no mass.      Tenderness: There is no abdominal tenderness. There is no guarding.   Feet:      Right foot:      Skin integrity: Skin integrity normal.      Left foot:      Skin integrity: Skin integrity normal.      Comments: Diabetic Foot Exam Performed and Monofilament Test Performed    Monofilament test normal bilateral.  Vibratory sensation normal bilateral.  No edema bilateral.  Neurological:      Mental Status: She is alert.           Problems Addressed this Visit          Cardiac and Vasculature    Hypertension (Chronic)    Hyperlipidemia (Chronic)    CAD (coronary artery disease) (Chronic)       Endocrine and Metabolic    Diabetes mellitus (Chronic)     Other Visit Diagnoses       Encounter for annual wellness exam in Medicare patient    -  Primary          Diagnoses         Codes Comments    Encounter for annual wellness exam in Medicare patient    -  Primary ICD-10-CM: Z00.00  ICD-9-CM: V70.0     Primary hypertension     ICD-10-CM: I10  ICD-9-CM: 401.9     Mixed hyperlipidemia     ICD-10-CM: E78.2  ICD-9-CM: 272.2     Coronary artery disease involving native coronary artery of native heart without angina pectoris     ICD-10-CM: I25.10  ICD-9-CM: 414.01     Type 2 diabetes mellitus without complication, without long-term current use of insulin     ICD-10-CM: E11.9  ICD-9-CM: 250.00           Assessment & Plan   In for recheck of hypertension, hyperlipidemia and diabetes mellitus today December 2023.  She had 3 more stents placed in her heart in October 2022.  She had an MI later in the day after an epidural.  She had 13 stents many years ago.  The current 3 stents were placed in a staged fashion 1 week apart.  She  has CKD and CAD.  She has had some problems with sciatica on the left side for 23 months.  Started July 2021.  She has been seen by neurosurgery in the past year.  Neurosurgery recommended an injection which she refused at the time.  She did get an injection in the left SI joint which helped for only 2 days.  She has had 2 different bypass surgeries as well as 13 different stents over the years.  Blood pressure control is good with the addition of spironolactone previously.  CAD is asymptomatic.  Fatigue has improved.  Annual labs today December 2023 including CBC, CMP, lipids, A1c, UA.  Gets BMP, a1c, lipids today and q 3 mos.  Eye check up this year was fantastic with Dr. Dow as well as cardiology visit Dr. Parekh  [facility] at [phone].  Now on Farxiga 10 mg daily which has helped her glycemic control as well as underlying coronary disease and CKD.  Unfortunately she is now in the donut hole and Farxiga is going to be very expensive for her.  She might also be a good candidate to consider prophylactic colchicine therapy.  In that regard we checked a CRP which was normal.  She continues to dance weekly and doing recitals regularly.  Advised to cut out bananas oranges and potatoes.  Potassium is a little high at this time but they could be a lab error as well.  She has been eating a lot of bananas of late.  She could consider atropine eyedrops sublingually for her salivation but right now she is not interested in pursuing.  Urine microalbumin and diabetic foot exam today.    The above information was reviewed again today 12/07/23.  It continues to be accurate as reflected above and is unchanged.  History, physical and review of systems all reviewed and are unchanged.  Medications were reviewed today and continue the current dosing.             Dragon disclaimer:   Part of this note may be an electronic transcription/translation of spoken language to printed text using the Dragon Dictation System.

## 2024-01-02 RX ORDER — GLIPIZIDE 5 MG/1
TABLET ORAL
Qty: 270 TABLET | Refills: 1 | Status: SHIPPED | OUTPATIENT
Start: 2024-01-02

## 2024-01-04 RX ORDER — HYDROCHLOROTHIAZIDE 12.5 MG/1
12.5 TABLET ORAL DAILY
Qty: 90 TABLET | Refills: 1 | Status: SHIPPED | OUTPATIENT
Start: 2024-01-04

## 2024-02-26 ENCOUNTER — TELEPHONE (OUTPATIENT)
Dept: INTERNAL MEDICINE | Facility: CLINIC | Age: 89
End: 2024-02-26
Payer: MEDICARE

## 2024-02-26 NOTE — TELEPHONE ENCOUNTER
Caller Name: Tamie Peter      Relationship: Self      Best Contact Number: [phone]       Patient is requesting samples of FARXIGA      How many days of medication do you have left? 1 WEEK        Additional Information: PATIENT CALLED AND WANTS TO KNOW IF THE OFFICE RECEIVED THE SAMPLES FOR FARXIGA. PLEASE CALL AND LET HER KNOW.

## 2024-03-06 ENCOUNTER — LAB (OUTPATIENT)
Dept: LAB | Facility: HOSPITAL | Age: 89
End: 2024-03-06
Payer: MEDICARE

## 2024-03-06 DIAGNOSIS — E87.6 LOW SERUM POTASSIUM LEVEL: Primary | ICD-10-CM

## 2024-03-06 LAB
BUN SERPL-MCNC: 40 MG/DL (ref 8–27)
BUN/CREAT SERPL: 25 (ref 12–28)
CALCIUM SERPL-MCNC: 10.4 MG/DL (ref 8.7–10.3)
CHLORIDE SERPL-SCNC: 105 MMOL/L (ref 96–106)
CHOLEST SERPL-MCNC: 231 MG/DL (ref 100–199)
CHOLEST/HDLC SERPL: 6.1 RATIO (ref 0–4.4)
CO2 SERPL-SCNC: 21 MMOL/L (ref 20–29)
CREAT SERPL-MCNC: 1.6 MG/DL (ref 0.57–1)
EGFRCR SERPLBLD CKD-EPI 2021: 31 ML/MIN/1.73
GLUCOSE SERPL-MCNC: 140 MG/DL (ref 70–99)
HBA1C MFR BLD: 7 % (ref 4.8–5.6)
HDLC SERPL-MCNC: 38 MG/DL
LDLC SERPL CALC-MCNC: 127 MG/DL (ref 0–99)
POTASSIUM SERPL-SCNC: 5.3 MMOL/L (ref 3.5–5.2)
POTASSIUM SERPL-SCNC: 6.4 MMOL/L (ref 3.5–5.2)
SODIUM SERPL-SCNC: 141 MMOL/L (ref 134–144)
TRIGL SERPL-MCNC: 367 MG/DL (ref 0–149)
VLDLC SERPL CALC-MCNC: 66 MG/DL (ref 5–40)

## 2024-03-06 PROCEDURE — 84132 ASSAY OF SERUM POTASSIUM: CPT | Performed by: INTERNAL MEDICINE

## 2024-03-06 PROCEDURE — 36415 COLL VENOUS BLD VENIPUNCTURE: CPT | Performed by: INTERNAL MEDICINE

## 2024-03-06 NOTE — PROGRESS NOTES
Per Dr. Barbosa's recommendation, potassium is on the high end of normal. She is to stop spironolactone indefinitely, but no other changes. Pt verbalized understanding, med list updated.

## 2024-03-07 ENCOUNTER — OFFICE VISIT (OUTPATIENT)
Dept: INTERNAL MEDICINE | Facility: CLINIC | Age: 89
End: 2024-03-07
Payer: MEDICARE

## 2024-03-07 ENCOUNTER — TELEPHONE (OUTPATIENT)
Dept: INTERNAL MEDICINE | Facility: CLINIC | Age: 89
End: 2024-03-07

## 2024-03-07 VITALS
WEIGHT: 119 LBS | RESPIRATION RATE: 16 BRPM | TEMPERATURE: 97.9 F | DIASTOLIC BLOOD PRESSURE: 80 MMHG | OXYGEN SATURATION: 94 % | HEIGHT: 60 IN | SYSTOLIC BLOOD PRESSURE: 118 MMHG | BODY MASS INDEX: 23.36 KG/M2 | HEART RATE: 86 BPM

## 2024-03-07 DIAGNOSIS — I25.10 CORONARY ARTERY DISEASE INVOLVING NATIVE CORONARY ARTERY OF NATIVE HEART WITHOUT ANGINA PECTORIS: Chronic | ICD-10-CM

## 2024-03-07 DIAGNOSIS — E11.9 TYPE 2 DIABETES MELLITUS WITHOUT COMPLICATION, WITHOUT LONG-TERM CURRENT USE OF INSULIN: Chronic | ICD-10-CM

## 2024-03-07 DIAGNOSIS — I10 PRIMARY HYPERTENSION: Primary | Chronic | ICD-10-CM

## 2024-03-07 DIAGNOSIS — N18.32 STAGE 3B CHRONIC KIDNEY DISEASE: ICD-10-CM

## 2024-03-07 DIAGNOSIS — E78.2 MIXED HYPERLIPIDEMIA: Chronic | ICD-10-CM

## 2024-03-07 RX ORDER — ROSUVASTATIN CALCIUM 20 MG/1
20 TABLET, COATED ORAL DAILY
Qty: 90 TABLET | Refills: 1 | Status: SHIPPED | OUTPATIENT
Start: 2024-03-07

## 2024-03-07 NOTE — TELEPHONE ENCOUNTER
Caller: Tamie Peter    Relationship: Self    Best call back number: [phone]    Who are you requesting to speak with (clinical staff, provider,  specific staff member): DR. SINGLETARY    What was the call regarding: PATIENT STATES SHE WANTED TO LET DR. SINGLETARY KNOW THAT SHE IS NOT CURRENTLY TAKING THE ATORVASTATIN MEDICATION.     PATIENT STATES IF DR. SINGLETARY IS WANTING HER TO TAKE THE ATORVASTATIN, TO PLEASE SEND THE PRESCRIPTION TO [facility] [id number] - [place] [address] AT [address] [place] [address] [phone] [facility] [phone]

## 2024-03-07 NOTE — PROGRESS NOTES
Subjective   Tamie Peter is a 88 y.o. female.     Chief Complaint   Patient presents with    Hypertension    Hyperlipidemia    Hyperglycemia    Potassium     Lt. Leg Pain          History of Present Illness  Has trouble with excessive salivation.  He feels like it chokes her at times.  She has also had some trouble with hot flashes.  No weight loss.  Hypertension  This is a chronic problem. The current episode started more than 1 year ago. The problem is unchanged. The problem is controlled. Pertinent negatives include no palpitations, peripheral edema (right foot in summer only) or shortness of breath.   Hyperlipidemia  This is a chronic problem. The current episode started more than 1 year ago. The problem is controlled. Recent lipid tests were reviewed and are normal. Pertinent negatives include no shortness of breath.   Hyperglycemia    Diabetes  She presents for her follow-up diabetic visit. She has type 2 diabetes mellitus. No MedicAlert identification noted. Her disease course has been stable. Symptoms are stable.   Coronary Artery Disease  Presents for follow-up visit. Pertinent negatives include no leg swelling, palpitations or shortness of breath. Risk factors include hyperlipidemia.   Chronic Kidney Disease  This is a chronic problem. The current episode started more than 1 year ago. The problem occurs constantly. The problem has been unchanged.        The following portions of the patient's history were reviewed and updated as appropriate: allergies, current medications, past social history and problem list.    Outpatient Medications Marked as Taking for the 3/7/24 encounter (Office Visit) with Mohinder Barbosa MD   Medication Sig Dispense Refill    alendronate (FOSAMAX) 70 MG tablet Take 1 tablet by mouth 1 (One) Time Per Week.      amLODIPine (NORVASC) 10 MG tablet Take 1 tablet by mouth Daily. 90 tablet 1    apixaban (ELIQUIS) 5 MG tablet tablet Take 1 tablet by mouth 2 (Two) Times a Day.       aspirin 81 MG EC tablet Take 1 tablet by mouth Daily.      atorvastatin (LIPITOR) 80 MG tablet TAKE ONE TABLET BY MOUTH DAILY 90 tablet 1    clopidogrel (PLAVIX) 75 MG tablet Take 1 tablet by mouth Daily.      dapagliflozin Propanediol (Farxiga) 10 MG tablet Take 10 mg by mouth Daily. 90 tablet 3    Diclofenac Sodium (VOLTAREN) 1 % gel gel APPLY 4 GRAMS OF GEL TO AFFECTED AREA FOUR TIMES A DAY 30 g 1    glipizide (GLUCOTROL) 5 MG tablet TAKE 2 TABLETS BY MOUTH EVERY MORNING WITH BREAKFAST AND TAKE ONE TABLET BY MOUTH EVERY EVENING 270 tablet 1    hydrALAZINE (APRESOLINE) 25 MG tablet TAKE ONE TABLET BY MOUTH TWICE A  tablet 1    hydroCHLOROthiazide (HYDRODIURIL) 12.5 MG tablet TAKE 1 TABLET BY MOUTH DAILY 90 tablet 1    metFORMIN (GLUCOPHAGE) 500 MG tablet TAKE TWO TABLETS BY MOUTH TWICE A DAY WITH MEALS 360 tablet 1    metoprolol tartrate (LOPRESSOR) 50 MG tablet TAKE TWO TABLETS BY MOUTH TWICE A  tablet 1    nitroglycerin (NITROSTAT) 0.4 MG SL tablet 1 tablet.         Review of Systems   Respiratory:  Negative for shortness of breath and wheezing.    Cardiovascular:  Negative for palpitations and leg swelling.   Gastrointestinal:  Negative for constipation and diarrhea.       Objective   Vitals:    03/07/24 0902   BP: 118/80   Pulse: 86   Resp: 16   Temp: 97.9 °F (36.6 °C)   SpO2: 94%      Wt Readings from Last 3 Encounters:   03/07/24 54 kg (119 lb)   12/07/23 54.4 kg (120 lb)   09/19/23 55.3 kg (122 lb)    Body mass index is 23.24 kg/m².      Physical Exam  Constitutional:       Appearance: Normal appearance. She is well-developed.   Neck:      Thyroid: No thyromegaly.   Cardiovascular:      Rate and Rhythm: Normal rate and regular rhythm.      Heart sounds: Normal heart sounds. No murmur heard.     No gallop.   Pulmonary:      Effort: Pulmonary effort is normal. No respiratory distress.      Breath sounds: Normal breath sounds. No wheezing or rales.   Abdominal:      General: Bowel sounds are  normal.      Palpations: Abdomen is soft. There is no mass.      Tenderness: There is no abdominal tenderness. There is no guarding.   Neurological:      Mental Status: She is alert.           Problems Addressed this Visit          Cardiac and Vasculature    Hypertension - Primary (Chronic)    Hyperlipidemia (Chronic)    CAD (coronary artery disease) (Chronic)       Endocrine and Metabolic    Diabetes mellitus (Chronic)     Diagnoses         Codes Comments    Primary hypertension    -  Primary ICD-10-CM: I10  ICD-9-CM: 401.9     Mixed hyperlipidemia     ICD-10-CM: E78.2  ICD-9-CM: 272.2     Coronary artery disease involving native coronary artery of native heart without angina pectoris     ICD-10-CM: I25.10  ICD-9-CM: 414.01     Type 2 diabetes mellitus without complication, without long-term current use of insulin     ICD-10-CM: E11.9  ICD-9-CM: 250.00           Assessment & Plan   In for recheck of hypertension, hyperlipidemia and diabetes mellitus today March 2024.  She had 3 more stents placed in her heart in October 2022.  She had an MI later in the day after an epidural.  She had 13 stents many years ago.  The current 3 stents were placed in a staged fashion 1 week apart.  She has CKD and CAD.  She has had some problems with sciatica on the left side for 3 years.  Started July 2021.  She has been seen by neurosurgery in the past year.  Neurosurgery recommended an injection which she refused at the time.  She did get an injection in the left SI joint which helped for only 2 days.  She has had 2 different bypass surgeries as well as 13 different stents over the years.  Blood pressure control is good with the addition of spironolactone previously.  CAD is asymptomatic.  Fatigue has improved.  Annual labs December 2023 including CBC, CMP, lipids, A1c, UA.  Gets BMP, a1c, lipids today and q 3 mos.  Eye check up this year was fantastic with Dr. Dow as well as cardiology visit Dr. Iglesia Bryant at [phone].   Now on Farxiga 10 mg daily which has helped her glycemic control as well as underlying coronary disease and CKD.  She might also be a good candidate to consider prophylactic colchicine therapy.  In that regard we checked a CRP which was normal.  She continues to dance weekly and doing recitals regularly.  Advised to cut out bananas oranges and potatoes.  Potassium is a little high at this time but they could be a lab error as well.  She has been eating a lot of bananas of late.  She could consider atropine eyedrops sublingually for her salivation but right now she is not interested in pursuing.  Will discontinue spironolactone given her persistent borderline hyperkalemia.  Cholesterol is running high.  I suspect she is somehow missing her atorvastatin.  She is going to check that out and let me know.  If she is really taking it we need to consider adding some Zetia or even an injectable like Repatha although I think cost is going to be an issue for her.    The above information was reviewed again today 03/07/24.  It continues to be accurate as reflected above and is unchanged.  History, physical and review of systems all reviewed and are unchanged.  Medications were reviewed today and continue the current dosing.             Dragon disclaimer:   Part of this note may be an electronic transcription/translation of spoken language to printed text using the Dragon Dictation System.

## 2024-06-04 LAB
BUN SERPL-MCNC: 35 MG/DL (ref 8–27)
BUN/CREAT SERPL: 25 (ref 12–28)
CALCIUM SERPL-MCNC: 9.6 MG/DL (ref 8.7–10.3)
CHLORIDE SERPL-SCNC: 101 MMOL/L (ref 96–106)
CHOLEST SERPL-MCNC: 163 MG/DL (ref 100–199)
CHOLEST/HDLC SERPL: 3.7 RATIO (ref 0–4.4)
CO2 SERPL-SCNC: 22 MMOL/L (ref 20–29)
CREAT SERPL-MCNC: 1.39 MG/DL (ref 0.57–1)
EGFRCR SERPLBLD CKD-EPI 2021: 37 ML/MIN/1.73
GLUCOSE SERPL-MCNC: 209 MG/DL (ref 70–99)
HBA1C MFR BLD: 7.9 % (ref 4.8–5.6)
HDLC SERPL-MCNC: 44 MG/DL
LDLC SERPL CALC-MCNC: 85 MG/DL (ref 0–99)
POTASSIUM SERPL-SCNC: 4.9 MMOL/L (ref 3.5–5.2)
SODIUM SERPL-SCNC: 140 MMOL/L (ref 134–144)
TRIGL SERPL-MCNC: 204 MG/DL (ref 0–149)
VLDLC SERPL CALC-MCNC: 34 MG/DL (ref 5–40)

## 2024-06-06 ENCOUNTER — OFFICE VISIT (OUTPATIENT)
Dept: INTERNAL MEDICINE | Facility: CLINIC | Age: 89
End: 2024-06-06
Payer: MEDICARE

## 2024-06-06 VITALS
TEMPERATURE: 97.9 F | HEIGHT: 60 IN | RESPIRATION RATE: 16 BRPM | WEIGHT: 115 LBS | SYSTOLIC BLOOD PRESSURE: 128 MMHG | DIASTOLIC BLOOD PRESSURE: 82 MMHG | BODY MASS INDEX: 22.58 KG/M2 | HEART RATE: 60 BPM | OXYGEN SATURATION: 98 %

## 2024-06-06 DIAGNOSIS — E11.9 TYPE 2 DIABETES MELLITUS WITHOUT COMPLICATION, WITHOUT LONG-TERM CURRENT USE OF INSULIN: Chronic | ICD-10-CM

## 2024-06-06 DIAGNOSIS — N18.32 STAGE 3B CHRONIC KIDNEY DISEASE: ICD-10-CM

## 2024-06-06 DIAGNOSIS — I10 ESSENTIAL HYPERTENSION: Primary | ICD-10-CM

## 2024-06-06 DIAGNOSIS — E78.2 MIXED HYPERLIPIDEMIA: Chronic | ICD-10-CM

## 2024-06-06 PROCEDURE — 1126F AMNT PAIN NOTED NONE PRSNT: CPT | Performed by: INTERNAL MEDICINE

## 2024-06-06 PROCEDURE — 1160F RVW MEDS BY RX/DR IN RCRD: CPT | Performed by: INTERNAL MEDICINE

## 2024-06-06 PROCEDURE — 99214 OFFICE O/P EST MOD 30 MIN: CPT | Performed by: INTERNAL MEDICINE

## 2024-06-06 PROCEDURE — 1159F MED LIST DOCD IN RCRD: CPT | Performed by: INTERNAL MEDICINE

## 2024-06-06 RX ORDER — ROSUVASTATIN CALCIUM 40 MG/1
40 TABLET, COATED ORAL DAILY
Qty: 90 TABLET | Refills: 3 | Status: SHIPPED | OUTPATIENT
Start: 2024-06-06

## 2024-06-06 NOTE — PROGRESS NOTES
Subjective   Tamie Peter is a 88 y.o. female.     Chief Complaint   Patient presents with    Hyperlipidemia    Hypertension    Hyperglycemia         History of Present Illness  Has trouble with excessive salivation.  He feels like it chokes her at times.  She has also had some trouble with hot flashes.  No weight loss.  Hyperlipidemia  This is a chronic problem. The current episode started more than 1 year ago. The problem is controlled. Recent lipid tests were reviewed and are normal. Pertinent negatives include no chest pain or shortness of breath.   Hypertension  This is a chronic problem. The current episode started more than 1 year ago. The problem is unchanged. The problem is controlled. Pertinent negatives include no chest pain, palpitations, peripheral edema (right foot in summer only) or shortness of breath.   Diabetes  She presents for her follow-up diabetic visit. She has type 2 diabetes mellitus. No MedicAlert identification noted. Her disease course has been stable. Pertinent negatives for diabetes include no chest pain. Symptoms are stable.        The following portions of the patient's history were reviewed and updated as appropriate: allergies, current medications, past social history and problem list.    Outpatient Medications Marked as Taking for the 6/6/24 encounter (Office Visit) with Mohinder Barbosa MD   Medication Sig Dispense Refill    alendronate (FOSAMAX) 70 MG tablet Take 1 tablet by mouth 1 (One) Time Per Week.      amLODIPine (NORVASC) 10 MG tablet Take 1 tablet by mouth Daily. 90 tablet 1    apixaban (ELIQUIS) 5 MG tablet tablet Take 1 tablet by mouth 2 (Two) Times a Day.      aspirin 81 MG EC tablet Take 1 tablet by mouth Daily.      clopidogrel (PLAVIX) 75 MG tablet Take 1 tablet by mouth Daily.      dapagliflozin Propanediol (Farxiga) 10 MG tablet Take 10 mg by mouth Daily. 90 tablet 3    Diclofenac Sodium (VOLTAREN) 1 % gel gel APPLY 4 GRAMS OF GEL TO AFFECTED AREA FOUR  TIMES A DAY 30 g 1    glipizide (GLUCOTROL) 5 MG tablet TAKE 2 TABLETS BY MOUTH EVERY MORNING WITH BREAKFAST AND TAKE ONE TABLET BY MOUTH EVERY EVENING 270 tablet 1    hydrALAZINE (APRESOLINE) 25 MG tablet TAKE ONE TABLET BY MOUTH TWICE A  tablet 1    hydroCHLOROthiazide (HYDRODIURIL) 12.5 MG tablet TAKE 1 TABLET BY MOUTH DAILY 90 tablet 1    metFORMIN (GLUCOPHAGE) 500 MG tablet TAKE TWO TABLETS BY MOUTH TWICE A DAY WITH MEALS 360 tablet 1    metoprolol tartrate (LOPRESSOR) 50 MG tablet TAKE TWO TABLETS BY MOUTH TWICE A  tablet 1    nitroglycerin (NITROSTAT) 0.4 MG SL tablet 1 tablet.      rosuvastatin (Crestor) 20 MG tablet Take 1 tablet by mouth Daily. 90 tablet 1       Review of Systems   Respiratory:  Negative for shortness of breath and wheezing.    Cardiovascular:  Negative for chest pain, palpitations and leg swelling.   Gastrointestinal:  Negative for constipation and diarrhea.       Objective   Vitals:    06/06/24 1007   BP: 128/82   Pulse: 60   Resp: 16   Temp: 97.9 °F (36.6 °C)   SpO2: 98%      Wt Readings from Last 3 Encounters:   06/06/24 52.2 kg (115 lb)   03/07/24 54 kg (119 lb)   12/07/23 54.4 kg (120 lb)    Body mass index is 22.46 kg/m².      Physical Exam  Constitutional:       Appearance: Normal appearance. She is well-developed.   Neck:      Thyroid: No thyromegaly.   Cardiovascular:      Rate and Rhythm: Normal rate and regular rhythm.      Heart sounds: Normal heart sounds. No murmur heard.     No gallop.   Pulmonary:      Effort: Pulmonary effort is normal. No respiratory distress.      Breath sounds: Normal breath sounds. No wheezing or rales.   Abdominal:      General: Bowel sounds are normal.      Palpations: Abdomen is soft. There is no mass.      Tenderness: There is no abdominal tenderness. There is no guarding.   Neurological:      Mental Status: She is alert.           Problems Addressed this Visit          Cardiac and Vasculature    Hyperlipidemia (Chronic)    Essential  hypertension - Primary (Chronic)       Endocrine and Metabolic    Diabetes mellitus (Chronic)     Diagnoses         Codes Comments    Essential hypertension    -  Primary ICD-10-CM: I10  ICD-9-CM: 401.9     Mixed hyperlipidemia     ICD-10-CM: E78.2  ICD-9-CM: 272.2     Type 2 diabetes mellitus without complication, without long-term current use of insulin     ICD-10-CM: E11.9  ICD-9-CM: 250.00           Assessment & Plan   In for recheck of hypertension, hyperlipidemia and diabetes mellitus today June 2024.  She had 3 more stents placed in her heart in October 2022.  She had an MI later in the day after an epidural.  She had 13 stents many years ago.  The current 3 stents were placed in a staged fashion 1 week apart.  She has CKD and CAD.  She has had some problems with sciatica on the left side for 3 years.  Started July 2021.  She has been seen by neurosurgery in the past year.  Neurosurgery recommended an injection which she refused at the time.  She did get an injection in the left SI joint which helped for only 2 days.  She has had 2 different bypass surgeries as well as 13 different stents over the years.  Blood pressure control is good with the addition of spironolactone previously.  That was later stopped due to hyperkalemia.  CAD is asymptomatic.  Fatigue has improved.  Annual labs December 2023 including CBC, CMP, lipids, A1c, UA.  Gets BMP, a1c, lipids today and q 3 mos.  Eye check up this year was fantastic with Dr. Dow as well as cardiology visit Dr. Iglesia Bryant at [phone].  Now on Farxiga 10 mg daily which has helped her glycemic control as well as underlying coronary disease and CKD.  CKD is improved.  She might also be a good candidate to consider prophylactic colchicine therapy.  In that regard we checked a CRP which was normal.  She continues to dance weekly and doing recitals regularly.  Advised to cut out bananas oranges and potatoes.  Potassium is a little high at this time but they  could be a lab error as well.  She has been eating a lot of bananas of late.  She could consider atropine eyedrops sublingually for her salivation but right now she is not interested in pursuing.  Currently she is on Crestor 20 mg daily and much better on it.  Will try boosting it up to 40 mg daily given the severity and recurrence of her CAD.    The above information was reviewed again today 06/06/24.  It continues to be accurate as reflected above and is unchanged.  History, physical and review of systems all reviewed and are unchanged.  Medications were reviewed today and continue the current dosing.             Dragon disclaimer:   Part of this note may be an electronic transcription/translation of spoken language to printed text using the Dragon Dictation System.

## 2024-06-25 ENCOUNTER — APPOINTMENT (OUTPATIENT)
Dept: WOMENS IMAGING | Facility: HOSPITAL | Age: 89
End: 2024-06-25
Payer: MEDICARE

## 2024-06-25 PROCEDURE — 77063 BREAST TOMOSYNTHESIS BI: CPT | Performed by: RADIOLOGY

## 2024-06-25 PROCEDURE — 77067 SCR MAMMO BI INCL CAD: CPT | Performed by: RADIOLOGY

## 2024-06-27 RX ORDER — GLIPIZIDE 5 MG/1
TABLET ORAL
Qty: 270 TABLET | Refills: 1 | Status: SHIPPED | OUTPATIENT
Start: 2024-06-27

## 2024-07-01 RX ORDER — HYDROCHLOROTHIAZIDE 12.5 MG/1
12.5 TABLET ORAL DAILY
Qty: 90 TABLET | Refills: 1 | Status: SHIPPED | OUTPATIENT
Start: 2024-07-01

## 2024-07-25 RX ORDER — DAPAGLIFLOZIN 10 MG/1
1 TABLET, FILM COATED ORAL DAILY
Qty: 90 TABLET | Refills: 1 | Status: SHIPPED | OUTPATIENT
Start: 2024-07-25

## 2024-09-24 ENCOUNTER — OFFICE VISIT (OUTPATIENT)
Dept: INTERNAL MEDICINE | Facility: CLINIC | Age: 89
End: 2024-09-24
Payer: MEDICARE

## 2024-09-24 VITALS
RESPIRATION RATE: 18 BRPM | BODY MASS INDEX: 22.38 KG/M2 | HEART RATE: 66 BPM | TEMPERATURE: 97.9 F | SYSTOLIC BLOOD PRESSURE: 128 MMHG | DIASTOLIC BLOOD PRESSURE: 62 MMHG | WEIGHT: 114 LBS | HEIGHT: 60 IN | OXYGEN SATURATION: 98 %

## 2024-09-24 DIAGNOSIS — I10 ESSENTIAL HYPERTENSION: Chronic | ICD-10-CM

## 2024-09-24 DIAGNOSIS — Z79.899 MEDICATION MANAGEMENT: ICD-10-CM

## 2024-09-24 DIAGNOSIS — E78.2 MIXED HYPERLIPIDEMIA: Chronic | ICD-10-CM

## 2024-09-24 DIAGNOSIS — R10.30 LOWER ABDOMINAL PAIN: ICD-10-CM

## 2024-09-24 DIAGNOSIS — Z23 NEED FOR VACCINATION: Primary | ICD-10-CM

## 2024-09-24 DIAGNOSIS — E11.9 TYPE 2 DIABETES MELLITUS WITHOUT COMPLICATION, WITHOUT LONG-TERM CURRENT USE OF INSULIN: Chronic | ICD-10-CM

## 2024-09-24 LAB
BUN SERPL-MCNC: 27 MG/DL (ref 8–27)
BUN/CREAT SERPL: 18 (ref 12–28)
CALCIUM SERPL-MCNC: 9.7 MG/DL (ref 8.7–10.3)
CHLORIDE SERPL-SCNC: 98 MMOL/L (ref 96–106)
CHOLEST SERPL-MCNC: 170 MG/DL (ref 100–199)
CHOLEST/HDLC SERPL: 3.7 RATIO (ref 0–4.4)
CO2 SERPL-SCNC: 24 MMOL/L (ref 20–29)
CREAT SERPL-MCNC: 1.46 MG/DL (ref 0.57–1)
EGFRCR SERPLBLD CKD-EPI 2021: 35 ML/MIN/1.73
GLUCOSE SERPL-MCNC: 194 MG/DL (ref 70–99)
HBA1C MFR BLD: 8.3 % (ref 4.8–5.6)
HDLC SERPL-MCNC: 46 MG/DL
LDLC SERPL CALC-MCNC: 81 MG/DL (ref 0–99)
POTASSIUM SERPL-SCNC: 4.6 MMOL/L (ref 3.5–5.2)
SODIUM SERPL-SCNC: 137 MMOL/L (ref 134–144)
TRIGL SERPL-MCNC: 263 MG/DL (ref 0–149)
VLDLC SERPL CALC-MCNC: 43 MG/DL (ref 5–40)

## 2024-09-24 PROCEDURE — G0008 ADMIN INFLUENZA VIRUS VAC: HCPCS | Performed by: INTERNAL MEDICINE

## 2024-09-24 PROCEDURE — 90662 IIV NO PRSV INCREASED AG IM: CPT | Performed by: INTERNAL MEDICINE

## 2024-09-24 PROCEDURE — 1160F RVW MEDS BY RX/DR IN RCRD: CPT | Performed by: INTERNAL MEDICINE

## 2024-09-24 PROCEDURE — 1126F AMNT PAIN NOTED NONE PRSNT: CPT | Performed by: INTERNAL MEDICINE

## 2024-09-24 PROCEDURE — 99214 OFFICE O/P EST MOD 30 MIN: CPT | Performed by: INTERNAL MEDICINE

## 2024-09-24 PROCEDURE — 1159F MED LIST DOCD IN RCRD: CPT | Performed by: INTERNAL MEDICINE

## 2024-10-09 ENCOUNTER — HOSPITAL ENCOUNTER (OUTPATIENT)
Dept: CT IMAGING | Facility: HOSPITAL | Age: 89
Discharge: HOME | End: 2024-10-09
Admitting: INTERNAL MEDICINE
Payer: MEDICARE

## 2024-10-09 DIAGNOSIS — R10.30 LOWER ABDOMINAL PAIN: ICD-10-CM

## 2024-10-09 PROCEDURE — 0 DIATRIZOATE MEGLUMINE & SODIUM PER 1 ML: Performed by: INTERNAL MEDICINE

## 2024-10-09 PROCEDURE — 74176 CT ABD & PELVIS W/O CONTRAST: CPT

## 2024-10-09 RX ORDER — DIATRIZOATE MEGLUMINE AND DIATRIZOATE SODIUM 660; 100 MG/ML; MG/ML
30 SOLUTION ORAL; RECTAL
Status: COMPLETED | OUTPATIENT
Start: 2024-10-09 | End: 2024-10-09

## 2024-10-09 RX ADMIN — DIATRIZOATE MEGLUMINE AND DIATRIZOATE SODIUM 30 ML: 660; 100 LIQUID ORAL; RECTAL at 08:00

## 2024-10-21 ENCOUNTER — LAB (OUTPATIENT)
Dept: LAB | Facility: HOSPITAL | Age: 89
End: 2024-10-21
Payer: MEDICARE

## 2024-10-21 ENCOUNTER — TELEPHONE (OUTPATIENT)
Dept: INTERNAL MEDICINE | Facility: CLINIC | Age: 89
End: 2024-10-21

## 2024-10-21 ENCOUNTER — OFFICE VISIT (OUTPATIENT)
Dept: INTERNAL MEDICINE | Facility: CLINIC | Age: 89
End: 2024-10-21
Payer: MEDICARE

## 2024-10-21 VITALS
HEIGHT: 60 IN | HEART RATE: 83 BPM | WEIGHT: 109 LBS | SYSTOLIC BLOOD PRESSURE: 122 MMHG | OXYGEN SATURATION: 98 % | RESPIRATION RATE: 16 BRPM | BODY MASS INDEX: 21.4 KG/M2 | TEMPERATURE: 97.7 F | DIASTOLIC BLOOD PRESSURE: 70 MMHG

## 2024-10-21 DIAGNOSIS — C18.3 MALIGNANT NEOPLASM OF HEPATIC FLEXURE: Primary | ICD-10-CM

## 2024-10-21 LAB — CEA SERPL-MCNC: 5386 NG/ML

## 2024-10-21 PROCEDURE — 82378 CARCINOEMBRYONIC ANTIGEN: CPT | Performed by: INTERNAL MEDICINE

## 2024-10-21 PROCEDURE — 99215 OFFICE O/P EST HI 40 MIN: CPT | Performed by: INTERNAL MEDICINE

## 2024-10-21 PROCEDURE — 36415 COLL VENOUS BLD VENIPUNCTURE: CPT | Performed by: INTERNAL MEDICINE

## 2024-10-21 PROCEDURE — 1126F AMNT PAIN NOTED NONE PRSNT: CPT | Performed by: INTERNAL MEDICINE

## 2024-10-21 NOTE — TELEPHONE ENCOUNTER
She should stop the Plavix at least 5 days prior to the procedure.  I will discontinue Eliquis from her medication list.  She should stop the aspirin immediately.

## 2024-10-21 NOTE — PROGRESS NOTES
Subjective   Tamie Peter is a 88 y.o. female.     Chief Complaint   Patient presents with    Discuss CT          History of Present Illness  In with daughter and daughter-in-law to discuss recent CT scan of abdomen.  Both daughter-in-law's have reviewed the CT extensively prior to the visit.  Lots of questions today.  She has occasional shooting pains in the lower abdomen that are nonspecific.  That will lower abdominal pain she complained of a few weeks ago has resolved.  The weight loss persists.  That seems to have leveled off.  No problems with defecation.       The following portions of the patient's history were reviewed and updated as appropriate: allergies, current medications, past social history and problem list.    Outpatient Medications Marked as Taking for the 10/21/24 encounter (Office Visit) with Mohinder Barbosa MD   Medication Sig Dispense Refill    alendronate (FOSAMAX) 70 MG tablet Take 1 tablet by mouth 1 (One) Time Per Week.      amLODIPine (NORVASC) 10 MG tablet Take 1 tablet by mouth Daily. 90 tablet 1    apixaban (ELIQUIS) 5 MG tablet tablet Take 1 tablet by mouth 2 (Two) Times a Day.      aspirin 81 MG EC tablet Take 1 tablet by mouth Daily.      clopidogrel (PLAVIX) 75 MG tablet Take 1 tablet by mouth Daily.      dapagliflozin Propanediol (Farxiga) 10 MG tablet TAKE 1 TABLET BY MOUTH DAILY 90 tablet 1    Diclofenac Sodium (VOLTAREN) 1 % gel gel APPLY 4 GRAMS OF GEL TO AFFECTED AREA FOUR TIMES A DAY 30 g 1    glipizide (GLUCOTROL) 5 MG tablet TAKE 2 TABLETS BY MOUTH EVERY MORNING WITH BREAKFAST AND ONE TABLET BY EVERY EVENING 270 tablet 1    hydrALAZINE (APRESOLINE) 25 MG tablet TAKE ONE TABLET BY MOUTH TWICE A  tablet 1    hydroCHLOROthiazide 12.5 MG tablet TAKE 1 TABLET BY MOUTH DAILY 90 tablet 1    metFORMIN (GLUCOPHAGE) 500 MG tablet TAKE TWO TABLETS BY MOUTH TWICE A DAY WITH MEALS 360 tablet 1    metoprolol tartrate (LOPRESSOR) 50 MG tablet TAKE TWO TABLETS BY MOUTH  TWICE A  tablet 1    nitroglycerin (NITROSTAT) 0.4 MG SL tablet 1 tablet.      rosuvastatin (Crestor) 40 MG tablet Take 1 tablet by mouth Daily. 90 tablet 3       Review of Systems   Constitutional:  Positive for unexpected weight change.   Gastrointestinal:  Positive for abdominal pain. Negative for constipation and diarrhea.       Objective   Vitals:    10/21/24 0952   BP: 122/70   Pulse: 83   Resp: 16   Temp: 97.7 °F (36.5 °C)   SpO2: 98%      Wt Readings from Last 3 Encounters:   10/21/24 49.4 kg (109 lb)   09/24/24 51.7 kg (114 lb)   06/06/24 52.2 kg (115 lb)    Body mass index is 21.29 kg/m².      Physical Exam  Constitutional:       Appearance: Normal appearance.   Abdominal:      General: There is no distension.      Palpations: Abdomen is soft. There is no mass.      Tenderness: There is no abdominal tenderness. There is no guarding.   Neurological:      Mental Status: She is alert.           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  She had some vague abdominal pain for about 1 month along with a 10 pound weight loss noted on her office visit 9/24/2024.  The weight loss is stabilized according to the patient but she is lost another 5 pounds on our scales.  However the original 10 pound weight loss complaint was only 5 pounds per our scales in the prior 6 months.  The patient's only real concern is whether she can continue her current activities which is mainly her dance routines.  She does not feel that bad.  I am not sure she understands the entire gravity of the current situation.  The daughter and daughter-in-law however ask a lot of pointed questions to their on  topic.  From the looks of the CT scan this is almost certainly going to be a colon cancer metastatic to liver and lungs.  There is a large mass in the hepatic flexure measuring 4.5 cm.  I suggested the patient and family this is not likely going to be a curative situation but more of a palliative situation than when of disease management.  I do not think they are looking at any type of surgery at the present time but simply a biopsy to confirm the diagnosis and likely chemotherapy thereafter if she is a candidate.  Will set her up for a CT guided biopsy of one of her liver masses 1 of which is 3.1 x 2.4 cm.  Will send off a CEA today.  Patient vies to stop her aspirin and Plavix today and at least 5 days prior to the proposed liver biopsy.  Eliquis will need to be held 3 days prior to the proposed biopsy.  Make oncology referral to Dr. Marvin.  Discussed all of this in detail with the patient and family.  They initially were considering referral to [facility] at [facility] or the cancer center at the [facility] but decided to stay in the [facility] for ease of visits and patient's location.  Will also suggest patient discontinue her Fosamax.  I see a history of peptic ulcer disease and GERD in the chart on top of her numerous anticoagulants.    I spent 41 minutes caring for Tamie on this date of service. This time includes time spent by me in the following activities: preparing for the visit, counseling and educating the patient/family/caregiver, referring and communicating with other health care professionals, documenting information in the medical record, care coordination, ordering test(s), and ordering procedure(s)      The above information was reviewed again today 10/21/24.  It continues to be accurate as reflected above and is unchanged.  History, physical and review of systems all reviewed and are unchanged.  Medications were reviewed today and continue the current  dosing.               Malorie disclaimer:   Part of this note may be an electronic transcription/translation of spoken language to printed text using the Dragon Dictation System.

## 2024-10-21 NOTE — TELEPHONE ENCOUNTER
Also, assuming there are no complications she can resume the aspirin and Plavix 24 hours after the biopsy.

## 2024-10-21 NOTE — TELEPHONE ENCOUNTER
Please advise Patient needs to stop taking Fosomax if she is still taking it,  does not think she needs it.  said she Also needs to stop taking Eliquis 3 days prior to procedure. He has not talked to radiologist yet. If he has other instructions we will call

## 2024-10-21 NOTE — H&P (VIEW-ONLY)
Subjective   Tamie Peter is a 88 y.o. female.     Chief Complaint   Patient presents with    Discuss CT          History of Present Illness  In with daughter and daughter-in-law to discuss recent CT scan of abdomen.  Both daughter-in-law's have reviewed the CT extensively prior to the visit.  Lots of questions today.  She has occasional shooting pains in the lower abdomen that are nonspecific.  That will lower abdominal pain she complained of a few weeks ago has resolved.  The weight loss persists.  That seems to have leveled off.  No problems with defecation.       The following portions of the patient's history were reviewed and updated as appropriate: allergies, current medications, past social history and problem list.    Outpatient Medications Marked as Taking for the 10/21/24 encounter (Office Visit) with Mohinder Barbosa MD   Medication Sig Dispense Refill    alendronate (FOSAMAX) 70 MG tablet Take 1 tablet by mouth 1 (One) Time Per Week.      amLODIPine (NORVASC) 10 MG tablet Take 1 tablet by mouth Daily. 90 tablet 1    apixaban (ELIQUIS) 5 MG tablet tablet Take 1 tablet by mouth 2 (Two) Times a Day.      aspirin 81 MG EC tablet Take 1 tablet by mouth Daily.      clopidogrel (PLAVIX) 75 MG tablet Take 1 tablet by mouth Daily.      dapagliflozin Propanediol (Farxiga) 10 MG tablet TAKE 1 TABLET BY MOUTH DAILY 90 tablet 1    Diclofenac Sodium (VOLTAREN) 1 % gel gel APPLY 4 GRAMS OF GEL TO AFFECTED AREA FOUR TIMES A DAY 30 g 1    glipizide (GLUCOTROL) 5 MG tablet TAKE 2 TABLETS BY MOUTH EVERY MORNING WITH BREAKFAST AND ONE TABLET BY EVERY EVENING 270 tablet 1    hydrALAZINE (APRESOLINE) 25 MG tablet TAKE ONE TABLET BY MOUTH TWICE A  tablet 1    hydroCHLOROthiazide 12.5 MG tablet TAKE 1 TABLET BY MOUTH DAILY 90 tablet 1    metFORMIN (GLUCOPHAGE) 500 MG tablet TAKE TWO TABLETS BY MOUTH TWICE A DAY WITH MEALS 360 tablet 1    metoprolol tartrate (LOPRESSOR) 50 MG tablet TAKE TWO TABLETS BY MOUTH  TWICE A  tablet 1    nitroglycerin (NITROSTAT) 0.4 MG SL tablet 1 tablet.      rosuvastatin (Crestor) 40 MG tablet Take 1 tablet by mouth Daily. 90 tablet 3       Review of Systems   Constitutional:  Positive for unexpected weight change.   Gastrointestinal:  Positive for abdominal pain. Negative for constipation and diarrhea.       Objective   Vitals:    10/21/24 0952   BP: 122/70   Pulse: 83   Resp: 16   Temp: 97.7 °F (36.5 °C)   SpO2: 98%      Wt Readings from Last 3 Encounters:   10/21/24 49.4 kg (109 lb)   09/24/24 51.7 kg (114 lb)   06/06/24 52.2 kg (115 lb)    Body mass index is 21.29 kg/m².      Physical Exam  Constitutional:       Appearance: Normal appearance.   Abdominal:      General: There is no distension.      Palpations: Abdomen is soft. There is no mass.      Tenderness: There is no abdominal tenderness. There is no guarding.   Neurological:      Mental Status: She is alert.           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  She had some vague abdominal pain for about 1 month along with a 10 pound weight loss noted on her office visit 9/24/2024.  The weight loss is stabilized according to the patient but she is lost another 5 pounds on our scales.  However the original 10 pound weight loss complaint was only 5 pounds per our scales in the prior 6 months.  The patient's only real concern is whether she can continue her current activities which is mainly her dance routines.  She does not feel that bad.  I am not sure she understands the entire gravity of the current situation.  The daughter and daughter-in-law however ask a lot of pointed questions to their on  topic.  From the looks of the CT scan this is almost certainly going to be a colon cancer metastatic to liver and lungs.  There is a large mass in the hepatic flexure measuring 4.5 cm.  I suggested the patient and family this is not likely going to be a curative situation but more of a palliative situation than when of disease management.  I do not think they are looking at any type of surgery at the present time but simply a biopsy to confirm the diagnosis and likely chemotherapy thereafter if she is a candidate.  Will set her up for a CT guided biopsy of one of her liver masses 1 of which is 3.1 x 2.4 cm.  Will send off a CEA today.  Patient vies to stop her aspirin and Plavix today and at least 5 days prior to the proposed liver biopsy.  Eliquis will need to be held 3 days prior to the proposed biopsy.  Make oncology referral to Dr. Marvin.  Discussed all of this in detail with the patient and family.  They initially were considering referral to [facility] at [facility] or the cancer center at the [facility] but decided to stay in the [facility] for ease of visits and patient's location.  Will also suggest patient discontinue her Fosamax.  I see a history of peptic ulcer disease and GERD in the chart on top of her numerous anticoagulants.    I spent 41 minutes caring for Tamie on this date of service. This time includes time spent by me in the following activities: preparing for the visit, counseling and educating the patient/family/caregiver, referring and communicating with other health care professionals, documenting information in the medical record, care coordination, ordering test(s), and ordering procedure(s)      The above information was reviewed again today 10/21/24.  It continues to be accurate as reflected above and is unchanged.  History, physical and review of systems all reviewed and are unchanged.  Medications were reviewed today and continue the current  dosing.               Malorie disclaimer:   Part of this note may be an electronic transcription/translation of spoken language to printed text using the Dragon Dictation System.

## 2024-10-22 NOTE — PROGRESS NOTES
10/28/24 0001   Pre-Procedure Phone Call   Procedure Time Verified Yes   Arrival Time 0930   Procedure Location Verified Yes   Medical History Reviewed No   NPO Status Reinforced Yes   Ride and Caregiver Arranged Yes   Patient Knows to Bring Current Medications   (No changes in medications since last reviewed.)   Bring Outside Films Requested   (CT A&P 10/9/24 in PACS)

## 2024-10-23 ENCOUNTER — TELEPHONE (OUTPATIENT)
Dept: INTERNAL MEDICINE | Facility: CLINIC | Age: 89
End: 2024-10-23
Payer: MEDICARE

## 2024-10-23 RX ORDER — METOPROLOL TARTRATE 50 MG
100 TABLET ORAL 2 TIMES DAILY
Qty: 360 TABLET | Refills: 1 | Status: SHIPPED | OUTPATIENT
Start: 2024-10-23

## 2024-10-23 NOTE — TELEPHONE ENCOUNTER
Mandi daughter called to say she is concerned about Patient being off of Plavix for 5 days. She stated she had a heart attack 2 years ago when off of it. She stated she remembers Cardiologist stated she shouldn't be off of it for at least a year. She would like to make sure she just wants to double check that she should stay off.

## 2024-10-23 NOTE — TELEPHONE ENCOUNTER
Caller: Mandi Mcghee    Relationship: Emergency Contact    Best call back number: [phone]       Who are you requesting to speak with (clinical staff, provider,  specific staff member): PCP OR CLINICAL      What was the call regarding: NEEDS TO SPEAK TO SOMEONE ON DR. VU STAFF ABOUT THE BIOPSY SCHEDULED FOR 10/28/24.

## 2024-10-28 ENCOUNTER — HOSPITAL ENCOUNTER (OUTPATIENT)
Dept: CT IMAGING | Facility: HOSPITAL | Age: 89
Discharge: HOME | End: 2024-10-28
Payer: MEDICARE

## 2024-10-28 VITALS
WEIGHT: 109 LBS | OXYGEN SATURATION: 100 % | SYSTOLIC BLOOD PRESSURE: 129 MMHG | HEART RATE: 60 BPM | BODY MASS INDEX: 21.4 KG/M2 | DIASTOLIC BLOOD PRESSURE: 60 MMHG | RESPIRATION RATE: 16 BRPM | HEIGHT: 60 IN

## 2024-10-28 DIAGNOSIS — C18.3 MALIGNANT NEOPLASM OF HEPATIC FLEXURE: ICD-10-CM

## 2024-10-28 LAB
BASOPHILS # BLD AUTO: 0.07 10*3/MM3 (ref 0–0.2)
BASOPHILS NFR BLD AUTO: 0.8 % (ref 0–1.5)
DEPRECATED RDW RBC AUTO: 44.7 FL (ref 37–54)
EOSINOPHIL # BLD AUTO: 0.12 10*3/MM3 (ref 0–0.4)
EOSINOPHIL NFR BLD AUTO: 1.4 % (ref 0.3–6.2)
ERYTHROCYTE [DISTWIDTH] IN BLOOD BY AUTOMATED COUNT: 14.1 % (ref 12.3–15.4)
HCT VFR BLD AUTO: 37.3 % (ref 34–46.6)
HGB BLD-MCNC: 11.6 G/DL (ref 12–15.9)
IMM GRANULOCYTES # BLD AUTO: 0.03 10*3/MM3 (ref 0–0.05)
IMM GRANULOCYTES NFR BLD AUTO: 0.4 % (ref 0–0.5)
INR PPP: 1.2 (ref 0.8–1.2)
LYMPHOCYTES # BLD AUTO: 0.98 10*3/MM3 (ref 0.7–3.1)
LYMPHOCYTES NFR BLD AUTO: 11.5 % (ref 19.6–45.3)
MCH RBC QN AUTO: 26.9 PG (ref 26.6–33)
MCHC RBC AUTO-ENTMCNC: 31.1 G/DL (ref 31.5–35.7)
MCV RBC AUTO: 86.5 FL (ref 79–97)
MONOCYTES # BLD AUTO: 0.52 10*3/MM3 (ref 0.1–0.9)
MONOCYTES NFR BLD AUTO: 6.1 % (ref 5–12)
NEUTROPHILS NFR BLD AUTO: 6.8 10*3/MM3 (ref 1.7–7)
NEUTROPHILS NFR BLD AUTO: 79.8 % (ref 42.7–76)
NRBC BLD AUTO-RTO: 0 /100 WBC (ref 0–0.2)
PLATELET # BLD AUTO: 373 10*3/MM3 (ref 140–450)
PLATELET # BLD AUTO: 378 10*3/MM3 (ref 140–450)
PMV BLD AUTO: 9.8 FL (ref 6–12)
PROTHROMBIN TIME: 12.6 SECONDS (ref 12.8–15.2)
RBC # BLD AUTO: 4.31 10*6/MM3 (ref 3.77–5.28)
WBC NRBC COR # BLD AUTO: 8.52 10*3/MM3 (ref 3.4–10.8)

## 2024-10-28 PROCEDURE — 99152 MOD SED SAME PHYS/QHP 5/>YRS: CPT

## 2024-10-28 PROCEDURE — 88341 IMHCHEM/IMCYTCHM EA ADD ANTB: CPT | Performed by: INTERNAL MEDICINE

## 2024-10-28 PROCEDURE — 77012 CT SCAN FOR NEEDLE BIOPSY: CPT

## 2024-10-28 PROCEDURE — 88307 TISSUE EXAM BY PATHOLOGIST: CPT | Performed by: INTERNAL MEDICINE

## 2024-10-28 PROCEDURE — 88342 IMHCHEM/IMCYTCHM 1ST ANTB: CPT | Performed by: INTERNAL MEDICINE

## 2024-10-28 PROCEDURE — 25010000002 MIDAZOLAM PER 1 MG: Performed by: RADIOLOGY

## 2024-10-28 PROCEDURE — 25010000002 FENTANYL CITRATE (PF) 50 MCG/ML SOLUTION: Performed by: RADIOLOGY

## 2024-10-28 PROCEDURE — 85049 AUTOMATED PLATELET COUNT: CPT | Performed by: RADIOLOGY

## 2024-10-28 PROCEDURE — 85025 COMPLETE CBC W/AUTO DIFF WBC: CPT | Performed by: RADIOLOGY

## 2024-10-28 PROCEDURE — 85610 PROTHROMBIN TIME: CPT

## 2024-10-28 PROCEDURE — 25010000002 LIDOCAINE 1 % SOLUTION: Performed by: RADIOLOGY

## 2024-10-28 RX ORDER — LIDOCAINE HYDROCHLORIDE 10 MG/ML
20 INJECTION, SOLUTION INFILTRATION; PERINEURAL ONCE
Status: COMPLETED | OUTPATIENT
Start: 2024-10-28 | End: 2024-10-28

## 2024-10-28 RX ORDER — FENTANYL CITRATE 50 UG/ML
INJECTION, SOLUTION INTRAMUSCULAR; INTRAVENOUS AS NEEDED
Status: COMPLETED | OUTPATIENT
Start: 2024-10-28 | End: 2024-10-28

## 2024-10-28 RX ORDER — SODIUM CHLORIDE 0.9 % (FLUSH) 0.9 %
3 SYRINGE (ML) INJECTION EVERY 12 HOURS SCHEDULED
Status: DISCONTINUED | OUTPATIENT
Start: 2024-10-28 | End: 2024-10-29 | Stop reason: HOSPADM

## 2024-10-28 RX ORDER — SODIUM CHLORIDE 0.9 % (FLUSH) 0.9 %
10 SYRINGE (ML) INJECTION AS NEEDED
Status: DISCONTINUED | OUTPATIENT
Start: 2024-10-28 | End: 2024-10-29 | Stop reason: HOSPADM

## 2024-10-28 RX ORDER — SODIUM CHLORIDE 9 MG/ML
25 INJECTION, SOLUTION INTRAVENOUS ONCE
Status: DISCONTINUED | OUTPATIENT
Start: 2024-10-28 | End: 2024-10-29 | Stop reason: HOSPADM

## 2024-10-28 RX ORDER — MIDAZOLAM HYDROCHLORIDE 1 MG/ML
INJECTION, SOLUTION INTRAMUSCULAR; INTRAVENOUS AS NEEDED
Status: COMPLETED | OUTPATIENT
Start: 2024-10-28 | End: 2024-10-28

## 2024-10-28 RX ADMIN — MIDAZOLAM 0.5 MG: 1 INJECTION INTRAMUSCULAR; INTRAVENOUS at 11:31

## 2024-10-28 RX ADMIN — LIDOCAINE HYDROCHLORIDE 20 ML: 10 INJECTION, SOLUTION INFILTRATION; PERINEURAL at 11:35

## 2024-10-28 RX ADMIN — MIDAZOLAM 0.5 MG: 1 INJECTION INTRAMUSCULAR; INTRAVENOUS at 11:25

## 2024-10-28 RX ADMIN — FENTANYL CITRATE 50 MCG: 50 INJECTION, SOLUTION INTRAMUSCULAR; INTRAVENOUS at 11:31

## 2024-10-28 NOTE — INTERVAL H&P NOTE
H&P reviewed. The patient was examined and there are no changes to the H&P.   rrr, nml resp effort

## 2024-10-28 NOTE — POST-PROCEDURE NOTE
POST PROCEDURE NOTE    Procedure: liver bx    Pre-Procedure Diagnosis: lesion    Post-procedure Diagnosis: same    Findings: successful bx, gelfoam used    Complications: none    Blood loss: min    Specimen Removed: [email or web address]    Disposition:   Discharge home

## 2024-10-28 NOTE — NURSING NOTE
Pt procedure completed. Pt resting with eyes closed, daughter at bedside. Pt on right side until 1300 and pt aware. Pt will remain on strict bed rest until discharge.  Pt aware. Denies pain and food and fluid at this time.

## 2024-10-28 NOTE — DISCHARGE INSTRUCTIONS
EDUCATION /DISCHARGE INSTRUCTIONS  CT/US guided biopsy:  A biopsy is a procedure done to remove tissue for further analysis.  Before images are taken to locate the target area.  Images can be obtained using ultrasound, CT or MRI.  A physician will clean your skin with antiseptic soap, place a sterile towel around the site and administer a local anesthetic to numb the area.  The physician will then insert a special needle.  Sometimes images are taken of the needle after it is inserted to ensure the needle is in the correct area to be biopsied.   A sample is obtained and sent to the laboratory for study.  Occasionally the laboratory is unable to make a diagnosis from the sample and the procedure may need to be repeated.  Within a week the radiologist will send a report to your physician.  A pathologist will also examine the tissue and send a report.    Risks of the procedure include but are not limited to:   *  Bleeding    *  Infection   *  Puncture of surrounding organs *  Death     *  Lung collapse if the biopsy is near the chest which may require insertion of a      chest tube to re-inflate the lung if severe.    Benefits of the procedure:  Using x-ray helps to locate the area that requires a biopsy. The procedure is less invasive than a surgical procedure, there are no large incisions and it does not require anesthesia.    Alternatives to the procedure:  A biopsy can be performed surgically.  Risks of a surgical biopsy include exposure to anesthesia, infection, excessive bleeding and injury to abdominal organs.  A benefit of surgical biopsy is the ability to see the area to be biopsied and remove of a larger piece of tissue.    THIS EDUCATION INFORMATION WAS REVIEWED PRIOR TO PROCEDURE AND CONSENT. Patient initials__________________Time___________________    Post Procedure:    *  Expect the biopsy site may be tender up to one week.    *  Rest today (no pushing pulling or straining).   *  Slowly increase activity  tomorrow.    *  If you received sedation do not drive for 24 hours.   *  Keep dressing clean and dry.   *  Leave dressing on puncture site for 24 hours.    *  You may shower when dressing removed.  Call your doctor if experiencing:   *  Signs of infection such as redness, swelling, excessive pain and / or foul        smelling drainage from the puncture site.   *  Chills or fever over 101 degrees (by mouth).   *  Unrelieved pain.   *  Any new or severe symptoms.   *  If experiencing sudden / severe shortness of breath or chest pain go to the       nearest emergency room.   Following the procedure:     Follow-up with the ordering physician as directed.    Continue to take other medications as directed by your physician unless    otherwise instructed.   If applicable, resume taking your blood thinners or Aspirin on ___10/29/2024 11:00 am________.    If you have any concerns please call the Radiology Nurses Desk at [phone].  You are the most important factor in your recovery.  Follow the above instructions carefully.

## 2024-10-28 NOTE — NURSING NOTE
Pt taken to pt discharge in W/C by HAVEN Nair and Luana and released to care of her daughter.  NAD

## 2024-10-29 LAB
CYTO UR: NORMAL
LAB AP CASE REPORT: NORMAL
LAB AP DIAGNOSIS COMMENT: NORMAL
LAB AP SPECIAL STAINS: NORMAL
PATH REPORT.FINAL DX SPEC: NORMAL
PATH REPORT.GROSS SPEC: NORMAL

## 2024-10-29 RX ORDER — HYDRALAZINE HYDROCHLORIDE 25 MG/1
25 TABLET, FILM COATED ORAL 2 TIMES DAILY
Qty: 180 TABLET | Refills: 1 | Status: SHIPPED | OUTPATIENT
Start: 2024-10-29

## 2024-10-29 NOTE — PROGRESS NOTES
10/29/24 0923   Post Procedure Follow Up   Procedural pain/discomfort/disposition? No Pain/Discomfort;Activity as per D/C Instructions

## 2024-11-04 ENCOUNTER — CONSULT (OUTPATIENT)
Dept: ONCOLOGY | Facility: CLINIC | Age: 89
End: 2024-11-04
Payer: MEDICARE

## 2024-11-04 ENCOUNTER — LAB (OUTPATIENT)
Dept: LAB | Facility: HOSPITAL | Age: 89
End: 2024-11-04
Payer: MEDICARE

## 2024-11-04 VITALS
WEIGHT: 105.4 LBS | SYSTOLIC BLOOD PRESSURE: 160 MMHG | RESPIRATION RATE: 16 BRPM | BODY MASS INDEX: 20.69 KG/M2 | HEIGHT: 60 IN | DIASTOLIC BLOOD PRESSURE: 78 MMHG | HEART RATE: 65 BPM | OXYGEN SATURATION: 97 % | TEMPERATURE: 97.3 F

## 2024-11-04 DIAGNOSIS — Z45.2 ENCOUNTER FOR ADJUSTMENT OR MANAGEMENT OF VASCULAR ACCESS DEVICE: ICD-10-CM

## 2024-11-04 DIAGNOSIS — C18.9 METASTATIC COLON CANCER TO LIVER: Primary | ICD-10-CM

## 2024-11-04 DIAGNOSIS — R79.89 ABNORMAL CBC: Primary | ICD-10-CM

## 2024-11-04 DIAGNOSIS — C78.7 METASTATIC COLON CANCER TO LIVER: Primary | ICD-10-CM

## 2024-11-04 LAB
ALBUMIN SERPL-MCNC: 3.6 G/DL (ref 3.5–5.2)
ALBUMIN/GLOB SERPL: 1 G/DL
ALP SERPL-CCNC: 276 U/L (ref 39–117)
ALT SERPL W P-5'-P-CCNC: 22 U/L (ref 1–33)
ANION GAP SERPL CALCULATED.3IONS-SCNC: 17.4 MMOL/L (ref 5–15)
AST SERPL-CCNC: 59 U/L (ref 1–32)
BASOPHILS # BLD AUTO: 0.09 10*3/MM3 (ref 0–0.2)
BASOPHILS NFR BLD AUTO: 1 % (ref 0–1.5)
BILIRUB SERPL-MCNC: 0.3 MG/DL (ref 0–1.2)
BUN SERPL-MCNC: 26 MG/DL (ref 8–23)
BUN/CREAT SERPL: 22 (ref 7–25)
CALCIUM SPEC-SCNC: 9.4 MG/DL (ref 8.6–10.5)
CHLORIDE SERPL-SCNC: 96 MMOL/L (ref 98–107)
CO2 SERPL-SCNC: 22.6 MMOL/L (ref 22–29)
CREAT SERPL-MCNC: 1.18 MG/DL (ref 0.57–1)
DEPRECATED RDW RBC AUTO: 45.9 FL (ref 37–54)
EGFRCR SERPLBLD CKD-EPI 2021: 45.1 ML/MIN/1.73
EOSINOPHIL # BLD AUTO: 0.21 10*3/MM3 (ref 0–0.4)
EOSINOPHIL NFR BLD AUTO: 2.2 % (ref 0.3–6.2)
ERYTHROCYTE [DISTWIDTH] IN BLOOD BY AUTOMATED COUNT: 14.8 % (ref 12.3–15.4)
FERRITIN SERPL-MCNC: 37 NG/ML (ref 13–150)
GLOBULIN UR ELPH-MCNC: 3.7 GM/DL
GLUCOSE SERPL-MCNC: 239 MG/DL (ref 65–99)
HCT VFR BLD AUTO: 36.1 % (ref 34–46.6)
HGB BLD-MCNC: 11.3 G/DL (ref 12–15.9)
IMM GRANULOCYTES # BLD AUTO: 0.07 10*3/MM3 (ref 0–0.05)
IMM GRANULOCYTES NFR BLD AUTO: 0.7 % (ref 0–0.5)
IRON 24H UR-MRATE: 47 MCG/DL (ref 37–145)
IRON SATN MFR SERPL: 11 % (ref 20–50)
LYMPHOCYTES # BLD AUTO: 1.13 10*3/MM3 (ref 0.7–3.1)
LYMPHOCYTES NFR BLD AUTO: 11.9 % (ref 19.6–45.3)
MCH RBC QN AUTO: 26.7 PG (ref 26.6–33)
MCHC RBC AUTO-ENTMCNC: 31.3 G/DL (ref 31.5–35.7)
MCV RBC AUTO: 85.3 FL (ref 79–97)
MONOCYTES # BLD AUTO: 0.55 10*3/MM3 (ref 0.1–0.9)
MONOCYTES NFR BLD AUTO: 5.8 % (ref 5–12)
NEUTROPHILS NFR BLD AUTO: 7.42 10*3/MM3 (ref 1.7–7)
NEUTROPHILS NFR BLD AUTO: 78.4 % (ref 42.7–76)
NRBC BLD AUTO-RTO: 0 /100 WBC (ref 0–0.2)
PLATELET # BLD AUTO: 301 10*3/MM3 (ref 140–450)
PMV BLD AUTO: 10 FL (ref 6–12)
POTASSIUM SERPL-SCNC: 3.8 MMOL/L (ref 3.5–5.2)
PROT SERPL-MCNC: 7.3 G/DL (ref 6–8.5)
RBC # BLD AUTO: 4.23 10*6/MM3 (ref 3.77–5.28)
SODIUM SERPL-SCNC: 136 MMOL/L (ref 136–145)
TIBC SERPL-MCNC: 423 MCG/DL (ref 298–536)
TRANSFERRIN SERPL-MCNC: 284 MG/DL (ref 200–360)
WBC NRBC COR # BLD AUTO: 9.47 10*3/MM3 (ref 3.4–10.8)

## 2024-11-04 PROCEDURE — 84466 ASSAY OF TRANSFERRIN: CPT | Performed by: INTERNAL MEDICINE

## 2024-11-04 PROCEDURE — 85025 COMPLETE CBC W/AUTO DIFF WBC: CPT

## 2024-11-04 PROCEDURE — 36415 COLL VENOUS BLD VENIPUNCTURE: CPT

## 2024-11-04 PROCEDURE — 80053 COMPREHEN METABOLIC PANEL: CPT | Performed by: INTERNAL MEDICINE

## 2024-11-04 PROCEDURE — 83540 ASSAY OF IRON: CPT | Performed by: INTERNAL MEDICINE

## 2024-11-04 PROCEDURE — 82728 ASSAY OF FERRITIN: CPT | Performed by: INTERNAL MEDICINE

## 2024-11-04 RX ORDER — SODIUM CHLORIDE 0.9 % (FLUSH) 0.9 %
10 SYRINGE (ML) INJECTION AS NEEDED
OUTPATIENT
Start: 2024-11-11

## 2024-11-04 RX ORDER — HEPARIN SODIUM (PORCINE) LOCK FLUSH IV SOLN 100 UNIT/ML 100 UNIT/ML
500 SOLUTION INTRAVENOUS AS NEEDED
OUTPATIENT
Start: 2024-11-11

## 2024-11-04 NOTE — PROGRESS NOTES
[facility]    CONSULTING IN BLOOD DISORDERS & CANCER      REASON FOR CONSULTATION/CHIEF COMPLAINT:     Evaluation and management for metastatic colorectal cancer                             REQUESTING PHYSICIAN: Mohinder Barbosa MD  RECORDS OBTAINED:  Records of the patients history including those from the electronic medical record were reviewed and summarized in detail.    HISTORY OF PRESENT ILLNESS:    The patient is a 88 y.o. year old female with medical history significant for CAD s/p CABG x 3 (9097, 2007), PCI's x 16, pacemaker, peptic ulcer disease, hypertension and dyslipidemia comes for metastatic colorectal cancer evaluation and management.    Patient had presented to her PCP,  in early October 2024 with lower abdominal pain and weight loss of > 20 pounds.  A CT A/P performed 10/9/2024 noted innumerable lung nodules, multiple hypodense masses in the liver and a large mass in the hepatic flexure of the colon, measuring 4.5 cm in greatest dimension.    Patient underwent CT-guided liver lesion biopsy on 10/28/2024.  Pathology: Metastatic adenocarcinoma of colorectal origin.    Patient was referred to hematology/oncology for further evaluation and management.  She was first seen in this clinic on 11//24.  Accompanied by 3 of 4 daughters.  Patient denies any major GI issues.  No blood in stool or altered bowel habits.  She does take stool softeners for regular bowel movements.  No nausea or vomiting.  No family history of colorectal cancer.    Past Medical History:   Diagnosis Date    Arthritis of left sacroiliac joint 05/05/2022    CKD (chronic kidney disease), stage III     Coronary artery disease     cath 11/15. CABG x3 and all grafts widely patent. Very small OM and Cfx and that may be the cause of lateral ischemia on stress test. Non bipassable    Diabetes mellitus, type II     GERD (gastroesophageal reflux disease)     Gout     Heart attack 10/2022    Hyperlipidemia     Hypertension     PUD (peptic  ulcer disease)      Past Surgical History:   Procedure Laterality Date    ABDOMINAL SURGERY      CARDIAC ELECTROPHYSIOLOGY PROCEDURE N/A 02/22/2017    Procedure: Pacemaker DC new;  Surgeon: Juvenal Zhong MD;  [facility]: [facility] INVAS[facility] LOC[facility];  Service:     CARDIAC SURGERY  10/2022    CATARACT EXTRACTION      CORONARY ARTERY BYPASS GRAFT      1992, 2009    EPIDURAL Left 10/17/2022    Procedure: LUMBAR/SACRAL TRANSFORAMINAL EPIDURAL Left L4-5 and left L5-S1 - hold plavix 7 days before;  Surgeon: Yasmine Gaming MD;  [facility];  Service: Pain Management;  Laterality: Left;    PACEMAKER IMPLANTATION      PIRIFORMUS INJECTION Left 07/15/2022    Procedure: PIRIFORMIS INJECTION;  Surgeon: Yasmine Gaming MD;  [facility];  Service: Pain Management;  Laterality: Left;    RADIOFREQUENCY ABLATION Left 2/20/2023    Procedure: RADIOFREQUENCY ABLATION NERVES for the left sacroiliac joint;  Surgeon: Yasmine Gaming MD;  [facility];  Service: Pain Management;  Laterality: Left;    SACROILIAC JOINT INJECTION Left 05/09/2022    Procedure: SACROILIAC INJECTION - Left;  Surgeon: Yasmine Gaming MD;  [facility];  Service: Pain Management;  Laterality: Left;    SACROILIAC JOINT INJECTION Left 07/15/2022    Procedure: left Sacroiliac joint injection and left piriformis injection;  Surgeon: Yasmine Gaming MD;  [facility];  Service: Pain Management;  Laterality: Left;    TUBAL ABDOMINAL LIGATION         MEDICATIONS    Current Outpatient Medications:     aspirin 81 MG EC tablet, Take 1 tablet by mouth Daily., Disp: , Rfl:     clopidogrel (PLAVIX) 75 MG tablet, Take 1 tablet by mouth Daily., Disp: , Rfl:     dapagliflozin Propanediol (Farxiga) 10 MG tablet, TAKE 1 TABLET BY MOUTH DAILY, Disp: 90 tablet, Rfl: 1    glipizide (GLUCOTROL) 5 MG tablet, TAKE 2 TABLETS BY MOUTH EVERY MORNING WITH BREAKFAST AND ONE TABLET BY EVERY EVENING, Disp: 270 tablet, Rfl: 1     "hydrALAZINE (APRESOLINE) 25 MG tablet, TAKE 1 TABLET BY MOUTH TWICE A DAY, Disp: 180 tablet, Rfl: 1    hydroCHLOROthiazide 12.5 MG tablet, TAKE 1 TABLET BY MOUTH DAILY, Disp: 90 tablet, Rfl: 1    metFORMIN (GLUCOPHAGE) 500 MG tablet, TAKE TWO TABLETS BY MOUTH TWICE A DAY WITH MEALS, Disp: 360 tablet, Rfl: 1    metoprolol tartrate (LOPRESSOR) 50 MG tablet, TAKE 2 TABLETS BY MOUTH TWICE A DAY, Disp: 360 tablet, Rfl: 1    nitroglycerin (NITROSTAT) 0.4 MG SL tablet, 1 tablet., Disp: , Rfl:     rosuvastatin (Crestor) 40 MG tablet, Take 1 tablet by mouth Daily., Disp: 90 tablet, Rfl: 3    Diclofenac Sodium (VOLTAREN) 1 % gel gel, APPLY 4 GRAMS OF GEL TO AFFECTED AREA FOUR TIMES A DAY (Patient not taking: Reported on 11/4/2024), Disp: 30 g, Rfl: 1    multivitamin with minerals (CENTRUM SILVER 50+WOMEN PO), Take 1 tablet by mouth Daily., Disp: , Rfl:     ALLERGIES:     Allergies   Allergen Reactions    Pneumococcal Vaccines Paresthesia       SOCIAL HISTORY:       Social History     Socioeconomic History    Marital status:    Tobacco Use    Smoking status: Never     Passive exposure: Never    Smokeless tobacco: Never   Vaping Use    Vaping status: Never Used   Substance and Sexual Activity    Alcohol use: Never    Drug use: Never         FAMILY HISTORY:  Family History   Problem Relation Age of Onset    Coronary artery disease Mother     Heart attack Sister        REVIEW OF SYSTEMS:  As per HPI         Vitals:    11/04/24 0932   BP: 160/78   Pulse: 65   Resp: 16   Temp: 97.3 °F (36.3 °C)   TempSrc: Oral   SpO2: 97%   Weight: 47.8 kg (105 lb 6.4 oz)   Height: 152.4 cm (60\")   PainSc:   2   PainLoc: Abdomen         11/4/2024     9:34 AM   Current Status   ECOG score 0      PHYSICAL EXAM:    CONSTITUTIONAL:  Vital signs reviewed.  No distress, looks comfortable.  EYES:  Conjunctiva and lids unremarkable.   EARS,NOSE,MOUTH,THROAT:  Ears and nose appear unremarkable.  Lips, teeth, gums appear unremarkable.  RESPIRATORY:  " Normal respiratory effort.  Lungs clear to auscultation bilaterally.  CARDIOVASCULAR:  Normal S1, S2.  No murmurs rubs or gallops.  No significant lower extremity edema.  GASTROINTESTINAL: Abdomen appears unremarkable.  Nondistended  LYMPHATIC:  No cervical, supraclavicular lymphadenopathy.  NEURO: AAOx3, no focal deficits.  Appears to have equal strength all 4 extremities.  MUSCULOSKELETAL:  Unremarkable digits/nails.  No cyanosis or clubbing.  No apparent joint deformities.  SKIN:  Warm.  No rashes.  PSYCHIATRIC:  Normal judgment and insight.  Normal mood and affect.     RECENT LABS:        Consult on 11/04/2024   Component Date Value Ref Range Status    Ferritin 11/04/2024 37.00  13.00 - 150.00 ng/mL Final    Iron 11/04/2024 47  37 - 145 mcg/dL Final    Iron Saturation (TSAT) 11/04/2024 11 (L)  20 - 50 % Final    Transferrin 11/04/2024 284  200 - 360 mg/dL Final    TIBC 11/04/2024 423  298 - 536 mcg/dL Final    Glucose 11/04/2024 239 (H)  65 - 99 mg/dL Final    BUN 11/04/2024 26 (H)  8 - 23 mg/dL Final    Creatinine 11/04/2024 1.18 (H)  0.57 - 1.00 mg/dL Final    Sodium 11/04/2024 136  136 - 145 mmol/L Final    Potassium 11/04/2024 3.8  3.5 - 5.2 mmol/L Final    Chloride 11/04/2024 96 (L)  98 - 107 mmol/L Final    CO2 11/04/2024 22.6  22.0 - 29.0 mmol/L Final    Calcium 11/04/2024 9.4  8.6 - 10.5 mg/dL Final    Total Protein 11/04/2024 7.3  6.0 - 8.5 g/dL Final    Albumin 11/04/2024 3.6  3.5 - 5.2 g/dL Final    ALT (SGPT) 11/04/2024 22  1 - 33 U/L Final    AST (SGOT) 11/04/2024 59 (H)  1 - 32 U/L Final    Alkaline Phosphatase 11/04/2024 276 (H)  39 - 117 U/L Final    Total Bilirubin 11/04/2024 0.3  0.0 - 1.2 mg/dL Final    Globulin 11/04/2024 3.7  gm/dL Final    A/G Ratio 11/04/2024 1.0  g/dL Final    BUN/Creatinine Ratio 11/04/2024 22.0  7.0 - 25.0 Final    Anion Gap 11/04/2024 17.4 (H)  5.0 - 15.0 mmol/L Final    eGFR 11/04/2024 45.1 (L)  >60.0 mL/min/1.73 Final   Lab on 11/04/2024   Component Date Value Ref  Range Status    WBC 11/04/2024 9.47  3.40 - 10.80 10*3/mm3 Final    RBC 11/04/2024 4.23  3.77 - 5.28 10*6/mm3 Final    Hemoglobin 11/04/2024 11.3 (L)  12.0 - 15.9 g/dL Final    Hematocrit 11/04/2024 36.1  34.0 - 46.6 % Final    MCV 11/04/2024 85.3  79.0 - 97.0 fL Final    MCH 11/04/2024 26.7  26.6 - 33.0 pg Final    MCHC 11/04/2024 31.3 (L)  31.5 - 35.7 g/dL Final    RDW 11/04/2024 14.8  12.3 - 15.4 % Final    RDW-SD 11/04/2024 45.9  37.0 - 54.0 fl Final    MPV 11/04/2024 10.0  6.0 - 12.0 fL Final    Platelets 11/04/2024 301  140 - 450 10*3/mm3 Final    Neutrophil % 11/04/2024 78.4 (H)  42.7 - 76.0 % Final    Lymphocyte % 11/04/2024 11.9 (L)  19.6 - 45.3 % Final    Monocyte % 11/04/2024 5.8  5.0 - 12.0 % Final    Eosinophil % 11/04/2024 2.2  0.3 - 6.2 % Final    Basophil % 11/04/2024 1.0  0.0 - 1.5 % Final    Immature Grans % 11/04/2024 0.7 (H)  0.0 - 0.5 % Final    Neutrophils, Absolute 11/04/2024 7.42 (H)  1.70 - 7.00 10*3/mm3 Final    Lymphocytes, Absolute 11/04/2024 1.13  0.70 - 3.10 10*3/mm3 Final    Monocytes, Absolute 11/04/2024 0.55  0.10 - 0.90 10*3/mm3 Final    Eosinophils, Absolute 11/04/2024 0.21  0.00 - 0.40 10*3/mm3 Final    Basophils, Absolute 11/04/2024 0.09  0.00 - 0.20 10*3/mm3 Final    Immature Grans, Absolute 11/04/2024 0.07 (H)  0.00 - 0.05 10*3/mm3 Final    nRBC 11/04/2024 0.0  0.0 - 0.2 /100 WBC Final     ASSESSMENT:  Patient is a pleasant 88-year-old female with medical history significant for CAD s/p CABG x 3 (9097, 2007), PCI's x 16, pacemaker, peptic ulcer disease, hypertension and dyslipidemia comes for metastatic colorectal cancer evaluation and management.    # Metastatic colorectal cancer with liver and lung metastasis, right sided:  Patient had presented to her PCP,  in early October 2024 with lower abdominal pain and weight loss of > 20 pounds.    A CT A/P performed 10/9/2024 noted innumerable lung nodules, multiple hypodense masses in the liver and a large mass in the hepatic  flexure of the colon, measuring 4.5 cm in greatest dimension.  CT-guided liver lesion biopsy on 10/28/2024. Pathology: Metastatic adenocarcinoma of colorectal origin.  CEA 5386.0 (10/21/2024)  I reviewed the imaging and pathology findings with patient and her family in detail.  Informed that she has been diagnosed with metastatic colorectal cancer with diffuse metastasis.  Reviewed  natural history, prognosis and palliative treatment options.  Patient is interested in receiving cancer directed therapy.  With her age and significant cardiovascular medical comorbidity-treatment with palliative chemotherapy would be challenging.  Plan made to check [id number] and Caris NGS to look for MSI status and other targetable mutations.  If none, will consider palliative chemotherapy with FOLFOX (dose reduced) with addition of Avastin/cetuximab with subsequent cycles, if tolerates well.  Will have low threshold to discontinue chemo if she has poor tolerance.  Will also refer to colorectal surgery for colonoscopy evaluation for the degree of obstruction and need for any surgical intervention.  Patient is agreeable to the plan.    # Anemia:  Hemoglobin 11.3 g/dL on 11/4/24.  No evidence of iron deficiency.  Encouraged to improve nutritional intake    # CAD s/p CABG (1997 and 2007) and PCI x 16:   Follows up with , cardiology at the [facility]  On aspirin 81 mg and Plavix 75 mg daily    # DM2:  Currently on Farxiga, glipizide and metformin.    Denies any neuropathy.  Per patient, last A1c was 8.6.  Being managed by PCP    PLAN:   -Obtain PET/CT for staging  -Refer to colorectal surgery for colonoscopy and port placement  -Check [id number] and [facility]is NGS  -Tentative plan to administer FOLFOX (dose reduced) if MSI stable or no targetable mutations on the NGS.    -Advised close follow-up with cardiology and other specialists  -Follow-up in 2-3 weeks or sooner if needed    Orders Placed This Encounter   Procedures    NM  PET/CT Skull Base to Mid Thigh     Order Specific Question:   What radiopharmaceutical is preferred for this exam?     Answer:   FDG  (offered at all sites)     Order Specific Question:   Release to patient     Answer:   Routine Release [[id number]]     Order Specific Question:   Reason for Exam:     Answer:   colon cancer staging    Ferritin     Order Specific Question:   Release to patient     Answer:   Routine Release [1400000002]    Iron Profile     Order Specific Question:   Release to patient     Answer:   Routine Release [[id number]]    Comprehensive Metabolic Panel     Order Specific Question:   Release to patient     Answer:   Routine Release [[id number]]    Ambulatory Referral to Colorectal Surgery     Referral Priority:   Routine     Referral Type:   Consultation     Referral Reason:   Specialty Services Required     Requested Specialty:   Colon and Rectal Surgery     Number of Visits Requested:   1   Total time spent during this patient encounter is 65 minutes. The total time spent with the patient includes: preparing to see the patient by reviewing of tests, prior notes or other relevant information, performing appropriate independent examination & evaluation, counseling, ordering of medications, tests or procedures, documenting clinic information in the electronic medical records or other health records, independently interpreting results of tests and communicating the results to the patient/family or caregiver.

## 2024-11-05 ENCOUNTER — HOSPITAL ENCOUNTER (OUTPATIENT)
Dept: PET IMAGING | Facility: HOSPITAL | Age: 89
Discharge: HOME | End: 2024-11-05
Payer: MEDICARE

## 2024-11-05 ENCOUNTER — PATIENT ROUNDING (BHMG ONLY) (OUTPATIENT)
Dept: ONCOLOGY | Facility: CLINIC | Age: 89
End: 2024-11-05
Payer: MEDICARE

## 2024-11-05 LAB
CYTO UR: NORMAL
GLUCOSE BLDC GLUCOMTR-MCNC: 180 MG/DL (ref 70–130)
LAB AP CASE REPORT: NORMAL
LAB AP DIAGNOSIS COMMENT: NORMAL
LAB AP SPECIAL STAINS: NORMAL
PATH REPORT.ADDENDUM SPEC: NORMAL
PATH REPORT.FINAL DX SPEC: NORMAL
PATH REPORT.GROSS SPEC: NORMAL

## 2024-11-05 PROCEDURE — 78815 PET IMAGE W/CT SKULL-THIGH: CPT

## 2024-11-05 PROCEDURE — A9552 F18 FDG: HCPCS | Performed by: INTERNAL MEDICINE

## 2024-11-05 PROCEDURE — 82948 REAGENT STRIP/BLOOD GLUCOSE: CPT

## 2024-11-05 PROCEDURE — 0 FLUDEOXYGLUCOSE F18 SOLUTION: Performed by: INTERNAL MEDICINE

## 2024-11-05 RX ADMIN — FLUDEOXYGLUCOSE F 18 1 DOSE: 200 INJECTION, SOLUTION INTRAVENOUS at 10:55

## 2024-11-07 ENCOUNTER — OFFICE VISIT (OUTPATIENT)
Dept: SURGERY | Facility: CLINIC | Age: 89
End: 2024-11-07
Payer: MEDICARE

## 2024-11-07 VITALS
HEART RATE: 71 BPM | DIASTOLIC BLOOD PRESSURE: 70 MMHG | OXYGEN SATURATION: 96 % | HEIGHT: 60 IN | WEIGHT: 104 LBS | SYSTOLIC BLOOD PRESSURE: 150 MMHG | BODY MASS INDEX: 20.42 KG/M2

## 2024-11-07 DIAGNOSIS — C18.9 METASTATIC COLON CANCER TO LIVER: Primary | ICD-10-CM

## 2024-11-07 DIAGNOSIS — C78.7 METASTATIC COLON CANCER TO LIVER: Primary | ICD-10-CM

## 2024-11-07 RX ORDER — CALCIUM CARBONATE 500 MG/1
1 TABLET, CHEWABLE ORAL DAILY
COMMUNITY

## 2024-11-07 NOTE — H&P (VIEW-ONLY)
Colorectal & General Surgery  Consultation    Patient: Tamie Peter  YOB: 1935  MRN: [id number]      Assessment  Tamie Peter is a 88 y.o. 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    From a functional standpoint, she has no evidence of obstruction or bleeding from her likely hepatic flexure mass.  I do not suspect that there is any urgent indication for colectomy or fecal diversion, but colonoscopy will certainly give us more information about this.    From an oncologic standpoint, she has already seen Dr. Meeks and is weighing her treatment options.  At this point, no decision has been made about what therapy she will begin.  If she does need a port, I am happy to place that at that time.  Once that is determined.    We discussed the role of a colonoscopy, and I think that it will give us the information that we need to treat her safely from a systemic standpoint.  Will also force the opportunity for more tissue for possible molecular analysis.  We discussed the risk, benefits, alternatives to colonoscopy, including her increased risk of myocardial infarction secondary to cardiac history.  Will plan to proceed with colonoscopy.  Cardiac clearance pending.    Referring Provider: Lonny Meeks MD     Reason for Consultation: Colon cancer    History of Present Illness   Tamie Peter is a 88 y.o. female who presented with unexplained weight loss as well as lower abdominal pain and was found to have metastatic colorectal cancer.  She denies any significant issues today.  Denies hematochezia, melena, tenesmus, abdominal pain, constipation.  She takes fiber and MiraLAX at times.  She is quite worried about her cardiac history.    Most recent colonoscopy: 2003    Past Medical History   Past Medical History:   Diagnosis Date    Arthritis of left sacroiliac joint 05/05/2022    CKD (chronic kidney disease),  stage III     Coronary artery disease     cath 11/15. CABG x3 and all grafts widely patent. Very small OM and Cfx and that may be the cause of lateral ischemia on stress test. Non bipassable    Diabetes mellitus, type II     GERD (gastroesophageal reflux disease)     Gout     Heart attack 10/2022    Hyperlipidemia     Hypertension     PUD (peptic ulcer disease)         Past Surgical History   Past Surgical History:   Procedure Laterality Date    ABDOMINAL SURGERY      CARDIAC ELECTROPHYSIOLOGY PROCEDURE N/A 02/22/2017    Procedure: Pacemaker DC new;  Surgeon: Juvenal Zhong MD;  Location: [facility] INVASIVE LOCATION;  Service:     CARDIAC SURGERY  10/2022    CATARACT EXTRACTION      COLONOSCOPY  2010    CORONARY ARTERY BYPASS GRAFT      1992, 2009    EPIDURAL Left 10/17/2022    Procedure: LUMBAR/SACRAL TRANSFORAMINAL EPIDURAL Left L4-5 and left L5-S1 - hold plavix 7 days before;  Surgeon: Yasmine Gaming MD;  [facility];  Service: Pain Management;  Laterality: Left;    PACEMAKER IMPLANTATION      PIRIFORMUS INJECTION Left 07/15/2022    Procedure: PIRIFORMIS INJECTION;  Surgeon: Yasmine Gaming MD;  [facility];  Service: Pain Management;  Laterality: Left;    RADIOFREQUENCY ABLATION Left 02/20/2023    Procedure: RADIOFREQUENCY ABLATION NERVES for the left sacroiliac joint;  Surgeon: Yasmine Gaming MD;  [facility];  Service: Pain Management;  Laterality: Left;    SACROILIAC JOINT INJECTION Left 05/09/2022    Procedure: SACROILIAC INJECTION - Left;  Surgeon: Yasmine Gaming MD;  [facility];  Service: Pain Management;  Laterality: Left;    SACROILIAC JOINT INJECTION Left 07/15/2022    Procedure: left Sacroiliac joint injection and left piriformis injection;  Surgeon: Yasmine Gaming MD;  [facility];  Service: Pain Management;  Laterality: Left;    TUBAL ABDOMINAL LIGATION         Social History  Social History     Socioeconomic History    Marital  status:    Tobacco Use    Smoking status: Never     Passive exposure: Never    Smokeless tobacco: Never   Vaping Use    Vaping status: Never Used   Substance and Sexual Activity    Alcohol use: Never    Drug use: Never       Family History  Family History   Problem Relation Age of Onset    Coronary artery disease Mother     Heart attack Sister        Colorectal cancer family history: None    Review of Systems  Negative except as documented in the HPI.     Allergies  Allergies   Allergen Reactions    Pneumococcal Vaccines Paresthesia       Medications    Current Outpatient Medications:     aspirin 81 MG EC tablet, Take 1 tablet by mouth Daily., Disp: , Rfl:     clopidogrel (PLAVIX) 75 MG tablet, Take 1 tablet by mouth Daily., Disp: , Rfl:     dapagliflozin Propanediol (Farxiga) 10 MG tablet, TAKE 1 TABLET BY MOUTH DAILY, Disp: 90 tablet, Rfl: 1    Diclofenac Sodium (VOLTAREN) 1 % gel gel, APPLY 4 GRAMS OF GEL TO AFFECTED AREA FOUR TIMES A DAY (Patient not taking: Reported on 11/4/2024), Disp: 30 g, Rfl: 1    glipizide (GLUCOTROL) 5 MG tablet, TAKE 2 TABLETS BY MOUTH EVERY MORNING WITH BREAKFAST AND ONE TABLET BY EVERY EVENING, Disp: 270 tablet, Rfl: 1    hydrALAZINE (APRESOLINE) 25 MG tablet, TAKE 1 TABLET BY MOUTH TWICE A DAY, Disp: 180 tablet, Rfl: 1    hydroCHLOROthiazide 12.5 MG tablet, TAKE 1 TABLET BY MOUTH DAILY, Disp: 90 tablet, Rfl: 1    metFORMIN (GLUCOPHAGE) 500 MG tablet, TAKE TWO TABLETS BY MOUTH TWICE A DAY WITH MEALS, Disp: 360 tablet, Rfl: 1    metoprolol tartrate (LOPRESSOR) 50 MG tablet, TAKE 2 TABLETS BY MOUTH TWICE A DAY, Disp: 360 tablet, Rfl: 1    multivitamin with minerals (CENTRUM SILVER 50+WOMEN PO), Take 1 tablet by mouth Daily., Disp: , Rfl:     nitroglycerin (NITROSTAT) 0.4 MG SL tablet, 1 tablet., Disp: , Rfl:     rosuvastatin (Crestor) 40 MG tablet, Take 1 tablet by mouth Daily., Disp: 90 tablet, Rfl: 3    Vital Signs  There were no vitals filed for this visit.     Physical  Exam  Constitutional: Resting comfortably, no acute distress  Neck: Supple, trachea midline  Respiratory: No increased work of breathing, Symmetric excursion  Cardiovascular: Well pefursed, no jugular venous distention evident   Abdominal: Soft, non-tender, non-distended  Lymphatics: No cervical or suprascapular adenopathy  Skin: Warm, dry, no rash on visualized skin surfaces  Musculoskeletal: Symmetric strength, no obvious gross abnormalities  Psychiatric: Alert and oriented ×3, normal affect   BMI is within normal parameters. No other follow-up for BMI required.    Laboratory Results  I have personally reviewed CBC with WBC 9, hemoglobin, platelets 301.  CMP with creatinine 1.1, bicarb 22, albumin 3.6, AST 59, ALT 22, total bilirubin 0.3.  CEA 5386.    I have also personally reviewed pathology report from liver biopsy which demonstrates metastatic adenocarcinoma consistent with colorectal primary.    Radiology  I have personally reviewed PET scan which demonstrates significant number of hepatic metastatic lesions as well as multiple lung nodules.  Apparent mass in the hepatic flexure.    Endoscopy  Colonoscopy in 2003 is not visible.         Joseph Christian MD  Colorectal & General Surgery  [facility]    [address], [address]  [place], [place], [address]  P: [phone]  F: [phone]

## 2024-11-07 NOTE — PROGRESS NOTES
Colorectal & General Surgery  Consultation    Patient: Tamie Peter  YOB: 1935  MRN: [id number]      Assessment  Tamie Peter is a 88 y.o. 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    From a functional standpoint, she has no evidence of obstruction or bleeding from her likely hepatic flexure mass.  I do not suspect that there is any urgent indication for colectomy or fecal diversion, but colonoscopy will certainly give us more information about this.    From an oncologic standpoint, she has already seen Dr. Meeks and is weighing her treatment options.  At this point, no decision has been made about what therapy she will begin.  If she does need a port, I am happy to place that at that time.  Once that is determined.    We discussed the role of a colonoscopy, and I think that it will give us the information that we need to treat her safely from a systemic standpoint.  Will also force the opportunity for more tissue for possible molecular analysis.  We discussed the risk, benefits, alternatives to colonoscopy, including her increased risk of myocardial infarction secondary to cardiac history.  Will plan to proceed with colonoscopy.  Cardiac clearance pending.    Referring Provider: Lonny Meeks MD     Reason for Consultation: Colon cancer    History of Present Illness   Tamie Peter is a 88 y.o. female who presented with unexplained weight loss as well as lower abdominal pain and was found to have metastatic colorectal cancer.  She denies any significant issues today.  Denies hematochezia, melena, tenesmus, abdominal pain, constipation.  She takes fiber and MiraLAX at times.  She is quite worried about her cardiac history.    Most recent colonoscopy: 2003    Past Medical History   Past Medical History:   Diagnosis Date    Arthritis of left sacroiliac joint 05/05/2022    CKD (chronic kidney disease),  stage III     Coronary artery disease     cath 11/15. CABG x3 and all grafts widely patent. Very small OM and Cfx and that may be the cause of lateral ischemia on stress test. Non bipassable    Diabetes mellitus, type II     GERD (gastroesophageal reflux disease)     Gout     Heart attack 10/2022    Hyperlipidemia     Hypertension     PUD (peptic ulcer disease)         Past Surgical History   Past Surgical History:   Procedure Laterality Date    ABDOMINAL SURGERY      CARDIAC ELECTROPHYSIOLOGY PROCEDURE N/A 02/22/2017    Procedure: Pacemaker DC new;  Surgeon: Juvenal Zhong MD;  Location: [facility] INVASIVE LOCATION;  Service:     CARDIAC SURGERY  10/2022    CATARACT EXTRACTION      COLONOSCOPY  2010    CORONARY ARTERY BYPASS GRAFT      1992, 2009    EPIDURAL Left 10/17/2022    Procedure: LUMBAR/SACRAL TRANSFORAMINAL EPIDURAL Left L4-5 and left L5-S1 - hold plavix 7 days before;  Surgeon: Yasmine Gaming MD;  Location: [facility];  Service: Pain Management;  Laterality: Left;    PACEMAKER IMPLANTATION      PIRIFORMUS INJECTION Left 07/15/2022    Procedure: PIRIFORMIS INJECTION;  Surgeon: Yasmine Gaming MD;  Location: [facility];  Service: Pain Management;  Laterality: Left;    RADIOFREQUENCY ABLATION Left 02/20/2023    Procedure: RADIOFREQUENCY ABLATION NERVES for the left sacroiliac joint;  Surgeon: Yasmine Gaming MD;  Location: [facility];  Service: Pain Management;  Laterality: Left;    SACROILIAC JOINT INJECTION Left 05/09/2022    Procedure: SACROILIAC INJECTION - Left;  Surgeon: Yasmine Gaming MD;  Location: [facility];  Service: Pain Management;  Laterality: Left;    SACROILIAC JOINT INJECTION Left 07/15/2022    Procedure: left Sacroiliac joint injection and left piriformis injection;  Surgeon: Yasmine Gaming MD;  Location: [facility];  Service: Pain Management;  Laterality: Left;    TUBAL ABDOMINAL LIGATION         Social History  Social History     Socioeconomic History    Marital  status:    Tobacco Use    Smoking status: Never     Passive exposure: Never    Smokeless tobacco: Never   Vaping Use    Vaping status: Never Used   Substance and Sexual Activity    Alcohol use: Never    Drug use: Never       Family History  Family History   Problem Relation Age of Onset    Coronary artery disease Mother     Heart attack Sister        Colorectal cancer family history: None    Review of Systems  Negative except as documented in the HPI.     Allergies  Allergies   Allergen Reactions    Pneumococcal Vaccines Paresthesia       Medications    Current Outpatient Medications:     aspirin 81 MG EC tablet, Take 1 tablet by mouth Daily., Disp: , Rfl:     clopidogrel (PLAVIX) 75 MG tablet, Take 1 tablet by mouth Daily., Disp: , Rfl:     dapagliflozin Propanediol (Farxiga) 10 MG tablet, TAKE 1 TABLET BY MOUTH DAILY, Disp: 90 tablet, Rfl: 1    Diclofenac Sodium (VOLTAREN) 1 % gel gel, APPLY 4 GRAMS OF GEL TO AFFECTED AREA FOUR TIMES A DAY (Patient not taking: Reported on 11/4/2024), Disp: 30 g, Rfl: 1    glipizide (GLUCOTROL) 5 MG tablet, TAKE 2 TABLETS BY MOUTH EVERY MORNING WITH BREAKFAST AND ONE TABLET BY EVERY EVENING, Disp: 270 tablet, Rfl: 1    hydrALAZINE (APRESOLINE) 25 MG tablet, TAKE 1 TABLET BY MOUTH TWICE A DAY, Disp: 180 tablet, Rfl: 1    hydroCHLOROthiazide 12.5 MG tablet, TAKE 1 TABLET BY MOUTH DAILY, Disp: 90 tablet, Rfl: 1    metFORMIN (GLUCOPHAGE) 500 MG tablet, TAKE TWO TABLETS BY MOUTH TWICE A DAY WITH MEALS, Disp: 360 tablet, Rfl: 1    metoprolol tartrate (LOPRESSOR) 50 MG tablet, TAKE 2 TABLETS BY MOUTH TWICE A DAY, Disp: 360 tablet, Rfl: 1    multivitamin with minerals (CENTRUM SILVER 50+WOMEN PO), Take 1 tablet by mouth Daily., Disp: , Rfl:     nitroglycerin (NITROSTAT) 0.4 MG SL tablet, 1 tablet., Disp: , Rfl:     rosuvastatin (Crestor) 40 MG tablet, Take 1 tablet by mouth Daily., Disp: 90 tablet, Rfl: 3    Vital Signs  There were no vitals filed for this visit.     Physical  Exam  Constitutional: Resting comfortably, no acute distress  Neck: Supple, trachea midline  Respiratory: No increased work of breathing, Symmetric excursion  Cardiovascular: Well pefursed, no jugular venous distention evident   Abdominal: Soft, non-tender, non-distended  Lymphatics: No cervical or suprascapular adenopathy  Skin: Warm, dry, no rash on visualized skin surfaces  Musculoskeletal: Symmetric strength, no obvious gross abnormalities  Psychiatric: Alert and oriented ×3, normal affect   BMI is within normal parameters. No other follow-up for BMI required.    Laboratory Results  I have personally reviewed CBC with WBC 9, hemoglobin, platelets 301.  CMP with creatinine 1.1, bicarb 22, albumin 3.6, AST 59, ALT 22, total bilirubin 0.3.  CEA 5386.    I have also personally reviewed pathology report from liver biopsy which demonstrates metastatic adenocarcinoma consistent with colorectal primary.    Radiology  I have personally reviewed PET scan which demonstrates significant number of hepatic metastatic lesions as well as multiple lung nodules.  Apparent mass in the hepatic flexure.    Endoscopy  Colonoscopy in 2003 is not visible.         Joseph Christian MD  Colorectal & General Surgery  [facility]    [address], [address]  [place], [place], [address]  P: [phone]  F: [phone]

## 2024-11-08 ENCOUNTER — PREP FOR SURGERY (OUTPATIENT)
Dept: OTHER | Facility: HOSPITAL | Age: 89
End: 2024-11-08
Payer: MEDICARE

## 2024-11-08 ENCOUNTER — TELEPHONE (OUTPATIENT)
Dept: ONCOLOGY | Facility: CLINIC | Age: 89
End: 2024-11-08

## 2024-11-08 DIAGNOSIS — C78.7 METASTATIC COLON CANCER TO LIVER: Primary | ICD-10-CM

## 2024-11-08 DIAGNOSIS — C18.9 METASTATIC COLON CANCER TO LIVER: Primary | ICD-10-CM

## 2024-11-08 NOTE — TELEPHONE ENCOUNTER
Caller: Mandi Mcghee    Relationship: Emergency Contact    Best call back number: [phone]     What is the best time to reach you: ANYTIME     Who are you requesting to speak with (clinical staff, provider,  specific staff member): NON-CLINICAL     What was the call regarding: PT IS DUE TO HAVE HER PORT PUT IN AT THE SAME TIME AS HAVING HER COLONOSCOPY ON 11/22. DR HOFFMAN DID NOT WANT TO START THE PT'S CHEMO UNTIL AFTER THESE 2 THINGS WERE DONE.     THE PT IS SCHEDULED FOR 11/21/24 FOR THE FIRST TREATMENT. THEY NEED TO GET THIS MOVED UNTIL AFTER THE 11/22.     Is it okay if the provider responds through [employer]: NO - PLEASE CALL BACK TO ADVISE.

## 2024-11-13 ENCOUNTER — TELEMEDICINE (OUTPATIENT)
Dept: ONCOLOGY | Facility: CLINIC | Age: 89
End: 2024-11-13
Payer: MEDICARE

## 2024-11-13 VITALS — HEIGHT: 59 IN | WEIGHT: 102 LBS | BODY MASS INDEX: 20.56 KG/M2

## 2024-11-13 DIAGNOSIS — C78.7 METASTATIC COLON CANCER TO LIVER: Primary | ICD-10-CM

## 2024-11-13 DIAGNOSIS — C18.9 METASTATIC COLON CANCER TO LIVER: Primary | ICD-10-CM

## 2024-11-13 LAB — REF LAB TEST METHOD: NORMAL

## 2024-11-13 RX ORDER — ONDANSETRON 8 MG/1
8 TABLET, FILM COATED ORAL 3 TIMES DAILY PRN
Qty: 30 TABLET | Refills: 5 | Status: SHIPPED | OUTPATIENT
Start: 2024-11-20

## 2024-11-13 RX ORDER — LIDOCAINE/PRILOCAINE 2.5 %-2.5%
1 CREAM (GRAM) TOPICAL
Qty: 30 G | Refills: 3 | Status: SHIPPED | OUTPATIENT
Start: 2024-11-13

## 2024-11-13 NOTE — PROGRESS NOTES
TREATMENT  PREPARATION    Tamie Peter  [id number]  1935    Chief Complaint: Treatment preparation and needs assessment    History of present illness:  Tamie Peter is a 88 y.o. year old female who is here today for treatment preparation and needs assessment.  The patient has been diagnosed with   Encounter Diagnosis   Name Primary?    Metastatic colon cancer to liver Yes    and is scheduled to begin treatment with: FOLFOX + Avastin    Oncology History:    Oncology/Hematology History   Metastatic colon cancer to liver   11/4/2024 Initial Diagnosis    Metastatic colon cancer to liver     11/21/2024 -  Chemotherapy    OP COLON mFOLFOX6 + Bevacizumab 5mg/kg (OXALIplatin / Leucovorin / Fluorouracil / Bevacizumab)         The current medication list and allergy list were reviewed and reconciled.     Past Medical History, Past Surgical History, Social History, Family History have been reviewed and are without significant changes except as mentioned.    Physical Exam:    There were no vitals filed for this visit.  Vitals:    11/13/24 1422   PainSc: 0-No pain        ECOG score: 0         Physical Exam  Constitutional:       Appearance: Normal appearance.   Pulmonary:      Effort: Pulmonary effort is normal.   Neurological:      Mental Status: She is oriented to person, place, and time.           NEEDS ASSESSMENTS  Psychosocial and Barriers to care  The patient has completed a PHQ-9 Depression Screening and the Distress Thermometer (DT) today.  PHQ-9 results show PHQ-2 Total Score:   PHQ-9 Total Score:        The patient scored their distress today as Distress Level: 0-No distress on a scale of 0-10 with 0 being no distress and 10 being extreme distress. Problems marked by the patient as being an issue for them within the last week include   .      Results were reviewed along with psychosocial resources offered by our cancer center.  Our Supportive Oncology team will be flagged for a score of 4 or above, and a  same day call will be made for a score of 9 or 10.  A mental health referral is offered at that time. Patients who score less than 4 have been educated on our support services and can be referred to our  upon request.  The patient will not be referred to our .       Nutrition  The patient has completed the malnutrition screening today. They scored Malnutrition Screening Tool  Have you recently lost weight without trying?  If yes, how much weight have you lost?: 0--> No  Have you been eating poorly because of a decreased appetite?: 0--> No  MST score: 0   with a score of 0-1 meaning not at risk in a score of 2 or greater meaning at risk.  Patients with a score of 3 or higher will be referred to our oncology dietitian for support. Patients beginning at risk treatment regimens or who have dietary concerns will also be referred to our oncology dietitian. The patient will be referred.    Functional Assessment  Persons who are age 70 or greater will be screened for qualification of a comprehensive geriatric assessment by our survivorship nurse practitioner.  Older adults with cancer face unique challenges. These may include an increased risk of drug reactions, financial burdens, and caregiver stress. The patient scored G8 Questionnaire  Has food intake declined over the past 3 months due to loss of appetite, digestive problems, chewing or swallowing difficulties?: No decrease in food intake  Weight loss during the last 3 months: Weight loss > 3 kg / 6/6 lbs  Mobility: Goes out  Neuropsychological Problems: No psychological problems  Body Mass Index (BMI (weight in kg) / (height in m2)): BMI 19 to BMI < 21  Takes more than 3 medications a day: Yes, takes more than 3 prescription drugs per day  In comparison with other people of the same age, how does the patient consider his/her health status?: Better  Age: > 85  Total Score: 9 . Patients scoring 14 or lower will referred for an older adult  "functional assessment with the survivorship advanced practice registered nurse to ensure all needed support is provided as patients plan for their treatments. The patient will be referred.    Intravenous Access Assessment  The patient and I discussed planned intravenous chemo/biotherapy as well as other IV treatments that are often needed throughout the course of treatment. These may include, but are not limited to blood transfusions, antibiotics, and IV hydration. Discussed that depending on selected treatment and vein assessment, patient may require venous access device (VAD) which could include but not limited to a Mediport or PICC line. Risks and benefits of VADs reviewed. The patient will be treated via Port.    Advanced Care Planning  Advance Care Planning   The patient and I discussed advanced care planning, \"Conversations that Matter\".   This service is offered for development of advance directives with a certified ACP facilitator.  The patient does have an up-to-date advanced directive. This document is not on file with our office. The patient is not interested in an appointment with one of our facilitators to create or update their advanced directives.    Have you reviewed your Advance Directive and is it valid for this stay?: Not applicable          Smoking cessation  Tobacco Use: Low Risk  (11/13/2024)    Patient History     Smoking Tobacco Use: Never     Smokeless Tobacco Use: Never     Passive Exposure: Never     Survivorship   When appropriate, we discussed that we will refer the patient to survivorship clinic to discuss next steps following completion of planned treatment.  Reviewed this visit will include assessment of your physical, psychological, functional, and spiritual needs as a survivor and the need at attend this visit when scheduled.    TREATMENT EDUCATION    Today I met with the patient to discuss the chemo/biotherapy regimen recommended for treatment of Metastatic colon cancer to liver  - " Provider Communication  - Provider communication  - ondansetron (ZOFRAN) 8 MG tablet  - Ambulatory Referral to Survivorship  .  The patient was given explanation of treatment premed side effects including office policy that prohibits patients to drive if sedating medications are administered, MD explanation given regarding benefits, side effects, toxicities and goals of treatment.  The patient received a Chemotherapy/Biotherapy Plan Summary including diagnosis and explanation of specific treatment plan.    SIDE EFFECTS:  Common side effects were discussed with the patient and/or significant other.  Discussion included where applicable hair loss/discoloration, anemia/fatigue, infection/chills/fever, appetite, bleeding risk/precautions, constipation, diarrhea, mouth sores, taste alteration, loss of appetite, nausea/vomiting, peripheral neuropathy, skin/nail changes, rash, muscle aches/weakness, photosensitivity, weight gain/loss, hearing loss, dizziness, menopausal symptoms, menstrual irregularity, sterility, high blood pressure, heart damage, liver damage, lung damage, kidney damage, DVT/PE risk, fluid retention, pleural/pericardial effusion, somnolence, electrolyte/LFT imbalance, vein exercises and/or the possible need for vascular access/port placement.  The patient was advised that although uncommon, leakage of an infused medication from the vein or venous access device may lead to skin breakdown and/or other tissue damage.  The patient was advised that he/she may have pain, bleeding, and/or bruising from the insertion of a needle in their vein or venous access device (port).  The patient was further advised that, in spite of proper technique, infection with redness and irritation may rarely occur at the site where the needle was inserted.  The patient was advised that if complications occur, additional medical treatment is available.  Finally, where applicable we have reviewed rare but potential immune mediated  side effects including shortness of breath, cough, chest pain (pneumonitis), abdominal pain, diarrhea (colitis), thyroiditis (hypothyroid or hyperthyroid), hepatitis and liver dysfunction, nephritis and renal dysfunction.    Discussion also included side effects specific to drugs in the treatment plan, specifically:    Treatment Plans       Name Type Plan Dates Plan Provider         Active    OP COLON mFOLFOX6 + Bevacizumab 5mg/kg (OXALIplatin / Leucovorin / Fluorouracil / Bevacizumab) ONCOLOGY TREATMENT 11/20/2024 - Present Lonny Meeks MD                      Questions answered and additional information discussed on topics including:  Anemia, Thrombocytopenia, Neutropenia, Nutrition and appetite changes, Constipation, Diarrhea, Nausea & vomiting, Mouth sores, Alopecia, Organ toxicities, and Survivorship       Assessment and Plan:    Diagnoses and all orders for this visit:    1. Metastatic colon cancer to liver (Primary)  -     Provider Communication  -     Provider communication  -     ondansetron (ZOFRAN) 8 MG tablet; Take 1 tablet by mouth 3 (Three) Times a Day As Needed for Nausea or Vomiting.  Dispense: 30 tablet; Refill: 5  -     Ambulatory Referral to Survivorship  -     Ambulatory Referral to OP ONC Nutrition Services  -     Ambulatory Referral to Behavioral Health    Other orders  -     lidocaine-prilocaine (EMLA) 2.5-2.5 % cream; Apply 1 Application topically to the appropriate area as directed Every 2 (Two) Hours As Needed for Mild Pain.  Dispense: 30 g; Refill: 3      No orders of the defined types were placed in this encounter.    The patient and I have reviewed their diagnosis and scheduled treatment plan. Needs assessment was completed where applicable including genetics, psychosocial needs, barriers to care, VAD evaluation, advanced care planning, survivorship, and palliative care services where indicated. Referrals have been ordered as appropriate based upon evaluation today and patient  desires.   Chemo/biotherapy teaching was completed today and consent obtained. See separate documentation for further details.  Adequate time was given to answer questions.  Patient made aware of their care team members and contact information if they have questions or problems throughout the treatment course.  Discussion held and written information provided describing frequency of office visits and ongoing monitoring throughout the treatment plan.     Reviewed with patient any prescribed medication sent to pharmacy.  Education provided regarding proper storage, safe handling, and proper disposal of unused medication.  Proper handling of body fluids and waste discussed and written information provided.  If appropriate, patient had pretreatment labs drawn today.    Learning assessment completed at initial patient encounter. See separate flowsheet. Chemo/biotherapy education comprehension assessed at today's visit.    I spent 70 minutes caring for Tamie on this date of service. This time includes time spent by me in the following activities: preparing for the visit, reviewing tests, obtaining and/or reviewing a separately obtained history, performing a medically appropriate examination and/or evaluation, counseling and educating the patient/family/caregiver, ordering medications, tests, or procedures, referring and communicating with other health care professionals, documenting information in the medical record, independently interpreting results and communicating that information with the patient/family/caregiver, and care coordination.     Shanika Sykes, APRN   11/13/24

## 2024-11-13 NOTE — PROGRESS NOTES
[facility] Hematology/Oncology Treatment Plan Summary    Name: Tamie Peter  [facility]# [id number]  MD: Dr. Meeks    Diagnosis:     ICD-10-CM ICD-9-CM   1. Metastatic colon cancer to liver  C18.9 153.9    C78.7 197.7     Goal of treatment: palliative    Treatment Medication(s):   5 FU   Oxaliplatin   Leucovorin   Bevacizumab (Avastin)    Frequency: Every 2 weeks    Number of cycles: 12    Starting on: 11/21/2024    Repeat after 3-4 cycles: CT Scan    Items for home use: Senokot-S (for constipation), Milk of Magnesia (for constipation), Imodium AD (for diarrhea), Tylenol (for fever and/or pain), and Thermometer    Rx written for: [] Nausea    [x] Pre-Treatment   EMLA cream to port site 30 minutes prior to access and ondansetron 8 mg by mouth every 8 hours as needed for nausea    Notes:     Next Steps: PORT placement on 11/19    Completing Provider: DEIRDRE Maxwell           Date/time: 11/13/2024      Please note: You will be seen by a provider frequently with your treatment plan. This plan may change depending on many factors, if so, this will be discussed with you by your physician.  Last update 03/2022.

## 2024-11-14 DIAGNOSIS — C78.7 METASTATIC COLON CANCER TO LIVER: Primary | ICD-10-CM

## 2024-11-14 DIAGNOSIS — C18.9 METASTATIC COLON CANCER TO LIVER: Primary | ICD-10-CM

## 2024-11-15 ENCOUNTER — TELEPHONE (OUTPATIENT)
Dept: ONCOLOGY | Facility: CLINIC | Age: 89
End: 2024-11-15

## 2024-11-15 NOTE — TELEPHONE ENCOUNTER
Called back patient's daughter - Mandi. Spoke with Dr Meeks who prefers patient to be started on treatment the soonest. Patient's port placement and colonoscopy was rescheduled to 11/19/24. As per Jinny at [facility] (relief charge RN), they are able to do unhook before 3pm, preferrably 12nn. Coordinating with .

## 2024-11-15 NOTE — TELEPHONE ENCOUNTER
Caller: Irving Mcghee - DAUGHTER    Relationship: Emergency Contact    Best call back number: [phone]    What is the best time to reach you: ASAP    Who are you requesting to speak with (clinical staff, provider,  specific staff member): WHITNEY    What was the call regarding: ALL PT'S APPTS. WERE CHANGED FROM 11/21/24 TO 11/26/24 EXCEPT NUTRITION & NO ONE WAS NOTIFIED & IRVING WOULD LIKE TO KNOW WHY.    Is it okay if the provider responds through MyChart: NO

## 2024-11-18 ENCOUNTER — TELEPHONE (OUTPATIENT)
Dept: OTHER | Facility: HOSPITAL | Age: 89
End: 2024-11-18
Payer: MEDICARE

## 2024-11-18 NOTE — TELEPHONE ENCOUNTER
[facility] MULTIDISCIPLINARY CLINIC  SURVIVORSHIP SERVICES CARE COORDINATION NOTE  PHONE      Call placed to patient  RE: open referral for older adult functional assessment visit.    Spoke with patient's daughter Mandi.  Trying to coordinate appt while patient is on campus since daughter lives out of town.  Offered patient an appt for 11- 10:30 while daughter was still in town.  Mandi stated that her mom is having a colonoscopy and a port placement on 11- and thought that would be to much on her.  They would like to wait for now to see how treatment goes.  Will call back if wishes to schedule an appt in the future.    Introduced myself and reviewed purpose and goals of functional assessment and what to expect..     older adult functional assessment information and self-report tools.

## 2024-11-19 ENCOUNTER — ANESTHESIA (OUTPATIENT)
Dept: PERIOP | Facility: HOSPITAL | Age: 89
End: 2024-11-19
Payer: MEDICARE

## 2024-11-19 ENCOUNTER — HOSPITAL ENCOUNTER (OUTPATIENT)
Facility: HOSPITAL | Age: 89
Setting detail: HOSPITAL OUTPATIENT SURGERY
Discharge: HOME | End: 2024-11-19
Attending: SURGERY | Admitting: SURGERY
Payer: MEDICARE

## 2024-11-19 ENCOUNTER — ANESTHESIA EVENT (OUTPATIENT)
Dept: PERIOP | Facility: HOSPITAL | Age: 89
End: 2024-11-19
Payer: MEDICARE

## 2024-11-19 ENCOUNTER — APPOINTMENT (OUTPATIENT)
Dept: GENERAL RADIOLOGY | Facility: HOSPITAL | Age: 89
End: 2024-11-19
Payer: MEDICARE

## 2024-11-19 VITALS
RESPIRATION RATE: 18 BRPM | OXYGEN SATURATION: 100 % | DIASTOLIC BLOOD PRESSURE: 73 MMHG | HEART RATE: 60 BPM | SYSTOLIC BLOOD PRESSURE: 177 MMHG | TEMPERATURE: 97.9 F

## 2024-11-19 DIAGNOSIS — C78.7 METASTATIC COLON CANCER TO LIVER: ICD-10-CM

## 2024-11-19 DIAGNOSIS — C18.9 METASTATIC COLON CANCER TO LIVER: ICD-10-CM

## 2024-11-19 LAB — GLUCOSE BLDC GLUCOMTR-MCNC: 114 MG/DL (ref 70–130)

## 2024-11-19 PROCEDURE — 76000 FLUOROSCOPY <1 HR PHYS/QHP: CPT

## 2024-11-19 PROCEDURE — 25010000002 CEFAZOLIN PER 500 MG: Performed by: SURGERY

## 2024-11-19 PROCEDURE — 36561 INSERT TUNNELED CV CATH: CPT | Performed by: SURGERY

## 2024-11-19 PROCEDURE — 45381 COLONOSCOPY SUBMUCOUS NJX: CPT | Performed by: SURGERY

## 2024-11-19 PROCEDURE — 25810000003 LACTATED RINGERS PER 1000 ML: Performed by: ANESTHESIOLOGY

## 2024-11-19 PROCEDURE — C1788 PORT, INDWELLING, IMP: HCPCS | Performed by: SURGERY

## 2024-11-19 PROCEDURE — 76937 US GUIDE VASCULAR ACCESS: CPT | Performed by: SURGERY

## 2024-11-19 PROCEDURE — 45380 COLONOSCOPY AND BIOPSY: CPT | Performed by: SURGERY

## 2024-11-19 PROCEDURE — 25010000002 PHENYLEPHRINE 10 MG/ML SOLUTION: Performed by: ANESTHESIOLOGY

## 2024-11-19 PROCEDURE — 82948 REAGENT STRIP/BLOOD GLUCOSE: CPT

## 2024-11-19 PROCEDURE — 25010000002 LIDOCAINE 2% SOLUTION: Performed by: ANESTHESIOLOGY

## 2024-11-19 PROCEDURE — 25010000002 PROPOFOL 10 MG/ML EMULSION: Performed by: ANESTHESIOLOGY

## 2024-11-19 PROCEDURE — 88305 TISSUE EXAM BY PATHOLOGIST: CPT | Performed by: SURGERY

## 2024-11-19 PROCEDURE — 25010000002 HEPARIN (PORCINE) PER 1000 UNITS: Performed by: SURGERY

## 2024-11-19 PROCEDURE — 77001 FLUOROGUIDE FOR VEIN DEVICE: CPT | Performed by: SURGERY

## 2024-11-19 DEVICE — POWERPORT CLEARVUE ISP IMPLANTABLE PORT WITH ATTACHABLE 8F POLYURETHANE OPEN-ENDED SINGLE-LUMEN VENOUS CATHETER PROCEDURAL KIT
Type: IMPLANTABLE DEVICE | Site: CHEST | Status: FUNCTIONAL
Brand: POWERPORT CLEARVUE

## 2024-11-19 RX ORDER — LABETALOL HYDROCHLORIDE 5 MG/ML
5 INJECTION, SOLUTION INTRAVENOUS
Status: DISCONTINUED | OUTPATIENT
Start: 2024-11-19 | End: 2024-11-19 | Stop reason: HOSPADM

## 2024-11-19 RX ORDER — DIPHENHYDRAMINE HYDROCHLORIDE 50 MG/ML
12.5 INJECTION INTRAMUSCULAR; INTRAVENOUS
Status: DISCONTINUED | OUTPATIENT
Start: 2024-11-19 | End: 2024-11-19 | Stop reason: HOSPADM

## 2024-11-19 RX ORDER — HYDROCODONE BITARTRATE AND ACETAMINOPHEN 5; 325 MG/1; MG/1
1 TABLET ORAL ONCE AS NEEDED
Status: DISCONTINUED | OUTPATIENT
Start: 2024-11-19 | End: 2024-11-19 | Stop reason: HOSPADM

## 2024-11-19 RX ORDER — EPHEDRINE SULFATE 50 MG/ML
5 INJECTION, SOLUTION INTRAVENOUS ONCE AS NEEDED
Status: DISCONTINUED | OUTPATIENT
Start: 2024-11-19 | End: 2024-11-19 | Stop reason: HOSPADM

## 2024-11-19 RX ORDER — DROPERIDOL 2.5 MG/ML
0.62 INJECTION, SOLUTION INTRAMUSCULAR; INTRAVENOUS
Status: DISCONTINUED | OUTPATIENT
Start: 2024-11-19 | End: 2024-11-19 | Stop reason: HOSPADM

## 2024-11-19 RX ORDER — SODIUM CHLORIDE 0.9 % (FLUSH) 0.9 %
3 SYRINGE (ML) INJECTION EVERY 12 HOURS SCHEDULED
Status: DISCONTINUED | OUTPATIENT
Start: 2024-11-19 | End: 2024-11-19 | Stop reason: HOSPADM

## 2024-11-19 RX ORDER — HYDROMORPHONE HYDROCHLORIDE 1 MG/ML
0.25 INJECTION, SOLUTION INTRAMUSCULAR; INTRAVENOUS; SUBCUTANEOUS
Status: DISCONTINUED | OUTPATIENT
Start: 2024-11-19 | End: 2024-11-19 | Stop reason: HOSPADM

## 2024-11-19 RX ORDER — EPHEDRINE SULFATE 50 MG/ML
INJECTION, SOLUTION INTRAVENOUS AS NEEDED
Status: DISCONTINUED | OUTPATIENT
Start: 2024-11-19 | End: 2024-11-19 | Stop reason: SURG

## 2024-11-19 RX ORDER — BUPIVACAINE HYDROCHLORIDE AND EPINEPHRINE 5; 5 MG/ML; UG/ML
INJECTION, SOLUTION EPIDURAL; INTRACAUDAL; PERINEURAL AS NEEDED
Status: DISCONTINUED | OUTPATIENT
Start: 2024-11-19 | End: 2024-11-19 | Stop reason: HOSPADM

## 2024-11-19 RX ORDER — PROMETHAZINE HYDROCHLORIDE 25 MG/1
25 TABLET ORAL ONCE AS NEEDED
Status: DISCONTINUED | OUTPATIENT
Start: 2024-11-19 | End: 2024-11-19 | Stop reason: HOSPADM

## 2024-11-19 RX ORDER — FAMOTIDINE 10 MG/ML
20 INJECTION, SOLUTION INTRAVENOUS ONCE
Status: COMPLETED | OUTPATIENT
Start: 2024-11-19 | End: 2024-11-19

## 2024-11-19 RX ORDER — NALOXONE HCL 0.4 MG/ML
0.2 VIAL (ML) INJECTION AS NEEDED
Status: DISCONTINUED | OUTPATIENT
Start: 2024-11-19 | End: 2024-11-19 | Stop reason: HOSPADM

## 2024-11-19 RX ORDER — HYDROCODONE BITARTRATE AND ACETAMINOPHEN 7.5; 325 MG/1; MG/1
1 TABLET ORAL EVERY 4 HOURS PRN
Status: DISCONTINUED | OUTPATIENT
Start: 2024-11-19 | End: 2024-11-19 | Stop reason: HOSPADM

## 2024-11-19 RX ORDER — FENTANYL CITRATE 50 UG/ML
25 INJECTION, SOLUTION INTRAMUSCULAR; INTRAVENOUS
Status: DISCONTINUED | OUTPATIENT
Start: 2024-11-19 | End: 2024-11-19 | Stop reason: HOSPADM

## 2024-11-19 RX ORDER — FLUMAZENIL 0.1 MG/ML
0.2 INJECTION INTRAVENOUS AS NEEDED
Status: DISCONTINUED | OUTPATIENT
Start: 2024-11-19 | End: 2024-11-19 | Stop reason: HOSPADM

## 2024-11-19 RX ORDER — LIDOCAINE HYDROCHLORIDE 20 MG/ML
INJECTION, SOLUTION INFILTRATION; PERINEURAL AS NEEDED
Status: DISCONTINUED | OUTPATIENT
Start: 2024-11-19 | End: 2024-11-19 | Stop reason: SURG

## 2024-11-19 RX ORDER — PROMETHAZINE HYDROCHLORIDE 25 MG/1
25 SUPPOSITORY RECTAL ONCE AS NEEDED
Status: DISCONTINUED | OUTPATIENT
Start: 2024-11-19 | End: 2024-11-19 | Stop reason: HOSPADM

## 2024-11-19 RX ORDER — SODIUM CHLORIDE 0.9 % (FLUSH) 0.9 %
3-10 SYRINGE (ML) INJECTION AS NEEDED
Status: DISCONTINUED | OUTPATIENT
Start: 2024-11-19 | End: 2024-11-19 | Stop reason: HOSPADM

## 2024-11-19 RX ORDER — IPRATROPIUM BROMIDE AND ALBUTEROL SULFATE 2.5; .5 MG/3ML; MG/3ML
3 SOLUTION RESPIRATORY (INHALATION) ONCE AS NEEDED
Status: DISCONTINUED | OUTPATIENT
Start: 2024-11-19 | End: 2024-11-19 | Stop reason: HOSPADM

## 2024-11-19 RX ORDER — SODIUM CHLORIDE, SODIUM LACTATE, POTASSIUM CHLORIDE, CALCIUM CHLORIDE 600; 310; 30; 20 MG/100ML; MG/100ML; MG/100ML; MG/100ML
9 INJECTION, SOLUTION INTRAVENOUS CONTINUOUS
Status: DISCONTINUED | OUTPATIENT
Start: 2024-11-19 | End: 2024-11-19 | Stop reason: HOSPADM

## 2024-11-19 RX ORDER — ONDANSETRON 2 MG/ML
4 INJECTION INTRAMUSCULAR; INTRAVENOUS ONCE AS NEEDED
Status: DISCONTINUED | OUTPATIENT
Start: 2024-11-19 | End: 2024-11-19 | Stop reason: HOSPADM

## 2024-11-19 RX ORDER — PROPOFOL 10 MG/ML
VIAL (ML) INTRAVENOUS AS NEEDED
Status: DISCONTINUED | OUTPATIENT
Start: 2024-11-19 | End: 2024-11-19 | Stop reason: SURG

## 2024-11-19 RX ORDER — LIDOCAINE HYDROCHLORIDE 10 MG/ML
0.5 INJECTION, SOLUTION INFILTRATION; PERINEURAL ONCE AS NEEDED
Status: DISCONTINUED | OUTPATIENT
Start: 2024-11-19 | End: 2024-11-19 | Stop reason: HOSPADM

## 2024-11-19 RX ORDER — PHENYLEPHRINE HYDROCHLORIDE 10 MG/ML
INJECTION INTRAVENOUS AS NEEDED
Status: DISCONTINUED | OUTPATIENT
Start: 2024-11-19 | End: 2024-11-19 | Stop reason: SURG

## 2024-11-19 RX ORDER — HYDRALAZINE HYDROCHLORIDE 20 MG/ML
5 INJECTION INTRAMUSCULAR; INTRAVENOUS
Status: DISCONTINUED | OUTPATIENT
Start: 2024-11-19 | End: 2024-11-19 | Stop reason: HOSPADM

## 2024-11-19 RX ADMIN — FAMOTIDINE 20 MG: 10 INJECTION INTRAVENOUS at 15:32

## 2024-11-19 RX ADMIN — PROPOFOL 100 MCG/KG/MIN: 10 INJECTION, EMULSION INTRAVENOUS at 15:53

## 2024-11-19 RX ADMIN — PHENYLEPHRINE HYDROCHLORIDE 100 MCG: 10 INJECTION INTRAVENOUS at 16:05

## 2024-11-19 RX ADMIN — PROPOFOL 20 MG: 10 INJECTION, EMULSION INTRAVENOUS at 16:39

## 2024-11-19 RX ADMIN — SODIUM CHLORIDE, SODIUM LACTATE, POTASSIUM CHLORIDE, CALCIUM CHLORIDE 9 ML/HR: 20; 30; 600; 310 INJECTION, SOLUTION INTRAVENOUS at 15:32

## 2024-11-19 RX ADMIN — SODIUM CHLORIDE 2000 MG: 900 INJECTION INTRAVENOUS at 15:32

## 2024-11-19 RX ADMIN — PROPOFOL 100 MG: 10 INJECTION, EMULSION INTRAVENOUS at 15:53

## 2024-11-19 RX ADMIN — LIDOCAINE HYDROCHLORIDE 50 MG: 20 INJECTION, SOLUTION INFILTRATION; PERINEURAL at 15:53

## 2024-11-19 RX ADMIN — PROPOFOL 30 MG: 10 INJECTION, EMULSION INTRAVENOUS at 16:10

## 2024-11-19 RX ADMIN — EPHEDRINE SULFATE 10 MG: 50 INJECTION INTRAVENOUS at 16:32

## 2024-11-19 NOTE — ANESTHESIA PREPROCEDURE EVALUATION
Anesthesia Evaluation     Patient summary reviewed and Nursing notes reviewed   no history of anesthetic complications:   NPO Solid Status: > 8 hours  NPO Liquid Status: > 8 hours           Airway   Mallampati: II  TM distance: >3 FB  Neck ROM: full  No difficulty expected  Dental - normal exam     Pulmonary    (-) asthma, sleep apnea, rhonchi, decreased breath sounds, wheezes, not a smoker  Cardiovascular   Exercise tolerance: good (4-7 METS)    PT is on anticoagulation therapy  Rhythm: regular  Rate: normal    (+) pacemaker (Medtronic adapta for Complete Heart Block), hypertension poorly controlled 2 medications or greater, past MI  >12 months, CAD, CABG (2009) >6 Months, cardiac stents (last placed in 2022 total 16 stents per daughter) Drug eluting stent more than 12 months ago , hyperlipidemia  (-) dysrhythmias, angina, KHOURY, murmur    ROS comment: Off plavix and ASA x 5 days     Neuro/Psych  (-) seizures, CVA  GI/Hepatic/Renal/Endo    (+) GERD, liver disease (Mets), renal disease- CRI, diabetes mellitus type 2 well controlled  (-) no thyroid disorder    Musculoskeletal     Abdominal     Abdomen: soft.   Substance History      OB/GYN          Other   arthritis,   history of cancer (suspected colon) active                Anesthesia Plan    ASA 3     MAC   total IV anesthesia  intravenous induction     Anesthetic plan, risks, benefits, and alternatives have been provided, discussed and informed consent has been obtained with: patient.    CODE STATUS:

## 2024-11-19 NOTE — OP NOTE
Colorectal & General Surgery  Operative Report    PREOPERATIVE DIAGNOSIS:  Metastatic colon cancer    POSTOPERATIVE DIAGNOSIS:  Same    PROCEDURE:  Powerport placement with fluoroscopic guidance  Ultrasound guided access of the right internal jugular vein  Colonoscopy to right colon with cold forcep biopsies and injection of submucosal tattoo    FINDINGS:  Successful placement of right internal jugular PowerPort  Hepatic flexure mass noted, circumferential, partially obstructing with 8 mm lumen remaining.  Significant sigmoid diverticulosis    DATE OF PROCEDURE:   11/19/24     SURGEON:  Joseph Christian MD    ASSISTANT:  None    ANESTHESIA:  Monitored anesthesia care    EBL:  Minimal    SPECIMEN:  Hepatic flexure mass    DESCRIPTION:  All risks, benefits, and alternatives were explained and informed consent was obtained.  The patient was taken to the operating room under the care of the nursing staff.  The patient was placed on the operating room table in the supine position where anesthesia was induced.  The patient was then prepped and draped in the usual sterile fashion.  Local anesthesic was administered.  The right internal jugular vein was accessed with an 18-gauge needle under ultrasound guiddance, and a guidewire was inserted under fluoroscopic guidance into the superior vena cava.  A subcutaneous pocket was then created with electrocautery on the right chest wall. The port was placed in the pocket and secured in two points with 2-0 PDS. The tubing was then tunneled from the subcutaneous pocket to the location of the wire. The vein dilator and sheath were introduced, and the dilator and guidewire were removed.  The port tubing was inserted to the cavoatrial junction, and position of tip was confirmed with fluoroscopic guidance.  Venous blood was easily aspirated from the port, and the port was flushed with heparinized saline. There was good hemostasis noted. The skin was then closed with 3-0 Vicryl deep  dermal and 4-0 monocryl subcuticular sutures. Dermabond was placed over the wound.     The patient was then placed back on the stretcher in the left lateral decubitus position.  Digital rectal exam was normal.  The colonoscope was then inserted into the anal canal and advanced through the rectum and the colon to the hepatic flexure where we identified a partially obstructing hepatic flexure mass.  Multiple biopsies were obtained.  The scope could not be passed through the approximately 8 mm lumen of the colon as the mass was circumferential.  Tattoo was placed approximately 1 cm distal to the mass in 2 locations.  The scope was then removed and no other mucosal abnormalities were identified aside from significant sigmoid diverticulosis.    The patient tolerated the procedure well and was transferred to the recovery area in stable condition. Recovery room CXR was ordered to confirm placement.    Joseph Christian M.D.  Colorectal & General Surgery  [facility]    [address], [address]  [place], [place], [address]  P: [phone]  F: [phone]

## 2024-11-19 NOTE — DISCHARGE INSTRUCTIONS
POST OP RECOMMENDATIONS  Dr. Joseph Christian  [phone]  Discharge Instructions for Port Placement    Restart Plavix on Thursday.  Go home, rest and take it easy today; however, you should get up and move about several times today to reduce the risk of developing a blood clot in your legs.   You may experience some dizziness or memory loss from the anesthesia. This may last for the next 24 hours. Someone should plan on staying with you for the first 24 hours for your safety.   Do not make any important legal decisions or sign any legal papers for the next 24 hours.   Eat and drink lightly today. Start off with liquids, jello, soup, crackers or other bland foods at first. You may advance your diet tomorrow as tolerated as long as you do not experience any nausea or vomiting.   If present, you may remove your outer dressings in 3 days. The white tapes called steri-strips should stay in place. They will fall off on their own in 1-2 weeks. Do not worry if they come off sooner. Otherwise, glue covering your incisions will fall off in the next 1-2 weeks.  You may notice some bleeding/drainage on your outer dressings. A little bloody drainage is normal. If the bleeding/drainage is such that the bandage cannot absorb it, remove the dressing, apply clean gauze and apply firm pressure for a full 15 minutes. If the bleeding continues, please call me.   You may shower tomorrow. No tub baths until your incisions are completely healed.   You will have some pain and discomfort after surgery.  You will not be totally pain free, but tylenol and ibuprofen should make the pain more tolerable.  Unless you have been previously instructed to not take tylenol or ibuprofen, you can alternate these medications every 4 hours and take as instructed on each bottle. Please take any ibuprofen with food to avoid nausea and GI upset. If you are having severe pain that cannot be controlled by OTC pain medicine, please contact me.   You have also  received a prescription for an anti-nausea medicine. Please take this as prescribed for any nausea or vomiting. Nausea could be a result of the anesthesia. If you experience severe nausea and vomiting that cannot be controlled by the nausea medicine, please call me.   No driving for 24 hours and until you fell comfortable making sudden movements with your neck and arms.   If the port is to be used within 10-14 days of placement, no surgical follow-up is needed. Otherwise, you should call the office at [phone] to schedule a follow-up in about 1 week.   Remember to contact me for any of the following:   Fever > 101 degrees  Severe pain that cannot be controlled by taking your pain pills  Severe nausea or vomiting that cannot be controlled by taking your nausea pills  Significant bleeding of your incisions  Drainage that has a bad smell or is yellow or green in appearance  Any other questions or concerns

## 2024-11-19 NOTE — ANESTHESIA POSTPROCEDURE EVALUATION
Patient: Tamie Peter    Procedure Summary       Date: 11/19/24 Room / Location: [facility] OR [address] [place] [address] MAIN OR    Anesthesia Start: 1541 Anesthesia Stop: 1712    Procedures:       INSERTION VENOUS ACCESS DEVICE      COLONOSCOPY WITH TATTOO MASS INJECTION 4 ML Diagnosis:       Metastatic colon cancer to liver      (Metastatic colon cancer to liver [C18.9, C78.7])    Surgeons: Caden Christian MD Provider: Destin Dewitt MD    Anesthesia Type: MAC ASA Status: 3            Anesthesia Type: MAC    Vitals  Vitals Value Taken Time   /73 11/19/24 1745   Temp     Pulse 64 11/19/24 1750   Resp 18 11/19/24 1745   SpO2 100 % 11/19/24 1750   Vitals shown include unfiled device data.        Post Anesthesia Care and Evaluation    Patient location during evaluation: PHASE II  Patient participation: complete - patient participated  Level of consciousness: awake and alert  Pain management: adequate    Airway patency: patent  Anesthetic complications: No anesthetic complications  PONV Status: none  Cardiovascular status: acceptable and hemodynamically stable  Respiratory status: acceptable, nonlabored ventilation and spontaneous ventilation  Hydration status: acceptable

## 2024-11-21 ENCOUNTER — CLINICAL SUPPORT (OUTPATIENT)
Dept: OTHER | Facility: HOSPITAL | Age: 89
End: 2024-11-21
Payer: MEDICARE

## 2024-11-21 ENCOUNTER — OFFICE VISIT (OUTPATIENT)
Dept: ONCOLOGY | Facility: CLINIC | Age: 89
End: 2024-11-21
Payer: MEDICARE

## 2024-11-21 ENCOUNTER — INFUSION (OUTPATIENT)
Dept: ONCOLOGY | Facility: HOSPITAL | Age: 89
End: 2024-11-21
Payer: MEDICARE

## 2024-11-21 VITALS
WEIGHT: 104.3 LBS | DIASTOLIC BLOOD PRESSURE: 65 MMHG | TEMPERATURE: 98.2 F | BODY MASS INDEX: 20.48 KG/M2 | HEART RATE: 65 BPM | HEIGHT: 60 IN | OXYGEN SATURATION: 98 % | SYSTOLIC BLOOD PRESSURE: 145 MMHG | RESPIRATION RATE: 16 BRPM

## 2024-11-21 DIAGNOSIS — C18.9 METASTATIC COLON CANCER TO LIVER: ICD-10-CM

## 2024-11-21 DIAGNOSIS — C78.7 METASTATIC COLON CANCER TO LIVER: Primary | ICD-10-CM

## 2024-11-21 DIAGNOSIS — T45.4X5A ADVERSE EFFECT OF PREPARATION OF IRON COMPOUND, INITIAL ENCOUNTER: ICD-10-CM

## 2024-11-21 DIAGNOSIS — D50.9 IRON DEFICIENCY ANEMIA, UNSPECIFIED IRON DEFICIENCY ANEMIA TYPE: ICD-10-CM

## 2024-11-21 DIAGNOSIS — C18.9 METASTATIC COLON CANCER TO LIVER: Primary | ICD-10-CM

## 2024-11-21 DIAGNOSIS — C78.7 METASTATIC COLON CANCER TO LIVER: ICD-10-CM

## 2024-11-21 LAB
ALBUMIN SERPL-MCNC: 3.5 G/DL (ref 3.5–5.2)
ALBUMIN/GLOB SERPL: 1.1 G/DL
ALP SERPL-CCNC: 239 U/L (ref 39–117)
ALT SERPL W P-5'-P-CCNC: 12 U/L (ref 1–33)
ANION GAP SERPL CALCULATED.3IONS-SCNC: 14.8 MMOL/L (ref 5–15)
AST SERPL-CCNC: 57 U/L (ref 1–32)
BASOPHILS # BLD AUTO: 0.05 10*3/MM3 (ref 0–0.2)
BASOPHILS NFR BLD AUTO: 0.5 % (ref 0–1.5)
BILIRUB SERPL-MCNC: 0.3 MG/DL (ref 0–1.2)
BUN SERPL-MCNC: 28 MG/DL (ref 8–23)
BUN/CREAT SERPL: 23.1 (ref 7–25)
CALCIUM SPEC-SCNC: 9.6 MG/DL (ref 8.6–10.5)
CHLORIDE SERPL-SCNC: 94 MMOL/L (ref 98–107)
CO2 SERPL-SCNC: 24.2 MMOL/L (ref 22–29)
CREAT SERPL-MCNC: 1.21 MG/DL (ref 0.57–1)
CYTO UR: NORMAL
DEPRECATED RDW RBC AUTO: 45.1 FL (ref 37–54)
EGFRCR SERPLBLD CKD-EPI 2021: 43.7 ML/MIN/1.73
EOSINOPHIL # BLD AUTO: 0.07 10*3/MM3 (ref 0–0.4)
EOSINOPHIL NFR BLD AUTO: 0.7 % (ref 0.3–6.2)
ERYTHROCYTE [DISTWIDTH] IN BLOOD BY AUTOMATED COUNT: 15.2 % (ref 12.3–15.4)
GLOBULIN UR ELPH-MCNC: 3.3 GM/DL
GLUCOSE SERPL-MCNC: 289 MG/DL (ref 65–99)
HCT VFR BLD AUTO: 33.2 % (ref 34–46.6)
HGB BLD-MCNC: 10.5 G/DL (ref 12–15.9)
IMM GRANULOCYTES # BLD AUTO: 0.05 10*3/MM3 (ref 0–0.05)
IMM GRANULOCYTES NFR BLD AUTO: 0.5 % (ref 0–0.5)
LAB AP CASE REPORT: NORMAL
LAB AP DIAGNOSIS COMMENT: NORMAL
LYMPHOCYTES # BLD AUTO: 0.78 10*3/MM3 (ref 0.7–3.1)
LYMPHOCYTES NFR BLD AUTO: 7.3 % (ref 19.6–45.3)
MCH RBC QN AUTO: 25.7 PG (ref 26.6–33)
MCHC RBC AUTO-ENTMCNC: 31.6 G/DL (ref 31.5–35.7)
MCV RBC AUTO: 81.4 FL (ref 79–97)
MONOCYTES # BLD AUTO: 0.62 10*3/MM3 (ref 0.1–0.9)
MONOCYTES NFR BLD AUTO: 5.8 % (ref 5–12)
NEUTROPHILS NFR BLD AUTO: 85.2 % (ref 42.7–76)
NEUTROPHILS NFR BLD AUTO: 9.07 10*3/MM3 (ref 1.7–7)
NRBC BLD AUTO-RTO: 0 /100 WBC (ref 0–0.2)
PATH REPORT.FINAL DX SPEC: NORMAL
PATH REPORT.GROSS SPEC: NORMAL
PLATELET # BLD AUTO: 302 10*3/MM3 (ref 140–450)
PMV BLD AUTO: 9.9 FL (ref 6–12)
POTASSIUM SERPL-SCNC: 3 MMOL/L (ref 3.5–5.2)
PROT SERPL-MCNC: 6.8 G/DL (ref 6–8.5)
RBC # BLD AUTO: 4.08 10*6/MM3 (ref 3.77–5.28)
SODIUM SERPL-SCNC: 133 MMOL/L (ref 136–145)
WBC NRBC COR # BLD AUTO: 10.64 10*3/MM3 (ref 3.4–10.8)

## 2024-11-21 PROCEDURE — 25010000002 LEUCOVORIN CALCIUM PER 50 MG: Performed by: INTERNAL MEDICINE

## 2024-11-21 PROCEDURE — 85025 COMPLETE CBC W/AUTO DIFF WBC: CPT

## 2024-11-21 PROCEDURE — 25010000002 DEXAMETHASONE SODIUM PHOSPHATE 100 MG/10ML SOLUTION: Performed by: INTERNAL MEDICINE

## 2024-11-21 PROCEDURE — 96411 CHEMO IV PUSH ADDL DRUG: CPT

## 2024-11-21 PROCEDURE — 25010000003 DEXTROSE 5 % SOLUTION 250 ML FLEX CONT: Performed by: INTERNAL MEDICINE

## 2024-11-21 PROCEDURE — A9270 NON-COVERED ITEM OR SERVICE: HCPCS | Performed by: INTERNAL MEDICINE

## 2024-11-21 PROCEDURE — 25010000002 FOSAPREPITANT PER 1 MG: Performed by: INTERNAL MEDICINE

## 2024-11-21 PROCEDURE — 96413 CHEMO IV INFUSION 1 HR: CPT

## 2024-11-21 PROCEDURE — 96368 THER/DIAG CONCURRENT INF: CPT

## 2024-11-21 PROCEDURE — 25810000003 SODIUM CHLORIDE 0.9 % SOLUTION 250 ML FLEX CONT: Performed by: INTERNAL MEDICINE

## 2024-11-21 PROCEDURE — 25010000002 OXALIPLATIN PER 0.5 MG: Performed by: INTERNAL MEDICINE

## 2024-11-21 PROCEDURE — 96415 CHEMO IV INFUSION ADDL HR: CPT

## 2024-11-21 PROCEDURE — G0498 CHEMO EXTEND IV INFUS W/PUMP: HCPCS

## 2024-11-21 PROCEDURE — 25010000002 FLUOROURACIL PER 500 MG: Performed by: INTERNAL MEDICINE

## 2024-11-21 PROCEDURE — 63710000001 PROCHLORPERAZINE MALEATE PER 10 MG: Performed by: INTERNAL MEDICINE

## 2024-11-21 PROCEDURE — 96374 THER/PROPH/DIAG INJ IV PUSH: CPT

## 2024-11-21 PROCEDURE — 96367 TX/PROPH/DG ADDL SEQ IV INF: CPT

## 2024-11-21 PROCEDURE — 96375 TX/PRO/DX INJ NEW DRUG ADDON: CPT

## 2024-11-21 PROCEDURE — 80053 COMPREHEN METABOLIC PANEL: CPT

## 2024-11-21 PROCEDURE — 25010000002 FERRIC CARBOXYMALTOSE 750 MG/15ML SOLUTION 15 ML VIAL: Performed by: INTERNAL MEDICINE

## 2024-11-21 PROCEDURE — 25010000002 PALONOSETRON PER 25 MCG: Performed by: INTERNAL MEDICINE

## 2024-11-21 PROCEDURE — 63710000001 POTASSIUM CHLORIDE 20 MEQ TABLET CONTROLLED-RELEASE: Performed by: INTERNAL MEDICINE

## 2024-11-21 RX ORDER — DEXTROSE MONOHYDRATE 50 MG/ML
20 INJECTION, SOLUTION INTRAVENOUS ONCE
Status: CANCELLED | OUTPATIENT
Start: 2024-11-21

## 2024-11-21 RX ORDER — DEXTROSE MONOHYDRATE 50 MG/ML
20 INJECTION, SOLUTION INTRAVENOUS ONCE
Status: COMPLETED | OUTPATIENT
Start: 2024-11-21 | End: 2024-11-21

## 2024-11-21 RX ORDER — FAMOTIDINE 10 MG/ML
20 INJECTION, SOLUTION INTRAVENOUS AS NEEDED
Status: CANCELLED | OUTPATIENT
Start: 2024-11-21

## 2024-11-21 RX ORDER — PROCHLORPERAZINE MALEATE 10 MG
10 TABLET ORAL ONCE
Status: COMPLETED | OUTPATIENT
Start: 2024-11-21 | End: 2024-11-21

## 2024-11-21 RX ORDER — HYDROCHLOROTHIAZIDE 12.5 MG/1
12.5 TABLET ORAL DAILY
Qty: 90 TABLET | Refills: 1 | Status: SHIPPED | OUTPATIENT
Start: 2024-11-21

## 2024-11-21 RX ORDER — POTASSIUM CHLORIDE 1500 MG/1
40 TABLET, EXTENDED RELEASE ORAL ONCE
Status: COMPLETED | OUTPATIENT
Start: 2024-11-21 | End: 2024-11-21

## 2024-11-21 RX ORDER — DIPHENHYDRAMINE HYDROCHLORIDE 50 MG/ML
50 INJECTION INTRAMUSCULAR; INTRAVENOUS AS NEEDED
Status: CANCELLED | OUTPATIENT
Start: 2024-11-21

## 2024-11-21 RX ORDER — FLUOROURACIL 50 MG/ML
320 INJECTION, SOLUTION INTRAVENOUS ONCE
Status: CANCELLED | OUTPATIENT
Start: 2024-11-21

## 2024-11-21 RX ORDER — SODIUM CHLORIDE 9 MG/ML
20 INJECTION, SOLUTION INTRAVENOUS ONCE
Status: CANCELLED | OUTPATIENT
Start: 2024-11-21

## 2024-11-21 RX ORDER — PALONOSETRON 0.05 MG/ML
0.25 INJECTION, SOLUTION INTRAVENOUS ONCE
Status: COMPLETED | OUTPATIENT
Start: 2024-11-21 | End: 2024-11-21

## 2024-11-21 RX ORDER — POTASSIUM CHLORIDE 1500 MG/1
20 TABLET, EXTENDED RELEASE ORAL DAILY
Qty: 30 TABLET | Refills: 0 | Status: SHIPPED | OUTPATIENT
Start: 2024-11-21

## 2024-11-21 RX ORDER — PALONOSETRON 0.05 MG/ML
0.25 INJECTION, SOLUTION INTRAVENOUS ONCE
Status: CANCELLED | OUTPATIENT
Start: 2024-11-21

## 2024-11-21 RX ORDER — FLUOROURACIL 50 MG/ML
320 INJECTION, SOLUTION INTRAVENOUS ONCE
Status: COMPLETED | OUTPATIENT
Start: 2024-11-21 | End: 2024-11-21

## 2024-11-21 RX ADMIN — PALONOSETRON HYDROCHLORIDE 0.25 MG: 0.25 INJECTION INTRAVENOUS at 10:23

## 2024-11-21 RX ADMIN — PROCHLORPERAZINE MALEATE 10 MG: 10 TABLET ORAL at 13:25

## 2024-11-21 RX ADMIN — POTASSIUM CHLORIDE 40 MEQ: 1500 TABLET, EXTENDED RELEASE ORAL at 10:36

## 2024-11-21 RX ADMIN — FOSAPREPITANT 100 ML: 150 INJECTION, POWDER, LYOPHILIZED, FOR SOLUTION INTRAVENOUS at 10:40

## 2024-11-21 RX ADMIN — DEXTROSE 20 ML/HR: 50 INJECTION, SOLUTION INTRAVENOUS at 11:00

## 2024-11-21 RX ADMIN — FLUOROURACIL 2730 MG: 50 INJECTION, SOLUTION INTRAVENOUS at 13:49

## 2024-11-21 RX ADMIN — LEUCOVORIN CALCIUM 450 MG: 350 INJECTION, POWDER, LYOPHILIZED, FOR SUSPENSION INTRAMUSCULAR; INTRAVENOUS at 11:22

## 2024-11-21 RX ADMIN — DEXAMETHASONE SODIUM PHOSPHATE 12 MG: 10 INJECTION, SOLUTION INTRAMUSCULAR; INTRAVENOUS at 10:23

## 2024-11-21 RX ADMIN — FLUOROURACIL 450 MG: 50 INJECTION, SOLUTION INTRAVENOUS at 13:48

## 2024-11-21 RX ADMIN — OXALIPLATIN 95 MG: 5 INJECTION, SOLUTION INTRAVENOUS at 11:22

## 2024-11-21 RX ADMIN — FERRIC CARBOXYMALTOSE INJECTION 709.5 MG: 50 INJECTION, SOLUTION INTRAVENOUS at 13:31

## 2024-11-21 NOTE — SIGNIFICANT NOTE
No Avastin today due to new port. Pt received 40 meq of oral potassium today and will start potassium at home.     Reviewed instructions for chemo ball at home. Pt vu and spill kit was provided. Unhook over on 3P at 1200.     Lab Results   Component Value Date    GLUCOSE 289 (H) 11/21/2024    BUN 28 (H) 11/21/2024    CREATININE 1.21 (H) 11/21/2024     (L) 11/21/2024    K 3.0 (L) 11/21/2024    CL 94 (L) 11/21/2024    CALCIUM 9.6 11/21/2024    PROTEINTOT 6.8 11/21/2024    ALBUMIN 3.5 11/21/2024    ALT 12 11/21/2024    AST 57 (H) 11/21/2024    ALKPHOS 239 (H) 11/21/2024    BILITOT 0.3 11/21/2024    GLOB 3.3 11/21/2024    AGRATIO 1.1 11/21/2024    BCR 23.1 11/21/2024    ANIONGAP 14.8 11/21/2024    EGFR 43.7 (L) 11/21/2024

## 2024-11-21 NOTE — PROGRESS NOTES
"OUTPATIENT ONCOLOGY NUTRITION ASSESSMENT    Patient Name: Tamie Peter  YOB: 1935  MRN: [id number]  Assessment Date: 11/21/2024    COMMENTS:  Initial nutrition assessment for pt with metastatic colon cancer. Other pertinent medical history includes DM, GERD, CKD.  Pt is receiving her first (of 12) FOLFOX infusion.  Labs/Meds from today reviewed. Glu 289, Na+ 133, K+ 3.0. Last Hgba1c 8.3 on 9/23/24. She is on glucotrol, metformin and farxiga for blood sugar management. She is getting decadron during her tx today as well as potassium replacement.    Met with pt and her daughter today during her infusion.  They report a decline in po intake and appetite.  She only eats 2 meals a day and these meals aren't typically very big or calorically dense. Weight hx reviewed indicating 15-20lb loss in the past 6 months.    Explained RD role and the importance of adequate nutrition and hydration during treatment.  Encouraged patient to focus on getting adequate calories, protein and nutrients in order to support immune function and nutrition needs. She needs to add a third meal and at the minimum an evening snack. Reviewed examples of high protein foods to include at meals and snacks.  Reviewed current and potential future side effects:  N/V/D, constipation, mouth sores, poor appetite, fatigue and taste alteration. Also discussed the basics of diabetes and meal planning.    Provided the [employer] booklet \"Nutrition during Cancer Treatment\" as a resource as well as RD contact info for any questions. Will follow throughout treatment course and be available as needed. Pt very appreciative of visit.        Reason for Assessment New assessment, Nurse Practitioner referral      Diagnosis/Problem   Metastatic colon cancer   Treatment Plan Chemotherapy FOLFOX   Frequency Q 2 weeks x 12 cycles   Goal of cancer treatment Palliative   Comments:        Encounter Information        Nutrition/Diet History:  2 " meals a day, doesn't eat beef   Oral Nutrition Supplements:    Factors/Symptoms Affecting Intake: Decreased appetite, Fatigue, Weight loss   Comments:      Medical/Surgical History Past Medical History:   Diagnosis Date    Anemia     Arthritis of left sacroiliac joint 05/05/2022    CKD (chronic kidney disease), stage III     PATIENT STATES IT IS AT STAGE 4 AT THIS TIME    Colorectal cancer     Coronary artery disease     cath 11/15. CABG x3 and all grafts widely patent. Very small OM and Cfx and that may be the cause of lateral ischemia on stress test. Non bipassable    Diabetes mellitus, type II     GERD (gastroesophageal reflux disease)     Gout     Heart attack 10/2022    MULTIPLE (6 OR MORE)    History of DVT in adulthood     AFTER CHILDBIRTH    Hyperlipidemia     Hypertension     Liver masses     Pacemaker     PUD (peptic ulcer disease)        Past Surgical History:   Procedure Laterality Date    CARDIAC ELECTROPHYSIOLOGY PROCEDURE N/A 02/22/2017    Procedure: Pacemaker DC new;  Surgeon: Juvenal Zhong MD;  Location: [facility] CATH INVASIVE LOCATION;  Service:     CARDIAC SURGERY  10/2022    CATARACT EXTRACTION      COLONOSCOPY  2010    COLONOSCOPY N/A 11/19/2024    Procedure: COLONOSCOPY WITH TATTOO MASS INJECTION 4 ML;  Surgeon: Caden Christian MD;  Location: [facility] MAIN OR;  Service: General;  Laterality: N/A;    CORONARY ARTERY BYPASS GRAFT      1992, 2009    EPIDURAL Left 10/17/2022    Procedure: LUMBAR/SACRAL TRANSFORAMINAL EPIDURAL Left L4-5 and left L5-S1 - hold plavix 7 days before;  Surgeon: Yasmine Gaming MD;  Location: [facility] MAIN OR;  Service: Pain Management;  Laterality: Left;    PACEMAKER IMPLANTATION      PIRIFORMUS INJECTION Left 07/15/2022    Procedure: PIRIFORMIS INJECTION;  Surgeon: Yasmine Gaming MD;  Location: [facility] MAIN OR;  Service: Pain Management;  Laterality: Left;    RADIOFREQUENCY ABLATION Left 02/20/2023    Procedure: RADIOFREQUENCY ABLATION NERVES for the left sacroiliac  "joint;  Surgeon: Yasmine Gaming MD;  Location: [facility] MAIN OR;  Service: Pain Management;  Laterality: Left;    SACROILIAC JOINT INJECTION Left 05/09/2022    Procedure: SACROILIAC INJECTION - Left;  Surgeon: Yasmine Gaming MD;  Location: [facility] MAIN OR;  Service: Pain Management;  Laterality: Left;    SACROILIAC JOINT INJECTION Left 07/15/2022    Procedure: left Sacroiliac joint injection and left piriformis injection;  Surgeon: Yasmine Gaming MD;  Location: [facility] MAIN OR;  Service: Pain Management;  Laterality: Left;    TUBAL ABDOMINAL LIGATION      VENOUS ACCESS DEVICE (PORT) INSERTION N/A 11/19/2024    Procedure: INSERTION VENOUS ACCESS DEVICE;  Surgeon: Caden Christian MD;  Location: [facility] MAIN OR;  Service: General;  Laterality: N/A;             Anthropometrics        Current Height Ht Readings from Last 1 Encounters:   11/21/24 152.4 cm (60\")      Current Weight Wt Readings from Last 1 Encounters:   11/21/24 47.3 kg (104 lb 4.8 oz)      BMI  20.37   Ideal Body Weight (IBW) 100lb   Usual Body Weight (UBW) 120lb   Weight Change/Trend Loss   Weight History Wt Readings from Last 30 Encounters:   11/21/24 0931 47.3 kg (104 lb 4.8 oz)   11/13/24 1422 46.3 kg (102 lb)   11/13/24 1344 46.7 kg (103 lb)   11/07/24 0802 47.2 kg (104 lb)   11/04/24 0932 47.8 kg (105 lb 6.4 oz)   10/28/24 1013 49.4 kg (109 lb)   10/21/24 0952 49.4 kg (109 lb)   09/24/24 1004 51.7 kg (114 lb)   06/06/24 1007 52.2 kg (115 lb)   03/07/24 0902 54 kg (119 lb)   12/07/23 1011 54.4 kg (120 lb)   09/19/23 0941 55.3 kg (122 lb)   06/06/23 0900 54.9 kg (121 lb)   05/18/23 1523 52.7 kg (116 lb 3.2 oz)   04/04/23 0943 54.3 kg (119 lb 12.8 oz)   03/07/23 1416 54.4 kg (120 lb)   02/20/23 1053 52.2 kg (115 lb)   02/15/23 1428 52.2 kg (115 lb)   01/26/23 0917 55.2 kg (121 lb 12.8 oz)   12/13/22 0753 54.2 kg (119 lb 6.4 oz)   12/06/22 0922 53.1 kg (117 lb)   12/02/22 0938 52.2 kg (115 lb)   10/17/22 1229 56.2 kg (124 lb)   09/22/22 0954 57.6 " "kg (127 lb)   09/20/22 1138 56.2 kg (124 lb)   08/16/22 1138 57.2 kg (126 lb 3.2 oz)   07/15/22 0841 56.7 kg (125 lb)   06/20/22 1111 55.8 kg (123 lb)   06/06/22 1355 56 kg (123 lb 6.4 oz)   05/09/22 1415 57.6 kg (127 lb)   05/05/22 0940 57.6 kg (127 lb)          Medications           Current medications: aspirin, clopidogrel, dapagliflozin Propanediol, glipizide, hydrALAZINE, hydroCHLOROthiazide, lidocaine-prilocaine, metFORMIN, metoprolol tartrate, multivitamin with minerals, nitroglycerin, ondansetron, potassium chloride, and rosuvastatin                 Tests/Procedures        Tests/Procedures Chemotherapy, Colonoscopy     Labs       Pertinent Labs    Results from last 7 days   Lab Units 11/21/24  0912   SODIUM mmol/L 133*   POTASSIUM mmol/L 3.0*   CHLORIDE mmol/L 94*   CO2 mmol/L 24.2   BUN mg/dL 28*   CREATININE mg/dL 1.21*   CALCIUM mg/dL 9.6   BILIRUBIN mg/dL 0.3   ALK PHOS U/L 239*   ALT (SGPT) U/L 12   AST (SGOT) U/L 57*   GLUCOSE mg/dL 289*     Results from last 7 days   Lab Units 11/21/24  0912   HEMOGLOBIN g/dL 10.5*   HEMATOCRIT % 33.2*   WBC 10*3/mm3 10.64   ALBUMIN g/dL 3.5     Results from last 7 days   Lab Units 11/21/24  0912   PLATELETS 10*3/mm3 302     No results found for: \"COVID19\"  Lab Results   Component Value Date    HGBA1C 8.3 (H) 09/23/2024          Physical Findings        Physical Appearance alert, loss of muscle mass, loss of subcutaneous fat, oriented     Edema  no edema   Gastrointestinal None   Tubes/Lines/Drains Implantable Port   Oral/Mouth Cavity WNL   Skin Intact       Estimated/Assessed Needs        Energy Requirements    Height for Calculation      Weight for Calculation 47.3 kg   Method for Estimation  35 kcal/kg   EST Needs (kcal/day) 1655 kcals per day       Protein Requirements    Weight for Calculation 47.3 kg   EST Protein Needs (g/kg) 1.2 gm/kg   EST Daily Needs (g/day) 56 gms per day       Fluid Requirements     Method for Estimation 1 mL/kcal    Estimated Needs " (mL/day) 1500 mLs per day         NUTRITION INTERVENTION / PLAN OF CARE      Intervention Goal(s) Maintain nutrition status, Reduce/improve symptoms, Provide information, Meet estimated needs, Disease management/therapy, Tolerate PO , Increase intake, and Appropriate weight gain         RD Intervention/Action Encouraged intake, Follow Tx progress, Recommended/ordered         Recommendations:       PO Diet Consume calorie and protein dense healthy foods, 6 small meals      Supplements Boost glucose control, Premier protein shakes or Glucerna      Snacks       Other    New Prescription Ordered? No, recommend   --      Monitor/Evaluation Follow up as needed   Education Education provided   --    Electronically signed by:  Leann Shabazz RD  11/21/24 11:30 EST

## 2024-11-21 NOTE — PROGRESS NOTES
[facility]    CONSULTING IN BLOOD DISORDERS & CANCER      REASON FOR CONSULTATION/CHIEF COMPLAINT:     Evaluation and management for metastatic colorectal cancer                             REQUESTING PHYSICIAN: No ref. provider found  RECORDS OBTAINED:  Records of the patients history including those from the electronic medical record were reviewed and summarized in detail.    HISTORY OF PRESENT ILLNESS:    The patient is a 88 y.o. year old female with medical history significant for CAD s/p CABG x 3 (9097, 2007), PCI's x 16, pacemaker, peptic ulcer disease, hypertension and dyslipidemia comes for metastatic colorectal cancer evaluation and management.    Patient had presented to her PCP,  in early October 2024 with lower abdominal pain and weight loss of > 20 pounds.  A CT A/P performed 10/9/2024 noted innumerable lung nodules, multiple hypodense masses in the liver and a large mass in the hepatic flexure of the colon, measuring 4.5 cm in greatest dimension.    Patient underwent CT-guided liver lesion biopsy on 10/28/2024.  Pathology: Metastatic adenocarcinoma of colorectal origin.    Patient was referred to hematology/oncology for further evaluation and management.  She was first seen in this clinic on 11//24.  Accompanied by 3 of 4 daughters.  Patient denies any major GI issues.  No blood in stool or altered bowel habits.  She does take stool softeners for regular bowel movements.  No nausea or vomiting.  No family history of colorectal cancer.    11/5/2024: PET/CT demonstrates hypermetabolic lesions in the colonic hepatic flexure, pulmonary, hepatic, osseous and zenaida metastasis.  Colonoscopy did reveal hepatic flexure mass, partially obstructing.    INTERIM HISTORY:  Patient returns to the clinic for a follow-up visit.  No new complaints since last visit.  She does report of having worsening fatigue in the last couple of weeks and having mild cough with no sputum production.  Afebrile.  Denies any blood  in stool or urine.  No cardiac or respiratory symptoms.    Past Medical History:   Diagnosis Date    Anemia     Arthritis of left sacroiliac joint 05/05/2022    CKD (chronic kidney disease), stage III     PATIENT STATES IT IS AT STAGE 4 AT THIS TIME    Colorectal cancer     Coronary artery disease     cath 11/15. CABG x3 and all grafts widely patent. Very small OM and Cfx and that may be the cause of lateral ischemia on stress test. Non bipassable    Diabetes mellitus, type II     GERD (gastroesophageal reflux disease)     Gout     Heart attack 10/2022    MULTIPLE (6 OR MORE)    History of DVT in adulthood     AFTER CHILDBIRTH    Hyperlipidemia     Hypertension     Liver masses     Pacemaker     PUD (peptic ulcer disease)      Past Surgical History:   Procedure Laterality Date    CARDIAC ELECTROPHYSIOLOGY PROCEDURE N/A 02/22/2017    Procedure: Pacemaker DC new;  Surgeon: Juvenal Zhong MD;  Location: [facility] CATH INVASIVE LOCATION;  Service:     CARDIAC SURGERY  10/2022    CATARACT EXTRACTION      COLONOSCOPY  2010    COLONOSCOPY N/A 11/19/2024    Procedure: COLONOSCOPY WITH TATTOO MASS INJECTION 4 ML;  Surgeon: Caden Christian MD;  Location: [facility] MAIN OR;  Service: General;  Laterality: N/A;    CORONARY ARTERY BYPASS GRAFT      1992, 2009    EPIDURAL Left 10/17/2022    Procedure: LUMBAR/SACRAL TRANSFORAMINAL EPIDURAL Left L4-5 and left L5-S1 - hold plavix 7 days before;  Surgeon: Yasmine Gaming MD;  Location: [facility] MAIN OR;  Service: Pain Management;  Laterality: Left;    PACEMAKER IMPLANTATION      PIRIFORMUS INJECTION Left 07/15/2022    Procedure: PIRIFORMIS INJECTION;  Surgeon: Yasmine Gaming MD;  Location: [facility] MAIN OR;  Service: Pain Management;  Laterality: Left;    RADIOFREQUENCY ABLATION Left 02/20/2023    Procedure: RADIOFREQUENCY ABLATION NERVES for the left sacroiliac joint;  Surgeon: Yasmine Gaming MD;  Location: [facility] MAIN [facility];  Service: Pain Management;  Laterality: Left;    SACROILIAC  JOINT INJECTION Left 05/09/2022    Procedure: SACROILIAC INJECTION - Left;  Surgeon: Yasmine Gaming MD;  Location: [facility];  Service: Pain Management;  Laterality: Left;    SACROILIAC JOINT INJECTION Left 07/15/2022    Procedure: left Sacroiliac joint injection and left piriformis injection;  Surgeon: Yasmine Gaming MD;  Location: [facility];  Service: Pain Management;  Laterality: Left;    TUBAL ABDOMINAL LIGATION      VENOUS ACCESS DEVICE (PORT) INSERTION N/A 11/19/2024    Procedure: INSERTION VENOUS ACCESS DEVICE;  Surgeon: Caden Christian MD;  Location: [facility] MAIN OR;  Service: General;  Laterality: N/A;       MEDICATIONS    Current Outpatient Medications:     aspirin 81 MG EC tablet, Take 1 tablet by mouth Daily. HOLD FOR SURGERY PER MD ORDERS, Disp: , Rfl:     clopidogrel (PLAVIX) 75 MG tablet, Take 1 tablet by mouth Daily. HOLD FOR SURGERY PER MD ORDERS, Disp: , Rfl:     dapagliflozin Propanediol (Farxiga) 10 MG tablet, TAKE 1 TABLET BY MOUTH DAILY, Disp: 90 tablet, Rfl: 1    glipizide (GLUCOTROL) 5 MG tablet, TAKE 2 TABLETS BY MOUTH EVERY MORNING WITH BREAKFAST AND ONE TABLET BY EVERY EVENING, Disp: 270 tablet, Rfl: 1    hydrALAZINE (APRESOLINE) 25 MG tablet, TAKE 1 TABLET BY MOUTH TWICE A DAY, Disp: 180 tablet, Rfl: 1    hydroCHLOROthiazide 12.5 MG tablet, Take 1 tablet by mouth Daily., Disp: 90 tablet, Rfl: 1    lidocaine-prilocaine (EMLA) 2.5-2.5 % cream, Apply 1 Application topically to the appropriate area as directed Every 2 (Two) Hours As Needed for Mild Pain., Disp: 30 g, Rfl: 3    metFORMIN (GLUCOPHAGE) 500 MG tablet, Take 2 tablets by mouth 2 (Two) Times a Day With Meals., Disp: 360 tablet, Rfl: 1    metoprolol tartrate (LOPRESSOR) 50 MG tablet, TAKE 2 TABLETS BY MOUTH TWICE A DAY, Disp: 360 tablet, Rfl: 1    multivitamin with minerals (CENTRUM SILVER 50+WOMEN PO), Take 1 tablet by mouth Daily. HOLD FOR SURGERY, Disp: , Rfl:     nitroglycerin (NITROSTAT) 0.4 MG SL tablet, 1  tablet., Disp: , Rfl:     ondansetron (ZOFRAN) 8 MG tablet, Take 1 tablet by mouth 3 (Three) Times a Day As Needed for Nausea or Vomiting., Disp: 30 tablet, Rfl: 5    rosuvastatin (Crestor) 40 MG tablet, Take 1 tablet by mouth Daily., Disp: 90 tablet, Rfl: 3    Current Facility-Administered Medications:     potassium chloride (KLOR-CON M20) CR tablet 40 mEq, 40 mEq, Oral, Once, Lonny Meeks MD    Facility-Administered Medications Ordered in Other Visits:     dexAMETHasone (DECADRON) IVPB 12 mg, 12 mg, Intravenous, Once, Lonny Meeks MD, Last Rate: 200 mL/hr at 11/21/24 1023, 12 mg at 11/21/24 1023    dextrose (D5W) 5 % infusion, 20 mL/hr, Intravenous, Once, Lonny Meeks MD    fluorouracil (ADRUCIL) 2,730 mg in sodium chloride 0.9 % 240 mL chemo infusion - FOR HOME USE, 1,920 mg/m2 (Treatment Plan Recorded), Intravenous, Once, Lonny Meeks MD    fluorouracil (ADRUCIL) chemo injection 450 mg, 320 mg/m2 (Treatment Plan Recorded), Intravenous, Once, Lonny Meeks MD    FOSAPREPITANT 150 MG/100ML NORMAL SALINE (CBC) IVPB 100 mL 100 mL, 150 mg, Intravenous, Once, Lonny Meeks MD    leucovorin 450 mg in dextrose (D5W) 5 % 272.5 mL IVPB, 450 mg, Intravenous, Once, Lonny Meeks MD    OXALIplatin (ELOXATIN) 95 mg in dextrose (D5W) 5 % 269 mL chemo IVPB, 68 mg/m2 (Treatment Plan Recorded), Intravenous, Once, Lonny Meeks MD    ALLERGIES:     Allergies   Allergen Reactions    Pneumococcal Vaccines Paresthesia       SOCIAL HISTORY:       Social History     Socioeconomic History    Marital status:    Tobacco Use    Smoking status: Never     Passive exposure: Never    Smokeless tobacco: Never   Vaping Use    Vaping status: Never Used   Substance and Sexual Activity    Alcohol use: Never    Drug use: Never         FAMILY HISTORY:  Family History   Problem Relation Age of Onset    Coronary artery disease Mother     Heart attack Sister     Malig Hyperthermia Neg Hx   "      REVIEW OF SYSTEMS:  As per HPI         Vitals:    11/21/24 0931   BP: 145/65   Pulse: 65   Resp: 16   Temp: 98.2 °F (36.8 °C)   TempSrc: Oral   SpO2: 98%   Weight: 47.3 kg (104 lb 4.8 oz)   Height: 152.4 cm (60\")   PainSc: 0-No pain         11/21/2024     9:32 AM   Current Status   ECOG score 0      PHYSICAL EXAM:    CONSTITUTIONAL:  Vital signs reviewed.  No distress, looks comfortable.  EYES:  Conjunctiva and lids unremarkable.   EARS,NOSE,MOUTH,THROAT:  Ears and nose appear unremarkable.  Lips, teeth, gums appear unremarkable.  RESPIRATORY:  Normal respiratory effort.  Lungs clear to auscultation bilaterally.  CARDIOVASCULAR:  Normal S1, S2.  No murmurs rubs or gallops.  No significant lower extremity edema.  GASTROINTESTINAL: Abdomen appears unremarkable.  Nondistended  LYMPHATIC:  No cervical, supraclavicular lymphadenopathy.  NEURO: AAOx3, no focal deficits.  Appears to have equal strength all 4 extremities.  MUSCULOSKELETAL:  Unremarkable digits/nails.  No cyanosis or clubbing.  No apparent joint deformities.  SKIN:  Warm.  No rashes.  PSYCHIATRIC:  Normal judgment and insight.  Normal mood and affect.     RECENT LABS:        Infusion on 11/21/2024   Component Date Value Ref Range Status    Glucose 11/21/2024 289 (H)  65 - 99 mg/dL Final    BUN 11/21/2024 28 (H)  8 - 23 mg/dL Final    Creatinine 11/21/2024 1.21 (H)  0.57 - 1.00 mg/dL Final    Sodium 11/21/2024 133 (L)  136 - 145 mmol/L Final    Potassium 11/21/2024 3.0 (L)  3.5 - 5.2 mmol/L Final    Chloride 11/21/2024 94 (L)  98 - 107 mmol/L Final    CO2 11/21/2024 24.2  22.0 - 29.0 mmol/L Final    Calcium 11/21/2024 9.6  8.6 - 10.5 mg/dL Final    Total Protein 11/21/2024 6.8  6.0 - 8.5 g/dL Final    Albumin 11/21/2024 3.5  3.5 - 5.2 g/dL Final    ALT (SGPT) 11/21/2024 12  1 - 33 U/L Final    AST (SGOT) 11/21/2024 57 (H)  1 - 32 U/L Final    Alkaline Phosphatase 11/21/2024 239 (H)  39 - 117 U/L Final    Total Bilirubin 11/21/2024 0.3  0.0 - 1.2 mg/dL " Final    Globulin 11/21/2024 3.3  gm/dL Final    A/G Ratio 11/21/2024 1.1  g/dL Final    BUN/Creatinine Ratio 11/21/2024 23.1  7.0 - 25.0 Final    Anion Gap 11/21/2024 14.8  5.0 - 15.0 mmol/L Final    eGFR 11/21/2024 43.7 (L)  >60.0 mL/min/1.73 Final    WBC 11/21/2024 10.64  3.40 - 10.80 10*3/mm3 Final    RBC 11/21/2024 4.08  3.77 - 5.28 10*6/mm3 Final    Hemoglobin 11/21/2024 10.5 (L)  12.0 - 15.9 g/dL Final    Hematocrit 11/21/2024 33.2 (L)  34.0 - 46.6 % Final    MCV 11/21/2024 81.4  79.0 - 97.0 fL Final    MCH 11/21/2024 25.7 (L)  26.6 - 33.0 pg Final    MCHC 11/21/2024 31.6  31.5 - 35.7 g/dL Final    RDW 11/21/2024 15.2  12.3 - 15.4 % Final    RDW-SD 11/21/2024 45.1  37.0 - 54.0 fl Final    MPV 11/21/2024 9.9  6.0 - 12.0 fL Final    Platelets 11/21/2024 302  140 - 450 10*3/mm3 Final    Neutrophil % 11/21/2024 85.2 (H)  42.7 - 76.0 % Final    Lymphocyte % 11/21/2024 7.3 (L)  19.6 - 45.3 % Final    Monocyte % 11/21/2024 5.8  5.0 - 12.0 % Final    Eosinophil % 11/21/2024 0.7  0.3 - 6.2 % Final    Basophil % 11/21/2024 0.5  0.0 - 1.5 % Final    Immature Grans % 11/21/2024 0.5  0.0 - 0.5 % Final    Neutrophils, Absolute 11/21/2024 9.07 (H)  1.70 - 7.00 10*3/mm3 Final    Lymphocytes, Absolute 11/21/2024 0.78  0.70 - 3.10 10*3/mm3 Final    Monocytes, Absolute 11/21/2024 0.62  0.10 - 0.90 10*3/mm3 Final    Eosinophils, Absolute 11/21/2024 0.07  0.00 - 0.40 10*3/mm3 Final    Basophils, Absolute 11/21/2024 0.05  0.00 - 0.20 10*3/mm3 Final    Immature Grans, Absolute 11/21/2024 0.05  0.00 - 0.05 10*3/mm3 Final    nRBC 11/21/2024 0.0  0.0 - 0.2 /100 WBC Final   Admission on 11/19/2024, Discharged on 11/19/2024   Component Date Value Ref Range Status    Glucose 11/19/2024 114  70 - 130 mg/dL Final    Case Report 11/19/2024    Final                    Value:Surgical Pathology Report                         Case: [id number]                                  Authorizing Provider:  Caden Christian,   Collected:            "11/19/2024 04:46 PM                                 MD                                                                           Ordering Location:     [facility]  Received:            11/19/2024 10:12 PM                                 MAIN OR                                                                      Pathologist:           Benja Gutierrez MD                                                         Specimen:    Large Intestine, Hepatic Flexure, Hepatic flexure mass biopsy                              Final Diagnosis 11/19/2024    Final                    Value:1.  Hepatic flexure mass biopsy:         A. 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         B. No invasive carcinoma identified.        Comment 11/19/2024    Final                    Value:This case was shared in consultation with Dr. Ortiz, who concurred with the above diagnosis.  Sections are of tubulovillous adenoma with focal intraepithelial/intramucosal adenocarcinoma at least.  No definitive invasion is identified within this superficially sampled neoplasm.  Patient has a reported history of liver involvement by metastatic adenocarcinoma with colorectal features.  Comprehensive MI profile testing was requested on that specimen (see [id number]).  Clinical correlation is required.      Gross Description 11/19/2024    Final                    Value:1. Large Intestine, Hepatic Flexure.  The specimen is received in formalin labeled with the patient's name and further designated 'hepatic flexure mass  biopsy' are multiple small fragments of gray-tan tissue. The specimen is submitted for embedding as received.          Microscopic Description 11/19/2024    Final                    Value:Unless \"gross only\" is specified, the final diagnosis for each specimen is based on microscopic examination of tissue.       ASSESSMENT:  Patient is a pleasant 88-year-old female with medical history " significant for CAD s/p CABG x 3 (1997, 2007), PCI's x 16, pacemaker, peptic ulcer disease, hypertension and dyslipidemia comes for metastatic colorectal cancer evaluation and management.    # Metastatic colorectal cancer with liver and lung metastasis, right sided:  Patient had presented to her PCP,  in early October 2024 with lower abdominal pain and weight loss of > 20 pounds.    A CT A/P performed 10/9/2024 noted innumerable lung nodules, multiple hypodense masses in the liver and a large mass in the hepatic flexure of the colon, measuring 4.5 cm in greatest dimension.  CT-guided liver lesion biopsy on 10/28/2024. Pathology: Metastatic adenocarcinoma of colorectal origin.  CEA 5386.0 (10/21/2024)  I reviewed the imaging and pathology findings with patient and her family in detail.  Informed that she has been diagnosed with metastatic colorectal cancer with diffuse metastasis.  Reviewed  natural history, prognosis and palliative treatment options.  Patient is interested in receiving cancer directed therapy.  With her age and significant cardiovascular medical comorbidity-treatment with palliative chemotherapy would be challenging.  11/21/2024: Reviewed recent PET/CT and colonoscopy findings which redemonstrate diffuse metastatic disease.  [id number] liquid NGS Shows MSI stable.  SHANE mutation, FGFR 1 and EGFR gene amplifications.  Caris tissue-based NGS is pending.  With SHANE mutation, will check germline [facility] testing as well.  Given her age, medical history and poor performance status, plan made to proceed with FOLFOX with first cycle.  Dose reduce by 20%.  Will wait for final Caris NGS results before finalizing Avastin vs cetuximab.  Follow-up in 2 weeks or sooner if needed.    # Anemia:  Hemoglobin 11.3 g/dL on 11/4/24.  Iron profile most consistent with iron deficiency.  11/21/2024: Patient reports of constipation.  Has poor tolerance to oral iron.  Will administer IV Injectafer today    # CAD s/p  CABG (1997 and 2007) and PCI x 16:   Follows up with , cardiology at the [facility]  On aspirin 81 mg and Plavix 75 mg daily    # DM2:  Currently on Farxiga, glipizide and metformin.    Denies any neuropathy.  Per patient, last A1c was 8.6.  Being managed by PCP    PLAN:   -Proceed with cycle 1 FOLFOX, 20% dose reduced  -IV Injectafer x 1 dose today  -Administer p.o. potassium 40 mill equivalents x 1 dose today.  Start potassium chloride 20 mg daily at home.  -Check invitae.  DPYD mutation & Caris NGS pending  -Advised close follow-up with cardiology and other specialists  -Follow-up in 2 weeks for port, labs and cycle 2 FOLFOX +/- Avastin/cetuximab or sooner if needed    Orders Placed This Encounter   Procedures    [facility] - Miscellaneous Test     Standing Status:   Future     Number of Occurrences:   1     Standing Expiration Date:   11/21/2025     Order Specific Question:   What is the name of the test you wish to perform?     Answer:   INVITAE     Order Specific Question:   Release to patient     Answer:   Routine Release [[id number]]    DPYD Genotyping     Order Specific Question:   Release to patient     Answer:   Routine Release [[id number]]   Total time spent during this patient encounter is 41 minutes. The total time spent with the patient includes: preparing to see the patient by reviewing of tests, prior notes or other relevant information, performing appropriate independent examination & evaluation, counseling, ordering of medications, tests or procedures, documenting clinic information in the electronic medical records or other health records, independently interpreting results of tests and communicating the results to the patient/family or caregiver.

## 2024-11-23 ENCOUNTER — INFUSION (OUTPATIENT)
Dept: ONCOLOGY | Facility: HOSPITAL | Age: 89
End: 2024-11-23
Payer: MEDICARE

## 2024-11-23 DIAGNOSIS — C18.9 METASTATIC COLON CANCER TO LIVER: Primary | ICD-10-CM

## 2024-11-23 DIAGNOSIS — Z45.2 ENCOUNTER FOR ADJUSTMENT OR MANAGEMENT OF VASCULAR ACCESS DEVICE: ICD-10-CM

## 2024-11-23 DIAGNOSIS — C78.7 METASTATIC COLON CANCER TO LIVER: Primary | ICD-10-CM

## 2024-11-23 PROCEDURE — 96523 IRRIG DRUG DELIVERY DEVICE: CPT

## 2024-11-23 PROCEDURE — 25010000002 HEPARIN LOCK FLUSH PER 10 UNITS: Performed by: INTERNAL MEDICINE

## 2024-11-23 RX ORDER — HEPARIN SODIUM (PORCINE) LOCK FLUSH IV SOLN 100 UNIT/ML 100 UNIT/ML
500 SOLUTION INTRAVENOUS AS NEEDED
Status: DISCONTINUED | OUTPATIENT
Start: 2024-11-23 | End: 2024-11-23 | Stop reason: HOSPADM

## 2024-11-23 RX ORDER — SODIUM CHLORIDE 0.9 % (FLUSH) 0.9 %
10 SYRINGE (ML) INJECTION AS NEEDED
Status: DISCONTINUED | OUTPATIENT
Start: 2024-11-23 | End: 2024-11-23 | Stop reason: HOSPADM

## 2024-11-23 RX ORDER — HEPARIN SODIUM (PORCINE) LOCK FLUSH IV SOLN 100 UNIT/ML 100 UNIT/ML
500 SOLUTION INTRAVENOUS AS NEEDED
OUTPATIENT
Start: 2024-11-23

## 2024-11-23 RX ORDER — SODIUM CHLORIDE 0.9 % (FLUSH) 0.9 %
10 SYRINGE (ML) INJECTION AS NEEDED
OUTPATIENT
Start: 2024-11-23

## 2024-11-23 RX ADMIN — HEPARIN 500 UNITS: 100 SYRINGE at 11:16

## 2024-11-23 RX ADMIN — Medication 10 ML: at 11:15

## 2024-11-25 LAB
CYTO UR: NORMAL
LAB AP CASE REPORT: NORMAL
LAB AP DIAGNOSIS COMMENT: NORMAL
LAB AP SPECIAL STAINS: NORMAL
Lab: NORMAL
PATH REPORT.ADDENDUM SPEC: NORMAL
PATH REPORT.FINAL DX SPEC: NORMAL
PATH REPORT.GROSS SPEC: NORMAL

## 2024-11-27 LAB
CLINICAL INFO: NORMAL
DPYD GENE MUT ANL BLD/T: NORMAL
DPYD GENE PROD MET ACT IMP BLD/T-IMP: NORMAL
IMP & REVIEW OF LAB RESULTS: NORMAL
MEDICAL DIRECTOR REVIEW: NORMAL

## 2024-12-04 ENCOUNTER — TELEPHONE (OUTPATIENT)
Dept: ONCOLOGY | Facility: CLINIC | Age: 89
End: 2024-12-04
Payer: MEDICARE

## 2024-12-04 NOTE — TELEPHONE ENCOUNTER
----- Message from Lonny Meeks sent at 12/2/2024  9:47 AM EST -----  Ok.   Initially I was planning 20%, but now with DPYD mutation - let's reduce the 5FU bolus and infusion by 50%. Thanks    Treatment plan updated based on 50% dose reduction due to DPYD gene mutation (5fu bolus, infusion, and LV). Coordinated with Jinny Brito.

## 2024-12-05 ENCOUNTER — OFFICE VISIT (OUTPATIENT)
Dept: ONCOLOGY | Facility: CLINIC | Age: 89
End: 2024-12-05
Payer: MEDICARE

## 2024-12-05 ENCOUNTER — INFUSION (OUTPATIENT)
Dept: ONCOLOGY | Facility: HOSPITAL | Age: 89
End: 2024-12-05
Payer: MEDICARE

## 2024-12-05 ENCOUNTER — PATIENT OUTREACH (OUTPATIENT)
Dept: ONCOLOGY | Facility: HOSPITAL | Age: 89
End: 2024-12-05
Payer: MEDICARE

## 2024-12-05 VITALS
HEIGHT: 60 IN | SYSTOLIC BLOOD PRESSURE: 136 MMHG | DIASTOLIC BLOOD PRESSURE: 72 MMHG | WEIGHT: 102.2 LBS | OXYGEN SATURATION: 98 % | HEART RATE: 70 BPM | RESPIRATION RATE: 16 BRPM | BODY MASS INDEX: 20.07 KG/M2 | TEMPERATURE: 98.2 F

## 2024-12-05 DIAGNOSIS — T45.1X5A CHEMOTHERAPY INDUCED NEUTROPENIA: ICD-10-CM

## 2024-12-05 DIAGNOSIS — C18.9 METASTATIC COLON CANCER TO LIVER: ICD-10-CM

## 2024-12-05 DIAGNOSIS — C18.9 METASTATIC COLON CANCER TO LIVER: Primary | ICD-10-CM

## 2024-12-05 DIAGNOSIS — C78.7 METASTATIC COLON CANCER TO LIVER: ICD-10-CM

## 2024-12-05 DIAGNOSIS — D70.1 CHEMOTHERAPY INDUCED NEUTROPENIA: ICD-10-CM

## 2024-12-05 DIAGNOSIS — R05.8 COUGH PRODUCTIVE OF YELLOW SPUTUM: ICD-10-CM

## 2024-12-05 DIAGNOSIS — Z45.2 ENCOUNTER FOR ADJUSTMENT OR MANAGEMENT OF VASCULAR ACCESS DEVICE: ICD-10-CM

## 2024-12-05 DIAGNOSIS — C78.7 METASTATIC COLON CANCER TO LIVER: Primary | ICD-10-CM

## 2024-12-05 LAB
ALBUMIN SERPL-MCNC: 3.3 G/DL (ref 3.5–5.2)
ALBUMIN/GLOB SERPL: 1.1 G/DL
ALP SERPL-CCNC: 219 U/L (ref 39–117)
ALT SERPL W P-5'-P-CCNC: 18 U/L (ref 1–33)
ANION GAP SERPL CALCULATED.3IONS-SCNC: 12.3 MMOL/L (ref 5–15)
AST SERPL-CCNC: 37 U/L (ref 1–32)
BASOPHILS # BLD AUTO: 0.02 10*3/MM3 (ref 0–0.2)
BASOPHILS NFR BLD AUTO: 3.8 % (ref 0–1.5)
BILIRUB SERPL-MCNC: 0.4 MG/DL (ref 0–1.2)
BUN SERPL-MCNC: 21 MG/DL (ref 8–23)
BUN/CREAT SERPL: 19.8 (ref 7–25)
CALCIUM SPEC-SCNC: 8.9 MG/DL (ref 8.6–10.5)
CHLORIDE SERPL-SCNC: 97 MMOL/L (ref 98–107)
CO2 SERPL-SCNC: 24.7 MMOL/L (ref 22–29)
CREAT SERPL-MCNC: 1.06 MG/DL (ref 0.57–1)
DEPRECATED RDW RBC AUTO: 44.7 FL (ref 37–54)
EGFRCR SERPLBLD CKD-EPI 2021: 51.3 ML/MIN/1.73
EOSINOPHIL # BLD AUTO: 0.01 10*3/MM3 (ref 0–0.4)
EOSINOPHIL NFR BLD AUTO: 1.9 % (ref 0.3–6.2)
ERYTHROCYTE [DISTWIDTH] IN BLOOD BY AUTOMATED COUNT: 17.6 % (ref 12.3–15.4)
GLOBULIN UR ELPH-MCNC: 3.1 GM/DL
GLUCOSE SERPL-MCNC: 209 MG/DL (ref 65–99)
HCT VFR BLD AUTO: 35 % (ref 34–46.6)
HGB BLD-MCNC: 10.9 G/DL (ref 12–15.9)
IMM GRANULOCYTES # BLD AUTO: 0 10*3/MM3 (ref 0–0.05)
IMM GRANULOCYTES NFR BLD AUTO: 0 % (ref 0–0.5)
LYMPHOCYTES # BLD AUTO: 0.34 10*3/MM3 (ref 0.7–3.1)
LYMPHOCYTES NFR BLD AUTO: 65.4 % (ref 19.6–45.3)
MCH RBC QN AUTO: 25.8 PG (ref 26.6–33)
MCHC RBC AUTO-ENTMCNC: 31.1 G/DL (ref 31.5–35.7)
MCV RBC AUTO: 82.9 FL (ref 79–97)
MONOCYTES # BLD AUTO: 0.07 10*3/MM3 (ref 0.1–0.9)
MONOCYTES NFR BLD AUTO: 13.5 % (ref 5–12)
NEUTROPHILS NFR BLD AUTO: 0.08 10*3/MM3 (ref 1.7–7)
NEUTROPHILS NFR BLD AUTO: 15.4 % (ref 42.7–76)
NRBC BLD AUTO-RTO: 3.8 /100 WBC (ref 0–0.2)
PLATELET # BLD AUTO: 119 10*3/MM3 (ref 140–450)
PMV BLD AUTO: 9.6 FL (ref 6–12)
POTASSIUM SERPL-SCNC: 3.9 MMOL/L (ref 3.5–5.2)
PROT SERPL-MCNC: 6.4 G/DL (ref 6–8.5)
RBC # BLD AUTO: 4.22 10*6/MM3 (ref 3.77–5.28)
SODIUM SERPL-SCNC: 134 MMOL/L (ref 136–145)
WBC NRBC COR # BLD AUTO: 0.52 10*3/MM3 (ref 3.4–10.8)

## 2024-12-05 PROCEDURE — 25010000002 FILGRASTIM 300 MCG/0.5ML SOLUTION PREFILLED SYRINGE: Performed by: INTERNAL MEDICINE

## 2024-12-05 PROCEDURE — 36591 DRAW BLOOD OFF VENOUS DEVICE: CPT

## 2024-12-05 PROCEDURE — 25010000002 HEPARIN LOCK FLUSH PER 10 UNITS: Performed by: INTERNAL MEDICINE

## 2024-12-05 PROCEDURE — 96372 THER/PROPH/DIAG INJ SC/IM: CPT

## 2024-12-05 PROCEDURE — 85025 COMPLETE CBC W/AUTO DIFF WBC: CPT

## 2024-12-05 PROCEDURE — 80053 COMPREHEN METABOLIC PANEL: CPT

## 2024-12-05 RX ORDER — HEPARIN SODIUM (PORCINE) LOCK FLUSH IV SOLN 100 UNIT/ML 100 UNIT/ML
500 SOLUTION INTRAVENOUS AS NEEDED
Status: DISCONTINUED | OUTPATIENT
Start: 2024-12-05 | End: 2024-12-05 | Stop reason: HOSPADM

## 2024-12-05 RX ORDER — HEPARIN SODIUM (PORCINE) LOCK FLUSH IV SOLN 100 UNIT/ML 100 UNIT/ML
500 SOLUTION INTRAVENOUS AS NEEDED
OUTPATIENT
Start: 2024-12-05

## 2024-12-05 RX ORDER — SODIUM CHLORIDE 0.9 % (FLUSH) 0.9 %
10 SYRINGE (ML) INJECTION AS NEEDED
OUTPATIENT
Start: 2024-12-05

## 2024-12-05 RX ORDER — OLANZAPINE 5 MG/1
2.5 TABLET ORAL NIGHTLY
Qty: 30 TABLET | Refills: 1 | Status: SHIPPED | OUTPATIENT
Start: 2024-12-05

## 2024-12-05 RX ADMIN — HEPARIN 500 UNITS: 100 SYRINGE at 10:05

## 2024-12-05 RX ADMIN — FILGRASTIM 300 MCG: 300 INJECTION, SOLUTION INTRAVENOUS; SUBCUTANEOUS at 10:04

## 2024-12-05 NOTE — PROGRESS NOTES
[facility]    CONSULTING IN BLOOD DISORDERS & CANCER      REASON FOR CONSULTATION/CHIEF COMPLAINT:     Evaluation and management for metastatic colorectal cancer                             REQUESTING PHYSICIAN: No ref. provider found  RECORDS OBTAINED:  Records of the patients history including those from the electronic medical record were reviewed and summarized in detail.    HISTORY OF PRESENT ILLNESS:    The patient is a 88 y.o. year old female with medical history significant for CAD s/p CABG x 3 (9097, 2007), PCI's x 16, pacemaker, peptic ulcer disease, hypertension and dyslipidemia comes for metastatic colorectal cancer evaluation and management.    Patient had presented to her PCP,  in early October 2024 with lower abdominal pain and weight loss of > 20 pounds.  A CT A/P performed 10/9/2024 noted innumerable lung nodules, multiple hypodense masses in the liver and a large mass in the hepatic flexure of the colon, measuring 4.5 cm in greatest dimension.    Patient underwent CT-guided liver lesion biopsy on 10/28/2024.  Pathology: Metastatic adenocarcinoma of colorectal origin.    Patient was referred to hematology/oncology for further evaluation and management.  She was first seen in this clinic on 11//24.  Accompanied by 3 of 4 daughters.  Patient denies any major GI issues.  No blood in stool or altered bowel habits.  She does take stool softeners for regular bowel movements.  No nausea or vomiting.  No family history of colorectal cancer.    11/5/2024: PET/CT demonstrates hypermetabolic lesions in the colonic hepatic flexure, pulmonary, hepatic, osseous and zenaida metastasis.  Colonoscopy did reveal hepatic flexure mass, partially obstructing.    11/21/2024: Cycle 1 FOLFOX    INTERIM HISTORY:  Patient returns to the clinic for a follow-up visit.  Patient reports of tolerating cycle 1 FOLFOX fairly well.  She did report of having nausea and loose stools for few days.  She has been having persistent  fatigue since.  Afebrile.  She continues to have cough with whitish/yellowish sputum production.  Appetite has been okay.  Weight stable.  Denies any blood in stool or urine.  No cardiac or respiratory symptoms.    Past Medical History:   Diagnosis Date    Anemia     Arthritis of left sacroiliac joint 05/05/2022    CKD (chronic kidney disease), stage III     PATIENT STATES IT IS AT STAGE 4 AT THIS TIME    Colorectal cancer     Coronary artery disease     cath 11/15. CABG x3 and all grafts widely patent. Very small OM and Cfx and that may be the cause of lateral ischemia on stress test. Non bipassable    Diabetes mellitus, type II     GERD (gastroesophageal reflux disease)     Gout     Heart attack 10/2022    MULTIPLE (6 OR MORE)    History of DVT in adulthood     AFTER CHILDBIRTH    Hyperlipidemia     Hypertension     Liver masses     Pacemaker     PUD (peptic ulcer disease)      Past Surgical History:   Procedure Laterality Date    CARDIAC ELECTROPHYSIOLOGY PROCEDURE N/A 02/22/2017    Procedure: Pacemaker DC new;  Surgeon: Juvenal Zhong MD;  Location: [facility] CATH INVASIVE LOCATION;  Service:     CARDIAC SURGERY  10/2022    CATARACT EXTRACTION      COLONOSCOPY  2010    COLONOSCOPY N/A 11/19/2024    Procedure: COLONOSCOPY WITH TATTOO MASS INJECTION 4 ML;  Surgeon: Caden Christian MD;  Location: [facility] MAIN OR;  Service: General;  Laterality: N/A;    CORONARY ARTERY BYPASS GRAFT      1992, 2009    EPIDURAL Left 10/17/2022    Procedure: LUMBAR/SACRAL TRANSFORAMINAL EPIDURAL Left L4-5 and left L5-S1 - hold plavix 7 days before;  Surgeon: Yasmine Gaming MD;  [place] [facility] [place];  Service: Pain Management;  Laterality: Left;    PACEMAKER IMPLANTATION      PIRIFORMUS INJECTION Left 07/15/2022    Procedure: PIRIFORMIS INJECTION;  Surgeon: Yasmine Gaming MD;  [place] [facility] [place];  Service: Pain Management;  Laterality: Left;    RADIOFREQUENCY ABLATION Left 02/20/2023    Procedure: RADIOFREQUENCY  ABLATION NERVES for the left sacroiliac joint;  Surgeon: Yasmine Gaming MD;  [facility];  Service: Pain Management;  Laterality: Left;    SACROILIAC JOINT INJECTION Left 05/09/2022    Procedure: SACROILIAC INJECTION - Left;  Surgeon: Yasmine Gaming MD;  Location: [facility];  Service: Pain Management;  Laterality: Left;    SACROILIAC JOINT INJECTION Left 07/15/2022    Procedure: left Sacroiliac joint injection and left piriformis injection;  Surgeon: Yasmine Gaming MD;  Location: [facility];  Service: Pain Management;  Laterality: Left;    TUBAL ABDOMINAL LIGATION      VENOUS ACCESS DEVICE (PORT) INSERTION N/A 11/19/2024    Procedure: INSERTION VENOUS ACCESS DEVICE;  Surgeon: Caden Christian MD;  Location: [facility] MA[facility] OR;  Service: General;  Laterality: N/A;       MEDICATIONS    Current Outpatient Medications:     aspirin 81 MG EC tablet, Take 1 tablet by mouth Daily. HOLD FOR SURGERY PER MD ORDERS, Disp: , Rfl:     clopidogrel (PLAVIX) 75 MG tablet, Take 1 tablet by mouth Daily. HOLD FOR SURGERY PER MD ORDERS, Disp: , Rfl:     dapagliflozin Propanediol (Farxiga) 10 MG tablet, TAKE 1 TABLET BY MOUTH DAILY, Disp: 90 tablet, Rfl: 1    glipizide (GLUCOTROL) 5 MG tablet, TAKE 2 TABLETS BY MOUTH EVERY MORNING WITH BREAKFAST AND ONE TABLET BY EVERY EVENING, Disp: 270 tablet, Rfl: 1    hydrALAZINE (APRESOLINE) 25 MG tablet, TAKE 1 TABLET BY MOUTH TWICE A DAY, Disp: 180 tablet, Rfl: 1    hydroCHLOROthiazide 12.5 MG tablet, Take 1 tablet by mouth Daily., Disp: 90 tablet, Rfl: 1    lidocaine-prilocaine (EMLA) 2.5-2.5 % cream, Apply 1 Application topically to the appropriate area as directed Every 2 (Two) Hours As Needed for Mild Pain., Disp: 30 g, Rfl: 3    metFORMIN (GLUCOPHAGE) 500 MG tablet, Take 2 tablets by mouth 2 (Two) Times a Day With Meals., Disp: 360 tablet, Rfl: 1    metoprolol tartrate (LOPRESSOR) 50 MG tablet, TAKE 2 TABLETS BY MOUTH TWICE A DAY, Disp: 360 tablet, Rfl: 1     "multivitamin with minerals (CENTRUM SILVER 50+WOMEN PO), Take 1 tablet by mouth Daily. HOLD FOR SURGERY, Disp: , Rfl:     nitroglycerin (NITROSTAT) 0.4 MG SL tablet, 1 tablet., Disp: , Rfl:     ondansetron (ZOFRAN) 8 MG tablet, Take 1 tablet by mouth 3 (Three) Times a Day As Needed for Nausea or Vomiting., Disp: 30 tablet, Rfl: 5    potassium chloride (KLOR-CON M20) 20 MEQ CR tablet, Take 1 tablet by mouth Daily., Disp: 30 tablet, Rfl: 0    rosuvastatin (Crestor) 40 MG tablet, Take 1 tablet by mouth Daily., Disp: 90 tablet, Rfl: 3    OLANZapine (zyPREXA) 5 MG tablet, Take 0.5 tablets by mouth Every Night., Disp: 30 tablet, Rfl: 1    ALLERGIES:     Allergies   Allergen Reactions    Pneumococcal Vaccines Paresthesia       SOCIAL HISTORY:       Social History     Socioeconomic History    Marital status:    Tobacco Use    Smoking status: Never     Passive exposure: Never    Smokeless tobacco: Never   Vaping Use    Vaping status: Never Used   Substance and Sexual Activity    Alcohol use: Never    Drug use: Never         FAMILY HISTORY:  Family History   Problem Relation Age of Onset    Coronary artery disease Mother     Heart attack Sister     Malig Hyperthermia Neg Hx        REVIEW OF SYSTEMS:  As per HPI         Vitals:    12/05/24 0916   BP: 136/72   Pulse: 70   Resp: 16   Temp: 98.2 °F (36.8 °C)   TempSrc: Oral   SpO2: 98%   Weight: 46.4 kg (102 lb 3.2 oz)   Height: 152.4 cm (60\")   PainSc: 0-No pain           12/5/2024     9:17 AM   Current Status   ECOG score 1      PHYSICAL EXAM:    CONSTITUTIONAL:  Vital signs reviewed.  No distress, looks comfortable.  EYES:  Conjunctiva and lids unremarkable.   EARS,NOSE,MOUTH,THROAT:  Ears and nose appear unremarkable.  Lips, teeth, gums appear unremarkable.  RESPIRATORY:  Normal respiratory effort.  Lungs clear to auscultation bilaterally.  CARDIOVASCULAR:  Normal S1, S2.  No murmurs rubs or gallops.  No significant lower extremity edema.  GASTROINTESTINAL: Abdomen " appears unremarkable.  Nondistended  LYMPHATIC:  No cervical, supraclavicular lymphadenopathy.  NEURO: AAOx3, no focal deficits.  Appears to have equal strength all 4 extremities.  MUSCULOSKELETAL:  Unremarkable digits/nails.  No cyanosis or clubbing.  No apparent joint deformities.  SKIN:  Warm.  No rashes.  PSYCHIATRIC:  Normal judgment and insight.  Normal mood and affect.     RECENT LABS:        Infusion on 12/05/2024   Component Date Value Ref Range Status    Glucose 12/05/2024 209 (H)  65 - 99 mg/dL Final    BUN 12/05/2024 21  8 - 23 mg/dL Final    Creatinine 12/05/2024 1.06 (H)  0.57 - 1.00 mg/dL Final    Sodium 12/05/2024 134 (L)  136 - 145 mmol/L Final    Potassium 12/05/2024 3.9  3.5 - 5.2 mmol/L Final    Chloride 12/05/2024 97 (L)  98 - 107 mmol/L Final    CO2 12/05/2024 24.7  22.0 - 29.0 mmol/L Final    Calcium 12/05/2024 8.9  8.6 - 10.5 mg/dL Final    Total Protein 12/05/2024 6.4  6.0 - 8.5 g/dL Final    Albumin 12/05/2024 3.3 (L)  3.5 - 5.2 g/dL Final    ALT (SGPT) 12/05/2024 18  1 - 33 U/L Final    AST (SGOT) 12/05/2024 37 (H)  1 - 32 U/L Final    Alkaline Phosphatase 12/05/2024 219 (H)  39 - 117 U/L Final    Total Bilirubin 12/05/2024 0.4  0.0 - 1.2 mg/dL Final    Globulin 12/05/2024 3.1  gm/dL Final    A/G Ratio 12/05/2024 1.1  g/dL Final    BUN/Creatinine Ratio 12/05/2024 19.8  7.0 - 25.0 Final    Anion Gap 12/05/2024 12.3  5.0 - 15.0 mmol/L Final    eGFR 12/05/2024 51.3 (L)  >60.0 mL/min/1.73 Final    WBC 12/05/2024 0.52 (C)  3.40 - 10.80 10*3/mm3 Final    RBC 12/05/2024 4.22  3.77 - 5.28 10*6/mm3 Final    Hemoglobin 12/05/2024 10.9 (L)  12.0 - 15.9 g/dL Final    Hematocrit 12/05/2024 35.0  34.0 - 46.6 % Final    MCV 12/05/2024 82.9  79.0 - 97.0 fL Final    MCH 12/05/2024 25.8 (L)  26.6 - 33.0 pg Final    MCHC 12/05/2024 31.1 (L)  31.5 - 35.7 g/dL Final    RDW 12/05/2024 17.6 (H)  12.3 - 15.4 % Final    RDW-SD 12/05/2024 44.7  37.0 - 54.0 fl Final    MPV 12/05/2024 9.6  6.0 - 12.0 fL Final     Platelets 12/05/2024 119 (L)  140 - 450 10*3/mm3 Final    Plts [id number]    Neutrophil % 12/05/2024 15.4 (L)  42.7 - 76.0 % Final    Lymphocyte % 12/05/2024 65.4 (H)  19.6 - 45.3 % Final    Monocyte % 12/05/2024 13.5 (H)  5.0 - 12.0 % Final    Eosinophil % 12/05/2024 1.9  0.3 - 6.2 % Final    Basophil % 12/05/2024 3.8 (H)  0.0 - 1.5 % Final    Immature Grans % 12/05/2024 0.0  0.0 - 0.5 % Final    Neutrophils, Absolute 12/05/2024 0.08 (L)  1.70 - 7.00 10*3/mm3 Final    Lymphocytes, Absolute 12/05/2024 0.34 (L)  0.70 - 3.10 10*3/mm3 Final    Monocytes, Absolute 12/05/2024 0.07 (L)  0.10 - 0.90 10*3/mm3 Final    Eosinophils, Absolute 12/05/2024 0.01  0.00 - 0.40 10*3/mm3 Final    Basophils, Absolute 12/05/2024 0.02  0.00 - 0.20 10*3/mm3 Final    Immature Grans, Absolute 12/05/2024 0.00  0.00 - 0.05 10*3/mm3 Final    nRBC 12/05/2024 3.8 (H)  0.0 - 0.2 /100 WBC Final     ASSESSMENT:  Patient is a pleasant 88-year-old female with medical history significant for CAD s/p CABG x 3 (1997, 2007), PCI's x 16, pacemaker, peptic ulcer disease, hypertension and dyslipidemia comes for metastatic colorectal cancer evaluation and management.    # Metastatic colorectal cancer with liver and lung metastasis, right sided:  Patient had presented to her PCP,  in early October 2024 with lower abdominal pain and weight loss of > 20 pounds.    A CT A/P performed 10/9/2024 noted innumerable lung nodules, multiple hypodense masses in the liver and a large mass in the hepatic flexure of the colon, measuring 4.5 cm in greatest dimension.  CT-guided liver lesion biopsy on 10/28/2024. Pathology: Metastatic adenocarcinoma of colorectal origin.  CEA 5386.0 (10/21/2024)  I reviewed the imaging and pathology findings with patient and her family in detail.  Informed that she has been diagnosed with metastatic colorectal cancer with diffuse metastasis.  Reviewed  natural history, prognosis and palliative treatment options.  Patient is  interested in receiving cancer directed therapy.  With her age and significant cardiovascular medical comorbidity-treatment with palliative chemotherapy would be challenging.  Caris NGS shows MSI stable.  Low TMB.  No NRAS/KRAS/BRAF mutations. [id number] liquid NGS Shows MSI stable.  SHANE mutation, FGFR 1 and EGFR gene amplifications.   [facility] germline mutation testing pending  11/21/2024: Cycle 1 FOLFOX.    12/5/2024: Overall tolerated cycle 1 well with fatigue, nausea and loose stools for few days.  Labs today shows severe neutropenia with WBC count of 0.52 and ANC of 0.08.  Will hold chemotherapy today.  Plan to administer Neupogen today and tomorrow.  Reevaluate next week.  If leukopenia/neutropenia resolved, plan to administer FOLFOX with 50% dose reduction.  Patient has DPYD intermediate mutation.  Will check sputum cultures and prescribe antibiotics if positive for infectious etiology.  Encouraged to maintain adequate nutrition and hydration.  Follow-up in 1 week or sooner if needed.    # Anemia:  Hemoglobin 11.3 g/dL on 11/4/24.  Iron profile most consistent with iron deficiency.  11/21/2024: Patient reports of constipation.  Has poor tolerance to oral iron.  Will administer IV Injectafer today    # CAD s/p CABG (1997 and 2007) and PCI x 16:   Follows up with , cardiology at [facility]  On aspirin 81 mg and Plavix 75 mg daily    # DM2:  Currently on Farxiga, glipizide and metformin.    Denies any neuropathy.  Per patient, last A1c was 8.6.  Being managed by PCP    PLAN:   -Hold chemotherapy today  -Neupogen today and tomorrow  -[facility] pending  -Advised close follow-up with cardiology and other specialists  -Follow-up in 1 week for tentative cycle 2 FOLFOX with 50% dose reduction in Neulasta or sooner if needed  Orders Placed This Encounter   Procedures    Sputum Culture - Sputum, Cough     Standing Status:   Future     Standing Expiration Date:   12/5/2025     Order Specific Question:    Release to patient     Answer:   Routine Release [[id number]]    Comprehensive metabolic panel     Standing Status:   Future     Standing Expiration Date:   12/10/2025     Order Specific Question:   Release to patient     Answer:   Routine Release [[id number]]    Urinalysis without microscopic (no culture) - Urine, Clean Catch     Standing Status:   Future     Standing Expiration Date:   12/10/2025     Order Specific Question:   Release to patient     Answer:   Routine Release [[id number]]    CBC and Differential     Standing Status:   Future     Standing Expiration Date:   12/10/2025     Order Specific Question:   Manual Differential     Answer:   No     Order Specific Question:   Release to patient     Answer:   Routine Release [[id number]]   Total time spent during this patient encounter is 40 minutes. The total time spent with the patient includes: preparing to see the patient by reviewing of tests, prior notes or other relevant information, performing appropriate independent examination & evaluation, counseling, ordering of medications, tests or procedures, documenting clinic information in the electronic medical records or other health records, independently interpreting results of tests and communicating the results to the patient/family or caregiver.

## 2024-12-06 ENCOUNTER — CLINICAL SUPPORT (OUTPATIENT)
Dept: OTHER | Facility: HOSPITAL | Age: 89
End: 2024-12-06
Payer: MEDICARE

## 2024-12-06 ENCOUNTER — INFUSION (OUTPATIENT)
Dept: ONCOLOGY | Facility: HOSPITAL | Age: 89
End: 2024-12-06
Payer: MEDICARE

## 2024-12-06 VITALS
HEART RATE: 91 BPM | BODY MASS INDEX: 19.8 KG/M2 | RESPIRATION RATE: 16 BRPM | SYSTOLIC BLOOD PRESSURE: 104 MMHG | OXYGEN SATURATION: 97 % | DIASTOLIC BLOOD PRESSURE: 67 MMHG | WEIGHT: 101.4 LBS | TEMPERATURE: 97.5 F

## 2024-12-06 DIAGNOSIS — C78.7 METASTATIC COLON CANCER TO LIVER: ICD-10-CM

## 2024-12-06 DIAGNOSIS — T45.1X5A CHEMOTHERAPY INDUCED NEUTROPENIA: Primary | ICD-10-CM

## 2024-12-06 DIAGNOSIS — C18.9 METASTATIC COLON CANCER TO LIVER: ICD-10-CM

## 2024-12-06 DIAGNOSIS — D70.1 CHEMOTHERAPY INDUCED NEUTROPENIA: Primary | ICD-10-CM

## 2024-12-06 PROCEDURE — 25010000002 FILGRASTIM 300 MCG/0.5ML SOLUTION PREFILLED SYRINGE: Performed by: INTERNAL MEDICINE

## 2024-12-06 PROCEDURE — 96372 THER/PROPH/DIAG INJ SC/IM: CPT

## 2024-12-06 PROCEDURE — 87205 SMEAR GRAM STAIN: CPT | Performed by: INTERNAL MEDICINE

## 2024-12-06 RX ADMIN — FILGRASTIM 300 MCG: 300 INJECTION, SOLUTION INTRAVENOUS; SUBCUTANEOUS at 08:03

## 2024-12-06 NOTE — PROGRESS NOTES
OUTPATIENT ONCOLOGY NUTRITION     Patient Name: Tamie Peter  YOB: 1935  MRN: [id number]  Assessment Date: 12/6/2024    COMMENTS:  Follow up.  Pt was unable to receive her FOLFOX infusion infusion yesterday due to low counts.  She received Neupogen yesterday and then again this am.  Labs/Meds reviewed. Glu 209, WBC 0.52, plts 119, ANC 0.08.     Met with pt and her daughter today during her injection.  They report she is feeling pretty good and only had one episode of diarrhea after her last treatment.  Her po intake and appetite are mediocre.  She still only eats 2 small meals a day: cereal, 1/2 tuna or peanut butter sandwich and fruit. She isn't drinking any nutrition supplements - states they upset her stomach. Weight is down a few more pounds from last visit to 101lbs.  Encouraged protein foods and to keep eating even when she feels poorly (after zofran as she does have a little nausea  from time to time).      Will follow throughout treatment course and be available as needed. Pt very appreciative of visit.        Reason for Assessment Follow up     Diagnosis/Problem   Metastatic colon cancer   Treatment Plan Chemotherapy FOLFOX   Frequency Q 2 weeks x 12 cycles   Goal of cancer treatment Palliative   Comments:        Encounter Information        Nutrition/Diet History:  2 meals a day, doesn't eat beef   Oral Nutrition Supplements:  Doesn't tolerate them - makes her sick to her stomach   Factors/Symptoms Affecting Intake: Decreased appetite, Fatigue, Weight loss   Comments:      Medical/Surgical History Past Medical History:   Diagnosis Date    Anemia     Arthritis of left sacroiliac joint 05/05/2022    CKD (chronic kidney disease), stage III     PATIENT STATES IT IS AT STAGE 4 AT THIS TIME    Colorectal cancer     Coronary artery disease     cath 11/15. CABG x3 and all grafts widely patent. Very small OM and Cfx and that may be the cause of lateral ischemia on stress test. Non bipassable     Diabetes mellitus, type II     GERD (gastroesophageal reflux disease)     Gout     Heart attack 10/2022    MULTIPLE (6 OR MORE)    History of DVT in adulthood     AFTER CHILDBIRTH    Hyperlipidemia     Hypertension     Liver masses     Pacemaker     PUD (peptic ulcer disease)        Past Surgical History:   Procedure Laterality Date    CARDIAC ELECTROPHYSIOLOGY PROCEDURE N/A 02/22/2017    Procedure: Pacemaker DC new;  Surgeon: Juvenal Zhong MD;  Location:  [facility] CATH INVASIVE LOCATION;  Service:     CARDIAC SURGERY  10/2022    CATARACT EXTRACTION      COLONOSCOPY  2010    COLONOSCOPY N/A 11/19/2024    Procedure: COLONOSCOPY WITH TATTOO MASS INJECTION 4 ML;  Surgeon: Caden Christian MD;  Location:  [facility] MAIN OR;  Service: General;  Laterality: N/A;    CORONARY ARTERY BYPASS GRAFT      1992, 2009    EPIDURAL Left 10/17/2022    Procedure: LUMBAR/SACRAL TRANSFORAMINAL EPIDURAL Left L4-5 and left L5-S1 - hold plavix 7 days before;  Surgeon: Yasmine Gaming MD;  Location: [facility];  Service: Pain Management;  Laterality: Left;    PACEMAKER IMPLANTATION      PIRIFORMUS INJECTION Left 07/15/2022    Procedure: PIRIFORMIS INJECTION;  Surgeon: Yasmine Gaming MD;  [facility];  Service: Pain Management;  Laterality: Left;    RADIOFREQUENCY ABLATION Left 02/20/2023    Procedure: RADIOFREQUENCY ABLATION NERVES for the left sacroiliac joint;  Surgeon: Yasmine Gaming MD;  [facility]: [facility];  Service: Pain Management;  Laterality: Left;    SACROILIAC JOINT INJECTION Left 05/09/2022    Procedure: SACROILIAC INJECTION - Left;  Surgeon: Yasmine Gaming MD;  [facility];  Service: Pain Management;  Laterality: Left;    SACROILIAC JOINT INJECTION Left 07/15/2022    Procedure: left Sacroiliac joint injection and left piriformis injection;  Surgeon: Yasmine Gaming MD;  Location: [facility];  Service: Pain Management;  Laterality: Left;    TUBAL ABDOMINAL LIGATION      VENOUS ACCESS  "DEVICE (PORT) INSERTION N/A 11/19/2024    Procedure: INSERTION VENOUS ACCESS DEVICE;  Surgeon: Caden Christian MD;  Location: [facility];  Service: General;  Laterality: N/A;             Anthropometrics        Current Height Ht Readings from Last 1 Encounters:   12/05/24 152.4 cm (60\")      Current Weight Wt Readings from Last 1 Encounters:   12/06/24 46 kg (101 lb 6.4 oz)      BMI  19.8   Ideal Body Weight (IBW) 100lb   Usual Body Weight (UBW) 120lb   Weight Change/Trend Loss   Weight History Wt Readings from Last 30 Encounters:   12/06/24 0802 46 kg (101 lb 6.4 oz)   12/05/24 0916 46.4 kg (102 lb 3.2 oz)   11/21/24 0931 47.3 kg (104 lb 4.8 oz)   11/13/24 1422 46.3 kg (102 lb)   11/13/24 1344 46.7 kg (103 lb)   11/07/24 0802 47.2 kg (104 lb)   11/04/24 0932 47.8 kg (105 lb 6.4 oz)   10/28/24 1013 49.4 kg (109 lb)   10/21/24 0952 49.4 kg (109 lb)   09/24/24 1004 51.7 kg (114 lb)   06/06/24 1007 52.2 kg (115 lb)   03/07/24 0902 54 kg (119 lb)   12/07/23 1011 54.4 kg (120 lb)   09/19/23 0941 55.3 kg (122 lb)   06/06/23 0900 54.9 kg (121 lb)   05/18/23 1523 52.7 kg (116 lb 3.2 oz)   04/04/23 0943 54.3 kg (119 lb 12.8 oz)   03/07/23 1416 54.4 kg (120 lb)   02/20/23 1053 52.2 kg (115 lb)   02/15/23 1428 52.2 kg (115 lb)   01/26/23 0917 55.2 kg (121 lb 12.8 oz)   12/13/22 0753 54.2 kg (119 lb 6.4 oz)   12/06/22 0922 53.1 kg (117 lb)   12/02/22 0938 52.2 kg (115 lb)   10/17/22 1229 56.2 kg (124 lb)   09/22/22 0954 57.6 kg (127 lb)   09/20/22 1138 56.2 kg (124 lb)   08/16/22 1138 57.2 kg (126 lb 3.2 oz)   07/15/22 0841 56.7 kg (125 lb)   06/20/22 1111 55.8 kg (123 lb)   06/06/22 1355 56 kg (123 lb 6.4 oz)          Medications           Current medications: OLANZapine, aspirin, clopidogrel, dapagliflozin Propanediol, glipizide, hydrALAZINE, hydroCHLOROthiazide, lidocaine-prilocaine, metFORMIN, metoprolol tartrate, multivitamin with minerals, nitroglycerin, ondansetron, potassium chloride, and rosuvastatin        " "         Tests/Procedures        Tests/Procedures Chemotherapy     Labs       Pertinent Labs    Results from last 7 days   Lab Units 12/05/24  0828   SODIUM mmol/L 134*   POTASSIUM mmol/L 3.9   CHLORIDE mmol/L 97*   CO2 mmol/L 24.7   BUN mg/dL 21   CREATININE mg/dL 1.06*   CALCIUM mg/dL 8.9   BILIRUBIN mg/dL 0.4   ALK PHOS U/L 219*   ALT (SGPT) U/L 18   AST (SGOT) U/L 37*   GLUCOSE mg/dL 209*     Results from last 7 days   Lab Units 12/05/24  0828   HEMOGLOBIN g/dL 10.9*   HEMATOCRIT % 35.0   WBC 10*3/mm3 0.52*   ALBUMIN g/dL 3.3*     Results from last 7 days   Lab Units 12/05/24  0828   PLATELETS 10*3/mm3 119*     No results found for: \"COVID19\"  Lab Results   Component Value Date    HGBA1C 8.3 (H) 09/23/2024          Physical Findings        Physical Appearance alert, loss of muscle mass, loss of subcutaneous fat, oriented     Edema  no edema   Gastrointestinal None   Tubes/Lines/Drains Implantable Port   Oral/Mouth Cavity WNL   Skin Intact       Estimated/Assessed Needs        Energy Requirements    Height for Calculation      Weight for Calculation 47.3 kg   Method for Estimation  35 kcal/kg   EST Needs (kcal/day) 1655 kcals per day       Protein Requirements    Weight for Calculation 47.3 kg   EST Protein Needs (g/kg) 1.2 gm/kg   EST Daily Needs (g/day) 56 gms per day       Fluid Requirements     Method for Estimation 1 mL/kcal    Estimated Needs (mL/day) 1500 mLs per day         NUTRITION INTERVENTION / PLAN OF CARE      Intervention Goal(s) Maintain nutrition status, Reduce/improve symptoms, Provide information, Meet estimated needs, Disease management/therapy, Tolerate PO , Increase intake, and Appropriate weight gain         RD Intervention/Action Encouraged intake, Follow Tx progress, Recommended/ordered         Recommendations:       PO Diet Consume calorie and protein dense healthy foods, 6 small meals      Supplements       Snacks       Other    New Prescription Ordered? No, recommend   --    "   Monitor/Evaluation Follow up as needed   Education Education provided   --    Electronically signed by:  Leann Shabazz RD  12/06/24 09:26 EST

## 2024-12-07 LAB
BACTERIA SPEC RESP CULT: NORMAL
GRAM STN SPEC: NORMAL

## 2024-12-09 LAB — REF LAB TEST RESULTS: NORMAL

## 2024-12-10 ENCOUNTER — INFUSION (OUTPATIENT)
Dept: ONCOLOGY | Facility: HOSPITAL | Age: 89
End: 2024-12-10
Payer: MEDICARE

## 2024-12-10 ENCOUNTER — OFFICE VISIT (OUTPATIENT)
Dept: ONCOLOGY | Facility: CLINIC | Age: 89
End: 2024-12-10
Payer: MEDICARE

## 2024-12-10 VITALS
RESPIRATION RATE: 17 BRPM | TEMPERATURE: 97.2 F | SYSTOLIC BLOOD PRESSURE: 134 MMHG | HEART RATE: 64 BPM | HEIGHT: 60 IN | BODY MASS INDEX: 19.89 KG/M2 | OXYGEN SATURATION: 98 % | DIASTOLIC BLOOD PRESSURE: 71 MMHG | WEIGHT: 101.3 LBS

## 2024-12-10 DIAGNOSIS — D70.1 CHEMOTHERAPY INDUCED NEUTROPENIA: Primary | ICD-10-CM

## 2024-12-10 DIAGNOSIS — C78.7 METASTATIC COLON CANCER TO LIVER: ICD-10-CM

## 2024-12-10 DIAGNOSIS — C78.7 METASTATIC COLON CANCER TO LIVER: Primary | ICD-10-CM

## 2024-12-10 DIAGNOSIS — C18.9 METASTATIC COLON CANCER TO LIVER: ICD-10-CM

## 2024-12-10 DIAGNOSIS — C18.9 METASTATIC COLON CANCER TO LIVER: Primary | ICD-10-CM

## 2024-12-10 DIAGNOSIS — T45.1X5A CHEMOTHERAPY INDUCED NEUTROPENIA: Primary | ICD-10-CM

## 2024-12-10 LAB
ALBUMIN SERPL-MCNC: 3.1 G/DL (ref 3.5–5.2)
ALBUMIN/GLOB SERPL: 1.1 G/DL
ALP SERPL-CCNC: 214 U/L (ref 39–117)
ALT SERPL W P-5'-P-CCNC: 16 U/L (ref 1–33)
ANION GAP SERPL CALCULATED.3IONS-SCNC: 13.8 MMOL/L (ref 5–15)
AST SERPL-CCNC: 31 U/L (ref 1–32)
BASOPHILS # BLD AUTO: 0.04 10*3/MM3 (ref 0–0.2)
BASOPHILS NFR BLD AUTO: 0.3 % (ref 0–1.5)
BILIRUB SERPL-MCNC: 0.2 MG/DL (ref 0–1.2)
BILIRUB UR QL STRIP: NEGATIVE
BUN SERPL-MCNC: 31 MG/DL (ref 8–23)
BUN/CREAT SERPL: 23.7 (ref 7–25)
CALCIUM SPEC-SCNC: 8.7 MG/DL (ref 8.6–10.5)
CHLORIDE SERPL-SCNC: 106 MMOL/L (ref 98–107)
CLARITY UR: CLEAR
CO2 SERPL-SCNC: 20.2 MMOL/L (ref 22–29)
COLOR UR: YELLOW
CREAT SERPL-MCNC: 1.31 MG/DL (ref 0.57–1)
DEPRECATED RDW RBC AUTO: 52.3 FL (ref 37–54)
EGFRCR SERPLBLD CKD-EPI 2021: 39.5 ML/MIN/1.73
EOSINOPHIL # BLD AUTO: 0.09 10*3/MM3 (ref 0–0.4)
EOSINOPHIL NFR BLD AUTO: 0.7 % (ref 0.3–6.2)
ERYTHROCYTE [DISTWIDTH] IN BLOOD BY AUTOMATED COUNT: 19.4 % (ref 12.3–15.4)
GLOBULIN UR ELPH-MCNC: 2.9 GM/DL
GLUCOSE SERPL-MCNC: 175 MG/DL (ref 65–99)
GLUCOSE UR STRIP-MCNC: ABNORMAL MG/DL
HCT VFR BLD AUTO: 35.1 % (ref 34–46.6)
HGB BLD-MCNC: 10.7 G/DL (ref 12–15.9)
HGB UR QL STRIP.AUTO: NEGATIVE
IMM GRANULOCYTES # BLD AUTO: 1.26 10*3/MM3 (ref 0–0.05)
IMM GRANULOCYTES NFR BLD AUTO: 9.4 % (ref 0–0.5)
KETONES UR QL STRIP: NEGATIVE
LEUKOCYTE ESTERASE UR QL STRIP.AUTO: ABNORMAL
LYMPHOCYTES # BLD AUTO: 2 10*3/MM3 (ref 0.7–3.1)
LYMPHOCYTES NFR BLD AUTO: 14.9 % (ref 19.6–45.3)
MCH RBC QN AUTO: 26.4 PG (ref 26.6–33)
MCHC RBC AUTO-ENTMCNC: 30.5 G/DL (ref 31.5–35.7)
MCV RBC AUTO: 86.5 FL (ref 79–97)
MONOCYTES # BLD AUTO: 2.2 10*3/MM3 (ref 0.1–0.9)
MONOCYTES NFR BLD AUTO: 16.4 % (ref 5–12)
NEUTROPHILS NFR BLD AUTO: 58.3 % (ref 42.7–76)
NEUTROPHILS NFR BLD AUTO: 7.83 10*3/MM3 (ref 1.7–7)
NITRITE UR QL STRIP: NEGATIVE
NRBC BLD AUTO-RTO: 0.2 /100 WBC (ref 0–0.2)
PH UR STRIP.AUTO: 5.5 [PH] (ref 4.5–8)
PLATELET # BLD AUTO: 379 10*3/MM3 (ref 140–450)
PMV BLD AUTO: 9.7 FL (ref 6–12)
POTASSIUM SERPL-SCNC: 4.5 MMOL/L (ref 3.5–5.2)
PROT SERPL-MCNC: 6 G/DL (ref 6–8.5)
PROT UR QL STRIP: ABNORMAL
RBC # BLD AUTO: 4.06 10*6/MM3 (ref 3.77–5.28)
SODIUM SERPL-SCNC: 140 MMOL/L (ref 136–145)
SP GR UR STRIP: 1.01 (ref 1–1.03)
UROBILINOGEN UR QL STRIP: ABNORMAL
WBC NRBC COR # BLD AUTO: 13.42 10*3/MM3 (ref 3.4–10.8)

## 2024-12-10 PROCEDURE — 25010000002 OXALIPLATIN PER 0.5 MG: Performed by: INTERNAL MEDICINE

## 2024-12-10 PROCEDURE — 96413 CHEMO IV INFUSION 1 HR: CPT

## 2024-12-10 PROCEDURE — 81003 URINALYSIS AUTO W/O SCOPE: CPT

## 2024-12-10 PROCEDURE — 96367 TX/PROPH/DG ADDL SEQ IV INF: CPT

## 2024-12-10 PROCEDURE — 25010000002 DEXAMETHASONE SODIUM PHOSPHATE 100 MG/10ML SOLUTION: Performed by: INTERNAL MEDICINE

## 2024-12-10 PROCEDURE — 96411 CHEMO IV PUSH ADDL DRUG: CPT

## 2024-12-10 PROCEDURE — 25010000002 FOSAPREPITANT PER 1 MG: Performed by: INTERNAL MEDICINE

## 2024-12-10 PROCEDURE — 96366 THER/PROPH/DIAG IV INF ADDON: CPT

## 2024-12-10 PROCEDURE — G0498 CHEMO EXTEND IV INFUS W/PUMP: HCPCS

## 2024-12-10 PROCEDURE — 25010000002 PALONOSETRON PER 25 MCG: Performed by: INTERNAL MEDICINE

## 2024-12-10 PROCEDURE — 25810000003 SODIUM CHLORIDE 0.9 % SOLUTION 250 ML FLEX CONT: Performed by: INTERNAL MEDICINE

## 2024-12-10 PROCEDURE — 80053 COMPREHEN METABOLIC PANEL: CPT

## 2024-12-10 PROCEDURE — 25010000003 DEXTROSE 5 % SOLUTION: Performed by: INTERNAL MEDICINE

## 2024-12-10 PROCEDURE — 85025 COMPLETE CBC W/AUTO DIFF WBC: CPT

## 2024-12-10 PROCEDURE — 25010000002 LEUCOVORIN CALCIUM PER 50 MG: Performed by: INTERNAL MEDICINE

## 2024-12-10 PROCEDURE — 96368 THER/DIAG CONCURRENT INF: CPT

## 2024-12-10 PROCEDURE — 25010000003 DEXTROSE 5 % SOLUTION 250 ML FLEX CONT: Performed by: INTERNAL MEDICINE

## 2024-12-10 PROCEDURE — 96415 CHEMO IV INFUSION ADDL HR: CPT

## 2024-12-10 PROCEDURE — 25010000002 FLUOROURACIL PER 500 MG: Performed by: INTERNAL MEDICINE

## 2024-12-10 PROCEDURE — 96375 TX/PRO/DX INJ NEW DRUG ADDON: CPT

## 2024-12-10 RX ORDER — DEXTROSE MONOHYDRATE 50 MG/ML
20 INJECTION, SOLUTION INTRAVENOUS ONCE
Status: CANCELLED | OUTPATIENT
Start: 2024-12-10

## 2024-12-10 RX ORDER — FAMOTIDINE 10 MG/ML
20 INJECTION, SOLUTION INTRAVENOUS AS NEEDED
Status: CANCELLED | OUTPATIENT
Start: 2024-12-10

## 2024-12-10 RX ORDER — PALONOSETRON 0.05 MG/ML
0.25 INJECTION, SOLUTION INTRAVENOUS ONCE
Status: CANCELLED | OUTPATIENT
Start: 2024-12-10

## 2024-12-10 RX ORDER — SODIUM CHLORIDE 9 MG/ML
20 INJECTION, SOLUTION INTRAVENOUS ONCE
Status: DISCONTINUED | OUTPATIENT
Start: 2024-12-10 | End: 2024-12-10 | Stop reason: HOSPADM

## 2024-12-10 RX ORDER — DIPHENHYDRAMINE HYDROCHLORIDE 50 MG/ML
50 INJECTION INTRAMUSCULAR; INTRAVENOUS AS NEEDED
Status: CANCELLED | OUTPATIENT
Start: 2024-12-10

## 2024-12-10 RX ORDER — PALONOSETRON 0.05 MG/ML
0.25 INJECTION, SOLUTION INTRAVENOUS ONCE
Status: COMPLETED | OUTPATIENT
Start: 2024-12-10 | End: 2024-12-10

## 2024-12-10 RX ORDER — HYDROCORTISONE SODIUM SUCCINATE 100 MG/2ML
100 INJECTION INTRAMUSCULAR; INTRAVENOUS AS NEEDED
Status: CANCELLED | OUTPATIENT
Start: 2024-12-10

## 2024-12-10 RX ORDER — FLUOROURACIL 50 MG/ML
200 INJECTION, SOLUTION INTRAVENOUS ONCE
Status: COMPLETED | OUTPATIENT
Start: 2024-12-10 | End: 2024-12-10

## 2024-12-10 RX ORDER — SODIUM CHLORIDE 9 MG/ML
20 INJECTION, SOLUTION INTRAVENOUS ONCE
Status: CANCELLED | OUTPATIENT
Start: 2024-12-10

## 2024-12-10 RX ORDER — FLUOROURACIL 50 MG/ML
200 INJECTION, SOLUTION INTRAVENOUS ONCE
Status: CANCELLED | OUTPATIENT
Start: 2024-12-10

## 2024-12-10 RX ORDER — DEXTROSE MONOHYDRATE 50 MG/ML
20 INJECTION, SOLUTION INTRAVENOUS ONCE
Status: COMPLETED | OUTPATIENT
Start: 2024-12-10 | End: 2024-12-10

## 2024-12-10 RX ADMIN — LEUCOVORIN CALCIUM 280 MG: 350 INJECTION, POWDER, LYOPHILIZED, FOR SUSPENSION INTRAMUSCULAR; INTRAVENOUS at 10:36

## 2024-12-10 RX ADMIN — DEXTROSE MONOHYDRATE 20 ML/HR: 50 INJECTION, SOLUTION INTRAVENOUS at 09:22

## 2024-12-10 RX ADMIN — PALONOSETRON HYDROCHLORIDE 0.25 MG: 0.25 INJECTION INTRAVENOUS at 09:22

## 2024-12-10 RX ADMIN — DEXAMETHASONE SODIUM PHOSPHATE 12 MG: 10 INJECTION, SOLUTION INTRAMUSCULAR; INTRAVENOUS at 09:22

## 2024-12-10 RX ADMIN — FLUOROURACIL 280 MG: 50 INJECTION, SOLUTION INTRAVENOUS at 12:43

## 2024-12-10 RX ADMIN — FLUOROURACIL 1700 MG: 50 INJECTION, SOLUTION INTRAVENOUS at 12:43

## 2024-12-10 RX ADMIN — OXALIPLATIN 60 MG: 5 INJECTION, SOLUTION INTRAVENOUS at 10:36

## 2024-12-10 RX ADMIN — FOSAPREPITANT 100 ML: 150 INJECTION, POWDER, LYOPHILIZED, FOR SOLUTION INTRAVENOUS at 09:43

## 2024-12-10 NOTE — PROGRESS NOTES
[facility]    CONSULTING IN BLOOD DISORDERS & CANCER      REASON FOR CONSULTATION/CHIEF COMPLAINT:     Evaluation and management for metastatic colorectal cancer                             REQUESTING PHYSICIAN: No ref. provider found  RECORDS OBTAINED:  Records of the patients history including those from the electronic medical record were reviewed and summarized in detail.    HISTORY OF PRESENT ILLNESS:    The patient is a 89 y.o. year old female with medical history significant for CAD s/p CABG x 3 (9097, 2007), PCI's x 16, pacemaker, peptic ulcer disease, hypertension and dyslipidemia comes for metastatic colorectal cancer evaluation and management.    Patient had presented to her PCP,  in early October 2024 with lower abdominal pain and weight loss of > 20 pounds.  A CT A/P performed 10/9/2024 noted innumerable lung nodules, multiple hypodense masses in the liver and a large mass in the hepatic flexure of the colon, measuring 4.5 cm in greatest dimension.    Patient underwent CT-guided liver lesion biopsy on 10/28/2024.  Pathology: Metastatic adenocarcinoma of colorectal origin.    Patient was referred to hematology/oncology for further evaluation and management.  She was first seen in this clinic on 11//24.  Accompanied by 3 of 4 daughters.  Patient denies any major GI issues.  No blood in stool or altered bowel habits.  She does take stool softeners for regular bowel movements.  No nausea or vomiting.  No family history of colorectal cancer.    11/5/2024: PET/CT demonstrates hypermetabolic lesions in the colonic hepatic flexure, pulmonary, hepatic, osseous and zenaida metastasis.  Colonoscopy did reveal hepatic flexure mass, partially obstructing.    11/21/2024: Cycle 1 FOLFOX    INTERIM HISTORY:  Patient returns to the clinic for a follow-up visit.  She is clinically stable.  Afebrile.  She continues to have cough with whitish/yellowish sputum production.  She does report of having intermittent  epigastric/abdominal discomfort.  Appetite has been okay.  Weight stable.  Denies any blood in stool or urine.  No cardiac or respiratory symptoms.    Past Medical History:   Diagnosis Date    Anemia     Arthritis of left sacroiliac joint 05/05/2022    CKD (chronic kidney disease), stage III     PATIENT STATES IT IS AT STAGE 4 AT THIS TIME    Colorectal cancer     Coronary artery disease     cath 11/15. CABG x3 and all grafts widely patent. Very small OM and Cfx and that may be the cause of lateral ischemia on stress test. Non bipassable    Diabetes mellitus, type II     GERD (gastroesophageal reflux disease)     Gout     Heart attack 10/2022    MULTIPLE (6 OR MORE)    History of DVT in adulthood     AFTER CHILDBIRTH    Hyperlipidemia     Hypertension     Liver masses     Pacemaker     PUD (peptic ulcer disease)      Past Surgical History:   Procedure Laterality Date    CARDIAC ELECTROPHYSIOLOGY PROCEDURE N/A 02/22/2017    Procedure: Pacemaker DC new;  Surgeon: Juvenal Zhong MD;  Location: [facility] CATH INVASIVE LOCATION;  Service:     CARDIAC SURGERY  10/2022    CATARACT EXTRACTION      COLONOSCOPY  2010    COLONOSCOPY N/A 11/19/2024    Procedure: COLONOSCOPY WITH TATTOO MASS INJECTION 4 ML;  Surgeon: Caden Christian MD;  Location: [facility] MAIN OR;  Service: General;  Laterality: N/A;    CORONARY ARTERY BYPASS GRAFT      1992, 2009    EPIDURAL Left 10/17/2022    Procedure: LUMBAR/SACRAL TRANSFORAMINAL EPIDURAL Left L4-5 and left L5-S1 - hold plavix 7 days before;  Surgeon: Yasmine Gaming MD;  Location: [facility] MAIN OR;  Service: Pain Management;  Laterality: Left;    PACEMAKER IMPLANTATION      PIRIFORMUS INJECTION Left 07/15/2022    Procedure: PIRIFORMIS INJECTION;  Surgeon: Yasmine Gaming MD;  Location: [facility] MAIN OR;  Service: Pain Management;  Laterality: Left;    RADIOFREQUENCY ABLATION Left 02/20/2023    Procedure: RADIOFREQUENCY ABLATION NERVES for the left sacroiliac joint;  Surgeon: Yasmine Gaming  MD GABRIELA;  Location: [facility] MAIN OR;  Service: Pain Management;  Laterality: Left;    SACROILIAC JOINT INJECTION Left 05/09/2022    Procedure: SACROILIAC INJECTION - Left;  Surgeon: Yasmine Gaming MD;  Location: [facility] MAIN OR;  Service: Pain Management;  Laterality: Left;    SACROILIAC JOINT INJECTION Left 07/15/2022    Procedure: left Sacroiliac joint injection and left piriformis injection;  Surgeon: Yasmine Gaming MD;  Location: [facility] MAIN OR;  Service: Pain Management;  Laterality: Left;    TUBAL ABDOMINAL LIGATION      VENOUS ACCESS DEVICE (PORT) INSERTION N/A 11/19/2024    Procedure: INSERTION VENOUS ACCESS DEVICE;  Surgeon: Caden Christian MD;  Location: [facility] MAIN OR;  Service: General;  Laterality: N/A;       MEDICATIONS    Current Outpatient Medications:     aspirin 81 MG EC tablet, Take 1 tablet by mouth Daily. HOLD FOR SURGERY PER MD ORDERS, Disp: , Rfl:     clopidogrel (PLAVIX) 75 MG tablet, Take 1 tablet by mouth Daily. HOLD FOR SURGERY PER MD ORDERS, Disp: , Rfl:     dapagliflozin Propanediol (Farxiga) 10 MG tablet, TAKE 1 TABLET BY MOUTH DAILY, Disp: 90 tablet, Rfl: 1    glipizide (GLUCOTROL) 5 MG tablet, TAKE 2 TABLETS BY MOUTH EVERY MORNING WITH BREAKFAST AND ONE TABLET BY EVERY EVENING, Disp: 270 tablet, Rfl: 1    hydrALAZINE (APRESOLINE) 25 MG tablet, TAKE 1 TABLET BY MOUTH TWICE A DAY, Disp: 180 tablet, Rfl: 1    hydroCHLOROthiazide 12.5 MG tablet, Take 1 tablet by mouth Daily., Disp: 90 tablet, Rfl: 1    lidocaine-prilocaine (EMLA) 2.5-2.5 % cream, Apply 1 Application topically to the appropriate area as directed Every 2 (Two) Hours As Needed for Mild Pain., Disp: 30 g, Rfl: 3    metFORMIN (GLUCOPHAGE) 500 MG tablet, Take 2 tablets by mouth 2 (Two) Times a Day With Meals., Disp: 360 tablet, Rfl: 1    metoprolol tartrate (LOPRESSOR) 50 MG tablet, TAKE 2 TABLETS BY MOUTH TWICE A DAY, Disp: 360 tablet, Rfl: 1    multivitamin with minerals (CENTRUM SILVER 50+WOMEN PO), Take 1 tablet by  "mouth Daily. HOLD FOR SURGERY, Disp: , Rfl:     nitroglycerin (NITROSTAT) 0.4 MG SL tablet, 1 tablet., Disp: , Rfl:     OLANZapine (zyPREXA) 5 MG tablet, Take 0.5 tablets by mouth Every Night., Disp: 30 tablet, Rfl: 1    ondansetron (ZOFRAN) 8 MG tablet, Take 1 tablet by mouth 3 (Three) Times a Day As Needed for Nausea or Vomiting., Disp: 30 tablet, Rfl: 5    potassium chloride (KLOR-CON M20) 20 MEQ CR tablet, Take 1 tablet by mouth Daily., Disp: 30 tablet, Rfl: 0    rosuvastatin (Crestor) 40 MG tablet, Take 1 tablet by mouth Daily., Disp: 90 tablet, Rfl: 3    ALLERGIES:     Allergies   Allergen Reactions    Pneumococcal Vaccines Paresthesia       SOCIAL HISTORY:       Social History     Socioeconomic History    Marital status:    Tobacco Use    Smoking status: Never     Passive exposure: Never    Smokeless tobacco: Never   Vaping Use    Vaping status: Never Used   Substance and Sexual Activity    Alcohol use: Never    Drug use: Never         FAMILY HISTORY:  Family History   Problem Relation Age of Onset    Coronary artery disease Mother     Heart attack Sister     Malig Hyperthermia Neg Hx        REVIEW OF SYSTEMS:  As per HPI         Vitals:    12/10/24 0829   BP: 134/71   Pulse: 64   Resp: 17   Temp: 97.2 °F (36.2 °C)   TempSrc: Oral   SpO2: 98%   Weight: 45.9 kg (101 lb 4.8 oz)   Height: 152.4 cm (60\")   PainSc: 0-No pain           12/10/2024     8:22 AM   Current Status   ECOG score 0      PHYSICAL EXAM:    CONSTITUTIONAL:  Vital signs reviewed.  No distress, looks comfortable.  EYES:  Conjunctiva and lids unremarkable.   EARS,NOSE,MOUTH,THROAT:  Ears and nose appear unremarkable.  Lips, teeth, gums appear unremarkable.  RESPIRATORY:  Normal respiratory effort.  Lungs clear to auscultation bilaterally.  CARDIOVASCULAR:  Normal S1, S2.  No murmurs rubs or gallops.  No significant lower extremity edema.  GASTROINTESTINAL: Abdomen appears unremarkable.  Nondistended  LYMPHATIC:  No cervical, " supraclavicular lymphadenopathy.  NEURO: AAOx3, no focal deficits.  Appears to have equal strength all 4 extremities.  MUSCULOSKELETAL:  Unremarkable digits/nails.  No cyanosis or clubbing.  No apparent joint deformities.  SKIN:  Warm.  No rashes.  PSYCHIATRIC:  Normal judgment and insight.  Normal mood and affect.     RECENT LABS:  Reviewed    ASSESSMENT:  Patient is a pleasant 88-year-old female with medical history significant for CAD s/p CABG x 3 (1997, 2007), PCI's x 16, pacemaker, peptic ulcer disease, hypertension and dyslipidemia comes for metastatic colorectal cancer evaluation and management.    # Metastatic colorectal cancer with liver and lung metastasis, right sided:  Patient had presented to her PCP,  in early October 2024 with lower abdominal pain and weight loss of > 20 pounds.    A CT A/P performed 10/9/2024 noted innumerable lung nodules, multiple hypodense masses in the liver and a large mass in the hepatic flexure of the colon, measuring 4.5 cm in greatest dimension.  CT-guided liver lesion biopsy on 10/28/2024. Pathology: Metastatic adenocarcinoma of colorectal origin.  CEA 5386.0 (10/21/2024)  I reviewed the imaging and pathology findings with patient and her family in detail.  Informed that she has been diagnosed with metastatic colorectal cancer with diffuse metastasis.  Reviewed  natural history, prognosis and palliative treatment options.  Patient is interested in receiving cancer directed therapy.  With her age and significant cardiovascular medical comorbidity-treatment with palliative chemotherapy would be challenging.  [facility] NGS shows MSI stable.  Low TMB.  No NRAS/KRAS/BRAF mutations. [id number] liquid NGS Shows MSI stable.  SHANE mutation, FGFR 1 and EGFR gene amplifications.   [facility] germline mutation testing pending  11/21/2024: Cycle 1 FOLFOX.    12/5/2024: Was seen for cycle 2 FOLFOX, however noted to have severe neutropenia with WBC count of 0.52 and ANC of 0.08.   Chemotherapy was held and received Neupogen x 2 doses.     Patient has DPYD intermediate mutation.  Given prolonged neutropenia, age and presence of DPYD mutation-plan made to administer 50% dose of FOLFOX with G-CSF support.   12/10/2024: Clinically stable.  Neutropenia resolved, ANC 7.83 today.  Persistent mild anemia of 10.7.  Will proceed with cycle 2 FOLFOX (50% of original dose) with Neulasta on pro.  Due to [place] holiday, will delay cycle 3 by 1 week.  Will see her back in 3 weeks for port, labs and tentative cycle 3 FOLFOX with addition of Avastin.  Patient reports of having intermittent epigastric/chest discomfort.  Given extensive cardiac history, advised to closely monitor and call us or cardiology in case of worsening/persistent symptoms.  Advised patient to call us back with any questions or concerns.      # Anemia:  Hemoglobin 11.3 g/dL on 11/4/24.  Iron profile most consistent with iron deficiency.  11/21/2024: Patient reports of constipation.  Has poor tolerance to oral iron.    11/21/2024: Received IV Injectafer 700 mg x 1 dose    # CAD s/p CABG (1997 and 2007) and PCI x 16:   Follows up with , [facility]ology at the [facility]  On aspirin 81 mg and Plavix 75 mg daily    # DM2:  Currently on Farxiga, glipizide and metformin.    Denies any neuropathy.  Per patient, last A1c was 8.6.  Being managed by PCP    PLAN:   -Proceed with cycle 2 FOLFOX (50% of the original dose) with Neulasta on pro.    - Due to Christmas holiday, will delay cycle 3 by 1 week.    -Follow-up with NP in 3 weeks for port, labs and tentative cycle 3 FOLFOX with addition of Avastin.    - MD follow-up in 6 weeks for tentative cycle 4 FOLFOX/Avastin or sooner if needed.  -Advised close follow-up with cardiology and other specialists  No orders of the defined types were placed in this encounter.  Total time spent during this patient encounter is 41 minutes. The total time spent with the patient includes: preparing to  see the patient by reviewing of tests, prior notes or other relevant information, performing appropriate independent examination & evaluation, counseling, ordering of medications, tests or procedures, documenting clinic information in the electronic medical records or other health records, independently interpreting results of tests and communicating the results to the patient/family or caregiver.

## 2024-12-12 ENCOUNTER — INFUSION (OUTPATIENT)
Dept: ONCOLOGY | Facility: HOSPITAL | Age: 89
End: 2024-12-12
Payer: MEDICARE

## 2024-12-12 DIAGNOSIS — Z45.2 ENCOUNTER FOR ADJUSTMENT OR MANAGEMENT OF VASCULAR ACCESS DEVICE: ICD-10-CM

## 2024-12-12 DIAGNOSIS — C78.7 METASTATIC COLON CANCER TO LIVER: Primary | ICD-10-CM

## 2024-12-12 DIAGNOSIS — C18.9 METASTATIC COLON CANCER TO LIVER: Primary | ICD-10-CM

## 2024-12-12 PROCEDURE — 25010000002 PEGFILGRASTIM 6 MG/0.6ML PREFILLED SYRINGE KIT: Performed by: INTERNAL MEDICINE

## 2024-12-12 PROCEDURE — 96377 APPLICATON ON-BODY INJECTOR: CPT

## 2024-12-12 PROCEDURE — 25010000002 HEPARIN LOCK FLUSH PER 10 UNITS: Performed by: INTERNAL MEDICINE

## 2024-12-12 RX ORDER — HEPARIN SODIUM (PORCINE) LOCK FLUSH IV SOLN 100 UNIT/ML 100 UNIT/ML
500 SOLUTION INTRAVENOUS AS NEEDED
Status: DISCONTINUED | OUTPATIENT
Start: 2024-12-12 | End: 2024-12-12 | Stop reason: HOSPADM

## 2024-12-12 RX ORDER — HEPARIN SODIUM (PORCINE) LOCK FLUSH IV SOLN 100 UNIT/ML 100 UNIT/ML
500 SOLUTION INTRAVENOUS AS NEEDED
OUTPATIENT
Start: 2024-12-12

## 2024-12-12 RX ORDER — SODIUM CHLORIDE 0.9 % (FLUSH) 0.9 %
10 SYRINGE (ML) INJECTION AS NEEDED
OUTPATIENT
Start: 2024-12-12

## 2024-12-12 RX ORDER — SODIUM CHLORIDE 0.9 % (FLUSH) 0.9 %
10 SYRINGE (ML) INJECTION AS NEEDED
Status: DISCONTINUED | OUTPATIENT
Start: 2024-12-12 | End: 2024-12-12 | Stop reason: HOSPADM

## 2024-12-12 RX ADMIN — PEGFILGRASTIM 6 MG: KIT SUBCUTANEOUS at 11:03

## 2024-12-12 RX ADMIN — Medication 500 UNITS: at 11:13

## 2024-12-12 RX ADMIN — Medication 10 ML: at 11:13

## 2024-12-19 RX ORDER — POTASSIUM CHLORIDE 1500 MG/1
20 TABLET, EXTENDED RELEASE ORAL DAILY
Qty: 30 TABLET | Refills: 0 | Status: SHIPPED | OUTPATIENT
Start: 2024-12-19

## 2024-12-20 ENCOUNTER — PATIENT OUTREACH (OUTPATIENT)
Dept: ONCOLOGY | Facility: HOSPITAL | Age: 89
End: 2024-12-20
Payer: MEDICARE

## 2024-12-20 NOTE — PROGRESS NOTES
"Phoned Tamie and introduced myself as a nurse navigator and explained the services a nurse navigator can provide during her treatment such as:         She is an 89 yo female newly dx with colorectal adenocarcinoma with liver mets, she is receiving FOLFOX/Avastin with G-CSF support. She reports feeling good today and denies any issues. She had questions regarding her  chemo dose reduction and we discussed she will receive the same chemo drugs at at 50% dose reduction. The goal is to lessen her side effects and to keep her on the schedule of every 2 weeks.  She was aware that her \"white count is low\" and we discussed neutropenic precautions. To avoid anyone who is sick, avoid large crowds, grocery shopping. Phone Dr. Meeks's office if she runs a fever 100.1 or greater.  Advised her to wear a mask when she has to be around people especially with the holidays approaching. She had a recent visit with Leann oncology dietician and she is following pt. She has a strong support system with her daughters. Gave Tamie my direct call back number and she thanked me for calling and explaining things to her and answering her questions. Encouraged her to reach out to with any further questions or concerns.   "

## 2024-12-24 RX ORDER — GLIPIZIDE 5 MG/1
TABLET ORAL
Qty: 270 TABLET | Refills: 1 | Status: SHIPPED | OUTPATIENT
Start: 2024-12-24

## 2024-12-27 ENCOUNTER — TELEPHONE (OUTPATIENT)
Dept: ONCOLOGY | Facility: CLINIC | Age: 89
End: 2024-12-27
Payer: MEDICARE

## 2024-12-27 ENCOUNTER — PATIENT OUTREACH (OUTPATIENT)
Dept: SURGERY | Facility: CLINIC | Age: 89
End: 2024-12-27
Payer: MEDICARE

## 2024-12-27 DIAGNOSIS — R53.1 WEAKNESS: ICD-10-CM

## 2024-12-27 DIAGNOSIS — C78.7 METASTATIC COLON CANCER TO LIVER: Primary | ICD-10-CM

## 2024-12-27 DIAGNOSIS — M81.0 OSTEOPOROSIS, UNSPECIFIED OSTEOPOROSIS TYPE, UNSPECIFIED PATHOLOGICAL FRACTURE PRESENCE: Chronic | ICD-10-CM

## 2024-12-27 DIAGNOSIS — C18.9 METASTATIC COLON CANCER TO LIVER: Primary | ICD-10-CM

## 2024-12-27 NOTE — TELEPHONE ENCOUNTER
----- Message from Kae Eugene sent at 12/27/2024  3:13 PM EST -----  It is fine to place home PT referral. I know there have been issues with staffing for [facility] at least. Also OK to add order for Vit D level when she comes in next. Thanks.  ----- Message -----  From: Hannah Prieto RN  Sent: 12/27/2024   1:06 PM EST  To: [facility]      ----- Message -----  From: Kayleigh Amado RN  Sent: 12/27/2024  10:36 AM EST  To: Hannah Prieto RN    Pt's daughter Melinda called and she thinks her mom would benefit from PT. She is requesting a referral for home PT and she would also like to ask if a Vit D level could be drawn at her mom's next visit.   Thanks,  Kayleigh OROURKE

## 2024-12-27 NOTE — PROGRESS NOTES
Pt's daughter Melinda called and she thinks her mom would benefit from PT. She is requesting a referral for home PT and she would also like to ask if a Vit D level could be drawn at her mom's next visit. Staff message was sent to Hannah Meeks's nurse. Will return call to Melinda with an update.

## 2024-12-30 NOTE — PROGRESS NOTES
[facility]    CONSULTING IN BLOOD DISORDERS & CANCER      REASON FOR CONSULTATION/CHIEF COMPLAINT:     Evaluation and management for metastatic colorectal cancer                             REQUESTING PHYSICIAN: No ref. provider found  RECORDS OBTAINED:  Records of the patients history including those from the electronic medical record were reviewed and summarized in detail.    HISTORY OF PRESENT ILLNESS:    The patient is a 89 y.o. year old female with medical history significant for CAD s/p CABG x 3 (9097, 2007), PCI's x 16, pacemaker, peptic ulcer disease, hypertension and dyslipidemia comes for metastatic colorectal cancer evaluation and management.    Patient had presented to her PCP,  in early October 2024 with lower abdominal pain and weight loss of > 20 pounds.  A CT A/P performed 10/9/2024 noted innumerable lung nodules, multiple hypodense masses in the liver and a large mass in the hepatic flexure of the colon, measuring 4.5 cm in greatest dimension.    Patient underwent CT-guided liver lesion biopsy on 10/28/2024.  Pathology: Metastatic adenocarcinoma of colorectal origin.    Patient was referred to hematology/oncology for further evaluation and management.  She was first seen in this clinic on 11//24.  Accompanied by 3 of 4 daughters.  Patient denies any major GI issues.  No blood in stool or altered bowel habits.  She does take stool softeners for regular bowel movements.  No nausea or vomiting.  No family history of colorectal cancer.    11/5/2024: PET/CT demonstrates hypermetabolic lesions in the colonic hepatic flexure, pulmonary, hepatic, osseous and zenaida metastasis.  Colonoscopy did reveal hepatic flexure mass, partially obstructing.    11/21/2024: Cycle 1 FOLFOX    12/5/2024: Was seen for cycle 2 FOLFOX, however noted to have severe neutropenia with WBC count of 0.52 and ANC of 0.08.  Chemotherapy was held and received Neupogen x 2 doses.       Patient has DPYD intermediate mutation.   Given prolonged neutropenia, age, and presence of DPYD mutation-plan made to administer 50% dose of FOLFOX with G-CSF support in the form of Neulasta on pro.    INTERIM HISTORY:  Patient returns today for follow-up for cycle 3 of FOLFOX.  Today plans going forward to give Neulasta support to hopefully avoid further neutropenia.  As outlined above her chemotherapy dosages have been adjusted.    She is seen back today companied by her son.  Thankfully she tolerated most recent treatment well with no bone pain in relation to Neulasta support.  She states overall her energy has been improved and she feels more less like herself.  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  We discussed this is reasonable as long as she listens to her body.    Today her urinalysis does reveal positive nitrites.  She is asymptomatic.  We will run urine for culture.    We received a request from the patient's daughter to order physical therapy for her and this order has already been placed.    They deny other concerns at this time.        Past Medical History:   Diagnosis Date    Anemia     Arthritis of left sacroiliac joint 05/05/2022    CKD (chronic kidney disease), stage III     PATIENT STATES IT IS AT STAGE 4 AT THIS TIME    Colorectal cancer     Coronary artery disease     cath 11/15. CABG x3 and all grafts widely patent. Very small OM and Cfx and that may be the cause of lateral ischemia on stress test. Non bipassable    Diabetes mellitus, type II     GERD (gastroesophageal reflux disease)     Gout     Heart attack 10/2022    MULTIPLE (6 OR MORE)    History of DVT in adulthood     AFTER CHILDBIRTH    Hyperlipidemia     Hypertension     Liver masses     Pacemaker     PUD (peptic ulcer disease)      Past Surgical History:   Procedure Laterality Date    CARDIAC ELECTROPHYSIOLOGY PROCEDURE N/A 02/22/2017    Procedure: Pacemaker DC new;  Surgeon: Juvenal Zhong MD;  Location: [facility] INVASIVE  LOCATION;  Service:     CARDIAC SURGERY  10/2022    CATARACT EXTRACTION      COLONOSCOPY  2010    COLONOSCOPY N/A 11/19/2024    Procedure: COLONOSCOPY WITH TATTOO MASS INJECTION 4 ML;  Surgeon: Caden Christian MD;  Location:  [facility];  Service: General;  Laterality: N/A;    CORONARY ARTERY BYPASS GRAFT      1992, 2009    EPIDURAL Left 10/17/2022    Procedure: LUMBAR/SACRAL TRANSFORAMINAL EPIDURAL Left L4-5 and left L5-S1 - hold plavix 7 days before;  Surgeon: Yasmine Gaming MD;  Location: [facility];  Service: Pain Management;  Laterality: Left;    PACEMAKER IMPLANTATION      PIRIFORMUS INJECTION Left 07/15/2022    Procedure: PIRIFORMIS INJECTION;  Surgeon: Yasmine Gaming MD;  Location: [facility];  Service: Pain Management;  Laterality: Left;    RADIOFREQUENCY ABLATION Left 02/20/2023    Procedure: RADIOFREQUENCY ABLATION NERVES for the left sacroiliac joint;  Surgeon: Yasmine Gaming MD;  Location: [facility];  Service: Pain Management;  Laterality: Left;    SACROILIAC JOINT INJECTION Left 05/09/2022    Procedure: SACROILIAC INJECTION - Left;  Surgeon: Yasmine Gaming MD;  Location: [facility];  Service: Pain Management;  Laterality: Left;    SACROILIAC JOINT INJECTION Left 07/15/2022    Procedure: left Sacroiliac joint injection and left piriformis injection;  Surgeon: Yasmine Gaming MD;  Location: [facility];  Service: Pain Management;  Laterality: Left;    TUBAL ABDOMINAL LIGATION      VENOUS ACCESS DEVICE (PORT) INSERTION N/A 11/19/2024    Procedure: INSERTION VENOUS ACCESS DEVICE;  Surgeon: Caden Christian MD;  Location:  [facility];  Service: General;  Laterality: N/A;       MEDICATIONS    Current Outpatient Medications:     aspirin 81 MG EC tablet, Take 1 tablet by mouth Daily. HOLD FOR SURGERY PER MD ORDERS, Disp: , Rfl:     clopidogrel (PLAVIX) 75 MG tablet, Take 1 tablet by mouth Daily. HOLD FOR SURGERY PER MD ORDERS, Disp: , Rfl:     dapagliflozin  Propanediol (Farxiga) 10 MG tablet, TAKE 1 TABLET BY MOUTH DAILY, Disp: 90 tablet, Rfl: 1    glipizide (GLUCOTROL) 5 MG tablet, TAKE 2 TABLETS BY MOUTH EVERY MORNING WITH BREAKFAST AND TAKE ONE TABLET BY MOUTH EVERY EVENING, Disp: 270 tablet, Rfl: 1    hydrALAZINE (APRESOLINE) 25 MG tablet, TAKE 1 TABLET BY MOUTH TWICE A DAY, Disp: 180 tablet, Rfl: 1    hydroCHLOROthiazide 12.5 MG tablet, Take 1 tablet by mouth Daily., Disp: 90 tablet, Rfl: 1    Klor-Con M20 20 MEQ CR tablet, TAKE 1 TABLET BY MOUTH DAILY, Disp: 30 tablet, Rfl: 0    lidocaine-prilocaine (EMLA) 2.5-2.5 % cream, Apply 1 Application topically to the appropriate area as directed Every 2 (Two) Hours As Needed for Mild Pain., Disp: 30 g, Rfl: 3    metFORMIN (GLUCOPHAGE) 500 MG tablet, Take 2 tablets by mouth 2 (Two) Times a Day With Meals., Disp: 360 tablet, Rfl: 1    metoprolol tartrate (LOPRESSOR) 50 MG tablet, TAKE 2 TABLETS BY MOUTH TWICE A DAY, Disp: 360 tablet, Rfl: 1    multivitamin with minerals (CENTRUM SILVER 50+WOMEN PO), Take 1 tablet by mouth Daily. HOLD FOR SURGERY, Disp: , Rfl:     nitroglycerin (NITROSTAT) 0.4 MG SL tablet, 1 tablet., Disp: , Rfl:     OLANZapine (zyPREXA) 5 MG tablet, Take 0.5 tablets by mouth Every Night., Disp: 30 tablet, Rfl: 1    ondansetron (ZOFRAN) 8 MG tablet, Take 1 tablet by mouth 3 (Three) Times a Day As Needed for Nausea or Vomiting., Disp: 30 tablet, Rfl: 5    rosuvastatin (Crestor) 40 MG tablet, Take 1 tablet by mouth Daily., Disp: 90 tablet, Rfl: 3  No current facility-administered medications for this visit.    Facility-Administered Medications Ordered in Other Visits:     Bevacizumab-adcd (VEGZELMA) 240 mg in sodium chloride 0.9 % 109.6 mL chemo IVPB, 5 mg/kg (Treatment Plan Recorded), Intravenous, Once, Kae Knight APRN, Last Rate: 250 mL/hr at 12/31/24 1151, 240 mg at 12/31/24 1151    fluorouracil (ADRUCIL) 1,700 mg in sodium chloride 0.9 % 240 mL chemo infusion - FOR HOME USE, 1,200 mg/m2  "(Treatment Plan Recorded), Intravenous, Once, Eugene, Kae O., APRN    fluorouracil (ADRUCIL) chemo injection 280 mg, 200 mg/m2 (Treatment Plan Recorded), Intravenous, Once, Eugene, Kae O., APRN    leucovorin 280 mg in dextrose (D5W) 5 % 264 mL IVPB, 200 mg/m2 (Treatment Plan Recorded), Intravenous, Once, Eugene, Kae O., APRN    OXALIplatin (ELOXATIN) 60 mg in dextrose (D5W) 5 % 262 mL chemo IVPB, 42.5 mg/m2 (Treatment Plan Recorded), Intravenous, Once, Eugene, Kae O., APRN    ALLERGIES:     Allergies   Allergen Reactions    Pneumococcal Vaccines Paresthesia       SOCIAL HISTORY:       Social History     Socioeconomic History    Marital status:    Tobacco Use    Smoking status: Never     Passive exposure: Never    Smokeless tobacco: Never   Vaping Use    Vaping status: Never Used   Substance and Sexual Activity    Alcohol use: Never    Drug use: Never         FAMILY HISTORY:  Family History   Problem Relation Age of Onset    Coronary artery disease Mother     Heart attack Sister     Liseth Hyperthermia Neg Hx             Vitals:    12/31/24 1034   BP: 143/79   Pulse: 90   Resp: 17   Temp: 98.2 °F (36.8 °C)   TempSrc: Oral   SpO2: 95%   Weight: 46.8 kg (103 lb 3.2 oz)   Height: 152.4 cm (60\")   PainSc: 0-No pain           12/31/2024    10:35 AM   Current Status   ECOG score 0      PHYSICAL EXAM:    CONSTITUTIONAL:  Vital signs reviewed.  No distress, looks comfortable.  EYES:  Conjunctiva and lids unremarkable.   EARS,NOSE,MOUTH,THROAT:  Ears and nose appear unremarkable.  Lips, teeth, gums appear unremarkable.  RESPIRATORY:  Normal respiratory effort.  Lungs clear to auscultation bilaterally.  CARDIOVASCULAR:  Normal S1, S2.  No murmurs rubs or gallops.  No significant lower extremity edema.  GASTROINTESTINAL: Abdomen appears unremarkable.  Nondistended  LYMPHATIC:  No cervical, supraclavicular lymphadenopathy.  NEURO: AAOx3, no focal deficits.  Appears to have equal strength all " 4 extremities.  MUSCULOSKELETAL:  Unremarkable digits/nails.  No cyanosis or clubbing.  No apparent joint deformities.  SKIN:  Warm.  No rashes.  PSYCHIATRIC:  Normal judgment and insight.  Normal mood and affect.     RECENT LABS:  Results from last 7 days   Lab Units 12/31/24  1023   WBC 10*3/mm3 13.31*   NEUTROS ABS 10*3/mm3 9.62*   HEMOGLOBIN g/dL 10.5*   HEMATOCRIT % 33.3*   PLATELETS 10*3/mm3 187     Results from last 7 days   Lab Units 12/31/24  1023   SODIUM mmol/L 140   POTASSIUM mmol/L 4.1   CHLORIDE mmol/L 103   CO2 mmol/L 24.7   BUN mg/dL 23   CREATININE mg/dL 1.00   CALCIUM mg/dL 8.9   ALBUMIN g/dL 3.1*   BILIRUBIN mg/dL 0.2   ALK PHOS U/L 217*   ALT (SGPT) U/L 39*   AST (SGOT) U/L 61*   GLUCOSE mg/dL 199*             ASSESSMENT:  Patient is a pleasant 88-year-old female with medical history significant for CAD s/p CABG x 3 (1997, 2007), PCI's x 16, pacemaker, peptic ulcer disease, hypertension and dyslipidemia comes for metastatic colorectal cancer evaluation and management.    # Metastatic colorectal cancer with liver and lung metastasis, right sided:  Patient had presented to her PCP,  in early October 2024 with lower abdominal pain and weight loss of > 20 pounds.    A CT A/P performed 10/9/2024 noted innumerable lung nodules, multiple hypodense masses in the liver and a large mass in the hepatic flexure of the colon, measuring 4.5 cm in greatest dimension.  CT-guided liver lesion biopsy on 10/28/2024. Pathology: Metastatic adenocarcinoma of colorectal origin.  CEA 5386.0 (10/21/2024)  I reviewed the imaging and pathology findings with patient and her family in detail.  Informed that she has been diagnosed with metastatic colorectal cancer with diffuse metastasis.  Reviewed  natural history, prognosis and palliative treatment options.  Patient is interested in receiving cancer directed therapy.  With her age and significant cardiovascular medical comorbidity-treatment with palliative  chemotherapy would be challenging.  [facility] NGS shows MSI stable.  Low TMB.  No NRAS/KRAS/BRAF mutations. [id number] liquid NGS Shows MSI stable.  SHANE mutation, FGFR 1 and EGFR gene amplifications.   [facility] germline mutation testing pending  11/21/2024: Cycle 1 FOLFOX.    12/5/2024: Was seen for cycle 2 FOLFOX, however noted to have severe neutropenia with WBC count of 0.52 and ANC of 0.08.  Chemotherapy was held and received Neupogen x 2 doses.     Patient has DPYD intermediate mutation.  Given prolonged neutropenia, age and presence of DPYD mutation-plan made to administer 50% dose of FOLFOX with G-CSF support.   12/10/2024: Clinically stable.  Neutropenia resolved, ANC 7.83 today.  Persistent mild anemia of 10.7.  Will proceed with cycle 2 FOLFOX (50% of original dose) with Neulasta on pro.  Due to [place] holiday, will delay cycle 3 by 1 week.    12/31/2024 patient due today for cycle 6 FOLFOX with plans to add bevacizumab.  Plan to continue Neulasta support.  She is tolerating treatment very well and we will proceed.  Will reimage after cycle 4.    # Anemia:  Hemoglobin 11.3 g/dL on 11/4/24.  Iron profile most consistent with iron deficiency.  11/21/2024: Patient reports of constipation.  Has poor tolerance to oral iron.    11/21/2024: Received IV Injectafer 700 mg x 1 dose  12/31/2024 Hgb 10.5    # CAD s/p CABG (1997 and 2007) and PCI x 16:   Follows up with , [facility]  On aspirin 81 mg and Plavix 75 mg daily    # DM2:  Currently on Farxiga, glipizide and metformin.    Denies any neuropathy.  Per patient, last A1c was 8.6.  Being managed by PCP    PLAN:   -Proceed with cycle 3 FOLFOX (50% of the original dose) with the addition of bevacizumab today. This is given delayed 1 week due to the [place] holiday.  -Continue Neulasta on pro placed on day 3 following 5-FU unhook.  -PT recently ordered per family request. 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  -Return in 2 weeks for NP follow-up and cycle 4 FOLFOX/bevacizumab  -In 3 weeks repeat CT scans chest, abdomen, pelvis with contrast.   -Return in 4 weeks for follow-up with Dr. Meeks, scan review, and potential continuation of FOLFOX/bevacizumab.      Orders Placed This Encounter   Procedures    CT chest w contrast     Standing Status:   Future     Standing Expiration Date:   12/31/2025     Order Specific Question:   Does this exam require Iam Bronch Protocol?     Answer:   No     Order Specific Question:   Release to patient     Answer:   Routine Release [[id number]]     Order Specific Question:   Reason for Exam:     Answer:   metastatic colon cancer with liver and lung mets     Order Specific Question:   Does this patient have a diabetic monitoring/medication delivering device on?     Answer:   No    CT abdomen pelvis w contrast     Standing Status:   Future     Standing Expiration Date:   12/31/2025     Order Specific Question:   Will Oral Contrast be needed for this procedure?     Answer:   Yes     Order Specific Question:   Reason for Exam:     Answer:   metastatic colon cancer with liver and lung mets     Order Specific Question:   Release to patient     Answer:   Routine Release [[id number]]    Comprehensive metabolic panel     Standing Status:   Future     Number of Occurrences:   1     Standing Expiration Date:   12/31/2025     Order Specific Question:   Release to patient     Answer:   Routine Release [[id number]]    Urinalysis without microscopic (no culture) - Urine, Clean Catch     Standing Status:   Future     Number of Occurrences:   1     Standing Expiration Date:   12/31/2025     Order Specific Question:   Release to patient     Answer:   Routine Release [[id number]]    CBC and Differential     Standing Status:   Future     Number of Occurrences:   1     Standing Expiration Date:   12/31/2025     Order Specific Question:   Manual Differential     Answer:    No     Order Specific Question:   Release to patient     Answer:   Routine Release [[id number]]     This patient is on high risk drug therapy requiring intensive monitoring for toxicity.      I spent 47 minutes caring for Tamie on this date of service. This time includes time spent by me in the following activities: preparing for the visit, reviewing tests, performing a medically appropriate examination and/or evaluation, counseling and educating the patient/family/caregiver, referring and communicating with other health care professionals, documenting information in the medical record, independently interpreting results and communicating that information with the patient/family/caregiver, care coordination, ordering medications, ordering test(s), obtaining a separately obtained history, and reviewing a separately obtained history      ADDENDUM: urine culture reviewed, likely colonization. As patient is asymptomatic, will not treat with antibiotics at this time but simply monitor.

## 2024-12-31 ENCOUNTER — OFFICE VISIT (OUTPATIENT)
Dept: ONCOLOGY | Facility: CLINIC | Age: 89
End: 2024-12-31
Payer: MEDICARE

## 2024-12-31 ENCOUNTER — INFUSION (OUTPATIENT)
Dept: ONCOLOGY | Facility: HOSPITAL | Age: 89
End: 2024-12-31
Payer: MEDICARE

## 2024-12-31 VITALS
HEART RATE: 90 BPM | HEIGHT: 60 IN | BODY MASS INDEX: 20.26 KG/M2 | SYSTOLIC BLOOD PRESSURE: 143 MMHG | RESPIRATION RATE: 17 BRPM | DIASTOLIC BLOOD PRESSURE: 79 MMHG | TEMPERATURE: 98.2 F | OXYGEN SATURATION: 95 % | WEIGHT: 103.2 LBS

## 2024-12-31 DIAGNOSIS — N18.30 STAGE 3 CHRONIC KIDNEY DISEASE, UNSPECIFIED WHETHER STAGE 3A OR 3B CKD: Primary | Chronic | ICD-10-CM

## 2024-12-31 DIAGNOSIS — M81.0 OSTEOPOROSIS, UNSPECIFIED OSTEOPOROSIS TYPE, UNSPECIFIED PATHOLOGICAL FRACTURE PRESENCE: Chronic | ICD-10-CM

## 2024-12-31 DIAGNOSIS — C18.9 METASTATIC COLON CANCER TO LIVER: Primary | ICD-10-CM

## 2024-12-31 DIAGNOSIS — C18.9 METASTATIC COLON CANCER TO LIVER: ICD-10-CM

## 2024-12-31 DIAGNOSIS — C18.9 CARCINOMA OF COLON METASTATIC TO LUNG: ICD-10-CM

## 2024-12-31 DIAGNOSIS — Z79.899 HIGH RISK MEDICATION USE: ICD-10-CM

## 2024-12-31 DIAGNOSIS — R53.1 WEAKNESS: ICD-10-CM

## 2024-12-31 DIAGNOSIS — T45.1X5A CHEMOTHERAPY INDUCED NEUTROPENIA: ICD-10-CM

## 2024-12-31 DIAGNOSIS — C78.7 METASTATIC COLON CANCER TO LIVER: Primary | ICD-10-CM

## 2024-12-31 DIAGNOSIS — D70.1 CHEMOTHERAPY INDUCED NEUTROPENIA: ICD-10-CM

## 2024-12-31 DIAGNOSIS — C78.7 METASTATIC COLON CANCER TO LIVER: ICD-10-CM

## 2024-12-31 DIAGNOSIS — C78.00 CARCINOMA OF COLON METASTATIC TO LUNG: ICD-10-CM

## 2024-12-31 LAB
25(OH)D3 SERPL-MCNC: 38.4 NG/ML (ref 30–100)
ALBUMIN SERPL-MCNC: 3.1 G/DL (ref 3.5–5.2)
ALBUMIN/GLOB SERPL: 1.1 G/DL
ALP SERPL-CCNC: 217 U/L (ref 39–117)
ALT SERPL W P-5'-P-CCNC: 39 U/L (ref 1–33)
ANION GAP SERPL CALCULATED.3IONS-SCNC: 12.3 MMOL/L (ref 5–15)
AST SERPL-CCNC: 61 U/L (ref 1–32)
BASOPHILS # BLD AUTO: 0.11 10*3/MM3 (ref 0–0.2)
BASOPHILS NFR BLD AUTO: 0.8 % (ref 0–1.5)
BILIRUB SERPL-MCNC: 0.2 MG/DL (ref 0–1.2)
BILIRUB UR QL STRIP: NEGATIVE
BUN SERPL-MCNC: 23 MG/DL (ref 8–23)
BUN/CREAT SERPL: 23 (ref 7–25)
CALCIUM SPEC-SCNC: 8.9 MG/DL (ref 8.6–10.5)
CHLORIDE SERPL-SCNC: 103 MMOL/L (ref 98–107)
CLARITY UR: CLEAR
CO2 SERPL-SCNC: 24.7 MMOL/L (ref 22–29)
COLOR UR: YELLOW
CREAT SERPL-MCNC: 1 MG/DL (ref 0.57–1)
DEPRECATED RDW RBC AUTO: 69.9 FL (ref 37–54)
EGFRCR SERPLBLD CKD-EPI 2021: 54.6 ML/MIN/1.73
EOSINOPHIL # BLD AUTO: 0.19 10*3/MM3 (ref 0–0.4)
EOSINOPHIL NFR BLD AUTO: 1.4 % (ref 0.3–6.2)
ERYTHROCYTE [DISTWIDTH] IN BLOOD BY AUTOMATED COUNT: 23.7 % (ref 12.3–15.4)
GLOBULIN UR ELPH-MCNC: 2.7 GM/DL
GLUCOSE SERPL-MCNC: 199 MG/DL (ref 65–99)
GLUCOSE UR STRIP-MCNC: ABNORMAL MG/DL
HCT VFR BLD AUTO: 33.3 % (ref 34–46.6)
HGB BLD-MCNC: 10.5 G/DL (ref 12–15.9)
HGB UR QL STRIP.AUTO: NEGATIVE
IMM GRANULOCYTES # BLD AUTO: 0.71 10*3/MM3 (ref 0–0.05)
IMM GRANULOCYTES NFR BLD AUTO: 5.3 % (ref 0–0.5)
KETONES UR QL STRIP: NEGATIVE
LEUKOCYTE ESTERASE UR QL STRIP.AUTO: NEGATIVE
LYMPHOCYTES # BLD AUTO: 1.66 10*3/MM3 (ref 0.7–3.1)
LYMPHOCYTES NFR BLD AUTO: 12.5 % (ref 19.6–45.3)
MCH RBC QN AUTO: 27.2 PG (ref 26.6–33)
MCHC RBC AUTO-ENTMCNC: 31.5 G/DL (ref 31.5–35.7)
MCV RBC AUTO: 86.3 FL (ref 79–97)
MONOCYTES # BLD AUTO: 1.02 10*3/MM3 (ref 0.1–0.9)
MONOCYTES NFR BLD AUTO: 7.7 % (ref 5–12)
NEUTROPHILS NFR BLD AUTO: 72.3 % (ref 42.7–76)
NEUTROPHILS NFR BLD AUTO: 9.62 10*3/MM3 (ref 1.7–7)
NITRITE UR QL STRIP: POSITIVE
NRBC BLD AUTO-RTO: 0.4 /100 WBC (ref 0–0.2)
PH UR STRIP.AUTO: 6 [PH] (ref 4.5–8)
PLATELET # BLD AUTO: 187 10*3/MM3 (ref 140–450)
PMV BLD AUTO: 9.6 FL (ref 6–12)
POTASSIUM SERPL-SCNC: 4.1 MMOL/L (ref 3.5–5.2)
PROT SERPL-MCNC: 5.8 G/DL (ref 6–8.5)
PROT UR QL STRIP: NEGATIVE
RBC # BLD AUTO: 3.86 10*6/MM3 (ref 3.77–5.28)
SODIUM SERPL-SCNC: 140 MMOL/L (ref 136–145)
SP GR UR STRIP: 1.01 (ref 1–1.03)
UROBILINOGEN UR QL STRIP: ABNORMAL
WBC NRBC COR # BLD AUTO: 13.31 10*3/MM3 (ref 3.4–10.8)

## 2024-12-31 PROCEDURE — 87086 URINE CULTURE/COLONY COUNT: CPT | Performed by: NURSE PRACTITIONER

## 2024-12-31 PROCEDURE — 82306 VITAMIN D 25 HYDROXY: CPT | Performed by: NURSE PRACTITIONER

## 2024-12-31 PROCEDURE — 25010000002 FLUOROURACIL PER 500 MG: Performed by: NURSE PRACTITIONER

## 2024-12-31 PROCEDURE — 96415 CHEMO IV INFUSION ADDL HR: CPT

## 2024-12-31 PROCEDURE — 25010000002 DEXAMETHASONE SODIUM PHOSPHATE 100 MG/10ML SOLUTION: Performed by: NURSE PRACTITIONER

## 2024-12-31 PROCEDURE — 85025 COMPLETE CBC W/AUTO DIFF WBC: CPT

## 2024-12-31 PROCEDURE — 96411 CHEMO IV PUSH ADDL DRUG: CPT

## 2024-12-31 PROCEDURE — 25010000002 FOSAPREPITANT PER 1 MG: Performed by: NURSE PRACTITIONER

## 2024-12-31 PROCEDURE — 25010000002 PALONOSETRON PER 25 MCG: Performed by: NURSE PRACTITIONER

## 2024-12-31 PROCEDURE — 87077 CULTURE AEROBIC IDENTIFY: CPT | Performed by: NURSE PRACTITIONER

## 2024-12-31 PROCEDURE — 81003 URINALYSIS AUTO W/O SCOPE: CPT

## 2024-12-31 PROCEDURE — 96417 CHEMO IV INFUS EACH ADDL SEQ: CPT

## 2024-12-31 PROCEDURE — 96413 CHEMO IV INFUSION 1 HR: CPT

## 2024-12-31 PROCEDURE — 25010000003 DEXTROSE 5 % SOLUTION 250 ML FLEX CONT: Performed by: NURSE PRACTITIONER

## 2024-12-31 PROCEDURE — 96375 TX/PRO/DX INJ NEW DRUG ADDON: CPT

## 2024-12-31 PROCEDURE — 96367 TX/PROPH/DG ADDL SEQ IV INF: CPT

## 2024-12-31 PROCEDURE — 87186 SC STD MICRODIL/AGAR DIL: CPT | Performed by: NURSE PRACTITIONER

## 2024-12-31 PROCEDURE — 25010000002 LEUCOVORIN CALCIUM PER 50 MG: Performed by: NURSE PRACTITIONER

## 2024-12-31 PROCEDURE — 25010000002 BEVACIZUMAB-ADCD 100 MG/4ML SOLUTION 4 ML VIAL: Performed by: NURSE PRACTITIONER

## 2024-12-31 PROCEDURE — G0498 CHEMO EXTEND IV INFUS W/PUMP: HCPCS

## 2024-12-31 PROCEDURE — 96368 THER/DIAG CONCURRENT INF: CPT

## 2024-12-31 PROCEDURE — 25810000003 SODIUM CHLORIDE 0.9 % SOLUTION 250 ML FLEX CONT: Performed by: NURSE PRACTITIONER

## 2024-12-31 PROCEDURE — 80053 COMPREHEN METABOLIC PANEL: CPT

## 2024-12-31 PROCEDURE — 25010000002 OXALIPLATIN PER 0.5 MG: Performed by: NURSE PRACTITIONER

## 2024-12-31 RX ORDER — FLUOROURACIL 50 MG/ML
200 INJECTION, SOLUTION INTRAVENOUS ONCE
Status: CANCELLED | OUTPATIENT
Start: 2024-12-31

## 2024-12-31 RX ORDER — PALONOSETRON 0.05 MG/ML
0.25 INJECTION, SOLUTION INTRAVENOUS ONCE
Status: COMPLETED | OUTPATIENT
Start: 2024-12-31 | End: 2024-12-31

## 2024-12-31 RX ORDER — HYDROCORTISONE SODIUM SUCCINATE 100 MG/2ML
100 INJECTION INTRAMUSCULAR; INTRAVENOUS AS NEEDED
Status: CANCELLED | OUTPATIENT
Start: 2024-12-31

## 2024-12-31 RX ORDER — SODIUM CHLORIDE 9 MG/ML
20 INJECTION, SOLUTION INTRAVENOUS ONCE
Status: CANCELLED | OUTPATIENT
Start: 2024-12-31

## 2024-12-31 RX ORDER — DIPHENHYDRAMINE HYDROCHLORIDE 50 MG/ML
50 INJECTION INTRAMUSCULAR; INTRAVENOUS AS NEEDED
Status: CANCELLED | OUTPATIENT
Start: 2024-12-31

## 2024-12-31 RX ORDER — FAMOTIDINE 10 MG/ML
20 INJECTION, SOLUTION INTRAVENOUS AS NEEDED
Status: CANCELLED | OUTPATIENT
Start: 2024-12-31

## 2024-12-31 RX ORDER — DEXTROSE MONOHYDRATE 50 MG/ML
20 INJECTION, SOLUTION INTRAVENOUS ONCE
Status: CANCELLED | OUTPATIENT
Start: 2024-12-31

## 2024-12-31 RX ORDER — PALONOSETRON 0.05 MG/ML
0.25 INJECTION, SOLUTION INTRAVENOUS ONCE
Status: CANCELLED | OUTPATIENT
Start: 2024-12-31

## 2024-12-31 RX ORDER — FLUOROURACIL 50 MG/ML
200 INJECTION, SOLUTION INTRAVENOUS ONCE
Status: COMPLETED | OUTPATIENT
Start: 2024-12-31 | End: 2024-12-31

## 2024-12-31 RX ADMIN — PALONOSETRON HYDROCHLORIDE 0.25 MG: 0.25 INJECTION INTRAVENOUS at 11:50

## 2024-12-31 RX ADMIN — FLUOROURACIL 280 MG: 50 INJECTION, SOLUTION INTRAVENOUS at 14:23

## 2024-12-31 RX ADMIN — OXALIPLATIN 60 MG: 5 INJECTION, SOLUTION INTRAVENOUS at 12:22

## 2024-12-31 RX ADMIN — BEVACIZUMAB-ADCD 240 MG: 100 INJECTION, SOLUTION INTRAVENOUS at 11:51

## 2024-12-31 RX ADMIN — FOSAPREPITANT DIMEGLUMINE 100 ML: 150 INJECTION, POWDER, LYOPHILIZED, FOR SOLUTION INTRAVENOUS at 11:11

## 2024-12-31 RX ADMIN — DEXAMETHASONE SODIUM PHOSPHATE 12 MG: 10 INJECTION, SOLUTION INTRAMUSCULAR; INTRAVENOUS at 11:35

## 2024-12-31 RX ADMIN — LEUCOVORIN CALCIUM 280 MG: 350 INJECTION, POWDER, LYOPHILIZED, FOR SOLUTION INTRAMUSCULAR; INTRAVENOUS at 12:22

## 2024-12-31 RX ADMIN — FLUOROURACIL 1700 MG: 50 INJECTION, SOLUTION INTRAVENOUS at 14:23

## 2025-01-01 ENCOUNTER — HOSPITAL ENCOUNTER (INPATIENT)
Facility: HOSPITAL | Age: 89
LOS: 6 days | End: 2025-05-20
Attending: EMERGENCY MEDICINE | Admitting: INTERNAL MEDICINE
Payer: MEDICARE

## 2025-01-01 ENCOUNTER — APPOINTMENT (OUTPATIENT)
Dept: CT IMAGING | Facility: HOSPITAL | Age: 89
End: 2025-01-01
Payer: MEDICARE

## 2025-01-01 ENCOUNTER — TELEPHONE (OUTPATIENT)
Dept: INTERNAL MEDICINE | Facility: CLINIC | Age: 89
End: 2025-01-01

## 2025-01-01 ENCOUNTER — HOSPITAL ENCOUNTER (INPATIENT)
Facility: HOSPITAL | Age: 89
LOS: 6 days | DRG: 951 | End: 2025-05-26
Attending: STUDENT IN AN ORGANIZED HEALTH CARE EDUCATION/TRAINING PROGRAM | Admitting: STUDENT IN AN ORGANIZED HEALTH CARE EDUCATION/TRAINING PROGRAM
Payer: COMMERCIAL

## 2025-01-01 VITALS
RESPIRATION RATE: 17 BRPM | BODY MASS INDEX: 19.48 KG/M2 | DIASTOLIC BLOOD PRESSURE: 76 MMHG | TEMPERATURE: 96.8 F | OXYGEN SATURATION: 96 % | SYSTOLIC BLOOD PRESSURE: 179 MMHG | HEIGHT: 60 IN | HEART RATE: 60 BPM

## 2025-01-01 VITALS
DIASTOLIC BLOOD PRESSURE: 78 MMHG | WEIGHT: 99 LBS | HEIGHT: 60 IN | TEMPERATURE: 98.4 F | HEART RATE: 107 BPM | BODY MASS INDEX: 19.44 KG/M2 | SYSTOLIC BLOOD PRESSURE: 149 MMHG | RESPIRATION RATE: 18 BRPM | OXYGEN SATURATION: 95 %

## 2025-01-01 DIAGNOSIS — K57.92 ACUTE DIVERTICULITIS: Primary | ICD-10-CM

## 2025-01-01 DIAGNOSIS — R62.7 FTT (FAILURE TO THRIVE) IN ADULT: ICD-10-CM

## 2025-01-01 DIAGNOSIS — N17.9 AKI (ACUTE KIDNEY INJURY): ICD-10-CM

## 2025-01-01 DIAGNOSIS — C18.9 COLON CANCER METASTASIZED TO MULTIPLE SITES: ICD-10-CM

## 2025-01-01 LAB
ALBUMIN SERPL-MCNC: 2.5 G/DL (ref 3.5–5.2)
ALBUMIN SERPL-MCNC: 2.7 G/DL (ref 3.5–5.2)
ALBUMIN SERPL-MCNC: 2.8 G/DL (ref 3.5–5.2)
ALBUMIN SERPL-MCNC: 3 G/DL (ref 3.5–5.2)
ALBUMIN SERPL-MCNC: 3.1 G/DL (ref 3.5–5.2)
ALBUMIN SERPL-MCNC: 3.2 G/DL (ref 3.5–5.2)
ALBUMIN SERPL-MCNC: 3.6 G/DL (ref 3.5–5.2)
ALBUMIN/GLOB SERPL: 0.9 G/DL
ALBUMIN/GLOB SERPL: 1 G/DL
ALBUMIN/GLOB SERPL: 1 G/DL
ALBUMIN/GLOB SERPL: 1.1 G/DL
ALBUMIN/GLOB SERPL: 1.2 G/DL
ALP SERPL-CCNC: 115 U/L (ref 39–117)
ALP SERPL-CCNC: 115 U/L (ref 39–117)
ALP SERPL-CCNC: 122 U/L (ref 39–117)
ALP SERPL-CCNC: 123 U/L (ref 39–117)
ALP SERPL-CCNC: 151 U/L (ref 39–117)
ALP SERPL-CCNC: 95 U/L (ref 39–117)
ALP SERPL-CCNC: 99 U/L (ref 39–117)
ALT SERPL W P-5'-P-CCNC: 10 U/L (ref 1–33)
ALT SERPL W P-5'-P-CCNC: 12 U/L (ref 1–33)
ALT SERPL W P-5'-P-CCNC: 13 U/L (ref 1–33)
ALT SERPL W P-5'-P-CCNC: 13 U/L (ref 1–33)
ALT SERPL W P-5'-P-CCNC: 14 U/L (ref 1–33)
ALT SERPL W P-5'-P-CCNC: 16 U/L (ref 1–33)
ALT SERPL W P-5'-P-CCNC: 18 U/L (ref 1–33)
ANION GAP SERPL CALCULATED.3IONS-SCNC: 10.3 MMOL/L (ref 5–15)
ANION GAP SERPL CALCULATED.3IONS-SCNC: 10.8 MMOL/L (ref 5–15)
ANION GAP SERPL CALCULATED.3IONS-SCNC: 12 MMOL/L (ref 5–15)
ANION GAP SERPL CALCULATED.3IONS-SCNC: 12 MMOL/L (ref 5–15)
ANION GAP SERPL CALCULATED.3IONS-SCNC: 13 MMOL/L (ref 5–15)
ANION GAP SERPL CALCULATED.3IONS-SCNC: 15 MMOL/L (ref 5–15)
ANION GAP SERPL CALCULATED.3IONS-SCNC: 9 MMOL/L (ref 5–15)
AST SERPL-CCNC: 16 U/L (ref 1–32)
AST SERPL-CCNC: 17 U/L (ref 1–32)
AST SERPL-CCNC: 18 U/L (ref 1–32)
AST SERPL-CCNC: 19 U/L (ref 1–32)
AST SERPL-CCNC: 20 U/L (ref 1–32)
AST SERPL-CCNC: 31 U/L (ref 1–32)
AST SERPL-CCNC: 38 U/L (ref 1–32)
BACTERIA SPEC AEROBE CULT: NORMAL
BACTERIA SPEC AEROBE CULT: NORMAL
BASOPHILS # BLD AUTO: 0.02 10*3/MM3 (ref 0–0.2)
BASOPHILS # BLD AUTO: 0.03 10*3/MM3 (ref 0–0.2)
BASOPHILS # BLD AUTO: 0.04 10*3/MM3 (ref 0–0.2)
BASOPHILS # BLD AUTO: 0.04 10*3/MM3 (ref 0–0.2)
BASOPHILS # BLD AUTO: 0.07 10*3/MM3 (ref 0–0.2)
BASOPHILS # BLD AUTO: 0.08 10*3/MM3 (ref 0–0.2)
BASOPHILS # BLD MANUAL: 0.12 10*3/MM3 (ref 0–0.2)
BASOPHILS NFR BLD AUTO: 0.2 % (ref 0–1.5)
BASOPHILS NFR BLD AUTO: 0.3 % (ref 0–1.5)
BASOPHILS NFR BLD AUTO: 0.3 % (ref 0–1.5)
BASOPHILS NFR BLD AUTO: 0.4 % (ref 0–1.5)
BASOPHILS NFR BLD AUTO: 0.7 % (ref 0–1.5)
BASOPHILS NFR BLD AUTO: 0.7 % (ref 0–1.5)
BASOPHILS NFR BLD MANUAL: 1 % (ref 0–1.5)
BILIRUB SERPL-MCNC: 0.2 MG/DL (ref 0–1.2)
BILIRUB SERPL-MCNC: 0.3 MG/DL (ref 0–1.2)
BILIRUB SERPL-MCNC: 0.3 MG/DL (ref 0–1.2)
BILIRUB SERPL-MCNC: 0.4 MG/DL (ref 0–1.2)
BILIRUB SERPL-MCNC: <0.2 MG/DL (ref 0–1.2)
BILIRUB UR QL STRIP: NEGATIVE
BUN SERPL-MCNC: 12 MG/DL (ref 8–23)
BUN SERPL-MCNC: 18 MG/DL (ref 8–23)
BUN SERPL-MCNC: 18 MG/DL (ref 8–23)
BUN SERPL-MCNC: 26 MG/DL (ref 8–23)
BUN SERPL-MCNC: 31 MG/DL (ref 8–23)
BUN SERPL-MCNC: 32 MG/DL (ref 8–23)
BUN SERPL-MCNC: 43 MG/DL (ref 8–23)
BUN/CREAT SERPL: 11.9 (ref 7–25)
BUN/CREAT SERPL: 12.1 (ref 7–25)
BUN/CREAT SERPL: 15.1 (ref 7–25)
BUN/CREAT SERPL: 19.5 (ref 7–25)
BUN/CREAT SERPL: 20.5 (ref 7–25)
BUN/CREAT SERPL: 20.8 (ref 7–25)
BUN/CREAT SERPL: 23.2 (ref 7–25)
CALCIUM SPEC-SCNC: 7.9 MG/DL (ref 8.6–10.5)
CALCIUM SPEC-SCNC: 8 MG/DL (ref 8.6–10.5)
CALCIUM SPEC-SCNC: 8.1 MG/DL (ref 8.6–10.5)
CALCIUM SPEC-SCNC: 8.3 MG/DL (ref 8.6–10.5)
CALCIUM SPEC-SCNC: 8.9 MG/DL (ref 8.6–10.5)
CALCIUM SPEC-SCNC: 9.2 MG/DL (ref 8.6–10.5)
CALCIUM SPEC-SCNC: 9.3 MG/DL (ref 8.6–10.5)
CHLORIDE SERPL-SCNC: 103 MMOL/L (ref 98–107)
CHLORIDE SERPL-SCNC: 104 MMOL/L (ref 98–107)
CHLORIDE SERPL-SCNC: 104 MMOL/L (ref 98–107)
CHLORIDE SERPL-SCNC: 105 MMOL/L (ref 98–107)
CHLORIDE SERPL-SCNC: 107 MMOL/L (ref 98–107)
CHLORIDE SERPL-SCNC: 110 MMOL/L (ref 98–107)
CHLORIDE SERPL-SCNC: 113 MMOL/L (ref 98–107)
CLARITY UR: CLEAR
CO2 SERPL-SCNC: 17 MMOL/L (ref 22–29)
CO2 SERPL-SCNC: 17.2 MMOL/L (ref 22–29)
CO2 SERPL-SCNC: 19.7 MMOL/L (ref 22–29)
CO2 SERPL-SCNC: 20 MMOL/L (ref 22–29)
CO2 SERPL-SCNC: 21 MMOL/L (ref 22–29)
CO2 SERPL-SCNC: 22 MMOL/L (ref 22–29)
CO2 SERPL-SCNC: 23 MMOL/L (ref 22–29)
COLOR UR: YELLOW
CREAT SERPL-MCNC: 0.99 MG/DL (ref 0.57–1)
CREAT SERPL-MCNC: 1.19 MG/DL (ref 0.57–1)
CREAT SERPL-MCNC: 1.25 MG/DL (ref 0.57–1)
CREAT SERPL-MCNC: 1.51 MG/DL (ref 0.57–1)
CREAT SERPL-MCNC: 1.56 MG/DL (ref 0.57–1)
CREAT SERPL-MCNC: 1.59 MG/DL (ref 0.57–1)
CREAT SERPL-MCNC: 1.85 MG/DL (ref 0.57–1)
D-LACTATE SERPL-SCNC: 1.3 MMOL/L (ref 0.5–2)
D-LACTATE SERPL-SCNC: 2.2 MMOL/L (ref 0.5–2)
DEPRECATED RDW RBC AUTO: 46.9 FL (ref 37–54)
DEPRECATED RDW RBC AUTO: 47.4 FL (ref 37–54)
DEPRECATED RDW RBC AUTO: 48 FL (ref 37–54)
DEPRECATED RDW RBC AUTO: 48.2 FL (ref 37–54)
DEPRECATED RDW RBC AUTO: 48.7 FL (ref 37–54)
DEPRECATED RDW RBC AUTO: 51.6 FL (ref 37–54)
DEPRECATED RDW RBC AUTO: 52.3 FL (ref 37–54)
EGFRCR SERPLBLD CKD-EPI 2021: 26.1 ML/MIN/1.73
EGFRCR SERPLBLD CKD-EPI 2021: 31.3 ML/MIN/1.73
EGFRCR SERPLBLD CKD-EPI 2021: 32 ML/MIN/1.73
EGFRCR SERPLBLD CKD-EPI 2021: 33.3 ML/MIN/1.73
EGFRCR SERPLBLD CKD-EPI 2021: 41.8 ML/MIN/1.73
EGFRCR SERPLBLD CKD-EPI 2021: 44.3 ML/MIN/1.73
EGFRCR SERPLBLD CKD-EPI 2021: 55.3 ML/MIN/1.73
EOSINOPHIL # BLD AUTO: 0.01 10*3/MM3 (ref 0–0.4)
EOSINOPHIL # BLD AUTO: 0.04 10*3/MM3 (ref 0–0.4)
EOSINOPHIL # BLD AUTO: 0.08 10*3/MM3 (ref 0–0.4)
EOSINOPHIL # BLD AUTO: 0.74 10*3/MM3 (ref 0–0.4)
EOSINOPHIL # BLD AUTO: 0.79 10*3/MM3 (ref 0–0.4)
EOSINOPHIL # BLD AUTO: 0.82 10*3/MM3 (ref 0–0.4)
EOSINOPHIL # BLD MANUAL: 0 10*3/MM3 (ref 0–0.4)
EOSINOPHIL NFR BLD AUTO: 0.1 % (ref 0.3–6.2)
EOSINOPHIL NFR BLD AUTO: 0.5 % (ref 0.3–6.2)
EOSINOPHIL NFR BLD AUTO: 0.8 % (ref 0.3–6.2)
EOSINOPHIL NFR BLD AUTO: 6.8 % (ref 0.3–6.2)
EOSINOPHIL NFR BLD AUTO: 6.9 % (ref 0.3–6.2)
EOSINOPHIL NFR BLD AUTO: 7.6 % (ref 0.3–6.2)
EOSINOPHIL NFR BLD MANUAL: 0 % (ref 0.3–6.2)
ERYTHROCYTE [DISTWIDTH] IN BLOOD BY AUTOMATED COUNT: 13.2 % (ref 12.3–15.4)
ERYTHROCYTE [DISTWIDTH] IN BLOOD BY AUTOMATED COUNT: 13.4 % (ref 12.3–15.4)
ERYTHROCYTE [DISTWIDTH] IN BLOOD BY AUTOMATED COUNT: 13.5 % (ref 12.3–15.4)
ERYTHROCYTE [DISTWIDTH] IN BLOOD BY AUTOMATED COUNT: 13.7 % (ref 12.3–15.4)
GEN 5 1HR TROPONIN T REFLEX: 42 NG/L
GLOBULIN UR ELPH-MCNC: 2.4 GM/DL
GLOBULIN UR ELPH-MCNC: 2.7 GM/DL
GLOBULIN UR ELPH-MCNC: 3.1 GM/DL
GLOBULIN UR ELPH-MCNC: 3.2 GM/DL
GLOBULIN UR ELPH-MCNC: 3.3 GM/DL
GLUCOSE BLDC GLUCOMTR-MCNC: 123 MG/DL (ref 70–130)
GLUCOSE BLDC GLUCOMTR-MCNC: 130 MG/DL (ref 70–130)
GLUCOSE BLDC GLUCOMTR-MCNC: 132 MG/DL (ref 70–130)
GLUCOSE BLDC GLUCOMTR-MCNC: 133 MG/DL (ref 70–130)
GLUCOSE BLDC GLUCOMTR-MCNC: 146 MG/DL (ref 70–130)
GLUCOSE BLDC GLUCOMTR-MCNC: 149 MG/DL (ref 70–130)
GLUCOSE BLDC GLUCOMTR-MCNC: 151 MG/DL (ref 70–130)
GLUCOSE BLDC GLUCOMTR-MCNC: 179 MG/DL (ref 70–130)
GLUCOSE BLDC GLUCOMTR-MCNC: 180 MG/DL (ref 70–130)
GLUCOSE BLDC GLUCOMTR-MCNC: 186 MG/DL (ref 70–130)
GLUCOSE BLDC GLUCOMTR-MCNC: 187 MG/DL (ref 70–130)
GLUCOSE BLDC GLUCOMTR-MCNC: 190 MG/DL (ref 70–130)
GLUCOSE BLDC GLUCOMTR-MCNC: 193 MG/DL (ref 70–130)
GLUCOSE BLDC GLUCOMTR-MCNC: 194 MG/DL (ref 70–130)
GLUCOSE BLDC GLUCOMTR-MCNC: 204 MG/DL (ref 70–130)
GLUCOSE BLDC GLUCOMTR-MCNC: 206 MG/DL (ref 70–130)
GLUCOSE BLDC GLUCOMTR-MCNC: 215 MG/DL (ref 70–130)
GLUCOSE BLDC GLUCOMTR-MCNC: 224 MG/DL (ref 70–130)
GLUCOSE BLDC GLUCOMTR-MCNC: 228 MG/DL (ref 70–130)
GLUCOSE BLDC GLUCOMTR-MCNC: 253 MG/DL (ref 70–130)
GLUCOSE BLDC GLUCOMTR-MCNC: 340 MG/DL (ref 70–130)
GLUCOSE BLDC GLUCOMTR-MCNC: 342 MG/DL (ref 70–130)
GLUCOSE BLDC GLUCOMTR-MCNC: 345 MG/DL (ref 70–130)
GLUCOSE BLDC GLUCOMTR-MCNC: 451 MG/DL (ref 70–130)
GLUCOSE BLDC GLUCOMTR-MCNC: 47 MG/DL (ref 70–130)
GLUCOSE BLDC GLUCOMTR-MCNC: 49 MG/DL (ref 70–130)
GLUCOSE BLDC GLUCOMTR-MCNC: 71 MG/DL (ref 70–130)
GLUCOSE BLDC GLUCOMTR-MCNC: 75 MG/DL (ref 70–130)
GLUCOSE BLDC GLUCOMTR-MCNC: 87 MG/DL (ref 70–130)
GLUCOSE SERPL-MCNC: 142 MG/DL (ref 65–99)
GLUCOSE SERPL-MCNC: 218 MG/DL (ref 65–99)
GLUCOSE SERPL-MCNC: 429 MG/DL (ref 65–99)
GLUCOSE SERPL-MCNC: 62 MG/DL (ref 65–99)
GLUCOSE SERPL-MCNC: 69 MG/DL (ref 65–99)
GLUCOSE SERPL-MCNC: 86 MG/DL (ref 65–99)
GLUCOSE SERPL-MCNC: 88 MG/DL (ref 65–99)
GLUCOSE UR STRIP-MCNC: ABNORMAL MG/DL
HCT VFR BLD AUTO: 28.8 % (ref 34–46.6)
HCT VFR BLD AUTO: 29.1 % (ref 34–46.6)
HCT VFR BLD AUTO: 32.3 % (ref 34–46.6)
HCT VFR BLD AUTO: 35.3 % (ref 34–46.6)
HCT VFR BLD AUTO: 35.5 % (ref 34–46.6)
HCT VFR BLD AUTO: 35.9 % (ref 34–46.6)
HCT VFR BLD AUTO: 38.8 % (ref 34–46.6)
HGB BLD-MCNC: 10.6 G/DL (ref 12–15.9)
HGB BLD-MCNC: 10.8 G/DL (ref 12–15.9)
HGB BLD-MCNC: 11 G/DL (ref 12–15.9)
HGB BLD-MCNC: 11.8 G/DL (ref 12–15.9)
HGB BLD-MCNC: 12.8 G/DL (ref 12–15.9)
HGB BLD-MCNC: 9.5 G/DL (ref 12–15.9)
HGB BLD-MCNC: 9.6 G/DL (ref 12–15.9)
HGB UR QL STRIP.AUTO: NEGATIVE
IMM GRANULOCYTES # BLD AUTO: 0.02 10*3/MM3 (ref 0–0.05)
IMM GRANULOCYTES # BLD AUTO: 0.03 10*3/MM3 (ref 0–0.05)
IMM GRANULOCYTES # BLD AUTO: 0.04 10*3/MM3 (ref 0–0.05)
IMM GRANULOCYTES # BLD AUTO: 0.04 10*3/MM3 (ref 0–0.05)
IMM GRANULOCYTES NFR BLD AUTO: 0.2 % (ref 0–0.5)
IMM GRANULOCYTES NFR BLD AUTO: 0.3 % (ref 0–0.5)
IMM GRANULOCYTES NFR BLD AUTO: 0.3 % (ref 0–0.5)
IMM GRANULOCYTES NFR BLD AUTO: 0.4 % (ref 0–0.5)
KETONES UR QL STRIP: NEGATIVE
LEUKOCYTE ESTERASE UR QL STRIP.AUTO: NEGATIVE
LYMPHOCYTES # BLD AUTO: 0.67 10*3/MM3 (ref 0.7–3.1)
LYMPHOCYTES # BLD AUTO: 1.32 10*3/MM3 (ref 0.7–3.1)
LYMPHOCYTES # BLD AUTO: 1.35 10*3/MM3 (ref 0.7–3.1)
LYMPHOCYTES # BLD AUTO: 1.53 10*3/MM3 (ref 0.7–3.1)
LYMPHOCYTES # BLD AUTO: 1.6 10*3/MM3 (ref 0.7–3.1)
LYMPHOCYTES # BLD AUTO: 2.19 10*3/MM3 (ref 0.7–3.1)
LYMPHOCYTES # BLD MANUAL: 0.62 10*3/MM3 (ref 0.7–3.1)
LYMPHOCYTES NFR BLD AUTO: 13.1 % (ref 19.6–45.3)
LYMPHOCYTES NFR BLD AUTO: 14.8 % (ref 19.6–45.3)
LYMPHOCYTES NFR BLD AUTO: 15 % (ref 19.6–45.3)
LYMPHOCYTES NFR BLD AUTO: 15 % (ref 19.6–45.3)
LYMPHOCYTES NFR BLD AUTO: 18.2 % (ref 19.6–45.3)
LYMPHOCYTES NFR BLD AUTO: 5.8 % (ref 19.6–45.3)
LYMPHOCYTES NFR BLD MANUAL: 2 % (ref 5–12)
MAGNESIUM SERPL-MCNC: 1.5 MG/DL (ref 1.6–2.4)
MAGNESIUM SERPL-MCNC: 1.6 MG/DL (ref 1.6–2.4)
MAGNESIUM SERPL-MCNC: 1.7 MG/DL (ref 1.6–2.4)
MAGNESIUM SERPL-MCNC: 2 MG/DL (ref 1.6–2.4)
MCH RBC QN AUTO: 31.8 PG (ref 26.6–33)
MCH RBC QN AUTO: 32 PG (ref 26.6–33)
MCH RBC QN AUTO: 32.3 PG (ref 26.6–33)
MCH RBC QN AUTO: 32.3 PG (ref 26.6–33)
MCH RBC QN AUTO: 32.5 PG (ref 26.6–33)
MCH RBC QN AUTO: 32.6 PG (ref 26.6–33)
MCH RBC QN AUTO: 32.7 PG (ref 26.6–33)
MCHC RBC AUTO-ENTMCNC: 30 G/DL (ref 31.5–35.7)
MCHC RBC AUTO-ENTMCNC: 31 G/DL (ref 31.5–35.7)
MCHC RBC AUTO-ENTMCNC: 32.9 G/DL (ref 31.5–35.7)
MCHC RBC AUTO-ENTMCNC: 33 G/DL (ref 31.5–35.7)
MCHC RBC AUTO-ENTMCNC: 33.4 G/DL (ref 31.5–35.7)
MCV RBC AUTO: 103.2 FL (ref 79–97)
MCV RBC AUTO: 106 FL (ref 79–97)
MCV RBC AUTO: 97.6 FL (ref 79–97)
MCV RBC AUTO: 98 FL (ref 79–97)
MCV RBC AUTO: 98.4 FL (ref 79–97)
MCV RBC AUTO: 98.6 FL (ref 79–97)
MCV RBC AUTO: 99 FL (ref 79–97)
MONOCYTES # BLD AUTO: 0.46 10*3/MM3 (ref 0.1–0.9)
MONOCYTES # BLD AUTO: 0.54 10*3/MM3 (ref 0.1–0.9)
MONOCYTES # BLD AUTO: 0.64 10*3/MM3 (ref 0.1–0.9)
MONOCYTES # BLD AUTO: 0.69 10*3/MM3 (ref 0.1–0.9)
MONOCYTES # BLD AUTO: 0.79 10*3/MM3 (ref 0.1–0.9)
MONOCYTES # BLD AUTO: 0.82 10*3/MM3 (ref 0.1–0.9)
MONOCYTES # BLD: 0.25 10*3/MM3 (ref 0.1–0.9)
MONOCYTES NFR BLD AUTO: 4 % (ref 5–12)
MONOCYTES NFR BLD AUTO: 5.3 % (ref 5–12)
MONOCYTES NFR BLD AUTO: 6.2 % (ref 5–12)
MONOCYTES NFR BLD AUTO: 6.8 % (ref 5–12)
MONOCYTES NFR BLD AUTO: 7.4 % (ref 5–12)
MONOCYTES NFR BLD AUTO: 7.9 % (ref 5–12)
NEUTROPHILS # BLD AUTO: 11.32 10*3/MM3 (ref 1.7–7)
NEUTROPHILS NFR BLD AUTO: 10.31 10*3/MM3 (ref 1.7–7)
NEUTROPHILS NFR BLD AUTO: 6.68 10*3/MM3 (ref 1.7–7)
NEUTROPHILS NFR BLD AUTO: 67.2 % (ref 42.7–76)
NEUTROPHILS NFR BLD AUTO: 69.6 % (ref 42.7–76)
NEUTROPHILS NFR BLD AUTO: 7.33 10*3/MM3 (ref 1.7–7)
NEUTROPHILS NFR BLD AUTO: 7.41 10*3/MM3 (ref 1.7–7)
NEUTROPHILS NFR BLD AUTO: 70.7 % (ref 42.7–76)
NEUTROPHILS NFR BLD AUTO: 76.1 % (ref 42.7–76)
NEUTROPHILS NFR BLD AUTO: 8.08 10*3/MM3 (ref 1.7–7)
NEUTROPHILS NFR BLD AUTO: 8.27 10*3/MM3 (ref 1.7–7)
NEUTROPHILS NFR BLD AUTO: 80.4 % (ref 42.7–76)
NEUTROPHILS NFR BLD AUTO: 89.6 % (ref 42.7–76)
NEUTROPHILS NFR BLD MANUAL: 92 % (ref 42.7–76)
NITRITE UR QL STRIP: NEGATIVE
NRBC BLD AUTO-RTO: 0 /100 WBC (ref 0–0.2)
PH UR STRIP.AUTO: <=5 [PH] (ref 5–8)
PLAT MORPH BLD: NORMAL
PLATELET # BLD AUTO: 171 10*3/MM3 (ref 140–450)
PLATELET # BLD AUTO: 196 10*3/MM3 (ref 140–450)
PLATELET # BLD AUTO: 196 10*3/MM3 (ref 140–450)
PLATELET # BLD AUTO: 217 10*3/MM3 (ref 140–450)
PLATELET # BLD AUTO: 223 10*3/MM3 (ref 140–450)
PLATELET # BLD AUTO: 224 10*3/MM3 (ref 140–450)
PLATELET # BLD AUTO: 273 10*3/MM3 (ref 140–450)
PMV BLD AUTO: 10 FL (ref 6–12)
PMV BLD AUTO: 10 FL (ref 6–12)
PMV BLD AUTO: 10.6 FL (ref 6–12)
PMV BLD AUTO: 9.5 FL (ref 6–12)
PMV BLD AUTO: 9.6 FL (ref 6–12)
PMV BLD AUTO: 9.6 FL (ref 6–12)
PMV BLD AUTO: 9.8 FL (ref 6–12)
POTASSIUM SERPL-SCNC: 3.1 MMOL/L (ref 3.5–5.2)
POTASSIUM SERPL-SCNC: 3.4 MMOL/L (ref 3.5–5.2)
POTASSIUM SERPL-SCNC: 3.4 MMOL/L (ref 3.5–5.2)
POTASSIUM SERPL-SCNC: 3.6 MMOL/L (ref 3.5–5.2)
POTASSIUM SERPL-SCNC: 3.7 MMOL/L (ref 3.5–5.2)
POTASSIUM SERPL-SCNC: 4.1 MMOL/L (ref 3.5–5.2)
POTASSIUM SERPL-SCNC: 4.2 MMOL/L (ref 3.5–5.2)
POTASSIUM SERPL-SCNC: 4.4 MMOL/L (ref 3.5–5.2)
PROCALCITONIN SERPL-MCNC: 0.17 NG/ML (ref 0–0.25)
PROT SERPL-MCNC: 4.9 G/DL (ref 6–8.5)
PROT SERPL-MCNC: 5.1 G/DL (ref 6–8.5)
PROT SERPL-MCNC: 5.2 G/DL (ref 6–8.5)
PROT SERPL-MCNC: 5.8 G/DL (ref 6–8.5)
PROT SERPL-MCNC: 6.2 G/DL (ref 6–8.5)
PROT SERPL-MCNC: 6.3 G/DL (ref 6–8.5)
PROT SERPL-MCNC: 6.9 G/DL (ref 6–8.5)
PROT UR QL STRIP: ABNORMAL
QT INTERVAL: 465 MS
QTC INTERVAL: 475 MS
RBC # BLD AUTO: 2.92 10*6/MM3 (ref 3.77–5.28)
RBC # BLD AUTO: 2.94 10*6/MM3 (ref 3.77–5.28)
RBC # BLD AUTO: 3.31 10*6/MM3 (ref 3.77–5.28)
RBC # BLD AUTO: 3.33 10*6/MM3 (ref 3.77–5.28)
RBC # BLD AUTO: 3.44 10*6/MM3 (ref 3.77–5.28)
RBC # BLD AUTO: 3.65 10*6/MM3 (ref 3.77–5.28)
RBC # BLD AUTO: 3.96 10*6/MM3 (ref 3.77–5.28)
RBC MORPH BLD: NORMAL
SODIUM SERPL-SCNC: 134 MMOL/L (ref 136–145)
SODIUM SERPL-SCNC: 136 MMOL/L (ref 136–145)
SODIUM SERPL-SCNC: 137 MMOL/L (ref 136–145)
SODIUM SERPL-SCNC: 138 MMOL/L (ref 136–145)
SODIUM SERPL-SCNC: 139 MMOL/L (ref 136–145)
SODIUM SERPL-SCNC: 141 MMOL/L (ref 136–145)
SODIUM SERPL-SCNC: 143 MMOL/L (ref 136–145)
SP GR UR STRIP: 1.01 (ref 1–1.03)
TROPONIN T % DELTA: 0
TROPONIN T NUMERIC DELTA: 0 NG/L
TROPONIN T SERPL HS-MCNC: 42 NG/L
UROBILINOGEN UR QL STRIP: ABNORMAL
VARIANT LYMPHS NFR BLD MANUAL: 5 % (ref 19.6–45.3)
WBC MORPH BLD: NORMAL
WBC NRBC COR # BLD AUTO: 10.28 10*3/MM3 (ref 3.4–10.8)
WBC NRBC COR # BLD AUTO: 10.36 10*3/MM3 (ref 3.4–10.8)
WBC NRBC COR # BLD AUTO: 10.65 10*3/MM3 (ref 3.4–10.8)
WBC NRBC COR # BLD AUTO: 11.51 10*3/MM3 (ref 3.4–10.8)
WBC NRBC COR # BLD AUTO: 12.03 10*3/MM3 (ref 3.4–10.8)
WBC NRBC COR # BLD AUTO: 12.3 10*3/MM3 (ref 3.4–10.8)
WBC NRBC COR # BLD AUTO: 8.78 10*3/MM3 (ref 3.4–10.8)

## 2025-01-01 PROCEDURE — 25010000002 HYDROMORPHONE 1 MG/ML SOLUTION: Performed by: INTERNAL MEDICINE

## 2025-01-01 PROCEDURE — 83735 ASSAY OF MAGNESIUM: CPT | Performed by: PHYSICIAN ASSISTANT

## 2025-01-01 PROCEDURE — 84132 ASSAY OF SERUM POTASSIUM: CPT | Performed by: INTERNAL MEDICINE

## 2025-01-01 PROCEDURE — 63710000001 ONDANSETRON ODT 4 MG TABLET DISPERSIBLE: Performed by: INTERNAL MEDICINE

## 2025-01-01 PROCEDURE — 25010000002 PIPERACILLIN SOD-TAZOBACTAM PER 1 G: Performed by: INTERNAL MEDICINE

## 2025-01-01 PROCEDURE — 85025 COMPLETE CBC W/AUTO DIFF WBC: CPT | Performed by: INTERNAL MEDICINE

## 2025-01-01 PROCEDURE — 82948 REAGENT STRIP/BLOOD GLUCOSE: CPT

## 2025-01-01 PROCEDURE — 63710000001 INSULIN LISPRO (HUMAN) PER 5 UNITS: Performed by: INTERNAL MEDICINE

## 2025-01-01 PROCEDURE — 80053 COMPREHEN METABOLIC PANEL: CPT | Performed by: INTERNAL MEDICINE

## 2025-01-01 PROCEDURE — 25010000002 ONDANSETRON PER 1 MG: Performed by: INTERNAL MEDICINE

## 2025-01-01 PROCEDURE — 25010000002 HALOPERIDOL LACTATE PER 5 MG: Performed by: FAMILY MEDICINE

## 2025-01-01 PROCEDURE — 25010000002 POTASSIUM CHLORIDE PER 2 MEQ: Performed by: HOSPITALIST

## 2025-01-01 PROCEDURE — 83735 ASSAY OF MAGNESIUM: CPT | Performed by: INTERNAL MEDICINE

## 2025-01-01 PROCEDURE — 99222 1ST HOSP IP/OBS MODERATE 55: CPT | Performed by: PHYSICIAN ASSISTANT

## 2025-01-01 PROCEDURE — 85025 COMPLETE CBC W/AUTO DIFF WBC: CPT | Performed by: PHYSICIAN ASSISTANT

## 2025-01-01 PROCEDURE — 25010000002 HYDROMORPHONE 1 MG/ML SOLUTION: Performed by: FAMILY MEDICINE

## 2025-01-01 PROCEDURE — 99285 EMERGENCY DEPT VISIT HI MDM: CPT

## 2025-01-01 PROCEDURE — 36415 COLL VENOUS BLD VENIPUNCTURE: CPT | Performed by: PHYSICIAN ASSISTANT

## 2025-01-01 PROCEDURE — 63710000001 INSULIN GLARGINE PER 5 UNITS: Performed by: INTERNAL MEDICINE

## 2025-01-01 PROCEDURE — 25010000002 HYDROMORPHONE PER 4 MG: Performed by: INTERNAL MEDICINE

## 2025-01-01 PROCEDURE — 97110 THERAPEUTIC EXERCISES: CPT

## 2025-01-01 PROCEDURE — P9612 CATHETERIZE FOR URINE SPEC: HCPCS

## 2025-01-01 PROCEDURE — 93010 ELECTROCARDIOGRAM REPORT: CPT | Performed by: STUDENT IN AN ORGANIZED HEALTH CARE EDUCATION/TRAINING PROGRAM

## 2025-01-01 PROCEDURE — 25010000002 MAGNESIUM SULFATE IN D5W 1G/100ML (PREMIX) 1-5 GM/100ML-% SOLUTION: Performed by: INTERNAL MEDICINE

## 2025-01-01 PROCEDURE — 99232 SBSQ HOSP IP/OBS MODERATE 35: CPT | Performed by: NURSE PRACTITIONER

## 2025-01-01 PROCEDURE — 85007 BL SMEAR W/DIFF WBC COUNT: CPT | Performed by: INTERNAL MEDICINE

## 2025-01-01 PROCEDURE — 25810000003 SODIUM CHLORIDE 0.9 % SOLUTION: Performed by: PHYSICIAN ASSISTANT

## 2025-01-01 PROCEDURE — 80053 COMPREHEN METABOLIC PANEL: CPT | Performed by: PHYSICIAN ASSISTANT

## 2025-01-01 PROCEDURE — 81003 URINALYSIS AUTO W/O SCOPE: CPT | Performed by: PHYSICIAN ASSISTANT

## 2025-01-01 PROCEDURE — 93005 ELECTROCARDIOGRAM TRACING: CPT | Performed by: EMERGENCY MEDICINE

## 2025-01-01 PROCEDURE — 25010000002 HYDROMORPHONE PER 4 MG: Performed by: FAMILY MEDICINE

## 2025-01-01 PROCEDURE — 25010000002 HYDROMORPHONE 1 MG/ML SOLUTION: Performed by: STUDENT IN AN ORGANIZED HEALTH CARE EDUCATION/TRAINING PROGRAM

## 2025-01-01 PROCEDURE — 25010000002 DEXAMETHASONE SODIUM PHOSPHATE 20 MG/5ML SOLUTION: Performed by: INTERNAL MEDICINE

## 2025-01-01 PROCEDURE — 25810000003 SODIUM CHLORIDE 0.9 % SOLUTION: Performed by: INTERNAL MEDICINE

## 2025-01-01 PROCEDURE — 25010000002 PIPERACILLIN SOD-TAZOBACTAM PER 1 G: Performed by: PHYSICIAN ASSISTANT

## 2025-01-01 PROCEDURE — 83605 ASSAY OF LACTIC ACID: CPT | Performed by: PHYSICIAN ASSISTANT

## 2025-01-01 PROCEDURE — 87040 BLOOD CULTURE FOR BACTERIA: CPT | Performed by: PHYSICIAN ASSISTANT

## 2025-01-01 PROCEDURE — 84145 PROCALCITONIN (PCT): CPT | Performed by: PHYSICIAN ASSISTANT

## 2025-01-01 PROCEDURE — 25010000002 GLYCOPYRROLATE 0.2 MG/ML SOLUTION: Performed by: NURSE PRACTITIONER

## 2025-01-01 PROCEDURE — 63710000001 INSULIN LISPRO (HUMAN) PER 5 UNITS: Performed by: PEDIATRICS

## 2025-01-01 PROCEDURE — 84484 ASSAY OF TROPONIN QUANT: CPT | Performed by: PHYSICIAN ASSISTANT

## 2025-01-01 PROCEDURE — 70450 CT HEAD/BRAIN W/O DYE: CPT

## 2025-01-01 PROCEDURE — 97162 PT EVAL MOD COMPLEX 30 MIN: CPT

## 2025-01-01 PROCEDURE — 25010000002 ONDANSETRON PER 1 MG: Performed by: PHYSICIAN ASSISTANT

## 2025-01-01 RX ORDER — HYDROMORPHONE HCL/0.9% NACL/PF 10 MG/50ML
PATIENT CONTROLLED ANALGESIA SYRINGE INTRAVENOUS CONTINUOUS
Refills: 0 | Status: DISCONTINUED | OUTPATIENT
Start: 2025-01-01 | End: 2025-01-01 | Stop reason: HOSPADM

## 2025-01-01 RX ORDER — INSULIN LISPRO 100 [IU]/ML
2-9 INJECTION, SOLUTION INTRAVENOUS; SUBCUTANEOUS
Status: CANCELLED | OUTPATIENT
Start: 2025-01-01

## 2025-01-01 RX ORDER — SODIUM CHLORIDE 0.9 % (FLUSH) 0.9 %
10 SYRINGE (ML) INJECTION EVERY 12 HOURS SCHEDULED
Status: DISCONTINUED | OUTPATIENT
Start: 2025-01-01 | End: 2025-01-01 | Stop reason: HOSPADM

## 2025-01-01 RX ORDER — BISACODYL 10 MG
10 SUPPOSITORY, RECTAL RECTAL DAILY PRN
Status: CANCELLED | OUTPATIENT
Start: 2025-01-01

## 2025-01-01 RX ORDER — ASPIRIN 81 MG/1
81 TABLET ORAL DAILY
Status: DISCONTINUED | OUTPATIENT
Start: 2025-01-01 | End: 2025-01-01

## 2025-01-01 RX ORDER — HEPARIN SODIUM (PORCINE) LOCK FLUSH IV SOLN 100 UNIT/ML 100 UNIT/ML
5 SOLUTION INTRAVENOUS AS NEEDED
Status: CANCELLED | OUTPATIENT
Start: 2025-01-01

## 2025-01-01 RX ORDER — GLIPIZIDE 5 MG/1
5 TABLET ORAL
Status: DISCONTINUED | OUTPATIENT
Start: 2025-01-01 | End: 2025-01-01

## 2025-01-01 RX ORDER — IBUPROFEN 600 MG/1
1 TABLET ORAL
Status: DISCONTINUED | OUTPATIENT
Start: 2025-01-01 | End: 2025-01-01

## 2025-01-01 RX ORDER — OXYCODONE HYDROCHLORIDE 5 MG/1
10 TABLET ORAL EVERY 4 HOURS PRN
Refills: 0 | Status: DISCONTINUED | OUTPATIENT
Start: 2025-01-01 | End: 2025-01-01

## 2025-01-01 RX ORDER — SODIUM CHLORIDE 0.9 % (FLUSH) 0.9 %
10 SYRINGE (ML) INJECTION AS NEEDED
Status: DISCONTINUED | OUTPATIENT
Start: 2025-01-01 | End: 2025-01-01 | Stop reason: HOSPADM

## 2025-01-01 RX ORDER — SODIUM CHLORIDE 0.9 % (FLUSH) 0.9 %
10 SYRINGE (ML) INJECTION EVERY 12 HOURS SCHEDULED
Status: CANCELLED | OUTPATIENT
Start: 2025-01-01

## 2025-01-01 RX ORDER — METOPROLOL TARTRATE 50 MG
100 TABLET ORAL 2 TIMES DAILY
Status: DISCONTINUED | OUTPATIENT
Start: 2025-01-01 | End: 2025-01-01

## 2025-01-01 RX ORDER — AMOXICILLIN 250 MG
2 CAPSULE ORAL 2 TIMES DAILY
Status: CANCELLED | OUTPATIENT
Start: 2025-01-01

## 2025-01-01 RX ORDER — DEXAMETHASONE SODIUM PHOSPHATE 4 MG/ML
4 INJECTION, SOLUTION INTRA-ARTICULAR; INTRALESIONAL; INTRAMUSCULAR; INTRAVENOUS; SOFT TISSUE DAILY
Status: DISCONTINUED | OUTPATIENT
Start: 2025-01-01 | End: 2025-01-01

## 2025-01-01 RX ORDER — POTASSIUM CHLORIDE 29.8 MG/ML
20 INJECTION INTRAVENOUS ONCE
Status: COMPLETED | OUTPATIENT
Start: 2025-01-01 | End: 2025-01-01

## 2025-01-01 RX ORDER — BISACODYL 10 MG
10 SUPPOSITORY, RECTAL RECTAL DAILY PRN
Status: DISCONTINUED | OUTPATIENT
Start: 2025-01-01 | End: 2025-01-01 | Stop reason: HOSPADM

## 2025-01-01 RX ORDER — SODIUM CHLORIDE 9 MG/ML
100 INJECTION, SOLUTION INTRAVENOUS CONTINUOUS
Status: CANCELLED | OUTPATIENT
Start: 2025-01-01 | End: 2025-01-01

## 2025-01-01 RX ORDER — SODIUM CHLORIDE 9 MG/ML
30 INJECTION, SOLUTION INTRAVENOUS CONTINUOUS PRN
Status: DISCONTINUED | OUTPATIENT
Start: 2025-01-01 | End: 2025-01-01 | Stop reason: HOSPADM

## 2025-01-01 RX ORDER — HYDROMORPHONE HYDROCHLORIDE 1 MG/ML
0.5 INJECTION, SOLUTION INTRAMUSCULAR; INTRAVENOUS; SUBCUTANEOUS
Status: DISCONTINUED | OUTPATIENT
Start: 2025-01-01 | End: 2025-01-01

## 2025-01-01 RX ORDER — ASPIRIN 81 MG/1
81 TABLET ORAL DAILY
Status: CANCELLED | OUTPATIENT
Start: 2025-01-01

## 2025-01-01 RX ORDER — HYDROMORPHONE HCL/0.9% NACL/PF 10 MG/50ML
PATIENT CONTROLLED ANALGESIA SYRINGE INTRAVENOUS CONTINUOUS
Refills: 0 | Status: DISCONTINUED | OUTPATIENT
Start: 2025-01-01 | End: 2025-01-01

## 2025-01-01 RX ORDER — HYDRALAZINE HYDROCHLORIDE 25 MG/1
25 TABLET, FILM COATED ORAL 2 TIMES DAILY
Status: DISCONTINUED | OUTPATIENT
Start: 2025-01-01 | End: 2025-01-01

## 2025-01-01 RX ORDER — SODIUM CHLORIDE 0.9 % (FLUSH) 0.9 %
20 SYRINGE (ML) INJECTION AS NEEDED
Status: CANCELLED | OUTPATIENT
Start: 2025-01-01

## 2025-01-01 RX ORDER — GLYCOPYRROLATE 0.2 MG/ML
0.4 INJECTION INTRAMUSCULAR; INTRAVENOUS EVERY 4 HOURS
Status: DISCONTINUED | OUTPATIENT
Start: 2025-01-01 | End: 2025-01-01 | Stop reason: HOSPADM

## 2025-01-01 RX ORDER — ONDANSETRON 2 MG/ML
4 INJECTION INTRAMUSCULAR; INTRAVENOUS EVERY 6 HOURS PRN
Status: DISCONTINUED | OUTPATIENT
Start: 2025-01-01 | End: 2025-01-01 | Stop reason: HOSPADM

## 2025-01-01 RX ORDER — ONDANSETRON 2 MG/ML
4 INJECTION INTRAMUSCULAR; INTRAVENOUS ONCE
Status: COMPLETED | OUTPATIENT
Start: 2025-01-01 | End: 2025-01-01

## 2025-01-01 RX ORDER — HYDRALAZINE HYDROCHLORIDE 25 MG/1
25 TABLET, FILM COATED ORAL 2 TIMES DAILY
Status: CANCELLED | OUTPATIENT
Start: 2025-01-01

## 2025-01-01 RX ORDER — ONDANSETRON 4 MG/1
4 TABLET, ORALLY DISINTEGRATING ORAL EVERY 6 HOURS PRN
Status: DISCONTINUED | OUTPATIENT
Start: 2025-01-01 | End: 2025-01-01

## 2025-01-01 RX ORDER — OXYCODONE HYDROCHLORIDE 5 MG/1
10 TABLET ORAL EVERY 4 HOURS
Refills: 0 | Status: DISCONTINUED | OUTPATIENT
Start: 2025-01-01 | End: 2025-01-01

## 2025-01-01 RX ORDER — LIDOCAINE HYDROCHLORIDE 20 MG/ML
JELLY TOPICAL AS NEEDED
Status: DISCONTINUED | OUTPATIENT
Start: 2025-01-01 | End: 2025-01-01 | Stop reason: HOSPADM

## 2025-01-01 RX ORDER — SODIUM CHLORIDE 0.9 % (FLUSH) 0.9 %
20 SYRINGE (ML) INJECTION AS NEEDED
Status: DISCONTINUED | OUTPATIENT
Start: 2025-01-01 | End: 2025-01-01 | Stop reason: HOSPADM

## 2025-01-01 RX ORDER — INSULIN LISPRO 100 [IU]/ML
2-9 INJECTION, SOLUTION INTRAVENOUS; SUBCUTANEOUS
Status: DISCONTINUED | OUTPATIENT
Start: 2025-01-01 | End: 2025-01-01

## 2025-01-01 RX ORDER — POTASSIUM CHLORIDE 1500 MG/1
20 TABLET, EXTENDED RELEASE ORAL ONCE
Status: COMPLETED | OUTPATIENT
Start: 2025-01-01 | End: 2025-01-01

## 2025-01-01 RX ORDER — LIDOCAINE 40 MG/G
1 CREAM TOPICAL AS NEEDED
Status: CANCELLED | OUTPATIENT
Start: 2025-01-01

## 2025-01-01 RX ORDER — OXYCODONE HYDROCHLORIDE 5 MG/1
5 TABLET ORAL 3 TIMES DAILY
Refills: 0 | Status: DISCONTINUED | OUTPATIENT
Start: 2025-01-01 | End: 2025-01-01

## 2025-01-01 RX ORDER — METRONIDAZOLE 500 MG/1
500 TABLET ORAL 3 TIMES DAILY
Status: DISCONTINUED | OUTPATIENT
Start: 2025-01-01 | End: 2025-01-01

## 2025-01-01 RX ORDER — METOPROLOL TARTRATE 50 MG
100 TABLET ORAL 2 TIMES DAILY
Status: DISCONTINUED | OUTPATIENT
Start: 2025-01-01 | End: 2025-01-01 | Stop reason: HOSPADM

## 2025-01-01 RX ORDER — BISACODYL 5 MG/1
5 TABLET, DELAYED RELEASE ORAL DAILY PRN
Status: DISCONTINUED | OUTPATIENT
Start: 2025-01-01 | End: 2025-01-01

## 2025-01-01 RX ORDER — DEXTROSE MONOHYDRATE 25 G/50ML
25 INJECTION, SOLUTION INTRAVENOUS
Status: DISCONTINUED | OUTPATIENT
Start: 2025-01-01 | End: 2025-01-01

## 2025-01-01 RX ORDER — ACETAMINOPHEN 650 MG/1
650 SUPPOSITORY RECTAL EVERY 4 HOURS PRN
Status: DISCONTINUED | OUTPATIENT
Start: 2025-01-01 | End: 2025-01-01

## 2025-01-01 RX ORDER — HYDROXYZINE HYDROCHLORIDE 25 MG/1
25 TABLET, FILM COATED ORAL EVERY 6 HOURS PRN
Status: DISCONTINUED | OUTPATIENT
Start: 2025-01-01 | End: 2025-01-01

## 2025-01-01 RX ORDER — HYDROMORPHONE HCL/0.9% NACL/PF 10 MG/50ML
PATIENT CONTROLLED ANALGESIA SYRINGE INTRAVENOUS CONTINUOUS
Status: DISCONTINUED | OUTPATIENT
Start: 2025-01-01 | End: 2025-01-01

## 2025-01-01 RX ORDER — MAGNESIUM SULFATE 1 G/100ML
1 INJECTION INTRAVENOUS
Status: COMPLETED | OUTPATIENT
Start: 2025-01-01 | End: 2025-01-01

## 2025-01-01 RX ORDER — OXYCODONE HYDROCHLORIDE 5 MG/1
10 TABLET ORAL
Refills: 0 | Status: DISCONTINUED | OUTPATIENT
Start: 2025-01-01 | End: 2025-01-01

## 2025-01-01 RX ORDER — SODIUM CHLORIDE 9 MG/ML
40 INJECTION, SOLUTION INTRAVENOUS AS NEEDED
Status: DISCONTINUED | OUTPATIENT
Start: 2025-01-01 | End: 2025-01-01 | Stop reason: HOSPADM

## 2025-01-01 RX ORDER — SODIUM CHLORIDE 9 MG/ML
40 INJECTION, SOLUTION INTRAVENOUS AS NEEDED
Status: CANCELLED | OUTPATIENT
Start: 2025-01-01

## 2025-01-01 RX ORDER — ACETAMINOPHEN 325 MG/1
650 TABLET ORAL EVERY 4 HOURS PRN
Status: DISCONTINUED | OUTPATIENT
Start: 2025-01-01 | End: 2025-01-01 | Stop reason: HOSPADM

## 2025-01-01 RX ORDER — SODIUM CHLORIDE 0.9 % (FLUSH) 0.9 %
10 SYRINGE (ML) INJECTION AS NEEDED
Status: CANCELLED | OUTPATIENT
Start: 2025-01-01

## 2025-01-01 RX ORDER — NICOTINE POLACRILEX 4 MG
15 LOZENGE BUCCAL
Status: DISCONTINUED | OUTPATIENT
Start: 2025-01-01 | End: 2025-01-01

## 2025-01-01 RX ORDER — HYDROCHLOROTHIAZIDE 12.5 MG/1
12.5 TABLET ORAL DAILY
Status: DISCONTINUED | OUTPATIENT
Start: 2025-01-01 | End: 2025-01-01

## 2025-01-01 RX ORDER — HEPARIN SODIUM (PORCINE) LOCK FLUSH IV SOLN 100 UNIT/ML 100 UNIT/ML
5 SOLUTION INTRAVENOUS AS NEEDED
Status: DISCONTINUED | OUTPATIENT
Start: 2025-01-01 | End: 2025-01-01 | Stop reason: HOSPADM

## 2025-01-01 RX ORDER — OXYCODONE HCL 20 MG/1
20 TABLET, FILM COATED, EXTENDED RELEASE ORAL EVERY 12 HOURS SCHEDULED
Refills: 0 | Status: DISCONTINUED | OUTPATIENT
Start: 2025-01-01 | End: 2025-01-01

## 2025-01-01 RX ORDER — HYDROXYZINE HYDROCHLORIDE 25 MG/1
25 TABLET, FILM COATED ORAL 3 TIMES DAILY PRN
Status: DISCONTINUED | OUTPATIENT
Start: 2025-01-01 | End: 2025-01-01

## 2025-01-01 RX ORDER — HYDROMORPHONE HCL/0.9% NACL/PF 10 MG/50ML
PATIENT CONTROLLED ANALGESIA SYRINGE INTRAVENOUS CONTINUOUS
Status: CANCELLED | OUTPATIENT
Start: 2025-01-01 | End: 2025-06-19

## 2025-01-01 RX ORDER — SODIUM CHLORIDE 9 MG/ML
75 INJECTION, SOLUTION INTRAVENOUS CONTINUOUS
Status: ACTIVE | OUTPATIENT
Start: 2025-01-01 | End: 2025-01-01

## 2025-01-01 RX ORDER — ACETAMINOPHEN 325 MG/1
650 TABLET ORAL EVERY 4 HOURS PRN
Status: CANCELLED | OUTPATIENT
Start: 2025-01-01

## 2025-01-01 RX ORDER — ONDANSETRON 2 MG/ML
4 INJECTION INTRAMUSCULAR; INTRAVENOUS EVERY 6 HOURS PRN
Status: CANCELLED | OUTPATIENT
Start: 2025-01-01

## 2025-01-01 RX ORDER — ACETAMINOPHEN 325 MG/1
650 TABLET ORAL EVERY 4 HOURS PRN
Status: DISCONTINUED | OUTPATIENT
Start: 2025-01-01 | End: 2025-01-01

## 2025-01-01 RX ORDER — LORAZEPAM 0.5 MG/1
0.5 TABLET ORAL EVERY 6 HOURS PRN
Status: DISCONTINUED | OUTPATIENT
Start: 2025-01-01 | End: 2025-01-01

## 2025-01-01 RX ORDER — INSULIN LISPRO 100 [IU]/ML
2-9 INJECTION, SOLUTION INTRAVENOUS; SUBCUTANEOUS
Status: DISCONTINUED | OUTPATIENT
Start: 2025-01-01 | End: 2025-01-01 | Stop reason: HOSPADM

## 2025-01-01 RX ORDER — CLOPIDOGREL BISULFATE 75 MG/1
75 TABLET ORAL DAILY
Status: CANCELLED | OUTPATIENT
Start: 2025-01-01

## 2025-01-01 RX ORDER — SODIUM CHLORIDE 9 MG/ML
125 INJECTION, SOLUTION INTRAVENOUS CONTINUOUS
Status: DISCONTINUED | OUTPATIENT
Start: 2025-01-01 | End: 2025-01-01

## 2025-01-01 RX ORDER — CLOPIDOGREL BISULFATE 75 MG/1
75 TABLET ORAL DAILY
Status: DISCONTINUED | OUTPATIENT
Start: 2025-01-01 | End: 2025-01-01

## 2025-01-01 RX ORDER — OXYCODONE HYDROCHLORIDE 5 MG/1
5 TABLET ORAL EVERY 8 HOURS PRN
Status: DISCONTINUED | OUTPATIENT
Start: 2025-01-01 | End: 2025-01-01

## 2025-01-01 RX ORDER — SODIUM CHLORIDE 9 MG/ML
100 INJECTION, SOLUTION INTRAVENOUS CONTINUOUS
Status: ACTIVE | OUTPATIENT
Start: 2025-01-01 | End: 2025-01-01

## 2025-01-01 RX ORDER — AMOXICILLIN 250 MG
2 CAPSULE ORAL 2 TIMES DAILY
Status: DISCONTINUED | OUTPATIENT
Start: 2025-01-01 | End: 2025-01-01

## 2025-01-01 RX ORDER — METOPROLOL TARTRATE 50 MG
100 TABLET ORAL 2 TIMES DAILY
Status: CANCELLED | OUTPATIENT
Start: 2025-01-01

## 2025-01-01 RX ORDER — POLYETHYLENE GLYCOL 3350 17 G/17G
17 POWDER, FOR SOLUTION ORAL DAILY PRN
Status: DISCONTINUED | OUTPATIENT
Start: 2025-01-01 | End: 2025-01-01

## 2025-01-01 RX ORDER — LIDOCAINE 40 MG/G
1 CREAM TOPICAL AS NEEDED
Status: DISCONTINUED | OUTPATIENT
Start: 2025-01-01 | End: 2025-01-01 | Stop reason: HOSPADM

## 2025-01-01 RX ORDER — HYDROXYZINE HYDROCHLORIDE 25 MG/1
25 TABLET, FILM COATED ORAL EVERY 6 HOURS PRN
Status: CANCELLED | OUTPATIENT
Start: 2025-01-01

## 2025-01-01 RX ORDER — OXYCODONE HYDROCHLORIDE 15 MG/1
30 TABLET, FILM COATED, EXTENDED RELEASE ORAL EVERY 12 HOURS SCHEDULED
Refills: 0 | Status: DISCONTINUED | OUTPATIENT
Start: 2025-01-01 | End: 2025-01-01

## 2025-01-01 RX ORDER — HALOPERIDOL 5 MG/ML
1 INJECTION INTRAMUSCULAR
Status: DISCONTINUED | OUTPATIENT
Start: 2025-01-01 | End: 2025-01-01 | Stop reason: HOSPADM

## 2025-01-01 RX ORDER — INSULIN LISPRO 100 [IU]/ML
12 INJECTION, SOLUTION INTRAVENOUS; SUBCUTANEOUS ONCE
Status: COMPLETED | OUTPATIENT
Start: 2025-01-01 | End: 2025-01-01

## 2025-01-01 RX ORDER — DEXTROSE MONOHYDRATE 25 G/50ML
25 INJECTION, SOLUTION INTRAVENOUS ONCE
Status: COMPLETED | OUTPATIENT
Start: 2025-01-01 | End: 2025-01-01

## 2025-01-01 RX ORDER — NALOXONE HCL 0.4 MG/ML
0.4 VIAL (ML) INJECTION
Status: DISCONTINUED | OUTPATIENT
Start: 2025-01-01 | End: 2025-01-01

## 2025-01-01 RX ORDER — AMOXICILLIN 250 MG
2 CAPSULE ORAL 2 TIMES DAILY
Status: DISCONTINUED | OUTPATIENT
Start: 2025-01-01 | End: 2025-01-01 | Stop reason: HOSPADM

## 2025-01-01 RX ORDER — SODIUM CHLORIDE 9 MG/ML
30 INJECTION, SOLUTION INTRAVENOUS CONTINUOUS PRN
Status: ACTIVE | OUTPATIENT
Start: 2025-01-01 | End: 2025-01-01

## 2025-01-01 RX ORDER — HYDRALAZINE HYDROCHLORIDE 25 MG/1
25 TABLET, FILM COATED ORAL 2 TIMES DAILY
Status: DISCONTINUED | OUTPATIENT
Start: 2025-01-01 | End: 2025-01-01 | Stop reason: HOSPADM

## 2025-01-01 RX ORDER — OXYCODONE HYDROCHLORIDE 5 MG/1
5 TABLET ORAL EVERY 4 HOURS PRN
Refills: 0 | Status: DISCONTINUED | OUTPATIENT
Start: 2025-01-01 | End: 2025-01-01

## 2025-01-01 RX ORDER — ACETAMINOPHEN 160 MG/5ML
650 SOLUTION ORAL EVERY 4 HOURS PRN
Status: DISCONTINUED | OUTPATIENT
Start: 2025-01-01 | End: 2025-01-01

## 2025-01-01 RX ORDER — SODIUM CHLORIDE 9 MG/ML
100 INJECTION, SOLUTION INTRAVENOUS CONTINUOUS
Status: DISCONTINUED | OUTPATIENT
Start: 2025-01-01 | End: 2025-01-01

## 2025-01-01 RX ORDER — ASPIRIN 81 MG/1
81 TABLET ORAL DAILY
Status: DISCONTINUED | OUTPATIENT
Start: 2025-01-01 | End: 2025-01-01 | Stop reason: HOSPADM

## 2025-01-01 RX ORDER — POTASSIUM CHLORIDE 1.5 G/1.58G
40 POWDER, FOR SOLUTION ORAL ONCE
Status: COMPLETED | OUTPATIENT
Start: 2025-01-01 | End: 2025-01-01

## 2025-01-01 RX ORDER — CALCIUM CARBONATE 500 MG/1
2 TABLET, CHEWABLE ORAL 2 TIMES DAILY PRN
Status: DISCONTINUED | OUTPATIENT
Start: 2025-01-01 | End: 2025-01-01

## 2025-01-01 RX ORDER — SODIUM CHLORIDE 9 MG/ML
30 INJECTION, SOLUTION INTRAVENOUS CONTINUOUS PRN
Status: CANCELLED | OUTPATIENT
Start: 2025-01-01 | End: 2025-01-01

## 2025-01-01 RX ORDER — CLOPIDOGREL BISULFATE 75 MG/1
75 TABLET ORAL DAILY
Status: DISCONTINUED | OUTPATIENT
Start: 2025-01-01 | End: 2025-01-01 | Stop reason: HOSPADM

## 2025-01-01 RX ADMIN — HYDROMORPHONE HYDROCHLORIDE 0.5 MG: 1 INJECTION, SOLUTION INTRAMUSCULAR; INTRAVENOUS; SUBCUTANEOUS at 11:14

## 2025-01-01 RX ADMIN — Medication 10 ML: at 23:19

## 2025-01-01 RX ADMIN — Medication 10 ML: at 21:54

## 2025-01-01 RX ADMIN — PIPERACILLIN AND TAZOBACTAM 3.38 G: 3; .375 INJECTION, POWDER, FOR SOLUTION INTRAVENOUS at 21:12

## 2025-01-01 RX ADMIN — Medication: at 06:57

## 2025-01-01 RX ADMIN — METOPROLOL TARTRATE 100 MG: 50 TABLET, FILM COATED ORAL at 08:27

## 2025-01-01 RX ADMIN — OXYCODONE HYDROCHLORIDE 10 MG: 5 TABLET ORAL at 09:08

## 2025-01-01 RX ADMIN — MAGNESIUM SULFATE IN DEXTROSE 1 G: 10 INJECTION, SOLUTION INTRAVENOUS at 09:44

## 2025-01-01 RX ADMIN — HYDRALAZINE HYDROCHLORIDE 25 MG: 25 TABLET ORAL at 08:24

## 2025-01-01 RX ADMIN — SODIUM CHLORIDE 100 ML/HR: 9 INJECTION, SOLUTION INTRAVENOUS at 17:33

## 2025-01-01 RX ADMIN — Medication 10 ML: at 20:41

## 2025-01-01 RX ADMIN — Medication 10 ML: at 08:52

## 2025-01-01 RX ADMIN — Medication 10 ML: at 10:10

## 2025-01-01 RX ADMIN — SODIUM CHLORIDE 75 ML/HR: 9 INJECTION, SOLUTION INTRAVENOUS at 07:19

## 2025-01-01 RX ADMIN — OXYCODONE 10 MG: 5 TABLET ORAL at 12:21

## 2025-01-01 RX ADMIN — METOPROLOL TARTRATE 100 MG: 50 TABLET, FILM COATED ORAL at 09:33

## 2025-01-01 RX ADMIN — CLOPIDOGREL BISULFATE 75 MG: 75 TABLET, FILM COATED ORAL at 10:54

## 2025-01-01 RX ADMIN — METOPROLOL TARTRATE 100 MG: 50 TABLET, FILM COATED ORAL at 10:54

## 2025-01-01 RX ADMIN — OXYCODONE HYDROCHLORIDE 10 MG: 5 TABLET ORAL at 01:20

## 2025-01-01 RX ADMIN — OXYCODONE 10 MG: 5 TABLET ORAL at 19:26

## 2025-01-01 RX ADMIN — PIPERACILLIN AND TAZOBACTAM 3.38 G: 3; .375 INJECTION, POWDER, FOR SOLUTION INTRAVENOUS at 22:03

## 2025-01-01 RX ADMIN — INSULIN LISPRO 2 UNITS: 100 INJECTION, SOLUTION INTRAVENOUS; SUBCUTANEOUS at 12:03

## 2025-01-01 RX ADMIN — PIPERACILLIN AND TAZOBACTAM 3.38 G: 3; .375 INJECTION, POWDER, FOR SOLUTION INTRAVENOUS at 09:08

## 2025-01-01 RX ADMIN — Medication: at 07:23

## 2025-01-01 RX ADMIN — Medication: at 13:00

## 2025-01-01 RX ADMIN — INSULIN GLARGINE 20 UNITS: 100 INJECTION, SOLUTION SUBCUTANEOUS at 14:38

## 2025-01-01 RX ADMIN — OXYCODONE HYDROCHLORIDE 10 MG: 5 TABLET ORAL at 21:33

## 2025-01-01 RX ADMIN — CLOPIDOGREL BISULFATE 75 MG: 75 TABLET, FILM COATED ORAL at 08:40

## 2025-01-01 RX ADMIN — Medication 10 ML: at 21:05

## 2025-01-01 RX ADMIN — Medication 10 ML: at 10:15

## 2025-01-01 RX ADMIN — ASPIRIN 81 MG: 81 TABLET, COATED ORAL at 21:51

## 2025-01-01 RX ADMIN — OXYCODONE HYDROCHLORIDE 10 MG: 5 TABLET ORAL at 01:10

## 2025-01-01 RX ADMIN — Medication 10 ML: at 08:40

## 2025-01-01 RX ADMIN — HYDRALAZINE HYDROCHLORIDE 25 MG: 25 TABLET ORAL at 10:54

## 2025-01-01 RX ADMIN — PIPERACILLIN AND TAZOBACTAM 3.38 G: 3; .375 INJECTION, POWDER, FOR SOLUTION INTRAVENOUS at 21:32

## 2025-01-01 RX ADMIN — OXYCODONE HYDROCHLORIDE 5 MG: 5 TABLET ORAL at 08:24

## 2025-01-01 RX ADMIN — METOPROLOL TARTRATE 100 MG: 50 TABLET, FILM COATED ORAL at 08:36

## 2025-01-01 RX ADMIN — HYDROMORPHONE HYDROCHLORIDE 0.5 MG: 1 INJECTION, SOLUTION INTRAMUSCULAR; INTRAVENOUS; SUBCUTANEOUS at 03:10

## 2025-01-01 RX ADMIN — HYDRALAZINE HYDROCHLORIDE 25 MG: 25 TABLET ORAL at 08:37

## 2025-01-01 RX ADMIN — Medication: at 11:46

## 2025-01-01 RX ADMIN — INSULIN LISPRO 4 UNITS: 100 INJECTION, SOLUTION INTRAVENOUS; SUBCUTANEOUS at 17:34

## 2025-01-01 RX ADMIN — ONDANSETRON 4 MG: 4 TABLET, ORALLY DISINTEGRATING ORAL at 21:24

## 2025-01-01 RX ADMIN — OXYCODONE HYDROCHLORIDE 10 MG: 5 TABLET ORAL at 17:03

## 2025-01-01 RX ADMIN — ASPIRIN 81 MG: 81 TABLET, COATED ORAL at 08:24

## 2025-01-01 RX ADMIN — OXYCODONE HYDROCHLORIDE 30 MG: 15 TABLET, FILM COATED, EXTENDED RELEASE ORAL at 13:53

## 2025-01-01 RX ADMIN — HYDROMORPHONE HYDROCHLORIDE 0.5 MG: 1 INJECTION, SOLUTION INTRAMUSCULAR; INTRAVENOUS; SUBCUTANEOUS at 15:24

## 2025-01-01 RX ADMIN — Medication 10 ML: at 08:00

## 2025-01-01 RX ADMIN — ASPIRIN 81 MG: 81 TABLET, COATED ORAL at 08:40

## 2025-01-01 RX ADMIN — HYDRALAZINE HYDROCHLORIDE 25 MG: 25 TABLET ORAL at 20:56

## 2025-01-01 RX ADMIN — HYDROMORPHONE HYDROCHLORIDE 1 MG: 1 INJECTION, SOLUTION INTRAMUSCULAR; INTRAVENOUS; SUBCUTANEOUS at 14:41

## 2025-01-01 RX ADMIN — INSULIN LISPRO 2 UNITS: 100 INJECTION, SOLUTION INTRAVENOUS; SUBCUTANEOUS at 12:17

## 2025-01-01 RX ADMIN — SENNOSIDES AND DOCUSATE SODIUM 2 TABLET: 50; 8.6 TABLET ORAL at 08:24

## 2025-01-01 RX ADMIN — HYDROMORPHONE HYDROCHLORIDE 1 MG: 1 INJECTION, SOLUTION INTRAMUSCULAR; INTRAVENOUS; SUBCUTANEOUS at 18:08

## 2025-01-01 RX ADMIN — INSULIN LISPRO 12 UNITS: 100 INJECTION, SOLUTION INTRAVENOUS; SUBCUTANEOUS at 16:16

## 2025-01-01 RX ADMIN — SENNOSIDES AND DOCUSATE SODIUM 2 TABLET: 50; 8.6 TABLET ORAL at 21:32

## 2025-01-01 RX ADMIN — HYDRALAZINE HYDROCHLORIDE 25 MG: 25 TABLET ORAL at 21:12

## 2025-01-01 RX ADMIN — HYDROMORPHONE HYDROCHLORIDE 0.5 MG: 1 INJECTION, SOLUTION INTRAMUSCULAR; INTRAVENOUS; SUBCUTANEOUS at 22:10

## 2025-01-01 RX ADMIN — SODIUM CHLORIDE 75 ML/HR: 9 INJECTION, SOLUTION INTRAVENOUS at 15:30

## 2025-01-01 RX ADMIN — HYDROMORPHONE HYDROCHLORIDE 1 MG: 1 INJECTION, SOLUTION INTRAMUSCULAR; INTRAVENOUS; SUBCUTANEOUS at 10:09

## 2025-01-01 RX ADMIN — CLOPIDOGREL BISULFATE 75 MG: 75 TABLET, FILM COATED ORAL at 08:21

## 2025-01-01 RX ADMIN — OXYCODONE 10 MG: 5 TABLET ORAL at 22:03

## 2025-01-01 RX ADMIN — PIPERACILLIN AND TAZOBACTAM 3.38 G: 3; .375 INJECTION, POWDER, FOR SOLUTION INTRAVENOUS at 10:15

## 2025-01-01 RX ADMIN — Medication: at 17:04

## 2025-01-01 RX ADMIN — HYDROMORPHONE HYDROCHLORIDE 1 MG: 1 INJECTION, SOLUTION INTRAMUSCULAR; INTRAVENOUS; SUBCUTANEOUS at 06:14

## 2025-01-01 RX ADMIN — CLOPIDOGREL BISULFATE 75 MG: 75 TABLET, FILM COATED ORAL at 21:54

## 2025-01-01 RX ADMIN — OXYCODONE HYDROCHLORIDE 10 MG: 5 TABLET ORAL at 05:19

## 2025-01-01 RX ADMIN — Medication 10 ML: at 21:15

## 2025-01-01 RX ADMIN — METOPROLOL TARTRATE 100 MG: 50 TABLET, FILM COATED ORAL at 21:12

## 2025-01-01 RX ADMIN — INSULIN LISPRO 2 UNITS: 100 INJECTION, SOLUTION INTRAVENOUS; SUBCUTANEOUS at 21:08

## 2025-01-01 RX ADMIN — LORAZEPAM 0.5 MG: 0.5 TABLET ORAL at 14:38

## 2025-01-01 RX ADMIN — OXYCODONE HYDROCHLORIDE 10 MG: 5 TABLET ORAL at 21:08

## 2025-01-01 RX ADMIN — HYDRALAZINE HYDROCHLORIDE 25 MG: 25 TABLET ORAL at 08:27

## 2025-01-01 RX ADMIN — DEXAMETHASONE SODIUM PHOSPHATE 4 MG: 4 INJECTION, SOLUTION INTRAMUSCULAR; INTRAVENOUS at 12:47

## 2025-01-01 RX ADMIN — HYDROMORPHONE HYDROCHLORIDE 1 MG: 1 INJECTION, SOLUTION INTRAMUSCULAR; INTRAVENOUS; SUBCUTANEOUS at 10:14

## 2025-01-01 RX ADMIN — METOPROLOL TARTRATE 100 MG: 50 TABLET, FILM COATED ORAL at 21:33

## 2025-01-01 RX ADMIN — OXYCODONE HYDROCHLORIDE 10 MG: 5 TABLET ORAL at 21:12

## 2025-01-01 RX ADMIN — POTASSIUM CHLORIDE 20 MEQ: 29.8 INJECTION, SOLUTION INTRAVENOUS at 10:51

## 2025-01-01 RX ADMIN — OXYCODONE 10 MG: 5 TABLET ORAL at 00:04

## 2025-01-01 RX ADMIN — OXYCODONE 10 MG: 5 TABLET ORAL at 15:58

## 2025-01-01 RX ADMIN — Medication 10 ML: at 20:51

## 2025-01-01 RX ADMIN — PIPERACILLIN AND TAZOBACTAM 3.38 G: 3; .375 INJECTION, POWDER, FOR SOLUTION INTRAVENOUS at 21:08

## 2025-01-01 RX ADMIN — OXYCODONE HYDROCHLORIDE 10 MG: 5 TABLET ORAL at 16:50

## 2025-01-01 RX ADMIN — Medication: at 16:00

## 2025-01-01 RX ADMIN — METOPROLOL TARTRATE 100 MG: 50 TABLET, FILM COATED ORAL at 20:58

## 2025-01-01 RX ADMIN — Medication 10 ML: at 23:34

## 2025-01-01 RX ADMIN — Medication: at 21:35

## 2025-01-01 RX ADMIN — Medication 10 ML: at 10:56

## 2025-01-01 RX ADMIN — HYDRALAZINE HYDROCHLORIDE 25 MG: 25 TABLET ORAL at 08:39

## 2025-01-01 RX ADMIN — Medication 10 ML: at 21:36

## 2025-01-01 RX ADMIN — SODIUM CHLORIDE 1000 ML: 9 INJECTION, SOLUTION INTRAVENOUS at 11:41

## 2025-01-01 RX ADMIN — ASPIRIN 81 MG: 81 TABLET, COATED ORAL at 08:36

## 2025-01-01 RX ADMIN — HYDROMORPHONE HYDROCHLORIDE 1 MG: 1 INJECTION, SOLUTION INTRAMUSCULAR; INTRAVENOUS; SUBCUTANEOUS at 20:45

## 2025-01-01 RX ADMIN — Medication: at 02:45

## 2025-01-01 RX ADMIN — OXYCODONE HYDROCHLORIDE 10 MG: 5 TABLET ORAL at 23:55

## 2025-01-01 RX ADMIN — ASPIRIN 81 MG: 81 TABLET, COATED ORAL at 09:08

## 2025-01-01 RX ADMIN — SODIUM CHLORIDE 75 ML/HR: 9 INJECTION, SOLUTION INTRAVENOUS at 06:38

## 2025-01-01 RX ADMIN — INSULIN LISPRO 2 UNITS: 100 INJECTION, SOLUTION INTRAVENOUS; SUBCUTANEOUS at 20:57

## 2025-01-01 RX ADMIN — OXYCODONE HYDROCHLORIDE 10 MG: 5 TABLET ORAL at 04:02

## 2025-01-01 RX ADMIN — Medication 10 ML: at 21:08

## 2025-01-01 RX ADMIN — ONDANSETRON 4 MG: 2 INJECTION, SOLUTION INTRAMUSCULAR; INTRAVENOUS at 14:33

## 2025-01-01 RX ADMIN — HYDROMORPHONE HYDROCHLORIDE 0.5 MG: 1 INJECTION, SOLUTION INTRAMUSCULAR; INTRAVENOUS; SUBCUTANEOUS at 22:45

## 2025-01-01 RX ADMIN — ACETAMINOPHEN 650 MG: 325 TABLET, FILM COATED ORAL at 22:34

## 2025-01-01 RX ADMIN — HYDROXYZINE HYDROCHLORIDE 25 MG: 25 TABLET, FILM COATED ORAL at 08:27

## 2025-01-01 RX ADMIN — Medication 10 ML: at 08:24

## 2025-01-01 RX ADMIN — Medication 10 ML: at 08:30

## 2025-01-01 RX ADMIN — HYDROXYZINE HYDROCHLORIDE 25 MG: 25 TABLET, FILM COATED ORAL at 23:20

## 2025-01-01 RX ADMIN — Medication 10 ML: at 08:25

## 2025-01-01 RX ADMIN — HYDRALAZINE HYDROCHLORIDE 25 MG: 25 TABLET ORAL at 21:54

## 2025-01-01 RX ADMIN — OXYCODONE HYDROCHLORIDE 10 MG: 5 TABLET ORAL at 05:08

## 2025-01-01 RX ADMIN — CLOPIDOGREL BISULFATE 75 MG: 75 TABLET, FILM COATED ORAL at 08:37

## 2025-01-01 RX ADMIN — SODIUM CHLORIDE 125 ML/HR: 9 INJECTION, SOLUTION INTRAVENOUS at 17:16

## 2025-01-01 RX ADMIN — PIPERACILLIN AND TAZOBACTAM 3.38 G: 3; .375 INJECTION, POWDER, FOR SOLUTION INTRAVENOUS at 21:46

## 2025-01-01 RX ADMIN — INSULIN LISPRO 4 UNITS: 100 INJECTION, SOLUTION INTRAVENOUS; SUBCUTANEOUS at 12:05

## 2025-01-01 RX ADMIN — HYDROMORPHONE HYDROCHLORIDE 1 MG: 1 INJECTION, SOLUTION INTRAMUSCULAR; INTRAVENOUS; SUBCUTANEOUS at 10:27

## 2025-01-01 RX ADMIN — OXYCODONE HYDROCHLORIDE 10 MG: 5 TABLET ORAL at 13:06

## 2025-01-01 RX ADMIN — HYDROMORPHONE HYDROCHLORIDE 1 MG: 1 INJECTION, SOLUTION INTRAMUSCULAR; INTRAVENOUS; SUBCUTANEOUS at 05:28

## 2025-01-01 RX ADMIN — OXYCODONE 10 MG: 5 TABLET ORAL at 05:13

## 2025-01-01 RX ADMIN — HALOPERIDOL LACTATE 1 MG: 5 INJECTION, SOLUTION INTRAMUSCULAR at 10:27

## 2025-01-01 RX ADMIN — HYDRALAZINE HYDROCHLORIDE 25 MG: 25 TABLET ORAL at 09:08

## 2025-01-01 RX ADMIN — OXYCODONE HYDROCHLORIDE 10 MG: 5 TABLET ORAL at 09:33

## 2025-01-01 RX ADMIN — OXYCODONE 10 MG: 5 TABLET ORAL at 08:27

## 2025-01-01 RX ADMIN — PIPERACILLIN AND TAZOBACTAM 3.38 G: 3; .375 INJECTION, POWDER, FOR SOLUTION INTRAVENOUS at 10:43

## 2025-01-01 RX ADMIN — POLYETHYLENE GLYCOL 3350 17 G: 17 POWDER, FOR SOLUTION ORAL at 10:54

## 2025-01-01 RX ADMIN — POTASSIUM CHLORIDE 20 MEQ: 1500 TABLET, EXTENDED RELEASE ORAL at 08:36

## 2025-01-01 RX ADMIN — MAGNESIUM SULFATE IN DEXTROSE 1 G: 10 INJECTION, SOLUTION INTRAVENOUS at 10:46

## 2025-01-01 RX ADMIN — POTASSIUM CHLORIDE 20 MEQ: 1500 TABLET, EXTENDED RELEASE ORAL at 08:21

## 2025-01-01 RX ADMIN — OXYCODONE HYDROCHLORIDE 10 MG: 5 TABLET ORAL at 10:54

## 2025-01-01 RX ADMIN — PIPERACILLIN AND TAZOBACTAM 3.38 G: 3; .375 INJECTION, POWDER, FOR SOLUTION INTRAVENOUS at 13:56

## 2025-01-01 RX ADMIN — ONDANSETRON 4 MG: 2 INJECTION, SOLUTION INTRAMUSCULAR; INTRAVENOUS at 12:40

## 2025-01-01 RX ADMIN — OXYCODONE HYDROCHLORIDE 5 MG: 5 TABLET ORAL at 05:23

## 2025-01-01 RX ADMIN — Medication: at 12:35

## 2025-01-01 RX ADMIN — ASPIRIN 81 MG: 81 TABLET, COATED ORAL at 10:54

## 2025-01-01 RX ADMIN — DEXTROSE MONOHYDRATE 25 G: 25 INJECTION, SOLUTION INTRAVENOUS at 14:51

## 2025-01-01 RX ADMIN — INSULIN LISPRO 2 UNITS: 100 INJECTION, SOLUTION INTRAVENOUS; SUBCUTANEOUS at 21:12

## 2025-01-01 RX ADMIN — ASPIRIN 81 MG: 81 TABLET, COATED ORAL at 08:27

## 2025-01-01 RX ADMIN — OXYCODONE 10 MG: 5 TABLET ORAL at 10:50

## 2025-01-01 RX ADMIN — INSULIN LISPRO 4 UNITS: 100 INJECTION, SOLUTION INTRAVENOUS; SUBCUTANEOUS at 16:50

## 2025-01-01 RX ADMIN — INSULIN LISPRO 2 UNITS: 100 INJECTION, SOLUTION INTRAVENOUS; SUBCUTANEOUS at 12:11

## 2025-01-01 RX ADMIN — PIPERACILLIN AND TAZOBACTAM 3.38 G: 3; .375 INJECTION, POWDER, FOR SOLUTION INTRAVENOUS at 10:53

## 2025-01-01 RX ADMIN — Medication 10 ML: at 20:46

## 2025-01-01 RX ADMIN — HYDROMORPHONE HYDROCHLORIDE 0.5 MG: 1 INJECTION, SOLUTION INTRAMUSCULAR; INTRAVENOUS; SUBCUTANEOUS at 23:55

## 2025-01-01 RX ADMIN — METOPROLOL TARTRATE 100 MG: 50 TABLET, FILM COATED ORAL at 21:54

## 2025-01-01 RX ADMIN — LORAZEPAM 0.5 MG: 0.5 TABLET ORAL at 01:17

## 2025-01-01 RX ADMIN — METOPROLOL TARTRATE 100 MG: 50 TABLET, FILM COATED ORAL at 08:21

## 2025-01-01 RX ADMIN — ONDANSETRON 4 MG: 2 INJECTION, SOLUTION INTRAMUSCULAR; INTRAVENOUS at 04:44

## 2025-01-01 RX ADMIN — OXYCODONE HYDROCHLORIDE 10 MG: 5 TABLET ORAL at 12:17

## 2025-01-01 RX ADMIN — Medication 10 ML: at 20:59

## 2025-01-01 RX ADMIN — OXYCODONE HYDROCHLORIDE 30 MG: 15 TABLET, FILM COATED, EXTENDED RELEASE ORAL at 02:22

## 2025-01-01 RX ADMIN — SENNOSIDES AND DOCUSATE SODIUM 2 TABLET: 50; 8.6 TABLET ORAL at 21:54

## 2025-01-01 RX ADMIN — MAGNESIUM SULFATE IN DEXTROSE 1 G: 10 INJECTION, SOLUTION INTRAVENOUS at 08:25

## 2025-01-01 RX ADMIN — HALOPERIDOL LACTATE 1 MG: 5 INJECTION, SOLUTION INTRAMUSCULAR at 06:10

## 2025-01-01 RX ADMIN — POTASSIUM CHLORIDE 40 MEQ: 1.5 FOR SOLUTION ORAL at 09:08

## 2025-01-01 RX ADMIN — SODIUM CHLORIDE 30 ML/HR: 9 INJECTION, SOLUTION INTRAVENOUS at 15:09

## 2025-01-01 RX ADMIN — Medication: at 14:31

## 2025-01-01 RX ADMIN — HYDROMORPHONE HYDROCHLORIDE 1 MG: 1 INJECTION, SOLUTION INTRAMUSCULAR; INTRAVENOUS; SUBCUTANEOUS at 12:50

## 2025-01-01 RX ADMIN — Medication: at 20:51

## 2025-01-01 RX ADMIN — OXYCODONE HYDROCHLORIDE 5 MG: 5 TABLET ORAL at 23:16

## 2025-01-01 RX ADMIN — HYDROMORPHONE HYDROCHLORIDE 1 MG: 1 INJECTION, SOLUTION INTRAMUSCULAR; INTRAVENOUS; SUBCUTANEOUS at 08:52

## 2025-01-01 RX ADMIN — PIPERACILLIN AND TAZOBACTAM 3.38 G: 3; .375 INJECTION, POWDER, FOR SOLUTION INTRAVENOUS at 21:15

## 2025-01-01 RX ADMIN — OXYCODONE HYDROCHLORIDE 10 MG: 5 TABLET ORAL at 12:47

## 2025-01-01 RX ADMIN — SENNOSIDES AND DOCUSATE SODIUM 2 TABLET: 50; 8.6 TABLET ORAL at 08:27

## 2025-01-01 RX ADMIN — GLYCOPYRROLATE 0.4 MG: 0.2 INJECTION INTRAMUSCULAR; INTRAVENOUS at 09:22

## 2025-01-01 RX ADMIN — INSULIN LISPRO 2 UNITS: 100 INJECTION, SOLUTION INTRAVENOUS; SUBCUTANEOUS at 17:03

## 2025-01-01 RX ADMIN — HYDRALAZINE HYDROCHLORIDE 25 MG: 25 TABLET ORAL at 20:58

## 2025-01-01 RX ADMIN — ASPIRIN 81 MG: 81 TABLET, COATED ORAL at 09:33

## 2025-01-01 RX ADMIN — SENNOSIDES AND DOCUSATE SODIUM 2 TABLET: 50; 8.6 TABLET ORAL at 21:12

## 2025-01-01 RX ADMIN — HYDRALAZINE HYDROCHLORIDE 25 MG: 25 TABLET ORAL at 21:33

## 2025-01-01 RX ADMIN — SENNOSIDES AND DOCUSATE SODIUM 2 TABLET: 50; 8.6 TABLET ORAL at 09:08

## 2025-01-01 RX ADMIN — Medication 10 ML: at 09:34

## 2025-01-01 RX ADMIN — SENNOSIDES AND DOCUSATE SODIUM 2 TABLET: 50; 8.6 TABLET ORAL at 10:54

## 2025-01-01 RX ADMIN — OXYCODONE HYDROCHLORIDE 5 MG: 5 TABLET ORAL at 18:11

## 2025-01-01 RX ADMIN — PIPERACILLIN AND TAZOBACTAM 3.38 G: 3; .375 INJECTION, POWDER, FOR SOLUTION INTRAVENOUS at 09:33

## 2025-01-01 RX ADMIN — OXYCODONE HYDROCHLORIDE 10 MG: 5 TABLET ORAL at 06:32

## 2025-01-01 RX ADMIN — OXYCODONE HYDROCHLORIDE 10 MG: 5 TABLET ORAL at 20:58

## 2025-01-01 RX ADMIN — METOPROLOL TARTRATE 100 MG: 50 TABLET, FILM COATED ORAL at 08:39

## 2025-01-01 RX ADMIN — HYDROMORPHONE HYDROCHLORIDE 1 MG: 1 INJECTION, SOLUTION INTRAMUSCULAR; INTRAVENOUS; SUBCUTANEOUS at 21:00

## 2025-01-01 RX ADMIN — METOPROLOL TARTRATE 100 MG: 50 TABLET, FILM COATED ORAL at 20:56

## 2025-01-01 RX ADMIN — OXYCODONE HYDROCHLORIDE 10 MG: 5 TABLET ORAL at 08:36

## 2025-01-01 RX ADMIN — Medication 10 ML: at 08:31

## 2025-01-01 RX ADMIN — CLOPIDOGREL BISULFATE 75 MG: 75 TABLET, FILM COATED ORAL at 09:33

## 2025-01-01 RX ADMIN — INSULIN LISPRO 2 UNITS: 100 INJECTION, SOLUTION INTRAVENOUS; SUBCUTANEOUS at 08:36

## 2025-01-01 RX ADMIN — METOPROLOL TARTRATE 100 MG: 50 TABLET, FILM COATED ORAL at 09:07

## 2025-01-01 RX ADMIN — HYDROMORPHONE HYDROCHLORIDE 0.5 MG: 1 INJECTION, SOLUTION INTRAMUSCULAR; INTRAVENOUS; SUBCUTANEOUS at 16:22

## 2025-01-01 RX ADMIN — HYDROMORPHONE HYDROCHLORIDE 0.5 MG: 1 INJECTION, SOLUTION INTRAMUSCULAR; INTRAVENOUS; SUBCUTANEOUS at 10:13

## 2025-01-01 RX ADMIN — ONDANSETRON 4 MG: 2 INJECTION, SOLUTION INTRAMUSCULAR; INTRAVENOUS at 04:06

## 2025-01-01 RX ADMIN — CLOPIDOGREL BISULFATE 75 MG: 75 TABLET, FILM COATED ORAL at 09:08

## 2025-01-01 RX ADMIN — CLOPIDOGREL BISULFATE 75 MG: 75 TABLET, FILM COATED ORAL at 08:27

## 2025-01-01 RX ADMIN — Medication: at 04:54

## 2025-01-01 RX ADMIN — HYDROMORPHONE HYDROCHLORIDE 0.5 MG: 1 INJECTION, SOLUTION INTRAMUSCULAR; INTRAVENOUS; SUBCUTANEOUS at 07:29

## 2025-01-01 RX ADMIN — SENNOSIDES AND DOCUSATE SODIUM 2 TABLET: 50; 8.6 TABLET ORAL at 21:08

## 2025-01-01 RX ADMIN — OXYCODONE HYDROCHLORIDE 10 MG: 5 TABLET ORAL at 17:26

## 2025-01-01 RX ADMIN — HYDRALAZINE HYDROCHLORIDE 25 MG: 25 TABLET ORAL at 09:33

## 2025-01-01 RX ADMIN — DEXAMETHASONE SODIUM PHOSPHATE 4 MG: 4 INJECTION, SOLUTION INTRAMUSCULAR; INTRAVENOUS at 10:53

## 2025-01-02 ENCOUNTER — INFUSION (OUTPATIENT)
Dept: ONCOLOGY | Facility: HOSPITAL | Age: 89
End: 2025-01-02
Payer: MEDICARE

## 2025-01-02 ENCOUNTER — PATIENT OUTREACH (OUTPATIENT)
Dept: OTHER | Facility: HOSPITAL | Age: 89
End: 2025-01-02
Payer: MEDICARE

## 2025-01-02 DIAGNOSIS — C18.9 METASTATIC COLON CANCER TO LIVER: Primary | ICD-10-CM

## 2025-01-02 DIAGNOSIS — Z45.2 ENCOUNTER FOR ADJUSTMENT OR MANAGEMENT OF VASCULAR ACCESS DEVICE: ICD-10-CM

## 2025-01-02 DIAGNOSIS — C78.7 METASTATIC COLON CANCER TO LIVER: Primary | ICD-10-CM

## 2025-01-02 LAB — BACTERIA SPEC AEROBE CULT: ABNORMAL

## 2025-01-02 PROCEDURE — 25010000002 PEGFILGRASTIM 6 MG/0.6ML PREFILLED SYRINGE KIT: Performed by: NURSE PRACTITIONER

## 2025-01-02 PROCEDURE — 25010000002 HEPARIN LOCK FLUSH PER 10 UNITS: Performed by: INTERNAL MEDICINE

## 2025-01-02 PROCEDURE — 96377 APPLICATON ON-BODY INJECTOR: CPT

## 2025-01-02 RX ORDER — SODIUM CHLORIDE 0.9 % (FLUSH) 0.9 %
10 SYRINGE (ML) INJECTION AS NEEDED
Status: DISCONTINUED | OUTPATIENT
Start: 2025-01-02 | End: 2025-01-02 | Stop reason: HOSPADM

## 2025-01-02 RX ORDER — HEPARIN SODIUM (PORCINE) LOCK FLUSH IV SOLN 100 UNIT/ML 100 UNIT/ML
500 SOLUTION INTRAVENOUS AS NEEDED
OUTPATIENT
Start: 2025-01-02

## 2025-01-02 RX ORDER — SODIUM CHLORIDE 0.9 % (FLUSH) 0.9 %
10 SYRINGE (ML) INJECTION AS NEEDED
OUTPATIENT
Start: 2025-01-02

## 2025-01-02 RX ORDER — HEPARIN SODIUM (PORCINE) LOCK FLUSH IV SOLN 100 UNIT/ML 100 UNIT/ML
500 SOLUTION INTRAVENOUS AS NEEDED
Status: DISCONTINUED | OUTPATIENT
Start: 2025-01-02 | End: 2025-01-02 | Stop reason: HOSPADM

## 2025-01-02 RX ADMIN — PEGFILGRASTIM 6 MG: KIT SUBCUTANEOUS at 12:48

## 2025-01-02 RX ADMIN — Medication 500 UNITS: at 12:48

## 2025-01-02 RX ADMIN — Medication 10 ML: at 12:48

## 2025-01-03 ENCOUNTER — PATIENT OUTREACH (OUTPATIENT)
Dept: OTHER | Facility: HOSPITAL | Age: 89
End: 2025-01-03
Payer: MEDICARE

## 2025-01-03 RX ORDER — SULFAMETHOXAZOLE AND TRIMETHOPRIM 800; 160 MG/1; MG/1
1 TABLET ORAL 2 TIMES DAILY
Qty: 6 TABLET | Refills: 0 | Status: SHIPPED | OUTPATIENT
Start: 2025-01-03

## 2025-01-03 RX ORDER — NITROFURANTOIN 25; 75 MG/1; MG/1
100 CAPSULE ORAL 2 TIMES DAILY
Qty: 14 CAPSULE | Refills: 0 | Status: CANCELLED | OUTPATIENT
Start: 2025-01-03

## 2025-01-03 NOTE — PROGRESS NOTES
1220 Pt's daughter Mandi call and left message on vm stating her mom had a Urine Culture done at Dr. Meeks's office showing >100,000 CFU/mL Escherichia coli Abnormal   and asked if she need any kind of treatment. Message sent to Dr. Meeks's nurse Carlos and Tobi OROURKE triage nurse to return call to Mandi.

## 2025-01-03 NOTE — TELEPHONE ENCOUNTER
Received a notification from Nurse Navigator about patient's daughter Mandi calling about patient's urine culture. Called Mandi and she stated that patient caregiver reports patient having a strong smell of urine, no pain when she urinates, no fever. This RN called patient directly and she reports urinary frequency, every 15mins, and urinary urgency. D/W Gerri GILMORE. Received an order for Bactrim DS BID x3days. Confirmed pharmacy. Updated pt's daughter.

## 2025-01-07 ENCOUNTER — TELEPHONE (OUTPATIENT)
Dept: ONCOLOGY | Facility: CLINIC | Age: 89
End: 2025-01-07
Payer: MEDICARE

## 2025-01-07 NOTE — TELEPHONE ENCOUNTER
Provider: Dr. Meeks  Caller: Hima Posey DPT  Relationship to Patient: Provider  Call Back Phone Number: [phone]  Reason for Call: Hima is needing a verbal for PT, 1 time per week for 8 weeks

## 2025-01-13 ENCOUNTER — PATIENT OUTREACH (OUTPATIENT)
Dept: OTHER | Facility: HOSPITAL | Age: 89
End: 2025-01-13
Payer: MEDICARE

## 2025-01-14 ENCOUNTER — OFFICE VISIT (OUTPATIENT)
Dept: ONCOLOGY | Facility: CLINIC | Age: 89
End: 2025-01-14
Payer: MEDICARE

## 2025-01-14 ENCOUNTER — INFUSION (OUTPATIENT)
Dept: ONCOLOGY | Facility: HOSPITAL | Age: 89
End: 2025-01-14
Payer: MEDICARE

## 2025-01-14 VITALS
OXYGEN SATURATION: 96 % | BODY MASS INDEX: 20.12 KG/M2 | HEART RATE: 62 BPM | SYSTOLIC BLOOD PRESSURE: 140 MMHG | TEMPERATURE: 97.8 F | WEIGHT: 103 LBS | DIASTOLIC BLOOD PRESSURE: 67 MMHG

## 2025-01-14 DIAGNOSIS — C18.9 METASTATIC COLON CANCER TO LIVER: Primary | ICD-10-CM

## 2025-01-14 DIAGNOSIS — C78.7 METASTATIC COLON CANCER TO LIVER: Primary | ICD-10-CM

## 2025-01-14 DIAGNOSIS — C18.9 CARCINOMA OF COLON METASTATIC TO LUNG: ICD-10-CM

## 2025-01-14 DIAGNOSIS — Z79.899 HIGH RISK MEDICATION USE: ICD-10-CM

## 2025-01-14 DIAGNOSIS — C18.9 METASTATIC COLON CANCER TO LIVER: ICD-10-CM

## 2025-01-14 DIAGNOSIS — C78.00 CARCINOMA OF COLON METASTATIC TO LUNG: ICD-10-CM

## 2025-01-14 DIAGNOSIS — C78.7 METASTATIC COLON CANCER TO LIVER: ICD-10-CM

## 2025-01-14 LAB
ALBUMIN SERPL-MCNC: 3 G/DL (ref 3.5–5.2)
ALBUMIN/GLOB SERPL: 1.1 G/DL
ALP SERPL-CCNC: 264 U/L (ref 39–117)
ALT SERPL W P-5'-P-CCNC: 33 U/L (ref 1–33)
ANION GAP SERPL CALCULATED.3IONS-SCNC: 11.9 MMOL/L (ref 5–15)
AST SERPL-CCNC: 65 U/L (ref 1–32)
BASOPHILS # BLD AUTO: 0.08 10*3/MM3 (ref 0–0.2)
BASOPHILS NFR BLD AUTO: 0.5 % (ref 0–1.5)
BILIRUB SERPL-MCNC: 0.2 MG/DL (ref 0–1.2)
BILIRUB UR QL STRIP: NEGATIVE
BUN SERPL-MCNC: 20 MG/DL (ref 8–23)
BUN/CREAT SERPL: 20.8 (ref 7–25)
CALCIUM SPEC-SCNC: 9 MG/DL (ref 8.6–10.5)
CHLORIDE SERPL-SCNC: 104 MMOL/L (ref 98–107)
CLARITY UR: CLEAR
CO2 SERPL-SCNC: 23.1 MMOL/L (ref 22–29)
COLOR UR: YELLOW
CREAT SERPL-MCNC: 0.96 MG/DL (ref 0.57–1)
DEPRECATED RDW RBC AUTO: 81.3 FL (ref 37–54)
EGFRCR SERPLBLD CKD-EPI 2021: 57.4 ML/MIN/1.73
EOSINOPHIL # BLD AUTO: 0.32 10*3/MM3 (ref 0–0.4)
EOSINOPHIL NFR BLD AUTO: 1.9 % (ref 0.3–6.2)
ERYTHROCYTE [DISTWIDTH] IN BLOOD BY AUTOMATED COUNT: 26.2 % (ref 12.3–15.4)
GLOBULIN UR ELPH-MCNC: 2.8 GM/DL
GLUCOSE SERPL-MCNC: 126 MG/DL (ref 65–99)
GLUCOSE UR STRIP-MCNC: ABNORMAL MG/DL
HCT VFR BLD AUTO: 34.6 % (ref 34–46.6)
HGB BLD-MCNC: 10.8 G/DL (ref 12–15.9)
HGB UR QL STRIP.AUTO: NEGATIVE
IMM GRANULOCYTES # BLD AUTO: 0.2 10*3/MM3 (ref 0–0.05)
IMM GRANULOCYTES NFR BLD AUTO: 1.2 % (ref 0–0.5)
KETONES UR QL STRIP: NEGATIVE
LEUKOCYTE ESTERASE UR QL STRIP.AUTO: NEGATIVE
LYMPHOCYTES # BLD AUTO: 1.42 10*3/MM3 (ref 0.7–3.1)
LYMPHOCYTES NFR BLD AUTO: 8.5 % (ref 19.6–45.3)
MCH RBC QN AUTO: 28.1 PG (ref 26.6–33)
MCHC RBC AUTO-ENTMCNC: 31.2 G/DL (ref 31.5–35.7)
MCV RBC AUTO: 90.1 FL (ref 79–97)
MONOCYTES # BLD AUTO: 0.84 10*3/MM3 (ref 0.1–0.9)
MONOCYTES NFR BLD AUTO: 5.1 % (ref 5–12)
NEUTROPHILS NFR BLD AUTO: 13.77 10*3/MM3 (ref 1.7–7)
NEUTROPHILS NFR BLD AUTO: 82.8 % (ref 42.7–76)
NITRITE UR QL STRIP: NEGATIVE
NRBC BLD AUTO-RTO: 0 /100 WBC (ref 0–0.2)
PH UR STRIP.AUTO: 5.5 [PH] (ref 4.5–8)
PLATELET # BLD AUTO: 185 10*3/MM3 (ref 140–450)
PMV BLD AUTO: 10.2 FL (ref 6–12)
POTASSIUM SERPL-SCNC: 4.1 MMOL/L (ref 3.5–5.2)
PROT SERPL-MCNC: 5.8 G/DL (ref 6–8.5)
PROT UR QL STRIP: ABNORMAL
RBC # BLD AUTO: 3.84 10*6/MM3 (ref 3.77–5.28)
SODIUM SERPL-SCNC: 139 MMOL/L (ref 136–145)
SP GR UR STRIP: 1.02 (ref 1–1.03)
UROBILINOGEN UR QL STRIP: ABNORMAL
WBC NRBC COR # BLD AUTO: 16.63 10*3/MM3 (ref 3.4–10.8)

## 2025-01-14 PROCEDURE — 25010000002 PALONOSETRON PER 25 MCG: Performed by: NURSE PRACTITIONER

## 2025-01-14 PROCEDURE — 25010000003 DEXTROSE 5 % SOLUTION 250 ML FLEX CONT: Performed by: NURSE PRACTITIONER

## 2025-01-14 PROCEDURE — 96413 CHEMO IV INFUSION 1 HR: CPT

## 2025-01-14 PROCEDURE — G0498 CHEMO EXTEND IV INFUS W/PUMP: HCPCS

## 2025-01-14 PROCEDURE — 96375 TX/PRO/DX INJ NEW DRUG ADDON: CPT

## 2025-01-14 PROCEDURE — 25010000002 OXALIPLATIN PER 0.5 MG: Performed by: NURSE PRACTITIONER

## 2025-01-14 PROCEDURE — 96367 TX/PROPH/DG ADDL SEQ IV INF: CPT

## 2025-01-14 PROCEDURE — 96411 CHEMO IV PUSH ADDL DRUG: CPT

## 2025-01-14 PROCEDURE — 96368 THER/DIAG CONCURRENT INF: CPT

## 2025-01-14 PROCEDURE — 80053 COMPREHEN METABOLIC PANEL: CPT

## 2025-01-14 PROCEDURE — 25010000002 FOSAPREPITANT PER 1 MG: Performed by: NURSE PRACTITIONER

## 2025-01-14 PROCEDURE — 85025 COMPLETE CBC W/AUTO DIFF WBC: CPT

## 2025-01-14 PROCEDURE — 25010000002 DEXAMETHASONE SODIUM PHOSPHATE 100 MG/10ML SOLUTION: Performed by: NURSE PRACTITIONER

## 2025-01-14 PROCEDURE — 25010000002 FLUOROURACIL PER 500 MG: Performed by: NURSE PRACTITIONER

## 2025-01-14 PROCEDURE — 96366 THER/PROPH/DIAG IV INF ADDON: CPT

## 2025-01-14 PROCEDURE — 81003 URINALYSIS AUTO W/O SCOPE: CPT

## 2025-01-14 PROCEDURE — 25810000003 SODIUM CHLORIDE 0.9 % SOLUTION 250 ML FLEX CONT: Performed by: NURSE PRACTITIONER

## 2025-01-14 PROCEDURE — 96415 CHEMO IV INFUSION ADDL HR: CPT

## 2025-01-14 PROCEDURE — 25010000002 LEUCOVORIN CALCIUM PER 50 MG: Performed by: NURSE PRACTITIONER

## 2025-01-14 RX ORDER — FLUOROURACIL 50 MG/ML
200 INJECTION, SOLUTION INTRAVENOUS ONCE
Status: CANCELLED | OUTPATIENT
Start: 2025-01-14

## 2025-01-14 RX ORDER — HYDROCORTISONE SODIUM SUCCINATE 100 MG/2ML
100 INJECTION INTRAMUSCULAR; INTRAVENOUS AS NEEDED
Status: CANCELLED | OUTPATIENT
Start: 2025-01-14

## 2025-01-14 RX ORDER — DEXTROSE MONOHYDRATE 50 MG/ML
20 INJECTION, SOLUTION INTRAVENOUS ONCE
Status: COMPLETED | OUTPATIENT
Start: 2025-01-14 | End: 2025-01-14

## 2025-01-14 RX ORDER — SODIUM CHLORIDE 9 MG/ML
20 INJECTION, SOLUTION INTRAVENOUS ONCE
Status: DISCONTINUED | OUTPATIENT
Start: 2025-01-14 | End: 2025-01-14 | Stop reason: HOSPADM

## 2025-01-14 RX ORDER — FLUOROURACIL 50 MG/ML
200 INJECTION, SOLUTION INTRAVENOUS ONCE
Status: COMPLETED | OUTPATIENT
Start: 2025-01-14 | End: 2025-01-14

## 2025-01-14 RX ORDER — PALONOSETRON 0.05 MG/ML
0.25 INJECTION, SOLUTION INTRAVENOUS ONCE
Status: COMPLETED | OUTPATIENT
Start: 2025-01-14 | End: 2025-01-14

## 2025-01-14 RX ORDER — DIPHENHYDRAMINE HYDROCHLORIDE 50 MG/ML
50 INJECTION INTRAMUSCULAR; INTRAVENOUS AS NEEDED
Status: CANCELLED | OUTPATIENT
Start: 2025-01-14

## 2025-01-14 RX ORDER — PALONOSETRON 0.05 MG/ML
0.25 INJECTION, SOLUTION INTRAVENOUS ONCE
Status: CANCELLED | OUTPATIENT
Start: 2025-01-14

## 2025-01-14 RX ORDER — DEXTROSE MONOHYDRATE 50 MG/ML
20 INJECTION, SOLUTION INTRAVENOUS ONCE
Status: CANCELLED | OUTPATIENT
Start: 2025-01-14

## 2025-01-14 RX ORDER — FAMOTIDINE 10 MG/ML
20 INJECTION, SOLUTION INTRAVENOUS AS NEEDED
Status: CANCELLED | OUTPATIENT
Start: 2025-01-14

## 2025-01-14 RX ORDER — SODIUM CHLORIDE 9 MG/ML
20 INJECTION, SOLUTION INTRAVENOUS ONCE
Status: CANCELLED | OUTPATIENT
Start: 2025-01-14

## 2025-01-14 RX ADMIN — PALONOSETRON HYDROCHLORIDE 0.25 MG: 0.25 INJECTION INTRAVENOUS at 09:39

## 2025-01-14 RX ADMIN — OXALIPLATIN 60 MG: 5 INJECTION, SOLUTION INTRAVENOUS at 11:12

## 2025-01-14 RX ADMIN — LEUCOVORIN CALCIUM 280 MG: 350 INJECTION, POWDER, LYOPHILIZED, FOR SUSPENSION INTRAMUSCULAR; INTRAVENOUS at 11:12

## 2025-01-14 RX ADMIN — FLUOROURACIL 280 MG: 50 INJECTION, SOLUTION INTRAVENOUS at 13:29

## 2025-01-14 RX ADMIN — FLUOROURACIL 1700 MG: 50 INJECTION, SOLUTION INTRAVENOUS at 13:29

## 2025-01-14 RX ADMIN — DEXTROSE MONOHYDRATE 20 ML/HR: 50 INJECTION, SOLUTION INTRAVENOUS at 11:14

## 2025-01-14 RX ADMIN — SODIUM CHLORIDE 12 MG: 9 INJECTION, SOLUTION INTRAVENOUS at 09:42

## 2025-01-14 RX ADMIN — FOSAPREPITANT 100 ML: 150 INJECTION, POWDER, LYOPHILIZED, FOR SOLUTION INTRAVENOUS at 09:58

## 2025-01-14 NOTE — PROGRESS NOTES
[facility]    CONSULTING IN BLOOD DISORDERS & CANCER      REASON FOR CONSULTATION/CHIEF COMPLAINT:     Evaluation and management for metastatic colorectal cancer                             REQUESTING PHYSICIAN: No ref. provider found  RECORDS OBTAINED:  Records of the patients history including those from the electronic medical record were reviewed and summarized in detail.    HISTORY OF PRESENT ILLNESS:    The patient is a 89 y.o. year old female with medical history significant for CAD s/p CABG x 3 (9097, 2007), PCI's x 16, pacemaker, peptic ulcer disease, hypertension and dyslipidemia comes for metastatic colorectal cancer evaluation and management.    Patient had presented to her PCP,  in early October 2024 with lower abdominal pain and weight loss of > 20 pounds.  A CT A/P performed 10/9/2024 noted innumerable lung nodules, multiple hypodense masses in the liver and a large mass in the hepatic flexure of the colon, measuring 4.5 cm in greatest dimension.    Patient underwent CT-guided liver lesion biopsy on 10/28/2024.  Pathology: Metastatic adenocarcinoma of colorectal origin.    Patient was referred to hematology/oncology for further evaluation and management.  She was first seen in this clinic on 11//24.  Accompanied by 3 of 4 daughters.  Patient denies any major GI issues.  No blood in stool or altered bowel habits.  She does take stool softeners for regular bowel movements.  No nausea or vomiting.  No family history of colorectal cancer.    11/5/2024: PET/CT demonstrates hypermetabolic lesions in the colonic hepatic flexure, pulmonary, hepatic, osseous and zenaida metastasis.  Colonoscopy did reveal hepatic flexure mass, partially obstructing.    11/21/2024: Cycle 1 FOLFOX    12/5/2024: Was seen for cycle 2 FOLFOX, however noted to have severe neutropenia with WBC count of 0.52 and ANC of 0.08.  Chemotherapy was held and received Neupogen x 2 doses.       Patient has DPYD intermediate mutation.   Given prolonged neutropenia, age, and presence of DPYD mutation-plan made to administer 50% dose of FOLFOX with G-CSF support in the form of Neulasta on pro.    INTERIM HISTORY:  Patient returns today for follow-up for cycle 4 of FOLFOX and her second bevacizumab.  Continued with Neulasta.    He is seen back today accompanied by her son.  Unfortunately with the last cycle she had significant side effects for approximately 3 days following disconnect where she could not get out of bed or prepare any meals for herself because she was so tired.  She states it took her about a week thereafter to regain her strength and she is now able to prepare her meals once again.  She is feeling good today.  She denies any bleeding or dark stools.  She denies any pain.  She has not yet started physical therapy..      Past Medical History:   Diagnosis Date    Anemia     Arthritis of left sacroiliac joint 05/05/2022    CKD (chronic kidney disease), stage III     PATIENT STATES IT IS AT STAGE 4 AT THIS TIME    Colorectal cancer     Coronary artery disease     cath 11/15. CABG x3 and all grafts widely patent. Very small OM and Cfx and that may be the cause of lateral ischemia on stress test. Non bipassable    Diabetes mellitus, type II     GERD (gastroesophageal reflux disease)     Gout     Heart attack 10/2022    MULTIPLE (6 OR MORE)    History of DVT in adulthood     AFTER CHILDBIRTH    Hyperlipidemia     Hypertension     Liver masses     Pacemaker     PUD (peptic ulcer disease)      Past Surgical History:   Procedure Laterality Date    CARDIAC ELECTROPHYSIOLOGY PROCEDURE N/A 02/22/2017    Procedure: Pacemaker DC new;  Surgeon: Juvenal Zhong MD;  Location: [facility] INVASIVE LOCATION;  Service:     CARDIAC SURGERY  10/2022    CATARACT EXTRACTION      COLONOSCOPY  2010    COLONOSCOPY N/A 11/19/2024    Procedure: COLONOSCOPY WITH TATTOO MASS INJECTION 4 ML;  Surgeon: Caden Christian MD;  Location: [facility] OR;  Service:  General;  Laterality: N/A;    CORONARY ARTERY BYPASS GRAFT      1992, 2009    EPIDURAL Left 10/17/2022    Procedure: LUMBAR/SACRAL TRANSFORAMINAL EPIDURAL Left L4-5 and left L5-S1 - hold plavix 7 days before;  Surgeon: Yasmine Gaming MD;  [facility];  Service: Pain Management;  Laterality: Left;    PACEMAKER IMPLANTATION      PIRIFORMUS INJECTION Left 07/15/2022    Procedure: PIRIFORMIS INJECTION;  Surgeon: Yasmine Gaming MD;  Location[facility];  Service: Pain Management;  Laterality: Left;    RADIOFREQUENCY ABLATION Left 02/20/2023    Procedure: RADIOFREQUENCY ABLATION NERVES for the left sacroiliac joint;  Surgeon: Yasmine Gaming MD;  [facility];  Service: Pain Management;  Laterality: Left;    SACROILIAC JOINT INJECTION Left 05/09/2022    Procedure: SACROILIAC INJECTION - Left;  Surgeon: Yasmine Gaming MD;  [facility];  Service: Pain Management;  Laterality: Left;    SACROILIAC JOINT INJECTION Left 07/15/2022    Procedure: left Sacroiliac joint injection and left piriformis injection;  Surgeon: Yasmine Gaming MD;  [facility];  Service: Pain Management;  Laterality: Left;    TUBAL ABDOMINAL LIGATION      VENOUS ACCESS DEVICE (PORT) INSERTION N/A 11/19/2024    Procedure: INSERTION VENOUS ACCESS DEVICE;  Surgeon: Caden Christian MD;  Location: [facility] MAIN OR;  Service: General;  Laterality: N/A;       MEDICATIONS    Current Outpatient Medications:     aspirin 81 MG EC tablet, Take 1 tablet by mouth Daily. HOLD FOR SURGERY PER MD ORDERS, Disp: , Rfl:     clopidogrel (PLAVIX) 75 MG tablet, Take 1 tablet by mouth Daily. HOLD FOR SURGERY PER MD ORDERS, Disp: , Rfl:     dapagliflozin Propanediol (Farxiga) 10 MG tablet, TAKE 1 TABLET BY MOUTH DAILY, Disp: 90 tablet, Rfl: 1    glipizide (GLUCOTROL) 5 MG tablet, TAKE 2 TABLETS BY MOUTH EVERY MORNING WITH BREAKFAST AND TAKE ONE TABLET BY MOUTH EVERY EVENING, Disp: 270 tablet, Rfl: 1    hydrALAZINE  (APRESOLINE) 25 MG tablet, TAKE 1 TABLET BY MOUTH TWICE A DAY, Disp: 180 tablet, Rfl: 1    hydroCHLOROthiazide 12.5 MG tablet, Take 1 tablet by mouth Daily., Disp: 90 tablet, Rfl: 1    Klor-Con M20 20 MEQ CR tablet, TAKE 1 TABLET BY MOUTH DAILY, Disp: 30 tablet, Rfl: 0    lidocaine-prilocaine (EMLA) 2.5-2.5 % cream, Apply 1 Application topically to the appropriate area as directed Every 2 (Two) Hours As Needed for Mild Pain., Disp: 30 g, Rfl: 3    metFORMIN (GLUCOPHAGE) 500 MG tablet, Take 2 tablets by mouth 2 (Two) Times a Day With Meals., Disp: 360 tablet, Rfl: 1    metoprolol tartrate (LOPRESSOR) 50 MG tablet, TAKE 2 TABLETS BY MOUTH TWICE A DAY, Disp: 360 tablet, Rfl: 1    multivitamin with minerals (CENTRUM SILVER 50+WOMEN PO), Take 1 tablet by mouth Daily. HOLD FOR SURGERY, Disp: , Rfl:     nitroglycerin (NITROSTAT) 0.4 MG SL tablet, 1 tablet., Disp: , Rfl:     OLANZapine (zyPREXA) 5 MG tablet, Take 0.5 tablets by mouth Every Night., Disp: 30 tablet, Rfl: 1    ondansetron (ZOFRAN) 8 MG tablet, Take 1 tablet by mouth 3 (Three) Times a Day As Needed for Nausea or Vomiting., Disp: 30 tablet, Rfl: 5    rosuvastatin (Crestor) 40 MG tablet, Take 1 tablet by mouth Daily., Disp: 90 tablet, Rfl: 3    sulfamethoxazole-trimethoprim (BACTRIM DS,SEPTRA DS) 800-160 MG per tablet, Take 1 tablet by mouth 2 (Two) Times a Day., Disp: 6 tablet, Rfl: 0  No current facility-administered medications for this visit.    Facility-Administered Medications Ordered in Other Visits:     fluorouracil (ADRUCIL) 1,700 mg in sodium chloride 0.9 % 240 mL chemo infusion - FOR HOME USE, 1,200 mg/m2 (Treatment Plan Recorded), Intravenous, Once, Mercedez Fritz APRN    fluorouracil (ADRUCIL) chemo injection 280 mg, 200 mg/m2 (Treatment Plan Recorded), Intravenous, Once, Mercedez Fritz APRN    leucovorin 280 mg in dextrose (D5W) 5 % 264 mL IVPB, 200 mg/m2 (Treatment Plan Recorded), Intravenous, Once, Mercedez Fritz APRN, Last Rate: 203 mL/hr at  01/14/25 1112, 280 mg at 01/14/25 1112    OXALIplatin (ELOXATIN) 60 mg in dextrose (D5W) 5 % 262 mL chemo IVPB, 42.5 mg/m2 (Treatment Plan Recorded), Intravenous, Once, Mercedez Fritz APRN, Last Rate: 135 mL/hr at 01/14/25 1112, 60 mg at 01/14/25 1112    sodium chloride 0.9 % infusion, 20 mL/hr, Intravenous, Once, Mercedez Fritz APRN    ALLERGIES:     Allergies   Allergen Reactions    Pneumococcal Vaccines Paresthesia       SOCIAL HISTORY:       Social History     Socioeconomic History    Marital status:    Tobacco Use    Smoking status: Never     Passive exposure: Never    Smokeless tobacco: Never   Vaping Use    Vaping status: Never Used   Substance and Sexual Activity    Alcohol use: Never    Drug use: Never         FAMILY HISTORY:  Family History   Problem Relation Age of Onset    Coronary artery disease Mother     Heart attack Sister     Liseth Hyperthermia Neg Hx             Vitals:    01/14/25 0858   BP: 140/67   Pulse: 62   Temp: 97.8 °F (36.6 °C)   TempSrc: Oral   SpO2: 96%   Weight: 46.7 kg (103 lb)   PainSc: 0-No pain             1/14/2025     9:00 AM   Current Status   ECOG score 2      PHYSICAL EXAM:    CONSTITUTIONAL:  Vital signs reviewed.  No distress, looks comfortable.  Seated in wheelchair today.  EYES:  Conjunctiva and lids unremarkable.   EARS,NOSE,MOUTH,THROAT:  Ears and nose appear unremarkable.  Lips, teeth, gums appear unremarkable.  RESPIRATORY:  Normal respiratory effort.  Lungs clear to auscultation bilaterally.  CARDIOVASCULAR:  Normal S1, S2.  No murmurs rubs or gallops.  No significant lower extremity edema.  GASTROINTESTINAL: Abdomen appears unremarkable.  Nondistended  LYMPHATIC:  No cervical, supraclavicular lymphadenopathy.  NEURO: AAOx3, no focal deficits.  Appears to have equal strength all 4 extremities.  MUSCULOSKELETAL:  Unremarkable digits/nails.  No cyanosis or clubbing.  No apparent joint deformities.  SKIN:  Warm.  No rashes.  PSYCHIATRIC:  Normal judgment and  insight.  Normal mood and affect.     RECENT LABS:  Results from last 7 days   Lab Units 01/14/25  0837   WBC 10*3/mm3 16.63*   NEUTROS ABS 10*3/mm3 13.77*   HEMOGLOBIN g/dL 10.8*   HEMATOCRIT % 34.6   PLATELETS 10*3/mm3 185       Results from last 7 days   Lab Units 01/14/25  0837   SODIUM mmol/L 139   POTASSIUM mmol/L 4.1   CHLORIDE mmol/L 104   CO2 mmol/L 23.1   BUN mg/dL 20   CREATININE mg/dL 0.96   CALCIUM mg/dL 9.0   ALBUMIN g/dL 3.0*   BILIRUBIN mg/dL 0.2   ALK PHOS U/L 264*   ALT (SGPT) U/L 33   AST (SGOT) U/L 65*   GLUCOSE mg/dL 126*               ASSESSMENT:  Patient is a pleasant 88-year-old female with medical history significant for CAD s/p CABG x 3 (1997, 2007), PCI's x 16, pacemaker, peptic ulcer disease, hypertension and dyslipidemia comes for metastatic colorectal cancer evaluation and management.    # Metastatic colorectal cancer with liver and lung metastasis, right sided:  Patient had presented to her PCP,  in early October 2024 with lower abdominal pain and weight loss of > 20 pounds.    A CT A/P performed 10/9/2024 noted innumerable lung nodules, multiple hypodense masses in the liver and a large mass in the hepatic flexure of the colon, measuring 4.5 cm in greatest dimension.  CT-guided liver lesion biopsy on 10/28/2024. Pathology: Metastatic adenocarcinoma of colorectal origin.  CEA 5386.0 (10/21/2024)  I reviewed the imaging and pathology findings with patient and her family in detail.  Informed that she has been diagnosed with metastatic colorectal cancer with diffuse metastasis.  Reviewed  natural history, prognosis and palliative treatment options.  Patient is interested in receiving cancer directed therapy.  With her age and significant cardiovascular medical comorbidity-treatment with palliative chemotherapy would be challenging.  Caris NGS shows MSI stable.  Low TMB.  No NRAS/KRAS/BRAF mutations. [id number] liquid NGS Shows MSI stable.  SHANE mutation, FGFR 1 and EGFR gene  jose.   Les germline mutation testing pending  11/21/2024: Cycle 1 FOLFOX.    12/5/2024: Was seen for cycle 2 FOLFOX, however noted to have severe neutropenia with WBC count of 0.52 and ANC of 0.08.  Chemotherapy was held and received Neupogen x 2 doses.     Patient has DPYD intermediate mutation.  Given prolonged neutropenia, age and presence of DPYD mutation-plan made to administer 50% dose of FOLFOX with G-CSF support.   12/10/2024: Clinically stable.  Neutropenia resolved, ANC 7.83 today.  Persistent mild anemia of 10.7.  Will proceed with cycle 2 FOLFOX (50% of original dose) with Neulasta on pro.  Due to Christmas holiday, will delay cycle 3 by 1 week.    12/31/2024 patient due today for cycle 3 FOLFOX with plans to add bevacizumab.  Plan to continue Neulasta support.  She is tolerating treatment very well and we will proceed.  Will reimage after cycle 4.  1/14/2025 patient due for cycle 4 FOLFOX and the second bevacizumab.  Neulasta support discontinued.  Due to significant intolerance issues following the previous cycle with the addition of bevacizumab, we will hold bevacizumab today.  She struggled with inability to get out of bed due to weakness and fatigue for 3 days or so following treatment.  It took her a full week thereafter to regain her strength angiogram order.  This is discussed with Dr. Meeks and we will hold the bevacizumab plan for review and reassess at the next visit. She is scheduled for CT imaging next week and review by Dr. Meeks the following week.    # Anemia:  Hemoglobin 11.3 g/dL on 11/4/24.  Iron profile most consistent with iron deficiency.  11/21/2024: Patient reports of constipation.  Has poor tolerance to oral iron.    11/21/2024: Received IV Injectafer 700 mg x 1 dose  12/31/2024 Hgb 10.5  1/14/2025 hemoglobin 10.8    # CAD s/p CABG (1997 and 2007) and PCI x 16:   Follows up with , cardiology at [facility]  On aspirin 81 mg and Plavix 75 mg  daily    # DM2:  Currently on Farxiga, glipizide and metformin.    Denies any neuropathy.  Per patient, last A1c was 8.6.  Being managed by PCP    PLAN:   -Cycle 4 FOLFOX at the same dose as previous (50% of the original dose)   -HOLD bevacizumab today  -Continue Neulasta on pro placed on day 3 following 5-FU unhook.  -PT recently ordered per family request. 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  -In 1 week repeat CT scans chest, abdomen, pelvis with contrast.   -Return in 2 weeks for follow-up with Dr. Meeks, scan review, and potential continuation of FOLFOX/ +/-bevacizumab due to tolerance issues.      Orders Placed This Encounter   Procedures    Comprehensive metabolic panel     Standing Status:   Future     Number of Occurrences:   1     Standing Expiration Date:   1/14/2026     Order Specific Question:   Release to patient     Answer:   Routine Release [[id number]]    Urinalysis without microscopic (no culture) - Urine, Clean Catch     Standing Status:   Future     Number of Occurrences:   1     Standing Expiration Date:   1/14/2026     Order Specific Question:   Release to patient     Answer:   Routine Release [[id number]]    CBC and Differential     Standing Status:   Future     Number of Occurrences:   1     Standing Expiration Date:   1/14/2026     Order Specific Question:   Manual Differential     Answer:   No     Order Specific Question:   Release to patient     Answer:   Routine Release [[id number]]     This patient is on high risk drug therapy requiring intensive monitoring for toxicity.  I spent 42 minutes caring for Tamie on this date of service. This time includes time spent by me in the following activities: preparing for the visit, reviewing tests, obtaining and/or reviewing a separately obtained history, performing a medically appropriate examination and/or evaluation, counseling and educating the patient/family/caregiver, ordering  medications, tests, or procedures, referring and communicating with other health care professionals, documenting information in the medical record, independently interpreting results and communicating that information with the patient/family/caregiver, and care coordination.

## 2025-01-16 ENCOUNTER — INFUSION (OUTPATIENT)
Dept: ONCOLOGY | Facility: HOSPITAL | Age: 89
End: 2025-01-16
Payer: MEDICARE

## 2025-01-16 DIAGNOSIS — C18.9 METASTATIC COLON CANCER TO LIVER: Primary | ICD-10-CM

## 2025-01-16 DIAGNOSIS — Z45.2 ENCOUNTER FOR ADJUSTMENT OR MANAGEMENT OF VASCULAR ACCESS DEVICE: ICD-10-CM

## 2025-01-16 DIAGNOSIS — C78.7 METASTATIC COLON CANCER TO LIVER: Primary | ICD-10-CM

## 2025-01-16 PROCEDURE — 25010000002 PEGFILGRASTIM 6 MG/0.6ML PREFILLED SYRINGE KIT: Performed by: NURSE PRACTITIONER

## 2025-01-16 PROCEDURE — 25010000002 HEPARIN LOCK FLUSH PER 10 UNITS: Performed by: INTERNAL MEDICINE

## 2025-01-16 PROCEDURE — 96377 APPLICATON ON-BODY INJECTOR: CPT

## 2025-01-16 RX ORDER — SODIUM CHLORIDE 0.9 % (FLUSH) 0.9 %
10 SYRINGE (ML) INJECTION AS NEEDED
Status: DISCONTINUED | OUTPATIENT
Start: 2025-01-16 | End: 2025-01-16 | Stop reason: HOSPADM

## 2025-01-16 RX ORDER — HEPARIN SODIUM (PORCINE) LOCK FLUSH IV SOLN 100 UNIT/ML 100 UNIT/ML
500 SOLUTION INTRAVENOUS AS NEEDED
OUTPATIENT
Start: 2025-01-16

## 2025-01-16 RX ORDER — SODIUM CHLORIDE 0.9 % (FLUSH) 0.9 %
10 SYRINGE (ML) INJECTION AS NEEDED
OUTPATIENT
Start: 2025-01-16

## 2025-01-16 RX ORDER — HEPARIN SODIUM (PORCINE) LOCK FLUSH IV SOLN 100 UNIT/ML 100 UNIT/ML
500 SOLUTION INTRAVENOUS AS NEEDED
Status: DISCONTINUED | OUTPATIENT
Start: 2025-01-16 | End: 2025-01-16 | Stop reason: HOSPADM

## 2025-01-16 RX ORDER — POTASSIUM CHLORIDE 1500 MG/1
20 TABLET, EXTENDED RELEASE ORAL DAILY
Qty: 30 TABLET | Refills: 0 | Status: SHIPPED | OUTPATIENT
Start: 2025-01-16

## 2025-01-16 RX ADMIN — Medication 500 UNITS: at 11:55

## 2025-01-16 RX ADMIN — PEGFILGRASTIM 6 MG: KIT SUBCUTANEOUS at 11:54

## 2025-01-17 ENCOUNTER — PATIENT OUTREACH (OUTPATIENT)
Dept: OTHER | Facility: HOSPITAL | Age: 89
End: 2025-01-17
Payer: MEDICARE

## 2025-01-21 ENCOUNTER — HOSPITAL ENCOUNTER (OUTPATIENT)
Dept: PET IMAGING | Facility: HOSPITAL | Age: 89
Discharge: HOME | End: 2025-01-21
Admitting: NURSE PRACTITIONER
Payer: MEDICARE

## 2025-01-21 ENCOUNTER — INFUSION (OUTPATIENT)
Dept: ONCOLOGY | Facility: HOSPITAL | Age: 89
End: 2025-01-21
Payer: MEDICARE

## 2025-01-21 DIAGNOSIS — C78.7 METASTATIC COLON CANCER TO LIVER: ICD-10-CM

## 2025-01-21 DIAGNOSIS — C18.9 CARCINOMA OF COLON METASTATIC TO LUNG: ICD-10-CM

## 2025-01-21 DIAGNOSIS — C18.9 METASTATIC COLON CANCER TO LIVER: ICD-10-CM

## 2025-01-21 DIAGNOSIS — C78.00 CARCINOMA OF COLON METASTATIC TO LUNG: ICD-10-CM

## 2025-01-21 PROCEDURE — 74177 CT ABD & PELVIS W/CONTRAST: CPT

## 2025-01-21 PROCEDURE — 25510000001 IOPAMIDOL 61 % SOLUTION: Performed by: NURSE PRACTITIONER

## 2025-01-21 PROCEDURE — 71260 CT THORAX DX C+: CPT

## 2025-01-21 PROCEDURE — 25510000002 DIATRIZOATE MEGLUMINE & SODIUM PER 1 ML: Performed by: NURSE PRACTITIONER

## 2025-01-21 RX ORDER — IOPAMIDOL 612 MG/ML
100 INJECTION, SOLUTION INTRAVASCULAR
Status: COMPLETED | OUTPATIENT
Start: 2025-01-21 | End: 2025-01-21

## 2025-01-21 RX ORDER — DIATRIZOATE MEGLUMINE AND DIATRIZOATE SODIUM 660; 100 MG/ML; MG/ML
30 SOLUTION ORAL; RECTAL
Status: COMPLETED | OUTPATIENT
Start: 2025-01-21 | End: 2025-01-21

## 2025-01-21 RX ORDER — IOPAMIDOL 612 MG/ML
100 INJECTION, SOLUTION INTRAVASCULAR
Status: DISCONTINUED | OUTPATIENT
Start: 2025-01-21 | End: 2025-01-21

## 2025-01-21 RX ORDER — DIATRIZOATE MEGLUMINE AND DIATRIZOATE SODIUM 660; 100 MG/ML; MG/ML
30 SOLUTION ORAL; RECTAL
Status: DISCONTINUED | OUTPATIENT
Start: 2025-01-21 | End: 2025-01-21

## 2025-01-21 RX ADMIN — IOPAMIDOL 85 ML: 612 INJECTION, SOLUTION INTRAVENOUS at 08:26

## 2025-01-21 RX ADMIN — DIATRIZOATE MEGLUMINE AND DIATRIZOATE SODIUM 30 ML: 660; 100 LIQUID ORAL; RECTAL at 08:26

## 2025-01-28 ENCOUNTER — INFUSION (OUTPATIENT)
Dept: ONCOLOGY | Facility: HOSPITAL | Age: 89
End: 2025-01-28
Payer: MEDICARE

## 2025-01-28 ENCOUNTER — CLINICAL SUPPORT (OUTPATIENT)
Dept: OTHER | Facility: HOSPITAL | Age: 89
End: 2025-01-28
Payer: MEDICARE

## 2025-01-28 ENCOUNTER — OFFICE VISIT (OUTPATIENT)
Dept: ONCOLOGY | Facility: CLINIC | Age: 89
End: 2025-01-28
Payer: MEDICARE

## 2025-01-28 VITALS
HEART RATE: 70 BPM | SYSTOLIC BLOOD PRESSURE: 143 MMHG | WEIGHT: 101.9 LBS | DIASTOLIC BLOOD PRESSURE: 80 MMHG | OXYGEN SATURATION: 96 % | RESPIRATION RATE: 17 BRPM | HEIGHT: 60 IN | TEMPERATURE: 97.1 F | BODY MASS INDEX: 20.01 KG/M2

## 2025-01-28 DIAGNOSIS — C18.9 METASTATIC COLON CANCER TO LIVER: Primary | ICD-10-CM

## 2025-01-28 DIAGNOSIS — C78.7 METASTATIC COLON CANCER TO LIVER: Primary | ICD-10-CM

## 2025-01-28 DIAGNOSIS — C78.7 METASTATIC COLON CANCER TO LIVER: ICD-10-CM

## 2025-01-28 DIAGNOSIS — C18.9 METASTATIC COLON CANCER TO LIVER: ICD-10-CM

## 2025-01-28 LAB
ALBUMIN SERPL-MCNC: 3.1 G/DL (ref 3.5–5.2)
ALBUMIN/GLOB SERPL: 1.2 G/DL
ALP SERPL-CCNC: 232 U/L (ref 39–117)
ALT SERPL W P-5'-P-CCNC: 16 U/L (ref 1–33)
ANION GAP SERPL CALCULATED.3IONS-SCNC: 12.9 MMOL/L (ref 5–15)
AST SERPL-CCNC: 28 U/L (ref 1–32)
BASOPHILS # BLD AUTO: 0.1 10*3/MM3 (ref 0–0.2)
BASOPHILS NFR BLD AUTO: 0.6 % (ref 0–1.5)
BILIRUB SERPL-MCNC: 0.2 MG/DL (ref 0–1.2)
BILIRUB UR QL STRIP: NEGATIVE
BUN SERPL-MCNC: 14 MG/DL (ref 8–23)
BUN/CREAT SERPL: 14 (ref 7–25)
CALCIUM SPEC-SCNC: 8.4 MG/DL (ref 8.6–10.5)
CHLORIDE SERPL-SCNC: 101 MMOL/L (ref 98–107)
CLARITY UR: CLEAR
CO2 SERPL-SCNC: 25.1 MMOL/L (ref 22–29)
COLOR UR: YELLOW
CREAT SERPL-MCNC: 1 MG/DL (ref 0.57–1)
DEPRECATED RDW RBC AUTO: 88.9 FL (ref 37–54)
EGFRCR SERPLBLD CKD-EPI 2021: 54.6 ML/MIN/1.73
EOSINOPHIL # BLD AUTO: 0.3 10*3/MM3 (ref 0–0.4)
EOSINOPHIL NFR BLD AUTO: 1.8 % (ref 0.3–6.2)
ERYTHROCYTE [DISTWIDTH] IN BLOOD BY AUTOMATED COUNT: 27.5 % (ref 12.3–15.4)
GLOBULIN UR ELPH-MCNC: 2.5 GM/DL
GLUCOSE SERPL-MCNC: 250 MG/DL (ref 65–99)
GLUCOSE UR STRIP-MCNC: ABNORMAL MG/DL
HCT VFR BLD AUTO: 35.1 % (ref 34–46.6)
HGB BLD-MCNC: 10.9 G/DL (ref 12–15.9)
HGB UR QL STRIP.AUTO: NEGATIVE
IMM GRANULOCYTES # BLD AUTO: 0.48 10*3/MM3 (ref 0–0.05)
IMM GRANULOCYTES NFR BLD AUTO: 2.9 % (ref 0–0.5)
KETONES UR QL STRIP: NEGATIVE
LEUKOCYTE ESTERASE UR QL STRIP.AUTO: NEGATIVE
LYMPHOCYTES # BLD AUTO: 2 10*3/MM3 (ref 0.7–3.1)
LYMPHOCYTES NFR BLD AUTO: 12 % (ref 19.6–45.3)
MCH RBC QN AUTO: 29.1 PG (ref 26.6–33)
MCHC RBC AUTO-ENTMCNC: 31.1 G/DL (ref 31.5–35.7)
MCV RBC AUTO: 93.9 FL (ref 79–97)
MONOCYTES # BLD AUTO: 0.94 10*3/MM3 (ref 0.1–0.9)
MONOCYTES NFR BLD AUTO: 5.6 % (ref 5–12)
NEUTROPHILS NFR BLD AUTO: 12.91 10*3/MM3 (ref 1.7–7)
NEUTROPHILS NFR BLD AUTO: 77.1 % (ref 42.7–76)
NITRITE UR QL STRIP: NEGATIVE
NRBC BLD AUTO-RTO: 0 /100 WBC (ref 0–0.2)
PH UR STRIP.AUTO: 6 [PH] (ref 4.5–8)
PLATELET # BLD AUTO: 174 10*3/MM3 (ref 140–450)
PMV BLD AUTO: 10 FL (ref 6–12)
POTASSIUM SERPL-SCNC: 4.2 MMOL/L (ref 3.5–5.2)
PROT SERPL-MCNC: 5.6 G/DL (ref 6–8.5)
PROT UR QL STRIP: ABNORMAL
RBC # BLD AUTO: 3.74 10*6/MM3 (ref 3.77–5.28)
SODIUM SERPL-SCNC: 139 MMOL/L (ref 136–145)
SP GR UR STRIP: 1.01 (ref 1–1.03)
UROBILINOGEN UR QL STRIP: ABNORMAL
WBC NRBC COR # BLD AUTO: 16.73 10*3/MM3 (ref 3.4–10.8)

## 2025-01-28 PROCEDURE — 96411 CHEMO IV PUSH ADDL DRUG: CPT

## 2025-01-28 PROCEDURE — 96415 CHEMO IV INFUSION ADDL HR: CPT

## 2025-01-28 PROCEDURE — 25010000002 FLUOROURACIL PER 500 MG: Performed by: INTERNAL MEDICINE

## 2025-01-28 PROCEDURE — 96413 CHEMO IV INFUSION 1 HR: CPT

## 2025-01-28 PROCEDURE — 80053 COMPREHEN METABOLIC PANEL: CPT

## 2025-01-28 PROCEDURE — 96368 THER/DIAG CONCURRENT INF: CPT

## 2025-01-28 PROCEDURE — 25810000003 SODIUM CHLORIDE 0.9 % SOLUTION 250 ML FLEX CONT: Performed by: INTERNAL MEDICINE

## 2025-01-28 PROCEDURE — 25010000002 OXALIPLATIN PER 0.5 MG: Performed by: INTERNAL MEDICINE

## 2025-01-28 PROCEDURE — 81003 URINALYSIS AUTO W/O SCOPE: CPT

## 2025-01-28 PROCEDURE — 85025 COMPLETE CBC W/AUTO DIFF WBC: CPT

## 2025-01-28 PROCEDURE — 25010000002 DEXAMETHASONE SODIUM PHOSPHATE 100 MG/10ML SOLUTION: Performed by: INTERNAL MEDICINE

## 2025-01-28 PROCEDURE — 25010000002 FOSAPREPITANT PER 1 MG: Performed by: INTERNAL MEDICINE

## 2025-01-28 PROCEDURE — 96375 TX/PRO/DX INJ NEW DRUG ADDON: CPT

## 2025-01-28 PROCEDURE — 25010000003 DEXTROSE 5 % SOLUTION 250 ML FLEX CONT: Performed by: INTERNAL MEDICINE

## 2025-01-28 PROCEDURE — 25010000002 LEUCOVORIN CALCIUM PER 50 MG: Performed by: INTERNAL MEDICINE

## 2025-01-28 PROCEDURE — 96367 TX/PROPH/DG ADDL SEQ IV INF: CPT

## 2025-01-28 PROCEDURE — 25010000002 PALONOSETRON PER 25 MCG: Performed by: INTERNAL MEDICINE

## 2025-01-28 PROCEDURE — G0498 CHEMO EXTEND IV INFUS W/PUMP: HCPCS

## 2025-01-28 RX ORDER — FLUOROURACIL 50 MG/ML
200 INJECTION, SOLUTION INTRAVENOUS ONCE
Status: CANCELLED | OUTPATIENT
Start: 2025-01-28

## 2025-01-28 RX ORDER — DEXTROSE MONOHYDRATE 50 MG/ML
20 INJECTION, SOLUTION INTRAVENOUS ONCE
Status: CANCELLED | OUTPATIENT
Start: 2025-01-28

## 2025-01-28 RX ORDER — DEXTROSE MONOHYDRATE 50 MG/ML
20 INJECTION, SOLUTION INTRAVENOUS ONCE
Status: COMPLETED | OUTPATIENT
Start: 2025-01-28 | End: 2025-01-28

## 2025-01-28 RX ORDER — PALONOSETRON 0.05 MG/ML
0.25 INJECTION, SOLUTION INTRAVENOUS ONCE
Status: COMPLETED | OUTPATIENT
Start: 2025-01-28 | End: 2025-01-28

## 2025-01-28 RX ORDER — HYDROCORTISONE SODIUM SUCCINATE 100 MG/2ML
100 INJECTION INTRAMUSCULAR; INTRAVENOUS AS NEEDED
Status: CANCELLED | OUTPATIENT
Start: 2025-01-28

## 2025-01-28 RX ORDER — DIPHENHYDRAMINE HYDROCHLORIDE 50 MG/ML
50 INJECTION INTRAMUSCULAR; INTRAVENOUS AS NEEDED
Status: CANCELLED | OUTPATIENT
Start: 2025-01-28

## 2025-01-28 RX ORDER — SODIUM CHLORIDE 9 MG/ML
20 INJECTION, SOLUTION INTRAVENOUS ONCE
Status: CANCELLED | OUTPATIENT
Start: 2025-01-28

## 2025-01-28 RX ORDER — FAMOTIDINE 10 MG/ML
20 INJECTION, SOLUTION INTRAVENOUS AS NEEDED
Status: CANCELLED | OUTPATIENT
Start: 2025-01-28

## 2025-01-28 RX ORDER — PALONOSETRON 0.05 MG/ML
0.25 INJECTION, SOLUTION INTRAVENOUS ONCE
Status: CANCELLED | OUTPATIENT
Start: 2025-01-28

## 2025-01-28 RX ORDER — FLUOROURACIL 50 MG/ML
200 INJECTION, SOLUTION INTRAVENOUS ONCE
Status: COMPLETED | OUTPATIENT
Start: 2025-01-28 | End: 2025-01-28

## 2025-01-28 RX ADMIN — FLUOROURACIL 1700 MG: 50 INJECTION, SOLUTION INTRAVENOUS at 12:50

## 2025-01-28 RX ADMIN — FLUOROURACIL 280 MG: 50 INJECTION, SOLUTION INTRAVENOUS at 12:50

## 2025-01-28 RX ADMIN — OXALIPLATIN 60 MG: 5 INJECTION, SOLUTION INTRAVENOUS at 10:43

## 2025-01-28 RX ADMIN — FOSAPREPITANT 100 ML: 150 INJECTION, POWDER, LYOPHILIZED, FOR SOLUTION INTRAVENOUS at 09:59

## 2025-01-28 RX ADMIN — LEUCOVORIN CALCIUM 280 MG: 350 INJECTION, POWDER, LYOPHILIZED, FOR SUSPENSION INTRAMUSCULAR; INTRAVENOUS at 10:46

## 2025-01-28 RX ADMIN — PALONOSETRON HYDROCHLORIDE 0.25 MG: 0.25 INJECTION INTRAVENOUS at 10:28

## 2025-01-28 RX ADMIN — DEXTROSE MONOHYDRATE 20 ML/HR: 5 INJECTION, SOLUTION INTRAVENOUS at 09:59

## 2025-01-28 RX ADMIN — DEXAMETHASONE SODIUM PHOSPHATE 12 MG: 10 INJECTION, SOLUTION INTRAMUSCULAR; INTRAVENOUS at 10:28

## 2025-01-28 NOTE — PROGRESS NOTES
OUTPATIENT ONCOLOGY NUTRITION     Patient Name: Tamie Peter  YOB: 1935  MRN: [id number]  Assessment Date: 1/28/2025    COMMENTS:  Follow up.  Pt is receiving her FOLFOX infusion today (#5).  Labs/Meds reviewed. Glu 250, Ca 8.4, WBC 16.73, plts 174, ANC 12.91.     Met with pt and her daughter today during her treatment.  They report she is feeling pretty good but her po intake and appetite are mediocre.  She eats very small portions, 2 small meals a day: cereal, 1/2 tuna or peanut butter sandwich and fruit. Snacks on carbs - doesn't like many proteins. Discussed her diabetes and elevated glucose levels.  Encouraged increasing her protein foods to help manage her diabetes and explained the relationship between carbs/sugars and protein.  Also provided and handout for her to review.  Sample of Boost Breeze to try since she doesn't like the creamy type supplements provided.      Will follow throughout treatment course and be available as needed. Pt very appreciative of visit.        Reason for Assessment Follow up     Diagnosis/Problem   Metastatic colon cancer   Treatment Plan Chemotherapy FOLFOX   Frequency Q 2 weeks x 12 cycles   Goal of cancer treatment Palliative   Comments:        Encounter Information        Nutrition/Diet History:  2 meals a day, doesn't eat beef, eats a lot of carbs   Oral Nutrition Supplements:  Doesn't tolerate them - makes her sick to her stomach   Factors/Symptoms Affecting Intake: Decreased appetite, Fatigue, Weight loss   Comments:      Medical/Surgical History Past Medical History:   Diagnosis Date    Anemia     Arthritis of left sacroiliac joint 05/05/2022    CKD (chronic kidney disease), stage III     PATIENT STATES IT IS AT STAGE 4 AT THIS TIME    Colorectal cancer     Coronary artery disease     cath 11/15. CABG x3 and all grafts widely patent. Very small OM and Cfx and that may be the cause of lateral ischemia on stress test. Non bipassable    Diabetes mellitus,  type II     GERD (gastroesophageal reflux disease)     Gout     Heart attack 10/2022    MULTIPLE (6 OR MORE)    History of DVT in adulthood     AFTER CHILDBIRTH    Hyperlipidemia     Hypertension     Liver masses     Pacemaker     PUD (peptic ulcer disease)        Past Surgical History:   Procedure Laterality Date    CARDIAC ELECTROPHYSIOLOGY PROCEDURE N/A 02/22/2017    Procedure: Pacemaker DC new;  Surgeon: Juvenal Zhong MD;  Location:  [facility];  Service:     CARDIAC SURGERY  10/2022    CATARACT EXTRACTION      COLONOSCOPY  2010    COLONOSCOPY N/A 11/19/2024    Procedure: COLONOSCOPY WITH TATTOO MASS INJECTION 4 ML;  Surgeon: Caden Christian MD;  Location:  [facility] OR;  Service: General;  Laterality: N/A;    CORONARY ARTERY BYPASS GRAFT      1992, 2009    EPIDURAL Left 10/17/2022    Procedure: LUMBAR/SACRAL TRANSFORAMINAL EPIDURAL Left L4-5 and left L5-S1 - hold plavix 7 days before;  Surgeon: Yasmine Gaming MD;  [facility];  Service: Pain Management;  Laterality: Left;    PACEMAKER IMPLANTATION      PIRIFORMUS INJECTION Left 07/15/2022    Procedure: PIRIFORMIS INJECTION;  Surgeon: Yasmine Gaming MD;  [facility];  Service: Pain Management;  Laterality: Left;    RADIOFREQUENCY ABLATION Left 02/20/2023    Procedure: RADIOFREQUENCY ABLATION NERVES for the left sacroiliac joint;  Surgeon: Yasmine Gaming MD;  [facility];  Service: Pain Management;  Laterality: Left;    SACROILIAC JOINT INJECTION Left 05/09/2022    Procedure: SACROILIAC INJECTION - Left;  Surgeon: Yasmine Gaming MD;  [facility];  Service: Pain Management;  Laterality: Left;    SACROILIAC JOINT INJECTION Left 07/15/2022    Procedure: left Sacroiliac joint injection and left piriformis injection;  Surgeon: Yasmine Gaming MD;  [facility];  Service: Pain Management;  Laterality: Left;    TUBAL ABDOMINAL LIGATION      VENOUS ACCESS DEVICE (PORT)  "INSERTION N/A 11/19/2024    Procedure: INSERTION VENOUS ACCESS DEVICE;  Surgeon: Caden Christian MD;  Location: [facility];  Service: General;  Laterality: N/A;             Anthropometrics        Current Height Ht Readings from Last 1 Encounters:   01/28/25 152.4 cm (60\")      Current Weight Wt Readings from Last 1 Encounters:   01/28/25 46.2 kg (101 lb 14.4 oz)      BMI  19.9   Ideal Body Weight (IBW) 100lb   Usual Body Weight (UBW) 120lb   Weight Change/Trend Loss   Weight History Wt Readings from Last 30 Encounters:   01/28/25 0905 46.2 kg (101 lb 14.4 oz)   01/14/25 0858 46.7 kg (103 lb)   12/31/24 1034 46.8 kg (103 lb 3.2 oz)   12/10/24 0829 45.9 kg (101 lb 4.8 oz)   12/06/24 0802 46 kg (101 lb 6.4 oz)   12/05/24 0916 46.4 kg (102 lb 3.2 oz)   11/21/24 0931 47.3 kg (104 lb 4.8 oz)   11/13/24 1422 46.3 kg (102 lb)   11/13/24 1344 46.7 kg (103 lb)   11/07/24 0802 47.2 kg (104 lb)   11/04/24 0932 47.8 kg (105 lb 6.4 oz)   10/28/24 1013 49.4 kg (109 lb)   10/21/24 0952 49.4 kg (109 lb)   09/24/24 1004 51.7 kg (114 lb)   06/06/24 1007 52.2 kg (115 lb)   03/07/24 0902 54 kg (119 lb)   12/07/23 1011 54.4 kg (120 lb)   09/19/23 0941 55.3 kg (122 lb)   06/06/23 0900 54.9 kg (121 lb)   05/18/23 1523 52.7 kg (116 lb 3.2 oz)   04/04/23 0943 54.3 kg (119 lb 12.8 oz)   03/07/23 1416 54.4 kg (120 lb)   02/20/23 1053 52.2 kg (115 lb)   02/15/23 1428 52.2 kg (115 lb)   01/26/23 0917 55.2 kg (121 lb 12.8 oz)   12/13/22 0753 54.2 kg (119 lb 6.4 oz)   12/06/22 0922 53.1 kg (117 lb)   12/02/22 0938 52.2 kg (115 lb)   10/17/22 1229 56.2 kg (124 lb)   09/22/22 0954 57.6 kg (127 lb)   09/20/22 1138 56.2 kg (124 lb)          Medications           Current medications: OLANZapine, aspirin, clopidogrel, dapagliflozin Propanediol, glipizide, hydrALAZINE, hydroCHLOROthiazide, lidocaine-prilocaine, metFORMIN, metoprolol tartrate, multivitamin with minerals, nitroglycerin, ondansetron, potassium chloride, rosuvastatin, and " "sulfamethoxazole-trimethoprim                 Tests/Procedures        Tests/Procedures Chemotherapy     Labs       Pertinent Labs    Results from last 7 days   Lab Units 01/28/25  0852   SODIUM mmol/L 139   POTASSIUM mmol/L 4.2   CHLORIDE mmol/L 101   CO2 mmol/L 25.1   BUN mg/dL 14   CREATININE mg/dL 1.00   CALCIUM mg/dL 8.4*   BILIRUBIN mg/dL 0.2   ALK PHOS U/L 232*   ALT (SGPT) U/L 16   AST (SGOT) U/L 28   GLUCOSE mg/dL 250*     Results from last 7 days   Lab Units 01/28/25  0852   HEMOGLOBIN g/dL 10.9*   HEMATOCRIT % 35.1   WBC 10*3/mm3 16.73*   ALBUMIN g/dL 3.1*     Results from last 7 days   Lab Units 01/28/25  0852   PLATELETS 10*3/mm3 174     No results found for: \"COVID19\"  Lab Results   Component Value Date    HGBA1C 8.3 (H) 09/23/2024          Physical Findings        Physical Appearance alert, loss of muscle mass, loss of subcutaneous fat, oriented     Edema  no edema   Gastrointestinal None   Tubes/Lines/Drains Implantable Port   Oral/Mouth Cavity WNL   Skin Intact       Estimated/Assessed Needs        Energy Requirements    Height for Calculation      Weight for Calculation 47.3 kg   Method for Estimation  35 kcal/kg   EST Needs (kcal/day) 1655 kcals per day       Protein Requirements    Weight for Calculation 47.3 kg   EST Protein Needs (g/kg) 1.2 gm/kg   EST Daily Needs (g/day) 56 gms per day       Fluid Requirements     Method for Estimation 1 mL/kcal    Estimated Needs (mL/day) 1500 mLs per day         NUTRITION INTERVENTION / PLAN OF CARE      Intervention Goal(s) Maintain nutrition status, Reduce/improve symptoms, Provide information, Meet estimated needs, Disease management/therapy, Tolerate PO , Increase intake, and Appropriate weight gain         RD Intervention/Action Encouraged intake, Follow Tx progress, Recommended/ordered         Recommendations:       PO Diet Consume calorie and protein dense healthy foods, 6 small meals      Supplements Try boost breeze      Snacks       Other    New " Prescription Ordered? No, recommend   --      Monitor/Evaluation Follow up as needed   Education Education provided   --    Electronically signed by:  Leann Shabazz RD  01/28/25 11:44 EST

## 2025-01-28 NOTE — PROGRESS NOTES
[facility]    CONSULTING IN BLOOD DISORDERS & CANCER      REASON FOR CONSULTATION/CHIEF COMPLAINT:     Evaluation and management for metastatic colorectal cancer                             REQUESTING PHYSICIAN: No ref. provider found  RECORDS OBTAINED:  Records of the patients history including those from the electronic medical record were reviewed and summarized in detail.    HISTORY OF PRESENT ILLNESS:    The patient is a 89 y.o. year old female with medical history significant for CAD s/p CABG x 3 (9097, 2007), PCI's x 16, pacemaker, peptic ulcer disease, hypertension and dyslipidemia comes for metastatic colorectal cancer evaluation and management.    Patient had presented to her PCP,  in early October 2024 with lower abdominal pain and weight loss of > 20 pounds.  A CT A/P performed 10/9/2024 noted innumerable lung nodules, multiple hypodense masses in the liver and a large mass in the hepatic flexure of the colon, measuring 4.5 cm in greatest dimension.    Patient underwent CT-guided liver lesion biopsy on 10/28/2024.  Pathology: Metastatic adenocarcinoma of colorectal origin.    Patient was referred to hematology/oncology for further evaluation and management.  She was first seen in this clinic on 11//24.  Accompanied by 3 of 4 daughters.  Patient denies any major GI issues.  No blood in stool or altered bowel habits.  She does take stool softeners for regular bowel movements.  No nausea or vomiting.  No family history of colorectal cancer.    11/5/2024: PET/CT demonstrates hypermetabolic lesions in the colonic hepatic flexure, pulmonary, hepatic, osseous and zenaida metastasis.  Colonoscopy did reveal hepatic flexure mass, partially obstructing.    11/21/2024: Cycle 1 FOLFOX    12/5/2024: Was seen for cycle 2 FOLFOX, however noted to have severe neutropenia with WBC count of 0.52 and ANC of 0.08.  Chemotherapy was held and received Neupogen x 2 doses.       Patient has DPYD intermediate mutation.   Given prolonged neutropenia, age, and presence of DPYD mutation-plan made to administer 50% dose of FOLFOX with G-CSF support in the form of Neulasta on pro.    CT scans after 4 cycles show excellent response to treatment    INTERIM HISTORY:  Patient returns today for follow-up for cycle 5 of FOLFOX.  Continued with Neulasta.    She is accompanied by her daughter.  Unfortunately with the third cycle she had significant side effects for approximately 3 days following disconnect where she could not get out of bed or prepare any meals for herself because she was so tired.  She states it took her about a week thereafter to regain her strength.  Due to these symptoms, she did not receive Avastin with cycle 4.      Today, she reports of tolerating the last cycle much better.  She is feeling good today.  She denies any bleeding or dark stools.  She denies any pain.  She inquired about going for dancing, driving her car and going to [employer] as she used to do prior to chemotherapy.  Patient's daughter (rightly so) is worried about her ability to drive and do all the physical activities which she desires to do.    Past Medical History:   Diagnosis Date    Anemia     Arthritis of left sacroiliac joint 05/05/2022    CKD (chronic kidney disease), stage III     PATIENT STATES IT IS AT STAGE 4 AT THIS TIME    Colorectal cancer     Coronary artery disease     cath 11/15. CABG x3 and all grafts widely patent. Very small OM and Cfx and that may be the cause of lateral ischemia on stress test. Non bipassable    Diabetes mellitus, type II     GERD (gastroesophageal reflux disease)     Gout     Heart attack 10/2022    MULTIPLE (6 OR MORE)    History of DVT in adulthood     AFTER CHILDBIRTH    Hyperlipidemia     Hypertension     Liver masses     Pacemaker     PUD (peptic ulcer disease)      Past Surgical History:   Procedure Laterality Date    CARDIAC ELECTROPHYSIOLOGY PROCEDURE N/A 02/22/2017    Procedure: Pacemaker DC new;  Surgeon:  Juvenal Zhong MD;  Location: [facility] CATH INVASIVE LOCATION;  Service:     CARDIAC SURGERY  10/2022    CATARACT EXTRACTION      COLONOSCOPY  2010    COLONOSCOPY N/A 11/19/2024    Procedure: COLONOSCOPY WITH TATTOO MASS INJECTION 4 ML;  Surgeon: Caden Christian MD;  Location:  [facility];  Service: General;  Laterality: N/A;    CORONARY ARTERY BYPASS GRAFT      1992, 2009    EPIDURAL Left 10/17/2022    Procedure: LUMBAR/SACRAL TRANSFORAMINAL EPIDURAL Left L4-5 and left L5-S1 - hold plavix 7 days before;  Surgeon: Yasmine Gaming MD;  Location: [facility];  Service: Pain Management;  Laterality: Left;    PACEMAKER IMPLANTATION      PIRIFORMUS INJECTION Left 07/15/2022    Procedure: PIRIFORMIS INJECTION;  Surgeon: Yasmine Gaming MD;  [facility];  Service: Pain Management;  Laterality: Left;    RADIOFREQUENCY ABLATION Left 02/20/2023    Procedure: RADIOFREQUENCY ABLATION NERVES for the left sacroiliac joint;  Surgeon: Yasmine Gaming MD;  [facility];  Service: Pain Management;  Laterality: Left;    SACROILIAC JOINT INJECTION Left 05/09/2022    Procedure: SACROILIAC INJECTION - Left;  Surgeon: Yasmine Gaming MD;  [facility];  Service: Pain Management;  Laterality: Left;    SACROILIAC JOINT INJECTION Left 07/15/2022    Procedure: left Sacroiliac joint injection and left piriformis injection;  Surgeon: Yasmine Gaming MD;  [facility];  Service: Pain Management;  Laterality: Left;    TUBAL ABDOMINAL LIGATION      VENOUS ACCESS DEVICE (PORT) INSERTION N/A 11/19/2024    Procedure: INSERTION VENOUS ACCESS DEVICE;  Surgeon: Caden Christian MD;  Location:  [facility];  Service: General;  Laterality: N/A;       MEDICATIONS    Current Outpatient Medications:     aspirin 81 MG EC tablet, Take 1 tablet by mouth Daily. HOLD FOR SURGERY PER MD ORDERS, Disp: , Rfl:     clopidogrel (PLAVIX) 75 MG tablet, Take 1 tablet by mouth Daily. HOLD FOR SURGERY  PER MD ORDERS, Disp: , Rfl:     dapagliflozin Propanediol (Farxiga) 10 MG tablet, TAKE 1 TABLET BY MOUTH DAILY, Disp: 90 tablet, Rfl: 1    glipizide (GLUCOTROL) 5 MG tablet, TAKE 2 TABLETS BY MOUTH EVERY MORNING WITH BREAKFAST AND TAKE ONE TABLET BY MOUTH EVERY EVENING, Disp: 270 tablet, Rfl: 1    hydrALAZINE (APRESOLINE) 25 MG tablet, TAKE 1 TABLET BY MOUTH TWICE A DAY, Disp: 180 tablet, Rfl: 1    hydroCHLOROthiazide 12.5 MG tablet, Take 1 tablet by mouth Daily., Disp: 90 tablet, Rfl: 1    Klor-Con M20 20 MEQ CR tablet, TAKE 1 TABLET BY MOUTH DAILY, Disp: 30 tablet, Rfl: 0    lidocaine-prilocaine (EMLA) 2.5-2.5 % cream, Apply 1 Application topically to the appropriate area as directed Every 2 (Two) Hours As Needed for Mild Pain., Disp: 30 g, Rfl: 3    metFORMIN (GLUCOPHAGE) 500 MG tablet, Take 2 tablets by mouth 2 (Two) Times a Day With Meals., Disp: 360 tablet, Rfl: 1    metoprolol tartrate (LOPRESSOR) 50 MG tablet, TAKE 2 TABLETS BY MOUTH TWICE A DAY, Disp: 360 tablet, Rfl: 1    multivitamin with minerals (CENTRUM SILVER 50+WOMEN PO), Take 1 tablet by mouth Daily. HOLD FOR SURGERY, Disp: , Rfl:     nitroglycerin (NITROSTAT) 0.4 MG SL tablet, 1 tablet., Disp: , Rfl:     ondansetron (ZOFRAN) 8 MG tablet, Take 1 tablet by mouth 3 (Three) Times a Day As Needed for Nausea or Vomiting., Disp: 30 tablet, Rfl: 5    rosuvastatin (Crestor) 40 MG tablet, Take 1 tablet by mouth Daily., Disp: 90 tablet, Rfl: 3    sulfamethoxazole-trimethoprim (BACTRIM DS,SEPTRA DS) 800-160 MG per tablet, Take 1 tablet by mouth 2 (Two) Times a Day., Disp: 6 tablet, Rfl: 0    OLANZapine (zyPREXA) 5 MG tablet, Take 0.5 tablets by mouth Every Night. (Patient not taking: Reported on 1/28/2025), Disp: 30 tablet, Rfl: 1    ALLERGIES:     Allergies   Allergen Reactions    Pneumococcal Vaccines Paresthesia       SOCIAL HISTORY:       Social History     Socioeconomic History    Marital status:    Tobacco Use    Smoking status: Never     Passive  "exposure: Never    Smokeless tobacco: Never   Vaping Use    Vaping status: Never Used   Substance and Sexual Activity    Alcohol use: Never    Drug use: Never         FAMILY HISTORY:  Family History   Problem Relation Age of Onset    Coronary artery disease Mother     Heart attack Sister     Liseth Hyperthermia Neg Hx             Vitals:    01/28/25 0905   BP: 143/80   Pulse: 70   Resp: 17   Temp: 97.1 °F (36.2 °C)   TempSrc: Oral   SpO2: 96%   Weight: 46.2 kg (101 lb 14.4 oz)   Height: 152.4 cm (60\")   PainSc: 0-No pain             1/28/2025     9:07 AM   Current Status   ECOG score 1      PHYSICAL EXAM:    CONSTITUTIONAL:  Vital signs reviewed.  No distress, looks comfortable.  Seated in wheelchair today.  EYES:  Conjunctiva and lids unremarkable.   EARS,NOSE,MOUTH,THROAT:  Ears and nose appear unremarkable.  Lips, teeth, gums appear unremarkable.  RESPIRATORY:  Normal respiratory effort.  Lungs clear to auscultation bilaterally.  CARDIOVASCULAR:  Normal S1, S2.  No murmurs rubs or gallops.  No significant lower extremity edema.  GASTROINTESTINAL: Abdomen appears unremarkable.  Nondistended  LYMPHATIC:  No cervical, supraclavicular lymphadenopathy.  NEURO: AAOx3, no focal deficits.  Appears to have equal strength all 4 extremities.  MUSCULOSKELETAL:  Unremarkable digits/nails.  No cyanosis or clubbing.  No apparent joint deformities.  SKIN:  Warm.  No rashes.  PSYCHIATRIC:  Normal judgment and insight.  Normal mood and affect.     RECENT LABS:  Results from last 7 days   Lab Units 01/28/25  0852   WBC 10*3/mm3 16.73*   NEUTROS ABS 10*3/mm3 12.91*   HEMOGLOBIN g/dL 10.9*   HEMATOCRIT % 35.1   PLATELETS 10*3/mm3 174       ASSESSMENT:  Patient is a pleasant 89-year-old female with medical history significant for CAD s/p CABG x 3 (1997, 2007), PCI's x 16, pacemaker, peptic ulcer disease, hypertension and dyslipidemia comes for metastatic colorectal cancer evaluation and management.    # Metastatic colorectal cancer " with liver and lung metastasis, right sided:  Patient had presented to her PCP,  in early October 2024 with lower abdominal pain and weight loss of > 20 pounds.    A CT A/P performed 10/9/2024 noted innumerable lung nodules, multiple hypodense masses in the liver and a large mass in the hepatic flexure of the colon, measuring 4.5 cm in greatest dimension.  CT-guided liver lesion biopsy on 10/28/2024. Pathology: Metastatic adenocarcinoma of colorectal origin.  CEA 5386.0 (10/21/2024)  I reviewed the imaging and pathology findings with patient and her family in detail.  Informed that she has been diagnosed with metastatic colorectal cancer with diffuse metastasis.  Reviewed  natural history, prognosis and palliative treatment options.  Patient is interested in receiving cancer directed therapy.  With her age and significant cardiovascular medical comorbidity-treatment with palliative chemotherapy would be challenging.  [facility] NGS shows MSI stable.  Low TMB.  No NRAS/KRAS/BRAF mutations. [id number] liquid NGS Shows MSI stable.  SHANE mutation, FGFR 1 and EGFR gene amplifications.   [facility] germline mutation testing pending  11/21/2024: Cycle 1 FOLFOX.    12/5/2024: Was seen for cycle 2 FOLFOX, however noted to have severe neutropenia with WBC count of 0.52 and ANC of 0.08.  Chemotherapy was held and received Neupogen x 2 doses.     Patient has DPYD intermediate mutation.  Given prolonged neutropenia, age and presence of DPYD mutation-plan made to administer 50% dose of FOLFOX with G-CSF support.   12/31/2024: Received cycle 3 FOLFOX with bevacizumab.    1/14/2025: Received cycle 4 FOLFOX without Avastin (held due to significant fatigue).  CT scans after 4 cycles showed excellent response to treatment  1/28/2025: Tolerated last dose of chemotherapy well.  Reviewed recent CT scan findings which show current response to treatment.  Patient and her daughter are worried about her tolerance to Avastin.  Plan made to  proceed with cycle 5 FOLFOX without Avastin.  If she does well with this cycle, will consider addition of Avastin with cycle 6 with 50% dose reduction.  Patient is agreeable.    # Anemia:  Hemoglobin 11.3 g/dL on 11/4/24.  Iron profile most consistent with iron deficiency.  11/21/2024: Patient reports of constipation.  Has poor tolerance to oral iron.    11/21/2024: Received IV Injectafer 700 mg x 1 dose  12/31/2024 Hgb 10.5  1/14/2025 hemoglobin 10.8    # CAD s/p CABG (1997 and 2007) and PCI x 16:   Follows up with , cardiology at the [facility]  On aspirin 81 mg and Plavix 75 mg daily    # DM2:  Currently on Farxiga, glipizide and metformin.    Denies any neuropathy.  Per patient, last A1c was 8.6.  Being managed by PCP    PLAN:   -Cycle 5 FOLFOX at the same dose as previous (50% of the original dose)   -HOLD bevacizumab today  -Continue Neulasta on pro placed on day 3 following 5-FU unhook.  -PT recently ordered per family request. 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  -Check [id number] at follow-up.  Plan to repeat every 6 weeks after that  -Return in 2 weeks for follow-up and potential continuation of FOLFOX/ +/-bevacizumab due to tolerance issues.  Tentative plan to resume Avastin at 50% dose if she does well with this cycle    Orders Placed This Encounter   Procedures    Comprehensive metabolic panel     Standing Status:   Future     Number of Occurrences:   1     Standing Expiration Date:   1/28/2026     Order Specific Question:   Release to patient     Answer:   Routine Release [[id number]]    Urinalysis without microscopic (no culture) - Urine, Clean Catch     Standing Status:   Future     Number of Occurrences:   1     Standing Expiration Date:   1/28/2026     Order Specific Question:   Release to patient     Answer:   Routine Release [[id number]]    CBC and Differential     Standing Status:   Future     Number of Occurrences:    1     Standing Expiration Date:   1/28/2026     Order Specific Question:   Manual Differential     Answer:   No     Order Specific Question:   Release to patient     Answer:   Routine Release [[id number]]   Total time spent during this patient encounter is 41 minutes. The total time spent with the patient includes: preparing to see the patient by reviewing of tests, prior notes or other relevant information, performing appropriate independent examination & evaluation, counseling, ordering of medications, tests or procedures, documenting clinic information in the electronic medical records or other health records, independently interpreting results of tests and communicating the results to the patient/family or caregiver.

## 2025-01-29 ENCOUNTER — PATIENT OUTREACH (OUTPATIENT)
Dept: OTHER | Facility: HOSPITAL | Age: 89
End: 2025-01-29
Payer: MEDICARE

## 2025-01-30 ENCOUNTER — INFUSION (OUTPATIENT)
Dept: ONCOLOGY | Facility: HOSPITAL | Age: 89
End: 2025-01-30
Payer: MEDICARE

## 2025-01-30 DIAGNOSIS — Z45.2 ENCOUNTER FOR ADJUSTMENT OR MANAGEMENT OF VASCULAR ACCESS DEVICE: ICD-10-CM

## 2025-01-30 DIAGNOSIS — C18.9 METASTATIC COLON CANCER TO LIVER: Primary | ICD-10-CM

## 2025-01-30 DIAGNOSIS — C78.7 METASTATIC COLON CANCER TO LIVER: Primary | ICD-10-CM

## 2025-01-30 PROCEDURE — 25010000002 PEGFILGRASTIM 6 MG/0.6ML PREFILLED SYRINGE KIT: Performed by: INTERNAL MEDICINE

## 2025-01-30 PROCEDURE — 25010000002 HEPARIN LOCK FLUSH PER 10 UNITS: Performed by: INTERNAL MEDICINE

## 2025-01-30 PROCEDURE — 96377 APPLICATON ON-BODY INJECTOR: CPT

## 2025-01-30 RX ORDER — HEPARIN SODIUM (PORCINE) LOCK FLUSH IV SOLN 100 UNIT/ML 100 UNIT/ML
500 SOLUTION INTRAVENOUS AS NEEDED
OUTPATIENT
Start: 2025-01-30

## 2025-01-30 RX ORDER — SODIUM CHLORIDE 0.9 % (FLUSH) 0.9 %
10 SYRINGE (ML) INJECTION AS NEEDED
OUTPATIENT
Start: 2025-01-30

## 2025-01-30 RX ORDER — HEPARIN SODIUM (PORCINE) LOCK FLUSH IV SOLN 100 UNIT/ML 100 UNIT/ML
500 SOLUTION INTRAVENOUS AS NEEDED
Status: DISCONTINUED | OUTPATIENT
Start: 2025-01-30 | End: 2025-01-30 | Stop reason: HOSPADM

## 2025-01-30 RX ORDER — SODIUM CHLORIDE 0.9 % (FLUSH) 0.9 %
10 SYRINGE (ML) INJECTION AS NEEDED
Status: DISCONTINUED | OUTPATIENT
Start: 2025-01-30 | End: 2025-01-30 | Stop reason: HOSPADM

## 2025-01-30 RX ADMIN — PEGFILGRASTIM 6 MG: KIT SUBCUTANEOUS at 11:00

## 2025-01-30 RX ADMIN — Medication 10 ML: at 11:04

## 2025-01-30 RX ADMIN — Medication 500 UNITS: at 11:04

## 2025-02-06 DIAGNOSIS — C78.00 CARCINOMA OF COLON METASTATIC TO LUNG: Primary | ICD-10-CM

## 2025-02-06 DIAGNOSIS — C18.9 ADENOCARCINOMA OF COLON: ICD-10-CM

## 2025-02-06 DIAGNOSIS — C18.9 CARCINOMA OF COLON METASTATIC TO LUNG: Primary | ICD-10-CM

## 2025-02-11 ENCOUNTER — INFUSION (OUTPATIENT)
Dept: ONCOLOGY | Facility: HOSPITAL | Age: 89
End: 2025-02-11
Payer: MEDICARE

## 2025-02-11 ENCOUNTER — CLINICAL SUPPORT (OUTPATIENT)
Dept: OTHER | Facility: HOSPITAL | Age: 89
End: 2025-02-11
Payer: MEDICARE

## 2025-02-11 ENCOUNTER — OFFICE VISIT (OUTPATIENT)
Dept: ONCOLOGY | Facility: CLINIC | Age: 89
End: 2025-02-11
Payer: MEDICARE

## 2025-02-11 VITALS
WEIGHT: 101.2 LBS | DIASTOLIC BLOOD PRESSURE: 70 MMHG | TEMPERATURE: 98.1 F | RESPIRATION RATE: 17 BRPM | BODY MASS INDEX: 19.87 KG/M2 | OXYGEN SATURATION: 95 % | HEIGHT: 60 IN | HEART RATE: 75 BPM | SYSTOLIC BLOOD PRESSURE: 113 MMHG

## 2025-02-11 DIAGNOSIS — C78.7 METASTATIC COLON CANCER TO LIVER: ICD-10-CM

## 2025-02-11 DIAGNOSIS — C18.9 METASTATIC COLON CANCER TO LIVER: Primary | ICD-10-CM

## 2025-02-11 DIAGNOSIS — C78.7 METASTATIC COLON CANCER TO LIVER: Primary | ICD-10-CM

## 2025-02-11 DIAGNOSIS — C18.9 METASTATIC COLON CANCER TO LIVER: ICD-10-CM

## 2025-02-11 DIAGNOSIS — R82.998 OTHER ABNORMAL FINDINGS IN URINE: ICD-10-CM

## 2025-02-11 LAB
ALBUMIN SERPL-MCNC: 3.4 G/DL (ref 3.5–5.2)
ALBUMIN/GLOB SERPL: 1.3 G/DL
ALP SERPL-CCNC: 246 U/L (ref 39–117)
ALT SERPL W P-5'-P-CCNC: 17 U/L (ref 1–33)
ANION GAP SERPL CALCULATED.3IONS-SCNC: 13.6 MMOL/L (ref 5–15)
AST SERPL-CCNC: 27 U/L (ref 1–32)
BASOPHILS # BLD AUTO: 0.13 10*3/MM3 (ref 0–0.2)
BASOPHILS NFR BLD AUTO: 0.6 % (ref 0–1.5)
BILIRUB SERPL-MCNC: 0.2 MG/DL (ref 0–1.2)
BILIRUB UR QL STRIP: NEGATIVE
BUN SERPL-MCNC: 18 MG/DL (ref 8–23)
BUN/CREAT SERPL: 16.8 (ref 7–25)
CALCIUM SPEC-SCNC: 9.2 MG/DL (ref 8.6–10.5)
CHLORIDE SERPL-SCNC: 103 MMOL/L (ref 98–107)
CLARITY UR: CLEAR
CO2 SERPL-SCNC: 23.4 MMOL/L (ref 22–29)
COLOR UR: YELLOW
CREAT SERPL-MCNC: 1.07 MG/DL (ref 0.57–1)
DEPRECATED RDW RBC AUTO: 90 FL (ref 37–54)
EGFRCR SERPLBLD CKD-EPI 2021: 50.4 ML/MIN/1.73
EOSINOPHIL # BLD AUTO: 0.17 10*3/MM3 (ref 0–0.4)
EOSINOPHIL NFR BLD AUTO: 0.7 % (ref 0.3–6.2)
ERYTHROCYTE [DISTWIDTH] IN BLOOD BY AUTOMATED COUNT: 27 % (ref 12.3–15.4)
GLOBULIN UR ELPH-MCNC: 2.7 GM/DL
GLUCOSE SERPL-MCNC: 266 MG/DL (ref 65–99)
GLUCOSE UR STRIP-MCNC: ABNORMAL MG/DL
HCT VFR BLD AUTO: 35.3 % (ref 34–46.6)
HGB BLD-MCNC: 11 G/DL (ref 12–15.9)
HGB UR QL STRIP.AUTO: NEGATIVE
IMM GRANULOCYTES # BLD AUTO: 0.26 10*3/MM3 (ref 0–0.05)
IMM GRANULOCYTES NFR BLD AUTO: 1.1 % (ref 0–0.5)
KETONES UR QL STRIP: NEGATIVE
LEUKOCYTE ESTERASE UR QL STRIP.AUTO: ABNORMAL
LYMPHOCYTES # BLD AUTO: 2.13 10*3/MM3 (ref 0.7–3.1)
LYMPHOCYTES NFR BLD AUTO: 9.3 % (ref 19.6–45.3)
MCH RBC QN AUTO: 29.6 PG (ref 26.6–33)
MCHC RBC AUTO-ENTMCNC: 31.2 G/DL (ref 31.5–35.7)
MCV RBC AUTO: 95.1 FL (ref 79–97)
MONOCYTES # BLD AUTO: 1.23 10*3/MM3 (ref 0.1–0.9)
MONOCYTES NFR BLD AUTO: 5.3 % (ref 5–12)
NEUTROPHILS NFR BLD AUTO: 19.08 10*3/MM3 (ref 1.7–7)
NEUTROPHILS NFR BLD AUTO: 83 % (ref 42.7–76)
NITRITE UR QL STRIP: POSITIVE
NRBC BLD AUTO-RTO: 0 /100 WBC (ref 0–0.2)
PH UR STRIP.AUTO: 5.5 [PH] (ref 4.5–8)
PLATELET # BLD AUTO: 228 10*3/MM3 (ref 140–450)
PMV BLD AUTO: 10.1 FL (ref 6–12)
POTASSIUM SERPL-SCNC: 4.2 MMOL/L (ref 3.5–5.2)
PROT SERPL-MCNC: 6.1 G/DL (ref 6–8.5)
PROT UR QL STRIP: ABNORMAL
RBC # BLD AUTO: 3.71 10*6/MM3 (ref 3.77–5.28)
SODIUM SERPL-SCNC: 140 MMOL/L (ref 136–145)
SP GR UR STRIP: 1.01 (ref 1–1.03)
UROBILINOGEN UR QL STRIP: ABNORMAL
WBC NRBC COR # BLD AUTO: 23 10*3/MM3 (ref 3.4–10.8)

## 2025-02-11 PROCEDURE — 96375 TX/PRO/DX INJ NEW DRUG ADDON: CPT

## 2025-02-11 PROCEDURE — 87086 URINE CULTURE/COLONY COUNT: CPT | Performed by: INTERNAL MEDICINE

## 2025-02-11 PROCEDURE — 96413 CHEMO IV INFUSION 1 HR: CPT

## 2025-02-11 PROCEDURE — 87088 URINE BACTERIA CULTURE: CPT | Performed by: INTERNAL MEDICINE

## 2025-02-11 PROCEDURE — 25810000003 SODIUM CHLORIDE 0.9 % SOLUTION: Performed by: INTERNAL MEDICINE

## 2025-02-11 PROCEDURE — 25010000002 PALONOSETRON PER 25 MCG: Performed by: INTERNAL MEDICINE

## 2025-02-11 PROCEDURE — 25010000002 LEUCOVORIN CALCIUM PER 50 MG: Performed by: INTERNAL MEDICINE

## 2025-02-11 PROCEDURE — 96368 THER/DIAG CONCURRENT INF: CPT

## 2025-02-11 PROCEDURE — G0498 CHEMO EXTEND IV INFUS W/PUMP: HCPCS

## 2025-02-11 PROCEDURE — 96367 TX/PROPH/DG ADDL SEQ IV INF: CPT

## 2025-02-11 PROCEDURE — 96366 THER/PROPH/DIAG IV INF ADDON: CPT

## 2025-02-11 PROCEDURE — 96361 HYDRATE IV INFUSION ADD-ON: CPT

## 2025-02-11 PROCEDURE — 25010000002 FLUOROURACIL PER 500 MG: Performed by: INTERNAL MEDICINE

## 2025-02-11 PROCEDURE — 85025 COMPLETE CBC W/AUTO DIFF WBC: CPT

## 2025-02-11 PROCEDURE — 96415 CHEMO IV INFUSION ADDL HR: CPT

## 2025-02-11 PROCEDURE — 25010000002 BEVACIZUMAB-ADCD 100 MG/4ML SOLUTION 4 ML VIAL: Performed by: INTERNAL MEDICINE

## 2025-02-11 PROCEDURE — 25010000002 FOSAPREPITANT PER 1 MG: Performed by: INTERNAL MEDICINE

## 2025-02-11 PROCEDURE — 80053 COMPREHEN METABOLIC PANEL: CPT

## 2025-02-11 PROCEDURE — 25810000003 SODIUM CHLORIDE 0.9 % SOLUTION 250 ML FLEX CONT: Performed by: INTERNAL MEDICINE

## 2025-02-11 PROCEDURE — 96411 CHEMO IV PUSH ADDL DRUG: CPT

## 2025-02-11 PROCEDURE — 81003 URINALYSIS AUTO W/O SCOPE: CPT

## 2025-02-11 PROCEDURE — 25010000003 DEXTROSE 5 % SOLUTION 250 ML FLEX CONT: Performed by: INTERNAL MEDICINE

## 2025-02-11 PROCEDURE — 25010000002 OXALIPLATIN PER 0.5 MG: Performed by: INTERNAL MEDICINE

## 2025-02-11 PROCEDURE — 87186 SC STD MICRODIL/AGAR DIL: CPT | Performed by: INTERNAL MEDICINE

## 2025-02-11 PROCEDURE — 25010000002 DEXAMETHASONE SODIUM PHOSPHATE 100 MG/10ML SOLUTION: Performed by: INTERNAL MEDICINE

## 2025-02-11 PROCEDURE — 96417 CHEMO IV INFUS EACH ADDL SEQ: CPT

## 2025-02-11 RX ORDER — PALONOSETRON 0.05 MG/ML
0.25 INJECTION, SOLUTION INTRAVENOUS ONCE
Status: CANCELLED | OUTPATIENT
Start: 2025-02-11

## 2025-02-11 RX ORDER — DIPHENHYDRAMINE HYDROCHLORIDE 50 MG/ML
50 INJECTION INTRAMUSCULAR; INTRAVENOUS AS NEEDED
Status: CANCELLED | OUTPATIENT
Start: 2025-02-11

## 2025-02-11 RX ORDER — SODIUM CHLORIDE 9 MG/ML
20 INJECTION, SOLUTION INTRAVENOUS ONCE
Status: DISCONTINUED | OUTPATIENT
Start: 2025-02-11 | End: 2025-02-11 | Stop reason: HOSPADM

## 2025-02-11 RX ORDER — FLUOROURACIL 50 MG/ML
200 INJECTION, SOLUTION INTRAVENOUS ONCE
Status: COMPLETED | OUTPATIENT
Start: 2025-02-11 | End: 2025-02-11

## 2025-02-11 RX ORDER — DEXTROSE MONOHYDRATE 50 MG/ML
20 INJECTION, SOLUTION INTRAVENOUS ONCE
Status: COMPLETED | OUTPATIENT
Start: 2025-02-11 | End: 2025-02-11

## 2025-02-11 RX ORDER — SODIUM CHLORIDE 9 MG/ML
20 INJECTION, SOLUTION INTRAVENOUS ONCE
Status: CANCELLED | OUTPATIENT
Start: 2025-02-11

## 2025-02-11 RX ORDER — FLUOROURACIL 50 MG/ML
200 INJECTION, SOLUTION INTRAVENOUS ONCE
Status: CANCELLED | OUTPATIENT
Start: 2025-02-11

## 2025-02-11 RX ORDER — HYDROCORTISONE SODIUM SUCCINATE 100 MG/2ML
100 INJECTION INTRAMUSCULAR; INTRAVENOUS AS NEEDED
Status: CANCELLED | OUTPATIENT
Start: 2025-02-11

## 2025-02-11 RX ORDER — DEXTROSE MONOHYDRATE 50 MG/ML
20 INJECTION, SOLUTION INTRAVENOUS ONCE
Status: CANCELLED | OUTPATIENT
Start: 2025-02-11

## 2025-02-11 RX ORDER — PALONOSETRON 0.05 MG/ML
0.25 INJECTION, SOLUTION INTRAVENOUS ONCE
Status: COMPLETED | OUTPATIENT
Start: 2025-02-11 | End: 2025-02-11

## 2025-02-11 RX ORDER — FAMOTIDINE 10 MG/ML
20 INJECTION, SOLUTION INTRAVENOUS AS NEEDED
Status: CANCELLED | OUTPATIENT
Start: 2025-02-11

## 2025-02-11 RX ADMIN — LEUCOVORIN CALCIUM 280 MG: 350 INJECTION, POWDER, LYOPHILIZED, FOR SUSPENSION INTRAMUSCULAR; INTRAVENOUS at 13:45

## 2025-02-11 RX ADMIN — OXALIPLATIN 60 MG: 5 INJECTION, SOLUTION INTRAVENOUS at 13:45

## 2025-02-11 RX ADMIN — DEXTROSE MONOHYDRATE 20 ML/HR: 50 INJECTION, SOLUTION INTRAVENOUS at 13:45

## 2025-02-11 RX ADMIN — BEVACIZUMAB-ADCD 120 MG: 100 INJECTION, SOLUTION INTRAVENOUS at 12:21

## 2025-02-11 RX ADMIN — DEXAMETHASONE SODIUM PHOSPHATE 12 MG: 10 INJECTION, SOLUTION INTRAMUSCULAR; INTRAVENOUS at 12:49

## 2025-02-11 RX ADMIN — PALONOSETRON HYDROCHLORIDE 0.25 MG: 0.25 INJECTION INTRAVENOUS at 11:42

## 2025-02-11 RX ADMIN — FLUOROURACIL 1700 MG: 50 INJECTION, SOLUTION INTRAVENOUS at 15:51

## 2025-02-11 RX ADMIN — FLUOROURACIL 280 MG: 50 INJECTION, SOLUTION INTRAVENOUS at 15:50

## 2025-02-11 RX ADMIN — FOSAPREPITANT 100 ML: 150 INJECTION, POWDER, LYOPHILIZED, FOR SOLUTION INTRAVENOUS at 11:45

## 2025-02-11 RX ADMIN — SODIUM CHLORIDE 500 ML: 900 INJECTION, SOLUTION INTRAVENOUS at 11:36

## 2025-02-11 NOTE — PROGRESS NOTES
OUTPATIENT ONCOLOGY NUTRITION     Patient Name: Tamie Peter  YOB: 1935  MRN: [id number]  Assessment Date: 2/11/2025    COMMENTS:  Follow up.  Pt is receiving her FOLFOX infusion today (#6).  Labs/Meds reviewed. Glu 266, Ca 9.2, WBC 23, ANC 19. Weight stable at 101lbs    Met with pt and her son (?) today during her treatment.  They report she is feeling pretty good and feels like her po intake and appetite are a little better.  She still eats very small portions, 2 small meals a day but got up at 3am today for a snack. She hasn't tried the Boost Breeze  I gave her last visit. Encouraged her to try it and consider mixing with some diet ginger ale and to put forth effort to gain a few pounds.    Will follow throughout treatment course and be available as needed. Pt very appreciative of visit.        Reason for Assessment Follow up     Diagnosis/Problem   Metastatic colon cancer   Treatment Plan Chemotherapy FOLFOX   Frequency Q 2 weeks x 12 cycles   Goal of cancer treatment Palliative   Comments:        Encounter Information        Nutrition/Diet History:  2 meals a day, doesn't eat beef, eats a lot of carbs   Oral Nutrition Supplements:  Doesn't tolerate them - makes her sick to her stomach   Factors/Symptoms Affecting Intake: Decreased appetite, Fatigue, Weight loss   Comments:      Medical/Surgical History Past Medical History:   Diagnosis Date    Anemia     Arthritis of left sacroiliac joint 05/05/2022    CKD (chronic kidney disease), stage III     PATIENT STATES IT IS AT STAGE 4 AT THIS TIME    Colorectal cancer     Coronary artery disease     cath 11/15. CABG x3 and all grafts widely patent. Very small OM and Cfx and that may be the cause of lateral ischemia on stress test. Non bipassable    Diabetes mellitus, type II     GERD (gastroesophageal reflux disease)     Gout     Heart attack 10/2022    MULTIPLE (6 OR MORE)    History of DVT in adulthood     AFTER CHILDBIRTH    Hyperlipidemia      Hypertension     Liver masses     Pacemaker     PUD (peptic ulcer disease)        Past Surgical History:   Procedure Laterality Date    CARDIAC ELECTROPHYSIOLOGY PROCEDURE N/A 02/22/2017    Procedure: Pacemaker DC new;  Surgeon: Juvenal Zhong MD;  Location[facility] CATH [facility]VASIVE LOC[facility];  Service:     CARDIAC SURGERY  10/2022    CATARACT EXTRACTION      COLONOSCOPY  2010    COLONOSCOPY N/A 11/19/2024    Procedure: COLONOSCOPY WITH TATTOO MASS INJECTION 4 ML;  Surgeon: Caden Christian MD;  Location:  [facility];  Service: General;  Laterality: N/A;    CORONARY ARTERY BYPASS GRAFT      1992, 2009    EPIDURAL Left 10/17/2022    Procedure: LUMBAR/SACRAL TRANSFORAMINAL EPIDURAL Left L4-5 and left L5-S1 - hold plavix 7 days before;  Surgeon: Yasmine Gamnig MD;  [facility];  Service: Pain Management;  Laterality: Left;    PACEMAKER IMPLANTATION      PIRIFORMUS INJECTION Left 07/15/2022    Procedure: PIRIFORMIS INJECTION;  Surgeon: Yasmine Gaming MD;  [facility];  Service: Pain Management;  Laterality: Left;    RADIOFREQUENCY ABLATION Left 02/20/2023    Procedure: RADIOFREQUENCY ABLATION NERVES for the left sacroiliac joint;  Surgeon: Yasmine Gaming MD;  [facility];  Service: Pain Management;  Laterality: Left;    SACROILIAC JOINT INJECTION Left 05/09/2022    Procedure: SACROILIAC INJECTION - Left;  Surgeon: Yasmine Gaming MD;  [facility];  Service: Pain Management;  Laterality: Left;    SACROILIAC JOINT INJECTION Left 07/15/2022    Procedure: left Sacroiliac joint injection and left piriformis injection;  Surgeon: Yasmine Gaming MD;  [facility];  Service: Pain Management;  Laterality: Left;    TUBAL ABDOMINAL LIGATION      VENOUS ACCESS DEVICE (PORT) INSERTION N/A 11/19/2024    Procedure: INSERTION VENOUS ACCESS DEVICE;  Surgeon: Caden Christian MD;  Location:  [facility];  Service: General;  Laterality: N/A;          "    Anthropometrics        Current Height Ht Readings from Last 1 Encounters:   02/11/25 152.4 cm (60\")      Current Weight Wt Readings from Last 1 Encounters:   02/11/25 45.9 kg (101 lb 3.2 oz)      BMI  19.76   Ideal Body Weight (IBW) 100lb   Usual Body Weight (UBW) 120lb   Weight Change/Trend Loss   Weight History Wt Readings from Last 30 Encounters:   02/11/25 1025 45.9 kg (101 lb 3.2 oz)   01/28/25 0905 46.2 kg (101 lb 14.4 oz)   01/14/25 0858 46.7 kg (103 lb)   12/31/24 1034 46.8 kg (103 lb 3.2 oz)   12/10/24 0829 45.9 kg (101 lb 4.8 oz)   12/06/24 0802 46 kg (101 lb 6.4 oz)   12/05/24 0916 46.4 kg (102 lb 3.2 oz)   11/21/24 0931 47.3 kg (104 lb 4.8 oz)   11/13/24 1422 46.3 kg (102 lb)   11/13/24 1344 46.7 kg (103 lb)   11/07/24 0802 47.2 kg (104 lb)   11/04/24 0932 47.8 kg (105 lb 6.4 oz)   10/28/24 1013 49.4 kg (109 lb)   10/21/24 0952 49.4 kg (109 lb)   09/24/24 1004 51.7 kg (114 lb)   06/06/24 1007 52.2 kg (115 lb)   03/07/24 0902 54 kg (119 lb)   12/07/23 1011 54.4 kg (120 lb)   09/19/23 0941 55.3 kg (122 lb)   06/06/23 0900 54.9 kg (121 lb)   05/18/23 1523 52.7 kg (116 lb 3.2 oz)   04/04/23 0943 54.3 kg (119 lb 12.8 oz)   03/07/23 1416 54.4 kg (120 lb)   02/20/23 1053 52.2 kg (115 lb)   02/15/23 1428 52.2 kg (115 lb)   01/26/23 0917 55.2 kg (121 lb 12.8 oz)   12/13/22 0753 54.2 kg (119 lb 6.4 oz)   12/06/22 0922 53.1 kg (117 lb)   12/02/22 0938 52.2 kg (115 lb)   10/17/22 1229 56.2 kg (124 lb)   09/22/22 0954 57.6 kg (127 lb)          Medications           Current medications: OLANZapine, aspirin, clopidogrel, dapagliflozin Propanediol, glipizide, hydrALAZINE, hydroCHLOROthiazide, lidocaine-prilocaine, metFORMIN, metoprolol tartrate, multivitamin with minerals, nitroglycerin, ondansetron, potassium chloride, rosuvastatin, and sulfamethoxazole-trimethoprim                 Tests/Procedures        Tests/Procedures Chemotherapy     Labs       Pertinent Labs    Results from last 7 days   Lab Units " "02/11/25  1023   SODIUM mmol/L 140   POTASSIUM mmol/L 4.2   CHLORIDE mmol/L 103   CO2 mmol/L 23.4   BUN mg/dL 18   CREATININE mg/dL 1.07*   CALCIUM mg/dL 9.2   BILIRUBIN mg/dL 0.2   ALK PHOS U/L 246*   ALT (SGPT) U/L 17   AST (SGOT) U/L 27   GLUCOSE mg/dL 266*     Results from last 7 days   Lab Units 02/11/25  1023   HEMOGLOBIN g/dL 11.0*   HEMATOCRIT % 35.3   WBC 10*3/mm3 23.00*   ALBUMIN g/dL 3.4*     Results from last 7 days   Lab Units 02/11/25  1023   PLATELETS 10*3/mm3 228     No results found for: \"COVID19\"  Lab Results   Component Value Date    HGBA1C 8.3 (H) 09/23/2024          Physical Findings        Physical Appearance alert, loss of muscle mass, loss of subcutaneous fat, oriented     Edema  no edema   Gastrointestinal None   Tubes/Lines/Drains Implantable Port   Oral/Mouth Cavity WNL   Skin Intact       Estimated/Assessed Needs        Energy Requirements    Height for Calculation      Weight for Calculation 47.3 kg   Method for Estimation  35 kcal/kg   EST Needs (kcal/day) 1655 kcals per day       Protein Requirements    Weight for Calculation 47.3 kg   EST Protein Needs (g/kg) 1.2 gm/kg   EST Daily Needs (g/day) 56 gms per day       Fluid Requirements     Method for Estimation 1 mL/kcal    Estimated Needs (mL/day) 1500 mLs per day         NUTRITION INTERVENTION / PLAN OF CARE      Intervention Goal(s) Maintain nutrition status, Reduce/improve symptoms, Provide information, Meet estimated needs, Disease management/therapy, Tolerate PO , Increase intake, and Appropriate weight gain         RD Intervention/Action Encouraged intake, Follow Tx progress, Recommended/ordered         Recommendations:       PO Diet Consume calorie and protein dense healthy foods, 6 small meals      Supplements Try boost breeze      Snacks       Other    New Prescription Ordered? No, recommend   --      Monitor/Evaluation Follow up as needed   Education Education provided   --    Electronically signed by:  Leann Shabazz, " RD  02/11/25 11:45 EST

## 2025-02-11 NOTE — PROGRESS NOTES
[facility]    CONSULTING IN BLOOD DISORDERS & CANCER      REASON FOR CONSULTATION/CHIEF COMPLAINT:     Evaluation and management for metastatic colorectal cancer                             REQUESTING PHYSICIAN: No ref. provider found  RECORDS OBTAINED:  Records of the patients history including those from the electronic medical record were reviewed and summarized in detail.    HISTORY OF PRESENT ILLNESS:    The patient is a 89 y.o. year old female with medical history significant for CAD s/p CABG x 3 (9097, 2007), PCI's x 16, pacemaker, peptic ulcer disease, hypertension and dyslipidemia comes for metastatic colorectal cancer evaluation and management.    Patient had presented to her PCP,  in early October 2024 with lower abdominal pain and weight loss of > 20 pounds.  A CT A/P performed 10/9/2024 noted innumerable lung nodules, multiple hypodense masses in the liver and a large mass in the hepatic flexure of the colon, measuring 4.5 cm in greatest dimension.    Patient underwent CT-guided liver lesion biopsy on 10/28/2024.  Pathology: Metastatic adenocarcinoma of colorectal origin.    Patient was referred to hematology/oncology for further evaluation and management.  She was first seen in this clinic on 11//24.  Accompanied by 3 of 4 daughters.  Patient denies any major GI issues.  No blood in stool or altered bowel habits.  She does take stool softeners for regular bowel movements.  No nausea or vomiting.  No family history of colorectal cancer.    11/5/2024: PET/CT demonstrates hypermetabolic lesions in the colonic hepatic flexure, pulmonary, hepatic, osseous and zenaida metastasis.  Colonoscopy did reveal hepatic flexure mass, partially obstructing.    11/21/2024: Cycle 1 FOLFOX    12/5/2024: Was seen for cycle 2 FOLFOX, however noted to have severe neutropenia with WBC count of 0.52 and ANC of 0.08.  Chemotherapy was held and received Neupogen x 2 doses.       Patient has DPYD intermediate mutation.   Given prolonged neutropenia, age, and presence of DPYD mutation-plan made to administer 50% dose of FOLFOX with G-CSF support in the form of Neulasta on pro.    CT scans after 4 cycles show excellent response to treatment    INTERIM HISTORY:  Patient returns today for follow-up for cycle 6 of FOLFOX.  Continued with Neulasta.    She is accompanied by her son.  Unfortunately with the third cycle she had significant side effects for approximately 3 days following disconnect where she could not get out of bed or prepare any meals for herself because she was so tired.  She states it took her about a week thereafter to regain her strength.  Due to these symptoms, she did not receive Avastin with cycle 4 and 5.      Today, she reports of tolerating the last cycle much better-which was administered without Avastin.  She is feeling good today.  She denies any bleeding or dark stools.  She denies any pain.  She inquired about going for dancing, driving her car and going to [employer] as she used to do prior to chemotherapy.  Patient's family (rightly so) is worried about her ability to drive and do all the physical activities which she desires to do.    Past Medical History:   Diagnosis Date    Anemia     Arthritis of left sacroiliac joint 05/05/2022    CKD (chronic kidney disease), stage III     PATIENT STATES IT IS AT STAGE 4 AT THIS TIME    Colorectal cancer     Coronary artery disease     cath 11/15. CABG x3 and all grafts widely patent. Very small OM and Cfx and that may be the cause of lateral ischemia on stress test. Non bipassable    Diabetes mellitus, type II     GERD (gastroesophageal reflux disease)     Gout     Heart attack 10/2022    MULTIPLE (6 OR MORE)    History of DVT in adulthood     AFTER CHILDBIRTH    Hyperlipidemia     Hypertension     Liver masses     Pacemaker     PUD (peptic ulcer disease)      Past Surgical History:   Procedure Laterality Date    CARDIAC ELECTROPHYSIOLOGY PROCEDURE N/A 02/22/2017     Procedure: Pacemaker DC new;  Surgeon: Juvenal Zhong MD;  Location:  [facility];  Service:     CARDIAC SURGERY  10/2022    CATARACT EXTRACTION      COLONOSCOPY  2010    COLONOSCOPY N/A 11/19/2024    Procedure: COLONOSCOPY WITH TATTOO MASS INJECTION 4 ML;  Surgeon: Caden Christian MD;  Location:  [facility];  Service: General;  Laterality: N/A;    CORONARY ARTERY BYPASS GRAFT      1992, 2009    EPIDURAL Left 10/17/2022    Procedure: LUMBAR/SACRAL TRANSFORAMINAL EPIDURAL Left L4-5 and left L5-S1 - hold plavix 7 days before;  Surgeon: Yasmine Gaming MD;  Location: [facility];  Service: Pain Management;  Laterality: Left;    PACEMAKER IMPLANTATION      PIRIFORMUS INJECTION Left 07/15/2022    Procedure: PIRIFORMIS INJECTION;  Surgeon: Yasmine Gaming MD;  [facility]: [facility];  Service: Pain Management;  Laterality: Left;    RADIOFREQUENCY ABLATION Left 02/20/2023    Procedure: RADIOFREQUENCY ABLATION NERVES for the left sacroiliac joint;  Surgeon: Yasmine Gaming MD;  [facility];  Service: Pain Management;  Laterality: Left;    SACROILIAC JOINT INJECTION Left 05/09/2022    Procedure: SACROILIAC INJECTION - Left;  Surgeon: Yasmine Gaming MD;  [facility];  Service: Pain Management;  Laterality: Left;    SACROILIAC JOINT INJECTION Left 07/15/2022    Procedure: left Sacroiliac joint injection and left piriformis injection;  Surgeon: Yasmine Gaming MD;  [facility];  Service: Pain Management;  Laterality: Left;    TUBAL ABDOMINAL LIGATION      VENOUS ACCESS DEVICE (PORT) INSERTION N/A 11/19/2024    Procedure: INSERTION VENOUS ACCESS DEVICE;  Surgeon: Caden Christian MD;  Location:  [facility];  Service: General;  Laterality: N/A;       MEDICATIONS    Current Outpatient Medications:     aspirin 81 MG EC tablet, Take 1 tablet by mouth Daily. HOLD FOR SURGERY PER MD ORDERS, Disp: , Rfl:     clopidogrel (PLAVIX) 75 MG tablet, Take 1  tablet by mouth Daily. HOLD FOR SURGERY PER MD ORDERS, Disp: , Rfl:     dapagliflozin Propanediol (Farxiga) 10 MG tablet, TAKE 1 TABLET BY MOUTH DAILY, Disp: 90 tablet, Rfl: 1    glipizide (GLUCOTROL) 5 MG tablet, TAKE 2 TABLETS BY MOUTH EVERY MORNING WITH BREAKFAST AND TAKE ONE TABLET BY MOUTH EVERY EVENING, Disp: 270 tablet, Rfl: 1    hydrALAZINE (APRESOLINE) 25 MG tablet, TAKE 1 TABLET BY MOUTH TWICE A DAY, Disp: 180 tablet, Rfl: 1    hydroCHLOROthiazide 12.5 MG tablet, Take 1 tablet by mouth Daily., Disp: 90 tablet, Rfl: 1    Klor-Con M20 20 MEQ CR tablet, TAKE 1 TABLET BY MOUTH DAILY, Disp: 30 tablet, Rfl: 0    lidocaine-prilocaine (EMLA) 2.5-2.5 % cream, Apply 1 Application topically to the appropriate area as directed Every 2 (Two) Hours As Needed for Mild Pain., Disp: 30 g, Rfl: 3    metFORMIN (GLUCOPHAGE) 500 MG tablet, Take 2 tablets by mouth 2 (Two) Times a Day With Meals., Disp: 360 tablet, Rfl: 1    metoprolol tartrate (LOPRESSOR) 50 MG tablet, TAKE 2 TABLETS BY MOUTH TWICE A DAY, Disp: 360 tablet, Rfl: 1    multivitamin with minerals (CENTRUM SILVER 50+WOMEN PO), Take 1 tablet by mouth Daily. HOLD FOR SURGERY, Disp: , Rfl:     nitroglycerin (NITROSTAT) 0.4 MG SL tablet, 1 tablet., Disp: , Rfl:     OLANZapine (zyPREXA) 5 MG tablet, Take 0.5 tablets by mouth Every Night., Disp: 30 tablet, Rfl: 1    ondansetron (ZOFRAN) 8 MG tablet, Take 1 tablet by mouth 3 (Three) Times a Day As Needed for Nausea or Vomiting., Disp: 30 tablet, Rfl: 5    rosuvastatin (Crestor) 40 MG tablet, Take 1 tablet by mouth Daily., Disp: 90 tablet, Rfl: 3    sulfamethoxazole-trimethoprim (BACTRIM DS,SEPTRA DS) 800-160 MG per tablet, Take 1 tablet by mouth 2 (Two) Times a Day., Disp: 6 tablet, Rfl: 0    ALLERGIES:     Allergies   Allergen Reactions    Pneumococcal Vaccines Paresthesia       SOCIAL HISTORY:       Social History     Socioeconomic History    Marital status:    Tobacco Use    Smoking status: Never     Passive  "exposure: Never    Smokeless tobacco: Never   Vaping Use    Vaping status: Never Used   Substance and Sexual Activity    Alcohol use: Never    Drug use: Never         FAMILY HISTORY:  Family History   Problem Relation Age of Onset    Coronary artery disease Mother     Heart attack Sister     Liseth Hyperthermia Neg Hx             Vitals:    02/11/25 1025   BP: 113/70   Pulse: 75   Resp: 17   Temp: 98.1 °F (36.7 °C)   TempSrc: Oral   SpO2: 95%   Weight: 45.9 kg (101 lb 3.2 oz)   Height: 152.4 cm (60\")   PainSc: 0-No pain             2/11/2025    10:32 AM   Current Status   ECOG score 1      PHYSICAL EXAM:    CONSTITUTIONAL:  Vital signs reviewed.  No distress, looks comfortable.  Seated in wheelchair today.  EYES:  Conjunctiva and lids unremarkable.   EARS,NOSE,MOUTH,THROAT:  Ears and nose appear unremarkable.  Lips, teeth, gums appear unremarkable.  RESPIRATORY:  Normal respiratory effort.  Lungs clear to auscultation bilaterally.  CARDIOVASCULAR:  Normal S1, S2.  No murmurs rubs or gallops.  No significant lower extremity edema.  GASTROINTESTINAL: Abdomen appears unremarkable.  Nondistended  LYMPHATIC:  No cervical, supraclavicular lymphadenopathy.  NEURO: AAOx3, no focal deficits.  Appears to have equal strength all 4 extremities.  MUSCULOSKELETAL:  Unremarkable digits/nails.  No cyanosis or clubbing.  No apparent joint deformities.  SKIN:  Warm.  No rashes.  PSYCHIATRIC:  Normal judgment and insight.  Normal mood and affect.     RECENT LABS:  Results from last 7 days   Lab Units 02/11/25  1023   WBC 10*3/mm3 23.00*   NEUTROS ABS 10*3/mm3 19.08*   HEMOGLOBIN g/dL 11.0*   HEMATOCRIT % 35.3   PLATELETS 10*3/mm3 228       ASSESSMENT:  Patient is a pleasant 89-year-old female with medical history significant for CAD s/p CABG x 3 (1997, 2007), PCI's x 16, pacemaker, peptic ulcer disease, hypertension and dyslipidemia comes for metastatic colorectal cancer evaluation and management.    # Metastatic colorectal cancer with " liver and lung metastasis, right sided:  Patient had presented to her PCP,  in early October 2024 with lower abdominal pain and weight loss of > 20 pounds.    A CT A/P performed 10/9/2024 noted innumerable lung nodules, multiple hypodense masses in the liver and a large mass in the hepatic flexure of the colon, measuring 4.5 cm in greatest dimension.  CT-guided liver lesion biopsy on 10/28/2024. Pathology: Metastatic adenocarcinoma of colorectal origin.  CEA 5386.0 (10/21/2024)  I reviewed the imaging and pathology findings with patient and her family in detail.  Informed that she has been diagnosed with metastatic colorectal cancer with diffuse metastasis.  Reviewed  natural history, prognosis and palliative treatment options.  Patient is interested in receiving cancer directed therapy.  With her age and significant cardiovascular medical comorbidity-treatment with palliative chemotherapy would be challenging.  [facility] NGS shows MSI stable.  Low TMB.  No NRAS/KRAS/BRAF mutations. [id number] liquid NGS Shows MSI stable.  SHANE mutation, FGFR 1 and EGFR gene amplifications.   [facility] germline mutation testing pending  11/21/2024: Cycle 1 FOLFOX.    12/5/2024: Was seen for cycle 2 FOLFOX, however noted to have severe neutropenia with WBC count of 0.52 and ANC of 0.08.  Chemotherapy was held and received Neupogen x 2 doses.     Patient has DPYD intermediate mutation.  Given prolonged neutropenia, age and presence of DPYD mutation-plan made to administer 50% dose of FOLFOX with G-CSF support.   12/31/2024: Received cycle 3 FOLFOX with bevacizumab.    1/14/2025: Received cycle 4 FOLFOX without Avastin (held due to significant fatigue).  CT scans after 4 cycles showed excellent response to treatment  2/11/2025: Tolerated last dose of chemotherapy well.  Labs today stable as well, except mild elevation in serum creatinine of 1.07, leukocytosis of 23.0 and anemia of 11.0.  The UA shows positive for nitrates and  leukocytes.  Patient denies any urinary symptoms.  Will check urine culture.  If positive, plan to start on antibiotics.  In the meantime, proceed with cycle 5 FOLFOX (50% dose) along with Avastin (50% dose).  Follow up in 2 weeks or sooner if needed.    # Leukocytosis:  WBC count of 23.0 on 2/11/2025.  No clear signs or symptoms of infections.  Unclear whether Neulasta related versus UTI.  Check urine culture today.  Start antibiotics if positive.    # Anemia:  Hemoglobin 11.3 g/dL on 11/4/24.  Iron profile most consistent with iron deficiency.  11/21/2024: Patient reports of constipation.  Has poor tolerance to oral iron.    11/21/2024: Received IV Injectafer 700 mg x 1 dose  12/31/2024 Hgb 10.5  1/14/2025 hemoglobin 10.8    # CAD s/p CABG (1997 and 2007) and PCI x 16:   Follows up with , cardiology at the [facility]  On aspirin 81 mg and Plavix 75 mg daily    # DM2:  Currently on Farxiga, glipizide and metformin.    Denies any neuropathy.  Per patient, last A1c was 8.6.  Being managed by PCP    PLAN:   -Cycle 6 FOLFOX at the same dose as previous (50% of the original dose)   -Administer Avastin (50% dose) today  -Continue Neulasta on pro placed on day 3 following 5-FU unhook.  -Start antibiotics if urine culture is positive  -PT ongoing per family request. 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  -[id number] drawn today.  Plan to repeat every 6 weeks after that  -Return in 2 weeks for follow-up and potential continuation of FOLFOX/ +/-bevacizumab due to tolerance issues.  Consider increasing the gap between treatments if poor tolerance.    Orders Placed This Encounter   Procedures    Comprehensive metabolic panel     Standing Status:   Future     Number of Occurrences:   1     Standing Expiration Date:   2/11/2026     Order Specific Question:   Release to patient     Answer:   Routine Release [[id number]]    Urinalysis without microscopic  (no culture) - Urine, Clean Catch     Standing Status:   Future     Number of Occurrences:   1     Standing Expiration Date:   2/11/2026     Order Specific Question:   Release to patient     Answer:   Routine Release [[id number]]    CBC and Differential     Standing Status:   Future     Number of Occurrences:   1     Standing Expiration Date:   2/11/2026     Order Specific Question:   Manual Differential     Answer:   No     Order Specific Question:   Release to patient     Answer:   Routine Release [[id number]]   Total time spent during this patient encounter is 42 minutes. The total time spent with the patient includes: preparing to see the patient by reviewing of tests, prior notes or other relevant information, performing appropriate independent examination & evaluation, counseling, ordering of medications, tests or procedures, documenting clinic information in the electronic medical records or other health records, independently interpreting results of tests and communicating the results to the patient/family or caregiver.

## 2025-02-11 NOTE — NURSING NOTE
Patient arrived for infusion. Bevacizumab dose reduced today due to UA results by 50%. Patient reported frequency of urination. Urine culture ordered by DR. Meeks. Ok to proceed with resulted CBC/CMP. Bolus of 500 cc of NS administered today.   Lab Results   Component Value Date    GLUCOSE 266 (H) 02/11/2025    BUN 18 02/11/2025    CREATININE 1.07 (H) 02/11/2025     02/11/2025    K 4.2 02/11/2025     02/11/2025    CALCIUM 9.2 02/11/2025    PROTEINTOT 6.1 02/11/2025    ALBUMIN 3.4 (L) 02/11/2025    ALT 17 02/11/2025    AST 27 02/11/2025    ALKPHOS 246 (H) 02/11/2025    BILITOT 0.2 02/11/2025    GLOB 2.7 02/11/2025    AGRATIO 1.3 02/11/2025    BCR 16.8 02/11/2025    ANIONGAP 13.6 02/11/2025    EGFR 50.4 (L) 02/11/2025     Lab Results   Component Value Date    WBC 23.00 (H) 02/11/2025    HGB 11.0 (L) 02/11/2025    HCT 35.3 02/11/2025    MCV 95.1 02/11/2025     02/11/2025

## 2025-02-12 ENCOUNTER — TELEPHONE (OUTPATIENT)
Dept: ONCOLOGY | Facility: CLINIC | Age: 89
End: 2025-02-12
Payer: MEDICARE

## 2025-02-12 DIAGNOSIS — C18.9 CARCINOMA OF COLON METASTATIC TO LUNG: Primary | ICD-10-CM

## 2025-02-12 DIAGNOSIS — C78.00 CARCINOMA OF COLON METASTATIC TO LUNG: Primary | ICD-10-CM

## 2025-02-12 DIAGNOSIS — C18.9 ADENOCARCINOMA OF COLON: ICD-10-CM

## 2025-02-12 RX ORDER — CEFDINIR 300 MG/1
300 CAPSULE ORAL 2 TIMES DAILY
Qty: 14 CAPSULE | Refills: 0 | Status: SHIPPED | OUTPATIENT
Start: 2025-02-12

## 2025-02-12 NOTE — TELEPHONE ENCOUNTER
----- Message from Lonny Meeks sent at 2/12/2025  1:32 PM EST -----  Her urine culture is growing E. coli.  I have sent a prescription for Omnicef to her pharmacy.  Please inform patient.  Thank you    Patient daughter made aware and she will have someone  the prescription. Daughter would like to inform Dr Meeks that patient is not as active as before, pt will say during visit that she is doing well, but she sees pt being weak, unable to walk from the car to the building and to the office, pt needing a wheelchair. Daughter made aware that we will relay this msg to Dr Meeks. Advised daughter to ensure that patient has always someone to ensure pt is safe. She v/u.

## 2025-02-13 ENCOUNTER — INFUSION (OUTPATIENT)
Dept: ONCOLOGY | Facility: HOSPITAL | Age: 89
End: 2025-02-13
Payer: MEDICARE

## 2025-02-13 DIAGNOSIS — C78.7 METASTATIC COLON CANCER TO LIVER: Primary | ICD-10-CM

## 2025-02-13 DIAGNOSIS — C18.9 METASTATIC COLON CANCER TO LIVER: Primary | ICD-10-CM

## 2025-02-13 DIAGNOSIS — Z45.2 ENCOUNTER FOR ADJUSTMENT OR MANAGEMENT OF VASCULAR ACCESS DEVICE: ICD-10-CM

## 2025-02-13 LAB — BACTERIA SPEC AEROBE CULT: ABNORMAL

## 2025-02-13 PROCEDURE — 96377 APPLICATON ON-BODY INJECTOR: CPT

## 2025-02-13 PROCEDURE — 25010000002 HEPARIN LOCK FLUSH PER 10 UNITS: Performed by: INTERNAL MEDICINE

## 2025-02-13 PROCEDURE — 25010000002 PEGFILGRASTIM 6 MG/0.6ML PREFILLED SYRINGE KIT: Performed by: INTERNAL MEDICINE

## 2025-02-13 RX ORDER — SODIUM CHLORIDE 0.9 % (FLUSH) 0.9 %
10 SYRINGE (ML) INJECTION AS NEEDED
OUTPATIENT
Start: 2025-02-13

## 2025-02-13 RX ORDER — HEPARIN SODIUM (PORCINE) LOCK FLUSH IV SOLN 100 UNIT/ML 100 UNIT/ML
500 SOLUTION INTRAVENOUS AS NEEDED
Status: DISCONTINUED | OUTPATIENT
Start: 2025-02-13 | End: 2025-02-13 | Stop reason: HOSPADM

## 2025-02-13 RX ORDER — SODIUM CHLORIDE 0.9 % (FLUSH) 0.9 %
10 SYRINGE (ML) INJECTION AS NEEDED
Status: DISCONTINUED | OUTPATIENT
Start: 2025-02-13 | End: 2025-02-13 | Stop reason: HOSPADM

## 2025-02-13 RX ORDER — HEPARIN SODIUM (PORCINE) LOCK FLUSH IV SOLN 100 UNIT/ML 100 UNIT/ML
500 SOLUTION INTRAVENOUS AS NEEDED
OUTPATIENT
Start: 2025-02-13

## 2025-02-13 RX ADMIN — Medication 500 UNITS: at 14:29

## 2025-02-13 RX ADMIN — PEGFILGRASTIM 6 MG: KIT SUBCUTANEOUS at 14:24

## 2025-02-13 RX ADMIN — Medication 10 ML: at 14:29

## 2025-02-24 RX ORDER — POTASSIUM CHLORIDE 1500 MG/1
20 TABLET, EXTENDED RELEASE ORAL DAILY
Qty: 30 TABLET | Refills: 0 | Status: SHIPPED | OUTPATIENT
Start: 2025-02-24

## 2025-02-25 ENCOUNTER — INFUSION (OUTPATIENT)
Dept: ONCOLOGY | Facility: HOSPITAL | Age: 89
End: 2025-02-25
Payer: MEDICARE

## 2025-02-25 ENCOUNTER — CLINICAL SUPPORT (OUTPATIENT)
Dept: OTHER | Facility: HOSPITAL | Age: 89
End: 2025-02-25
Payer: MEDICARE

## 2025-02-25 ENCOUNTER — OFFICE VISIT (OUTPATIENT)
Dept: ONCOLOGY | Facility: CLINIC | Age: 89
End: 2025-02-25
Payer: MEDICARE

## 2025-02-25 VITALS
DIASTOLIC BLOOD PRESSURE: 70 MMHG | BODY MASS INDEX: 19.18 KG/M2 | TEMPERATURE: 98.2 F | OXYGEN SATURATION: 97 % | RESPIRATION RATE: 17 BRPM | HEART RATE: 75 BPM | SYSTOLIC BLOOD PRESSURE: 132 MMHG | HEIGHT: 60 IN | WEIGHT: 97.7 LBS

## 2025-02-25 DIAGNOSIS — C18.9 METASTATIC COLON CANCER TO LIVER: Primary | ICD-10-CM

## 2025-02-25 DIAGNOSIS — C18.9 CARCINOMA OF COLON METASTATIC TO LUNG: ICD-10-CM

## 2025-02-25 DIAGNOSIS — C18.9 METASTATIC COLON CANCER TO LIVER: ICD-10-CM

## 2025-02-25 DIAGNOSIS — C18.9 ADENOCARCINOMA OF COLON: ICD-10-CM

## 2025-02-25 DIAGNOSIS — C78.7 METASTATIC COLON CANCER TO LIVER: Primary | ICD-10-CM

## 2025-02-25 DIAGNOSIS — C78.00 CARCINOMA OF COLON METASTATIC TO LUNG: ICD-10-CM

## 2025-02-25 DIAGNOSIS — C78.7 METASTATIC COLON CANCER TO LIVER: ICD-10-CM

## 2025-02-25 LAB
ALBUMIN SERPL-MCNC: 3.3 G/DL (ref 3.5–5.2)
ALBUMIN/GLOB SERPL: 1.3 G/DL
ALP SERPL-CCNC: 247 U/L (ref 39–117)
ALT SERPL W P-5'-P-CCNC: 14 U/L (ref 1–33)
ANION GAP SERPL CALCULATED.3IONS-SCNC: 11 MMOL/L (ref 5–15)
AST SERPL-CCNC: 23 U/L (ref 1–32)
BASOPHILS # BLD AUTO: 0.1 10*3/MM3 (ref 0–0.2)
BASOPHILS NFR BLD AUTO: 0.3 % (ref 0–1.5)
BILIRUB SERPL-MCNC: 0.2 MG/DL (ref 0–1.2)
BILIRUB UR QL STRIP: NEGATIVE
BUN SERPL-MCNC: 19 MG/DL (ref 8–23)
BUN/CREAT SERPL: 18.8 (ref 7–25)
CALCIUM SPEC-SCNC: 9.1 MG/DL (ref 8.6–10.5)
CEA SERPL-MCNC: 826 NG/ML
CHLORIDE SERPL-SCNC: 108 MMOL/L (ref 98–107)
CLARITY UR: CLEAR
CO2 SERPL-SCNC: 19 MMOL/L (ref 22–29)
COLOR UR: YELLOW
CREAT SERPL-MCNC: 1.01 MG/DL (ref 0.57–1)
DEPRECATED RDW RBC AUTO: 88.1 FL (ref 37–54)
EGFRCR SERPLBLD CKD-EPI 2021: 54 ML/MIN/1.73
EOSINOPHIL # BLD AUTO: 0.08 10*3/MM3 (ref 0–0.4)
EOSINOPHIL NFR BLD AUTO: 0.3 % (ref 0.3–6.2)
ERYTHROCYTE [DISTWIDTH] IN BLOOD BY AUTOMATED COUNT: 26.2 % (ref 12.3–15.4)
GLOBULIN UR ELPH-MCNC: 2.5 GM/DL
GLUCOSE SERPL-MCNC: 167 MG/DL (ref 65–99)
GLUCOSE UR STRIP-MCNC: ABNORMAL MG/DL
HCT VFR BLD AUTO: 32.8 % (ref 34–46.6)
HGB BLD-MCNC: 10.2 G/DL (ref 12–15.9)
HGB UR QL STRIP.AUTO: NEGATIVE
IMM GRANULOCYTES # BLD AUTO: 0.42 10*3/MM3 (ref 0–0.05)
IMM GRANULOCYTES NFR BLD AUTO: 1.4 % (ref 0–0.5)
KETONES UR QL STRIP: NEGATIVE
LEUKOCYTE ESTERASE UR QL STRIP.AUTO: NEGATIVE
LYMPHOCYTES # BLD AUTO: 2.55 10*3/MM3 (ref 0.7–3.1)
LYMPHOCYTES NFR BLD AUTO: 8.4 % (ref 19.6–45.3)
MCH RBC QN AUTO: 29.7 PG (ref 26.6–33)
MCHC RBC AUTO-ENTMCNC: 31.1 G/DL (ref 31.5–35.7)
MCV RBC AUTO: 95.6 FL (ref 79–97)
MONOCYTES # BLD AUTO: 1.39 10*3/MM3 (ref 0.1–0.9)
MONOCYTES NFR BLD AUTO: 4.6 % (ref 5–12)
NEUTROPHILS NFR BLD AUTO: 25.77 10*3/MM3 (ref 1.7–7)
NEUTROPHILS NFR BLD AUTO: 85 % (ref 42.7–76)
NITRITE UR QL STRIP: NEGATIVE
NRBC BLD AUTO-RTO: 0 /100 WBC (ref 0–0.2)
PH UR STRIP.AUTO: 5.5 [PH] (ref 4.5–8)
PLATELET # BLD AUTO: 155 10*3/MM3 (ref 140–450)
PMV BLD AUTO: 10.2 FL (ref 6–12)
POTASSIUM SERPL-SCNC: 4.3 MMOL/L (ref 3.5–5.2)
PROT SERPL-MCNC: 5.8 G/DL (ref 6–8.5)
PROT UR QL STRIP: NEGATIVE
RBC # BLD AUTO: 3.43 10*6/MM3 (ref 3.77–5.28)
SODIUM SERPL-SCNC: 138 MMOL/L (ref 136–145)
SP GR UR STRIP: 1.01 (ref 1–1.03)
UROBILINOGEN UR QL STRIP: ABNORMAL
WBC NRBC COR # BLD AUTO: 30.31 10*3/MM3 (ref 3.4–10.8)

## 2025-02-25 PROCEDURE — 96375 TX/PRO/DX INJ NEW DRUG ADDON: CPT

## 2025-02-25 PROCEDURE — 96368 THER/DIAG CONCURRENT INF: CPT

## 2025-02-25 PROCEDURE — G0498 CHEMO EXTEND IV INFUS W/PUMP: HCPCS

## 2025-02-25 PROCEDURE — 96367 TX/PROPH/DG ADDL SEQ IV INF: CPT

## 2025-02-25 PROCEDURE — 82378 CARCINOEMBRYONIC ANTIGEN: CPT | Performed by: INTERNAL MEDICINE

## 2025-02-25 PROCEDURE — 25010000002 FLUOROURACIL PER 500 MG: Performed by: INTERNAL MEDICINE

## 2025-02-25 PROCEDURE — 96411 CHEMO IV PUSH ADDL DRUG: CPT

## 2025-02-25 PROCEDURE — 25010000002 BEVACIZUMAB-ADCD 100 MG/4ML SOLUTION 4 ML VIAL: Performed by: INTERNAL MEDICINE

## 2025-02-25 PROCEDURE — 96413 CHEMO IV INFUSION 1 HR: CPT

## 2025-02-25 PROCEDURE — 36415 COLL VENOUS BLD VENIPUNCTURE: CPT | Performed by: INTERNAL MEDICINE

## 2025-02-25 PROCEDURE — 80053 COMPREHEN METABOLIC PANEL: CPT

## 2025-02-25 PROCEDURE — 25010000002 FOSAPREPITANT PER 1 MG: Performed by: INTERNAL MEDICINE

## 2025-02-25 PROCEDURE — 85025 COMPLETE CBC W/AUTO DIFF WBC: CPT

## 2025-02-25 PROCEDURE — 25010000002 PALONOSETRON PER 25 MCG: Performed by: INTERNAL MEDICINE

## 2025-02-25 PROCEDURE — 81003 URINALYSIS AUTO W/O SCOPE: CPT

## 2025-02-25 PROCEDURE — 25010000002 OXALIPLATIN PER 0.5 MG: Performed by: INTERNAL MEDICINE

## 2025-02-25 PROCEDURE — 96417 CHEMO IV INFUS EACH ADDL SEQ: CPT

## 2025-02-25 PROCEDURE — 25010000003 DEXTROSE 5 % SOLUTION 250 ML FLEX CONT: Performed by: INTERNAL MEDICINE

## 2025-02-25 PROCEDURE — 96415 CHEMO IV INFUSION ADDL HR: CPT

## 2025-02-25 PROCEDURE — 25810000003 SODIUM CHLORIDE 0.9 % SOLUTION: Performed by: INTERNAL MEDICINE

## 2025-02-25 PROCEDURE — 25010000002 DEXAMETHASONE SODIUM PHOSPHATE 100 MG/10ML SOLUTION: Performed by: INTERNAL MEDICINE

## 2025-02-25 PROCEDURE — 25010000002 LEUCOVORIN CALCIUM PER 50 MG: Performed by: INTERNAL MEDICINE

## 2025-02-25 PROCEDURE — 25810000003 SODIUM CHLORIDE 0.9 % SOLUTION 250 ML FLEX CONT: Performed by: INTERNAL MEDICINE

## 2025-02-25 RX ORDER — SODIUM CHLORIDE 9 MG/ML
20 INJECTION, SOLUTION INTRAVENOUS ONCE
Status: COMPLETED | OUTPATIENT
Start: 2025-02-25 | End: 2025-02-25

## 2025-02-25 RX ORDER — DEXTROSE MONOHYDRATE 50 MG/ML
20 INJECTION, SOLUTION INTRAVENOUS ONCE
Status: CANCELLED | OUTPATIENT
Start: 2025-02-25

## 2025-02-25 RX ORDER — HYDROCORTISONE SODIUM SUCCINATE 100 MG/2ML
100 INJECTION INTRAMUSCULAR; INTRAVENOUS AS NEEDED
Status: CANCELLED | OUTPATIENT
Start: 2025-02-25

## 2025-02-25 RX ORDER — PALONOSETRON 0.05 MG/ML
0.25 INJECTION, SOLUTION INTRAVENOUS ONCE
Status: COMPLETED | OUTPATIENT
Start: 2025-02-25 | End: 2025-02-25

## 2025-02-25 RX ORDER — FAMOTIDINE 10 MG/ML
20 INJECTION, SOLUTION INTRAVENOUS AS NEEDED
Status: CANCELLED | OUTPATIENT
Start: 2025-02-25

## 2025-02-25 RX ORDER — DIPHENHYDRAMINE HYDROCHLORIDE 50 MG/ML
50 INJECTION INTRAMUSCULAR; INTRAVENOUS AS NEEDED
Status: CANCELLED | OUTPATIENT
Start: 2025-02-25

## 2025-02-25 RX ORDER — DEXTROSE MONOHYDRATE 50 MG/ML
20 INJECTION, SOLUTION INTRAVENOUS ONCE
Status: COMPLETED | OUTPATIENT
Start: 2025-02-25 | End: 2025-02-25

## 2025-02-25 RX ORDER — PALONOSETRON 0.05 MG/ML
0.25 INJECTION, SOLUTION INTRAVENOUS ONCE
Status: CANCELLED | OUTPATIENT
Start: 2025-02-25

## 2025-02-25 RX ORDER — FLUOROURACIL 50 MG/ML
200 INJECTION, SOLUTION INTRAVENOUS ONCE
Status: COMPLETED | OUTPATIENT
Start: 2025-02-25 | End: 2025-02-25

## 2025-02-25 RX ORDER — FLUOROURACIL 50 MG/ML
200 INJECTION, SOLUTION INTRAVENOUS ONCE
Status: CANCELLED | OUTPATIENT
Start: 2025-02-25

## 2025-02-25 RX ORDER — SODIUM CHLORIDE 9 MG/ML
20 INJECTION, SOLUTION INTRAVENOUS ONCE
Status: CANCELLED | OUTPATIENT
Start: 2025-02-25

## 2025-02-25 RX ADMIN — FLUOROURACIL 1700 MG: 50 INJECTION, SOLUTION INTRAVENOUS at 15:36

## 2025-02-25 RX ADMIN — FLUOROURACIL 280 MG: 50 INJECTION, SOLUTION INTRAVENOUS at 15:36

## 2025-02-25 RX ADMIN — FOSAPREPITANT DIMEGLUMINE 100 ML: 150 INJECTION, POWDER, LYOPHILIZED, FOR SOLUTION INTRAVENOUS at 12:41

## 2025-02-25 RX ADMIN — LEUCOVORIN CALCIUM 280 MG: 350 INJECTION, POWDER, LYOPHILIZED, FOR SUSPENSION INTRAMUSCULAR; INTRAVENOUS at 13:46

## 2025-02-25 RX ADMIN — BEVACIZUMAB-ADCD 120 MG: 100 INJECTION, SOLUTION INTRAVENOUS at 13:12

## 2025-02-25 RX ADMIN — OXALIPLATIN 60 MG: 5 INJECTION, SOLUTION INTRAVENOUS at 13:47

## 2025-02-25 RX ADMIN — DEXTROSE MONOHYDRATE 20 ML/HR: 50 INJECTION, SOLUTION INTRAVENOUS at 13:46

## 2025-02-25 RX ADMIN — PALONOSETRON HYDROCHLORIDE 0.25 MG: 0.25 INJECTION INTRAVENOUS at 12:26

## 2025-02-25 RX ADMIN — DEXAMETHASONE SODIUM PHOSPHATE 12 MG: 10 INJECTION, SOLUTION INTRAMUSCULAR; INTRAVENOUS at 12:26

## 2025-02-25 RX ADMIN — SODIUM CHLORIDE 20 ML/HR: 9 INJECTION, SOLUTION INTRAVENOUS at 12:26

## 2025-02-25 NOTE — PROGRESS NOTES
[facility]    CONSULTING IN BLOOD DISORDERS & CANCER      REASON FOR CONSULTATION/CHIEF COMPLAINT:     Evaluation and management for metastatic colorectal cancer                             REQUESTING PHYSICIAN: No ref. provider found  RECORDS OBTAINED:  Records of the patients history including those from the electronic medical record were reviewed and summarized in detail.    HISTORY OF PRESENT ILLNESS:    The patient is a 89 y.o. year old female with medical history significant for CAD s/p CABG x 3 (9097, 2007), PCI's x 16, pacemaker, peptic ulcer disease, hypertension and dyslipidemia comes for metastatic colorectal cancer evaluation and management.    Patient had presented to her PCP,  in early October 2024 with lower abdominal pain and weight loss of > 20 pounds.  A CT A/P performed 10/9/2024 noted innumerable lung nodules, multiple hypodense masses in the liver and a large mass in the hepatic flexure of the colon, measuring 4.5 cm in greatest dimension.    Patient underwent CT-guided liver lesion biopsy on 10/28/2024.  Pathology: Metastatic adenocarcinoma of colorectal origin.    Patient was referred to hematology/oncology for further evaluation and management.  She was first seen in this clinic on 11//24.  Accompanied by 3 of 4 daughters.  Patient denies any major GI issues.  No blood in stool or altered bowel habits.  She does take stool softeners for regular bowel movements.  No nausea or vomiting.  No family history of colorectal cancer.    11/5/2024: PET/CT demonstrates hypermetabolic lesions in the colonic hepatic flexure, pulmonary, hepatic, osseous and zenaida metastasis.  Colonoscopy did reveal hepatic flexure mass, partially obstructing.    11/21/2024: Cycle 1 FOLFOX    12/5/2024: Was seen for cycle 2 FOLFOX, however noted to have severe neutropenia with WBC count of 0.52 and ANC of 0.08.  Chemotherapy was held and received Neupogen x 2 doses.       Patient has DPYD intermediate mutation.   Given prolonged neutropenia, age, and presence of DPYD mutation-plan made to administer 50% dose of FOLFOX with G-CSF support in the form of Neulasta on pro.    CT scans after 4 cycles show excellent response to treatment    INTERIM HISTORY:  Patient returns today for follow-up for cycle 7 of FOLFOX.  Continued with Neulasta.    She is accompanied by her son.  Patient reports of tolerating the last dose of chemotherapy fairly well.  She had received dose reduced FOLFOX-Avastin.  She was tired and had loose stools for few days.  The fatigue seem to have persisted longer with this treatment.  She completed the antibiotics for UTI.  Still urinates every 2-3 hours.  Encouraged to maintain at adequate hygiene after urination and bowel movements.  Appetite has been decent, although she has lost few pounds since last visit.  She remains in a wheelchair and gets short of breath quickly.      Patient had not Avastin with cycle 4 and 5 due to excessive fatigue, however resumed at 50% dose with cycle 6.      She denies any bleeding or dark stools.  She denies any pain.  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  Patient's family (rightly so) is worried about her ability to drive and do all the physical activities which she desires to do.    Past Medical History:   Diagnosis Date    Anemia     Arthritis of left sacroiliac joint 05/05/2022    CKD (chronic kidney disease), stage III     PATIENT STATES IT IS AT STAGE 4 AT THIS TIME    Colorectal cancer     Coronary artery disease     cath 11/15. CABG x3 and all grafts widely patent. Very small OM and Cfx and that may be the cause of lateral ischemia on stress test. Non bipassable    Diabetes mellitus, type II     GERD (gastroesophageal reflux disease)     Gout     Heart attack 10/2022    MULTIPLE (6 OR MORE)    History of DVT in adulthood     AFTER CHILDBIRTH    Hyperlipidemia     Hypertension     Liver  masses     Pacemaker     PUD (peptic ulcer disease)      Past Surgical History:   Procedure Laterality Date    CARDIAC ELECTROPHYSIOLOGY PROCEDURE N/A 02/22/2017    Procedure: Pacemaker DC new;  Surgeon: Juvenal Zhong MD;  Location: [facility] CATH INVASIVE LOCATION;  Service:     CARDIAC SURGERY  10/2022    CATARACT EXTRACTION      COLONOSCOPY  2010    COLONOSCOPY N/A 11/19/2024    Procedure: COLONOSCOPY WITH TATTOO MASS INJECTION 4 ML;  Surgeon: Caden Christian MD;  Location:  [facility];  Service: General;  Laterality: N/A;    CORONARY ARTERY BYPASS GRAFT      1992, 2009    EPIDURAL Left 10/17/2022    Procedure: LUMBAR/SACRAL TRANSFORAMINAL EPIDURAL Left L4-5 and left L5-S1 - hold plavix 7 days before;  Surgeon: Yasmine Gaming MD;  [facility];  Service: Pain Management;  Laterality: Left;    PACEMAKER IMPLANTATION      PIRIFORMUS INJECTION Left 07/15/2022    Procedure: PIRIFORMIS INJECTION;  Surgeon: Yasmine Gaming MD;  [facility];  Service: Pain Management;  Laterality: Left;    RADIOFREQUENCY ABLATION Left 02/20/2023    Procedure: RADIOFREQUENCY ABLATION NERVES for the left sacroiliac joint;  Surgeon: Yasmine Gaming MD;  [facility];  Service: Pain Management;  Laterality: Left;    SACROILIAC JOINT INJECTION Left 05/09/2022    Procedure: SACROILIAC INJECTION - Left;  Surgeon: Yasmine Gaming MD;  [facility];  Service: Pain Management;  Laterality: Left;    SACROILIAC JOINT INJECTION Left 07/15/2022    Procedure: left Sacroiliac joint injection and left piriformis injection;  Surgeon: Yasmine Gaming MD;  [facility];  Service: Pain Management;  Laterality: Left;    TUBAL ABDOMINAL LIGATION      VENOUS ACCESS DEVICE (PORT) INSERTION N/A 11/19/2024    Procedure: INSERTION VENOUS ACCESS DEVICE;  Surgeon: Caden Christian MD;  Location:  [facility];  Service: General;  Laterality: N/A;       MEDICATIONS    Current Outpatient  Medications:     aspirin 81 MG EC tablet, Take 1 tablet by mouth Daily. HOLD FOR SURGERY PER MD ORDERS, Disp: , Rfl:     cefdinir (OMNICEF) 300 MG capsule, Take 1 capsule by mouth 2 (Two) Times a Day., Disp: 14 capsule, Rfl: 0    clopidogrel (PLAVIX) 75 MG tablet, Take 1 tablet by mouth Daily. HOLD FOR SURGERY PER MD ORDERS, Disp: , Rfl:     dapagliflozin Propanediol (Farxiga) 10 MG tablet, TAKE 1 TABLET BY MOUTH DAILY, Disp: 90 tablet, Rfl: 1    glipizide (GLUCOTROL) 5 MG tablet, TAKE 2 TABLETS BY MOUTH EVERY MORNING WITH BREAKFAST AND TAKE ONE TABLET BY MOUTH EVERY EVENING, Disp: 270 tablet, Rfl: 1    hydrALAZINE (APRESOLINE) 25 MG tablet, TAKE 1 TABLET BY MOUTH TWICE A DAY, Disp: 180 tablet, Rfl: 1    hydroCHLOROthiazide 12.5 MG tablet, Take 1 tablet by mouth Daily., Disp: 90 tablet, Rfl: 1    lidocaine-prilocaine (EMLA) 2.5-2.5 % cream, Apply 1 Application topically to the appropriate area as directed Every 2 (Two) Hours As Needed for Mild Pain., Disp: 30 g, Rfl: 3    metFORMIN (GLUCOPHAGE) 500 MG tablet, Take 2 tablets by mouth 2 (Two) Times a Day With Meals., Disp: 360 tablet, Rfl: 1    metoprolol tartrate (LOPRESSOR) 50 MG tablet, TAKE 2 TABLETS BY MOUTH TWICE A DAY, Disp: 360 tablet, Rfl: 1    multivitamin with minerals (CENTRUM SILVER 50+WOMEN PO), Take 1 tablet by mouth Daily. HOLD FOR SURGERY, Disp: , Rfl:     nitroglycerin (NITROSTAT) 0.4 MG SL tablet, 1 tablet., Disp: , Rfl:     OLANZapine (zyPREXA) 5 MG tablet, Take 0.5 tablets by mouth Every Night., Disp: 30 tablet, Rfl: 1    ondansetron (ZOFRAN) 8 MG tablet, Take 1 tablet by mouth 3 (Three) Times a Day As Needed for Nausea or Vomiting., Disp: 30 tablet, Rfl: 5    potassium chloride (Klor-Con M20) 20 MEQ CR tablet, TAKE 1 TABLET BY MOUTH DAILY, Disp: 30 tablet, Rfl: 0    rosuvastatin (Crestor) 40 MG tablet, Take 1 tablet by mouth Daily., Disp: 90 tablet, Rfl: 3    sulfamethoxazole-trimethoprim (BACTRIM DS,SEPTRA DS) 800-160 MG per tablet, Take 1  "tablet by mouth 2 (Two) Times a Day., Disp: 6 tablet, Rfl: 0    ALLERGIES:     Allergies   Allergen Reactions    Pneumococcal Vaccines Paresthesia       SOCIAL HISTORY:       Social History     Socioeconomic History    Marital status:    Tobacco Use    Smoking status: Never     Passive exposure: Never    Smokeless tobacco: Never   Vaping Use    Vaping status: Never Used   Substance and Sexual Activity    Alcohol use: Never    Drug use: Never         FAMILY HISTORY:  Family History   Problem Relation Age of Onset    Coronary artery disease Mother     Heart attack Sister     Liseth Hyperthermia Neg Hx             Vitals:    02/25/25 1126   BP: 132/70   Pulse: 75   Resp: 17   Temp: 98.2 °F (36.8 °C)   TempSrc: Oral   SpO2: 97%   Weight: 44.3 kg (97 lb 11.2 oz)   Height: 152.4 cm (60\")   PainSc: 0-No pain             2/25/2025    11:28 AM   Current Status   ECOG score 1      PHYSICAL EXAM:    CONSTITUTIONAL:  Vital signs reviewed.  No distress, looks comfortable.  Seated in wheelchair today.  EYES:  Conjunctiva and lids unremarkable.   EARS,NOSE,MOUTH,THROAT:  Ears and nose appear unremarkable.  Lips, teeth, gums appear unremarkable.  RESPIRATORY:  Normal respiratory effort.  Lungs clear to auscultation bilaterally.  CARDIOVASCULAR:  Normal S1, S2.  No murmurs rubs or gallops.  No significant lower extremity edema.  GASTROINTESTINAL: Abdomen appears unremarkable.  Nondistended  LYMPHATIC:  No cervical, supraclavicular lymphadenopathy.  NEURO: AAOx3, no focal deficits.  Appears to have equal strength all 4 extremities.  MUSCULOSKELETAL:  Unremarkable digits/nails.  No cyanosis or clubbing.  No apparent joint deformities.  SKIN:  Warm.  No rashes.  PSYCHIATRIC:  Normal judgment and insight.  Normal mood and affect.     RECENT LABS:  Results from last 7 days   Lab Units 02/25/25  1108   WBC 10*3/mm3 30.31*   NEUTROS ABS 10*3/mm3 25.77*   HEMOGLOBIN g/dL 10.2*   HEMATOCRIT % 32.8*   PLATELETS 10*3/mm3 155 "       ASSESSMENT:  Patient is a pleasant 89-year-old female with medical history significant for CAD s/p CABG x 3 (1997, 2007), PCI's x 16, pacemaker, peptic ulcer disease, hypertension and dyslipidemia comes for metastatic colorectal cancer evaluation and management.    # Metastatic colorectal cancer with liver and lung metastasis, right sided:  Patient had presented to her PCP,  in early October 2024 with lower abdominal pain and weight loss of > 20 pounds.    A CT A/P performed 10/9/2024 noted innumerable lung nodules, multiple hypodense masses in the liver and a large mass in the hepatic flexure of the colon, measuring 4.5 cm in greatest dimension.  CT-guided liver lesion biopsy on 10/28/2024. Pathology: Metastatic adenocarcinoma of colorectal origin.  CEA 5386.0 (10/21/2024)  I reviewed the imaging and pathology findings with patient and her family in detail.  Informed that she has been diagnosed with metastatic colorectal cancer with diffuse metastasis.  Reviewed  natural history, prognosis and palliative treatment options.  Patient is interested in receiving cancer directed therapy.  With her age and significant cardiovascular medical comorbidity-treatment with palliative chemotherapy would be challenging.  [facility] NGS shows MSI stable.  Low TMB.  No NRAS/KRAS/BRAF mutations. [id number] liquid NGS Shows MSI stable.  SHANE mutation, FGFR 1 and EGFR gene amplifications.   [facility] germline mutation testing pending  11/21/2024: Cycle 1 FOLFOX.    12/5/2024: Was seen for cycle 2 FOLFOX, however noted to have severe neutropenia with WBC count of 0.52 and ANC of 0.08.  Chemotherapy was held and received Neupogen x 2 doses.     Patient has DPYD intermediate mutation.  Given prolonged neutropenia, age and presence of DPYD mutation-plan made to administer 50% dose of FOLFOX with G-CSF support.   12/31/2024: Received cycle 3 FOLFOX with bevacizumab.    1/14/2025: Received cycle 4 FOLFOX without Avastin (held due to  significant fatigue).  CT scans after 4 cycles showed excellent response to treatment  [id number] from February 2025 shows downward trending ctDNA levels  2/25/2025: Tolerated the last dose of chemotherapy fairly well, although had prolonged fatigue.  Labs today show leukocytosis of 30.31-neutrophilic, anemia of 10.2 and mild elevation in serum creatinine of 1.01.  UA with excessive glucose, however is negative for any evidence of UTI.  She recently completed antibiotic course for UTI.  No current signs/symptoms of infection.  Suspect leukocytosis related to G-CSF.  Reviewed recent [id number] results which show excellent response.  Patient had received an EOB from [facility] and is worried about payment.  Will discuss with [facility]'s team if patient receives a bill.  Given prolonged fatigue with last cycle, plan made to proceed with cycle 7 FOLFOX (50% dose) along with Avastin (50% dose) and increase the gap to once every 3 weeks moving forward.  Follow up in 3 weeks or sooner if needed.  Plan to repeat CT scans again in mid April 2025.    # Leukocytosis:  WBC count of 23.0 on 2/11/2025.  No clear signs or symptoms of infections.  Unclear whether Neulasta related versus UTI.  Urine cultures grew E. coli.  She received Omnicef.  2/25/2025: Persistent leukocytosis of 30,000.  UA is negative.  I suspect this to be G-CSF related rather than underlying evidence of infection.  Will closely monitor.    # Anemia:  Hemoglobin 11.3 g/dL on 11/4/24.  Iron profile most consistent with iron deficiency.  11/21/2024: Patient reports of constipation.  Has poor tolerance to oral iron.    11/21/2024: Received IV Injectafer 700 mg x 1 dose  12/31/2024 Hgb 10.5  1/14/2025 hemoglobin 10.8    # CAD s/p CABG (1997 and 2007) and PCI x 16:   Follows up with , cardiology at the [facility]  On aspirin 81 mg and Plavix 75 mg daily    # DM2:  Currently on Farxiga, glipizide and metformin.    Denies any neuropathy.  Per patient,  last A1c was 8.6.  Being managed by PCP    PLAN:   -Cycle 7 FOLFOX at the same dose as previous (50% of the original dose)   -Administer Avastin (50% dose) today  -Continue Neulasta on pro placed on day 3 following 5-FU unhook.  -Plan to administer treatment every 3 weeks moving forward  -PT ongoing per family request (once a week at home). 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  -Plan to repeat [id number] every 6 weeks, if insurance coverage is not an issue.  Otherwise discontinue  -Return in 3 weeks for follow-up and potential continuation of FOLFOX/ +/-bevacizumab due to tolerance issues.      Orders Placed This Encounter   Procedures    Comprehensive metabolic panel     Standing Status:   Future     Number of Occurrences:   1     Standing Expiration Date:   2/25/2026     Order Specific Question:   Release to patient     Answer:   Routine Release [[id number]]    Urinalysis without microscopic (no culture) - Urine, Clean Catch     Standing Status:   Future     Number of Occurrences:   1     Standing Expiration Date:   2/25/2026     Order Specific Question:   Release to patient     Answer:   Routine Release [[id number]]    CBC and Differential     Standing Status:   Future     Number of Occurrences:   1     Standing Expiration Date:   2/25/2026     Order Specific Question:   Manual Differential     Answer:   No     Order Specific Question:   Release to patient     Answer:   Routine Release [[id number]]   Total time spent during this patient encounter is 41 minutes. The total time spent with the patient includes: preparing to see the patient by reviewing of tests, prior notes or other relevant information, performing appropriate independent examination & evaluation, counseling, ordering of medications, tests or procedures, documenting clinic information in the electronic medical records or other health records, independently interpreting results of tests  and communicating the results to the patient/family or caregiver.

## 2025-02-25 NOTE — PROGRESS NOTES
"OUTPATIENT ONCOLOGY NUTRITION     Patient Name: Tamie Peter  YOB: 1935  MRN: [id number]  Assessment Date: 2/25/2025    COMMENTS:  Follow up.  Pt is receiving her FOLFOX and Avastin infusions today (#7).  Labs/Meds reviewed. Glu 167, WBC 30.3, ANC 25.8, Hgb 10.2. Weight down to 97bs    Met with pt today during her treatment.  She reports she has nausea at day 4 and then starts feeling pretty good. Her po intake and appetite are not great, resulting in 4 lb loss. She still eats very small portions, 2 meals a day but her dinner intake is not great, sometimes skips it. We discussed potential dinner options that are easy to prepare that won't sit \"heavy\" on her stomach. (Her family will bring her a burger and fries and she states it's too much on her stomach in the evening). Also discussed calorie and protein dense snack options, handout provided.    Will follow throughout treatment course and be available as needed. Pt very appreciative of visit.        Reason for Assessment Follow up     Diagnosis/Problem   Metastatic colon cancer   Treatment Plan Chemotherapy FOLFOX, Avastin   Frequency Q 2 weeks x 12 cycles   Goal of cancer treatment Palliative   Comments:        Encounter Information        Nutrition/Diet History:  B- Oatmeal, L- Soup/sandwich (sometimes), D- doesn't like anything heavy, family will bring things.   Oral Nutrition Supplements:  Doesn't tolerate them - makes her sick to her stomach   Factors/Symptoms Affecting Intake: Decreased appetite, Fatigue, Weight loss   Comments:      Medical/Surgical History Past Medical History:   Diagnosis Date    Anemia     Arthritis of left sacroiliac joint 05/05/2022    CKD (chronic kidney disease), stage III     PATIENT STATES IT IS AT STAGE 4 AT THIS TIME    Colorectal cancer     Coronary artery disease     cath 11/15. CABG x3 and all grafts widely patent. Very small OM and Cfx and that may be the cause of lateral ischemia on stress test. Non " bipassable    Diabetes mellitus, type II     GERD (gastroesophageal reflux disease)     Gout     Heart attack 10/2022    MULTIPLE (6 OR MORE)    History of DVT in adulthood     AFTER CHILDBIRTH    Hyperlipidemia     Hypertension     Liver masses     Pacemaker     PUD (peptic ulcer disease)        Past Surgical History:   Procedure Laterality Date    CARDIAC ELECTROPHYSIOLOGY PROCEDURE N/A 02/22/2017    Procedure: Pacemaker DC new;  Surgeon: Juvenal Zhong MD;  Location:  [facility] CATH INVASIVE LOCATION;  Service:     CARDIAC SURGERY  10/2022    CATARACT EXTRACTION      COLONOSCOPY  2010    COLONOSCOPY N/A 11/19/2024    Procedure: COLONOSCOPY WITH TATTOO MASS INJECTION 4 ML;  Surgeon: Caden Christian MD;  Location:  [facility];  Service: General;  Laterality: N/A;    CORONARY ARTERY BYPASS GRAFT      1992, 2009    EPIDURAL Left 10/17/2022    Procedure: LUMBAR/SACRAL TRANSFORAMINAL EPIDURAL Left L4-5 and left L5-S1 - hold plavix 7 days before;  Surgeon: Yasmine Gaming MD;  [facility];  Service: Pain Management;  Laterality: Left;    PACEMAKER IMPLANTATION      PIRIFORMUS INJECTION Left 07/15/2022    Procedure: PIRIFORMIS INJECTION;  Surgeon: Yasmine Gaming MD;  [facility];  Service: Pain Management;  Laterality: Left;    RADIOFREQUENCY ABLATION Left 02/20/2023    Procedure: RADIOFREQUENCY ABLATION NERVES for the left sacroiliac joint;  Surgeon: Yasmine Gaming MD;  [facility];  Service: Pain Management;  Laterality: Left;    SACROILIAC JOINT INJECTION Left 05/09/2022    Procedure: SACROILIAC INJECTION - Left;  Surgeon: Yasmine Gaming MD;  [facility];  Service: Pain Management;  Laterality: Left;    SACROILIAC JOINT INJECTION Left 07/15/2022    Procedure: left Sacroiliac joint injection and left piriformis injection;  Surgeon: Yasmine Gaming MD;  [facility];  Service: Pain Management;  Laterality: Left;    TUBAL ABDOMINAL LIGATION       "VENOUS ACCESS DEVICE (PORT) INSERTION N/A 11/19/2024    Procedure: INSERTION VENOUS ACCESS DEVICE;  Surgeon: Caden Christian MD;  Location: [facility];  Service: General;  Laterality: N/A;             Anthropometrics        Current Height Ht Readings from Last 1 Encounters:   02/25/25 152.4 cm (60\")      Current Weight Wt Readings from Last 1 Encounters:   02/25/25 44.3 kg (97 lb 11.2 oz)      BMI  19.1   Ideal Body Weight (IBW) 100lb   Usual Body Weight (UBW) 120lb   Weight Change/Trend Loss   Weight History Wt Readings from Last 30 Encounters:   02/25/25 1126 44.3 kg (97 lb 11.2 oz)   02/11/25 1025 45.9 kg (101 lb 3.2 oz)   01/28/25 0905 46.2 kg (101 lb 14.4 oz)   01/14/25 0858 46.7 kg (103 lb)   12/31/24 1034 46.8 kg (103 lb 3.2 oz)   12/10/24 0829 45.9 kg (101 lb 4.8 oz)   12/06/24 0802 46 kg (101 lb 6.4 oz)   12/05/24 0916 46.4 kg (102 lb 3.2 oz)   11/21/24 0931 47.3 kg (104 lb 4.8 oz)   11/13/24 1422 46.3 kg (102 lb)   11/13/24 1344 46.7 kg (103 lb)   11/07/24 0802 47.2 kg (104 lb)   11/04/24 0932 47.8 kg (105 lb 6.4 oz)   10/28/24 1013 49.4 kg (109 lb)   10/21/24 0952 49.4 kg (109 lb)   09/24/24 1004 51.7 kg (114 lb)   06/06/24 1007 52.2 kg (115 lb)   03/07/24 0902 54 kg (119 lb)   12/07/23 1011 54.4 kg (120 lb)   09/19/23 0941 55.3 kg (122 lb)   06/06/23 0900 54.9 kg (121 lb)   05/18/23 1523 52.7 kg (116 lb 3.2 oz)   04/04/23 0943 54.3 kg (119 lb 12.8 oz)   03/07/23 1416 54.4 kg (120 lb)   02/20/23 1053 52.2 kg (115 lb)   02/15/23 1428 52.2 kg (115 lb)   01/26/23 0917 55.2 kg (121 lb 12.8 oz)   12/13/22 0753 54.2 kg (119 lb 6.4 oz)   12/06/22 0922 53.1 kg (117 lb)   12/02/22 0938 52.2 kg (115 lb)   10/17/22 1229 56.2 kg (124 lb)          Medications           Current medications: OLANZapine, aspirin, cefdinir, clopidogrel, dapagliflozin Propanediol, glipizide, hydrALAZINE, hydroCHLOROthiazide, lidocaine-prilocaine, metFORMIN, metoprolol tartrate, multivitamin with minerals, nitroglycerin, " "ondansetron, potassium chloride, rosuvastatin, and sulfamethoxazole-trimethoprim                 Tests/Procedures        Tests/Procedures Chemotherapy     Labs       Pertinent Labs    Results from last 7 days   Lab Units 02/25/25  1108   SODIUM mmol/L 138   POTASSIUM mmol/L 4.3   CHLORIDE mmol/L 108*   CO2 mmol/L 19.0*   BUN mg/dL 19   CREATININE mg/dL 1.01*   CALCIUM mg/dL 9.1   BILIRUBIN mg/dL 0.2   ALK PHOS U/L 247*   ALT (SGPT) U/L 14   AST (SGOT) U/L 23   GLUCOSE mg/dL 167*     Results from last 7 days   Lab Units 02/25/25  1108   HEMOGLOBIN g/dL 10.2*   HEMATOCRIT % 32.8*   WBC 10*3/mm3 30.31*   ALBUMIN g/dL 3.3*     Results from last 7 days   Lab Units 02/25/25  1108   PLATELETS 10*3/mm3 155     No results found for: \"COVID19\"  Lab Results   Component Value Date    HGBA1C 8.3 (H) 09/23/2024          Physical Findings        Physical Appearance alert, loss of muscle mass, loss of subcutaneous fat, oriented     Edema  no edema   Gastrointestinal None   Tubes/Lines/Drains Implantable Port   Oral/Mouth Cavity WNL   Skin Intact       Estimated/Assessed Needs        Energy Requirements    Height for Calculation      Weight for Calculation 47.3 kg   Method for Estimation  35 kcal/kg   EST Needs (kcal/day) 1655 kcals per day       Protein Requirements    Weight for Calculation 47.3 kg   EST Protein Needs (g/kg) 1.2 gm/kg   EST Daily Needs (g/day) 56 gms per day       Fluid Requirements     Method for Estimation 1 mL/kcal    Estimated Needs (mL/day) 1500 mLs per day         NUTRITION INTERVENTION / PLAN OF CARE      Intervention Goal(s) Maintain nutrition status, Reduce/improve symptoms, Provide information, Meet estimated needs, Disease management/therapy, Tolerate PO , Increase intake, and Appropriate weight gain         RD Intervention/Action Encouraged intake, Follow Tx progress, Recommended/ordered         Recommendations:       PO Diet Consume calorie and protein dense foods, 6 small meals      Supplements       " Snacks       Other    New Prescription Ordered? No, recommend   --      Monitor/Evaluation Follow up as needed   Education Education provided   --    Electronically signed by:  Leann Shabazz RD  02/25/25 14:08 EST

## 2025-02-27 ENCOUNTER — INFUSION (OUTPATIENT)
Dept: ONCOLOGY | Facility: HOSPITAL | Age: 89
End: 2025-02-27
Payer: MEDICARE

## 2025-02-27 DIAGNOSIS — C78.7 METASTATIC COLON CANCER TO LIVER: Primary | ICD-10-CM

## 2025-02-27 DIAGNOSIS — C18.9 METASTATIC COLON CANCER TO LIVER: Primary | ICD-10-CM

## 2025-02-27 DIAGNOSIS — Z45.2 ENCOUNTER FOR ADJUSTMENT OR MANAGEMENT OF VASCULAR ACCESS DEVICE: ICD-10-CM

## 2025-02-27 PROCEDURE — 96377 APPLICATON ON-BODY INJECTOR: CPT

## 2025-02-27 PROCEDURE — 25010000002 PEGFILGRASTIM 6 MG/0.6ML PREFILLED SYRINGE KIT: Performed by: INTERNAL MEDICINE

## 2025-02-27 PROCEDURE — 25010000002 HEPARIN LOCK FLUSH PER 10 UNITS: Performed by: INTERNAL MEDICINE

## 2025-02-27 RX ORDER — HEPARIN SODIUM (PORCINE) LOCK FLUSH IV SOLN 100 UNIT/ML 100 UNIT/ML
500 SOLUTION INTRAVENOUS AS NEEDED
Status: DISCONTINUED | OUTPATIENT
Start: 2025-02-27 | End: 2025-02-27 | Stop reason: HOSPADM

## 2025-02-27 RX ORDER — SODIUM CHLORIDE 0.9 % (FLUSH) 0.9 %
10 SYRINGE (ML) INJECTION AS NEEDED
Status: DISCONTINUED | OUTPATIENT
Start: 2025-02-27 | End: 2025-02-27 | Stop reason: HOSPADM

## 2025-02-27 RX ORDER — SODIUM CHLORIDE 0.9 % (FLUSH) 0.9 %
10 SYRINGE (ML) INJECTION AS NEEDED
OUTPATIENT
Start: 2025-02-27

## 2025-02-27 RX ORDER — HEPARIN SODIUM (PORCINE) LOCK FLUSH IV SOLN 100 UNIT/ML 100 UNIT/ML
500 SOLUTION INTRAVENOUS AS NEEDED
OUTPATIENT
Start: 2025-02-27

## 2025-02-27 RX ADMIN — Medication 500 UNITS: at 13:35

## 2025-02-27 RX ADMIN — PEGFILGRASTIM 6 MG: KIT SUBCUTANEOUS at 13:34

## 2025-02-27 RX ADMIN — Medication 10 ML: at 13:34

## 2025-03-18 ENCOUNTER — CLINICAL SUPPORT (OUTPATIENT)
Dept: OTHER | Facility: HOSPITAL | Age: 89
End: 2025-03-18
Payer: MEDICARE

## 2025-03-18 ENCOUNTER — INFUSION (OUTPATIENT)
Dept: ONCOLOGY | Facility: HOSPITAL | Age: 89
End: 2025-03-18
Payer: MEDICARE

## 2025-03-18 ENCOUNTER — OFFICE VISIT (OUTPATIENT)
Dept: ONCOLOGY | Facility: CLINIC | Age: 89
End: 2025-03-18
Payer: MEDICARE

## 2025-03-18 VITALS
RESPIRATION RATE: 16 BRPM | DIASTOLIC BLOOD PRESSURE: 68 MMHG | OXYGEN SATURATION: 98 % | HEART RATE: 64 BPM | HEIGHT: 60 IN | TEMPERATURE: 98.1 F | SYSTOLIC BLOOD PRESSURE: 133 MMHG | BODY MASS INDEX: 18.91 KG/M2 | WEIGHT: 96.3 LBS

## 2025-03-18 DIAGNOSIS — R74.8 LIVER ENZYME ELEVATION: ICD-10-CM

## 2025-03-18 DIAGNOSIS — R63.4 WEIGHT LOSS, UNINTENTIONAL: ICD-10-CM

## 2025-03-18 DIAGNOSIS — C78.7 METASTATIC COLON CANCER TO LIVER: ICD-10-CM

## 2025-03-18 DIAGNOSIS — C18.9 METASTATIC COLON CANCER TO LIVER: ICD-10-CM

## 2025-03-18 DIAGNOSIS — C18.9 METASTATIC COLON CANCER TO LIVER: Primary | ICD-10-CM

## 2025-03-18 DIAGNOSIS — C18.9 CARCINOMA OF COLON METASTATIC TO LUNG: ICD-10-CM

## 2025-03-18 DIAGNOSIS — C78.7 METASTATIC COLON CANCER TO LIVER: Primary | ICD-10-CM

## 2025-03-18 DIAGNOSIS — C78.00 CARCINOMA OF COLON METASTATIC TO LUNG: ICD-10-CM

## 2025-03-18 DIAGNOSIS — Z45.2 ENCOUNTER FOR ADJUSTMENT OR MANAGEMENT OF VASCULAR ACCESS DEVICE: ICD-10-CM

## 2025-03-18 DIAGNOSIS — Z79.899 HIGH RISK MEDICATION USE: ICD-10-CM

## 2025-03-18 LAB
ALBUMIN SERPL-MCNC: 3.3 G/DL (ref 3.5–5.2)
ALBUMIN/GLOB SERPL: 1.3 G/DL
ALP SERPL-CCNC: 195 U/L (ref 39–117)
ALT SERPL W P-5'-P-CCNC: 74 U/L (ref 1–33)
ANION GAP SERPL CALCULATED.3IONS-SCNC: 14.6 MMOL/L (ref 5–15)
AST SERPL-CCNC: 94 U/L (ref 1–32)
BACTERIA UR QL AUTO: ABNORMAL /HPF
BASOPHILS # BLD AUTO: 0.08 10*3/MM3 (ref 0–0.2)
BASOPHILS NFR BLD AUTO: 0.5 % (ref 0–1.5)
BILIRUB SERPL-MCNC: 0.2 MG/DL (ref 0–1.2)
BILIRUB UR QL STRIP: NEGATIVE
BILIRUB UR QL STRIP: NEGATIVE
BUN SERPL-MCNC: 25 MG/DL (ref 8–23)
BUN/CREAT SERPL: 22.7 (ref 7–25)
CALCIUM SPEC-SCNC: 9.1 MG/DL (ref 8.6–10.5)
CHLORIDE SERPL-SCNC: 104 MMOL/L (ref 98–107)
CLARITY UR: ABNORMAL
CLARITY UR: CLEAR
CO2 SERPL-SCNC: 18.4 MMOL/L (ref 22–29)
COLOR UR: YELLOW
COLOR UR: YELLOW
CREAT SERPL-MCNC: 1.1 MG/DL (ref 0.57–1)
DEPRECATED RDW RBC AUTO: 83.7 FL (ref 37–54)
EGFRCR SERPLBLD CKD-EPI 2021: 48.7 ML/MIN/1.73
EOSINOPHIL # BLD AUTO: 0.15 10*3/MM3 (ref 0–0.4)
EOSINOPHIL NFR BLD AUTO: 0.9 % (ref 0.3–6.2)
ERYTHROCYTE [DISTWIDTH] IN BLOOD BY AUTOMATED COUNT: 22.1 % (ref 12.3–15.4)
GLOBULIN UR ELPH-MCNC: 2.6 GM/DL
GLUCOSE SERPL-MCNC: 285 MG/DL (ref 65–99)
GLUCOSE UR STRIP-MCNC: ABNORMAL MG/DL
GLUCOSE UR STRIP-MCNC: ABNORMAL MG/DL
HCT VFR BLD AUTO: 32.9 % (ref 34–46.6)
HGB BLD-MCNC: 10.6 G/DL (ref 12–15.9)
HGB UR QL STRIP.AUTO: NEGATIVE
HGB UR QL STRIP.AUTO: NEGATIVE
HYALINE CASTS UR QL AUTO: ABNORMAL /LPF
IMM GRANULOCYTES # BLD AUTO: 0.18 10*3/MM3 (ref 0–0.05)
IMM GRANULOCYTES NFR BLD AUTO: 1.1 % (ref 0–0.5)
KETONES UR QL STRIP: NEGATIVE
KETONES UR QL STRIP: NEGATIVE
LEUKOCYTE ESTERASE UR QL STRIP.AUTO: ABNORMAL
LEUKOCYTE ESTERASE UR QL STRIP.AUTO: ABNORMAL
LYMPHOCYTES # BLD AUTO: 1.96 10*3/MM3 (ref 0.7–3.1)
LYMPHOCYTES NFR BLD AUTO: 12 % (ref 19.6–45.3)
MCH RBC QN AUTO: 32.3 PG (ref 26.6–33)
MCHC RBC AUTO-ENTMCNC: 32.2 G/DL (ref 31.5–35.7)
MCV RBC AUTO: 100.3 FL (ref 79–97)
MONOCYTES # BLD AUTO: 0.75 10*3/MM3 (ref 0.1–0.9)
MONOCYTES NFR BLD AUTO: 4.6 % (ref 5–12)
NEUTROPHILS NFR BLD AUTO: 13.15 10*3/MM3 (ref 1.7–7)
NEUTROPHILS NFR BLD AUTO: 80.9 % (ref 42.7–76)
NITRITE UR QL STRIP: NEGATIVE
NITRITE UR QL STRIP: POSITIVE
NRBC BLD AUTO-RTO: 0 /100 WBC (ref 0–0.2)
PH UR STRIP.AUTO: 5.5 [PH] (ref 4.5–8)
PH UR STRIP.AUTO: 5.5 [PH] (ref 4.5–8)
PLATELET # BLD AUTO: 195 10*3/MM3 (ref 140–450)
PMV BLD AUTO: 9.4 FL (ref 6–12)
POTASSIUM SERPL-SCNC: 4.5 MMOL/L (ref 3.5–5.2)
PROT SERPL-MCNC: 5.9 G/DL (ref 6–8.5)
PROT UR QL STRIP: NEGATIVE
PROT UR QL STRIP: NEGATIVE
RBC # BLD AUTO: 3.28 10*6/MM3 (ref 3.77–5.28)
RBC # UR STRIP: ABNORMAL /HPF
REF LAB TEST METHOD: ABNORMAL
SODIUM SERPL-SCNC: 137 MMOL/L (ref 136–145)
SP GR UR STRIP: 1.01 (ref 1–1.03)
SP GR UR STRIP: 1.01 (ref 1–1.03)
SQUAMOUS #/AREA URNS HPF: ABNORMAL /HPF
UROBILINOGEN UR QL STRIP: ABNORMAL
UROBILINOGEN UR QL STRIP: ABNORMAL
WBC # UR STRIP: ABNORMAL /HPF
WBC NRBC COR # BLD AUTO: 16.27 10*3/MM3 (ref 3.4–10.8)

## 2025-03-18 PROCEDURE — 25010000002 HEPARIN LOCK FLUSH PER 10 UNITS: Performed by: INTERNAL MEDICINE

## 2025-03-18 PROCEDURE — 87086 URINE CULTURE/COLONY COUNT: CPT | Performed by: NURSE PRACTITIONER

## 2025-03-18 PROCEDURE — 87088 URINE BACTERIA CULTURE: CPT | Performed by: NURSE PRACTITIONER

## 2025-03-18 PROCEDURE — 81001 URINALYSIS AUTO W/SCOPE: CPT | Performed by: NURSE PRACTITIONER

## 2025-03-18 PROCEDURE — 85025 COMPLETE CBC W/AUTO DIFF WBC: CPT

## 2025-03-18 PROCEDURE — 87186 SC STD MICRODIL/AGAR DIL: CPT | Performed by: NURSE PRACTITIONER

## 2025-03-18 PROCEDURE — 36591 DRAW BLOOD OFF VENOUS DEVICE: CPT

## 2025-03-18 PROCEDURE — 80053 COMPREHEN METABOLIC PANEL: CPT

## 2025-03-18 RX ORDER — PALONOSETRON 0.05 MG/ML
0.25 INJECTION, SOLUTION INTRAVENOUS ONCE
Status: DISCONTINUED | OUTPATIENT
Start: 2025-03-18 | End: 2025-03-18 | Stop reason: HOSPADM

## 2025-03-18 RX ORDER — SODIUM CHLORIDE 0.9 % (FLUSH) 0.9 %
10 SYRINGE (ML) INJECTION AS NEEDED
Status: DISCONTINUED | OUTPATIENT
Start: 2025-03-18 | End: 2025-03-18 | Stop reason: HOSPADM

## 2025-03-18 RX ORDER — SODIUM CHLORIDE 9 MG/ML
20 INJECTION, SOLUTION INTRAVENOUS ONCE
Status: CANCELLED | OUTPATIENT
Start: 2025-03-18

## 2025-03-18 RX ORDER — HEPARIN SODIUM (PORCINE) LOCK FLUSH IV SOLN 100 UNIT/ML 100 UNIT/ML
500 SOLUTION INTRAVENOUS AS NEEDED
Status: DISCONTINUED | OUTPATIENT
Start: 2025-03-18 | End: 2025-03-18 | Stop reason: HOSPADM

## 2025-03-18 RX ORDER — PALONOSETRON 0.05 MG/ML
0.25 INJECTION, SOLUTION INTRAVENOUS ONCE
Status: CANCELLED | OUTPATIENT
Start: 2025-03-18

## 2025-03-18 RX ORDER — SODIUM CHLORIDE 0.9 % (FLUSH) 0.9 %
10 SYRINGE (ML) INJECTION AS NEEDED
Status: CANCELLED | OUTPATIENT
Start: 2025-03-18

## 2025-03-18 RX ORDER — FLUOROURACIL 50 MG/ML
200 INJECTION, SOLUTION INTRAVENOUS ONCE
Status: CANCELLED | OUTPATIENT
Start: 2025-03-18

## 2025-03-18 RX ORDER — HYDROCORTISONE SODIUM SUCCINATE 100 MG/2ML
100 INJECTION INTRAMUSCULAR; INTRAVENOUS AS NEEDED
Status: CANCELLED | OUTPATIENT
Start: 2025-03-18

## 2025-03-18 RX ORDER — FAMOTIDINE 10 MG/ML
20 INJECTION, SOLUTION INTRAVENOUS AS NEEDED
Status: CANCELLED | OUTPATIENT
Start: 2025-03-18

## 2025-03-18 RX ORDER — SODIUM CHLORIDE 9 MG/ML
20 INJECTION, SOLUTION INTRAVENOUS ONCE
Status: DISCONTINUED | OUTPATIENT
Start: 2025-03-18 | End: 2025-03-18 | Stop reason: HOSPADM

## 2025-03-18 RX ORDER — HEPARIN SODIUM (PORCINE) LOCK FLUSH IV SOLN 100 UNIT/ML 100 UNIT/ML
500 SOLUTION INTRAVENOUS AS NEEDED
Status: CANCELLED | OUTPATIENT
Start: 2025-03-18

## 2025-03-18 RX ORDER — DEXTROSE MONOHYDRATE 50 MG/ML
20 INJECTION, SOLUTION INTRAVENOUS ONCE
Status: CANCELLED | OUTPATIENT
Start: 2025-03-18

## 2025-03-18 RX ORDER — DIPHENHYDRAMINE HYDROCHLORIDE 50 MG/ML
50 INJECTION INTRAMUSCULAR; INTRAVENOUS AS NEEDED
Status: CANCELLED | OUTPATIENT
Start: 2025-03-18

## 2025-03-18 RX ADMIN — Medication 10 ML: at 11:37

## 2025-03-18 RX ADMIN — Medication 500 UNITS: at 11:37

## 2025-03-18 NOTE — PROGRESS NOTES
[facility]    CONSULTING IN BLOOD DISORDERS & CANCER      REASON FOR CONSULTATION/CHIEF COMPLAINT:     Evaluation and management for metastatic colorectal cancer                             REQUESTING PHYSICIAN: No ref. provider found  RECORDS OBTAINED:  Records of the patients history including those from the electronic medical record were reviewed and summarized in detail.    HISTORY OF PRESENT ILLNESS:    The patient is a 89 y.o. year old female with medical history significant for CAD s/p CABG x 3 (9097, 2007), PCI's x 16, pacemaker, peptic ulcer disease, hypertension and dyslipidemia comes for metastatic colorectal cancer evaluation and management.    Patient had presented to her PCP,  in early October 2024 with lower abdominal pain and weight loss of > 20 pounds.  A CT A/P performed 10/9/2024 noted innumerable lung nodules, multiple hypodense masses in the liver and a large mass in the hepatic flexure of the colon, measuring 4.5 cm in greatest dimension.    Patient underwent CT-guided liver lesion biopsy on 10/28/2024.  Pathology: Metastatic adenocarcinoma of colorectal origin.    Patient was referred to hematology/oncology for further evaluation and management.  She was first seen in this clinic on 11//24.  Accompanied by 3 of 4 daughters.  Patient denies any major GI issues.  No blood in stool or altered bowel habits.  She does take stool softeners for regular bowel movements.  No nausea or vomiting.  No family history of colorectal cancer.    11/5/2024: PET/CT demonstrates hypermetabolic lesions in the colonic hepatic flexure, pulmonary, hepatic, osseous and zenaida metastasis.  Colonoscopy did reveal hepatic flexure mass, partially obstructing.    11/21/2024: Cycle 1 FOLFOX    12/5/2024: Was seen for cycle 2 FOLFOX, however noted to have severe neutropenia with WBC count of 0.52 and ANC of 0.08.  Chemotherapy was held and received Neupogen x 2 doses.       Patient has DPYD intermediate mutation.   Given prolonged neutropenia, age, and presence of DPYD mutation-plan made to administer 50% dose of FOLFOX with G-CSF support in the form of Neulasta on pro.    CT scans after 4 cycles show excellent response to treatment    INTERIM HISTORY:  Patient is seen back today for consideration of cycle 8 FOLFOX.  She is accompanied by her daughter.  Patient has continued to get weaker with treatment and most recent cycle we push the interval between treatments to 3 weeks total.  She does feel that this helped her.  Unfortunately she does continue to lose weight.  She feels that she is eating quite a bit, eating several small meals a day but nevertheless her weight is dropping.  Her daughter does not think she eats enough protein.  The patient has been and continues to meet with our oncology dietitian team.    Patient is noting more random bone pain such as in her right shoulder or her left tibia.  This is fleeting and is most likely related to Neulasta, noting that since we added Neulasta she has actually had quite a bit of leukocytosis with the Neulasta actually perhaps working a little too well for her.    She has previously had additionally UTIs, treated with recent antibiotics.  Symptoms have currently resolved, though urinalysis initially showing positive nitrites yet again and we will send for culture.    Today unfortunately LFTs have returned 3 times upper limit of normal which is new for her.  She is only taking occasional Tylenol for headache, denies any alcohol use ever.  Per discussion with Dr. Meeks we will hold treatment today and pursue repeat CT imaging to further assess.    Past Medical History:   Diagnosis Date    Anemia     Arthritis of left sacroiliac joint 05/05/2022    CKD (chronic kidney disease), stage III     PATIENT STATES IT IS AT STAGE 4 AT THIS TIME    Colorectal cancer     Coronary artery disease     cath 11/15. CABG x3 and all grafts widely patent. Very small OM and Cfx and that may be the  cause of lateral ischemia on stress test. Non bipassable    Diabetes mellitus, type II     GERD (gastroesophageal reflux disease)     Gout     Heart attack 10/2022    MULTIPLE (6 OR MORE)    History of DVT in adulthood     AFTER CHILDBIRTH    Hyperlipidemia     Hypertension     Liver masses     Pacemaker     PUD (peptic ulcer disease)      Past Surgical History:   Procedure Laterality Date    CARDIAC ELECTROPHYSIOLOGY PROCEDURE N/A 02/22/2017    Procedure: Pacemaker DC new;  Surgeon: Juvenal Zhong MD;  Location:  [facility];  Service:     CARDIAC SURGERY  10/2022    CATARACT EXTRACTION      COLONOSCOPY  2010    COLONOSCOPY N/A 11/19/2024    Procedure: COLONOSCOPY WITH TATTOO MASS INJECTION 4 ML;  Surgeon: Caden Christian MD;  Location:  [facility];  Service: General;  Laterality: N/A;    CORONARY ARTERY BYPASS GRAFT      1992, 2009    EPIDURAL Left 10/17/2022    Procedure: LUMBAR/SACRAL TRANSFORAMINAL EPIDURAL Left L4-5 and left L5-S1 - hold plavix 7 days before;  Surgeon: Yasmine Gaming MD;  Location: [facility];  Service: Pain Management;  Laterality: Left;    PACEMAKER IMPLANTATION      PIRIFORMUS INJECTION Left 07/15/2022    Procedure: PIRIFORMIS INJECTION;  Surgeon: Yasmine Gaming MD;  Location: [facility];  Service: Pain Management;  Laterality: Left;    RADIOFREQUENCY ABLATION Left 02/20/2023    Procedure: RADIOFREQUENCY ABLATION NERVES for the left sacroiliac joint;  Surgeon: Yasmine Gaming MD;  [facility];  Service: Pain Management;  Laterality: Left;    SACROILIAC JOINT INJECTION Left 05/09/2022    Procedure: SACROILIAC INJECTION - Left;  Surgeon: Yasmine Gaming MD;  [facility];  Service: Pain Management;  Laterality: Left;    SACROILIAC JOINT INJECTION Left 07/15/2022    Procedure: left Sacroiliac joint injection and left piriformis injection;  Surgeon: Yasmine Gaming MD;  [facility];  Service: Pain Management;   Laterality: Left;    TUBAL ABDOMINAL LIGATION      VENOUS ACCESS DEVICE (PORT) INSERTION N/A 11/19/2024    Procedure: INSERTION VENOUS ACCESS DEVICE;  Surgeon: Caden Christian MD;  Location: [facility] OR;  Service: General;  Laterality: N/A;       MEDICATIONS    Current Outpatient Medications:     aspirin 81 MG EC tablet, Take 1 tablet by mouth Daily. HOLD FOR SURGERY PER MD ORDERS, Disp: , Rfl:     clopidogrel (PLAVIX) 75 MG tablet, Take 1 tablet by mouth Daily. HOLD FOR SURGERY PER MD ORDERS, Disp: , Rfl:     dapagliflozin Propanediol (Farxiga) 10 MG tablet, TAKE 1 TABLET BY MOUTH DAILY, Disp: 90 tablet, Rfl: 1    glipizide (GLUCOTROL) 5 MG tablet, TAKE 2 TABLETS BY MOUTH EVERY MORNING WITH BREAKFAST AND TAKE ONE TABLET BY MOUTH EVERY EVENING, Disp: 270 tablet, Rfl: 1    hydrALAZINE (APRESOLINE) 25 MG tablet, TAKE 1 TABLET BY MOUTH TWICE A DAY, Disp: 180 tablet, Rfl: 1    hydroCHLOROthiazide 12.5 MG tablet, Take 1 tablet by mouth Daily., Disp: 90 tablet, Rfl: 1    lidocaine-prilocaine (EMLA) 2.5-2.5 % cream, Apply 1 Application topically to the appropriate area as directed Every 2 (Two) Hours As Needed for Mild Pain., Disp: 30 g, Rfl: 3    metFORMIN (GLUCOPHAGE) 500 MG tablet, Take 2 tablets by mouth 2 (Two) Times a Day With Meals., Disp: 360 tablet, Rfl: 1    metoprolol tartrate (LOPRESSOR) 50 MG tablet, TAKE 2 TABLETS BY MOUTH TWICE A DAY, Disp: 360 tablet, Rfl: 1    multivitamin with minerals (CENTRUM SILVER 50+WOMEN PO), Take 1 tablet by mouth Daily. HOLD FOR SURGERY, Disp: , Rfl:     nitroglycerin (NITROSTAT) 0.4 MG SL tablet, 1 tablet., Disp: , Rfl:     ondansetron (ZOFRAN) 8 MG tablet, Take 1 tablet by mouth 3 (Three) Times a Day As Needed for Nausea or Vomiting., Disp: 30 tablet, Rfl: 5    potassium chloride (Klor-Con M20) 20 MEQ CR tablet, TAKE 1 TABLET BY MOUTH DAILY, Disp: 30 tablet, Rfl: 0    rosuvastatin (Crestor) 40 MG tablet, Take 1 tablet by mouth Daily., Disp: 90 tablet, Rfl: 3  No  "current facility-administered medications for this visit.    Facility-Administered Medications Ordered in Other Visits:     dexAMETHasone (DECADRON) IVPB 12 mg, 12 mg, Intravenous, Once, Kae Knight, DEIRDRE    FOSAPREPITANT 150 MG/100ML NORMAL SALINE (CBC) IVPB 100 mL 100 mL, 150 mg, Intravenous, Once, Kae Knight, APRNIKO    heparin injection 500 Units, 500 Units, Intravenous, PRN, Lonny Meeks MD, 500 Units at 03/18/25 1137    palonosetron (ALOXI) injection 0.25 mg, 0.25 mg, Intravenous, Once, Kae Knight, DEIRDRE    sodium chloride 0.9 % flush 10 mL, 10 mL, Intravenous, PRN, Lonny Meeks MD, 10 mL at 03/18/25 1137    sodium chloride 0.9 % infusion, 20 mL/hr, Intravenous, Once, Kae Knight APRN    ALLERGIES:     Allergies   Allergen Reactions    Pneumococcal Vaccines Paresthesia       SOCIAL HISTORY:       Social History     Socioeconomic History    Marital status:    Tobacco Use    Smoking status: Never     Passive exposure: Never    Smokeless tobacco: Never   Vaping Use    Vaping status: Never Used   Substance and Sexual Activity    Alcohol use: Never    Drug use: Never         FAMILY HISTORY:  Family History   Problem Relation Age of Onset    Coronary artery disease Mother     Heart attack Sister     Malig Hyperthermia Neg Hx             Vitals:    03/18/25 1036   BP: 133/68   Pulse: 64   Resp: 16   Temp: 98.1 °F (36.7 °C)   TempSrc: Oral   SpO2: 98%   Weight: 43.7 kg (96 lb 4.8 oz)   Height: 152.4 cm (60\")   PainSc: 0-No pain               3/18/2025    10:33 AM   Current Status   ECOG score 1     PHYSICAL EXAM:    CONSTITUTIONAL:  Vital signs reviewed.  No distress, looks comfortable.  Somewhat frail.  EYES:  Conjunctiva and lids unremarkable.   EARS,NOSE,MOUTH,THROAT:  Ears and nose appear unremarkable.  Lips, teeth, gums appear unremarkable.  RESPIRATORY:  Normal respiratory effort.  Lungs clear to auscultation bilaterally.  CARDIOVASCULAR:  Normal " S1, S2.  No murmurs rubs or gallops.  No significant lower extremity edema.  GASTROINTESTINAL: Abdomen appears unremarkable.  Nondistended  LYMPHATIC:  No cervical, supraclavicular lymphadenopathy.  NEURO: AAOx3, no focal deficits.  Appears to have equal strength all 4 extremities.  MUSCULOSKELETAL:  Unremarkable digits/nails.  No cyanosis or clubbing.  No apparent joint deformities.  SKIN:  Warm.  No rashes.  PSYCHIATRIC:  Normal judgment and insight.  Normal mood and affect.     RECENT LABS:  Results from last 7 days   Lab Units 03/18/25  1017   WBC 10*3/mm3 16.27*   NEUTROS ABS 10*3/mm3 13.15*   HEMOGLOBIN g/dL 10.6*   HEMATOCRIT % 32.9*   PLATELETS 10*3/mm3 195         ASSESSMENT:  Patient is a pleasant 89-year-old female with medical history significant for CAD s/p CABG x 3 (1997, 2007), PCI's x 16, pacemaker, peptic ulcer disease, hypertension and dyslipidemia comes for metastatic colorectal cancer evaluation and management.    # Metastatic colorectal cancer with liver and lung metastasis, right sided:  Patient had presented to her PCP,  in early October 2024 with lower abdominal pain and weight loss of > 20 pounds.    A CT A/P performed 10/9/2024 noted innumerable lung nodules, multiple hypodense masses in the liver and a large mass in the hepatic flexure of the colon, measuring 4.5 cm in greatest dimension.  CT-guided liver lesion biopsy on 10/28/2024. Pathology: Metastatic adenocarcinoma of colorectal origin.  CEA 5386.0 (10/21/2024)  I reviewed the imaging and pathology findings with patient and her family in detail.  Informed that she has been diagnosed with metastatic colorectal cancer with diffuse metastasis.  Reviewed  natural history, prognosis and palliative treatment options.  Patient is interested in receiving cancer directed therapy.  With her age and significant cardiovascular medical comorbidity-treatment with palliative chemotherapy would be challenging.  [facility] NGS shows MSI stable.  Low  TMB.  No NRAS/KRAS/BRAF mutations. [id number] liquid NGS Shows MSI stable.  SHANE mutation, FGFR 1 and EGFR gene amplifications.   [facility] germline mutation testing pending  11/21/2024: Cycle 1 FOLFOX.    12/5/2024: Was seen for cycle 2 FOLFOX, however noted to have severe neutropenia with WBC count of 0.52 and ANC of 0.08.  Chemotherapy was held and received Neupogen x 2 doses.     Patient has DPYD intermediate mutation.  Given prolonged neutropenia, age and presence of DPYD mutation-plan made to administer 50% dose of FOLFOX with G-CSF support.   12/31/2024: Received cycle 3 FOLFOX with bevacizumab.    1/14/2025: Received cycle 4 FOLFOX without Avastin (held due to significant fatigue).  CT scans after 4 cycles showed excellent response to treatment  [id number] from February 2025 shows downward trending ctDNA levels  2/25/2025: Tolerated the last dose of chemotherapy fairly well, although had prolonged fatigue.  Labs today show leukocytosis of 30.31-neutrophilic, anemia of 10.2 and mild elevation in serum creatinine of 1.01.  UA with excessive glucose, however is negative for any evidence of UTI.  She recently completed antibiotic course for UTI.  No current signs/symptoms of infection.  Suspect leukocytosis related to G-CSF.  Reviewed recent [place] [id number] results which show excellent response.  Patient had received an EOB from [facility] and is worried about payment.  Will discuss with [facility]'s team if patient receives a bill.  Given prolonged fatigue with last cycle, plan made to proceed with cycle 7 FOLFOX (50% dose) along with Avastin (50% dose) and increase the gap to once every 3 weeks moving forward.    3/18/2025 due today for cycle 8 FOLFOX/Avastin.  Patient did feel the additional 1 week off helped her fatigue.  However she is continuing to lose weight despite her best efforts.  Also LFTs have increased to 3 times upper limit of normal.  Per discussion with Dr. Meeks we will hold treatment today and pursue  repeat CT scans.    # Leukocytosis:  WBC count of 23.0 on 2/11/2025.  No clear signs or symptoms of infections.  Unclear whether Neulasta related versus UTI.  Urine cultures grew E. coli.  She received Omnicef.  2/25/2025: Persistent leukocytosis of 30,000.  UA is negative.  I suspect this to be G-CSF related rather than underlying evidence of infection.  Will closely monitor.  3/18/2025: WBC 16,270.  This is felt to be related to ongoing Neulasta support.    # Anemia:  Hemoglobin 11.3 g/dL on 11/4/24.  Iron profile most consistent with iron deficiency.  11/21/2024: Patient reports of constipation.  Has poor tolerance to oral iron.    11/21/2024: Received IV Injectafer 700 mg x 1 dose  3/18/2025: Hgb 10.6.    # CAD s/p CABG (1997 and 2007) and PCI x 16:   Follows up with , cardiology at the [facility]  On aspirin 81 mg and Plavix 75 mg daily    # DM2:  Currently on Farxiga, glipizide and metformin.    Denies any neuropathy.  Per patient, last A1c was 8.6.  Being managed by PCP    # Intermittent bone pain  Reports occasional pain in her right shoulder or left leg.  This is fleeting and is felt to likely be related to Neulasta support.    PLAN:   -Hold cycle 8 FOLFOX/Avastin due to increasing LFTs.  -Patient will meet with oncology dietitian today for ongoing assessment.  -We did additionally discuss during our visit ways to add additional calories to her diet, with specific focus on protein.  Patient is a diabetic which complicates her ability to get in a lot of calories.  -Due to urine showing positive nitrites again we will check microscopic urine as well as culture as this continues to be an intermittent ongoing issue though she is asymptomatic.    -Patient continues with PT at home once per week.  -Patient is having what is suspected to be bone pain related to Neulasta support.  Depending on continue plan of care, we may need to consider either removing Neulasta or potentially even giving every  other cycle as she does have leukocytosis as well.  -She will proceed with CT scans chest, abdomen, pelvis as soon as possible.  -She will follow-up with Dr. Meeks following scans to review imaging and discuss further plan of care.  -Of note most recent [id number] showed improvement and last scans also showed improvement.    *Current plan of care is FOLFOX with 50% dose reduction as well as Avastin at 50% dose reduction as well as Neulasta support.  This is now being given every 3 weeks for better tolerance.    This patient is on high risk drug therapy requiring intensive monitoring for toxicity.      I spent 75 minutes caring for Tamie on this date of service. This time includes time spent by me in the following activities: preparing for the visit, reviewing tests, performing a medically appropriate examination and/or evaluation, counseling and educating the patient/family/caregiver, referring and communicating with other health care professionals, documenting information in the medical record, independently interpreting results and communicating that information with the patient/family/caregiver, care coordination, ordering medications, ordering test(s), obtaining a separately obtained history, and reviewing a separately obtained history      Orders Placed This Encounter   Procedures    CT chest w contrast     Standing Status:   Future     Expected Date:   3/19/2025     Expiration Date:   3/18/2026     Does this exam require Iam Bronch Protocol?:   No     Release to patient:   Routine Release [[id number]]     Reason for Exam::   metastatic colon cancer with liver and lung mets     Does this patient have a diabetic monitoring/medication delivering device on?:   No    CT abdomen pelvis w contrast     Standing Status:   Future     Expected Date:   3/19/2025     Expiration Date:   3/18/2026     Will Oral Contrast be needed for this procedure?:   Yes     Reason for Exam::   metastatic colon cancer with liver and lung  mets     Release to patient:   Routine Release [[id number]]    Comprehensive metabolic panel     Standing Status:   Future     Number of Occurrences:   1     Expected Date:   3/18/2025     Expiration Date:   3/18/2026     Release to patient:   Routine Release [[id number]]    Urinalysis without microscopic (no culture) - Urine, Clean Catch     Standing Status:   Future     Number of Occurrences:   1     Expected Date:   3/18/2025     Expiration Date:   3/18/2026     Release to patient:   Routine Release [[id number]]    Urinalysis With Culture If Indicated -     Release to patient:   Routine Release [[id number]]    CBC and Differential     Standing Status:   Future     Number of Occurrences:   1     Expected Date:   3/18/2025     Expiration Date:   3/18/2026     Manual Differential:   No     Release to patient:   Routine Release [[id number]]

## 2025-03-18 NOTE — PROGRESS NOTES
OUTPATIENT ONCOLOGY NUTRITION     Patient Name: Tamie Peter  YOB: 1935  MRN: [id number]  Assessment Date: 3/18/2025    COMMENTS:  Follow up.  Pt's FOLFOX and Avastin being held today due to elevated LFT's.  Labs/Meds reviewed. Glu 285, ALT 74. AST 94, ALKP 195. Weight stable at 96bs    Met with pt today during her treatment.  She reports she has been getting hungry in the middle of the night and eating oatmeal. She still eats very small portions though.  Her family bought her a premier protein bar and after she ate it, she had abd pain and vomited (might be the inulin in it).  We discussed other calorie and protein dense food options and provided her with some overnight oats recipes to try.    Will follow throughout treatment course and be available as needed. Pt very appreciative of visit.        Reason for Assessment Follow up     Diagnosis/Problem   Metastatic colon cancer   Treatment Plan Chemotherapy FOLFOX, Avastin   Frequency Q 2 weeks x 12 cycles   Goal of cancer treatment Palliative   Comments:        Encounter Information        Nutrition/Diet History:  B- Oatmeal, L- Soup/sandwich (sometimes), D- doesn't like anything heavy, family will bring things. HS - oatmeal   Oral Nutrition Supplements:  Doesn't tolerate them - makes her sick to her stomach   Factors/Symptoms Affecting Intake: Decreased appetite, Fatigue, Weight loss   Comments:      Medical/Surgical History Past Medical History:   Diagnosis Date    Anemia     Arthritis of left sacroiliac joint 05/05/2022    CKD (chronic kidney disease), stage III     PATIENT STATES IT IS AT STAGE 4 AT THIS TIME    Colorectal cancer     Coronary artery disease     cath 11/15. CABG x3 and all grafts widely patent. Very small OM and Cfx and that may be the cause of lateral ischemia on stress test. Non bipassable    Diabetes mellitus, type II     GERD (gastroesophageal reflux disease)     Gout     Heart attack 10/2022    MULTIPLE (6 OR MORE)     History of DVT in adulthood     AFTER CHILDBIRTH    Hyperlipidemia     Hypertension     Liver masses     Pacemaker     PUD (peptic ulcer disease)        Past Surgical History:   Procedure Laterality Date    CARDIAC ELECTROPHYSIOLOGY PROCEDURE N/A 02/22/2017    Procedure: Pacemaker DC new;  Surgeon: Juvenal Zhong MD;  Location: [facility] CATH INVASIVE LOCATION;  Service:     CARDIAC SURGERY  10/2022    CATARACT EXTRACTION      COLONOSCOPY  2010    COLONOSCOPY N/A 11/19/2024    Procedure: COLONOSCOPY WITH TATTOO MASS INJECTION 4 ML;  Surgeon: Caden Christian MD;  Location:  [facility];  Service: General;  Laterality: N/A;    CORONARY ARTERY BYPASS GRAFT      1992, 2009    EPIDURAL Left 10/17/2022    Procedure: LUMBAR/SACRAL TRANSFORAMINAL EPIDURAL Left L4-5 and left L5-S1 - hold plavix 7 days before;  Surgeon: Yasmine Gaming MD;  Location: [facility];  Service: Pain Management;  Laterality: Left;    PACEMAKER IMPLANTATION      PIRIFORMUS INJECTION Left 07/15/2022    Procedure: PIRIFORMIS INJECTION;  Surgeon: Yasmine Gaming MD;  Location: [facility];  Service: Pain Management;  Laterality: Left;    RADIOFREQUENCY ABLATION Left 02/20/2023    Procedure: RADIOFREQUENCY ABLATION NERVES for the left sacroiliac joint;  Surgeon: Yasmine Gaming MD;  Location: [facility];  Service: Pain Management;  Laterality: Left;    SACROILIAC JOINT INJECTION Left 05/09/2022    Procedure: SACROILIAC INJECTION - Left;  Surgeon: Yasmine Gaming MD;  Location: [facility];  Service: Pain Management;  Laterality: Left;    SACROILIAC JOINT INJECTION Left 07/15/2022    Procedure: left Sacroiliac joint injection and left piriformis injection;  Surgeon: Yasmine Gaming MD;  Location: [facility];  Service: Pain Management;  Laterality: Left;    TUBAL ABDOMINAL LIGATION      VENOUS ACCESS DEVICE (PORT) INSERTION N/A 11/19/2024    Procedure: INSERTION VENOUS ACCESS DEVICE;  Surgeon: Caden Christian MD;   "Location: [facility] OR;  Service: General;  Laterality: N/A;             Anthropometrics        Current Height Ht Readings from Last 1 Encounters:   03/18/25 152.4 cm (60\")      Current Weight Wt Readings from Last 1 Encounters:   03/18/25 43.7 kg (96 lb 4.8 oz)      BMI  19.1   Ideal Body Weight (IBW) 100lb   Usual Body Weight (UBW) 120lb   Weight Change/Trend Loss   Weight History Wt Readings from Last 30 Encounters:   03/18/25 1036 43.7 kg (96 lb 4.8 oz)   02/25/25 1126 44.3 kg (97 lb 11.2 oz)   02/11/25 1025 45.9 kg (101 lb 3.2 oz)   01/28/25 0905 46.2 kg (101 lb 14.4 oz)   01/14/25 0858 46.7 kg (103 lb)   12/31/24 1034 46.8 kg (103 lb 3.2 oz)   12/10/24 0829 45.9 kg (101 lb 4.8 oz)   12/06/24 0802 46 kg (101 lb 6.4 oz)   12/05/24 0916 46.4 kg (102 lb 3.2 oz)   11/21/24 0931 47.3 kg (104 lb 4.8 oz)   11/13/24 1422 46.3 kg (102 lb)   11/13/24 1344 46.7 kg (103 lb)   11/07/24 0802 47.2 kg (104 lb)   11/04/24 0932 47.8 kg (105 lb 6.4 oz)   10/28/24 1013 49.4 kg (109 lb)   10/21/24 0952 49.4 kg (109 lb)   09/24/24 1004 51.7 kg (114 lb)   06/06/24 1007 52.2 kg (115 lb)   03/07/24 0902 54 kg (119 lb)   12/07/23 1011 54.4 kg (120 lb)   09/19/23 0941 55.3 kg (122 lb)   06/06/23 0900 54.9 kg (121 lb)   05/18/23 1523 52.7 kg (116 lb 3.2 oz)   04/04/23 0943 54.3 kg (119 lb 12.8 oz)   03/07/23 1416 54.4 kg (120 lb)   02/20/23 1053 52.2 kg (115 lb)   02/15/23 1428 52.2 kg (115 lb)   01/26/23 0917 55.2 kg (121 lb 12.8 oz)   12/13/22 0753 54.2 kg (119 lb 6.4 oz)   12/06/22 0922 53.1 kg (117 lb)   12/02/22 0938 52.2 kg (115 lb)          Medications           Current medications: aspirin, clopidogrel, dapagliflozin Propanediol, glipizide, hydrALAZINE, hydroCHLOROthiazide, lidocaine-prilocaine, metFORMIN, metoprolol tartrate, multivitamin with minerals, nitroglycerin, ondansetron, potassium chloride, and rosuvastatin                 Tests/Procedures        Tests/Procedures Chemotherapy     Labs       Pertinent Labs    Results " "from last 7 days   Lab Units 03/18/25  1017   SODIUM mmol/L 137   POTASSIUM mmol/L 4.5   CHLORIDE mmol/L 104   CO2 mmol/L 18.4*   BUN mg/dL 25*   CREATININE mg/dL 1.10*   CALCIUM mg/dL 9.1   BILIRUBIN mg/dL 0.2   ALK PHOS U/L 195*   ALT (SGPT) U/L 74*   AST (SGOT) U/L 94*   GLUCOSE mg/dL 285*     Results from last 7 days   Lab Units 03/18/25  1017   HEMOGLOBIN g/dL 10.6*   HEMATOCRIT % 32.9*   WBC 10*3/mm3 16.27*   ALBUMIN g/dL 3.3*     Results from last 7 days   Lab Units 03/18/25  1017   PLATELETS 10*3/mm3 195     No results found for: \"COVID19\"  Lab Results   Component Value Date    HGBA1C 8.3 (H) 09/23/2024          Physical Findings        Physical Appearance alert, loss of muscle mass, loss of subcutaneous fat, oriented     Edema  no edema   Gastrointestinal None   Tubes/Lines/Drains Implantable Port   Oral/Mouth Cavity WNL   Skin Intact       Estimated/Assessed Needs        Energy Requirements    Height for Calculation      Weight for Calculation 47.3 kg   Method for Estimation  35 kcal/kg   EST Needs (kcal/day) 1655 kcals per day       Protein Requirements    Weight for Calculation 47.3 kg   EST Protein Needs (g/kg) 1.2 gm/kg   EST Daily Needs (g/day) 56 gms per day       Fluid Requirements     Method for Estimation 1 mL/kcal    Estimated Needs (mL/day) 1500 mLs per day         NUTRITION INTERVENTION / PLAN OF CARE      Intervention Goal(s) Maintain nutrition status, Reduce/improve symptoms, Provide information, Meet estimated needs, Disease management/therapy, Tolerate PO , Increase intake, and Appropriate weight gain         RD Intervention/Action Encouraged intake, Follow Tx progress, Recommended/ordered         Recommendations:       PO Diet Consume calorie and protein dense foods, 6 small meals      Supplements       Snacks Try overnight oats      Other    New Prescription Ordered? No, recommend   --      Monitor/Evaluation Follow up as needed   Education Education provided   --    Electronically signed " by:  Leann Shabazz, BALAJI  03/18/25 13:09 EDT

## 2025-03-19 ENCOUNTER — HOSPITAL ENCOUNTER (OUTPATIENT)
Dept: PET IMAGING | Facility: HOSPITAL | Age: 89
Discharge: HOME | End: 2025-03-19
Admitting: NURSE PRACTITIONER
Payer: MEDICARE

## 2025-03-19 ENCOUNTER — INFUSION (OUTPATIENT)
Dept: ONCOLOGY | Facility: HOSPITAL | Age: 89
End: 2025-03-19
Payer: MEDICARE

## 2025-03-19 DIAGNOSIS — C18.9 METASTATIC COLON CANCER TO LIVER: ICD-10-CM

## 2025-03-19 DIAGNOSIS — C78.7 METASTATIC COLON CANCER TO LIVER: ICD-10-CM

## 2025-03-19 DIAGNOSIS — C18.9 CARCINOMA OF COLON METASTATIC TO LUNG: ICD-10-CM

## 2025-03-19 DIAGNOSIS — C78.00 CARCINOMA OF COLON METASTATIC TO LUNG: ICD-10-CM

## 2025-03-19 DIAGNOSIS — R74.8 LIVER ENZYME ELEVATION: ICD-10-CM

## 2025-03-19 PROCEDURE — 74177 CT ABD & PELVIS W/CONTRAST: CPT

## 2025-03-19 PROCEDURE — 25510000002 DIATRIZOATE MEGLUMINE & SODIUM PER 1 ML: Performed by: NURSE PRACTITIONER

## 2025-03-19 PROCEDURE — 25510000001 IOPAMIDOL 61 % SOLUTION: Performed by: NURSE PRACTITIONER

## 2025-03-19 PROCEDURE — 71260 CT THORAX DX C+: CPT

## 2025-03-19 RX ORDER — IOPAMIDOL 612 MG/ML
100 INJECTION, SOLUTION INTRAVASCULAR
Status: COMPLETED | OUTPATIENT
Start: 2025-03-19 | End: 2025-03-19

## 2025-03-19 RX ORDER — DIATRIZOATE MEGLUMINE AND DIATRIZOATE SODIUM 660; 100 MG/ML; MG/ML
30 SOLUTION ORAL; RECTAL
Status: COMPLETED | OUTPATIENT
Start: 2025-03-19 | End: 2025-03-19

## 2025-03-19 RX ADMIN — DIATRIZOATE MEGLUMINE AND DIATRIZOATE SODIUM 30 ML: 660; 100 LIQUID ORAL; RECTAL at 08:00

## 2025-03-19 RX ADMIN — IOPAMIDOL 85 ML: 612 INJECTION, SOLUTION INTRAVENOUS at 08:59

## 2025-03-20 LAB — BACTERIA SPEC AEROBE CULT: ABNORMAL

## 2025-03-20 RX ORDER — POTASSIUM CHLORIDE 750 MG/1
10 TABLET, EXTENDED RELEASE ORAL DAILY
Qty: 30 TABLET | Refills: 0 | Status: SHIPPED | OUTPATIENT
Start: 2025-03-20

## 2025-03-20 NOTE — TELEPHONE ENCOUNTER
"As per Debbie NP: \"Let's refill it at just 10 mEq daily. And please let her know of this change.     Also please tell her that I reviewed her urine culture (done because of positive bacteria on urinalysis) and it was felt to be colonized and would not recommend treatment unless she was having symptoms.  If she is having any symptoms to suggest urinary tract infection, let me know and we can treat but otherwise I do not plan to give her antibiotics.  Thank you.\"    Called patient's daughter - Mandi to inform her of this message. She v/u, she requested that antibiotic be sent ahead since they are unable to be there for the patient, to monitor symptoms. Explained to her that the recommendation is to have an antibiotic if patient is having symptoms. S/W patient and she denies any changes in frequency, urgency, no odor, no pain, not cloudy, urine is colored light yellow. Debbie WALLER updated. Pt is scheduled to be seen jacquelyn by Dr Meeks and will be made aware.  "

## 2025-03-21 ENCOUNTER — PRIOR AUTHORIZATION (OUTPATIENT)
Dept: ONCOLOGY | Facility: HOSPITAL | Age: 89
End: 2025-03-21
Payer: MEDICARE

## 2025-03-21 ENCOUNTER — OFFICE VISIT (OUTPATIENT)
Dept: ONCOLOGY | Facility: CLINIC | Age: 89
End: 2025-03-21
Payer: MEDICARE

## 2025-03-21 ENCOUNTER — INFUSION (OUTPATIENT)
Dept: ONCOLOGY | Facility: HOSPITAL | Age: 89
End: 2025-03-21
Payer: MEDICARE

## 2025-03-21 VITALS
WEIGHT: 98.1 LBS | SYSTOLIC BLOOD PRESSURE: 130 MMHG | RESPIRATION RATE: 17 BRPM | DIASTOLIC BLOOD PRESSURE: 70 MMHG | BODY MASS INDEX: 19.26 KG/M2 | HEART RATE: 76 BPM | HEIGHT: 60 IN | TEMPERATURE: 98.2 F | OXYGEN SATURATION: 95 %

## 2025-03-21 DIAGNOSIS — C18.9 CARCINOMA OF COLON METASTATIC TO LUNG: ICD-10-CM

## 2025-03-21 DIAGNOSIS — Z45.2 ENCOUNTER FOR ADJUSTMENT OR MANAGEMENT OF VASCULAR ACCESS DEVICE: Primary | ICD-10-CM

## 2025-03-21 DIAGNOSIS — C18.9 METASTATIC COLON CANCER TO LIVER: Primary | ICD-10-CM

## 2025-03-21 DIAGNOSIS — C18.9 METASTATIC COLON CANCER TO LIVER: ICD-10-CM

## 2025-03-21 DIAGNOSIS — C78.7 METASTATIC COLON CANCER TO LIVER: Primary | ICD-10-CM

## 2025-03-21 DIAGNOSIS — C78.7 METASTATIC COLON CANCER TO LIVER: ICD-10-CM

## 2025-03-21 DIAGNOSIS — C18.9 ADENOCARCINOMA OF COLON: ICD-10-CM

## 2025-03-21 DIAGNOSIS — C78.00 CARCINOMA OF COLON METASTATIC TO LUNG: ICD-10-CM

## 2025-03-21 LAB
ALBUMIN SERPL-MCNC: 3.5 G/DL (ref 3.5–5.2)
ALBUMIN/GLOB SERPL: 1.3 G/DL
ALP SERPL-CCNC: 192 U/L (ref 39–117)
ALT SERPL W P-5'-P-CCNC: 114 U/L (ref 1–33)
ANION GAP SERPL CALCULATED.3IONS-SCNC: 12.2 MMOL/L (ref 5–15)
AST SERPL-CCNC: 98 U/L (ref 1–32)
BILIRUB SERPL-MCNC: 0.2 MG/DL (ref 0–1.2)
BUN SERPL-MCNC: 24 MG/DL (ref 8–23)
BUN/CREAT SERPL: 24.5 (ref 7–25)
CALCIUM SPEC-SCNC: 9.4 MG/DL (ref 8.6–10.5)
CHLORIDE SERPL-SCNC: 107 MMOL/L (ref 98–107)
CO2 SERPL-SCNC: 19.8 MMOL/L (ref 22–29)
CREAT SERPL-MCNC: 0.98 MG/DL (ref 0.57–1)
EGFRCR SERPLBLD CKD-EPI 2021: 56 ML/MIN/1.73
GLOBULIN UR ELPH-MCNC: 2.7 GM/DL
GLUCOSE SERPL-MCNC: 168 MG/DL (ref 65–99)
POTASSIUM SERPL-SCNC: 4.9 MMOL/L (ref 3.5–5.2)
PROT SERPL-MCNC: 6.2 G/DL (ref 6–8.5)
SODIUM SERPL-SCNC: 139 MMOL/L (ref 136–145)

## 2025-03-21 PROCEDURE — 36591 DRAW BLOOD OFF VENOUS DEVICE: CPT

## 2025-03-21 PROCEDURE — 25010000002 HEPARIN LOCK FLUSH PER 10 UNITS: Performed by: INTERNAL MEDICINE

## 2025-03-21 PROCEDURE — 80053 COMPREHEN METABOLIC PANEL: CPT

## 2025-03-21 RX ORDER — HEPARIN SODIUM (PORCINE) LOCK FLUSH IV SOLN 100 UNIT/ML 100 UNIT/ML
500 SOLUTION INTRAVENOUS AS NEEDED
Status: DISCONTINUED | OUTPATIENT
Start: 2025-03-21 | End: 2025-03-21 | Stop reason: HOSPADM

## 2025-03-21 RX ORDER — SULFAMETHOXAZOLE AND TRIMETHOPRIM 800; 160 MG/1; MG/1
1 TABLET ORAL 2 TIMES DAILY
Qty: 14 TABLET | Refills: 0 | Status: SHIPPED | OUTPATIENT
Start: 2025-03-21

## 2025-03-21 RX ORDER — HEPARIN SODIUM (PORCINE) LOCK FLUSH IV SOLN 100 UNIT/ML 100 UNIT/ML
500 SOLUTION INTRAVENOUS AS NEEDED
OUTPATIENT
Start: 2025-03-21

## 2025-03-21 RX ORDER — DRONABINOL 2.5 MG/1
2.5 CAPSULE ORAL
Qty: 60 CAPSULE | Refills: 0 | Status: SHIPPED | OUTPATIENT
Start: 2025-03-21

## 2025-03-21 RX ORDER — SODIUM CHLORIDE 0.9 % (FLUSH) 0.9 %
10 SYRINGE (ML) INJECTION AS NEEDED
OUTPATIENT
Start: 2025-03-21

## 2025-03-21 RX ADMIN — Medication 500 UNITS: at 12:33

## 2025-03-21 NOTE — PROGRESS NOTES
[facility]    CONSULTING IN BLOOD DISORDERS & CANCER      REASON FOR CONSULTATION/CHIEF COMPLAINT:     Evaluation and management for metastatic colorectal cancer                             REQUESTING PHYSICIAN: No ref. provider found  RECORDS OBTAINED:  Records of the patients history including those from the electronic medical record were reviewed and summarized in detail.    HISTORY OF PRESENT ILLNESS:    The patient is a 89 y.o. year old female with medical history significant for CAD s/p CABG x 3 (9097, 2007), PCI's x 16, pacemaker, peptic ulcer disease, hypertension and dyslipidemia comes for metastatic colorectal cancer evaluation and management.    Patient had presented to her PCP,  in early October 2024 with lower abdominal pain and weight loss of > 20 pounds.  A CT A/P performed 10/9/2024 noted innumerable lung nodules, multiple hypodense masses in the liver and a large mass in the hepatic flexure of the colon, measuring 4.5 cm in greatest dimension.    Patient underwent CT-guided liver lesion biopsy on 10/28/2024.  Pathology: Metastatic adenocarcinoma of colorectal origin.    Patient was referred to hematology/oncology for further evaluation and management.  She was first seen in this clinic on 11//24.  Accompanied by 3 of 4 daughters.  Patient denies any major GI issues.  No blood in stool or altered bowel habits.  She does take stool softeners for regular bowel movements.  No nausea or vomiting.  No family history of colorectal cancer.    11/5/2024: PET/CT demonstrates hypermetabolic lesions in the colonic hepatic flexure, pulmonary, hepatic, osseous and zenaida metastasis.  Colonoscopy did reveal hepatic flexure mass, partially obstructing.    11/21/2024: Cycle 1 FOLFOX    12/5/2024: Was seen for cycle 2 FOLFOX, however noted to have severe neutropenia with WBC count of 0.52 and ANC of 0.08.  Chemotherapy was held and received Neupogen x 2 doses.       Patient has DPYD intermediate mutation.   Given prolonged neutropenia, age, and presence of DPYD mutation-plan made to administer 50% dose of FOLFOX with G-CSF support in the form of Neulasta on pro.    CT scans after 4 cycles show excellent response to treatment    INTERIM HISTORY:  Patient is seen back today for a follow-up visit.  She was seen earlier this week and reported of severe, right shoulder and left leg pain.  A CT C/A/P was obtained which shows overall stable findings with no evidence of disease progression.      Today, she reports of doing slightly better.  States the pain in her joints has improved.  Patient's states she is doing well on chemotherapy, however her daughter for and caretaker somewhat disagree with that.  Patient reports that she feels weak for 3 to 4 days after each chemo cycle, however bounces back.  She is able to eat smaller meals, however not putting on any weight.     Past Medical History:   Diagnosis Date    Anemia     Arthritis of left sacroiliac joint 05/05/2022    CKD (chronic kidney disease), stage III     PATIENT STATES IT IS AT STAGE 4 AT THIS TIME    Colorectal cancer     Coronary artery disease     cath 11/15. CABG x3 and all grafts widely patent. Very small OM and Cfx and that may be the cause of lateral ischemia on stress test. Non bipassable    Diabetes mellitus, type II     GERD (gastroesophageal reflux disease)     Gout     Heart attack 10/2022    MULTIPLE (6 OR MORE)    History of DVT in adulthood     AFTER CHILDBIRTH    Hyperlipidemia     Hypertension     Liver masses     Pacemaker     PUD (peptic ulcer disease)      Past Surgical History:   Procedure Laterality Date    CARDIAC ELECTROPHYSIOLOGY PROCEDURE N/A 02/22/2017    Procedure: Pacemaker DC new;  Surgeon: Juvenal Zhong MD;  Location: [facility] INVASIVE LOCATION;  Service:     CARDIAC SURGERY  10/2022    CATARACT EXTRACTION      COLONOSCOPY  2010    COLONOSCOPY N/A 11/19/2024    Procedure: COLONOSCOPY WITH TATTOO MASS INJECTION 4 ML;  Surgeon:  Caden Christian MD;  Location:  [facility];  Service: General;  Laterality: N/A;    CORONARY ARTERY BYPASS GRAFT      1992, 2009    EPIDURAL Left 10/17/2022    Procedure: LUMBAR/SACRAL TRANSFORAMINAL EPIDURAL Left L4-5 and left L5-S1 - hold plavix 7 days before;  Surgeon: Yasmine Gaming MD;  Location: [facility];  Service: Pain Management;  Laterality: Left;    PACEMAKER IMPLANTATION      PIRIFORMUS INJECTION Left 07/15/2022    Procedure: PIRIFORMIS INJECTION;  Surgeon: Yasmine Gaming MD;  Location: [facility];  Service: Pain Management;  Laterality: Left;    RADIOFREQUENCY ABLATION Left 02/20/2023    Procedure: RADIOFREQUENCY ABLATION NERVES for the left sacroiliac joint;  Surgeon: Yasmine Gaming MD;  Location: [facility];  Service: Pain Management;  Laterality: Left;    SACROILIAC JOINT INJECTION Left 05/09/2022    Procedure: SACROILIAC INJECTION - Left;  Surgeon: Yasmine Gaming MD;  Location: [facility];  Service: Pain Management;  Laterality: Left;    SACROILIAC JOINT INJECTION Left 07/15/2022    Procedure: left Sacroiliac joint injection and left piriformis injection;  Surgeon: Yasmine Gaming MD;  Location: [facility];  Service: Pain Management;  Laterality: Left;    TUBAL ABDOMINAL LIGATION      VENOUS ACCESS DEVICE (PORT) INSERTION N/A 11/19/2024    Procedure: INSERTION VENOUS ACCESS DEVICE;  Surgeon: Caden Christian MD;  Location:  [facility];  Service: General;  Laterality: N/A;       MEDICATIONS    Current Outpatient Medications:     aspirin 81 MG EC tablet, Take 1 tablet by mouth Daily. HOLD FOR SURGERY PER MD ORDERS, Disp: , Rfl:     clopidogrel (PLAVIX) 75 MG tablet, Take 1 tablet by mouth Daily. HOLD FOR SURGERY PER MD ORDERS, Disp: , Rfl:     dapagliflozin Propanediol (Farxiga) 10 MG tablet, TAKE 1 TABLET BY MOUTH DAILY, Disp: 90 tablet, Rfl: 1    glipizide (GLUCOTROL) 5 MG tablet, TAKE 2 TABLETS BY MOUTH EVERY MORNING WITH BREAKFAST AND TAKE ONE TABLET  BY MOUTH EVERY EVENING, Disp: 270 tablet, Rfl: 1    hydrALAZINE (APRESOLINE) 25 MG tablet, TAKE 1 TABLET BY MOUTH TWICE A DAY, Disp: 180 tablet, Rfl: 1    hydroCHLOROthiazide 12.5 MG tablet, Take 1 tablet by mouth Daily., Disp: 90 tablet, Rfl: 1    lidocaine-prilocaine (EMLA) 2.5-2.5 % cream, Apply 1 Application topically to the appropriate area as directed Every 2 (Two) Hours As Needed for Mild Pain., Disp: 30 g, Rfl: 3    metFORMIN (GLUCOPHAGE) 500 MG tablet, Take 2 tablets by mouth 2 (Two) Times a Day With Meals., Disp: 360 tablet, Rfl: 1    metoprolol tartrate (LOPRESSOR) 50 MG tablet, TAKE 2 TABLETS BY MOUTH TWICE A DAY, Disp: 360 tablet, Rfl: 1    multivitamin with minerals (CENTRUM SILVER 50+WOMEN PO), Take 1 tablet by mouth Daily. HOLD FOR SURGERY, Disp: , Rfl:     nitroglycerin (NITROSTAT) 0.4 MG SL tablet, 1 tablet., Disp: , Rfl:     ondansetron (ZOFRAN) 8 MG tablet, Take 1 tablet by mouth 3 (Three) Times a Day As Needed for Nausea or Vomiting., Disp: 30 tablet, Rfl: 5    potassium chloride (KLOR-CON M10) 10 MEQ CR tablet, Take 1 tablet by mouth Daily., Disp: 30 tablet, Rfl: 0    rosuvastatin (Crestor) 40 MG tablet, Take 1 tablet by mouth Daily., Disp: 90 tablet, Rfl: 3    dronabinol (Marinol) 2.5 MG capsule, Take 1 capsule by mouth 2 (Two) Times a Day Before Meals., Disp: 60 capsule, Rfl: 0    sulfamethoxazole-trimethoprim (BACTRIM DS,SEPTRA DS) 800-160 MG per tablet, Take 1 tablet by mouth 2 (Two) Times a Day., Disp: 14 tablet, Rfl: 0  No current facility-administered medications for this visit.    ALLERGIES:     Allergies   Allergen Reactions    Pneumococcal Vaccines Paresthesia       SOCIAL HISTORY:       Social History     Socioeconomic History    Marital status:    Tobacco Use    Smoking status: Never     Passive exposure: Never    Smokeless tobacco: Never   Vaping Use    Vaping status: Never Used   Substance and Sexual Activity    Alcohol use: Never    Drug use: Never         FAMILY  "HISTORY:  Family History   Problem Relation Age of Onset    Coronary artery disease Mother     Heart attack Sister     Liseth Hyperthermia Neg Hx             Vitals:    03/21/25 1121   BP: 130/70   Pulse: 76   Resp: 17   Temp: 98.2 °F (36.8 °C)   TempSrc: Oral   SpO2: 95%   Weight: 44.5 kg (98 lb 1.6 oz)   Height: 152.5 cm (60.04\")   PainSc: 0-No pain               3/21/2025    11:22 AM   Current Status   ECOG score 1     PHYSICAL EXAM:    CONSTITUTIONAL:  Vital signs reviewed.  No distress, looks comfortable.  Somewhat frail.  EYES:  Conjunctiva and lids unremarkable.   EARS,NOSE,MOUTH,THROAT:  Ears and nose appear unremarkable.  Lips, teeth, gums appear unremarkable.  RESPIRATORY:  Normal respiratory effort.  Lungs clear to auscultation bilaterally.  CARDIOVASCULAR:  Normal S1, S2.  No murmurs rubs or gallops.  No significant lower extremity edema.  GASTROINTESTINAL: Abdomen appears unremarkable.  Nondistended  LYMPHATIC:  No cervical, supraclavicular lymphadenopathy.  NEURO: AAOx3, no focal deficits.  Appears to have equal strength all 4 extremities.  MUSCULOSKELETAL:  Unremarkable digits/nails.  No cyanosis or clubbing.  No apparent joint deformities.  SKIN:  Warm.  No rashes.  PSYCHIATRIC:  Normal judgment and insight.  Normal mood and affect.     RECENT LABS:  Results from last 7 days   Lab Units 03/18/25  1017   WBC 10*3/mm3 16.27*   NEUTROS ABS 10*3/mm3 13.15*   HEMOGLOBIN g/dL 10.6*   HEMATOCRIT % 32.9*   PLATELETS 10*3/mm3 195         ASSESSMENT:  Patient is a pleasant 89-year-old female with medical history significant for CAD s/p CABG x 3 (1997, 2007), PCI's x 16, pacemaker, peptic ulcer disease, hypertension and dyslipidemia comes for metastatic colorectal cancer evaluation and management.    # Metastatic colorectal cancer with liver and lung metastasis, right sided:  Patient had presented to her PCP,  in early October 2024 with lower abdominal pain and weight loss of > 20 pounds.    A CT A/P " performed 10/9/2024 noted innumerable lung nodules, multiple hypodense masses in the liver and a large mass in the hepatic flexure of the colon, measuring 4.5 cm in greatest dimension.  CT-guided liver lesion biopsy on 10/28/2024. Pathology: Metastatic adenocarcinoma of colorectal origin.  CEA 5386.0 (10/21/2024)  I reviewed the imaging and pathology findings with patient and her family in detail.  Informed that she has been diagnosed with metastatic colorectal cancer with diffuse metastasis.  Reviewed  natural history, prognosis and palliative treatment options.  Patient is interested in receiving cancer directed therapy.  With her age and significant cardiovascular medical comorbidity-treatment with palliative chemotherapy would be challenging.  [facility] NGS shows MSI stable.  Low TMB.  No NRAS/KRAS/BRAF mutations. [id number] liquid NGS Shows MSI stable.  SHANE mutation, FGFR 1 and EGFR gene amplifications.   [facility] germline mutation testing pending  11/21/2024: Cycle 1 FOLFOX.    12/5/2024: Was seen for cycle 2 FOLFOX, however noted to have severe neutropenia with WBC count of 0.52 and ANC of 0.08.  Chemotherapy was held and received Neupogen x 2 doses.     Patient has DPYD intermediate mutation.  Given prolonged neutropenia, age and presence of DPYD mutation-plan made to administer 50% dose of FOLFOX with G-CSF support.   12/31/2024: Received cycle 3 FOLFOX with bevacizumab.    1/14/2025: Received cycle 4 FOLFOX without Avastin (held due to significant fatigue).  CT scans after 4 cycles showed excellent response to treatment  [id number] from February 2025 shows downward trending ctDNA levels  2/25/2025: Tolerated the last dose of chemotherapy fairly well, although had prolonged fatigue.  Labs today show leukocytosis of 30.31-neutrophilic, anemia of 10.2 and mild elevation in serum creatinine of 1.01.  UA with excessive glucose, however is negative for any evidence of UTI.  She recently completed antibiotic course  for UTI.  No current signs/symptoms of infection.  Suspect leukocytosis related to G-CSF.  Reviewed recent [id number] results which show excellent response.  Patient had received an EOB from [facility] and is worried about payment.  Will discuss with [facility]'s team if patient receives a bill.  Given prolonged fatigue with last cycle, plan made to proceed with cycle 7 FOLFOX (50% dose) along with Avastin (50% dose) and increase the gap to once every 3 weeks moving forward.    3/18: Chemo held due to fatigue, diffuse arthralgias and abnormal LFTs.  3/21/25: Reviewed recent CT scans which show overall stable findings with no evidence of disease progression.  Patient's arthralgias and fatigue slightly improved.  I had extensive discussion with patient and her family regarding goals of care, risks/benefits of continuing chemotherapy and just doing palliative care.  Patient remains quite interested in continuing with chemotherapy although she understands risk of further decline in her performance status.  Her LFTs remain elevated today.  Plan made to see her back next week on Tuesday for repeat labs.  If LFTs remain elevated, will hold oxaliplatin and proceed with 5-FU/Avastin (50% doses) alone.    # Leukocytosis:  WBC count of 23.0 on 2/11/2025.  No clear signs or symptoms of infections.  Unclear whether Neulasta related versus UTI.  Urine cultures grew E. coli.  She received Omnicef.  2/25/2025: Persistent leukocytosis of 30,000.  UA is negative.  I suspect this to be G-CSF related rather than underlying evidence of infection.  Will closely monitor.  3/18/2025: WBC 16,270.  This is felt to be related to ongoing Neulasta support.    # Anemia:  Hemoglobin 11.3 g/dL on 11/4/24.  Iron profile most consistent with iron deficiency.  11/21/2024: Patient reports of constipation.  Has poor tolerance to oral iron.    11/21/2024: Received IV Injectafer 700 mg x 1 dose  3/18/2025: Hgb 10.6.    # CAD s/p CABG (1997 and 2007) and PCI  x 16:   Follows up with , [facility]  On aspirin 81 mg and Plavix 75 mg daily    # DM2:  Currently on Farxiga, glipizide and metformin.    Denies any neuropathy.  Per patient, last A1c was 8.6.  Being managed by PCP    # Intermittent bone pain  Reports occasional pain in her right shoulder or left leg.  This is fleeting and is felt to likely be related to Neulasta support.    PLAN:   - Plan to see her back next week on Tuesday for repeat labs.  If LFTs remain elevated, will hold oxaliplatin and proceed with 5-FU/Avastin (50% doses) alone.  -Start p.o. Bactrim DS twice daily x 7 days for UTI   -Encouraged to maintain adequate nutritional intake.  Following up with oncology dietitian.   -Patient continues with PT at home once per week (on Fridays).  -Patient is having what is suspected to be bone pain related to Neulasta support.  Depending on continue plan of care, we may need to consider either removing Neulasta or potentially even giving every other cycle as she does have leukocytosis as well.    *Current plan of care is FOLFOX with 50% dose reduction as well as Avastin at 50% dose reduction as well as Neulasta support.  This is now being given every 3 weeks for better tolerance.    This patient is on high risk drug therapy requiring intensive monitoring for toxicity.      Orders Placed This Encounter   Procedures    Comprehensive Metabolic Panel     Release to patient:   Routine Release [[id number]]   Total time spent during this patient encounter is 41 minutes. The total time spent with the patient includes: preparing to see the patient by reviewing of tests, prior notes or other relevant information, performing appropriate independent examination & evaluation, counseling, ordering of medications, tests or procedures, documenting clinic information in the electronic medical records or other health records, independently interpreting results of tests and communicating the results  to the patient/family or caregiver.

## 2025-03-25 ENCOUNTER — OFFICE VISIT (OUTPATIENT)
Dept: ONCOLOGY | Facility: CLINIC | Age: 89
End: 2025-03-25
Payer: MEDICARE

## 2025-03-25 ENCOUNTER — INFUSION (OUTPATIENT)
Dept: ONCOLOGY | Facility: HOSPITAL | Age: 89
End: 2025-03-25
Payer: MEDICARE

## 2025-03-25 ENCOUNTER — APPOINTMENT (OUTPATIENT)
Dept: ONCOLOGY | Facility: HOSPITAL | Age: 89
End: 2025-03-25
Payer: MEDICARE

## 2025-03-25 VITALS
OXYGEN SATURATION: 100 % | WEIGHT: 98.5 LBS | HEIGHT: 60 IN | SYSTOLIC BLOOD PRESSURE: 129 MMHG | DIASTOLIC BLOOD PRESSURE: 71 MMHG | HEART RATE: 72 BPM | BODY MASS INDEX: 19.34 KG/M2 | TEMPERATURE: 97.7 F | RESPIRATION RATE: 16 BRPM

## 2025-03-25 DIAGNOSIS — C78.7 METASTATIC COLON CANCER TO LIVER: Primary | ICD-10-CM

## 2025-03-25 DIAGNOSIS — C18.9 METASTATIC COLON CANCER TO LIVER: Primary | ICD-10-CM

## 2025-03-25 DIAGNOSIS — C78.7 METASTATIC COLON CANCER TO LIVER: ICD-10-CM

## 2025-03-25 DIAGNOSIS — C18.9 METASTATIC COLON CANCER TO LIVER: ICD-10-CM

## 2025-03-25 LAB
ALBUMIN SERPL-MCNC: 3.5 G/DL (ref 3.5–5.2)
ALBUMIN/GLOB SERPL: 1.5 G/DL
ALP SERPL-CCNC: 141 U/L (ref 39–117)
ALT SERPL W P-5'-P-CCNC: 58 U/L (ref 1–33)
ANION GAP SERPL CALCULATED.3IONS-SCNC: 13.3 MMOL/L (ref 5–15)
AST SERPL-CCNC: 50 U/L (ref 1–32)
BASOPHILS # BLD AUTO: 0.06 10*3/MM3 (ref 0–0.2)
BASOPHILS NFR BLD AUTO: 0.7 % (ref 0–1.5)
BILIRUB SERPL-MCNC: <0.2 MG/DL (ref 0–1.2)
BILIRUB UR QL STRIP: NEGATIVE
BUN SERPL-MCNC: 27 MG/DL (ref 8–23)
BUN/CREAT SERPL: 19.9 (ref 7–25)
CALCIUM SPEC-SCNC: 9.1 MG/DL (ref 8.6–10.5)
CHLORIDE SERPL-SCNC: 105 MMOL/L (ref 98–107)
CLARITY UR: CLEAR
CO2 SERPL-SCNC: 18.7 MMOL/L (ref 22–29)
COLOR UR: YELLOW
CREAT SERPL-MCNC: 1.36 MG/DL (ref 0.57–1)
DEPRECATED RDW RBC AUTO: 77.2 FL (ref 37–54)
EGFRCR SERPLBLD CKD-EPI 2021: 37.8 ML/MIN/1.73
EOSINOPHIL # BLD AUTO: 0.11 10*3/MM3 (ref 0–0.4)
EOSINOPHIL NFR BLD AUTO: 1.3 % (ref 0.3–6.2)
ERYTHROCYTE [DISTWIDTH] IN BLOOD BY AUTOMATED COUNT: 20.4 % (ref 12.3–15.4)
GLOBULIN UR ELPH-MCNC: 2.4 GM/DL
GLUCOSE SERPL-MCNC: 227 MG/DL (ref 65–99)
GLUCOSE UR STRIP-MCNC: ABNORMAL MG/DL
HCT VFR BLD AUTO: 32.2 % (ref 34–46.6)
HGB BLD-MCNC: 10.4 G/DL (ref 12–15.9)
HGB UR QL STRIP.AUTO: NEGATIVE
IMM GRANULOCYTES # BLD AUTO: 0.04 10*3/MM3 (ref 0–0.05)
IMM GRANULOCYTES NFR BLD AUTO: 0.5 % (ref 0–0.5)
KETONES UR QL STRIP: NEGATIVE
LEUKOCYTE ESTERASE UR QL STRIP.AUTO: ABNORMAL
LYMPHOCYTES # BLD AUTO: 1.46 10*3/MM3 (ref 0.7–3.1)
LYMPHOCYTES NFR BLD AUTO: 16.9 % (ref 19.6–45.3)
MCH RBC QN AUTO: 32.8 PG (ref 26.6–33)
MCHC RBC AUTO-ENTMCNC: 32.3 G/DL (ref 31.5–35.7)
MCV RBC AUTO: 101.6 FL (ref 79–97)
MONOCYTES # BLD AUTO: 0.58 10*3/MM3 (ref 0.1–0.9)
MONOCYTES NFR BLD AUTO: 6.7 % (ref 5–12)
NEUTROPHILS NFR BLD AUTO: 6.4 10*3/MM3 (ref 1.7–7)
NEUTROPHILS NFR BLD AUTO: 73.9 % (ref 42.7–76)
NITRITE UR QL STRIP: NEGATIVE
NRBC BLD AUTO-RTO: 0 /100 WBC (ref 0–0.2)
PH UR STRIP.AUTO: <=5 [PH] (ref 4.5–8)
PLATELET # BLD AUTO: 188 10*3/MM3 (ref 140–450)
PMV BLD AUTO: 9.8 FL (ref 6–12)
POTASSIUM SERPL-SCNC: 4.6 MMOL/L (ref 3.5–5.2)
PROT SERPL-MCNC: 5.9 G/DL (ref 6–8.5)
PROT UR QL STRIP: ABNORMAL
RBC # BLD AUTO: 3.17 10*6/MM3 (ref 3.77–5.28)
SODIUM SERPL-SCNC: 137 MMOL/L (ref 136–145)
SP GR UR STRIP: 1.02 (ref 1–1.03)
UROBILINOGEN UR QL STRIP: ABNORMAL
WBC NRBC COR # BLD AUTO: 8.65 10*3/MM3 (ref 3.4–10.8)

## 2025-03-25 PROCEDURE — 25010000002 PALONOSETRON PER 25 MCG

## 2025-03-25 PROCEDURE — 96361 HYDRATE IV INFUSION ADD-ON: CPT

## 2025-03-25 PROCEDURE — 25810000003 SODIUM CHLORIDE 0.9 % SOLUTION

## 2025-03-25 PROCEDURE — 80053 COMPREHEN METABOLIC PANEL: CPT

## 2025-03-25 PROCEDURE — 25010000002 FOSAPREPITANT PER 1 MG

## 2025-03-25 PROCEDURE — G0498 CHEMO EXTEND IV INFUS W/PUMP: HCPCS

## 2025-03-25 PROCEDURE — 25010000002 DEXAMETHASONE SODIUM PHOSPHATE 100 MG/10ML SOLUTION

## 2025-03-25 PROCEDURE — 25010000002 LEUCOVORIN CALCIUM PER 50 MG

## 2025-03-25 PROCEDURE — 96413 CHEMO IV INFUSION 1 HR: CPT

## 2025-03-25 PROCEDURE — 25810000003 SODIUM CHLORIDE 0.9 % SOLUTION 250 ML FLEX CONT

## 2025-03-25 PROCEDURE — 96411 CHEMO IV PUSH ADDL DRUG: CPT

## 2025-03-25 PROCEDURE — 96367 TX/PROPH/DG ADDL SEQ IV INF: CPT

## 2025-03-25 PROCEDURE — 25010000002 BEVACIZUMAB-ADCD 100 MG/4ML SOLUTION 4 ML VIAL

## 2025-03-25 PROCEDURE — 96375 TX/PRO/DX INJ NEW DRUG ADDON: CPT

## 2025-03-25 PROCEDURE — 85025 COMPLETE CBC W/AUTO DIFF WBC: CPT

## 2025-03-25 PROCEDURE — 25010000002 FLUOROURACIL PER 500 MG

## 2025-03-25 PROCEDURE — 81003 URINALYSIS AUTO W/O SCOPE: CPT

## 2025-03-25 RX ORDER — SODIUM CHLORIDE 9 MG/ML
500 INJECTION, SOLUTION INTRAVENOUS ONCE
Status: COMPLETED | OUTPATIENT
Start: 2025-03-25 | End: 2025-03-25

## 2025-03-25 RX ORDER — DIPHENHYDRAMINE HYDROCHLORIDE 50 MG/ML
50 INJECTION, SOLUTION INTRAMUSCULAR; INTRAVENOUS AS NEEDED
Status: CANCELLED | OUTPATIENT
Start: 2025-03-25

## 2025-03-25 RX ORDER — OXALIPLATIN 5 MG/ML
100 INJECTION, POWDER, LYOPHILIZED, FOR SOLUTION INTRAVENOUS
COMMUNITY

## 2025-03-25 RX ORDER — FLUOROURACIL 50 MG/ML
200 INJECTION, SOLUTION INTRAVENOUS ONCE
Status: COMPLETED | OUTPATIENT
Start: 2025-03-25 | End: 2025-03-25

## 2025-03-25 RX ORDER — SODIUM CHLORIDE 9 MG/ML
20 INJECTION, SOLUTION INTRAVENOUS ONCE
Status: DISCONTINUED | OUTPATIENT
Start: 2025-03-25 | End: 2025-03-25 | Stop reason: HOSPADM

## 2025-03-25 RX ORDER — FAMOTIDINE 10 MG/ML
20 INJECTION, SOLUTION INTRAVENOUS AS NEEDED
Status: CANCELLED | OUTPATIENT
Start: 2025-03-25

## 2025-03-25 RX ORDER — PALONOSETRON 0.05 MG/ML
0.25 INJECTION, SOLUTION INTRAVENOUS ONCE
Status: COMPLETED | OUTPATIENT
Start: 2025-03-25 | End: 2025-03-25

## 2025-03-25 RX ORDER — FLUOROURACIL 50 MG/ML
200 INJECTION, SOLUTION INTRAVENOUS ONCE
Status: CANCELLED | OUTPATIENT
Start: 2025-03-25

## 2025-03-25 RX ORDER — HYDROCORTISONE SODIUM SUCCINATE 100 MG/2ML
100 INJECTION INTRAMUSCULAR; INTRAVENOUS AS NEEDED
Status: CANCELLED | OUTPATIENT
Start: 2025-03-25

## 2025-03-25 RX ORDER — SODIUM CHLORIDE 9 MG/ML
20 INJECTION, SOLUTION INTRAVENOUS ONCE
Status: CANCELLED | OUTPATIENT
Start: 2025-03-25

## 2025-03-25 RX ORDER — PALONOSETRON 0.05 MG/ML
0.25 INJECTION, SOLUTION INTRAVENOUS ONCE
Status: CANCELLED | OUTPATIENT
Start: 2025-03-25

## 2025-03-25 RX ORDER — DEXTROSE MONOHYDRATE 50 MG/ML
20 INJECTION, SOLUTION INTRAVENOUS ONCE
Status: CANCELLED | OUTPATIENT
Start: 2025-03-25

## 2025-03-25 RX ADMIN — SODIUM CHLORIDE 500 ML: 9 INJECTION, SOLUTION INTRAVENOUS at 10:00

## 2025-03-25 RX ADMIN — FOSAPREPITANT 100 ML: 150 INJECTION, POWDER, LYOPHILIZED, FOR SOLUTION INTRAVENOUS at 10:41

## 2025-03-25 RX ADMIN — FLUOROURACIL 1700 MG: 50 INJECTION, SOLUTION INTRAVENOUS at 12:36

## 2025-03-25 RX ADMIN — DEXAMETHASONE SODIUM PHOSPHATE 12 MG: 10 INJECTION, SOLUTION INTRAMUSCULAR; INTRAVENOUS at 11:11

## 2025-03-25 RX ADMIN — LEUCOVORIN CALCIUM 280 MG: 350 INJECTION, POWDER, LYOPHILIZED, FOR SUSPENSION INTRAMUSCULAR; INTRAVENOUS at 12:01

## 2025-03-25 RX ADMIN — PALONOSETRON HYDROCHLORIDE 0.25 MG: 0.25 INJECTION INTRAVENOUS at 10:40

## 2025-03-25 RX ADMIN — BEVACIZUMAB-ADCD 120 MG: 100 INJECTION, SOLUTION INTRAVENOUS at 11:31

## 2025-03-25 RX ADMIN — FLUOROURACIL 280 MG: 50 INJECTION, SOLUTION INTRAVENOUS at 12:35

## 2025-03-25 NOTE — PROGRESS NOTES
[facility]    CONSULTING IN BLOOD DISORDERS & CANCER      REASON FOR CONSULTATION/CHIEF COMPLAINT:     Evaluation and management for metastatic colorectal cancer                             REQUESTING PHYSICIAN: No ref. provider found  RECORDS OBTAINED:  Records of the patients history including those from the electronic medical record were reviewed and summarized in detail.    HISTORY OF PRESENT ILLNESS:    The patient is a 89 y.o. year old female with medical history significant for CAD s/p CABG x 3 (9097, 2007), PCI's x 16, pacemaker, peptic ulcer disease, hypertension and dyslipidemia comes for metastatic colorectal cancer evaluation and management.    Patient had presented to her PCP,  in early October 2024 with lower abdominal pain and weight loss of > 20 pounds.  A CT A/P performed 10/9/2024 noted innumerable lung nodules, multiple hypodense masses in the liver and a large mass in the hepatic flexure of the colon, measuring 4.5 cm in greatest dimension.    Patient underwent CT-guided liver lesion biopsy on 10/28/2024.  Pathology: Metastatic adenocarcinoma of colorectal origin.    Patient was referred to hematology/oncology for further evaluation and management.  She was first seen in this clinic on 11//24.  Accompanied by 3 of 4 daughters.  Patient denies any major GI issues.  No blood in stool or altered bowel habits.  She does take stool softeners for regular bowel movements.  No nausea or vomiting.  No family history of colorectal cancer.    11/5/2024: PET/CT demonstrates hypermetabolic lesions in the colonic hepatic flexure, pulmonary, hepatic, osseous and zenaida metastasis.  Colonoscopy did reveal hepatic flexure mass, partially obstructing.    11/21/2024: Cycle 1 FOLFOX    12/5/2024: Was seen for cycle 2 FOLFOX, however noted to have severe neutropenia with WBC count of 0.52 and ANC of 0.08.  Chemotherapy was held and received Neupogen x 2 doses.       Patient has DPYD intermediate mutation.   Given prolonged neutropenia, age, and presence of DPYD mutation-plan made to administer 50% dose of FOLFOX with G-CSF support in the form of Neulasta on pro.    CT scans after 4 cycles show excellent response to treatment    INTERIM HISTORY:  She returns today for follow up and Cycle 8 FOLFOX +bevacizumab. She notes fatigue has really improved since last week. She notes nausea 2-3 days after treatment which does resolve with zofran. She denies vomiting or diarrhea. She reports intermittent numbness and tingling, but states it hasn't happened in a few weeks. She denies urinary symptoms. She saw her cardiologist yesterday and states from that standpoint she is doing well. She is eating well, but admittedly reports not drinking enough water and per her daughter, hardly any. Discussed increasing hydration. She plans to return to tap dancing class and is hoping she can also start going to daily MPV again.     Past Medical History:   Diagnosis Date    Anemia     Arthritis of left sacroiliac joint 05/05/2022    CKD (chronic kidney disease), stage III     PATIENT STATES IT IS AT STAGE 4 AT THIS TIME    Colorectal cancer     Coronary artery disease     cath 11/15. CABG x3 and all grafts widely patent. Very small OM and Cfx and that may be the cause of lateral ischemia on stress test. Non bipassable    Diabetes mellitus, type II     GERD (gastroesophageal reflux disease)     Gout     Heart attack 10/2022    MULTIPLE (6 OR MORE)    History of DVT in adulthood     AFTER CHILDBIRTH    Hyperlipidemia     Hypertension     Liver masses     Pacemaker     PUD (peptic ulcer disease)      Past Surgical History:   Procedure Laterality Date    CARDIAC ELECTROPHYSIOLOGY PROCEDURE N/A 02/22/2017    Procedure: Pacemaker DC new;  Surgeon: Juvenal Zhong MD;  Location: [facility] INVASIVE LOCATION;  Service:     CARDIAC SURGERY  10/2022    CATARACT EXTRACTION      COLONOSCOPY  2010    COLONOSCOPY N/A 11/19/2024    Procedure: COLONOSCOPY  WITH TATTOO MASS INJECTION 4 ML;  Surgeon: Caden Christian MD;  Location:  [facility];  Service: General;  Laterality: N/A;    CORONARY ARTERY BYPASS GRAFT      1992, 2009    EPIDURAL Left 10/17/2022    Procedure: LUMBAR/SACRAL TRANSFORAMINAL EPIDURAL Left L4-5 and left L5-S1 - hold plavix 7 days before;  Surgeon: Yasmine Gaming MD;  Location: [facility];  Service: Pain Management;  Laterality: Left;    PACEMAKER IMPLANTATION      PIRIFORMUS INJECTION Left 07/15/2022    Procedure: PIRIFORMIS INJECTION;  Surgeon: Yasmine Gaming MD;  Location: [facility];  Service: Pain Management;  Laterality: Left;    RADIOFREQUENCY ABLATION Left 02/20/2023    Procedure: RADIOFREQUENCY ABLATION NERVES for the left sacroiliac joint;  Surgeon: Yasmine Gaming MD;  Location: [facility];  Service: Pain Management;  Laterality: Left;    SACROILIAC JOINT INJECTION Left 05/09/2022    Procedure: SACROILIAC INJECTION - Left;  Surgeon: Yasmine Gaming MD;  Location: [facility];  Service: Pain Management;  Laterality: Left;    SACROILIAC JOINT INJECTION Left 07/15/2022    Procedure: left Sacroiliac joint injection and left piriformis injection;  Surgeon: Yasmine Gaming MD;  Location: [facility];  Service: Pain Management;  Laterality: Left;    TUBAL ABDOMINAL LIGATION      VENOUS ACCESS DEVICE (PORT) INSERTION N/A 11/19/2024    Procedure: INSERTION VENOUS ACCESS DEVICE;  Surgeon: Caden Christian MD;  Location:  [facility];  Service: General;  Laterality: N/A;       MEDICATIONS    Current Outpatient Medications:     aspirin 81 MG EC tablet, Take 1 tablet by mouth Daily. HOLD FOR SURGERY PER MD ORDERS, Disp: , Rfl:     clopidogrel (PLAVIX) 75 MG tablet, Take 1 tablet by mouth Daily. HOLD FOR SURGERY PER MD ORDERS, Disp: , Rfl:     dapagliflozin Propanediol (Farxiga) 10 MG tablet, TAKE 1 TABLET BY MOUTH DAILY, Disp: 90 tablet, Rfl: 1    dronabinol (Marinol) 2.5 MG capsule, Take 1 capsule by mouth 2  (Two) Times a Day Before Meals., Disp: 60 capsule, Rfl: 0    glipizide (GLUCOTROL) 5 MG tablet, TAKE 2 TABLETS BY MOUTH EVERY MORNING WITH BREAKFAST AND TAKE ONE TABLET BY MOUTH EVERY EVENING, Disp: 270 tablet, Rfl: 1    hydrALAZINE (APRESOLINE) 25 MG tablet, TAKE 1 TABLET BY MOUTH TWICE A DAY, Disp: 180 tablet, Rfl: 1    hydroCHLOROthiazide 12.5 MG tablet, Take 1 tablet by mouth Daily., Disp: 90 tablet, Rfl: 1    lidocaine-prilocaine (EMLA) 2.5-2.5 % cream, Apply 1 Application topically to the appropriate area as directed Every 2 (Two) Hours As Needed for Mild Pain., Disp: 30 g, Rfl: 3    metFORMIN (GLUCOPHAGE) 500 MG tablet, Take 2 tablets by mouth 2 (Two) Times a Day With Meals., Disp: 360 tablet, Rfl: 1    metoprolol tartrate (LOPRESSOR) 50 MG tablet, TAKE 2 TABLETS BY MOUTH TWICE A DAY, Disp: 360 tablet, Rfl: 1    multivitamin with minerals (CENTRUM SILVER 50+WOMEN PO), Take 1 tablet by mouth Daily. HOLD FOR SURGERY, Disp: , Rfl:     nitroglycerin (NITROSTAT) 0.4 MG SL tablet, 1 tablet., Disp: , Rfl:     ondansetron (ZOFRAN) 8 MG tablet, Take 1 tablet by mouth 3 (Three) Times a Day As Needed for Nausea or Vomiting., Disp: 30 tablet, Rfl: 5    potassium chloride (KLOR-CON M10) 10 MEQ CR tablet, Take 1 tablet by mouth Daily., Disp: 30 tablet, Rfl: 0    rosuvastatin (Crestor) 40 MG tablet, Take 1 tablet by mouth Daily., Disp: 90 tablet, Rfl: 3    sulfamethoxazole-trimethoprim (BACTRIM DS,SEPTRA DS) 800-160 MG per tablet, Take 1 tablet by mouth 2 (Two) Times a Day., Disp: 14 tablet, Rfl: 0    ALLERGIES:     Allergies   Allergen Reactions    Pneumococcal Vaccines Paresthesia       SOCIAL HISTORY:       Social History     Socioeconomic History    Marital status:    Tobacco Use    Smoking status: Never     Passive exposure: Never    Smokeless tobacco: Never   Vaping Use    Vaping status: Never Used   Substance and Sexual Activity    Alcohol use: Never    Drug use: Never         FAMILY HISTORY:  Family History  "  Problem Relation Age of Onset    Coronary artery disease Mother     Heart attack Sister     Liseth Hyperthermia Neg Hx             Vitals:    03/25/25 0915   BP: 129/71   Pulse: 72   Resp: 16   Temp: 97.7 °F (36.5 °C)   TempSrc: Oral   SpO2: 100%   Weight: 44.7 kg (98 lb 8 oz)   Height: 152.5 cm (60.04\")   PainSc: 0-No pain           3/25/2025     9:12 AM   Current Status   ECOG score 1     PHYSICAL EXAM:  **  CONSTITUTIONAL:  Vital signs reviewed.  No distress, looks comfortable.  Somewhat frail.  EYES:  Conjunctiva and lids unremarkable.   EARS,NOSE,MOUTH,THROAT:  Ears and nose appear unremarkable.  Lips, teeth, gums appear unremarkable.  RESPIRATORY:  Normal respiratory effort. Lungs clear to auscultation bilaterally.   CARDIOVASCULAR:  Normal S1, S2.  No murmurs rubs or gallops.  No significant lower extremity edema.  GASTROINTESTINAL: Abdomen appears unremarkable.  Nondistended  LYMPHATIC:  No cervical, supraclavicular lymphadenopathy.  NEURO: AAOx3, no focal deficits.  Appears to have equal strength all 4 extremities.  MUSCULOSKELETAL:  Unremarkable digits/nails.  No cyanosis or clubbing.  No apparent joint deformities.  SKIN:  Warm.  No rashes.  PSYCHIATRIC:  Normal judgment and insight.  Normal mood and affect.     RECENT LABS:  Results from last 7 days   Lab Units 03/25/25  0837   WBC 10*3/mm3 8.65   NEUTROS ABS 10*3/mm3 6.40   HEMOGLOBIN g/dL 10.4*   HEMATOCRIT % 32.2*   PLATELETS 10*3/mm3 188     Results from last 7 days   Lab Units 03/25/25  0837 03/21/25  1223   SODIUM mmol/L 137 139   POTASSIUM mmol/L 4.6 4.9   CHLORIDE mmol/L 105 107   CO2 mmol/L 18.7* 19.8*   BUN mg/dL 27* 24*   CREATININE mg/dL 1.36* 0.98   CALCIUM mg/dL 9.1 9.4   ALBUMIN g/dL 3.5 3.5   BILIRUBIN mg/dL <0.2 0.2   ALK PHOS U/L 141* 192*   ALT (SGPT) U/L 58* 114*   AST (SGOT) U/L 50* 98*   GLUCOSE mg/dL 227* 168*         ASSESSMENT:  Patient is a pleasant 89-year-old female with medical history significant for CAD s/p CABG x 3 (1997, " 2007), PCI's x 16, pacemaker, peptic ulcer disease, hypertension and dyslipidemia comes for metastatic colorectal cancer evaluation and management.    # Metastatic colorectal cancer with liver and lung metastasis, right sided:  Patient had presented to her PCP,  in early October 2024 with lower abdominal pain and weight loss of > 20 pounds.    A CT A/P performed 10/9/2024 noted innumerable lung nodules, multiple hypodense masses in the liver and a large mass in the hepatic flexure of the colon, measuring 4.5 cm in greatest dimension.  CT-guided liver lesion biopsy on 10/28/2024. Pathology: Metastatic adenocarcinoma of colorectal origin.  CEA 5386.0 (10/21/2024)  I reviewed the imaging and pathology findings with patient and her family in detail.  Informed that she has been diagnosed with metastatic colorectal cancer with diffuse metastasis.  Reviewed  natural history, prognosis and palliative treatment options.  Patient is interested in receiving cancer directed therapy.  With her age and significant cardiovascular medical comorbidity-treatment with palliative chemotherapy would be challenging.  [facility] NGS shows MSI stable.  Low TMB.  No NRAS/KRAS/BRAF mutations. [id number] liquid NGS Shows MSI stable.  SHANE mutation, FGFR 1 and EGFR gene amplifications.   [facility] germline mutation testing pending  11/21/2024: Cycle 1 FOLFOX.    12/5/2024: Was seen for cycle 2 FOLFOX, however noted to have severe neutropenia with WBC count of 0.52 and ANC of 0.08.  Chemotherapy was held and received Neupogen x 2 doses.     Patient has DPYD intermediate mutation.  Given prolonged neutropenia, age and presence of DPYD mutation-plan made to administer 50% dose of FOLFOX with G-CSF support.   12/31/2024: Received cycle 3 FOLFOX with bevacizumab.    1/14/2025: Received cycle 4 FOLFOX without Avastin (held due to significant fatigue).  CT scans after 4 cycles showed excellent response to treatment  [id number] from February 2025  shows downward trending ctDNA levels  2/25/2025: Tolerated the last dose of chemotherapy fairly well, although had prolonged fatigue.  Labs today show leukocytosis of 30.31-neutrophilic, anemia of 10.2 and mild elevation in serum creatinine of 1.01.  UA with excessive glucose, however is negative for any evidence of UTI.  She recently completed antibiotic course for UTI.  No current signs/symptoms of infection.  Suspect leukocytosis related to G-CSF.  Reviewed recent [id number] results which show excellent response.  Patient had received an EOB from [facility] and is worried about payment.  Will discuss with [facility]'s team if patient receives a bill.  Given prolonged fatigue with last cycle, plan made to proceed with cycle 7 FOLFOX (50% dose) along with Avastin (50% dose) and increase the gap to once every 3 weeks moving forward.    3/18: Chemo held due to fatigue, diffuse arthralgias and abnormal LFTs.  3/21/25: Reviewed recent CT scans which show overall stable findings with no evidence of disease progression.  Patient's arthralgias and fatigue slightly improved.  I had extensive discussion with patient and her family regarding goals of care, risks/benefits of continuing chemotherapy and just doing palliative care.  Patient remains quite interested in continuing with chemotherapy although she understands risk of further decline in her performance status.  Her LFTs remain elevated today.  Plan made to see her back next week on Tuesday for repeat labs.  If LFTs remain elevated, will hold oxaliplatin and proceed with 5-FU/Avastin (50% doses) alone.  3/25/2025: She returns today for Cycle 8 FOLFOX +bevacizumab. Fatigue has improved. Liver enzymes also improved today, AST 50 and ALT 58. Discussed with Dr. Meeks, will proceed today with Cycle 3, but will omit oxaliplatin. Moving forward, if LFT's continue to improve, will add oxaliplatin back in at 60% dose reduction (previously at 50% dose reduction). Treatment has  also been adjusted to every 3 weeks and therefore, per Dr. Meeks, okay to discontinue neulasta on pro.     # Leukocytosis:  WBC count of 23.0 on 2/11/2025.  No clear signs or symptoms of infections.  Unclear whether Neulasta related versus UTI.  Urine cultures grew E. coli.  She received Omnicef.  2/25/2025: Persistent leukocytosis of 30,000.  UA is negative.  I suspect this to be G-CSF related rather than underlying evidence of infection.  Will closely monitor.  3/18/2025: WBC 16,270.  This is felt to be related to ongoing Neulasta support.  3/25/2025: WBC 8.65.    # Anemia:  Hemoglobin 11.3 g/dL on 11/4/24.  Iron profile most consistent with iron deficiency.  11/21/2024: Patient reports of constipation.  Has poor tolerance to oral iron.    11/21/2024: Received IV Injectafer 700 mg x 1 dose  3/18/2025: Hgb 10.6.  3/25/2025: Hgb 10.4    # CAD s/p CABG (1997 and 2007) and PCI x 16:   Follows up with , cardiology at the [facility]  On aspirin 81 mg and Plavix 75 mg daily    # DM2:  Currently on Farxiga, glipizide and metformin.    Denies any neuropathy.  Per patient, last A1c was 8.6.  Being managed by PCP    # Intermittent bone pain  Reports occasional pain in her right shoulder or left leg.  This is fleeting and is felt to likely be related to Neulasta support.  3/25/2025: Currently resolved. Discussed with Dr. Meeks and will discontinue neulasta onpro since treatment has been adjusted to every 3 weeks.     PLAN:   Proceed with Cycle 3 FOLFOX +bevacizumab, omit oxaliplatin today due to ongoing elevated LFT's. If liver enzymes continue to improve, can add oxaliplatin back on at 60% dose reduction. Continue 5FU bolus and infusion at 50% dose reduction.   Discontinue neulasta per Dr. Meeks since treatment has been adjusted to every 3 weeks.   Return to clinic on day 3 for unhook and 500mL NS bolus. Continue with each cycle.   Encouraged increasing hydration   Continue to follow with oncology dietician    Continue physical therapy at home on Fridays   Return to clinic in 3 weeks for MD visit, Cycle 9 FOLFOX + bevacizumab, and labs per protocol. Depending on liver enzymes and tolerance of this cycle, will consider adding back in oxaliplatin at a 60% dose reduction.     Patient is on a high risk medication requiring close monitoring for toxicity.      Shanika Sykes, APRN

## 2025-03-27 ENCOUNTER — INFUSION (OUTPATIENT)
Dept: ONCOLOGY | Facility: HOSPITAL | Age: 89
End: 2025-03-27
Payer: MEDICARE

## 2025-03-27 VITALS
TEMPERATURE: 97.5 F | HEART RATE: 71 BPM | SYSTOLIC BLOOD PRESSURE: 121 MMHG | DIASTOLIC BLOOD PRESSURE: 73 MMHG | BODY MASS INDEX: 19.47 KG/M2 | OXYGEN SATURATION: 96 % | WEIGHT: 99.8 LBS | RESPIRATION RATE: 16 BRPM

## 2025-03-27 DIAGNOSIS — C78.7 METASTATIC COLON CANCER TO LIVER: Primary | ICD-10-CM

## 2025-03-27 DIAGNOSIS — C18.9 METASTATIC COLON CANCER TO LIVER: Primary | ICD-10-CM

## 2025-03-27 PROCEDURE — 25810000003 SODIUM CHLORIDE 0.9 % SOLUTION

## 2025-03-27 PROCEDURE — 96360 HYDRATION IV INFUSION INIT: CPT

## 2025-03-27 RX ADMIN — SODIUM CHLORIDE 500 ML: 9 INJECTION, SOLUTION INTRAVENOUS at 10:10

## 2025-03-28 LAB
LAB GUARDANT ONC MSI-HIGH: NOT DETECTED
LAB GUARDANT ONC TMB: 7.59 MUT/MB

## 2025-03-31 ENCOUNTER — PATIENT OUTREACH (OUTPATIENT)
Dept: RADIATION ONCOLOGY | Facility: HOSPITAL | Age: 89
End: 2025-03-31
Payer: MEDICARE

## 2025-04-01 ENCOUNTER — TELEPHONE (OUTPATIENT)
Dept: ONCOLOGY | Facility: CLINIC | Age: 89
End: 2025-04-01
Payer: MEDICARE

## 2025-04-01 NOTE — TELEPHONE ENCOUNTER
----- Message from April C sent at 3/31/2025  5:10 PM EDT -----  Dr. Meeks,Ms. Peter's daughter Mandi called and stated pt is very weak since her last chemo on 03/25. She needs assistance to get out of the chair, into the bed, go to the bathroom. Mandi said physical therapy will probably not come to the house because she is too weak for therapy. She did receive fluids twice last week. I informed Mandi I would let you know and she may need to come in for a RN review or NP visit. I provided her with a list of home health services for private pay per her request. April RN    Called back patient she reports to have fallen in [place] and hurt her knee, due to missing a step. Pt has fallen the next day at home, she reports that her knee gave up on her. PT came Friday last week, and has applied some bandages. Pt reports to have been able to go to the bathroom and able to shower by herself. Pt has been eating and drinking OK. S/W Winsome Moore who confirmed that she did was able to eat oatmeal for breakfast. As per Marine, PT- Billy Gonzales [phone] who is scheduled to see patient jacquelyn. S/W Mandi, who reports that patient is doing better from last week. Patient is more weak than usual with this treatment. Patient is able to drink and eat okay. Mandi knows to call us if patient would need to be assessed/labs drawn in office.

## 2025-04-07 RX ORDER — OLANZAPINE 5 MG/1
TABLET ORAL
Qty: 30 TABLET | Refills: 1 | OUTPATIENT
Start: 2025-04-07

## 2025-04-15 ENCOUNTER — INFUSION (OUTPATIENT)
Dept: ONCOLOGY | Facility: HOSPITAL | Age: 89
End: 2025-04-15
Payer: MEDICARE

## 2025-04-15 ENCOUNTER — OFFICE VISIT (OUTPATIENT)
Dept: ONCOLOGY | Facility: CLINIC | Age: 89
End: 2025-04-15
Payer: MEDICARE

## 2025-04-15 VITALS
HEART RATE: 75 BPM | DIASTOLIC BLOOD PRESSURE: 70 MMHG | WEIGHT: 100.2 LBS | SYSTOLIC BLOOD PRESSURE: 124 MMHG | HEIGHT: 60 IN | RESPIRATION RATE: 17 BRPM | OXYGEN SATURATION: 98 % | BODY MASS INDEX: 19.67 KG/M2 | TEMPERATURE: 97.2 F

## 2025-04-15 DIAGNOSIS — C78.7 METASTATIC COLON CANCER TO LIVER: Primary | ICD-10-CM

## 2025-04-15 DIAGNOSIS — C18.9 METASTATIC COLON CANCER TO LIVER: ICD-10-CM

## 2025-04-15 DIAGNOSIS — C18.9 CARCINOMA OF COLON METASTATIC TO LUNG: ICD-10-CM

## 2025-04-15 DIAGNOSIS — C18.9 METASTATIC COLON CANCER TO LIVER: Primary | ICD-10-CM

## 2025-04-15 DIAGNOSIS — C78.7 METASTATIC COLON CANCER TO LIVER: ICD-10-CM

## 2025-04-15 DIAGNOSIS — C18.9 ADENOCARCINOMA OF COLON: ICD-10-CM

## 2025-04-15 DIAGNOSIS — C78.00 CARCINOMA OF COLON METASTATIC TO LUNG: ICD-10-CM

## 2025-04-15 DIAGNOSIS — Z45.2 ENCOUNTER FOR ADJUSTMENT OR MANAGEMENT OF VASCULAR ACCESS DEVICE: Primary | ICD-10-CM

## 2025-04-15 LAB
ALBUMIN SERPL-MCNC: 3.2 G/DL (ref 3.5–5.2)
ALBUMIN/GLOB SERPL: 1.2 G/DL
ALP SERPL-CCNC: 129 U/L (ref 39–117)
ALT SERPL W P-5'-P-CCNC: 24 U/L (ref 1–33)
ANION GAP SERPL CALCULATED.3IONS-SCNC: 14.9 MMOL/L (ref 5–15)
AST SERPL-CCNC: 30 U/L (ref 1–32)
BASOPHILS # BLD AUTO: 0.04 10*3/MM3 (ref 0–0.2)
BASOPHILS NFR BLD AUTO: 0.6 % (ref 0–1.5)
BILIRUB SERPL-MCNC: 0.3 MG/DL (ref 0–1.2)
BILIRUB UR QL STRIP: NEGATIVE
BUN SERPL-MCNC: 21 MG/DL (ref 8–23)
BUN/CREAT SERPL: 19.3 (ref 7–25)
CALCIUM SPEC-SCNC: 8.8 MG/DL (ref 8.6–10.5)
CEA SERPL-MCNC: 854 NG/ML
CHLORIDE SERPL-SCNC: 101 MMOL/L (ref 98–107)
CLARITY UR: CLEAR
CO2 SERPL-SCNC: 21.1 MMOL/L (ref 22–29)
COLOR UR: YELLOW
CREAT SERPL-MCNC: 1.09 MG/DL (ref 0.57–1)
DEPRECATED RDW RBC AUTO: 57.8 FL (ref 37–54)
EGFRCR SERPLBLD CKD-EPI 2021: 49.3 ML/MIN/1.73
EOSINOPHIL # BLD AUTO: 0.2 10*3/MM3 (ref 0–0.4)
EOSINOPHIL NFR BLD AUTO: 2.8 % (ref 0.3–6.2)
ERYTHROCYTE [DISTWIDTH] IN BLOOD BY AUTOMATED COUNT: 15.5 % (ref 12.3–15.4)
GLOBULIN UR ELPH-MCNC: 2.6 GM/DL
GLUCOSE SERPL-MCNC: 255 MG/DL (ref 65–99)
GLUCOSE UR STRIP-MCNC: ABNORMAL MG/DL
HCT VFR BLD AUTO: 31.1 % (ref 34–46.6)
HGB BLD-MCNC: 10 G/DL (ref 12–15.9)
HGB UR QL STRIP.AUTO: NEGATIVE
IMM GRANULOCYTES # BLD AUTO: 0.02 10*3/MM3 (ref 0–0.05)
IMM GRANULOCYTES NFR BLD AUTO: 0.3 % (ref 0–0.5)
KETONES UR QL STRIP: NEGATIVE
LEUKOCYTE ESTERASE UR QL STRIP.AUTO: ABNORMAL
LYMPHOCYTES # BLD AUTO: 0.98 10*3/MM3 (ref 0.7–3.1)
LYMPHOCYTES NFR BLD AUTO: 13.6 % (ref 19.6–45.3)
MCH RBC QN AUTO: 33.7 PG (ref 26.6–33)
MCHC RBC AUTO-ENTMCNC: 32.2 G/DL (ref 31.5–35.7)
MCV RBC AUTO: 104.7 FL (ref 79–97)
MONOCYTES # BLD AUTO: 0.48 10*3/MM3 (ref 0.1–0.9)
MONOCYTES NFR BLD AUTO: 6.6 % (ref 5–12)
NEUTROPHILS NFR BLD AUTO: 5.5 10*3/MM3 (ref 1.7–7)
NEUTROPHILS NFR BLD AUTO: 76.1 % (ref 42.7–76)
NITRITE UR QL STRIP: NEGATIVE
NRBC BLD AUTO-RTO: 0 /100 WBC (ref 0–0.2)
PH UR STRIP.AUTO: <=5 [PH] (ref 4.5–8)
PLATELET # BLD AUTO: 148 10*3/MM3 (ref 140–450)
PMV BLD AUTO: 10.2 FL (ref 6–12)
POTASSIUM SERPL-SCNC: 3.7 MMOL/L (ref 3.5–5.2)
PROT SERPL-MCNC: 5.8 G/DL (ref 6–8.5)
PROT UR QL STRIP: NEGATIVE
RBC # BLD AUTO: 2.97 10*6/MM3 (ref 3.77–5.28)
SODIUM SERPL-SCNC: 137 MMOL/L (ref 136–145)
SP GR UR STRIP: 1.01 (ref 1–1.03)
UROBILINOGEN UR QL STRIP: ABNORMAL
WBC NRBC COR # BLD AUTO: 7.22 10*3/MM3 (ref 3.4–10.8)

## 2025-04-15 PROCEDURE — 80053 COMPREHEN METABOLIC PANEL: CPT

## 2025-04-15 PROCEDURE — 82378 CARCINOEMBRYONIC ANTIGEN: CPT | Performed by: INTERNAL MEDICINE

## 2025-04-15 PROCEDURE — 99215 OFFICE O/P EST HI 40 MIN: CPT | Performed by: INTERNAL MEDICINE

## 2025-04-15 PROCEDURE — 81003 URINALYSIS AUTO W/O SCOPE: CPT

## 2025-04-15 PROCEDURE — G2211 COMPLEX E/M VISIT ADD ON: HCPCS | Performed by: INTERNAL MEDICINE

## 2025-04-15 PROCEDURE — 25010000002 HEPARIN LOCK FLUSH PER 10 UNITS: Performed by: INTERNAL MEDICINE

## 2025-04-15 PROCEDURE — 36591 DRAW BLOOD OFF VENOUS DEVICE: CPT

## 2025-04-15 PROCEDURE — 1126F AMNT PAIN NOTED NONE PRSNT: CPT | Performed by: INTERNAL MEDICINE

## 2025-04-15 PROCEDURE — 85025 COMPLETE CBC W/AUTO DIFF WBC: CPT

## 2025-04-15 RX ORDER — SODIUM CHLORIDE 0.9 % (FLUSH) 0.9 %
10 SYRINGE (ML) INJECTION AS NEEDED
Status: DISCONTINUED | OUTPATIENT
Start: 2025-04-15 | End: 2025-04-15 | Stop reason: HOSPADM

## 2025-04-15 RX ORDER — HEPARIN SODIUM (PORCINE) LOCK FLUSH IV SOLN 100 UNIT/ML 100 UNIT/ML
500 SOLUTION INTRAVENOUS AS NEEDED
Status: DISCONTINUED | OUTPATIENT
Start: 2025-04-15 | End: 2025-04-15 | Stop reason: HOSPADM

## 2025-04-15 RX ORDER — HEPARIN SODIUM (PORCINE) LOCK FLUSH IV SOLN 100 UNIT/ML 100 UNIT/ML
500 SOLUTION INTRAVENOUS AS NEEDED
OUTPATIENT
Start: 2025-04-15

## 2025-04-15 RX ORDER — SODIUM CHLORIDE 0.9 % (FLUSH) 0.9 %
10 SYRINGE (ML) INJECTION AS NEEDED
OUTPATIENT
Start: 2025-04-15

## 2025-04-15 RX ADMIN — Medication 10 ML: at 09:22

## 2025-04-15 RX ADMIN — Medication 500 UNITS: at 09:22

## 2025-04-15 NOTE — PROGRESS NOTES
[facility]    CONSULTING IN BLOOD DISORDERS & CANCER      REASON FOR CONSULTATION/CHIEF COMPLAINT:     Evaluation and management for metastatic colorectal cancer                             REQUESTING PHYSICIAN: No ref. provider found  RECORDS OBTAINED:  Records of the patients history including those from the electronic medical record were reviewed and summarized in detail.    HISTORY OF PRESENT ILLNESS:    The patient is a 89 y.o. year old female with medical history significant for CAD s/p CABG x 3 (9097, 2007), PCI's x 16, pacemaker, peptic ulcer disease, hypertension and dyslipidemia comes for metastatic colorectal cancer evaluation and management.    Patient had presented to her PCP,  in early October 2024 with lower abdominal pain and weight loss of > 20 pounds.  A CT A/P performed 10/9/2024 noted innumerable lung nodules, multiple hypodense masses in the liver and a large mass in the hepatic flexure of the colon, measuring 4.5 cm in greatest dimension.    Patient underwent CT-guided liver lesion biopsy on 10/28/2024.  Pathology: Metastatic adenocarcinoma of colorectal origin.    Patient was referred to hematology/oncology for further evaluation and management.  She was first seen in this clinic on 11//24.  Accompanied by 3 of 4 daughters.  Patient denies any major GI issues.  No blood in stool or altered bowel habits.  She does take stool softeners for regular bowel movements.  No nausea or vomiting.  No family history of colorectal cancer.    11/5/2024: PET/CT demonstrates hypermetabolic lesions in the colonic hepatic flexure, pulmonary, hepatic, osseous and zenaida metastasis.  Colonoscopy did reveal hepatic flexure mass, partially obstructing.    11/21/2024: Cycle 1 FOLFOX    12/5/2024: Was seen for cycle 2 FOLFOX, however noted to have severe neutropenia with WBC count of 0.52 and ANC of 0.08.  Chemotherapy was held and received Neupogen x 2 doses.       Patient has DPYD intermediate mutation.   Given prolonged neutropenia, age, and presence of DPYD mutation-plan made to administer 50% dose of FOLFOX with G-CSF support in the form of Neulasta on pro.    CT scans after 4 cycles show excellent response to treatment    INTERIM HISTORY:  She returns today for follow up visit. Patient had received cycle 8 FOLFOX-avastin 3 weeks ago. Since then, patient family reports she has not done well. She has been in the bed for the first 7-10 days. Also had falls. Low energy and poor appetite. No cardiac and respiratory symptoms.     Things have somewhat improved in last 1-2 weeks. She denies vomiting or diarrhea. She denies urinary symptoms. She saw her cardiologist recently and states from that standpoint she is doing well. She plans to return to tap dancing class and is hoping she can also start going to daily mass again.     Past Medical History:   Diagnosis Date    Anemia     Arthritis of left sacroiliac joint 05/05/2022    CKD (chronic kidney disease), stage III     PATIENT STATES IT IS AT STAGE 4 AT THIS TIME    Colorectal cancer     Coronary artery disease     cath 11/15. CABG x3 and all grafts widely patent. Very small OM and Cfx and that may be the cause of lateral ischemia on stress test. Non bipassable    Diabetes mellitus, type II     GERD (gastroesophageal reflux disease)     Gout     Heart attack 10/2022    MULTIPLE (6 OR MORE)    History of DVT in adulthood     AFTER CHILDBIRTH    Hyperlipidemia     Hypertension     Liver masses     Pacemaker     PUD (peptic ulcer disease)      Past Surgical History:   Procedure Laterality Date    CARDIAC ELECTROPHYSIOLOGY PROCEDURE N/A 02/22/2017    Procedure: Pacemaker DC new;  Surgeon: Juvenal Zhong MD;  Location: [facility] INVASIVE LOCATION;  Service:     CARDIAC SURGERY  10/2022    CATARACT EXTRACTION      COLONOSCOPY  2010    COLONOSCOPY N/A 11/19/2024    Procedure: COLONOSCOPY WITH TATTOO MASS INJECTION 4 ML;  Surgeon: Caden Christian MD;  Location:   [facility];  Service: General;  Laterality: N/A;    CORONARY ARTERY BYPASS GRAFT      1992, 2009    EPIDURAL Left 10/17/2022    Procedure: LUMBAR/SACRAL TRANSFORAMINAL EPIDURAL Left L4-5 and left L5-S1 - hold plavix 7 days before;  Surgeon: Yasmine Gaming MD;  Location: [facility];  Service: Pain Management;  Laterality: Left;    PACEMAKER IMPLANTATION      PIRIFORMUS INJECTION Left 07/15/2022    Procedure: PIRIFORMIS INJECTION;  Surgeon: Yasmine Gaming MD;  Location: [facility];  Service: Pain Management;  Laterality: Left;    RADIOFREQUENCY ABLATION Left 02/20/2023    Procedure: RADIOFREQUENCY ABLATION NERVES for the left sacroiliac joint;  Surgeon: Yasmine Gaming MD;  [facility]: [facility];  Service: Pain Management;  Laterality: Left;    SACROILIAC JOINT INJECTION Left 05/09/2022    Procedure: SACROILIAC INJECTION - Left;  Surgeon: Yasmine Gaming MD;  [facility];  Service: Pain Management;  Laterality: Left;    SACROILIAC JOINT INJECTION Left 07/15/2022    Procedure: left Sacroiliac joint injection and left piriformis injection;  Surgeon: Yasmine Gaming MD;  [facility]: [facility];  Service: Pain Management;  Laterality: Left;    TUBAL ABDOMINAL LIGATION      VENOUS ACCESS DEVICE (PORT) INSERTION N/A 11/19/2024    Procedure: INSERTION VENOUS ACCESS DEVICE;  Surgeon: Caden Christian MD;  Location:  [facility];  Service: General;  Laterality: N/A;       MEDICATIONS    Current Outpatient Medications:     aspirin 81 MG EC tablet, Take 1 tablet by mouth Daily. HOLD FOR SURGERY PER MD ORDERS, Disp: , Rfl:     clopidogrel (PLAVIX) 75 MG tablet, Take 1 tablet by mouth Daily. HOLD FOR SURGERY PER MD ORDERS, Disp: , Rfl:     dapagliflozin Propanediol (Farxiga) 10 MG tablet, TAKE 1 TABLET BY MOUTH DAILY, Disp: 90 tablet, Rfl: 1    glipizide (GLUCOTROL) 5 MG tablet, TAKE 2 TABLETS BY MOUTH EVERY MORNING WITH BREAKFAST AND TAKE ONE TABLET BY MOUTH EVERY EVENING, Disp: 270 tablet,  Rfl: 1    hydrALAZINE (APRESOLINE) 25 MG tablet, TAKE 1 TABLET BY MOUTH TWICE A DAY, Disp: 180 tablet, Rfl: 1    hydroCHLOROthiazide 12.5 MG tablet, Take 1 tablet by mouth Daily., Disp: 90 tablet, Rfl: 1    lidocaine-prilocaine (EMLA) 2.5-2.5 % cream, Apply 1 Application topically to the appropriate area as directed Every 2 (Two) Hours As Needed for Mild Pain., Disp: 30 g, Rfl: 3    metFORMIN (GLUCOPHAGE) 500 MG tablet, Take 2 tablets by mouth 2 (Two) Times a Day With Meals., Disp: 360 tablet, Rfl: 1    metoprolol tartrate (LOPRESSOR) 50 MG tablet, TAKE 2 TABLETS BY MOUTH TWICE A DAY, Disp: 360 tablet, Rfl: 1    multivitamin with minerals (CENTRUM SILVER 50+WOMEN PO), Take 1 tablet by mouth Daily. HOLD FOR SURGERY, Disp: , Rfl:     nitroglycerin (NITROSTAT) 0.4 MG SL tablet, 1 tablet., Disp: , Rfl:     ondansetron (ZOFRAN) 8 MG tablet, Take 1 tablet by mouth 3 (Three) Times a Day As Needed for Nausea or Vomiting., Disp: 30 tablet, Rfl: 5    oxaliplatin (ELOXATIN) 100 MG chemo injection, Infuse 100 mg into a venous catheter., Disp: , Rfl:     rosuvastatin (Crestor) 40 MG tablet, Take 1 tablet by mouth Daily., Disp: 90 tablet, Rfl: 3    dronabinol (Marinol) 2.5 MG capsule, Take 1 capsule by mouth 2 (Two) Times a Day Before Meals. (Patient not taking: Reported on 4/15/2025), Disp: 60 capsule, Rfl: 0    potassium chloride (KLOR-CON M10) 10 MEQ CR tablet, Take 1 tablet by mouth Daily. (Patient not taking: Reported on 4/15/2025), Disp: 30 tablet, Rfl: 0    sulfamethoxazole-trimethoprim (BACTRIM DS,SEPTRA DS) 800-160 MG per tablet, Take 1 tablet by mouth 2 (Two) Times a Day. (Patient not taking: Reported on 4/15/2025), Disp: 14 tablet, Rfl: 0    ALLERGIES:     Allergies   Allergen Reactions    Pneumococcal Vaccines Paresthesia       SOCIAL HISTORY:       Social History     Socioeconomic History    Marital status:    Tobacco Use    Smoking status: Never     Passive exposure: Never    Smokeless tobacco: Never   Vaping  "Use    Vaping status: Never Used   Substance and Sexual Activity    Alcohol use: Never    Drug use: Never         FAMILY HISTORY:  Family History   Problem Relation Age of Onset    Coronary artery disease Mother     Heart attack Sister     Liseth Hyperthermia Neg Hx             Vitals:    04/15/25 0834   BP: 124/70   Pulse: 75   Resp: 17   Temp: 97.2 °F (36.2 °C)   TempSrc: Oral   SpO2: 98%   Weight: 45.5 kg (100 lb 3.2 oz)   Height: 152.5 cm (60.04\")   PainSc: 0-No pain           4/15/2025     8:39 AM   Current Status   ECOG score 1     PHYSICAL EXAM:  **  CONSTITUTIONAL:  Vital signs reviewed.  No distress, looks comfortable.  Somewhat frail.  EYES:  Conjunctiva and lids unremarkable.   EARS,NOSE,MOUTH,THROAT:  Ears and nose appear unremarkable.  Lips, teeth, gums appear unremarkable.  RESPIRATORY:  Normal respiratory effort. Lungs clear to auscultation bilaterally.   CARDIOVASCULAR:  Normal S1, S2.  No murmurs rubs or gallops.  No significant lower extremity edema.  GASTROINTESTINAL: Abdomen appears unremarkable.  Nondistended  LYMPHATIC:  No cervical, supraclavicular lymphadenopathy.  NEURO: AAOx3, no focal deficits.  Appears to have equal strength all 4 extremities.  MUSCULOSKELETAL:  Unremarkable digits/nails.  No cyanosis or clubbing.  No apparent joint deformities.  SKIN:  Warm.  No rashes.  PSYCHIATRIC:  Normal judgment and insight.  Normal mood and affect.     RECENT LABS:  Results from last 7 days   Lab Units 04/15/25  0823   WBC 10*3/mm3 7.22   NEUTROS ABS 10*3/mm3 5.50   HEMOGLOBIN g/dL 10.0*   HEMATOCRIT % 31.1*   PLATELETS 10*3/mm3 148               ASSESSMENT:  Patient is a pleasant 89-year-old female with medical history significant for CAD s/p CABG x 3 (1997, 2007), PCI's x 16, pacemaker, peptic ulcer disease, hypertension and dyslipidemia comes for metastatic colorectal cancer evaluation and management.    # Metastatic colorectal cancer with liver and lung metastasis, right sided:  Patient had " presented to her PCP,  in early October 2024 with lower abdominal pain and weight loss of > 20 pounds.    A CT A/P performed 10/9/2024 noted innumerable lung nodules, multiple hypodense masses in the liver and a large mass in the hepatic flexure of the colon, measuring 4.5 cm in greatest dimension.  CT-guided liver lesion biopsy on 10/28/2024. Pathology: Metastatic adenocarcinoma of colorectal origin.  CEA 5386.0 (10/21/2024)  I reviewed the imaging and pathology findings with patient and her family in detail.  Informed that she has been diagnosed with metastatic colorectal cancer with diffuse metastasis.  Reviewed  natural history, prognosis and palliative treatment options.  Patient is interested in receiving cancer directed therapy.  With her age and significant cardiovascular medical comorbidity-treatment with palliative chemotherapy would be challenging.  [facility] NGS shows MSI stable.  Low TMB.  No NRAS/KRAS/BRAF mutations. Itvalsob994 liquid NGS Shows MSI stable.  SHANE mutation, FGFR 1 and EGFR gene amplifications.   [facility] germline mutation testing pending  11/21/2024: Cycle 1 FOLFOX.    12/5/2024: Was seen for cycle 2 FOLFOX, however noted to have severe neutropenia with WBC count of 0.52 and ANC of 0.08.  Chemotherapy was held and received Neupogen x 2 doses.     Patient has DPYD intermediate mutation.  Given prolonged neutropenia, age and presence of DPYD mutation-plan made to administer 50% dose of FOLFOX with G-CSF support.   12/31/2024: Received cycle 3 FOLFOX with bevacizumab.    1/14/2025: Received cycle 4 FOLFOX without Avastin (held due to significant fatigue).  CT scans after 4 cycles showed excellent response to treatment  [id number] from February 2025 shows downward trending ctDNA levels  2/25/2025: Tolerated the last dose of chemotherapy fairly well, although had prolonged fatigue.  Labs today show leukocytosis of 30.31-neutrophilic, anemia of 10.2 and mild elevation in serum creatinine of  1.01.  UA with excessive glucose, however is negative for any evidence of UTI.  She recently completed antibiotic course for UTI.  No current signs/symptoms of infection.  Suspect leukocytosis related to G-CSF.  Reviewed recent [id number] results which show excellent response.  Patient had received an EOB from [facility] and is worried about payment.  Will discuss with [facility]'s team if patient receives a bill.  Given prolonged fatigue with last cycle, plan made to proceed with cycle 7 FOLFOX (50% dose) along with Avastin (50% dose) and increase the gap to once every 3 weeks moving forward.    3/18: Chemo held due to fatigue, diffuse arthralgias and abnormal LFTs.  3/21/25: Reviewed recent CT scans which show overall stable findings with no evidence of disease progression.  Patient's arthralgias and fatigue slightly improved.  I had extensive discussion with patient and her family regarding goals of care, risks/benefits of continuing chemotherapy and just doing palliative care.  Patient remains quite interested in continuing with chemotherapy although she understands risk of further decline in her performance status.  Her LFTs remain elevated today.  Plan made to see her back next week on Tuesday for repeat labs.  If LFTs remain elevated, will hold oxaliplatin and proceed with 5-FU/Avastin (50% doses) alone.  3/25/2025: She returns today for Cycle 8 FOLFOX +bevacizumab. Fatigue has improved. Liver enzymes also improved today, AST 50 and ALT 58. proceed today with Cycle 8, but will omit oxaliplatin. Moving forward, if LFT's continue to improve, will add oxaliplatin back in at 60% dose reduction (previously at 50% dose reduction). Treatment has also been adjusted to every 3 weeks and therefore, per Dr. Meeks, matthew to discontinue neulasta on pro.   4/15/25: Patient is having worsening cumulative toxicity from chemo. Her family is worried about having side effects from continuation of chemo. Patient remains interested  in continuing treatment. Recent CT scans from 3/19/25 show overall stable findings. Although recent G360 shows slight increase in the cfDNA levels. Plan made to hold off on chemo for now and give her a treatment break for 2-3 months. Will repeat CT c/a/p in ~ 2 months and see her back after that. Encouraged to maintain adequate nutrition at home.    # Leukocytosis:  WBC count of 23.0 on 2/11/2025.  No clear signs or symptoms of infections.  Unclear whether Neulasta related versus UTI.  Urine cultures grew E. coli.  She received Omnicef.  2/25/2025: Persistent leukocytosis of 30,000.  UA is negative.  I suspect this to be G-CSF related rather than underlying evidence of infection.  Will closely monitor.  3/18/2025: WBC 16,270.  This is felt to be related to ongoing Neulasta support.  3/25/2025: WBC 8.65.    # Anemia:  Hemoglobin 11.3 g/dL on 11/4/24.  Iron profile most consistent with iron deficiency.  11/21/2024: Patient reports of constipation.  Has poor tolerance to oral iron.    11/21/2024: Received IV Injectafer 700 mg x 1 dose  3/18/2025: Hgb 10.6.  3/25/2025: Hgb 10.4    # CAD s/p CABG (1997 and 2007) and PCI x 16:   Follows up with , cardiology at the [facility]  On aspirin 81 mg and Plavix 75 mg daily    # DM2:  Currently on Farxiga, glipizide and metformin.    Denies any neuropathy.  Per patient, last A1c was 8.6.  Being managed by PCP    # Intermittent bone pain  Reports occasional pain in her right shoulder or left leg.  This is fleeting and is felt to likely be related to Neulasta support.  3/25/2025: Currently resolved. Discussed with Dr. Meeks and will discontinue neulasta onpro since treatment has been adjusted to every 3 weeks.     PLAN:   Hold further chemo. Plan for treatment break for the next 2-3 months  CT c/a/p in ~ 3 months.  Encouraged increasing hydration   Continue to follow with oncology dietician   Continue physical therapy at home on Fridays   Return to clinic in 3 months  for scans review and further plan or sooner if needed  Orders Placed This Encounter   Procedures    CT Chest With Contrast Diagnostic     Standing Status:   Future     Expected Date:   6/19/2025     Expiration Date:   4/16/2026     Does this exam require Iam Bronch Protocol?:   No     Release to patient:   Routine Release [[id number]]     Reason for Exam::   colon cancer f/u    CT Abdomen Pelvis With Contrast     Standing Status:   Future     Expected Date:   6/19/2025     Expiration Date:   4/16/2026     Will Oral Contrast be needed for this procedure?:   Yes     Release to patient:   Routine Release [[id number]]     Reason for Exam::   colon cancer f/u    CEA     Standing Status:   Future     Number of Occurrences:   1     Expected Date:   4/15/2025     Expiration Date:   4/15/2026     Release to patient:   Routine Release [[id number]]    Comprehensive metabolic panel     Standing Status:   Future     Number of Occurrences:   1     Expected Date:   4/15/2025     Expiration Date:   4/15/2026     Release to patient:   Routine Release [[id number]]    Urinalysis without microscopic (no culture) - Urine, Clean Catch     Standing Status:   Future     Number of Occurrences:   1     Expected Date:   4/15/2025     Expiration Date:   4/15/2026     Release to patient:   Routine Release [[id number]]    Comprehensive metabolic panel     Standing Status:   Future     Expected Date:   6/19/2025     Expiration Date:   4/19/2026     Release to patient:   Routine Release [[id number]]    CEA     Standing Status:   Future     Expected Date:   6/19/2025     Expiration Date:   4/19/2026     Release to patient:   Routine Release [[id number]]    [id number] - Blood, Blood, Venous       ====Department Info====  Department: [facility] [[id number]]   Location: [facility] [[id number]]  Address Line 1: [address] [place] [address]  Address Line 2: [facility] [address]  Phone: Dept: [phone]  Fax: Dept Fax:  [phone]  ====Department Info====  ====Visit Info====  [facility]:  [id number]  ====Visit Info====     Standing Status:   Future     Expected Date:   6/19/2025     Expiration Date:   4/15/2026     Advanced Cancer (Stage IIIB/IV-NSCLC, Stage III/IV-other cancer types)::   Stage IV     Date of original diagnosis::   11/4/2024     Cancer type::   GI     Additional information::   Colorectal Adenocarcinoma     Currently on therapy? If yes, select from the list below.:   Targeted Therapy     Date of therapy initiation::   11/21/2024     The patient is seeking further treatment and is::   Newly diagnosed (Stage III/IV)     Has the patient received a Dpufzsaf455 or Rbxxsozq324 CDx report since their most recent progression?:   No     Is tissue-based comprehensive genomic profiling (CGP) from a recent biopsy feasible?:   No     Has tissue-based CGP from a recent biopsy been performed with a non-QNS result?:   No     Has tissue-based CGP from a recent biopsy already returned an actionable result?:   Yes     Patient status::   Non-hospital Patient     Do you attest to the confirmation of medical necessity?:   Yes     Release to patient:   Routine Release [[id number]]    CBC and Differential     Standing Status:   Future     Number of Occurrences:   1     Expected Date:   4/15/2025     Expiration Date:   4/15/2026     Manual Differential:   No     Release to patient:   Routine Release [[id number]]    CBC and Differential     Standing Status:   Future     Expected Date:   6/19/2025     Expiration Date:   4/19/2026     Manual Differential:   No     Release to patient:   Routine Release [[id number]]   Total time spent during this patient encounter is 41 minutes. The total time spent with the patient includes: preparing to see the patient by reviewing of tests, prior notes or other relevant information, performing appropriate independent examination & evaluation, counseling, ordering of medications, tests or procedures, documenting  clinic information in the electronic medical records or other health records, independently interpreting results of tests and communicating the results to the patient/family or caregiver.

## 2025-04-15 NOTE — NURSING NOTE
No chemo per Dr. Meeks.  Andrzej deaccessed per protocol.  Bandaid applied to site.   reduction of left radius fx

## 2025-04-16 ENCOUNTER — PATIENT OUTREACH (OUTPATIENT)
Dept: OTHER | Facility: HOSPITAL | Age: 89
End: 2025-04-16
Payer: MEDICARE

## 2025-04-21 NOTE — TELEPHONE ENCOUNTER
IZABELLA WITH [facility] CALLED AND STATES  A MISSED VISIT ON 4/18/25 PER CAREGIVER CHOICE    CALL BACK NUMBER [phone]

## 2025-05-06 DIAGNOSIS — C78.7 METASTATIC COLON CANCER TO LIVER: ICD-10-CM

## 2025-05-06 DIAGNOSIS — C18.9 METASTATIC COLON CANCER TO LIVER: ICD-10-CM

## 2025-05-06 RX ORDER — OXYCODONE AND ACETAMINOPHEN 5; 325 MG/1; MG/1
1 TABLET ORAL EVERY 8 HOURS PRN
Qty: 60 TABLET | Refills: 0 | Status: SHIPPED | OUTPATIENT
Start: 2025-05-06

## 2025-05-06 NOTE — TELEPHONE ENCOUNTER
Mandi calling to report bone pain that started last Saturday, which started on her left leg, both shoulder; 2 days after had c/o of arm pain, unsure of which side. Pt hasn't been able to sleep. Prior to this pt had her PT and was able to walk. Pt not on any pain medication, has high tolerance for pain. Pt takes extra strength Tylenol that only helps a little bit. Nothing makes her comfortable at this time. D/W Dr Meeks. Received new order for Percocet 5-325 mg q8h PRN pain. Pt daughter instructed to call if pain medicine didn't help in a couple of days so that Dr Meeks can order scans. Mandi v/u.

## 2025-05-09 NOTE — TELEPHONE ENCOUNTER
"Received a communication from Elizabeth Lutz RN: \"Hi! I just got off the phone with the patient's daughter. She reports that the patient is still rating her pain an 8/10 and that she has also become much more fatigued and having a hard time focusing since starting the pain medicine this week. I just wanted to make you all aware.   Thanks   Maria G \".     Dr Meeks updated and stated \"I can order scans but not sure when it would be done to be followed up. If pain is severe, I suggest going to the ER. if not, we can order scans to be done early next week. Thanks \" Order placed and coordinating with . Pt daughter has been made aware. As per Mandi, percocet has helped, pain has decreased to 7/10, c/o persistent fatigue.  "

## 2025-05-12 ENCOUNTER — INFUSION (OUTPATIENT)
Dept: ONCOLOGY | Facility: HOSPITAL | Age: 89
End: 2025-05-12
Payer: MEDICARE

## 2025-05-12 ENCOUNTER — TELEPHONE (OUTPATIENT)
Dept: ONCOLOGY | Facility: CLINIC | Age: 89
End: 2025-05-12
Payer: MEDICARE

## 2025-05-12 ENCOUNTER — HOSPITAL ENCOUNTER (OUTPATIENT)
Dept: PET IMAGING | Facility: HOSPITAL | Age: 89
Discharge: HOME | End: 2025-05-12
Admitting: INTERNAL MEDICINE
Payer: MEDICARE

## 2025-05-12 DIAGNOSIS — N39.0 FREQUENT UTI: ICD-10-CM

## 2025-05-12 DIAGNOSIS — C18.9 METASTATIC COLON CANCER TO LIVER: ICD-10-CM

## 2025-05-12 DIAGNOSIS — C18.9 CARCINOMA OF COLON METASTATIC TO LUNG: ICD-10-CM

## 2025-05-12 DIAGNOSIS — C78.7 METASTATIC COLON CANCER TO LIVER: ICD-10-CM

## 2025-05-12 DIAGNOSIS — C78.00 CARCINOMA OF COLON METASTATIC TO LUNG: ICD-10-CM

## 2025-05-12 DIAGNOSIS — R39.15 URINARY URGENCY: Primary | ICD-10-CM

## 2025-05-12 DIAGNOSIS — R39.15 URINARY URGENCY: ICD-10-CM

## 2025-05-12 LAB
ALBUMIN SERPL-MCNC: 3.6 G/DL (ref 3.5–5.2)
ALBUMIN/GLOB SERPL: 1.4 G/DL
ALP SERPL-CCNC: 133 U/L (ref 39–117)
ALT SERPL W P-5'-P-CCNC: 23 U/L (ref 1–33)
ANION GAP SERPL CALCULATED.3IONS-SCNC: 16.1 MMOL/L (ref 5–15)
AST SERPL-CCNC: 39 U/L (ref 1–32)
BACTERIA UR QL AUTO: ABNORMAL /HPF
BASOPHILS # BLD AUTO: 0.03 10*3/MM3 (ref 0–0.2)
BASOPHILS NFR BLD AUTO: 0.4 % (ref 0–1.5)
BILIRUB SERPL-MCNC: 0.3 MG/DL (ref 0–1.2)
BILIRUB UR QL STRIP: NEGATIVE
BUN SERPL-MCNC: 44 MG/DL (ref 8–23)
BUN/CREAT SERPL: 30.6 (ref 7–25)
CALCIUM SPEC-SCNC: 9.2 MG/DL (ref 8.6–10.5)
CHLORIDE SERPL-SCNC: 102 MMOL/L (ref 98–107)
CLARITY UR: ABNORMAL
CO2 SERPL-SCNC: 20.9 MMOL/L (ref 22–29)
COLOR UR: YELLOW
CREAT SERPL-MCNC: 1.44 MG/DL (ref 0.57–1)
DEPRECATED RDW RBC AUTO: 51.3 FL (ref 37–54)
EGFRCR SERPLBLD CKD-EPI 2021: 35.3 ML/MIN/1.73
EOSINOPHIL # BLD AUTO: 0.08 10*3/MM3 (ref 0–0.4)
EOSINOPHIL NFR BLD AUTO: 1 % (ref 0.3–6.2)
ERYTHROCYTE [DISTWIDTH] IN BLOOD BY AUTOMATED COUNT: 14.1 % (ref 12.3–15.4)
GLOBULIN UR ELPH-MCNC: 2.6 GM/DL
GLUCOSE SERPL-MCNC: 114 MG/DL (ref 65–99)
GLUCOSE UR STRIP-MCNC: ABNORMAL MG/DL
HCT VFR BLD AUTO: 34.5 % (ref 34–46.6)
HGB BLD-MCNC: 11.2 G/DL (ref 12–15.9)
HGB UR QL STRIP.AUTO: NEGATIVE
HYALINE CASTS UR QL AUTO: ABNORMAL /LPF
IMM GRANULOCYTES # BLD AUTO: 0.01 10*3/MM3 (ref 0–0.05)
IMM GRANULOCYTES NFR BLD AUTO: 0.1 % (ref 0–0.5)
KETONES UR QL STRIP: NEGATIVE
LEUKOCYTE ESTERASE UR QL STRIP.AUTO: ABNORMAL
LYMPHOCYTES # BLD AUTO: 1.36 10*3/MM3 (ref 0.7–3.1)
LYMPHOCYTES NFR BLD AUTO: 16.2 % (ref 19.6–45.3)
MCH RBC QN AUTO: 32.6 PG (ref 26.6–33)
MCHC RBC AUTO-ENTMCNC: 32.5 G/DL (ref 31.5–35.7)
MCV RBC AUTO: 100.3 FL (ref 79–97)
MONOCYTES # BLD AUTO: 0.57 10*3/MM3 (ref 0.1–0.9)
MONOCYTES NFR BLD AUTO: 6.8 % (ref 5–12)
NEUTROPHILS NFR BLD AUTO: 6.33 10*3/MM3 (ref 1.7–7)
NEUTROPHILS NFR BLD AUTO: 75.5 % (ref 42.7–76)
NITRITE UR QL STRIP: NEGATIVE
NRBC BLD AUTO-RTO: 0 /100 WBC (ref 0–0.2)
PH UR STRIP.AUTO: 5.5 [PH] (ref 4.5–8)
PLATELET # BLD AUTO: 177 10*3/MM3 (ref 140–450)
PMV BLD AUTO: 10.2 FL (ref 6–12)
POTASSIUM SERPL-SCNC: 4.1 MMOL/L (ref 3.5–5.2)
PROT SERPL-MCNC: 6.2 G/DL (ref 6–8.5)
PROT UR QL STRIP: ABNORMAL
RBC # BLD AUTO: 3.44 10*6/MM3 (ref 3.77–5.28)
RBC # UR STRIP: ABNORMAL /HPF
REF LAB TEST METHOD: ABNORMAL
SODIUM SERPL-SCNC: 139 MMOL/L (ref 136–145)
SP GR UR STRIP: 1.02 (ref 1–1.03)
SQUAMOUS #/AREA URNS HPF: ABNORMAL /HPF
TRANS CELLS #/AREA URNS HPF: ABNORMAL /HPF
UROBILINOGEN UR QL STRIP: ABNORMAL
WBC # UR STRIP: ABNORMAL /HPF
WBC NRBC COR # BLD AUTO: 8.38 10*3/MM3 (ref 3.4–10.8)

## 2025-05-12 PROCEDURE — 87088 URINE BACTERIA CULTURE: CPT

## 2025-05-12 PROCEDURE — 87186 SC STD MICRODIL/AGAR DIL: CPT

## 2025-05-12 PROCEDURE — 87086 URINE CULTURE/COLONY COUNT: CPT

## 2025-05-12 PROCEDURE — 25510000001 IOPAMIDOL 61 % SOLUTION: Performed by: INTERNAL MEDICINE

## 2025-05-12 PROCEDURE — 74177 CT ABD & PELVIS W/CONTRAST: CPT

## 2025-05-12 PROCEDURE — 71260 CT THORAX DX C+: CPT

## 2025-05-12 PROCEDURE — 81001 URINALYSIS AUTO W/SCOPE: CPT

## 2025-05-12 PROCEDURE — 85025 COMPLETE CBC W/AUTO DIFF WBC: CPT

## 2025-05-12 PROCEDURE — 80053 COMPREHEN METABOLIC PANEL: CPT

## 2025-05-12 PROCEDURE — 25510000002 DIATRIZOATE MEGLUMINE & SODIUM PER 1 ML: Performed by: INTERNAL MEDICINE

## 2025-05-12 RX ORDER — IOPAMIDOL 612 MG/ML
85 INJECTION, SOLUTION INTRAVASCULAR
Status: COMPLETED | OUTPATIENT
Start: 2025-05-12 | End: 2025-05-12

## 2025-05-12 RX ORDER — DIATRIZOATE MEGLUMINE AND DIATRIZOATE SODIUM 660; 100 MG/ML; MG/ML
30 SOLUTION ORAL; RECTAL ONCE
Status: COMPLETED | OUTPATIENT
Start: 2025-05-12 | End: 2025-05-12

## 2025-05-12 RX ORDER — SULFAMETHOXAZOLE AND TRIMETHOPRIM 800; 160 MG/1; MG/1
1 TABLET ORAL 2 TIMES DAILY
Qty: 14 TABLET | Refills: 0 | Status: ON HOLD | OUTPATIENT
Start: 2025-05-12

## 2025-05-12 RX ADMIN — IOPAMIDOL 85 ML: 612 INJECTION, SOLUTION INTRAVENOUS at 15:30

## 2025-05-12 RX ADMIN — DIATRIZOATE MEGLUMINE AND DIATRIZOATE SODIUM 30 ML: 660; 100 LIQUID ORAL; RECTAL at 14:32

## 2025-05-12 NOTE — TELEPHONE ENCOUNTER
Provider: Constantino  Caller: Mandi  Relationship to Patient: daughter  Call Back Phone Number: [phone]  Reason for Call: patient has symptoms of UTI

## 2025-05-12 NOTE — TELEPHONE ENCOUNTER
Called back Ms Mandi who reports that pt has urinary urgency, didn't make it to the bathroom, caregiver reports that pt has soiled clothes, and is very confused, (+) strong smell of urine. Pt has history of frequent UTIs. Pt is scheduled to have scans this afternoon. D/W Shanika WALLER. Received recommendation for urinalysis with culture if indicated and Bactrim DS was sent to Sravan at [facility]; also for a STAT CBC and STAT CMP to be drawn. Appt line updated.

## 2025-05-13 ENCOUNTER — INFUSION (OUTPATIENT)
Dept: ONCOLOGY | Facility: HOSPITAL | Age: 89
End: 2025-05-13
Payer: MEDICARE

## 2025-05-13 ENCOUNTER — TELEPHONE (OUTPATIENT)
Dept: ONCOLOGY | Facility: CLINIC | Age: 89
End: 2025-05-13

## 2025-05-13 ENCOUNTER — LAB (OUTPATIENT)
Dept: LAB | Facility: HOSPITAL | Age: 89
End: 2025-05-13
Payer: MEDICARE

## 2025-05-13 ENCOUNTER — OFFICE VISIT (OUTPATIENT)
Dept: ONCOLOGY | Facility: CLINIC | Age: 89
End: 2025-05-13
Payer: MEDICARE

## 2025-05-13 VITALS
RESPIRATION RATE: 17 BRPM | BODY MASS INDEX: 19.48 KG/M2 | DIASTOLIC BLOOD PRESSURE: 78 MMHG | HEART RATE: 76 BPM | WEIGHT: 99.2 LBS | HEIGHT: 60 IN | OXYGEN SATURATION: 92 % | SYSTOLIC BLOOD PRESSURE: 127 MMHG | TEMPERATURE: 98.7 F

## 2025-05-13 DIAGNOSIS — N18.30 STAGE 3 CHRONIC KIDNEY DISEASE, UNSPECIFIED WHETHER STAGE 3A OR 3B CKD: Primary | ICD-10-CM

## 2025-05-13 DIAGNOSIS — C78.00 CARCINOMA OF COLON METASTATIC TO LUNG: ICD-10-CM

## 2025-05-13 DIAGNOSIS — C18.9 METASTATIC COLON CANCER TO LIVER: Primary | ICD-10-CM

## 2025-05-13 DIAGNOSIS — R53.1 WEAKNESS: ICD-10-CM

## 2025-05-13 DIAGNOSIS — C78.7 METASTATIC COLON CANCER TO LIVER: Primary | ICD-10-CM

## 2025-05-13 DIAGNOSIS — C18.9 CARCINOMA OF COLON METASTATIC TO LUNG: ICD-10-CM

## 2025-05-13 PROCEDURE — A9270 NON-COVERED ITEM OR SERVICE: HCPCS | Performed by: INTERNAL MEDICINE

## 2025-05-13 PROCEDURE — 96374 THER/PROPH/DIAG INJ IV PUSH: CPT

## 2025-05-13 PROCEDURE — 63710000001 OXYCODONE-ACETAMINOPHEN 5-325 MG TABLET: Performed by: INTERNAL MEDICINE

## 2025-05-13 PROCEDURE — 25810000003 SODIUM CHLORIDE 0.9 % SOLUTION: Performed by: INTERNAL MEDICINE

## 2025-05-13 PROCEDURE — 25010000002 ONDANSETRON PER 1 MG: Performed by: INTERNAL MEDICINE

## 2025-05-13 PROCEDURE — 96361 HYDRATE IV INFUSION ADD-ON: CPT

## 2025-05-13 RX ORDER — ONDANSETRON 2 MG/ML
4 INJECTION INTRAMUSCULAR; INTRAVENOUS ONCE
Status: COMPLETED | OUTPATIENT
Start: 2025-05-13 | End: 2025-05-13

## 2025-05-13 RX ORDER — CIPROFLOXACIN 500 MG/1
500 TABLET, FILM COATED ORAL 2 TIMES DAILY
Qty: 20 TABLET | Refills: 0 | Status: ON HOLD | OUTPATIENT
Start: 2025-05-13 | End: 2025-05-23

## 2025-05-13 RX ORDER — METRONIDAZOLE 500 MG/1
500 TABLET ORAL 3 TIMES DAILY
Qty: 42 TABLET | Refills: 0 | Status: ON HOLD | OUTPATIENT
Start: 2025-05-13 | End: 2025-05-23

## 2025-05-13 RX ORDER — OXYCODONE AND ACETAMINOPHEN 5; 325 MG/1; MG/1
1 TABLET ORAL ONCE
Refills: 0 | Status: COMPLETED | OUTPATIENT
Start: 2025-05-13 | End: 2025-05-13

## 2025-05-13 RX ORDER — SODIUM CHLORIDE 9 MG/ML
500 INJECTION, SOLUTION INTRAVENOUS ONCE
Status: COMPLETED | OUTPATIENT
Start: 2025-05-13 | End: 2025-05-13

## 2025-05-13 RX ORDER — ONDANSETRON 8 MG/1
8 TABLET, ORALLY DISINTEGRATING ORAL EVERY 8 HOURS PRN
Qty: 30 TABLET | Refills: 0 | Status: ON HOLD | OUTPATIENT
Start: 2025-05-13

## 2025-05-13 RX ADMIN — SODIUM CHLORIDE 500 ML/HR: 900 INJECTION, SOLUTION INTRAVENOUS at 14:22

## 2025-05-13 RX ADMIN — OXYCODONE HYDROCHLORIDE AND ACETAMINOPHEN 1 TABLET: 5; 325 TABLET ORAL at 15:20

## 2025-05-13 RX ADMIN — ONDANSETRON 4 MG: 2 INJECTION INTRAMUSCULAR; INTRAVENOUS at 14:22

## 2025-05-13 NOTE — TELEPHONE ENCOUNTER
Avelina confirmed receipt of referral. They are to call pt's family. Called Oscar and coordinated order with Yary via fax and email.    05/14/25 Pt is scheduled to be seen today 5/14/25 at 4pm.

## 2025-05-13 NOTE — TELEPHONE ENCOUNTER
Shanika NP recommend pt to have fluids, and be seen in office due to new report of confusion, recommending pt to stop Bactrim since pt doesn't have UTI.  added pt to infusion schedule. D/W Dr Meeks who can see pt this afternoon. Coordinated with  for the appt and update pt's daughter who stated that they can come at 1pm and reports that pt is not been drinking as much and would like to discuss palliative care. She was encourage to discuss pt's pain, symptoms, management. She acknowledge.

## 2025-05-13 NOTE — PROGRESS NOTES
[facility]    CONSULTING IN BLOOD DISORDERS & CANCER      REASON FOR CONSULTATION/CHIEF COMPLAINT:     Evaluation and management for metastatic colorectal cancer                             REQUESTING PHYSICIAN: No ref. provider found  RECORDS OBTAINED:  Records of the patients history including those from the electronic medical record were reviewed and summarized in detail.    HISTORY OF PRESENT ILLNESS:    The patient is a 89 y.o. year old female with medical history significant for CAD s/p CABG x 3 (9097, 2007), PCI's x 16, pacemaker, peptic ulcer disease, hypertension and dyslipidemia comes for metastatic colorectal cancer evaluation and management.    Patient had presented to her PCP,  in early October 2024 with lower abdominal pain and weight loss of > 20 pounds.  A CT A/P performed 10/9/2024 noted innumerable lung nodules, multiple hypodense masses in the liver and a large mass in the hepatic flexure of the colon, measuring 4.5 cm in greatest dimension.    Patient underwent CT-guided liver lesion biopsy on 10/28/2024.  Pathology: Metastatic adenocarcinoma of colorectal origin.    Patient was referred to hematology/oncology for further evaluation and management.  She was first seen in this clinic on 11//24.  Accompanied by 3 of 4 daughters.  Patient denies any major GI issues.  No blood in stool or altered bowel habits.  She does take stool softeners for regular bowel movements.  No nausea or vomiting.  No family history of colorectal cancer.    11/5/2024: PET/CT demonstrates hypermetabolic lesions in the colonic hepatic flexure, pulmonary, hepatic, osseous and zenaida metastasis.  Colonoscopy did reveal hepatic flexure mass, partially obstructing.    11/21/2024: Cycle 1 FOLFOX    12/5/2024: Was seen for cycle 2 FOLFOX, however noted to have severe neutropenia with WBC count of 0.52 and ANC of 0.08.  Chemotherapy was held and received Neupogen x 2 doses.       Patient has DPYD intermediate mutation.   Given prolonged neutropenia, age, and presence of DPYD mutation-plan made to administer 50% dose of FOLFOX with G-CSF support in the form of Neulasta on pro.    CT scans after 4 cycles show excellent response to treatment    April 2025: Chemotherapy held due to declining performance status    INTERIM HISTORY:  She returns today for follow up visit.  Per patient and family, patient has not been feeling very well for the last 1-2 weeks.  Patient has become more forgetful and confused.  Also reports of worsening lower back and left groin pain.  Patient has been feeling nauseous most of the time.  Has decreased appetite.  She has not felt like doing much.   Family is worried about her declining performance status.      Past Medical History:   Diagnosis Date    Anemia     Arthritis of left sacroiliac joint 05/05/2022    CKD (chronic kidney disease), stage III     PATIENT STATES IT IS AT STAGE 4 AT THIS TIME    Colorectal cancer     Coronary artery disease     cath 11/15. CABG x3 and all grafts widely patent. Very small OM and Cfx and that may be the cause of lateral ischemia on stress test. Non bipassable    Diabetes mellitus, type II     GERD (gastroesophageal reflux disease)     Gout     Heart attack 10/2022    MULTIPLE (6 OR MORE)    History of DVT in adulthood     AFTER CHILDBIRTH    Hyperlipidemia     Hypertension     Liver masses     Pacemaker     PUD (peptic ulcer disease)      Past Surgical History:   Procedure Laterality Date    CARDIAC ELECTROPHYSIOLOGY PROCEDURE N/A 02/22/2017    Procedure: Pacemaker DC new;  Surgeon: Juvenal Zhong MD;  Location: [facility] INVASIVE LOCATION;  Service:     CARDIAC SURGERY  10/2022    CATARACT EXTRACTION      COLONOSCOPY  2010    COLONOSCOPY N/A 11/19/2024    Procedure: COLONOSCOPY WITH TATTOO MASS INJECTION 4 ML;  Surgeon: Caden Christian MD;  Location: [facility] OR;  Service: General;  Laterality: N/A;    CORONARY ARTERY BYPASS GRAFT      1992, 2009    EPIDURAL  Left 10/17/2022    Procedure: LUMBAR/SACRAL TRANSFORAMINAL EPIDURAL Left L4-5 and left L5-S1 - hold plavix 7 days before;  Surgeon: Yasmine Gaming MD;  Location: [facility];  Service: Pain Management;  Laterality: Left;    PACEMAKER IMPLANTATION      PIRIFORMUS INJECTION Left 07/15/2022    Procedure: PIRIFORMIS INJECTION;  Surgeon: Yasmine Gaming MD;  Location: [facility];  Service: Pain Management;  Laterality: Left;    RADIOFREQUENCY ABLATION Left 02/20/2023    Procedure: RADIOFREQUENCY ABLATION NERVES for the left sacroiliac joint;  Surgeon: Yasmine Gaming MD;  Location: [facility];  Service: Pain Management;  Laterality: Left;    SACROILIAC JOINT INJECTION Left 05/09/2022    Procedure: SACROILIAC INJECTION - Left;  Surgeon: Yasmine Gaming MD;  Location: [facility];  Service: Pain Management;  Laterality: Left;    SACROILIAC JOINT INJECTION Left 07/15/2022    Procedure: left Sacroiliac joint injection and left piriformis injection;  Surgeon: Yasmine Gaming MD;  Location: [facility];  Service: Pain Management;  Laterality: Left;    TUBAL ABDOMINAL LIGATION      VENOUS ACCESS DEVICE (PORT) INSERTION N/A 11/19/2024    Procedure: INSERTION VENOUS ACCESS DEVICE;  Surgeon: Caden Christian MD;  Location: [facility] MAIN OR;  Service: General;  Laterality: N/A;       MEDICATIONS    Current Outpatient Medications:     aspirin 81 MG EC tablet, Take 1 tablet by mouth Daily. HOLD FOR SURGERY PER MD ORDERS, Disp: , Rfl:     clopidogrel (PLAVIX) 75 MG tablet, Take 1 tablet by mouth Daily. HOLD FOR SURGERY PER MD ORDERS, Disp: , Rfl:     dapagliflozin Propanediol (Farxiga) 10 MG tablet, TAKE 1 TABLET BY MOUTH DAILY, Disp: 90 tablet, Rfl: 1    glipizide (GLUCOTROL) 5 MG tablet, TAKE 2 TABLETS BY MOUTH EVERY MORNING WITH BREAKFAST AND TAKE ONE TABLET BY MOUTH EVERY EVENING, Disp: 270 tablet, Rfl: 1    hydrALAZINE (APRESOLINE) 25 MG tablet, TAKE 1 TABLET BY MOUTH TWICE A DAY, Disp: 180 tablet, Rfl:  1    hydroCHLOROthiazide 12.5 MG tablet, Take 1 tablet by mouth Daily., Disp: 90 tablet, Rfl: 1    lidocaine-prilocaine (EMLA) 2.5-2.5 % cream, Apply 1 Application topically to the appropriate area as directed Every 2 (Two) Hours As Needed for Mild Pain., Disp: 30 g, Rfl: 3    metFORMIN (GLUCOPHAGE) 500 MG tablet, Take 2 tablets by mouth 2 (Two) Times a Day With Meals., Disp: 360 tablet, Rfl: 1    metoprolol tartrate (LOPRESSOR) 50 MG tablet, TAKE 2 TABLETS BY MOUTH TWICE A DAY, Disp: 360 tablet, Rfl: 1    multivitamin with minerals (CENTRUM SILVER 50+WOMEN PO), Take 1 tablet by mouth Daily. HOLD FOR SURGERY, Disp: , Rfl:     nitroglycerin (NITROSTAT) 0.4 MG SL tablet, 1 tablet., Disp: , Rfl:     ondansetron (ZOFRAN) 8 MG tablet, Take 1 tablet by mouth 3 (Three) Times a Day As Needed for Nausea or Vomiting., Disp: 30 tablet, Rfl: 5    oxaliplatin (ELOXATIN) 100 MG chemo injection, Infuse 100 mg into a venous catheter., Disp: , Rfl:     oxyCODONE-acetaminophen (PERCOCET) 5-325 MG per tablet, Take 1 tablet by mouth Every 8 (Eight) Hours As Needed for Moderate Pain or Severe Pain., Disp: 60 tablet, Rfl: 0    potassium chloride (KLOR-CON M10) 10 MEQ CR tablet, Take 1 tablet by mouth Daily., Disp: 30 tablet, Rfl: 0    rosuvastatin (Crestor) 40 MG tablet, Take 1 tablet by mouth Daily., Disp: 90 tablet, Rfl: 3    sulfamethoxazole-trimethoprim (BACTRIM DS,SEPTRA DS) 800-160 MG per tablet, Take 1 tablet by mouth 2 (Two) Times a Day., Disp: 14 tablet, Rfl: 0  No current facility-administered medications for this visit.    ALLERGIES:     Allergies   Allergen Reactions    Pneumococcal Vaccines Paresthesia       SOCIAL HISTORY:       Social History     Socioeconomic History    Marital status:    Tobacco Use    Smoking status: Never     Passive exposure: Never    Smokeless tobacco: Never   Vaping Use    Vaping status: Never Used   Substance and Sexual Activity    Alcohol use: Never    Drug use: Never         FAMILY  "HISTORY:  Family History   Problem Relation Age of Onset    Coronary artery disease Mother     Heart attack Sister     Liseth Hyperthermia Neg Hx             Vitals:    05/13/25 1327   BP: 127/78   Pulse: 76   Resp: 17   Temp: 98.7 °F (37.1 °C)   TempSrc: Oral   SpO2: 92%   Weight: 45 kg (99 lb 3.2 oz)   Height: 152 cm (59.84\")   PainSc: 4    PainLoc: Abdomen           5/13/2025     1:28 PM   Current Status   ECOG score 2     PHYSICAL EXAM:  **  CONSTITUTIONAL:  Vital signs reviewed.  No distress, looks comfortable.  Elderly female, somewhat frail.  EYES:  Conjunctiva and lids unremarkable.   EARS,NOSE,MOUTH,THROAT:  Ears and nose appear unremarkable.  Lips, teeth, gums appear unremarkable.  RESPIRATORY:  Normal respiratory effort.   CARDIOVASCULAR:  Normal heart rate  GASTROINTESTINAL: Abdomen appears unremarkable.  Nondistended  LYMPHATIC:  No cervical, supraclavicular lymphadenopathy.  NEURO: AAOx3, no focal deficits.  Appears to have equal strength all 4 extremities.  MUSCULOSKELETAL:  Unremarkable digits/nails.  No cyanosis or clubbing.  No apparent joint deformities.  SKIN:  Warm.  No rashes.  PSYCHIATRIC: Is confused     RECENT LABS:  Results from last 7 days   Lab Units 05/12/25  1450   WBC 10*3/mm3 8.38   NEUTROS ABS 10*3/mm3 6.33   HEMOGLOBIN g/dL 11.2*   HEMATOCRIT % 34.5   PLATELETS 10*3/mm3 177     Results from last 7 days   Lab Units 05/12/25  1450   SODIUM mmol/L 139   POTASSIUM mmol/L 4.1   CHLORIDE mmol/L 102   CO2 mmol/L 20.9*   BUN mg/dL 44*   CREATININE mg/dL 1.44*   CALCIUM mg/dL 9.2   ALBUMIN g/dL 3.6   BILIRUBIN mg/dL 0.3   ALK PHOS U/L 133*   ALT (SGPT) U/L 23   AST (SGOT) U/L 39*   GLUCOSE mg/dL 114*           ASSESSMENT:  Patient is a pleasant 89-year-old female with medical history significant for CAD s/p CABG x 3 (1997, 2007), PCI's x 16, pacemaker, peptic ulcer disease, hypertension and dyslipidemia comes for metastatic colorectal cancer evaluation and management.    # Metastatic " colorectal cancer with liver and lung metastasis, right sided:  Patient had presented to her PCP,  in early October 2024 with lower abdominal pain and weight loss of > 20 pounds.    A CT A/P performed 10/9/2024 noted innumerable lung nodules, multiple hypodense masses in the liver and a large mass in the hepatic flexure of the colon, measuring 4.5 cm in greatest dimension.  CT-guided liver lesion biopsy on 10/28/2024. Pathology: Metastatic adenocarcinoma of colorectal origin.  CEA 5386.0 (10/21/2024)  I reviewed the imaging and pathology findings with patient and her family in detail.  Informed that she has been diagnosed with metastatic colorectal cancer with diffuse metastasis.  Reviewed  natural history, prognosis and palliative treatment options.  Patient is interested in receiving cancer directed therapy.  With her age and significant cardiovascular medical comorbidity-treatment with palliative chemotherapy would be challenging.  [facility] NGS shows MSI stable.  Low TMB.  No NRAS/KRAS/BRAF mutations. [id number] liquid NGS Shows MSI stable.  SHANE mutation, FGFR 1 and EGFR gene amplifications.   [facility] germline mutation testing pending  11/21/2024: Cycle 1 FOLFOX.    12/5/2024: Was seen for cycle 2 FOLFOX, however noted to have severe neutropenia with WBC count of 0.52 and ANC of 0.08.  Chemotherapy was held and received Neupogen x 2 doses.     Patient has DPYD intermediate mutation.  Given prolonged neutropenia, age and presence of DPYD mutation-plan made to administer 50% dose of FOLFOX with G-CSF support.   12/31/2024: Received cycle 3 FOLFOX with bevacizumab.    1/14/2025: Received cycle 4 FOLFOX without Avastin (held due to significant fatigue).  CT scans after 4 cycles showed excellent response to treatment  [id number] from February 2025 shows downward trending ctDNA levels  2/25/2025: Tolerated the last dose of chemotherapy fairly well, although had prolonged fatigue.  Labs today show leukocytosis of  30.31-neutrophilic, anemia of 10.2 and mild elevation in serum creatinine of 1.01.  UA with excessive glucose, however is negative for any evidence of UTI.  She recently completed antibiotic course for UTI.  No current signs/symptoms of infection.  Suspect leukocytosis related to G-CSF.  Reviewed recent [id number] results which show excellent response.  Patient had received an EOB from [facility] and is worried about payment.  Will discuss with [facility]'s team if patient receives a bill.  Given prolonged fatigue with last cycle, plan made to proceed with cycle 7 FOLFOX (50% dose) along with Avastin (50% dose) and increase the gap to once every 3 weeks moving forward.    3/18: Chemo held due to fatigue, diffuse arthralgias and abnormal LFTs.  3/21/25: Reviewed recent CT scans which show overall stable findings with no evidence of disease progression.  Patient's arthralgias and fatigue slightly improved.  I had extensive discussion with patient and her family regarding goals of care, risks/benefits of continuing chemotherapy and just doing palliative care.  Patient remains quite interested in continuing with chemotherapy although she understands risk of further decline in her performance status.  Her LFTs remain elevated today.  Plan made to see her back next week on Tuesday for repeat labs.  If LFTs remain elevated, will hold oxaliplatin and proceed with 5-FU/Avastin (50% doses) alone.  3/25/2025: She returns today for Cycle 8 FOLFOX +bevacizumab. Fatigue has improved. Liver enzymes also improved today, AST 50 and ALT 58. proceed today with Cycle 8, but will omit oxaliplatin. Moving forward, if LFT's continue to improve, will add oxaliplatin back in at 60% dose reduction (previously at 50% dose reduction). Treatment has also been adjusted to every 3 weeks and therefore, per Dr. Meeks, okay to discontinue neulasta on pro.   4/15/25: Patient is having worsening cumulative toxicity from chemo. Her family is worried about  having side effects from continuation of chemo. Patient remains interested in continuing treatment. Recent CT scans from 3/19/25 show overall stable findings. Although recent G360 shows slight increase in the cfDNA levels. Plan made to hold off on chemo for now and give her a treatment break for 2-3 months. Will repeat CT c/a/p in ~ 2 months and see her back after that. Encouraged to maintain adequate nutrition at home.  5/13/2025: Per patient and family, patient has not been feeling very well for the last 1-2 weeks.  Patient has become more forgetful and confused.  Also reports of worsening lower back and left groin pain.  Patient has been feeling nauseous most of the time.  Has decreased appetite.  She has not felt like doing much.  A CT C/A/P was obtained recently which demonstrated progressive disease in pulmonary mets and enlarging left sacral bone lesion.  Hepatic metastasis overall stable.  It also shows evidence of diverticulitis and possible cystitis.  I reviewed these findings with patient and her family.  Patient is not interested in receiving any further cancer directed therapy.  Plan made to refer to hospice.  Patient and family undecided on whether to do home with hospice or inpatient hospice.  Will send a prescription for p.o. ciprofloxacin and Flagyl to treat her diverticulitis and suspected cystitis.  Will administer 1 L normal saline fluid today and give IV Zofran 4 mg x 1 dose in office today.  Advised to continue taking p.o. Percocet 5/325 mg every 4-6 hours as needed and continue p.o. Zofran 8 mg every 8 hours as needed.  Follow-up as needed    # Leukocytosis:  WBC count of 23.0 on 2/11/2025.  No clear signs or symptoms of infections.  Unclear whether Neulasta related versus UTI.  Urine cultures grew E. coli.  She received Omnicef.  2/25/2025: Persistent leukocytosis of 30,000.  UA is negative.  I suspect this to be G-CSF related rather than underlying evidence of infection.  Will closely  monitor.  3/18/2025: WBC 16,270.  This is felt to be related to ongoing Neulasta support.  3/25/2025: WBC 8.65.    # Anemia:  Hemoglobin 11.3 g/dL on 11/4/24.  Iron profile most consistent with iron deficiency.  11/21/2024: Patient reports of constipation.  Has poor tolerance to oral iron.    11/21/2024: Received IV Injectafer 700 mg x 1 dose  3/18/2025: Hgb 10.6.  3/25/2025: Hgb 10.4    # CAD s/p CABG (1997 and 2007) and PCI x 16:   Follows up with , cardiology at the [facility]  On aspirin 81 mg and Plavix 75 mg daily    # DM2:  Currently on Farxiga, glipizide and metformin.    Denies any neuropathy.  Per patient, last A1c was 8.6.  Being managed by PCP    # Intermittent bone pain  Reports occasional pain in her right shoulder or left leg.  This is fleeting and is felt to likely be related to Neulasta support.  3/25/2025: Currently resolved. Discussed with Dr. Meeks and will discontinue neulasta onpro since treatment has been adjusted to every 3 weeks.     PLAN:   Refer to hospice  Administer 1 L normal saline and IV Zofran 4 mg x 1 dose today  Start p.o. ciprofloxacin and Flagyl for diverticulitis and cystitis  Continue p.o. Percocet 5/325 mg every 4-6 hours as needed  Continue p.o. Zofran ODT 8 mg every 8 hours as needed for nausea and vomiting  Follow-up as needed  No orders of the defined types were placed in this encounter.

## 2025-05-14 PROBLEM — N17.9 AKI (ACUTE KIDNEY INJURY): Status: ACTIVE | Noted: 2025-05-14

## 2025-05-14 PROBLEM — K57.92 ACUTE DIVERTICULITIS: Status: ACTIVE | Noted: 2025-01-01

## 2025-05-14 PROBLEM — E11.649 TYPE 2 DIABETES MELLITUS WITH HYPOGLYCEMIA, WITHOUT LONG-TERM CURRENT USE OF INSULIN: Status: ACTIVE | Noted: 2025-01-01

## 2025-05-14 NOTE — H&P
PCP: Mohinder Barbosa MD    Chief complaint   Chief Complaint   Patient presents with    Weakness - Generalized       HPI  Patient is a 89 y.o. female presents with a history of peptic ulcer, diabetes, CKD 3, colorectal cancer metastatic who presents to the ER secondary to lower abdominal pain and decreased p.o. intake.  Patient's last chemo was about 7 weeks ago.  They have decided to stop the chemo since she was not having a good quality of life.  Up until about 2 weeks ago she was able to go to [facility] and go see [facility] with grandkids.  Patient started having some lower abdominal pain for a few days, it comes and goes, no dysuria.  She has had decreased p.o. intake and nothing in the last day or 2.  Getting worse.  Outpatient CT of the abdomen showed some diverticulitis in the sigmoid colon.    Cipro and Flagyl were prescribed but pharmacy was unable to fill until today.    PAST MEDICAL HISTORY  Past Medical History:   Diagnosis Date    Anemia     Arthritis of left sacroiliac joint 05/05/2022    CKD (chronic kidney disease), stage III     PATIENT STATES IT IS AT STAGE 4 AT THIS TIME    Colorectal cancer     Coronary artery disease     cath 11/15. CABG x3 and all grafts widely patent. Very small OM and Cfx and that may be the cause of lateral ischemia on stress test. Non bipassable    Diabetes mellitus, type II     GERD (gastroesophageal reflux disease)     Gout     Heart attack 10/2022    MULTIPLE (6 OR MORE)    History of DVT in adulthood     AFTER CHILDBIRTH    Hyperlipidemia     Hypertension     Liver masses     Pacemaker     PUD (peptic ulcer disease)        PAST SURGICAL HISTORY  Past Surgical History:   Procedure Laterality Date    CARDIAC ELECTROPHYSIOLOGY PROCEDURE N/A 02/22/2017    Procedure: Pacemaker DC new;  Surgeon: Juvenal Zhong MD;  Location: [facility] INVASIVE LOCATION;  Service:     CARDIAC SURGERY  10/2022    CATARACT EXTRACTION      COLONOSCOPY  2010    COLONOSCOPY N/A 11/19/2024     Procedure: COLONOSCOPY WITH TATTOO MASS INJECTION 4 ML;  Surgeon: Caden Christian MD;  Location:  [facility];  Service: General;  Laterality: N/A;    CORONARY ARTERY BYPASS GRAFT      1992, 2009    EPIDURAL Left 10/17/2022    Procedure: LUMBAR/SACRAL TRANSFORAMINAL EPIDURAL Left L4-5 and left L5-S1 - hold plavix 7 days before;  Surgeon: Yasmine Gaming MD;  Location: [facility];  Service: Pain Management;  Laterality: Left;    PACEMAKER IMPLANTATION      PIRIFORMUS INJECTION Left 07/15/2022    Procedure: PIRIFORMIS INJECTION;  Surgeon: Yasmine Gaming MD;  Location: [facility];  Service: Pain Management;  Laterality: Left;    RADIOFREQUENCY ABLATION Left 02/20/2023    Procedure: RADIOFREQUENCY ABLATION NERVES for the left sacroiliac joint;  Surgeon: Yasmine Gaming MD;  Location: [facility];  Service: Pain Management;  Laterality: Left;    SACROILIAC JOINT INJECTION Left 05/09/2022    Procedure: SACROILIAC INJECTION - Left;  Surgeon: Yasmine Gaming MD;  Location: [facility];  Service: Pain Management;  Laterality: Left;    SACROILIAC JOINT INJECTION Left 07/15/2022    Procedure: left Sacroiliac joint injection and left piriformis injection;  Surgeon: Yasmine Gaming MD;  Location: [facility];  Service: Pain Management;  Laterality: Left;    TUBAL ABDOMINAL LIGATION      VENOUS ACCESS DEVICE (PORT) INSERTION N/A 11/19/2024    Procedure: INSERTION VENOUS ACCESS DEVICE;  Surgeon: Caden Christian MD;  Location:  [facility];  Service: General;  Laterality: N/A;       FAMILY HISTORY  Family History   Problem Relation Age of Onset    Coronary artery disease Mother     Heart attack Sister     Malig Hyperthermia Neg Hx        SOCIAL HISTORY  Social History     Tobacco Use    Smoking status: Never     Passive exposure: Never    Smokeless tobacco: Never   Vaping Use    Vaping status: Never Used   Substance Use Topics    Alcohol use: Never    Drug use: Never        MEDICATIONS:  Medications Prior to Admission   Medication Sig Dispense Refill Last Dose/Taking    aspirin 81 MG EC tablet Take 1 tablet by mouth Daily. HOLD FOR SURGERY PER MD ORDERS   5/13/2025    ciprofloxacin (Cipro) 500 MG tablet Take 1 tablet by mouth 2 (Two) Times a Day for 10 days. 1 tablet 20 tablet 0 Taking    clopidogrel (PLAVIX) 75 MG tablet Take 1 tablet by mouth Daily. HOLD FOR SURGERY PER MD ORDERS   5/13/2025    dapagliflozin Propanediol (Farxiga) 10 MG tablet TAKE 1 TABLET BY MOUTH DAILY 90 tablet 1 5/13/2025    glipizide (GLUCOTROL) 5 MG tablet TAKE 2 TABLETS BY MOUTH EVERY MORNING WITH BREAKFAST AND TAKE ONE TABLET BY MOUTH EVERY EVENING 270 tablet 1 5/13/2025    hydrALAZINE (APRESOLINE) 25 MG tablet TAKE 1 TABLET BY MOUTH TWICE A  tablet 1 5/13/2025    hydroCHLOROthiazide 12.5 MG tablet Take 1 tablet by mouth Daily. 90 tablet 1 5/13/2025    metFORMIN (GLUCOPHAGE) 500 MG tablet Take 2 tablets by mouth 2 (Two) Times a Day With Meals. 360 tablet 1 5/13/2025    metoprolol tartrate (LOPRESSOR) 50 MG tablet TAKE 2 TABLETS BY MOUTH TWICE A  tablet 1 5/13/2025    metroNIDAZOLE (Flagyl) 500 MG tablet Take 1 tablet by mouth 3 (Three) Times a Day for 10 days. 42 tablet 0 Taking    multivitamin with minerals (CENTRUM SILVER 50+WOMEN PO) Take 1 tablet by mouth Daily.   5/13/2025    ondansetron ODT (ZOFRAN-ODT) 8 MG disintegrating tablet Place 1 tablet on the tongue Every 8 (Eight) Hours As Needed for Nausea or Vomiting. 30 tablet 0 5/13/2025    oxaliplatin (ELOXATIN) 100 MG chemo injection Infuse 100 mg into a venous catheter.   Taking    oxyCODONE-acetaminophen (PERCOCET) 5-325 MG per tablet Take 1 tablet by mouth Every 8 (Eight) Hours As Needed for Moderate Pain or Severe Pain. 60 tablet 0 5/13/2025    potassium chloride (KLOR-CON M10) 10 MEQ CR tablet Take 1 tablet by mouth Daily. 30 tablet 0 Taking    rosuvastatin (Crestor) 40 MG tablet Take 1 tablet by mouth Daily. 90 tablet 3  5/13/2025    sulfamethoxazole-trimethoprim (BACTRIM DS,SEPTRA DS) 800-160 MG per tablet Take 1 tablet by mouth 2 (Two) Times a Day. 14 tablet 0 Taking    lidocaine-prilocaine (EMLA) 2.5-2.5 % cream Apply 1 Application topically to the appropriate area as directed Every 2 (Two) Hours As Needed for Mild Pain. 30 g 3 More than a month    nitroglycerin (NITROSTAT) 0.4 MG SL tablet 1 tablet.   Unknown       Allergies:  Pneumococcal vaccines    Review of Systems:  Fatigue, abdominal pain  Negative for following (except as per HPI):  Constitution: chills, fevers, fatigue   Eyes: change of vision, loss of vision and discharge  ENT: ear drainage, ear ringing and facial trauma  Respiratory: cough, pleuritic pain, shortness of air  Cardiovascular: chest pressure, pain, lower extremity edema, palpitations  Gastrointestinal: constipation, diarrhea, nausea, vomiting,   Integument: rash and wound  Hematologic / Lymphatic: excessive bleeding and easy bruising  Musculoskeletal: joint pain, joint stiffness, joint swelling and muscle pain  Neurological: headaches, numbness, seizures and tremors  Behavioral / Psych: anxiety, depression and hallucinations         Vital Signs  Temp:  [97.3 °F (36.3 °C)-98.1 °F (36.7 °C)] 98.1 °F (36.7 °C)  Heart Rate:  [] 62  Resp:  [12-18] 18  BP: (125-150)/(56-85) 136/64     There is no height or weight on file to calculate BMI.      Physical Exam:  General Appearance:    Alert, cooperative, in no acute distress.  Chronically ill-appearing   Head:    Normocephalic, without obvious abnormality, atraumatic   Eyes:         conjunctivae and sclerae normal, no icterus, PERRLA   ENT:    Ears grossly intact, oral mucosa moist,   Neck:   No adenopathy, supple, trachea midline,        Lungs:     Clear to auscultation,respirations regular, even and    unlabored    Heart:    Regular rhythm and normal rate,  no murmur, normal S1 and S2, port on right and permanent pacemaker on left   Abdomen:     Normal  bowel sounds, no masses,  soft tender to palpation left lower quadrant,   Extremities:   Moves all extremities well, no cyanosis, no edema,             Pulses:   Pulses palpable and equal bilaterally   Skin:   No bleeding, rash, bruising    Neurologic:    Psych:   Cranial nerves 2 - 12 grossly intact, sensation grossly intact,     Moves all extremities well, equal bilateral strength 4 out of 5    Alert and Oriented x 3, Normal Affect    I washed/sanitized my hands before entering the room and immediately upon leaving the room.    LABS:  Admission on 05/14/2025   Component Date Value Ref Range Status    Glucose 05/14/2025 86  65 - 99 mg/dL Final    BUN 05/14/2025 43 (H)  8 - 23 mg/dL Final    Creatinine 05/14/2025 1.85 (H)  0.57 - 1.00 mg/dL Final    Sodium 05/14/2025 143  136 - 145 mmol/L Final    Potassium 05/14/2025 4.2  3.5 - 5.2 mmol/L Final    Chloride 05/14/2025 105  98 - 107 mmol/L Final    CO2 05/14/2025 23.0  22.0 - 29.0 mmol/L Final    Calcium 05/14/2025 9.3  8.6 - 10.5 mg/dL Final    Total Protein 05/14/2025 6.9  6.0 - 8.5 g/dL Final    Albumin 05/14/2025 3.6  3.5 - 5.2 g/dL Final    ALT (SGPT) 05/14/2025 18  1 - 33 U/L Final    AST (SGOT) 05/14/2025 38 (H)  1 - 32 U/L Final    Alkaline Phosphatase 05/14/2025 151 (H)  39 - 117 U/L Final    Total Bilirubin 05/14/2025 0.2  0.0 - 1.2 mg/dL Final    Globulin 05/14/2025 3.3  gm/dL Final    A/G Ratio 05/14/2025 1.1  g/dL Final    BUN/Creatinine Ratio 05/14/2025 23.2  7.0 - 25.0 Final    Anion Gap 05/14/2025 15.0  5.0 - 15.0 mmol/L Final    eGFR 05/14/2025 26.1 (L)  >60.0 mL/min/1.73 Final    Color, UA 05/14/2025 Yellow  Yellow, Straw Final    Appearance, UA 05/14/2025 Clear  Clear Final    pH, UA 05/14/2025 <=5.0  5.0 - 8.0 Final    Specific Gravity, UA 05/14/2025 1.014  1.005 - 1.030 Final    Glucose, UA 05/14/2025 250 mg/dL (1+) (A)  Negative Final    Ketones, UA 05/14/2025 Negative  Negative Final    Bilirubin, UA 05/14/2025 Negative  Negative Final     Blood, UA 05/14/2025 Negative  Negative Final    Protein, UA 05/14/2025 Trace (A)  Negative Final    Leuk Esterase, UA 05/14/2025 Negative  Negative Final    Nitrite, UA 05/14/2025 Negative  Negative Final    Urobilinogen, UA 05/14/2025 0.2 E.U./dL  0.2 - 1.0 E.U./dL Final    Lactate 05/14/2025 2.2 (C)  0.5 - 2.0 mmol/L Final    Procalcitonin 05/14/2025 0.17  0.00 - 0.25 ng/mL Final    WBC 05/14/2025 11.51 (H)  3.40 - 10.80 10*3/mm3 Final    RBC 05/14/2025 3.96  3.77 - 5.28 10*6/mm3 Final    Hemoglobin 05/14/2025 12.8  12.0 - 15.9 g/dL Final    Hematocrit 05/14/2025 38.8  34.0 - 46.6 % Final    MCV 05/14/2025 98.0 (H)  79.0 - 97.0 fL Final    MCH 05/14/2025 32.3  26.6 - 33.0 pg Final    MCHC 05/14/2025 33.0  31.5 - 35.7 g/dL Final    RDW 05/14/2025 13.2  12.3 - 15.4 % Final    RDW-SD 05/14/2025 46.9  37.0 - 54.0 fl Final    MPV 05/14/2025 9.5  6.0 - 12.0 fL Final    Platelets 05/14/2025 224  140 - 450 10*3/mm3 Final    Neutrophil % 05/14/2025 89.6 (H)  42.7 - 76.0 % Final    Lymphocyte % 05/14/2025 5.8 (L)  19.6 - 45.3 % Final    Monocyte % 05/14/2025 4.0 (L)  5.0 - 12.0 % Final    Eosinophil % 05/14/2025 0.1 (L)  0.3 - 6.2 % Final    Basophil % 05/14/2025 0.3  0.0 - 1.5 % Final    Immature Grans % 05/14/2025 0.2  0.0 - 0.5 % Final    Neutrophils, Absolute 05/14/2025 10.31 (H)  1.70 - 7.00 10*3/mm3 Final    Lymphocytes, Absolute 05/14/2025 0.67 (L)  0.70 - 3.10 10*3/mm3 Final    Monocytes, Absolute 05/14/2025 0.46  0.10 - 0.90 10*3/mm3 Final    Eosinophils, Absolute 05/14/2025 0.01  0.00 - 0.40 10*3/mm3 Final    Basophils, Absolute 05/14/2025 0.04  0.00 - 0.20 10*3/mm3 Final    Immature Grans, Absolute 05/14/2025 0.02  0.00 - 0.05 10*3/mm3 Final    nRBC 05/14/2025 0.0  0.0 - 0.2 /100 WBC Final    QT Interval 05/14/2025 465  ms Final    QTC Interval 05/14/2025 475  ms Final    Magnesium 05/14/2025 1.6  1.6 - 2.4 mg/dL Final    HS Troponin T 05/14/2025 42 (H)  <14 ng/L Final    Lactate 05/14/2025 1.3  0.5 - 2.0 mmol/L  Final    HS Troponin T 05/14/2025 42 (H)  <14 ng/L Final    Troponin T Numeric Delta 05/14/2025 0  ng/L Final    Troponin T % Delta 05/14/2025 0  Abnormal if >/= 20% Final    Glucose 05/14/2025 47 (C)  70 - 130 mg/dL Final    Glucose 05/14/2025 49 (C)  70 - 130 mg/dL Final    Glucose 05/14/2025 149 (H)  70 - 130 mg/dL Final    Glucose 05/14/2025 215 (H)  70 - 130 mg/dL Final         DIAGNOSTICS:  CT Head Without Contrast  Result Date: 5/14/2025  1. Atrophy and small vessel ischemic changes. 2. No acute intracranial process.  Radiation dose reduction techniques were utilized, including automated exposure control and exposure modulation based on body size.   This report was finalized on 5/14/2025 1:53 PM by Dr. Johnathan Sandy M.D on Workstation: [employer]      Outpatient chest abdomen pelvis  IMPRESSION:  Impression:  1.  Findings of diverticulitis involving the sigmoid colon. This was  discussed with Dr. Meeks by telephone.  2.  There is air in the bladder which otherwise does not have a  significantly thickened appearance. Findings are indeterminant and may  be related to recent rotation versus gas-forming cystitis in the  appropriate clinical context. Correlation with patient history  recommended with urinalysis if clinically indicated.  3.  Innumerable bilateral pulmonary nodules with index nodules which  have increased in size since 3/19/2025 likely representing metastatic  disease.  4.  Left sacral lesion is present, as before. The degree of osseous  destruction, overall size of the lesion as well as extension into the  adjacent neural foramina of the sacrum appear to have increased since  3/19/2025 likely represent metastatic disease.  5.  Scattered hepatic lesions with index lesions which are grossly  unchanged in size. However, the degree of upstream intrahepatic bili  ductal dilation from the presumed metastasis within the central liver  appears to have increased.  6.  There is cholelithiasis. The  gallbladder has increased in size;  however is not definitively distended. Correlation with patient history  is recommended to exclude concern for early cholecystitis.  7.  Other findings as above.         Results Review:   I reviewed the patient's new clinical results.  Discussed with ER physician  Old records reviewed / Medical Decision Making High Complexity  I personally viewed and interpreted the patient's EKG/Telemetry data- sinus rhythm      ASSESSMENT AND PLAN    Acute diverticulitis    CAD (coronary artery disease)    CKD (chronic kidney disease) stage 3, GFR 30-59 ml/min    PUD (peptic ulcer disease)    Pacemaker    Metastatic colon cancer to liver    Type 2 diabetes mellitus with hypoglycemia, without long-term current use of insulin    JENNIFER (acute kidney injury)    Diverticulitis with anorexia, nausea and abdominal pain decreased p.o. intake causing dehydration  -IV fluids  - IV Zosyn  -Acute on chronic pain will get IV breakthrough and go ahead and schedule the Percocet tid and prn    Metastatic colon cancer with mets to liver, spine  -Patient has chronic cancer pain which she normally has been using Percocet 5 every 7 hours  - Patient was to meet with hospice, will also get palliative medicine to evaluate for symptom management.  The biggest thing is that patient's daughter lives out of town and the patient is needing some at home care/ respite care.  - Patient likely needs to be on MiraLAX or stool softener especially given the pain medications.  Daughter is concerned of bowel obstruction  - They been doing ibuprofen in between the Percocets.    JENNIFER on CKD 3a  -Likely secondary to decreased p.o. intake.  Patient also states she does not like water it makes her nauseous and suggested that she is going up to flavor it or use Pedialyte or something  - Give IV fluids now and patient has port and may be able to get home health to do?  - Hold ARB and HCTZ  - This is likely contributing to the hypoglycemia  due to recirculation of p.o. meds   - Hold ibuprofen    Urine bladder see CT scan for full report.  Patient denies any cystitis symptoms.  And urinalysis does not show evidence of infection.    Type 2 diabetes with hypoglycemia  --Accu-Cheks  -hypoglycemia protocol  -sliding scale insulin to cover outlier blood sugars  - Patient is on diabetic meds.  Will hold for now.  Patient is likely having some weight loss, may need to adjust and decrease strength of meds and also put hold parameters on some.    Goals of care per patient and daughter are to maintain the meds at this point, treat simple things like dehydration or infections and prioritize pain control    Will access port    HOME MEDS HELD: HCTZ, hypoglycemics, losartan  -Restart when clinically appropriate      Chronic medical conditions being monitored- stable, continue medical management  -   CAD (coronary artery disease)    CKD (chronic kidney disease) stage 3, GFR 30-59 ml/min    PUD (peptic ulcer disease)    Pacemaker    +DVT PROPH:    scds  + CODE STATUS: Full for now-but this likely needs to be readdressed  Poor long-term prognosis    I discussed the patient's findings and my recommendations with the patient and/or family.  Please reference all orders placed.    Liat Guerin MD  05/14/25  18:16 EDT      This document is intended for medical expert use only. Reading of this document by patients and/or patient's family without participating in medical staff guidance may result in misinterpretation and unintended morbidity. Any interpretation of such data is the responsibility of the patient and/or family member responsible for the patient in concert with their primary or specialist providers, and NOT to be left for sources of online searches such as [employer], [employer] or similar queries. Relying on these approaches to knowledge may result in misinterpretation, misguided goals of care and even death should patients or family members try recommendations outside  of the realm of professional medical care in a supervised way.    Dictated utilizing Voice dictation:  Parts of this note may be an electronic transcription/translation of spoke language to printed text using Dragon dictation system.

## 2025-05-14 NOTE — PROGRESS NOTES
[facility] Clinical Pharmacy Services: Piperacillin-Tazobactam Consult    Pt Name: Tamie Peter   : 1935    Indication: Intra-Abdominal Infection    Relevant clinical data and objective history reviewed:    Past Medical History:   Diagnosis Date    Anemia     Arthritis of left sacroiliac joint 2022    CKD (chronic kidney disease), stage III     PATIENT STATES IT IS AT STAGE 4 AT THIS TIME    Colorectal cancer     Coronary artery disease     cath 11/15. CABG x3 and all grafts widely patent. Very small OM and Cfx and that may be the cause of lateral ischemia on stress test. Non bipassable    Diabetes mellitus, type II     GERD (gastroesophageal reflux disease)     Gout     Heart attack 10/2022    MULTIPLE (6 OR MORE)    History of DVT in adulthood     AFTER CHILDBIRTH    Hyperlipidemia     Hypertension     Liver masses     Pacemaker     PUD (peptic ulcer disease)      Creatinine   Date Value Ref Range Status   2025 1.85 (H) 0.57 - 1.00 mg/dL Final   2025 1.44 (H) 0.57 - 1.00 mg/dL Final   04/15/2025 1.09 (H) 0.57 - 1.00 mg/dL Final     BUN   Date Value Ref Range Status   2025 43 (H) 8 - 23 mg/dL Final     Estimated Creatinine Clearance: 15.2 mL/min (A) (by C-G formula based on SCr of 1.85 mg/dL (H)).    Lab Results   Component Value Date    WBC 11.51 (H) 2025     Temp Readings from Last 3 Encounters:   25 97.3 °F (36.3 °C)   25 98.7 °F (37.1 °C) (Oral)   04/15/25 97.2 °F (36.2 °C) (Oral)      Assessment/Plan  Estimated CrCl <20 mL/min at this time; BMI 19 kg/m2  Will start piperacillin-tazobactam 3.375 g IV every 12 hours     Pharmacy will continue to follow daily while on piperacillin-tazobactam and adjust as needed. Thank you for this consult.    Nargis Omalley [facility]  Clinical Pharmacist

## 2025-05-14 NOTE — ED NOTES
..Nursing report ED to floor  Tamie Peter  89 y.o.  female    HPI :  HPI  Stated Reason for Visit: Weakness-woke up with symptoms  History Obtained From: EMS    Chief Complaint  Chief Complaint   Patient presents with    Weakness - Generalized       Admitting doctor:   Liat Guerin MD    Admitting diagnosis:   The primary encounter diagnosis was Acute diverticulitis. Diagnoses of Colon cancer metastasized to multiple sites, FTT (failure to thrive) in adult, and JENNIFER (acute kidney injury) were also pertinent to this visit.    Code status:   Current Code Status       Date Active Code Status Order ID Comments User Context       Prior            Allergies:   Pneumococcal vaccines    Isolation:   No active isolations    Intake and Output    Intake/Output Summary (Last 24 hours) at 5/14/2025 1529  Last data filed at 5/14/2025 1438  Gross per 24 hour   Intake 1100 ml   Output --   Net 1100 ml       Weight:   There were no vitals filed for this visit.    Most recent vitals:   Vitals:    05/14/25 1051 05/14/25 1115   BP: 147/85 150/69   Pulse: 110 76   Resp: 12 16   Temp: 98 °F (36.7 °C)    TempSrc: Oral    SpO2: 96% 97%       Active LDAs/IV Access:   Lines, Drains & Airways       Active LDAs       Name Placement date Placement time Site Days    Peripheral IV 05/14/25 1131 Anterior;Left;Proximal Forearm 05/14/25  1131  Forearm  less than 1    External Urinary Catheter 05/14/25  1326  --  less than 1    Single Lumen Implantable Port 11/19/24 Right Subclavian 11/19/24  1608  Subclavian  175                    Labs (abnormal labs have a star):   Labs Reviewed   COMPREHENSIVE METABOLIC PANEL - Abnormal; Notable for the following components:       Result Value    BUN 43 (*)     Creatinine 1.85 (*)     AST (SGOT) 38 (*)     Alkaline Phosphatase 151 (*)     eGFR 26.1 (*)     All other components within normal limits    Narrative:     GFR Categories in Chronic Kidney Disease (CKD)              GFR Category          GFR  (mL/min/1.73)    Interpretation  G1                    90 or greater        Normal or high (1)  G2                    60-89                Mild decrease (1)  G3a                   45-59                Mild to moderate decrease  G3b                   30-44                Moderate to severe decrease  G4                    15-29                Severe decrease  G5                    14 or less           Kidney failure    (1)In the absence of evidence of kidney disease, neither GFR category G1 or G2 fulfill the criteria for CKD.    eGFR calculation 2021 CKD-EPI creatinine equation, which does not include race as a factor   URINALYSIS W/ MICROSCOPIC IF INDICATED (NO CULTURE) - Abnormal; Notable for the following components:    Glucose,  mg/dL (1+) (*)     Protein, UA Trace (*)     All other components within normal limits    Narrative:     Urine microscopic not indicated.   LACTIC ACID, PLASMA - Abnormal; Notable for the following components:    Lactate 2.2 (*)     All other components within normal limits   CBC WITH AUTO DIFFERENTIAL - Abnormal; Notable for the following components:    WBC 11.51 (*)     MCV 98.0 (*)     Neutrophil % 89.6 (*)     Lymphocyte % 5.8 (*)     Monocyte % 4.0 (*)     Eosinophil % 0.1 (*)     Neutrophils, Absolute 10.31 (*)     Lymphocytes, Absolute 0.67 (*)     All other components within normal limits   TROPONIN - Abnormal; Notable for the following components:    HS Troponin T 42 (*)     All other components within normal limits    Narrative:     High Sensitive Troponin T Reference Range:  <14.0 ng/L- Negative Female for AMI  <22.0 ng/L- Negative Male for AMI  >=14 - Abnormal Female indicating possible myocardial injury.  >=22 - Abnormal Male indicating possible myocardial injury.   Clinicians would have to utilize clinical acumen, EKG, Troponin, and serial changes to determine if it is an Acute Myocardial Infarction or myocardial injury due to an underlying chronic condition.       "  HIGH SENSITIVITIY TROPONIN T 1HR - Abnormal; Notable for the following components:    HS Troponin T 42 (*)     All other components within normal limits    Narrative:     High Sensitive Troponin T Reference Range:  <14.0 ng/L- Negative Female for AMI  <22.0 ng/L- Negative Male for AMI  >=14 - Abnormal Female indicating possible myocardial injury.  >=22 - Abnormal Male indicating possible myocardial injury.   Clinicians would have to utilize clinical acumen, EKG, Troponin, and serial changes to determine if it is an Acute Myocardial Infarction or myocardial injury due to an underlying chronic condition.        POCT GLUCOSE FINGERSTICK - Abnormal; Notable for the following components:    Glucose 47 (*)     All other components within normal limits   POCT GLUCOSE FINGERSTICK - Abnormal; Notable for the following components:    Glucose 49 (*)     All other components within normal limits   POCT GLUCOSE FINGERSTICK - Abnormal; Notable for the following components:    Glucose 149 (*)     All other components within normal limits   PROCALCITONIN - Normal    Narrative:     As a Marker for Sepsis (Non-Neonates):    1. <0.5 ng/mL represents a low risk of severe sepsis and/or septic shock.  2. >2 ng/mL represents a high risk of severe sepsis and/or septic shock.    As a Marker for Lower Respiratory Tract Infections that require antibiotic therapy:    PCT on Admission    Antibiotic Therapy       6-12 Hrs later    >0.5                Strongly Recommended  >0.25 - <0.5        Recommended   0.1 - 0.25          Discouraged              Remeasure/reassess PCT  <0.1                Strongly Discouraged     Remeasure/reassess PCT    As 28 day mortality risk marker: \"Change in Procalcitonin Result\" (>80% or <=80%) if Day 0 (or Day 1) and Day 4 values are available. Refer to http://www.eEvents-pct-calculator.com    Change in PCT <=80%  A decrease of PCT levels below or equal to 80% defines a positive change in PCT test result " representing a higher risk for 28-day all-cause mortality of patients diagnosed with severe sepsis for septic shock.    Change in PCT >80%  A decrease of PCT levels of more than 80% defines a negative change in PCT result representing a lower risk for 28-day all-cause mortality of patients diagnosed with severe sepsis or septic shock.      MAGNESIUM - Normal   LACTIC ACID, REFLEX - Normal   BLOOD CULTURE   BLOOD CULTURE   CBC AND DIFFERENTIAL    Narrative:     The following orders were created for panel order CBC & Differential.  Procedure                               Abnormality         Status                     ---------                               -----------         ------                     CBC Auto Differential[[id number]]        Abnormal            Final result                 Please view results for these tests on the individual orders.       EKG:   ECG 12 Lead Other; gen weakness   Final Result   HEART RATE=63  bpm   RR Amxxelja=511  ms   VT Interval=98  ms   P Horizontal Axis=235  deg   P Front Axis=  deg   QRSD Allptxqc=845  ms   QT Ihjyhijn=033  ms   VJnB=612  ms   QRS Axis=-41  deg   T Wave Axis=18  deg   - ABNORMAL ECG -   Atrial-paced complexes   Right bundle branch block   Inferior  infarct, old   When compared with ECG of 24-Feb-2017 06:12:01,   New conduction abnormality   Electronically Signed By: Maxwell Daugherty ([facility]) 2025-05-14 12:02:39   Date and Time of Study:2025-05-14 11:14:39          Meds given in ED:   Medications   sodium chloride 0.9 % flush 10 mL (has no administration in time range)   sodium chloride 0.9 % bolus 1,000 mL (0 mL Intravenous Stopped 5/14/25 1326)   ondansetron (ZOFRAN) injection 4 mg (4 mg Intravenous Given 5/14/25 1240)   piperacillin-tazobactam (ZOSYN) 3.375 g IVPB in 100 mL NS MBP (CD) (0 g Intravenous Stopped 5/14/25 1438)   dextrose (D50W) (25 g/50 mL) IV injection 25 g (25 g Intravenous Given 5/14/25 1451)       Imaging results:  CT Head Without Contrast  Result  Date: 5/14/2025  1. Atrophy and small vessel ischemic changes. 2. No acute intracranial process.  Radiation dose reduction techniques were utilized, including automated exposure control and exposure modulation based on body size.   This report was finalized on 5/14/2025 1:53 PM by Dr. Johnathan Sandy M.D on Workstation: [id number]      CT Chest With Contrast Diagnostic  Result Date: 5/12/2025  Impression: 1.  Findings of diverticulitis involving the sigmoid colon. This was discussed with Dr. Meeks by telephone. 2.  There is air in the bladder which otherwise does not have a significantly thickened appearance. Findings are indeterminant and may be related to recent rotation versus gas-forming cystitis in the appropriate clinical context. Correlation with patient history recommended with urinalysis if clinically indicated. 3.  Innumerable bilateral pulmonary nodules with index nodules which have increased in size since 3/19/2025 likely representing metastatic disease. 4.  Left sacral lesion is present, as before. The degree of osseous destruction, overall size of the lesion as well as extension into the adjacent neural foramina of the sacrum appear to have increased since 3/19/2025 likely represent metastatic disease. 5.  Scattered hepatic lesions with index lesions which are grossly unchanged in size. However, the degree of upstream intrahepatic bili ductal dilation from the presumed metastasis within the central liver appears to have increased. 6.  There is cholelithiasis. The gallbladder has increased in size; however is not definitively distended. Correlation with patient history is recommended to exclude concern for early cholecystitis. 7.  Other findings as above.    This report was finalized on 5/12/2025 4:58 PM by Dr. Alvin Houston M.D on Workstation: [id number]      CT Abdomen Pelvis With Contrast  Result Date: 5/12/2025  Impression: 1.  Findings of diverticulitis involving the sigmoid colon. This was  discussed with Dr. Meeks by telephone. 2.  There is air in the bladder which otherwise does not have a significantly thickened appearance. Findings are indeterminant and may be related to recent rotation versus gas-forming cystitis in the appropriate clinical context. Correlation with patient history recommended with urinalysis if clinically indicated. 3.  Innumerable bilateral pulmonary nodules with index nodules which have increased in size since 3/19/2025 likely representing metastatic disease. 4.  Left sacral lesion is present, as before. The degree of osseous destruction, overall size of the lesion as well as extension into the adjacent neural foramina of the sacrum appear to have increased since 3/19/2025 likely represent metastatic disease. 5.  Scattered hepatic lesions with index lesions which are grossly unchanged in size. However, the degree of upstream intrahepatic bili ductal dilation from the presumed metastasis within the central liver appears to have increased. 6.  There is cholelithiasis. The gallbladder has increased in size; however is not definitively distended. Correlation with patient history is recommended to exclude concern for early cholecystitis. 7.  Other findings as above.    This report was finalized on 5/12/2025 4:58 PM by Dr. Alvin Houston M.D on Workstation: [id number]        Ambulatory status:   - up with x 1 assist    Social issues:   Social History     Socioeconomic History    Marital status:    Tobacco Use    Smoking status: Never     Passive exposure: Never    Smokeless tobacco: Never   Vaping Use    Vaping status: Never Used   Substance and Sexual Activity    Alcohol use: Never    Drug use: Never       Peripheral Neurovascular  Peripheral Neurovascular (Adult)  Peripheral Neurovascular WDL: WDL, pulse assessment  Pulse Assessment: radial    Neuro Cognitive  Neuro Cognitive (Adult)  Cognitive/Neuro/Behavioral WDL: .WDL except, arousability, level of consciousness,  mood/behavior, orientation, speech, all, motor response (family stated pts neurological status has improved completely from this morning, and that pt now appears to be in good spirit)  Level of Consciousness: Alert  Arousal Level: opens eyes spontaneously  Orientation: oriented x 4, person, place, time, situation  Speech: clear, spontaneous, logical  Mood/Behavior: calm, cooperative  Additional Documentation: [place] Coma Scale (Group)  Motor Response  Motor Response: general motor response  [place] Coma Scale  Best Eye Response: 4-->(E4) spontaneous  Best Motor Response: 6-->(M6) obeys commands  Best Verbal Response: 5-->(V5) oriented  [place] Coma Scale Score: 15    Learning  Learning Assessment  Learning Readiness and Ability: cognitive limitation noted  Education Provided  Person Taught: patient, family member/friend (daughter)  Teaching Focus: self-management, symptom/problem overview, medical device/equipment use, diagnostic test, risk factors/triggers  Education Outcome Evaluation: verbalizes understanding    Respiratory  Respiratory  Airway WDL: WDL  Respiratory WDL  Respiratory WDL: WDL, all  Rhythm/Pattern, Respiratory: depth regular, pattern regular, unlabored, no shortness of breath reported  Expansion/Accessory Muscles/Retractions: no use of accessory muscles, expansion symmetric, no retractions    Abdominal Pain  Safety Interventions  Safety Precautions/Falls Reduction: family at bedside  All Alarms: alarm(s) activated and audible    Pain Assessments  Pain (Adult)  (0-10) Pain Rating: Rest: 0    NIH Stroke Scale       Alyson Pyle RN  05/14/25 15:29 EDT

## 2025-05-14 NOTE — ED NOTES
..Nursing report ED to floor  Tamie Peter  89 y.o.  female    HPI :  HPI  Stated Reason for Visit: Weakness-woke up with symptoms  History Obtained From: EMS    Chief Complaint  Chief Complaint   Patient presents with    Weakness - Generalized       Admitting doctor:   Liat Guerin MD    Admitting diagnosis:   The primary encounter diagnosis was Acute diverticulitis. Diagnoses of Colon cancer metastasized to multiple sites, FTT (failure to thrive) in adult, and JENNIFER (acute kidney injury) were also pertinent to this visit.    Code status:   Current Code Status       Date Active Code Status Order ID Comments User Context       Prior            Allergies:   Pneumococcal vaccines    Isolation:   No active isolations    Intake and Output    Intake/Output Summary (Last 24 hours) at 5/14/2025 1414  Last data filed at 5/14/2025 1326  Gross per 24 hour   Intake 1000 ml   Output --   Net 1000 ml       Weight:   There were no vitals filed for this visit.    Most recent vitals:   Vitals:    05/14/25 1051 05/14/25 1115   BP: 147/85 150/69   Pulse: 110 76   Resp: 12 16   Temp: 98 °F (36.7 °C)    TempSrc: Oral    SpO2: 96% 97%       Active LDAs/IV Access:   Lines, Drains & Airways       Active LDAs       Name Placement date Placement time Site Days    Peripheral IV 05/14/25 1131 Anterior;Left;Proximal Forearm 05/14/25  1131  Forearm  less than 1    External Urinary Catheter 05/14/25  1326  --  less than 1    Single Lumen Implantable Port 11/19/24 Right Subclavian 11/19/24  1608  Subclavian  175                    Labs (abnormal labs have a star):   Labs Reviewed   COMPREHENSIVE METABOLIC PANEL - Abnormal; Notable for the following components:       Result Value    BUN 43 (*)     Creatinine 1.85 (*)     AST (SGOT) 38 (*)     Alkaline Phosphatase 151 (*)     eGFR 26.1 (*)     All other components within normal limits    Narrative:     GFR Categories in Chronic Kidney Disease (CKD)              GFR Category          GFR  (mL/min/1.73)    Interpretation  G1                    90 or greater        Normal or high (1)  G2                    60-89                Mild decrease (1)  G3a                   45-59                Mild to moderate decrease  G3b                   30-44                Moderate to severe decrease  G4                    15-29                Severe decrease  G5                    14 or less           Kidney failure    (1)In the absence of evidence of kidney disease, neither GFR category G1 or G2 fulfill the criteria for CKD.    eGFR calculation 2021 CKD-EPI creatinine equation, which does not include race as a factor   URINALYSIS W/ MICROSCOPIC IF INDICATED (NO CULTURE) - Abnormal; Notable for the following components:    Glucose,  mg/dL (1+) (*)     Protein, UA Trace (*)     All other components within normal limits    Narrative:     Urine microscopic not indicated.   LACTIC ACID, PLASMA - Abnormal; Notable for the following components:    Lactate 2.2 (*)     All other components within normal limits   CBC WITH AUTO DIFFERENTIAL - Abnormal; Notable for the following components:    WBC 11.51 (*)     MCV 98.0 (*)     Neutrophil % 89.6 (*)     Lymphocyte % 5.8 (*)     Monocyte % 4.0 (*)     Eosinophil % 0.1 (*)     Neutrophils, Absolute 10.31 (*)     Lymphocytes, Absolute 0.67 (*)     All other components within normal limits   TROPONIN - Abnormal; Notable for the following components:    HS Troponin T 42 (*)     All other components within normal limits    Narrative:     High Sensitive Troponin T Reference Range:  <14.0 ng/L- Negative Female for AMI  <22.0 ng/L- Negative Male for AMI  >=14 - Abnormal Female indicating possible myocardial injury.  >=22 - Abnormal Male indicating possible myocardial injury.   Clinicians would have to utilize clinical acumen, EKG, Troponin, and serial changes to determine if it is an Acute Myocardial Infarction or myocardial injury due to an underlying chronic condition.       "  HIGH SENSITIVITIY TROPONIN T 1HR - Abnormal; Notable for the following components:    HS Troponin T 42 (*)     All other components within normal limits    Narrative:     High Sensitive Troponin T Reference Range:  <14.0 ng/L- Negative Female for AMI  <22.0 ng/L- Negative Male for AMI  >=14 - Abnormal Female indicating possible myocardial injury.  >=22 - Abnormal Male indicating possible myocardial injury.   Clinicians would have to utilize clinical acumen, EKG, Troponin, and serial changes to determine if it is an Acute Myocardial Infarction or myocardial injury due to an underlying chronic condition.        PROCALCITONIN - Normal    Narrative:     As a Marker for Sepsis (Non-Neonates):    1. <0.5 ng/mL represents a low risk of severe sepsis and/or septic shock.  2. >2 ng/mL represents a high risk of severe sepsis and/or septic shock.    As a Marker for Lower Respiratory Tract Infections that require antibiotic therapy:    PCT on Admission    Antibiotic Therapy       6-12 Hrs later    >0.5                Strongly Recommended  >0.25 - <0.5        Recommended   0.1 - 0.25          Discouraged              Remeasure/reassess PCT  <0.1                Strongly Discouraged     Remeasure/reassess PCT    As 28 day mortality risk marker: \"Change in Procalcitonin Result\" (>80% or <=80%) if Day 0 (or Day 1) and Day 4 values are available. Refer to http://www.[employer]-pct-calculator.com    Change in PCT <=80%  A decrease of PCT levels below or equal to 80% defines a positive change in PCT test result representing a higher risk for 28-day all-cause mortality of patients diagnosed with severe sepsis for septic shock.    Change in PCT >80%  A decrease of PCT levels of more than 80% defines a negative change in PCT result representing a lower risk for 28-day all-cause mortality of patients diagnosed with severe sepsis or septic shock.      MAGNESIUM - Normal   BLOOD CULTURE   BLOOD CULTURE   LACTIC ACID, REFLEX   CBC AND " DIFFERENTIAL    Narrative:     The following orders were created for panel order CBC & Differential.  Procedure                               Abnormality         Status                     ---------                               -----------         ------                     CBC Auto Differential[[id number]]        Abnormal            Final result                 Please view results for these tests on the individual orders.       EKG:   ECG 12 Lead Other; gen weakness   Final Result   HEART RATE=63  bpm   RR Zldwdllw=248  ms   NM Interval=98  ms   P Horizontal Axis=235  deg   P Front Axis=  deg   QRSD Bvazjyac=041  ms   QT Rbmxmcwe=091  ms   DFiS=650  ms   QRS Axis=-41  deg   T Wave Axis=18  deg   - ABNORMAL ECG -   Atrial-paced complexes   Right bundle branch block   Inferior  infarct, old   When compared with ECG of 24-Feb-2017 06:12:01,   New conduction abnormality   Electronically Signed By: Maxwell Daugherty ([facility]) 2025-05-14 12:02:39   Date and Time of Study:2025-05-14 11:14:39          Meds given in ED:   Medications   sodium chloride 0.9 % flush 10 mL (has no administration in time range)   piperacillin-tazobactam (ZOSYN) 3.375 g IVPB in 100 mL NS MBP (CD) (3.375 g Intravenous New Bag 5/14/25 1356)   sodium chloride 0.9 % bolus 1,000 mL (0 mL Intravenous Stopped 5/14/25 1326)   ondansetron (ZOFRAN) injection 4 mg (4 mg Intravenous Given 5/14/25 1240)       Imaging results:  CT Head Without Contrast  Result Date: 5/14/2025  1. Atrophy and small vessel ischemic changes. 2. No acute intracranial process.  Radiation dose reduction techniques were utilized, including automated exposure control and exposure modulation based on body size.   This report was finalized on 5/14/2025 1:53 PM by Dr. Johnathan Sandy M.D on Workstation: [id number]      CT Chest With Contrast Diagnostic  Result Date: 5/12/2025  Impression: 1.  Findings of diverticulitis involving the sigmoid colon. This was discussed with Dr. Meeks by telephone. 2.   There is air in the bladder which otherwise does not have a significantly thickened appearance. Findings are indeterminant and may be related to recent rotation versus gas-forming cystitis in the appropriate clinical context. Correlation with patient history recommended with urinalysis if clinically indicated. 3.  Innumerable bilateral pulmonary nodules with index nodules which have increased in size since 3/19/2025 likely representing metastatic disease. 4.  Left sacral lesion is present, as before. The degree of osseous destruction, overall size of the lesion as well as extension into the adjacent neural foramina of the sacrum appear to have increased since 3/19/2025 likely represent metastatic disease. 5.  Scattered hepatic lesions with index lesions which are grossly unchanged in size. However, the degree of upstream intrahepatic bili ductal dilation from the presumed metastasis within the central liver appears to have increased. 6.  There is cholelithiasis. The gallbladder has increased in size; however is not definitively distended. Correlation with patient history is recommended to exclude concern for early cholecystitis. 7.  Other findings as above.    This report was finalized on 5/12/2025 4:58 PM by Dr. Alvin Houston M.D on Workstation: [id number]      CT Abdomen Pelvis With Contrast  Result Date: 5/12/2025  Impression: 1.  Findings of diverticulitis involving the sigmoid colon. This was discussed with Dr. Meeks by telephone. 2.  There is air in the bladder which otherwise does not have a significantly thickened appearance. Findings are indeterminant and may be related to recent rotation versus gas-forming cystitis in the appropriate clinical context. Correlation with patient history recommended with urinalysis if clinically indicated. 3.  Innumerable bilateral pulmonary nodules with index nodules which have increased in size since 3/19/2025 likely representing metastatic disease. 4.  Left sacral lesion  is present, as before. The degree of osseous destruction, overall size of the lesion as well as extension into the adjacent neural foramina of the sacrum appear to have increased since 3/19/2025 likely represent metastatic disease. 5.  Scattered hepatic lesions with index lesions which are grossly unchanged in size. However, the degree of upstream intrahepatic bili ductal dilation from the presumed metastasis within the central liver appears to have increased. 6.  There is cholelithiasis. The gallbladder has increased in size; however is not definitively distended. Correlation with patient history is recommended to exclude concern for early cholecystitis. 7.  Other findings as above.    This report was finalized on 5/12/2025 4:58 PM by Dr. Alvin Houston M.D on Workstation: [employer]        Ambulatory status:   - up with assist x 1 or wheelchair    Social issues:   Social History     Socioeconomic History    Marital status:    Tobacco Use    Smoking status: Never     Passive exposure: Never    Smokeless tobacco: Never   Vaping Use    Vaping status: Never Used   Substance and Sexual Activity    Alcohol use: Never    Drug use: Never       Peripheral Neurovascular  Peripheral Neurovascular (Adult)  Peripheral Neurovascular WDL: WDL, pulse assessment  Pulse Assessment: radial    Neuro Cognitive  Neuro Cognitive (Adult)  Cognitive/Neuro/Behavioral WDL: .WDL except, arousability, level of consciousness, mood/behavior, orientation, speech, all, motor response (family stated pts neurological status has improved completely from this morning, and that pt now appears to be in good spirit)  Level of Consciousness: Alert  Arousal Level: opens eyes spontaneously  Orientation: oriented x 4, person, place, time, situation  Speech: clear, spontaneous, logical  Mood/Behavior: calm, cooperative  Additional Documentation: Sophy Coma Scale (Group)  Motor Response  Motor Response: general motor response  Sophy Coma Scale  Best  Eye Response: 4-->(E4) spontaneous  Best Motor Response: 6-->(M6) obeys commands  Best Verbal Response: 5-->(V5) oriented  Sophy Coma Scale Score: 15    Learning  Learning Assessment  Learning Readiness and Ability: cognitive limitation noted  Education Provided  Person Taught: patient, family member/friend (daughter)  Teaching Focus: self-management, symptom/problem overview, medical device/equipment use, diagnostic test, risk factors/triggers  Education Outcome Evaluation: verbalizes understanding    Respiratory  Respiratory  Airway WDL: WDL  Respiratory WDL  Respiratory WDL: WDL, all  Rhythm/Pattern, Respiratory: depth regular, pattern regular, unlabored, no shortness of breath reported  Expansion/Accessory Muscles/Retractions: no use of accessory muscles, expansion symmetric, no retractions    Abdominal Pain  Safety Interventions  Safety Precautions/Falls Reduction: family at bedside  All Alarms: alarm(s) activated and audible    Pain Assessments  Pain (Adult)  (0-10) Pain Rating: Rest: 0    NIH Stroke Scale       Alyson Pyle RN  05/14/25 14:14 EDT

## 2025-05-14 NOTE — ED PROVIDER NOTES
EMERGENCY DEPARTMENT MD ATTESTATION NOTE    Room Number:  06/06  PCP: Mohinder Barbosa MD  Independent Historians: Patient, Family, and EMS    HPI:  A complete HPI/ROS/PMH/PSH/SH/FH are unobtainable due to: None    Chronic or social conditions impacting patient care (Social Determinants of Health): None      Context: Tamie Peter is a 89 y.o. female with a medical history of coronary disease, diabetes, sciatica, metastatic colon cancer who presents to the ED c/o acute episode of pale and shaking.  Family reports that she saw the oncologist yesterday and was started on antibiotics for diverticulitis and UTI.  Her chemotherapy was stopped.  They referred her to hospice.  She was supposed to start Cipro and Flagyl for the diverticulitis and cystitis but she has not yet taken her first dose.  This morning she had an episode where she was shaking all over and was pale and did not respond.  She reports pain in her left lower quadrant.  Family was concerned about a stroke.  She has no weakness or numbness.  They have not yet met with hospice.        Review of prior external notes (non-ED) -and- Review of prior external test results outside of this encounter:  Laboratory evaluation 5/12/2025 shows a CMP with an elevated creatinine 1.44    Prescription drug monitoring program review:           PHYSICAL EXAM    I have reviewed the triage vital signs and nursing notes.    ED Triage Vitals [05/14/25 1051]   Temp Heart Rate Resp BP SpO2   98 °F (36.7 °C) 110 12 147/85 96 %      Temp src Heart Rate Source Patient Position BP Location FiO2 (%)   Oral Monitor -- -- --       Physical Exam  GENERAL: Awake, alert, no acute distress  SKIN: Warm, dry  HENT: Normocephalic, atraumatic  EYES: no scleral icterus  CV: regular rhythm, regular rate  RESPIRATORY: normal effort, lungs clear  ABDOMEN: soft, moderate tenderness in the left lower quadrant, nondistended  MUSCULOSKELETAL: no deformity  NEURO: alert, moves all extremities, follows  commands.  No facial asymmetry.  Equal upper and lower extremity strength and sensation            MEDICATIONS GIVEN IN ER  Medications   sodium chloride 0.9 % flush 10 mL (has no administration in time range)   sodium chloride 0.9 % bolus 1,000 mL (0 mL Intravenous Stopped 5/14/25 1326)   ondansetron (ZOFRAN) injection 4 mg (4 mg Intravenous Given 5/14/25 1240)   piperacillin-tazobactam (ZOSYN) 3.375 g IVPB in 100 mL NS MBP (CD) (0 g Intravenous Stopped 5/14/25 1438)   dextrose (D50W) (25 g/50 mL) IV injection 25 g (25 g Intravenous Given 5/14/25 1451)         ORDERS PLACED DURING THIS VISIT:  Orders Placed This Encounter   Procedures    Blood Culture - Blood,    Blood Culture - Blood,    CT Head Without Contrast    Comprehensive Metabolic Panel    Urinalysis With Microscopic If Indicated (No Culture) - Urine, Catheter    Lactic Acid, Plasma    Procalcitonin    CBC Auto Differential    Magnesium    High Sensitivity Troponin T    STAT Lactic Acid, Reflex    High Sensitivity Troponin T 1Hr    [facility] (on-call MD unless specified) Details    IP Palliative Care Nurse Consult    POC Glucose Once    POC Glucose Once    POC Glucose Once    ECG 12 Lead Other; gen weakness    Insert Peripheral IV    Inpatient Admission    CBC & Differential         PROCEDURES  Procedures            PROGRESS, DATA ANALYSIS, CONSULTS, AND MEDICAL DECISION MAKING  All labs have been independently interpreted by me.  All radiology studies have been reviewed by me. All EKG's have been independently viewed and interpreted by me.  Discussion below represents my analysis of pertinent findings related to patient's condition, differential diagnosis, treatment plan and final disposition.    Differential diagnosis includes but is not limited to intracranial metastasis, seizure, stroke, sepsis, diverticulitis, UTI, anemia, hypokalemia.    Clinical Scores:                                     ED Course as of 05/14/25 1539   Wed May 14, 2025   1113 Patient  presents with progressive generalized weakness and an episode of altered mental status this morning.  Patient with known history of metastatic colon cancer.  She has been referred to hospice.  She is alert and oriented at this time.  Will check basic labs, head CT and will reevaluate. [EE]   1122 EKG PROCEDURE    EKG time: 1114  Rhythm/Rate: Atrial paced, rate 63  P waves and MA: Normal P, normal MA  QRS, axis: Right bundle branch block  ST and T waves: No acute    Independently Interpreted by me  New right bundle branch block compared to prior 2/24/2017   [TR]   1124 Right bundle branch block was present on EKG 11/20/2024 [TR]   1147 Leukocytes, UA: Negative [EE]   1147 Nitrite, UA: Negative [EE]   1147 WBC(!): 11.51  This was 8.3, 2 days ago [EE]   1200 Creatinine(!): 1.85 [EE]   1200 Lactate(!!): 2.2 [EE]   1233 CT Head Without Contrast  My independent interpretation of the imaging study is no acute hemorrhage [TR]   1234 Hemoglobin: 12.8 [TR]   1234 Lactate(!!): 2.2 [TR]   1234 Procalcitonin: 0.17 [TR]   1234 Magnesium: 1.6 [TR]   1234 HS Troponin T(!): 42 [TR]   1234 Creatinine(!): 1.85 [TR]   1234 Leukocytes, UA: Negative [TR]   1328 Updated patient and family on workup.  She does have known diverticulitis with worsening white count.  She has an JENNIFER.  She has not been doing well at home.  Plan to admit her for IV antibiotics, hospice consultation. [EE]   1404 I discussed the case with Dr. Cervantes GABRIELA.  She agrees to admit. [EE]      ED Course User Index  [EE] Chemo Bob PA  [TR] Bryant Young MD       MDM: The patient has no signs of stroke currently.  She has no deficits.  Her presentation is abnormal for any stroke.  Plan laboratory evaluation as well as CT imaging.  She was treated with 2 doses of Bactrim before she was switched to the new antibiotics that she has not yet started.  While the patient has been referred to hospice in Discussing with family it sounds like they have not shifted their  goals of care away from treatment.      COMPLEXITY OF CARE  The patient requires admission.    Please note that portions of this document were completed with a voice recognition program.    Note Disclaimer: At [facility], we believe that sharing information builds trust and better relationships. You are receiving this note because you recently visited [facility]. It is possible you will see health information before a provider has talked with you about it. This kind of information can be easy to misunderstand. To help you fully understand what it means for your health, we urge you to discuss this note with your provider.         Bryant Young MD  05/14/25 153

## 2025-05-14 NOTE — ED PROVIDER NOTES
EMERGENCY DEPARTMENT ENCOUNTER    Room Number:  06/06  Date of encounter:  5/14/2025  PCP: Mohinder Barbosa MD  Historian: Patient, daughter  Chronic or social conditions impacting care (social determinants of health): From home    HPI:  Chief Complaint: Generalized weakness, altered mental status  A complete HPI/ROS/PMH/PSH/SH/FH are unobtainable due to: Nothing    Context: Tamie Peter is a 89 y.o. female with a history of artery disease, hypertension, chronic kidney disease, known metastatic colon cancer, who presents to the ED c/o acute weakness, altered mental status.  Patient follows with Dr. Meeks in oncology.  Patient recently discontinued chemotherapy due to side effects and lack of response.  Over the past several weeks the patient has had progressive generalized weakness, nausea, weight loss.  She was seen yesterday in the oncology office and diagnosed with a UTI as well as diverticulitis based on a CT scan from several days ago.  He has not started antibiotics.  She was referred to hospice, with whom she had appointment today.    In addition the patient had an episode of confusion, altered mental status this morning.  Patient's daughter states she went to go wake her up and the patient could not talk and had a funny look in her eyes.  EMS was called.  By the time EMS arrived the patient had returned back to her baseline.    Review of prior external notes (non-ED):   I reviewed oncology office visit from yesterday.  Patient being treated for metastatic colon cancer.    Review of prior external test results outside of this encounter:  Reviewed CT of the abdomen dated 5/12/2025.  This showed evidence of diverticulitis, UTI, metastatic lung nodules, sacral bone metastases.    PAST MEDICAL HISTORY  Active Ambulatory Problems     Diagnosis Date Noted    CAD (coronary artery disease) 07/26/2016    Essential hypertension 07/26/2016    GERD (gastroesophageal reflux disease) 07/26/2016    Diabetes mellitus  07/26/2016    CKD (chronic kidney disease) stage 3, GFR 30-59 ml/min 07/26/2016    Gout 07/26/2016    Hyperlipidemia 07/26/2016    PUD (peptic ulcer disease) 11/01/2016    Sick sinus syndrome 02/21/2017    Disorder of rotator cuff 01/31/2013    Osteoporosis 09/14/2017    Pacemaker 01/30/2018    Acute left-sided low back pain with left-sided sciatica 09/22/2021    Arthritis of left sacroiliac joint 05/05/2022    Piriformis syndrome of left side 06/06/2022    Spinal stenosis of lumbar region 06/06/2022    Radiculitis 09/23/2022    Encounter for adjustment or management of vascular access device 11/04/2024    Metastatic colon cancer to liver 11/04/2024    Iron deficiency anemia, unspecified 11/21/2024    Adverse effect of compound iron preparation 11/21/2024    Chemotherapy induced neutropenia 12/05/2024    Carcinoma of colon metastatic to lung 12/31/2024     Resolved Ambulatory Problems     Diagnosis Date Noted    Near syncope 02/21/2017    Junctional rhythm 02/21/2017     Past Medical History:   Diagnosis Date    Anemia     CKD (chronic kidney disease), stage III     Colorectal cancer     Coronary artery disease     Diabetes mellitus, type II     Heart attack 10/2022    History of DVT in adulthood     Hypertension     Liver masses          PAST SURGICAL HISTORY  Past Surgical History:   Procedure Laterality Date    CARDIAC ELECTROPHYSIOLOGY PROCEDURE N/A 02/22/2017    Procedure: Pacemaker DC new;  Surgeon: Juvenal Zhong MD;  Location: [facility] INVASIVE LOCATION;  Service:     CARDIAC SURGERY  10/2022    CATARACT EXTRACTION      COLONOSCOPY  2010    COLONOSCOPY N/A 11/19/2024    Procedure: COLONOSCOPY WITH TATTOO MASS INJECTION 4 ML;  Surgeon: Caden Christian MD;  Location: [facility] OR;  Service: General;  Laterality: N/A;    CORONARY ARTERY BYPASS GRAFT      1992, 2009    EPIDURAL Left 10/17/2022    Procedure: LUMBAR/SACRAL TRANSFORAMINAL EPIDURAL Left L4-5 and left L5-S1 - hold plavix 7 days before;   Surgeon: Yasmine Gaming MD;  Location: [facility] OR;  Service: Pain Management;  Laterality: Left;    PACEMAKER IMPLANTATION      PIRIFORMUS INJECTION Left 07/15/2022    Procedure: PIRIFORMIS INJECTION;  Surgeon: Yasmine Gaming MD;  Location: [facility];  Service: Pain Management;  Laterality: Left;    RADIOFREQUENCY ABLATION Left 02/20/2023    Procedure: RADIOFREQUENCY ABLATION NERVES for the left sacroiliac joint;  Surgeon: Yasmine Gaming MD;  Location: [facility];  Service: Pain Management;  Laterality: Left;    SACROILIAC JOINT INJECTION Left 05/09/2022    Procedure: SACROILIAC INJECTION - Left;  Surgeon: Yasmine Gaming MD;  Location: [facility];  Service: Pain Management;  Laterality: Left;    SACROILIAC JOINT INJECTION Left 07/15/2022    Procedure: left Sacroiliac joint injection and left piriformis injection;  Surgeon: Yasmine Gaming MD;  Location: [facility];  Service: Pain Management;  Laterality: Left;    TUBAL ABDOMINAL LIGATION      VENOUS ACCESS DEVICE (PORT) INSERTION N/A 11/19/2024    Procedure: INSERTION VENOUS ACCESS DEVICE;  Surgeon: Caden Christian MD;  Location: [facility] MAIN OR;  Service: General;  Laterality: N/A;         FAMILY HISTORY  Family History   Problem Relation Age of Onset    Coronary artery disease Mother     Heart attack Sister     Malig Hyperthermia Neg Hx          SOCIAL HISTORY  Social History     Socioeconomic History    Marital status:    Tobacco Use    Smoking status: Never     Passive exposure: Never    Smokeless tobacco: Never   Vaping Use    Vaping status: Never Used   Substance and Sexual Activity    Alcohol use: Never    Drug use: Never         ALLERGIES  Pneumococcal vaccines        REVIEW OF SYSTEMS  All systems reviewed and negative except for those discussed in HPI.       PHYSICAL EXAM    I have reviewed the triage vital signs and nursing notes.    ED Triage Vitals [05/14/25 1051]   Temp Heart Rate Resp BP SpO2   98 °F (36.7 °C)  110 12 147/85 96 %      Temp src Heart Rate Source Patient Position BP Location FiO2 (%)   Oral Monitor -- -- --       Physical Exam  GENERAL: Alert, oriented, chronically ill-appearing, not distressed  HENT: head atraumatic, no nuchal rigidity  EYES: no scleral icterus, EOMI  CV: regular rhythm, regular rate, no murmur  RESPIRATORY: normal effort, CTA  ABDOMEN: soft, nontender  MUSCULOSKELETAL: no deformity, FROM, no calf swelling or tenderness  NEURO: alert, moves all extremities, follows commands  SKIN: warm, dry        LAB RESULTS  Recent Results (from the past 24 hours)   ECG 12 Lead Other; gen weakness    Collection Time: 05/14/25 11:14 AM   Result Value Ref Range    QT Interval 465 ms    QTC Interval 475 ms   Comprehensive Metabolic Panel    Collection Time: 05/14/25 11:27 AM    Specimen: Blood   Result Value Ref Range    Glucose 86 65 - 99 mg/dL    BUN 43 (H) 8 - 23 mg/dL    Creatinine 1.85 (H) 0.57 - 1.00 mg/dL    Sodium 143 136 - 145 mmol/L    Potassium 4.2 3.5 - 5.2 mmol/L    Chloride 105 98 - 107 mmol/L    CO2 23.0 22.0 - 29.0 mmol/L    Calcium 9.3 8.6 - 10.5 mg/dL    Total Protein 6.9 6.0 - 8.5 g/dL    Albumin 3.6 3.5 - 5.2 g/dL    ALT (SGPT) 18 1 - 33 U/L    AST (SGOT) 38 (H) 1 - 32 U/L    Alkaline Phosphatase 151 (H) 39 - 117 U/L    Total Bilirubin 0.2 0.0 - 1.2 mg/dL    Globulin 3.3 gm/dL    A/G Ratio 1.1 g/dL    BUN/Creatinine Ratio 23.2 7.0 - 25.0    Anion Gap 15.0 5.0 - 15.0 mmol/L    eGFR 26.1 (L) >60.0 mL/min/1.73   Urinalysis With Microscopic If Indicated (No Culture) - Straight Cath    Collection Time: 05/14/25 11:27 AM    Specimen: Straight Cath; Urine   Result Value Ref Range    Color, UA Yellow Yellow, Straw    Appearance, UA Clear Clear    pH, UA <=5.0 5.0 - 8.0    Specific Gravity, UA 1.014 1.005 - 1.030    Glucose,  mg/dL (1+) (A) Negative    Ketones, UA Negative Negative    Bilirubin, UA Negative Negative    Blood, UA Negative Negative    Protein, UA Trace (A) Negative    Leuk  Esterase, UA Negative Negative    Nitrite, UA Negative Negative    Urobilinogen, UA 0.2 E.U./dL 0.2 - 1.0 E.U./dL   Lactic Acid, Plasma    Collection Time: 05/14/25 11:27 AM    Specimen: Blood   Result Value Ref Range    Lactate 2.2 (C) 0.5 - 2.0 mmol/L   Procalcitonin    Collection Time: 05/14/25 11:27 AM    Specimen: Blood   Result Value Ref Range    Procalcitonin 0.17 0.00 - 0.25 ng/mL   CBC Auto Differential    Collection Time: 05/14/25 11:27 AM    Specimen: Blood   Result Value Ref Range    WBC 11.51 (H) 3.40 - 10.80 10*3/mm3    RBC 3.96 3.77 - 5.28 10*6/mm3    Hemoglobin 12.8 12.0 - 15.9 g/dL    Hematocrit 38.8 34.0 - 46.6 %    MCV 98.0 (H) 79.0 - 97.0 fL    MCH 32.3 26.6 - 33.0 pg    MCHC 33.0 31.5 - 35.7 g/dL    RDW 13.2 12.3 - 15.4 %    RDW-SD 46.9 37.0 - 54.0 fl    MPV 9.5 6.0 - 12.0 fL    Platelets 224 140 - 450 10*3/mm3    Neutrophil % 89.6 (H) 42.7 - 76.0 %    Lymphocyte % 5.8 (L) 19.6 - 45.3 %    Monocyte % 4.0 (L) 5.0 - 12.0 %    Eosinophil % 0.1 (L) 0.3 - 6.2 %    Basophil % 0.3 0.0 - 1.5 %    Immature Grans % 0.2 0.0 - 0.5 %    Neutrophils, Absolute 10.31 (H) 1.70 - 7.00 10*3/mm3    Lymphocytes, Absolute 0.67 (L) 0.70 - 3.10 10*3/mm3    Monocytes, Absolute 0.46 0.10 - 0.90 10*3/mm3    Eosinophils, Absolute 0.01 0.00 - 0.40 10*3/mm3    Basophils, Absolute 0.04 0.00 - 0.20 10*3/mm3    Immature Grans, Absolute 0.02 0.00 - 0.05 10*3/mm3    nRBC 0.0 0.0 - 0.2 /100 WBC   Magnesium    Collection Time: 05/14/25 11:27 AM    Specimen: Blood   Result Value Ref Range    Magnesium 1.6 1.6 - 2.4 mg/dL   High Sensitivity Troponin T    Collection Time: 05/14/25 11:27 AM    Specimen: Blood   Result Value Ref Range    HS Troponin T 42 (H) <14 ng/L   High Sensitivity Troponin T 1Hr    Collection Time: 05/14/25 12:43 PM    Specimen: Arm, Right; Blood   Result Value Ref Range    HS Troponin T 42 (H) <14 ng/L    Troponin T Numeric Delta 0 ng/L    Troponin T % Delta 0 Abnormal if >/= 20%   POC Glucose Once    Collection  Time: 05/14/25  2:31 PM    Specimen: Blood   Result Value Ref Range    Glucose 47 (C) 70 - 130 mg/dL   STAT Lactic Acid, Reflex    Collection Time: 05/14/25  2:33 PM    Specimen: Blood   Result Value Ref Range    Lactate 1.3 0.5 - 2.0 mmol/L   POC Glucose Once    Collection Time: 05/14/25  2:48 PM    Specimen: Blood   Result Value Ref Range    Glucose 49 (C) 70 - 130 mg/dL   POC Glucose Once    Collection Time: 05/14/25  3:05 PM    Specimen: Blood   Result Value Ref Range    Glucose 149 (H) 70 - 130 mg/dL       Ordered the above labs and independently reviewed the results.        RADIOLOGY  CT Head Without Contrast  Result Date: 5/14/2025  CT OF THE BRAIN WITHOUT CONTRAST 5/14/2025  HISTORY: Altered mental status.  Axial images were obtained through the brain without intravenous contrast.  There is mild to moderate diffuse atrophy. There is decreased attenuation of the periventricular white matter bilaterally consistent with small vessel white matter ischemic disease. Small somewhat more confluent area of low density seen near the anterior aspect of the left external capsule on image 21 likely a small focal area of old infarct.  There is no evidence of acute infarction, hemorrhage, midline shift or mass effect.  No bony abnormalities are seen.      1. Atrophy and small vessel ischemic changes. 2. No acute intracranial process.  Radiation dose reduction techniques were utilized, including automated exposure control and exposure modulation based on body size.   This report was finalized on 5/14/2025 1:53 PM by Dr. Johnathan Sandy M.D on Workstation: [id number]        I ordered the above noted radiological studies. Reviewed by me and discussed with radiologist.  See dictation for official radiology interpretation.      MEDICATIONS GIVEN IN ER    Medications   sodium chloride 0.9 % flush 10 mL (has no administration in time range)   sodium chloride 0.9 % bolus 1,000 mL (0 mL Intravenous Stopped 5/14/25 1326)    ondansetron (ZOFRAN) injection 4 mg (4 mg Intravenous Given 5/14/25 1240)   piperacillin-tazobactam (ZOSYN) 3.375 g IVPB in 100 mL NS MBP (CD) (0 g Intravenous Stopped 5/14/25 1438)   dextrose (D50W) (25 g/50 mL) IV injection 25 g (25 g Intravenous Given 5/14/25 1451)         ADDITIONAL ORDERS CONSIDERED BUT NOT ORDERED:  None      PROGRESS, DATA ANALYSIS, CONSULTS, AND MEDICAL DECISION MAKING    All labs have been independently interpreted by myself.  All radiology studies have been independently interpreted by myself and discussed with radiologist dictating the report.   EKGs independently interpreted by myself.  Discussion below represents my analysis of pertinent findings related to patient's condition, differential diagnosis, treatment plan and final disposition.    I have discussed case with Dr. Young, emergency room physician.  He has performed his own bedside examination and agrees with treatment plan.    ED Course as of 05/14/25 1516   Wed May 14, 2025   1113 Patient presents with progressive generalized weakness and an episode of altered mental status this morning.  Patient with known history of metastatic colon cancer.  She has been referred to hospice.  She is alert and oriented at this time.  Will check basic labs, head CT and will reevaluate. [EE]   1122 EKG PROCEDURE    EKG time: 1114  Rhythm/Rate: Atrial paced, rate 63  P waves and KS: Normal P, normal KS  QRS, axis: Right bundle branch block  ST and T waves: No acute    Independently Interpreted by me  New right bundle branch block compared to prior 2/24/2017   [TR]   1124 Right bundle branch block was present on EKG 11/20/2024 [TR]   1147 Leukocytes, UA: Negative [EE]   1147 Nitrite, UA: Negative [EE]   1147 WBC(!): 11.51  This was 8.3, 2 days ago [EE]   1200 Creatinine(!): 1.85 [EE]   1200 Lactate(!!): 2.2 [EE]   1233 CT Head Without Contrast  My independent interpretation of the imaging study is no acute hemorrhage [TR]   1234 Hemoglobin:  12.8 [TR]   1234 Lactate(!!): 2.2 [TR]   1234 Procalcitonin: 0.17 [TR]   1234 Magnesium: 1.6 [TR]   1234 HS Troponin T(!): 42 [TR]   1234 Creatinine(!): 1.85 [TR]   1234 Leukocytes, UA: Negative [TR]   1328 Updated patient and family on workup.  She does have known diverticulitis with worsening white count.  She has an JENNIFER.  She has not been doing well at home.  Plan to admit her for IV antibiotics, hospice consultation. [EE]   1404 I discussed the case with Dr. [facility].  She agrees to admit. [EE]      ED Course User Index  [EE] Chemo Bob PA  [TR] Bryant Young MD       AS OF 15:16 EDT VITALS:    BP - 150/69  HR - 76  TEMP - 98 °F (36.7 °C) (Oral)  O2 SATS - 97%        DIAGNOSIS  Final diagnoses:   Acute diverticulitis   Colon cancer metastasized to multiple sites   FTT (failure to thrive) in adult   JENNIFER (acute kidney injury)         DISPOSITION  Admitted      Dictated utilizing Dragon dictation     Chemo Bob PA  05/14/25 5381

## 2025-05-15 LAB — BACTERIA SPEC AEROBE CULT: ABNORMAL

## 2025-05-15 NOTE — CONSULTS
[facility]  Initial Inpatient Consult Note    Referring Provider: A     Reason for Consultation: Diverticulitis with abdominal pain    Subjective     History of present illness:      Thank you for allowing us to participate in the care of this patient.  89 y.o. female with history of CKD stage III, CAD s/p CABG, GERD, DM type II, pacemaker, PUD, and metastatic colon cancer with last chemo 7 weeks ago.  She presented to the ED with worsening lower abdominal pain intermittently, decreased p.o. intake and nothing to eat in the last 2 days.  Outpatient CT showed sigmoid diverticulitis.  Unable to start antibiotics prior to arrival.  Elevated creatinine to 1.56 with acute on chronic kidney disease.  Denies fever, melena, hematochezia, constipation, diarrhea.    11/19/2024 colonoscopy with Dr. Christian showed mass.    Denies tobacco, alcohol, or drug use.    Past Medical History:  Past Medical History:   Diagnosis Date    Anemia     Arthritis of left sacroiliac joint 05/05/2022    CKD (chronic kidney disease), stage III     PATIENT STATES IT IS AT STAGE 4 AT THIS TIME    Colorectal cancer     Coronary artery disease     cath 11/15. CABG x3 and all grafts widely patent. Very small OM and Cfx and that may be the cause of lateral ischemia on stress test. Non bipassable    Diabetes mellitus, type II     GERD (gastroesophageal reflux disease)     Gout     Heart attack 10/2022    MULTIPLE (6 OR MORE)    History of DVT in adulthood     AFTER CHILDBIRTH    Hyperlipidemia     Hypertension     Liver masses     Pacemaker     PUD (peptic ulcer disease)      Past Surgical History:  Past Surgical History:   Procedure Laterality Date    CARDIAC ELECTROPHYSIOLOGY PROCEDURE N/A 02/22/2017    Procedure: Pacemaker DC new;  Surgeon: Juvenal hZong MD;  Location: [facility] INVASIVE LOCATION;  Service:     CARDIAC SURGERY  10/2022    CATARACT EXTRACTION      COLONOSCOPY  2010    COLONOSCOPY N/A 11/19/2024     Procedure: COLONOSCOPY WITH TATTOO MASS INJECTION 4 ML;  Surgeon: Caden Christian MD;  Location:  [facility];  Service: General;  Laterality: N/A;    CORONARY ARTERY BYPASS GRAFT      1992, 2009    EPIDURAL Left 10/17/2022    Procedure: LUMBAR/SACRAL TRANSFORAMINAL EPIDURAL Left L4-5 and left L5-S1 - hold plavix 7 days before;  Surgeon: Yasmine Gaming MD;  Location: [facility];  Service: Pain Management;  Laterality: Left;    PACEMAKER IMPLANTATION      PIRIFORMUS INJECTION Left 07/15/2022    Procedure: PIRIFORMIS INJECTION;  Surgeon: Yasmine Gaming MD;  Location: [facility];  Service: Pain Management;  Laterality: Left;    RADIOFREQUENCY ABLATION Left 02/20/2023    Procedure: RADIOFREQUENCY ABLATION NERVES for the left sacroiliac joint;  Surgeon: Yasmine Gaming MD;  Location: [facility];  Service: Pain Management;  Laterality: Left;    SACROILIAC JOINT INJECTION Left 05/09/2022    Procedure: SACROILIAC INJECTION - Left;  Surgeon: Yasmine Gaming MD;  Location: [facility];  Service: Pain Management;  Laterality: Left;    SACROILIAC JOINT INJECTION Left 07/15/2022    Procedure: left Sacroiliac joint injection and left piriformis injection;  Surgeon: Yasmine Gaming MD;  Location: [facility];  Service: Pain Management;  Laterality: Left;    TUBAL ABDOMINAL LIGATION      VENOUS ACCESS DEVICE (PORT) INSERTION N/A 11/19/2024    Procedure: INSERTION VENOUS ACCESS DEVICE;  Surgeon: Caden Christian MD;  Location:  [facility];  Service: General;  Laterality: N/A;      Social History:   Social History     Tobacco Use    Smoking status: Never     Passive exposure: Never    Smokeless tobacco: Never   Substance Use Topics    Alcohol use: Never      Family History:  Family History   Problem Relation Age of Onset    Coronary artery disease Mother     Heart attack Sister     Malig Hyperthermia Neg Hx        Home Meds:  Medications Prior to Admission   Medication Sig Dispense Refill  Last Dose/Taking    aspirin 81 MG EC tablet Take 1 tablet by mouth Daily. HOLD FOR SURGERY PER MD ORDERS   5/13/2025    ciprofloxacin (Cipro) 500 MG tablet Take 1 tablet by mouth 2 (Two) Times a Day for 10 days. 1 tablet 20 tablet 0 Taking    clopidogrel (PLAVIX) 75 MG tablet Take 1 tablet by mouth Daily. HOLD FOR SURGERY PER MD ORDERS   5/13/2025    dapagliflozin Propanediol (Farxiga) 10 MG tablet TAKE 1 TABLET BY MOUTH DAILY 90 tablet 1 5/13/2025    glipizide (GLUCOTROL) 5 MG tablet TAKE 2 TABLETS BY MOUTH EVERY MORNING WITH BREAKFAST AND TAKE ONE TABLET BY MOUTH EVERY EVENING 270 tablet 1 5/13/2025    hydrALAZINE (APRESOLINE) 25 MG tablet TAKE 1 TABLET BY MOUTH TWICE A  tablet 1 5/13/2025    hydroCHLOROthiazide 12.5 MG tablet Take 1 tablet by mouth Daily. 90 tablet 1 5/13/2025    metFORMIN (GLUCOPHAGE) 500 MG tablet Take 2 tablets by mouth 2 (Two) Times a Day With Meals. 360 tablet 1 5/13/2025    metoprolol tartrate (LOPRESSOR) 50 MG tablet TAKE 2 TABLETS BY MOUTH TWICE A  tablet 1 5/13/2025    metroNIDAZOLE (Flagyl) 500 MG tablet Take 1 tablet by mouth 3 (Three) Times a Day for 10 days. 42 tablet 0 Taking    multivitamin with minerals (CENTRUM SILVER 50+WOMEN PO) Take 1 tablet by mouth Daily.   5/13/2025    ondansetron ODT (ZOFRAN-ODT) 8 MG disintegrating tablet Place 1 tablet on the tongue Every 8 (Eight) Hours As Needed for Nausea or Vomiting. 30 tablet 0 5/13/2025    oxaliplatin (ELOXATIN) 100 MG chemo injection Infuse 100 mg into a venous catheter.   Taking    oxyCODONE-acetaminophen (PERCOCET) 5-325 MG per tablet Take 1 tablet by mouth Every 8 (Eight) Hours As Needed for Moderate Pain or Severe Pain. 60 tablet 0 5/13/2025    potassium chloride (KLOR-CON M10) 10 MEQ CR tablet Take 1 tablet by mouth Daily. 30 tablet 0 Taking    rosuvastatin (Crestor) 40 MG tablet Take 1 tablet by mouth Daily. 90 tablet 3 5/13/2025    sulfamethoxazole-trimethoprim (BACTRIM DS,SEPTRA DS) 800-160 MG per tablet Take  1 tablet by mouth 2 (Two) Times a Day. 14 tablet 0 Taking    lidocaine-prilocaine (EMLA) 2.5-2.5 % cream Apply 1 Application topically to the appropriate area as directed Every 2 (Two) Hours As Needed for Mild Pain. 30 g 3 More than a month    nitroglycerin (NITROSTAT) 0.4 MG SL tablet 1 tablet.   Unknown     Current Meds:   aspirin, 81 mg, Oral, Daily  clopidogrel, 75 mg, Oral, Daily  dexAMETHasone, 4 mg, Intravenous, Daily  [Held by provider] empagliflozin, 10 mg, Oral, Daily  [Held by provider] glipizide, 5 mg, Oral, QAM AC  hydrALAZINE, 25 mg, Oral, BID  [Held by provider] hydroCHLOROthiazide, 12.5 mg, Oral, Daily  metoprolol tartrate, 100 mg, Oral, BID  oxyCODONE, 10 mg, Oral, Q4H  piperacillin-tazobactam, 3.375 g, Intravenous, Q12H  senna-docusate sodium, 2 tablet, Oral, BID  sodium chloride, 10 mL, Intravenous, Q12H  sodium chloride, 10 mL, Intravenous, Q12H      Allergies:  Allergies   Allergen Reactions    Pneumococcal Vaccines Paresthesia       Objective:  Objective     Vital Signs  Temp:  [97.3 °F (36.3 °C)-98.1 °F (36.7 °C)] 98 °F (36.7 °C)  Heart Rate:  [59-69] 59  Resp:  [16-18] 16  BP: (107-137)/(52-65) 121/52  Physical Exam:   Constitutional:    Alert, cooperative, in no acute distress    Eyes:          Conjunctivae and sclerae normal, no icterus    HENT:   Atraumatic, normal hearing, mucosa moist    Lungs:     Normal respiratory effort    Abdomen:     Soft, nondistended, LLQ with guarding; normal bowel sounds , no organomegaly    Skin:   No jaundice, rash, or pallor   Psychiatric:  Normal mood and affect; A&O x3     Results Review:   I reviewed the patient's new clinical results.    Results from last 7 days   Lab Units 05/15/25  0537 05/14/25  1127 05/12/25  1450   WBC 10*3/mm3 8.78 11.51* 8.38   HEMOGLOBIN g/dL 9.5* 12.8 11.2*   HEMATOCRIT % 28.8* 38.8 34.5   PLATELETS 10*3/mm3 171 224 177     Results from last 7 days   Lab Units 05/15/25  1335 05/15/25  0537 05/14/25  1127 05/12/25  1450   SODIUM  mmol/L  --  137 143 139   POTASSIUM mmol/L 4.1 3.6 4.2 4.1   CHLORIDE mmol/L  --  107 105 102   CO2 mmol/L  --  21.0* 23.0 20.9*   BUN mg/dL  --  32* 43* 44*   CREATININE mg/dL  --  1.56* 1.85* 1.44*   CALCIUM mg/dL  --  8.0* 9.3 9.2   BILIRUBIN mg/dL  --  <0.2 0.2 0.3   ALK PHOS U/L  --  115 151* 133*   ALT (SGPT) U/L  --  13 18 23   AST (SGOT) U/L  --  31 38* 39*   GLUCOSE mg/dL  --  69 86 114*         Lab Results   Lab Value Date/Time    LIPASE 44 10/17/2022 2347       Radiology:  CT Head Without Contrast   Final Result   1. Atrophy and small vessel ischemic changes.   2. No acute intracranial process.       Radiation dose reduction techniques were utilized, including automated   exposure control and exposure modulation based on body size.           This report was finalized on 5/14/2025 1:53 PM by Dr. Johnathan Sandy M.D on Workstation: [id number]              Assessment & Plan   Active Hospital Problems    Diagnosis     **Acute diverticulitis     Type 2 diabetes mellitus with hypoglycemia, without long-term current use of insulin     JENNIFER (acute kidney injury)     Metastatic colon cancer to liver     Pacemaker     PUD (peptic ulcer disease)     CKD (chronic kidney disease) stage 3, GFR 30-59 ml/min     CAD (coronary artery disease)        Assessment:  Acute sigmoid diverticulitis  Abdominal pain  Severe lower abdominal pain  Metastatic colon cancer, on chemotherapy  Acute on chronic kidney disease  History of CAD s/p CABG and pacemaker    Plan:  Continue IV Zosyn for treatment of diverticulitis  Difficult to control abdominal pain despite opiate analgesics.  Would recommend keeping her stools soft for easy passage.  Recommend MiraLAX daily.  Okay to continue Anne-Marie-Colace.  I do feel LLQ pain is related to her diverticulitis.  Will see how much of this improves with the antibiotics.  Patient and family have met with hospice and palliative care team.  They are taking GOC discussion under consideration.   Advance  diet as tolerated.  Low fiber for now.      I discussed the patients findings and my recommendations with patient and family.    Dragon dictation used throughout this note.            Melinda Gayle PA-C  [facility]  [address] [place] [address]  [phone] office  [phone] fax  [facility]

## 2025-05-15 NOTE — CONSULTS
[facility]   Inpatient Palliative Care Consultation  Patient Name: Tamie Peter   Patient : 1935   Date: 5/15/2025   Consulting Provider: Dr. Cervantes  Reason for Consultation: assistance with clarification of goals of care and pain      Chief Complaint: cancer related pain    Information obtained from: patient and relative(s)    Summary of Palliative Illness and Palliative Assessment: 89yoF with a primary diagnosis of metastatic colon cancer who presented from home with stroke like symptoms.  They report patient was at home and developed changes concerning with a stroke.  Additionally they report a significant increase in her pain over the last 2 weeks.  They states prior to this change, she was taking Tylenol only as needed.  The patient today is writhing in pain.  She rates her pain as an 8 out of 10.  She had recently started Percocet but at this time it is ineffective in controlling her pain.  She describes it as burning and aching deep inside.  She states its acute on chronic and comes and goes with intensity.  Daughter reports that the Dilaudid appears to help but only for a brief time and is generally worn off within an hour to 90 minutes.        Pain Assessment:   Intensity: 8  Quality:   burning, sharp, and tingling  Duration: acute on chronic  Location: Abdomen and Buttocks  Frequency: intermittent  Effect on Daily Life: Pain has GREAT impact on daily life  Aggravating Factors: movement  Relieving Factors: analgesics  Goal for Pain Management: to make it tolerable to sit or lay    Discussion of Patient/Family Treatment Preferences and Goals of Care: There was a voluntary discussion of advance planning and goals of care discussion. The following were present for the visit: patient and daughter at bedside.  We discussed Miss Peter's prognosis as well as her progression of her metastatic cancer.  We reviewed imaging of her brain and prior scans for her cancer.  We discussed at  length hospice versus home health at discharge.  The patient is medically eligible for hospice with a prognosis of 6 months or less.  I am concerned with the progression of her pain and her condition in the last 2 weeks that her prognosis is likely weeks not months.  We reviewed her goal for comfort.  Additionally, the patient is not interested in further cancer treatment.  We discussed needs at home.  The family verbalizes a desire to hire caregiving and they are deciding in hospice versus home health with palliative care support for pain management.  We discussed that currently Ms. Peter is eligible for hospice inpatient due to her acute symptom burden.  And that should they select hospice, we will flip to hospice admission and work to get a pain regimen that she can be discharged with at home.        Past Medical and Surgical History:    Past Medical History:   Diagnosis Date    Anemia     Arthritis of left sacroiliac joint 05/05/2022    CKD (chronic kidney disease), stage III     PATIENT STATES IT IS AT STAGE 4 AT THIS TIME    Colorectal cancer     Coronary artery disease     cath 11/15. CABG x3 and all grafts widely patent. Very small OM and Cfx and that may be the cause of lateral ischemia on stress test. Non bipassable    Diabetes mellitus, type II     GERD (gastroesophageal reflux disease)     Gout     Heart attack 10/2022    MULTIPLE (6 OR MORE)    History of DVT in adulthood     AFTER CHILDBIRTH    Hyperlipidemia     Hypertension     Liver masses     Pacemaker     PUD (peptic ulcer disease)       Past Surgical History:   Procedure Laterality Date    CARDIAC ELECTROPHYSIOLOGY PROCEDURE N/A 02/22/2017    Procedure: Pacemaker DC new;  Surgeon: Juvenal Zhong MD;  Location: [facility] INVASIVE LOCATION;  Service:     CARDIAC SURGERY  10/2022    CATARACT EXTRACTION      COLONOSCOPY  2010    COLONOSCOPY N/A 11/19/2024    Procedure: COLONOSCOPY WITH TATTOO MASS INJECTION 4 ML;  Surgeon: Caden Christian  "MD Luis E;  Location:  [facility];  Service: General;  Laterality: N/A;    CORONARY ARTERY BYPASS GRAFT      1992, 2009    EPIDURAL Left 10/17/2022    Procedure: LUMBAR/SACRAL TRANSFORAMINAL EPIDURAL Left L4-5 and left L5-S1 - hold plavix 7 days before;  Surgeon: Yasmine Gaming MD;  Location: [facility];  Service: Pain Management;  Laterality: Left;    PACEMAKER IMPLANTATION      PIRIFORMUS INJECTION Left 07/15/2022    Procedure: PIRIFORMIS INJECTION;  Surgeon: Yasmine Gaming MD;  Location: [facility];  Service: Pain Management;  Laterality: Left;    RADIOFREQUENCY ABLATION Left 02/20/2023    Procedure: RADIOFREQUENCY ABLATION NERVES for the left sacroiliac joint;  Surgeon: Yasmine Gaming MD;  Location: [facility];  Service: Pain Management;  Laterality: Left;    SACROILIAC JOINT INJECTION Left 05/09/2022    Procedure: SACROILIAC INJECTION - Left;  Surgeon: Yasmine Gaming MD;  Location: [facility];  Service: Pain Management;  Laterality: Left;    SACROILIAC JOINT INJECTION Left 07/15/2022    Procedure: left Sacroiliac joint injection and left piriformis injection;  Surgeon: Yasmine Gaming MD;  Location: [facility];  Service: Pain Management;  Laterality: Left;    TUBAL ABDOMINAL LIGATION      VENOUS ACCESS DEVICE (PORT) INSERTION N/A 11/19/2024    Procedure: INSERTION VENOUS ACCESS DEVICE;  Surgeon: Caden Christian MD;  Location:  [facility];  Service: General;  Laterality: N/A;          Palliative Care Psychosocial and Spiritual Screening    Living situation was living at home fairly independent prior to hospitalization          Physical Assessment:   /52 (BP Location: Right arm, Patient Position: Lying)   Pulse 59   Temp 98 °F (36.7 °C) (Oral)   Resp 16   Ht 152 cm (59.84\")   SpO2 97%   BMI 19.48 kg/m²    Palliative Performance Scale: Performance 40% based on the following measures: Ambulation: Mainly in bed, Activity and Evidence of Disease: Unable to do any " work, extensive evidence of disease, Self-Care: Mainly assistance required,  Intake: Normal or reduced, LOC: Full, drowsy or confusion  Physical Exam     Chronically ill-appearing female who is in acute distress secondary to pain  Respirations regular  Furrowed brow with temporal wasting noted  Abdomen soft without distention  Extremities without edema  Neuroexam shows a patient who is alert and oriented and answers questions      Medications:    Current Facility-Administered Medications:     acetaminophen (TYLENOL) tablet 650 mg, 650 mg, Oral, Q4H PRN **OR** acetaminophen (TYLENOL) 160 MG/5ML oral solution 650 mg, 650 mg, Oral, Q4H PRN **OR** acetaminophen (TYLENOL) suppository 650 mg, 650 mg, Rectal, Q4H PRN, Liat Guerin MD    aspirin EC tablet 81 mg, 81 mg, Oral, Daily, Liat Guerin MD, 81 mg at 05/15/25 0824    sennosides-docusate (PERICOLACE) 8.6-50 MG per tablet 2 tablet, 2 tablet, Oral, BID, 2 tablet at 05/15/25 0824 **AND** polyethylene glycol (MIRALAX) packet 17 g, 17 g, Oral, Daily PRN **AND** bisacodyl (DULCOLAX) EC tablet 5 mg, 5 mg, Oral, Daily PRN **AND** bisacodyl (DULCOLAX) suppository 10 mg, 10 mg, Rectal, Daily PRN, Liat Guerin MD    calcium carbonate (TUMS) chewable tablet 500 mg (200 mg elemental), 2 tablet, Oral, BID PRN, Liat Guerin MD    Calcium Replacement - Follow Nurse / BPA Driven Protocol, , Not Applicable, PRN, Liat Guerin MD    clopidogrel (PLAVIX) tablet 75 mg, 75 mg, Oral, Daily, Liat Guerin MD, 75 mg at 05/15/25 0821    dexAMETHasone sodium phosphate injection 4 mg, 4 mg, Intravenous, Daily, Yael Crowe MD    [Held by provider] empagliflozin (JARDIANCE) tablet 10 mg, 10 mg, Oral, Daily, Liat Guerin MD    [Held by provider] glipizide (GLUCOTROL) tablet 5 mg, 5 mg, Oral, QAM AC, Liat Guerin MD    heparin injection 500 Units, 5 mL, Intravenous, PRN, Liat Guerin MD     hydrALAZINE (APRESOLINE) tablet 25 mg, 25 mg, Oral, BID, Liat Guerin MD, 25 mg at 05/15/25 0824    [Held by provider] hydroCHLOROthiazide tablet 12.5 mg, 12.5 mg, Oral, Daily, Liat Guerin MD    HYDROmorphone (DILAUDID) injection 0.5 mg, 0.5 mg, Intravenous, Q1H PRN, 0.5 mg at 05/15/25 1013 **AND** naloxone (NARCAN) injection 0.4 mg, 0.4 mg, Intravenous, Q5 Min PRN, Liat Guerin MD    lidocaine (LMX) 4 % cream 1 Application, 1 Application, Topical, PRN, Liat Guerin MD    Magnesium Low Dose Replacement - Follow Nurse / BPA Driven Protocol, , Not Applicable, PRN, Liat Guerin MD    magnesium sulfate in D5W 1g/100mL (PREMIX), 1 g, Intravenous, Q1H, Benja Torres MD, 1 g at 05/15/25 1046    metoprolol tartrate (LOPRESSOR) tablet 100 mg, 100 mg, Oral, BID, Liat Guerin MD, 100 mg at 05/15/25 0821    ondansetron ODT (ZOFRAN-ODT) disintegrating tablet 4 mg, 4 mg, Oral, Q6H PRN, 4 mg at 05/14/25 2124 **OR** ondansetron (ZOFRAN) injection 4 mg, 4 mg, Intravenous, Q6H PRN, Liat Guerin MD, 4 mg at 05/15/25 0406    oxyCODONE (ROXICODONE) immediate release tablet 10 mg, 10 mg, Oral, Q4H, Yael Crowe MD    oxyCODONE (ROXICODONE) immediate release tablet 10 mg, 10 mg, Oral, Q4H PRN, Yael Crowe MD    Phosphorus Replacement - Follow Nurse / BPA Driven Protocol, , Not Applicable, PRN, Liat Guerin MD    piperacillin-tazobactam (ZOSYN) 3.375 g IVPB in 100 mL NS MBP (CD), 3.375 g, Intravenous, Q12H, Liat Guerin MD, 3.375 g at 05/15/25 1015    Potassium Replacement - Follow Nurse / BPA Driven Protocol, , Not Applicable, PRN, Liat Guerin MD    Insert Peripheral IV, , , Once **AND** sodium chloride 0.9 % flush 10 mL, 10 mL, Intravenous, PRN, Chemo Bob PA    sodium chloride 0.9 % flush 10 mL, 10 mL, Intravenous, Q12H, Liat Guerin MD, 10 mL at 05/15/25 0825    sodium chloride 0.9 %  flush 10 mL, 10 mL, Intravenous, PRN, Liat Guerin MD    sodium chloride 0.9 % flush 10 mL, 10 mL, Intravenous, Q12H, Liat Guerin MD, 10 mL at 05/15/25 0824    sodium chloride 0.9 % flush 10 mL, 10 mL, Intravenous, PRN, Liat Guerin MD    sodium chloride 0.9 % flush 20 mL, 20 mL, Intravenous, PRN, Liat Guerin MD    sodium chloride 0.9 % infusion 40 mL, 40 mL, Intravenous, PRN, Liat Guerin MD    sodium chloride 0.9 % infusion 40 mL, 40 mL, Intravenous, PRN, Liat Guerin MD    sodium chloride 0.9 % infusion, 125 mL/hr, Intravenous, Continuous, Liat Guerin MD, Last Rate: 125 mL/hr at 05/14/25 1716, 125 mL/hr at 05/14/25 1716     Allergies:   Allergies   Allergen Reactions    Pneumococcal Vaccines Paresthesia          Data (labs/images reviewed):   WBC   Date Value Ref Range Status   05/15/2025 8.78 3.40 - 10.80 10*3/mm3 Final     RBC   Date Value Ref Range Status   05/15/2025 2.92 (L) 3.77 - 5.28 10*6/mm3 Final     Hemoglobin   Date Value Ref Range Status   05/15/2025 9.5 (L) 12.0 - 15.9 g/dL Final     Hematocrit   Date Value Ref Range Status   05/15/2025 28.8 (L) 34.0 - 46.6 % Final     MCV   Date Value Ref Range Status   05/15/2025 98.6 (H) 79.0 - 97.0 fL Final     MCH   Date Value Ref Range Status   05/15/2025 32.5 26.6 - 33.0 pg Final     MCHC   Date Value Ref Range Status   05/15/2025 33.0 31.5 - 35.7 g/dL Final     RDW   Date Value Ref Range Status   05/15/2025 13.4 12.3 - 15.4 % Final     RDW-SD   Date Value Ref Range Status   05/15/2025 48.0 37.0 - 54.0 fl Final     MPV   Date Value Ref Range Status   05/15/2025 10.0 6.0 - 12.0 fL Final     Platelets   Date Value Ref Range Status   05/15/2025 171 140 - 450 10*3/mm3 Final     Neutrophil %   Date Value Ref Range Status   05/15/2025 76.1 (H) 42.7 - 76.0 % Final     Lymphocyte %   Date Value Ref Range Status   05/15/2025 15.0 (L) 19.6 - 45.3 % Final     Monocyte %   Date Value Ref  Range Status   05/15/2025 7.9 5.0 - 12.0 % Final     Eosinophil %   Date Value Ref Range Status   05/15/2025 0.5 0.3 - 6.2 % Final     Basophil %   Date Value Ref Range Status   05/15/2025 0.3 0.0 - 1.5 % Final     Immature Grans %   Date Value Ref Range Status   05/15/2025 0.2 0.0 - 0.5 % Final     Neutrophils, Absolute   Date Value Ref Range Status   05/15/2025 6.68 1.70 - 7.00 10*3/mm3 Final     Lymphocytes, Absolute   Date Value Ref Range Status   05/15/2025 1.32 0.70 - 3.10 10*3/mm3 Final     Monocytes, Absolute   Date Value Ref Range Status   05/15/2025 0.69 0.10 - 0.90 10*3/mm3 Final     Eosinophils, Absolute   Date Value Ref Range Status   05/15/2025 0.04 0.00 - 0.40 10*3/mm3 Final     Basophils, Absolute   Date Value Ref Range Status   05/15/2025 0.03 0.00 - 0.20 10*3/mm3 Final     Immature Grans, Absolute   Date Value Ref Range Status   05/15/2025 0.02 0.00 - 0.05 10*3/mm3 Final     nRBC   Date Value Ref Range Status   05/15/2025 0.0 0.0 - 0.2 /100 WBC Final        Lab Results   Component Value Date    GLUCOSE 69 05/15/2025    BUN 32 (H) 05/15/2025    CREATININE 1.56 (H) 05/15/2025     05/15/2025    K 3.6 05/15/2025     05/15/2025    CALCIUM 8.0 (L) 05/15/2025    PROTEINTOT 5.2 (L) 05/15/2025    ALBUMIN 2.8 (L) 05/15/2025    ALT 13 05/15/2025    AST 31 05/15/2025    ALKPHOS 115 05/15/2025    BILITOT <0.2 05/15/2025    GLOB 2.4 05/15/2025    AGRATIO 1.2 05/15/2025    BCR 20.5 05/15/2025    ANIONGAP 9.0 05/15/2025    EGFR 32.0 (L) 05/15/2025        CT Head Without Contrast  Narrative: CT OF THE BRAIN WITHOUT CONTRAST 5/14/2025     HISTORY: Altered mental status.     Axial images were obtained through the brain without intravenous  contrast.     There is mild to moderate diffuse atrophy. There is decreased  attenuation of the periventricular white matter bilaterally consistent  with small vessel white matter ischemic disease. Small somewhat more  confluent area of low density seen near the  anterior aspect of the left  external capsule on image 21 likely a small focal area of old infarct.     There is no evidence of acute infarction, hemorrhage, midline shift or  mass effect.     No bony abnormalities are seen.     Impression: 1. Atrophy and small vessel ischemic changes.  2. No acute intracranial process.     Radiation dose reduction techniques were utilized, including automated  exposure control and exposure modulation based on body size.        This report was finalized on 5/14/2025 1:53 PM by Dr. Johnathan Sandy M.D on Workstation: [id number]              Palliative Care Assessment and Recommendations: Tamie Peter is a 89 y.o. female with a primary palliative care diagnosis of metastatic colon cancer. Palliative care was consulted for symptom management of pain.     Prognosis and Palliative Performance Scale:  Performance 40% based on the following measures: Ambulation: Mainly in bed, Activity and Evidence of Disease: Unable to do any work, extensive evidence of disease, Self-Care: Mainly assistance required,  Intake: Normal or reduced, LOC: Full, drowsy or confusion  Disease State: Deteriorating despite treatments  Symptom Management:   Pain: Patient with very poorly controlled pain this morning.  Dose of IV Dilaudid given at bedside.  Patient did show improvement in her symptoms within 30 minutes of administration.  Adding Decadron's and adjuvanted 4 mg IV daily.  Will increase oxycodone to 10 mg every 4 hours routine and hourly as needed.  Dilaudid IV still available for rescue.  Goal is to move toward a long-acting pain medicine in the next couple days to try to identify home regimen that we will be successful.  Constipation: Continue senna 2 tabs twice daily for constipation    Goals of Care Treatment Preferences   Palliative Care Decision Making Capacity: intact  Healthcare Surrogate: Yes  What is Most important to patient/family at this time:   Code Status FULL   Other Considerations  regarding life sustaining treatments:   We discussed hospice versus palliative care.  At this time, the patient is eligible for hospice services in addition to hospice general inpatient care for symptom management of pain.  The patient is a high risk to decline and decompensate requiring skilled interventions.  The family is currently discussing her care options but I anticipate they will move toward a full comfort focus of care in the coming days.  Advance Directives Present or Completed: Alis    Follow up: Will follow-up daily while inpatient to ensure pain better managed  Discussed plan with: MD, RN, patient, and family        Thank you for consulting palliative care. If you need to reach the provider on call for the palliative care team please call [phone]      Yael Crowe MD  5/15/2025 11:39 EDT

## 2025-05-15 NOTE — PLAN OF CARE
Goal Outcome Evaluation:      Patient alert and oriented x 4. She was seen by hospice today and pain medication adjusted. Patients daughter reports that it may need to decrease in frequency as patient has been very sleepy. No complaints at this time.

## 2025-05-15 NOTE — CONSULTS
Met w/ patient, daughter, & son in patient's room. Family declined assessment at this time d/t patient finally sleeping & appearing to be comfortable.     Dr. Crowe had visited this patient & family earlier today. They discussed inpatient hospice & that patient qualifies for inpatient hospice services at this time. Family still have follow-up questions. Expressed concern about the IV antibiotics being stopped. Checked w/ Dr. Crowe who stated it was okay for patient to continue the IV antibiotics because they are for the patient's comfort.     Daughter is still concerned about when patient is discharged, who will be there to take care of her. Daughter states she has neighbors who can check on her & that she is considering a private caregiver. Daughter reports she definitely wants hospice whenever patient goes home, but is unsure about hospice inpatient yet. Verbalized understanding of this & ensured her [facility] is here to answer any questions she might have about inpatient hospice care. Verified she had [facility] information to contact if need be.     [facility] will continue to follow-up remotely daily for any change in patient status or discharge plans. Updated patient's RNShanika.    Please call w/ any questions, concerns, or change in patient status. Thank you for allowing us the opportunity to participate in the care of this patient.    Marianela Flor, RN, BSN  Referral & Admissions Coordinator  [facility]  [phone]

## 2025-05-15 NOTE — PROGRESS NOTES
Name: Tamie Peter ADMIT: 2025   : 1935  PCP: Mohinder Barbosa MD    MRN: [id number] LOS: 1 days   AGE/SEX: 89 y.o. female  ROOM: [facility]     Subjective   Subjective   Patient is lying on the bed and is chronically ill-appearing.  States her abdominal pain is reasonably well-controlled.  Denies nausea, vomiting, chest pain, shortness of breath.       Objective   Objective   Vital Signs  Temp:  [97.3 °F (36.3 °C)-98.1 °F (36.7 °C)] 98 °F (36.7 °C)  Heart Rate:  [59-70] 59  Resp:  [16-18] 16  BP: (107-137)/(52-71) 121/52  SpO2:  [96 %-100 %] 97 %  on  Flow (L/min) (Oxygen Therapy):  [2-3] 3;   Device (Oxygen Therapy): room air  Body mass index is 19.48 kg/m².  Physical Exam  HEENT: PERRLA, extraocular movements intact, Scleras no icterus  Neck: Supple, no JVD  Cardiovascular: Regular rate and rhythm with normal S1 and S2  Respiratory: Fairly clear to auscultation anteriorly with no wheezes  GI: Soft, tender in the lower abdomen, bowel sounds are present  Extremities: No edema, palpable pedal pulses  Neurologic: Grossly nonfocal, no facial asymmetry    Results Review     I reviewed the patient's new clinical results.  Results from last 7 days   Lab Units 05/15/25  0537 25  1127 25  1450   WBC 10*3/mm3 8.78 11.51* 8.38   HEMOGLOBIN g/dL 9.5* 12.8 11.2*   PLATELETS 10*3/mm3 171 224 177     Results from last 7 days   Lab Units 05/15/25  0537 25  1127 25  1450   SODIUM mmol/L 137 143 139   POTASSIUM mmol/L 3.6 4.2 4.1   CHLORIDE mmol/L 107 105 102   CO2 mmol/L 21.0* 23.0 20.9*   BUN mg/dL 32* 43* 44*   CREATININE mg/dL 1.56* 1.85* 1.44*   GLUCOSE mg/dL 69 86 114*   EGFR mL/min/1.73 32.0* 26.1* 35.3*     Results from last 7 days   Lab Units 05/15/25  0537 25  1127 25  1450   ALBUMIN g/dL 2.8* 3.6 3.6   BILIRUBIN mg/dL <0.2 0.2 0.3   ALK PHOS U/L 115 151* 133*   AST (SGOT) U/L 31 38* 39*   ALT (SGPT) U/L 13 18 23     Results from last 7 days   Lab Units 05/15/25  0537  05/14/25  1127 05/12/25  1450   CALCIUM mg/dL 8.0* 9.3 9.2   ALBUMIN g/dL 2.8* 3.6 3.6   MAGNESIUM mg/dL 1.5* 1.6  --      Results from last 7 days   Lab Units 05/14/25  1433 05/14/25  1127   PROCALCITONIN ng/mL  --  0.17   LACTATE mmol/L 1.3 2.2*     Glucose   Date/Time Value Ref Range Status   05/15/2025 1114 206 (H) 70 - 130 mg/dL Final   05/14/2025 1632 215 (H) 70 - 130 mg/dL Final   05/14/2025 1505 149 (H) 70 - 130 mg/dL Final   05/14/2025 1448 49 (C) 70 - 130 mg/dL Final   05/14/2025 1431 47 (C) 70 - 130 mg/dL Final       CT Head Without Contrast  Result Date: 5/14/2025  1. Atrophy and small vessel ischemic changes. 2. No acute intracranial process.  Radiation dose reduction techniques were utilized, including automated exposure control and exposure modulation based on body size.   This report was finalized on 5/14/2025 1:53 PM by Dr. Johnathan Sandy M.D on Workstation: [id number]        I have personally reviewed all medications:  Scheduled Medications  aspirin, 81 mg, Oral, Daily  clopidogrel, 75 mg, Oral, Daily  dexAMETHasone, 4 mg, Intravenous, Daily  [Held by provider] empagliflozin, 10 mg, Oral, Daily  [Held by provider] glipizide, 5 mg, Oral, QAM AC  hydrALAZINE, 25 mg, Oral, BID  [Held by provider] hydroCHLOROthiazide, 12.5 mg, Oral, Daily  metoprolol tartrate, 100 mg, Oral, BID  oxyCODONE, 10 mg, Oral, Q4H  piperacillin-tazobactam, 3.375 g, Intravenous, Q12H  senna-docusate sodium, 2 tablet, Oral, BID  sodium chloride, 10 mL, Intravenous, Q12H  sodium chloride, 10 mL, Intravenous, Q12H    Infusions  sodium chloride, 125 mL/hr, Last Rate: 125 mL/hr (05/14/25 1716)    Diet  Diet: Regular/House; Fluid Consistency: Thin (IDDSI 0)    I have personally reviewed:  [x]  Laboratory   [x]  Microbiology   [x]  Radiology   [x]  EKG/Telemetry  [x]  Cardiology/Vascular   []  Pathology    []  Records       Assessment/Plan     Active Hospital Problems    Diagnosis  POA    **Acute diverticulitis [K57.92]  Yes    Type  2 diabetes mellitus with hypoglycemia, without long-term current use of insulin [E11.649]  Yes    JENNIFER (acute kidney injury) [N17.9]  Yes    Metastatic colon cancer to liver [C18.9, C78.7]  Yes    Pacemaker [Z95.0]  Yes    PUD (peptic ulcer disease) [K27.9]  Yes    CKD (chronic kidney disease) stage 3, GFR 30-59 ml/min [N18.30]  Yes    CAD (coronary artery disease) [I25.10]  Yes      Resolved Hospital Problems   No resolved problems to display.       89 y.o. female admitted with Acute diverticulitis.    #1 acute diverticulitis with abdominal pain, currently on IV Zosyn to be continued.  Gastroenterology consult will be obtained as well.  On IV Dilaudid and p.o. oxycodone for pain control.  2.  Metastatic colon cancer with mets to liver/spine, continue with analgesics and palliative team following along as well.  3.  Acute/CKD stage IIIa, creatinine upon admission 1.85 and has improved to 1.56.  Baseline is around 1.1.  Continue to hold ARB and HCTZ.  Continue with fluids.  4.  Diabetes mellitus, home empagliflozin, glipizide are on hold and will continue with corrective dose insulin.  5.  Hypertension, on home metoprolol and hydralazine which will be continued.  6.  History of pacemaker placement and currently in paced rhythm.  7.  History of coronary artery disease, continue with aspirin, Plavix, metoprolol.  8.  On SCDs for DVT prophylaxis.  9.  CODE STATUS is full code.  10.  Expected discharge date, 5/17/2025.      Benja Torres MD  [facility]  05/15/25  14:40 EDT

## 2025-05-16 NOTE — CONSULTS
Pt in chair, daughter at bedside. Pt is [employer],  is scheduled to come today for blessing/prayer. Pt expressed thanks for prayer offered at pt request. No additional needs identified. Chp remains available for additional support as needed.

## 2025-05-16 NOTE — THERAPY EVALUATION
Patient Name: Tamie Peter  : 1935    MRN: [id number]                              Today's Date: 2025       Admit Date: 2025    Visit Dx:     ICD-10-CM ICD-9-CM   1. Acute diverticulitis  K57.92 562.11   2. Colon cancer metastasized to multiple sites  C18.9 153.9   3. FTT (failure to thrive) in adult  R62.7 783.7   4. JENNIFER (acute kidney injury)  N17.9 584.9     Patient Active Problem List   Diagnosis    CAD (coronary artery disease)    Essential hypertension    GERD (gastroesophageal reflux disease)    Diabetes mellitus    CKD (chronic kidney disease) stage 3, GFR 30-59 ml/min    Gout    Hyperlipidemia    PUD (peptic ulcer disease)    Sick sinus syndrome    Disorder of rotator cuff    Osteoporosis    Pacemaker    Acute left-sided low back pain with left-sided sciatica    Arthritis of left sacroiliac joint    Piriformis syndrome of left side    Spinal stenosis of lumbar region    Radiculitis    Encounter for adjustment or management of vascular access device    Metastatic colon cancer to liver    Iron deficiency anemia, unspecified    Adverse effect of compound iron preparation    Chemotherapy induced neutropenia    Carcinoma of colon metastatic to lung    Acute diverticulitis    Type 2 diabetes mellitus with hypoglycemia, without long-term current use of insulin    JENNIFER (acute kidney injury)     Past Medical History:   Diagnosis Date    Anemia     Arthritis of left sacroiliac joint 2022    CKD (chronic kidney disease), stage III     PATIENT STATES IT IS AT STAGE 4 AT THIS TIME    Colorectal cancer     Coronary artery disease     cath 11/15. CABG x3 and all grafts widely patent. Very small OM and Cfx and that may be the cause of lateral ischemia on stress test. Non bipassable    Diabetes mellitus, type II     GERD (gastroesophageal reflux disease)     Gout     Heart attack 10/2022    MULTIPLE (6 OR MORE)    History of DVT in adulthood     AFTER CHILDBIRTH    Hyperlipidemia     Hypertension      Liver masses     Pacemaker     PUD (peptic ulcer disease)      Past Surgical History:   Procedure Laterality Date    CARDIAC ELECTROPHYSIOLOGY PROCEDURE N/A 02/22/2017    Procedure: Pacemaker DC new;  Surgeon: Juvenal Zhong MD;  Location: [facility] CATH INVASIVE LOCATION;  Service:     CARDIAC SURGERY  10/2022    CATARACT EXTRACTION      COLONOSCOPY  2010    COLONOSCOPY N/A 11/19/2024    Procedure: COLONOSCOPY WITH TATTOO MASS INJECTION 4 ML;  Surgeon: Caden Christian MD;  Location:  [facility];  Service: General;  Laterality: N/A;    CORONARY ARTERY BYPASS GRAFT      1992, 2009    EPIDURAL Left 10/17/2022    Procedure: LUMBAR/SACRAL TRANSFORAMINAL EPIDURAL Left L4-5 and left L5-S1 - hold plavix 7 days before;  Surgeon: Yasmine Gaming MD;  Location: [facility];  Service: Pain Management;  Laterality: Left;    PACEMAKER IMPLANTATION      PIRIFORMUS INJECTION Left 07/15/2022    Procedure: PIRIFORMIS INJECTION;  Surgeon: Yasmine Gaming MD;  Location: [facility];  Service: Pain Management;  Laterality: Left;    RADIOFREQUENCY ABLATION Left 02/20/2023    Procedure: RADIOFREQUENCY ABLATION NERVES for the left sacroiliac joint;  Surgeon: Yasmine Gaming MD;  Location: [facility];  Service: Pain Management;  Laterality: Left;    SACROILIAC JOINT INJECTION Left 05/09/2022    Procedure: SACROILIAC INJECTION - Left;  Surgeon: Yasmine Gaming MD;  Location: [facility];  Service: Pain Management;  Laterality: Left;    SACROILIAC JOINT INJECTION Left 07/15/2022    Procedure: left Sacroiliac joint injection and left piriformis injection;  Surgeon: Yasmine Gaming MD;  Location: [facility];  Service: Pain Management;  Laterality: Left;    TUBAL ABDOMINAL LIGATION      VENOUS ACCESS DEVICE (PORT) INSERTION N/A 11/19/2024    Procedure: INSERTION VENOUS ACCESS DEVICE;  Surgeon: Caden Christian MD;  Location:  [facility];  Service: General;  Laterality: N/A;      General Information        Row Name 05/16/25 1044          Physical Therapy Time and Intention    Document Type evaluation  -AR     Mode of Treatment physical therapy  -AR       Row Name 05/16/25 1044          General Information    Patient Profile Reviewed yes  -AR     Prior Level of Function independent:  living home alone, started using RW as needed in last few wks.  2 falls after last chemo tx, no falls since.  -AR     Existing Precautions/Restrictions fall  -AR     Barriers to Rehab none identified  -AR       Row Name 05/16/25 1044          Living Environment    Current Living Arrangements home  -AR     People in Home alone  -AR       Row Name 05/16/25 1044          Cognition    Orientation Status (Cognition) oriented x 3  -AR       Row Name 05/16/25 1044          Safety Issues/Impairments Affecting Functional Mobility    Impairments Affecting Function (Mobility) balance;endurance/activity tolerance;pain;strength  -AR               User Key  (r) = Recorded By, (t) = Taken By, (c) = Cosigned By      Initials Name Provider Type    AR Melba Encinas, PT Physical Therapist                   Mobility       Row Name 05/16/25 1045          Bed Mobility    Bed Mobility supine-sit;sit-supine  -AR     Supine-Sit [place] (Bed Mobility) not tested  -AR     Sit-Supine [place] (Bed Mobility) not tested  -AR       Row Name 05/16/25 1045          Sit-Stand Transfer    Sit-Stand [place] (Transfers) contact guard  -AR     Assistive Device (Sit-Stand Transfers) walker, front-wheeled  -AR       Row Name 05/16/25 1045          Gait/Stairs (Locomotion)    [place] Level (Gait) contact guard  -AR     Assistive Device (Gait) walker, front-wheeled  -AR     Patient was able to Ambulate yes  -AR     Distance in Feet (Gait) 150  -AR     Deviations/Abnormal Patterns (Gait) gait speed decreased;festinating/shuffling  -AR     Bilateral Gait Deviations forward flexed posture;heel strike decreased  -AR               User Key  (r) = Recorded By, (t)  = Taken By, (c) = Cosigned By      Initials Name Provider Type    Melba Cortez, PT Physical Therapist                   Obj/Interventions       Row Name 05/16/25 1045          Range of Motion Comprehensive    Comment, General Range of Motion B LE WFL  -AR       Row Name 05/16/25 1045          Strength Comprehensive (MMT)    Comment, General Manual Muscle Testing (MMT) Assessment B LE 4/5  -AR               User Key  (r) = Recorded By, (t) = Taken By, (c) = Cosigned By      Initials Name Provider Type    Melba Cortez, PT Physical Therapist                   Goals/Plan       Row Name 05/16/25 1049          Bed Mobility Goal 1 (PT)    Activity/Assistive Device (Bed Mobility Goal 1, PT) bed mobility activities, all  -AR     [place] Level/Cues Needed (Bed Mobility Goal 1, PT) standby assist  -AR     Time Frame (Bed Mobility Goal 1, PT) 1 week  -AR       Row Name 05/16/25 1049          Transfer Goal 1 (PT)    Activity/Assistive Device (Transfer Goal 1, PT) sit-to-stand/stand-to-sit;bed-to-chair/chair-to-bed;walker, rolling  -AR     [place] Level/Cues Needed (Transfer Goal 1, PT) standby assist  -AR     Time Frame (Transfer Goal 1, PT) 1 week  -AR       Row Name 05/16/25 1049          Gait Training Goal 1 (PT)    Activity/Assistive Device (Gait Training Goal 1, PT) gait (walking locomotion);walker, rolling  -AR     [place] Level (Gait Training Goal 1, PT) standby assist  -AR     Distance (Gait Training Goal 1, PT) 200  -AR     Time Frame (Gait Training Goal 1, PT) 1 week  -AR       Row Name 05/16/25 1049          Therapy Assessment/Plan (PT)    Planned Therapy Interventions (PT) balance training;bed mobility training;gait training;home exercise program;neuromuscular re-education;transfer training;strengthening;postural re-education;patient/family education  -AR               User Key  (r) = Recorded By, (t) = Taken By, (c) = Cosigned By      Initials Name Provider Type    Melba Cortez,  PT Physical Therapist                   Clinical Impression       Row Name 05/16/25 1045          Pain    Pretreatment Pain Rating 2/10  -AR     Posttreatment Pain Rating 2/10  -AR     Pain Location abdomen  -AR     Pain Management Interventions premedicated for activity  -AR     Response to Pain Interventions activity participation with tolerable pain  -AR       Row Name 05/16/25 1045          Plan of Care Review    Outcome Evaluation Pt admitted from  home with acute diverticulitis. H/o colon CA, stopped chemo ~10 wks ago per dtr report for quality of life. Pt has been living at home alone, occasional use of RW, neighbor/caregiver stops by to assist w/ meds in morning and assist w/ grocery shopping.  Today pt reports feeling better currently, sitting in recliner on arrival.  Able to stand w/ CGA using RW.  Ambulated 150' w/ CGA and RW, slow gait but no loss of balance ors afety concerns.  Dtr deciding on plan but is preparing for pt to have 24/7 assist if home w/ hospice is the plan.  Discussed equipment for home and activity recommendation while in hospital.  -AR       Row Name 05/16/25 1045          Therapy Assessment/Plan (PT)    Rehab Potential (PT) fair  -AR     Criteria for Skilled Interventions Met (PT) yes  -AR     Therapy Frequency (PT) 3 times/wk  -AR       Row Name 05/16/25 1045          Vital Signs    O2 Delivery Pre Treatment room air  -AR       Row Name 05/16/25 1045          Positioning and Restraints    Pre-Treatment Position sitting in chair/recliner  no alarm  -AR     Post Treatment Position chair  -AR     In Chair notified nsg;reclined;sitting;call light within reach;encouraged to call for assist;with family/caregiver  no alarm nursing aware  -AR               User Key  (r) = Recorded By, (t) = Taken By, (c) = Cosigned By      Initials Name Provider Type    Melba Cortez, PT Physical Therapist                   Outcome Measures       Row Name 05/16/25 1048          How much help from  another person do you currently need...    Turning from your back to your side while in flat bed without using bedrails? 4  -AR     Moving from lying on back to sitting on the side of a flat bed without bedrails? 4  -AR     Moving to and from a bed to a chair (including a wheelchair)? 3  -AR     Standing up from a chair using your arms (e.g., wheelchair, bedside chair)? 3  -AR     Climbing 3-5 steps with a railing? 2  -AR     To walk in hospital room? 3  -AR     AM-PAC 6 Clicks Score (PT) 19  -AR     Highest Level of Mobility Goal Walk 10 Steps or More-6  -AR       Row Name 05/16/25 1049          Functional Assessment    Outcome Measure Options AM-PAC 6 Clicks Basic Mobility (PT)  -AR               User Key  (r) = Recorded By, (t) = Taken By, (c) = Cosigned By      Initials Name Provider Type    AR Melba Encinas, PT Physical Therapist                                 Physical Therapy Education       Title: PT OT SLP Therapies (In Progress)       Topic: Physical Therapy (In Progress)       Point: Mobility training (In Progress)       Learning Progress Summary            Patient Acceptance, E, NR by AR at 5/16/2025 1050                      Point: Home exercise program (In Progress)       Learning Progress Summary            Patient Acceptance, E, NR by AR at 5/16/2025 1050                      Point: Body mechanics (In Progress)       Learning Progress Summary            Patient Acceptance, E, NR by AR at 5/16/2025 1050                      Point: Precautions (In Progress)       Learning Progress Summary            Patient Acceptance, E, NR by AR at 5/16/2025 1050                                      User Key       Initials Effective Dates Name Provider Type Discipline    AR 06/16/21 -  Melba Encinas, PT Physical Therapist PT                  PT Recommendation and Plan  Planned Therapy Interventions (PT): balance training, bed mobility training, gait training, home exercise program, neuromuscular re-education,  transfer training, strengthening, postural re-education, patient/family education  Outcome Evaluation: Pt admitted from  home with acute diverticulitis. H/o colon CA, stopped chemo ~10 wks ago per dtr report for quality of life. Pt has been living at home alone, occasional use of RW, neighbor/caregiver stops by to assist w/ meds in morning and assist w/ grocery shopping.  Today pt reports feeling better currently, sitting in recliner on arrival.  Able to stand w/ CGA using RW.  Ambulated 150' w/ CGA and RW, slow gait but no loss of balance ors afety concerns.  Dtr deciding on plan but is preparing for pt to have 24/7 assist if home w/ hospice is the plan.  Discussed equipment for home and activity recommendation while in hospital.     Time Calculation:         PT Charges       Row Name 05/16/25 1043             Time Calculation    Start Time 0850  -AR      Stop Time 0920  -AR      Time Calculation (min) 30 min  -AR      PT Received On 05/16/25  -AR      PT - Next Appointment 05/20/25  -AR      PT Goal Re-Cert Due Date 05/23/25  -AR                User Key  (r) = Recorded By, (t) = Taken By, (c) = Cosigned By      Initials Name Provider Type    AR Melba Encinas, PT Physical Therapist                  Therapy Charges for Today       Code Description Service Date Service Provider Modifiers Qty    10465212695 HC PT EVAL MOD COMPLEXITY 3 5/16/2025 Melba Encinas, PT GP 1    37372319893 HC PT THER PROC EA 15 MIN 5/16/2025 Melba Encinas, PT GP 1            PT G-Codes  Outcome Measure Options: AM-PAC 6 Clicks Basic Mobility (PT)  AM-PAC 6 Clicks Score (PT): 19  PT Discharge Summary  Anticipated Discharge Disposition (PT): home with 24/7 care, inpatient hospice (pending dtr deciding on plan)    Melba Encinas PT  5/16/2025

## 2025-05-16 NOTE — SIGNIFICANT NOTE
05/16/25 1559   OTHER   Discipline occupational therapist   Rehab Time/Intention   Session Not Performed other (see comments)  (Patient fatigued and asked if OT could wait till Monday.  OT to follow up next date.)   Therapy Assessment/Plan (PT)   Criteria for Skilled Interventions Met (PT) yes   Recommendation   OT - Next Appointment 05/19/25

## 2025-05-16 NOTE — PROGRESS NOTES
[facility]  Inpatient Progress Note    Reason for Follow Up: diverticulitis     Subjective     Interval History:     Patient reports feeling better this morning.  She walked with physical therapy.  Her bowels moved yesterday and this morning with brown formed stool.  Denies rectal bleeding or rectal pain.  She is tolerating a regular diet.  No nausea or vomiting.  IV Zosyn, MiraLAX and Anne-Marie-Colace on board.      Current Facility-Administered Medications:     acetaminophen (TYLENOL) tablet 650 mg, 650 mg, Oral, Q4H PRN **OR** acetaminophen (TYLENOL) 160 MG/5ML oral solution 650 mg, 650 mg, Oral, Q4H PRN **OR** acetaminophen (TYLENOL) suppository 650 mg, 650 mg, Rectal, Q4H PRN, Liat Guerin MD    aspirin EC tablet 81 mg, 81 mg, Oral, Daily, Liat Guerin MD, 81 mg at 05/15/25 0824    sennosides-docusate (PERICOLACE) 8.6-50 MG per tablet 2 tablet, 2 tablet, Oral, BID, 2 tablet at 05/15/25 2132 **AND** polyethylene glycol (MIRALAX) packet 17 g, 17 g, Oral, Daily PRN **AND** bisacodyl (DULCOLAX) EC tablet 5 mg, 5 mg, Oral, Daily PRN **AND** bisacodyl (DULCOLAX) suppository 10 mg, 10 mg, Rectal, Daily PRN, Liat Guerin MD    calcium carbonate (TUMS) chewable tablet 500 mg (200 mg elemental), 2 tablet, Oral, BID PRN, Liat Guerin MD    Calcium Replacement - Follow Nurse / BPA Driven Protocol, , Not Applicable, PRN, Liat Guerin MD    clopidogrel (PLAVIX) tablet 75 mg, 75 mg, Oral, Daily, Liat Guerin MD, 75 mg at 05/15/25 0821    dexAMETHasone sodium phosphate injection 4 mg, 4 mg, Intravenous, Daily, Yael Crowe MD, 4 mg at 05/15/25 1247    [Held by provider] empagliflozin (JARDIANCE) tablet 10 mg, 10 mg, Oral, Daily, Liat Guerin MD    [Held by provider] glipizide (GLUCOTROL) tablet 5 mg, 5 mg, Oral, QAM AC, Liat Guerin MD    heparin injection 500 Units, 5 mL, Intravenous, PRN, Liat Guerin  MD Vale    hydrALAZINE (APRESOLINE) tablet 25 mg, 25 mg, Oral, BID, Liat Guerin MD, 25 mg at 05/15/25 2133    [Held by provider] hydroCHLOROthiazide tablet 12.5 mg, 12.5 mg, Oral, Daily, Liat Guerin MD    HYDROmorphone (DILAUDID) injection 0.5 mg, 0.5 mg, Intravenous, Q1H PRN, 0.5 mg at 05/15/25 1013 **AND** naloxone (NARCAN) injection 0.4 mg, 0.4 mg, Intravenous, Q5 Min PRN, Liat Guerin MD    lidocaine (LMX) 4 % cream 1 Application, 1 Application, Topical, PRN, Liat Guerin MD    Magnesium Low Dose Replacement - Follow Nurse / BPA Driven Protocol, , Not Applicable, PRN, Liat Guerin MD    metoprolol tartrate (LOPRESSOR) tablet 100 mg, 100 mg, Oral, BID, Liat Guerin MD, 100 mg at 05/15/25 2133    ondansetron ODT (ZOFRAN-ODT) disintegrating tablet 4 mg, 4 mg, Oral, Q6H PRN, 4 mg at 05/14/25 2124 **OR** ondansetron (ZOFRAN) injection 4 mg, 4 mg, Intravenous, Q6H PRN, Liat Guerin MD, 4 mg at 05/16/25 0444    oxyCODONE (ROXICODONE) immediate release tablet 10 mg, 10 mg, Oral, Q4H, Yael Crowe MD, 10 mg at 05/16/25 0632    oxyCODONE (ROXICODONE) immediate release tablet 10 mg, 10 mg, Oral, Q4H PRN, Yael Crowe MD    Phosphorus Replacement - Follow Nurse / BPA Driven Protocol, , Not Applicable, PRN, Liat Guerin MD    piperacillin-tazobactam (ZOSYN) 3.375 g IVPB in 100 mL NS MBP (CD), 3.375 g, Intravenous, Q12H, Liat Guerin MD, 3.375 g at 05/15/25 2132    Potassium Replacement - Follow Nurse / BPA Driven Protocol, , Not Applicable, PRN, Liat Guerin MD    Insert Peripheral IV, , , Once **AND** sodium chloride 0.9 % flush 10 mL, 10 mL, Intravenous, PRN, Chemo Bob PA    sodium chloride 0.9 % flush 10 mL, 10 mL, Intravenous, Q12H, Liat Guerin MD, 10 mL at 05/15/25 2136    sodium chloride 0.9 % flush 10 mL, 10 mL, Intravenous, PRN, Liat Guerin MD    sodium  chloride 0.9 % flush 10 mL, 10 mL, Intravenous, Q12H, Liat Guerin MD, 10 mL at 05/15/25 2136    sodium chloride 0.9 % flush 10 mL, 10 mL, Intravenous, PRN, Liat Guerin MD    sodium chloride 0.9 % flush 20 mL, 20 mL, Intravenous, PRN, Liat Guerin MD    sodium chloride 0.9 % infusion 40 mL, 40 mL, Intravenous, PRN, Liat Guerin MD    sodium chloride 0.9 % infusion 40 mL, 40 mL, Intravenous, PRN, Liat Guerin MD    sodium chloride 0.9 % infusion, 75 mL/hr, Intravenous, Continuous, Benja Torres MD, Last Rate: 75 mL/hr at 05/16/25 0638, 75 mL/hr at 05/16/25 0638    Objective     Vital Signs  Temp:  [97.3 °F (36.3 °C)-98.4 °F (36.9 °C)] 97.3 °F (36.3 °C)  Heart Rate:  [59-67] 60  Resp:  [16-18] 18  BP: (121-145)/(52-64) 145/64  Body mass index is 19.48 kg/m².    Intake/Output Summary (Last 24 hours) at 5/16/2025 0838  Last data filed at 5/16/2025 0638  Gross per 24 hour   Intake 450 ml   Output --   Net 450 ml     No intake/output data recorded.     Physical Exam:   General: patient awake, alert and cooperative   Abdomen: soft, nontender, nondistended; normal bowel sounds      Results Review:     I reviewed the patient's new clinical results.    Results from last 7 days   Lab Units 05/15/25  0537 05/14/25  1127 05/12/25  1450   WBC 10*3/mm3 8.78 11.51* 8.38   HEMOGLOBIN g/dL 9.5* 12.8 11.2*   HEMATOCRIT % 28.8* 38.8 34.5   PLATELETS 10*3/mm3 171 224 177     Results from last 7 days   Lab Units 05/15/25  1335 05/15/25  0537 05/14/25  1127 05/12/25  1450   SODIUM mmol/L  --  137 143 139   POTASSIUM mmol/L 4.1 3.6 4.2 4.1   CHLORIDE mmol/L  --  107 105 102   CO2 mmol/L  --  21.0* 23.0 20.9*   BUN mg/dL  --  32* 43* 44*   CREATININE mg/dL  --  1.56* 1.85* 1.44*   CALCIUM mg/dL  --  8.0* 9.3 9.2   BILIRUBIN mg/dL  --  <0.2 0.2 0.3   ALK PHOS U/L  --  115 151* 133*   ALT (SGPT) U/L  --  13 18 23   AST (SGOT) U/L  --  31 38* 39*   GLUCOSE mg/dL  --  69 86 114*          Lab Results   Lab Value Date/Time    LIPASE 44 10/17/2022 4287       Radiology:  CT Head Without Contrast   Final Result   1. Atrophy and small vessel ischemic changes.   2. No acute intracranial process.       Radiation dose reduction techniques were utilized, including automated   exposure control and exposure modulation based on body size.           This report was finalized on 5/14/2025 1:53 PM by Dr. Johnathan Sandy M.D on Workstation: [id number]              Assessment & Plan     Active Hospital Problems    Diagnosis     **Acute diverticulitis     Type 2 diabetes mellitus with hypoglycemia, without long-term current use of insulin     JENNIFER (acute kidney injury)     Metastatic colon cancer to liver     Pacemaker     PUD (peptic ulcer disease)     CKD (chronic kidney disease) stage 3, GFR 30-59 ml/min     CAD (coronary artery disease)        Assessment:  Acute sigmoid diverticulitis  Abdominal pain  Metastatic colon cancer on chemotherapy  Acute on chronic kidney disease  History of CAD status post CABG and pacemaker    Plan:  Diet as tolerated  Continue IV Zosyn  Recommend 7 days of Augmentin upon discharge  Continue current bowel regimen  We will sign off but remain available if needed    I discussed the patients findings and my recommendations with patient and Dr. Leigh .    Joanie Weinstein, DEIRDRE

## 2025-05-16 NOTE — PLAN OF CARE
Goal Outcome Evaluation:  Plan of Care Reviewed With: patient        Progress: no change  Outcome Evaluation: VSS, RA. Admitted with acute diverticulits. Regular diet. Assist x 1 BRP. IV abx given. Scheduled Lilia q4. Blood glucose >400 today, [facility] notified, insulin initiated and steroids discontinued. Palliative following. Plans for DC pending.

## 2025-05-16 NOTE — PLAN OF CARE
Goal Outcome Evaluation:  Plan of Care Reviewed With: patient, child        Progress: no change     Patient had woken up crying and very anxious, pt stated that she was scared of dying and leaving her family behind. Daughter and RN at bedside talked to patient to calm her down, pt became SOB and oxygen via nasal cannula was given which pt stated it helped, pt denied pain and kept saying she feel funny in her head and also had a bad dream and didn't know why she was in the hospital but oriented to self, time and place. Denies chest pain, dizziness. Did c/o nausea and zofran was given. Family is currently deciding on hospice.

## 2025-05-16 NOTE — PROGRESS NOTES
Name: Tamie Peter ADMIT: 2025   : 1935  PCP: Mohinder Barbosa MD    MRN: [id number] LOS: 2 days   AGE/SEX: 89 y.o. female  ROOM: [facility]     Subjective   Subjective   Patient is lying on the bed and is chronically ill-appearing.  States her abdominal pain is reasonably well-controlled.  Denies nausea, vomiting, chest pain, shortness of breath.  Has been experiencing episodes of agitation/anxiety and blood sugars are uncontrolled today.       Objective   Objective   Vital Signs  Temp:  [97.3 °F (36.3 °C)-98.4 °F (36.9 °C)] 98.2 °F (36.8 °C)  Heart Rate:  [59-67] 60  Resp:  [16-18] 16  BP: (117-145)/(54-66) 127/54  SpO2:  [94 %-98 %] 96 %  on  Flow (L/min) (Oxygen Therapy):  [2] 2;   Device (Oxygen Therapy): room air  Body mass index is 19.48 kg/m².  Physical Exam  HEENT: PERRLA, extraocular movements intact, Scleras no icterus  Neck: Supple, no JVD  Cardiovascular: Regular rate and rhythm with normal S1 and S2  Respiratory: Fairly clear to auscultation anteriorly with no wheezes  GI: Soft, tender in the lower abdomen, bowel sounds are present  Extremities: No edema, palpable pedal pulses  Neurologic: Grossly nonfocal, no facial asymmetry    Results Review     I reviewed the patient's new clinical results.  Results from last 7 days   Lab Units 25  1345 05/15/25  0537 25  11225  1450   WBC 10*3/mm3 12.30* 8.78 11.51* 8.38   HEMOGLOBIN g/dL 10.6* 9.5* 12.8 11.2*   PLATELETS 10*3/mm3 196 171 224 177     Results from last 7 days   Lab Units 25  1345 05/15/25  1335 05/15/25  0537 25  1127 25  1450   SODIUM mmol/L 134*  --  137 143 139   POTASSIUM mmol/L 4.4 4.1 3.6 4.2 4.1   CHLORIDE mmol/L 104  --  107 105 102   CO2 mmol/L 19.7*  --  21.0* 23.0 20.9*   BUN mg/dL 31*  --  32* 43* 44*   CREATININE mg/dL 1.59*  --  1.56* 1.85* 1.44*   GLUCOSE mg/dL 429*  --  69 86 114*   EGFR mL/min/1.73 31.3*  --  32.0* 26.1* 35.3*     Results from last 7 days   Lab Units  05/16/25  1345 05/15/25  0537 05/14/25  1127 05/12/25  1450   ALBUMIN g/dL 3.1* 2.8* 3.6 3.6   BILIRUBIN mg/dL 0.2 <0.2 0.2 0.3   ALK PHOS U/L 123* 115 151* 133*   AST (SGOT) U/L 20 31 38* 39*   ALT (SGPT) U/L 16 13 18 23     Results from last 7 days   Lab Units 05/16/25  1345 05/15/25  0537 05/14/25  1127 05/12/25  1450   CALCIUM mg/dL 8.3* 8.0* 9.3 9.2   ALBUMIN g/dL 3.1* 2.8* 3.6 3.6   MAGNESIUM mg/dL 2.0 1.5* 1.6  --      Results from last 7 days   Lab Units 05/14/25  1433 05/14/25  1127   PROCALCITONIN ng/mL  --  0.17   LACTATE mmol/L 1.3 2.2*     Glucose   Date/Time Value Ref Range Status   05/16/2025 1548 451 (C) 70 - 130 mg/dL Final   05/16/2025 1115 340 (H) 70 - 130 mg/dL Final   05/16/2025 0630 342 (H) 70 - 130 mg/dL Final   05/15/2025 2059 345 (H) 70 - 130 mg/dL Final   05/15/2025 1628 253 (H) 70 - 130 mg/dL Final   05/15/2025 1114 206 (H) 70 - 130 mg/dL Final   05/14/2025 1632 215 (H) 70 - 130 mg/dL Final       No radiology results for the last day      I have personally reviewed all medications:  Scheduled Medications  aspirin, 81 mg, Oral, Daily  clopidogrel, 75 mg, Oral, Daily  dexAMETHasone, 4 mg, Intravenous, Daily  [Held by provider] empagliflozin, 10 mg, Oral, Daily  [Held by provider] glipizide, 5 mg, Oral, QAM AC  hydrALAZINE, 25 mg, Oral, BID  [Held by provider] hydroCHLOROthiazide, 12.5 mg, Oral, Daily  insulin glargine, 20 Units, Subcutaneous, Daily  insulin lispro, 12 Units, Subcutaneous, Once  insulin lispro, 2-9 Units, Subcutaneous, 4x Daily AC & at Bedtime  metoprolol tartrate, 100 mg, Oral, BID  oxyCODONE, 10 mg, Oral, Q4H  piperacillin-tazobactam, 3.375 g, Intravenous, Q12H  senna-docusate sodium, 2 tablet, Oral, BID  sodium chloride, 10 mL, Intravenous, Q12H  sodium chloride, 10 mL, Intravenous, Q12H    Infusions  sodium chloride, 75 mL/hr, Last Rate: 75 mL/hr (05/16/25 0684)    Diet  Diet: Regular/House; Fluid Consistency: Thin (IDDSI 0)    I have personally reviewed:  [x]  Laboratory    [x]  Microbiology   [x]  Radiology   [x]  EKG/Telemetry  [x]  Cardiology/Vascular   []  Pathology    []  Records       Assessment/Plan     Active Hospital Problems    Diagnosis  POA    **Acute diverticulitis [K57.92]  Yes    Type 2 diabetes mellitus with hypoglycemia, without long-term current use of insulin [E11.649]  Yes    JENNIFER (acute kidney injury) [N17.9]  Yes    Metastatic colon cancer to liver [C18.9, C78.7]  Yes    Pacemaker [Z95.0]  Yes    PUD (peptic ulcer disease) [K27.9]  Yes    CKD (chronic kidney disease) stage 3, GFR 30-59 ml/min [N18.30]  Yes    CAD (coronary artery disease) [I25.10]  Yes      Resolved Hospital Problems   No resolved problems to display.       89 y.o. female admitted with Acute diverticulitis.    1. Acute diverticulitis with abdominal pain, currently on IV Zosyn to be continued.   On IV Dilaudid and p.o. oxycodone for pain control.  Gastroenterology following along and initiated IV steroids which hopefully can be discontinued secondary to episodes of agitation and uncontrolled blood sugars.    2.  Metastatic colon cancer with mets to liver/spine, continue with analgesics and palliative team following along as well.    3.  Acute/CKD stage IIIa, creatinine upon admission 1.85 and has improved to 1.56.  Baseline is around 1.1.  Continue to hold ARB and HCTZ.  Continue with fluids.    4.  Diabetes mellitus, home empagliflozin, glipizide are on hold and will continue with corrective dose insulin.  Lantus has been initiated today and rising blood sugars is secondary to steroids.    5.  Hypertension, on home metoprolol and hydralazine which will be continued.    6.  History of pacemaker placement and currently in paced rhythm.    7.  History of coronary artery disease, continue with aspirin, Plavix, metoprolol.    8.  Episodes of agitation, likely due to steroids and hopefully can be discontinued if okay with GI.  Has been initiated on as needed lorazepam    9.  On SCDs for DVT  prophylaxis.    10.  CODE STATUS is full code.    11.  Expected discharge date, 5/18/2025.    Copy text on this note has been reviewed by me on 5/16/2025    Benja Torres MD  [facility]  05/16/25  15:59 EDT

## 2025-05-16 NOTE — CASE MANAGEMENT/SOCIAL WORK
Discharge Planning Assessment  [facility]     Patient Name: Tamie Peter  MRN: [id number]  Today's Date: 5/16/2025    Admit Date: 5/14/2025    Plan: Pending possibly return home with hospice or palliative versus possible inpatient hospice, transport via family vs medical needs for transport   Discharge Needs Assessment       Row Name 05/16/25 1538       Living Environment    People in Home alone    Current Living Arrangements home    Potentially Unsafe Housing Conditions none    In the past 12 months has the electric, gas, oil, or water company threatened to shut off services in your home? No    Primary Care Provided by self    Provides Primary Care For no one    Family Caregiver if Needed child(ejrrod), adult    Family Caregiver Names Mandi Mcghee/daughter&MELISSA, Melinda Ochoa/daughter, & Alexa Peter    Able to Return to Prior Arrangements yes       Resource/Environmental Concerns    Resource/Environmental Concerns none    Transportation Concerns none       Transportation Needs    In the past 12 months, has lack of transportation kept you from medical appointments or from getting medications? no    In the past 12 months, has lack of transportation kept you from meetings, work, or from getting things needed for daily living? No       Food Insecurity    Within the past 12 months, you worried that your food would run out before you got the money to buy more. Never true    Within the past 12 months, the food you bought just didn't last and you didn't have money to get more. Never true       Transition Planning    Patient/Family Anticipates Transition to home with family;home with help/services;other (see comments);inpatient hospice  home with pallistus vs hospice    Patient/Family Anticipated Services at Transition none    Transportation Anticipated health plan transportation;family or friend will provide       Discharge Needs Assessment    Readmission Within the Last 30 Days no previous admission in last 30  days    Equipment Currently Used at Home walker, rolling;cane, straight    Concerns to be Addressed discharge planning    Anticipated Changes Related to Illness none    Equipment Needed After Discharge other (see comments)  TBD    Provided Post Acute Provider List? N/A    N/A Provider List Comment Pt denied need, reports Mandi has been talking to both hospice & palliative care              Discharge Plan       Row Name 05/16/25 1538       Plan    Plan Pending possibly return home with hospice or palliative versus possible inpatient hospice, transport via family vs medical needs for transport    Patient/Family in Agreement with Plan yes    Plan Comments CCP met with pt at bedside. CCP introduced self, role, & DC planning discussed. Face sheet information & pharmacy verified. Pt lives alone in single-story house. Pt reports 4 DAE & 1 flight of steps inside to upstairs. Pt reports she does not have to go upstairs. Prior to admit, pt reports she drives, IADLs, & reports use of the following DME: cane & walker. Pt reports having a living will & declined need for information. CCP verified copy on file. Pt declines Meds to Beds. Pt denies issues with affording or managing medications, affording food, or affording utilities. Pt has no history of home health. Pt reports history of rehab at [facility]. CCP reviewed DC plan with pt who reports her daughter has been helping her decide & meeting with palliative & hosptice. DC plan, per pt, is pending possibly return home with hospice or palliative versus possible inpatient hospice, transport via family vs medical needs for transport. Denies any needs/equipment. CCP will follow. DC barriers:  IVF, IV dexamethasone sodium phosphate, IV zosyn scheduled until 5/21, & OT consulted 5/14. [facility] [place]/PREM             Selected Continued Care - Episodes Includes continued care and service providers with selected services from the active episodes listed below     Expected Discharge  Date and Time       Expected Discharge Date Expected Discharge Time    May 17, 2025       Demographic Summary       Row Name 05/16/25 1538       General Information    Admission Type inpatient    Arrived From emergency department;home    Referral Source admission list    Reason for Consult discharge planning    Preferred Language English       Contact Information    Permission Granted to Share Info With ;family/designee              Functional Status       Row Name 05/16/25 1538       Functional Status    Usual Activity Tolerance good    Current Activity Tolerance good       Assessment of Health Literacy    How often do you have someone help you read hospital materials? Never    How often do you have problems learning about your medical condition because of difficulty understanding written information? Never    How often do you have a problem understanding what is told to you about your medical condition? Never    How confident are you filling out medical forms by yourself? Extremely    Health Literacy Excellent       Functional Status, IADL    Medications independent    Meal Preparation independent    Housekeeping independent    Laundry independent    Shopping independent    If for any reason you need help with day-to-day activities such as bathing, preparing meals, shopping, managing finances, etc., do you get the help you need? I don't need any help       Mental Status    General Appearance WDL WDL       Mental Status Summary    Recent Changes in Mental Status/Cognitive Functioning no changes       Employment/    Employment Status retired           Elizabeth Dawn RN

## 2025-05-16 NOTE — PLAN OF CARE
Goal Outcome Evaluation:              Outcome Evaluation: Pt admitted from  home with acute diverticulitis. H/o colon CA, stopped chemo ~10 wks ago per dtr report for quality of life. Pt has been living at home alone, occasional use of RW, neighbor/caregiver stops by to assist w/ meds in morning and assist w/ grocery shopping.  Today pt reports feeling better currently, sitting in recliner on arrival.  Able to stand w/ CGA using RW.  Ambulated 150' w/ CGA and RW, slow gait but no loss of balance ors afety concerns.  Dtr deciding on plan but is preparing for pt to have 24/7 assist if home w/ hospice is the plan.  Discussed equipment for home and activity recommendation while in hospital.    Anticipated Discharge Disposition (PT): home with 24/7 care, inpatient hospice (pending dtr deciding on plan)

## 2025-05-17 NOTE — PROGRESS NOTES
Name: Tamie Peter ADMIT: 2025   : 1935  PCP: Mohinder Barbosa MD    MRN: [id number] LOS: 3 days   AGE/SEX: 89 y.o. female  ROOM: [facility]     Subjective   Subjective   Patient is lying on the bed and is chronically ill-appearing.  Appears much better clinically.  Abdominal pain is near completely resolved and is tolerating p.o. diet quite well.  Denies nausea, vomiting, chest pain, shortness of breath.       Objective   Objective   Vital Signs  Temp:  [97 °F (36.1 °C)-98.2 °F (36.8 °C)] 97.9 °F (36.6 °C)  Heart Rate:  [60-72] 60  Resp:  [16] 16  BP: (113-146)/(50-79) 132/79  SpO2:  [94 %-96 %] 94 %  on   ;   Device (Oxygen Therapy): room air  Body mass index is 19.48 kg/m².  Physical Exam  HEENT: PERRLA, extraocular movements intact, Scleras no icterus  Neck: Supple, no JVD  Cardiovascular: Regular rate and rhythm with normal S1 and S2  Respiratory: Fairly clear to auscultation anteriorly with no wheezes  GI: Soft, tender in the lower abdomen, bowel sounds are present  Extremities: No edema, palpable pedal pulses  Neurologic: Grossly nonfocal, no facial asymmetry    Results Review     I reviewed the patient's new clinical results.  Results from last 7 days   Lab Units 25  0525  1345 05/15/25  0537 25  1127   WBC 10*3/mm3 10.28 12.30* 8.78 11.51*   HEMOGLOBIN g/dL 9.6* 10.6* 9.5* 12.8   PLATELETS 10*3/mm3 196 196 171 224     Results from last 7 days   Lab Units 25  0526 25  1345 05/15/25  1335 05/15/25  0537 25  1127   SODIUM mmol/L 139 134*  --  137 143   POTASSIUM mmol/L 3.7 4.4 4.1 3.6 4.2   CHLORIDE mmol/L 110* 104  --  107 105   CO2 mmol/L 17.0* 19.7*  --  21.0* 23.0   BUN mg/dL 26* 31*  --  32* 43*   CREATININE mg/dL 1.25* 1.59*  --  1.56* 1.85*   GLUCOSE mg/dL 62* 429*  --  69 86   EGFR mL/min/1.73 41.8* 31.3*  --  32.0* 26.1*     Results from last 7 days   Lab Units 25  0526 25  1345 05/15/25  0537 25  1127   ALBUMIN g/dL 2.7* 3.1*  2.8* 3.6   BILIRUBIN mg/dL 0.2 0.2 <0.2 0.2   ALK PHOS U/L 99 123* 115 151*   AST (SGOT) U/L 18 20 31 38*   ALT (SGPT) U/L 14 16 13 18     Results from last 7 days   Lab Units 05/17/25  0526 05/16/25  1345 05/15/25  0537 05/14/25  1127   CALCIUM mg/dL 8.1* 8.3* 8.0* 9.3   ALBUMIN g/dL 2.7* 3.1* 2.8* 3.6   MAGNESIUM mg/dL 1.7 2.0 1.5* 1.6     Results from last 7 days   Lab Units 05/14/25  1433 05/14/25  1127   PROCALCITONIN ng/mL  --  0.17   LACTATE mmol/L 1.3 2.2*     Glucose   Date/Time Value Ref Range Status   05/17/2025 1116 190 (H) 70 - 130 mg/dL Final   05/17/2025 0630 71 70 - 130 mg/dL Final   05/16/2025 2025 187 (H) 70 - 130 mg/dL Final   05/16/2025 1548 451 (C) 70 - 130 mg/dL Final   05/16/2025 1115 340 (H) 70 - 130 mg/dL Final   05/16/2025 0630 342 (H) 70 - 130 mg/dL Final   05/15/2025 2059 345 (H) 70 - 130 mg/dL Final       No radiology results for the last day      I have personally reviewed all medications:  Scheduled Medications  aspirin, 81 mg, Oral, Daily  clopidogrel, 75 mg, Oral, Daily  [Held by provider] empagliflozin, 10 mg, Oral, Daily  [Held by provider] glipizide, 5 mg, Oral, QAM AC  hydrALAZINE, 25 mg, Oral, BID  [Held by provider] hydroCHLOROthiazide, 12.5 mg, Oral, Daily  insulin glargine, 20 Units, Subcutaneous, Daily  insulin lispro, 2-9 Units, Subcutaneous, 4x Daily AC & at Bedtime  metoprolol tartrate, 100 mg, Oral, BID  oxyCODONE, 10 mg, Oral, Q4H  piperacillin-tazobactam, 3.375 g, Intravenous, Q12H  senna-docusate sodium, 2 tablet, Oral, BID  sodium chloride, 10 mL, Intravenous, Q12H  sodium chloride, 10 mL, Intravenous, Q12H    Infusions  sodium chloride, 75 mL/hr, Last Rate: 75 mL/hr (05/17/25 0719)    Diet  Diet: Regular/House; Fluid Consistency: Thin (IDDSI 0)    I have personally reviewed:  [x]  Laboratory   [x]  Microbiology   [x]  Radiology   [x]  EKG/Telemetry  [x]  Cardiology/Vascular   []  Pathology    []  Records       Assessment/Plan     Active Hospital Problems     Diagnosis  POA    **Acute diverticulitis [K57.92]  Yes    Type 2 diabetes mellitus with hypoglycemia, without long-term current use of insulin [E11.649]  Yes    JENNIFER (acute kidney injury) [N17.9]  Yes    Metastatic colon cancer to liver [C18.9, C78.7]  Yes    Pacemaker [Z95.0]  Yes    PUD (peptic ulcer disease) [K27.9]  Yes    CKD (chronic kidney disease) stage 3, GFR 30-59 ml/min [N18.30]  Yes    CAD (coronary artery disease) [I25.10]  Yes      Resolved Hospital Problems   No resolved problems to display.       89 y.o. female admitted with Acute diverticulitis.    1. Acute diverticulitis with abdominal pain, on IV Zosyn and will be continued.  On IV Dilaudid and p.o. oxycodone for pain control.  IV steroids have been discontinued and GI following along.  Clinically improving and tolerating diet.    2.  Metastatic colon cancer with mets to liver/spine, continue with analgesics and palliative team following along as well.    3.  Acute/CKD stage IIIa, creatinine upon admission 1.85 and has improved to 1.56>1.25.  Baseline is around 1.1.  Continue to hold ARB and HCTZ.  Continue with fluids.    4.  Diabetes mellitus, home empagliflozin, glipizide are on hold and will continue with corrective dose insulin.      5.  Hypertension, on home metoprolol and hydralazine which will be continued.    6.  History of pacemaker placement and currently in paced rhythm.    7.  History of coronary artery disease, continue with aspirin, Plavix, metoprolol.    8.  Episodes of agitation, improved after steroids have been discontinued.  On as needed lorazepam.    9.  On SCDs for DVT prophylaxis.    10.  CODE STATUS is full code.    11.  Expected discharge date, 5/18/2025.    Copy text on this note has been reviewed by me on 5/17/2025    Benja Torres MD  [facility]  05/17/25  12:08 EDT

## 2025-05-17 NOTE — PLAN OF CARE
Goal Outcome Evaluation:      Pain well controlled. Up to chair. Ambulated to restroom. C/o of nausea relieved with prn medication.

## 2025-05-17 NOTE — PROGRESS NOTES
"        [facility]   Inpatient Palliative Care Follow up  Date: 5/17/2025   Reason for follow up: Follow-up regarding pain management      Interval History    Information obtained from: Patient    Follow up Information: This morning patient is very bright.  She states she feels much better.  She states her pain is under control.  She has not required as needed medications to help manage her pain since oxycodone increased and scheduled.  She also reports she was able to eat a full breakfast this morning and is feeling much better.          Physical Assessment   /79 (BP Location: Left arm, Patient Position: Lying)   Pulse 60   Temp 97.9 °F (36.6 °C) (Oral)   Resp 16   Ht 152 cm (59.84\")   SpO2 94%   BMI 19.48 kg/m²    Palliative Performance Scale: Performance 50% based on the following measures: Ambulation: Mainly sit or lie down, Activity and Evidence of Disease: Unable to do any work, extensive evidence of disease, Self-Care: Considerable assistance required,  Intake: Normal or reduced, LOC: Full or confusion  Physical Exam     Chronically ill-appearing female who is in no acute distress  Lungs grossly clear bilaterally without wheezes or rails  Abdomen is soft nontender nondistended bowel sounds are present  Extremities without edema  Patient is alert and oriented and follows commands      Data (labs/images reviewed)    WBC   Date Value Ref Range Status   05/17/2025 10.28 3.40 - 10.80 10*3/mm3 Final     RBC   Date Value Ref Range Status   05/17/2025 2.94 (L) 3.77 - 5.28 10*6/mm3 Final     Hemoglobin   Date Value Ref Range Status   05/17/2025 9.6 (L) 12.0 - 15.9 g/dL Final     Hematocrit   Date Value Ref Range Status   05/17/2025 29.1 (L) 34.0 - 46.6 % Final     MCV   Date Value Ref Range Status   05/17/2025 99.0 (H) 79.0 - 97.0 fL Final     MCH   Date Value Ref Range Status   05/17/2025 32.7 26.6 - 33.0 pg Final     MCHC   Date Value Ref Range Status   05/17/2025 33.0 31.5 - 35.7 g/dL " Final     RDW   Date Value Ref Range Status   05/17/2025 13.4 12.3 - 15.4 % Final     RDW-SD   Date Value Ref Range Status   05/17/2025 48.2 37.0 - 54.0 fl Final     MPV   Date Value Ref Range Status   05/17/2025 10.0 6.0 - 12.0 fL Final     Platelets   Date Value Ref Range Status   05/17/2025 196 140 - 450 10*3/mm3 Final     Neutrophil %   Date Value Ref Range Status   05/17/2025 80.4 (H) 42.7 - 76.0 % Final     Lymphocyte %   Date Value Ref Range Status   05/17/2025 13.1 (L) 19.6 - 45.3 % Final     Monocyte %   Date Value Ref Range Status   05/17/2025 5.3 5.0 - 12.0 % Final     Eosinophil %   Date Value Ref Range Status   05/17/2025 0.8 0.3 - 6.2 % Final     Basophil %   Date Value Ref Range Status   05/17/2025 0.2 0.0 - 1.5 % Final     Immature Grans %   Date Value Ref Range Status   05/17/2025 0.2 0.0 - 0.5 % Final     Neutrophils, Absolute   Date Value Ref Range Status   05/17/2025 8.27 (H) 1.70 - 7.00 10*3/mm3 Final     Lymphocytes, Absolute   Date Value Ref Range Status   05/17/2025 1.35 0.70 - 3.10 10*3/mm3 Final     Monocytes, Absolute   Date Value Ref Range Status   05/17/2025 0.54 0.10 - 0.90 10*3/mm3 Final     Eosinophils, Absolute   Date Value Ref Range Status   05/17/2025 0.08 0.00 - 0.40 10*3/mm3 Final     Basophils, Absolute   Date Value Ref Range Status   05/17/2025 0.02 0.00 - 0.20 10*3/mm3 Final     Immature Grans, Absolute   Date Value Ref Range Status   05/17/2025 0.02 0.00 - 0.05 10*3/mm3 Final     nRBC   Date Value Ref Range Status   05/17/2025 0.0 0.0 - 0.2 /100 WBC Final       Lab Results   Component Value Date    GLUCOSE 62 (L) 05/17/2025    BUN 26 (H) 05/17/2025    CREATININE 1.25 (H) 05/17/2025     05/17/2025    K 3.7 05/17/2025     (H) 05/17/2025    CALCIUM 8.1 (L) 05/17/2025    PROTEINTOT 5.1 (L) 05/17/2025    ALBUMIN 2.7 (L) 05/17/2025    ALT 14 05/17/2025    AST 18 05/17/2025    ALKPHOS 99 05/17/2025    BILITOT 0.2 05/17/2025    GLOB 2.4 05/17/2025    AGRATIO 1.1  05/17/2025    BCR 20.8 05/17/2025    ANIONGAP 12.0 05/17/2025    EGFR 41.8 (L) 05/17/2025       CT Head Without Contrast  Narrative: CT OF THE BRAIN WITHOUT CONTRAST 5/14/2025     HISTORY: Altered mental status.     Axial images were obtained through the brain without intravenous  contrast.     There is mild to moderate diffuse atrophy. There is decreased  attenuation of the periventricular white matter bilaterally consistent  with small vessel white matter ischemic disease. Small somewhat more  confluent area of low density seen near the anterior aspect of the left  external capsule on image 21 likely a small focal area of old infarct.     There is no evidence of acute infarction, hemorrhage, midline shift or  mass effect.     No bony abnormalities are seen.     Impression: 1. Atrophy and small vessel ischemic changes.  2. No acute intracranial process.     Radiation dose reduction techniques were utilized, including automated  exposure control and exposure modulation based on body size.        This report was finalized on 5/14/2025 1:53 PM by Dr. Johnathan Sanyd M.D on Workstation: [id number]            Palliative Care Assessment and Recommendations    Prognosis and Palliative Performance Scale:  Performance 50% based on the following measures: Ambulation: Mainly sit or lie down, Activity and Evidence of Disease: Unable to do any work, extensive evidence of disease, Self-Care: Considerable assistance required,  Intake: Normal or reduced, LOC: Full or confusion  Disease State: Controlled with current treatments  Symptom Management:   Pain: Pain is much improved with addition of routine oxycodone.  Continue oxycodone 10 mg p.o. every 4 hours routine and hourly as needed.  Patient will benefit from this regimen at discharge.  Should she go home without hospice, please let us know's as we will coordinate a palliative admission and continue to prescribe her oxycodone.  Patient did not tolerate Decadron so it was  stopped.  Constipation: Senna 2 tabs p.o. twice daily for constipation    Goals of Care Treatment Preferences   Palliative Care Decision Making Capacity: Intact  Healthcare Surrogate: Per healthcare directive  What is Most important to patient/family at this time: to manage her pain  Code Status FULL   Other Considerations regarding life sustaining treatments:     Follow up: We will continue to follow peripherally around her pain control.  Patient is appropriate for hospice at discharge.  However, family is deciding between home health versus hospice support at discharge.  Will continue to follow and assist with any needs related to her disposition and pain management.  Discussed plan with: patient and family          Thank you for consulting palliative care. If you need to reach the provider on call for the palliative care team please call [phone]    Yael Crowe MD  5/17/2025 08:42 EDT

## 2025-05-18 NOTE — PLAN OF CARE
Goal Outcome Evaluation:              Outcome Evaluation: Patient a/o x1-2, very forgetfull. grandson and neighbor at bedside at beginning of shift. pt c/o pain in R shoulder, flinches at touch. No other issues noted. PRN ativan given due to patient being anxious. See v/s and labs.

## 2025-05-18 NOTE — PLAN OF CARE
Problem: Adult Inpatient Plan of Care  Goal: Plan of Care Review  Outcome: Progressing  Goal: Patient-Specific Goal (Individualized)  Outcome: Progressing  Goal: Absence of Hospital-Acquired Illness or Injury  Outcome: Progressing  Intervention: Identify and Manage Fall Risk  Recent Flowsheet Documentation  Taken 5/18/2025 1600 by Radha Pyle RN  Safety Promotion/Fall Prevention: activity supervised  Taken 5/18/2025 1400 by Radha Pyle RN  Safety Promotion/Fall Prevention: activity supervised  Taken 5/18/2025 1000 by Radha Pyle RN  Safety Promotion/Fall Prevention:   activity supervised   assistive device/personal items within reach  Taken 5/18/2025 0907 by Radha Pyle RN  Safety Promotion/Fall Prevention:   activity supervised   assistive device/personal items within reach   safety round/check completed   nonskid shoes/slippers when out of bed  Taken 5/18/2025 0800 by Radha Pyle RN  Safety Promotion/Fall Prevention:   activity supervised   assistive device/personal items within reach  Intervention: Prevent Skin Injury  Recent Flowsheet Documentation  Taken 5/18/2025 1600 by Radha Pyle RN  Body Position: side-lying  Taken 5/18/2025 1400 by Radha Pyle RN  Body Position: supine  Taken 5/18/2025 1000 by Radha Pyle RN  Body Position: supine  Taken 5/18/2025 0907 by Radha Pyle RN  Body Position: supine  Skin Protection:   incontinence pads utilized   transparent dressing maintained  Taken 5/18/2025 0800 by Radha Pyle RN  Body Position: supine  Intervention: Prevent Infection  Recent Flowsheet Documentation  Taken 5/18/2025 1600 by Radha Pyle RN  Infection Prevention: environmental surveillance performed  Taken 5/18/2025 1400 by Radha Pyle RN  Infection Prevention: environmental surveillance performed  Taken 5/18/2025 1000 by Radha Pyle RN  Infection Prevention: environmental surveillance performed  Taken 5/18/2025 0907 by Radha Pyle RN  Infection  Prevention: environmental surveillance performed  Taken 5/18/2025 0800 by Radha Pyle, RN  Infection Prevention: environmental surveillance performed  Goal: Optimal Comfort and Wellbeing  Outcome: Progressing  Intervention: Provide Person-Centered Care  Recent Flowsheet Documentation  Taken 5/18/2025 0907 by Radha Pyle, RN  Trust Relationship/Rapport:   care explained   choices provided   questions answered   questions encouraged   thoughts/feelings acknowledged  Goal: Readiness for Transition of Care  Outcome: Progressing   Goal Outcome Evaluation:

## 2025-05-18 NOTE — PROGRESS NOTES
Name: Tamie Peter ADMIT: 2025   : 1935  PCP: Mohinder Barbosa MD    MRN: [id number] LOS: 4 days   AGE/SEX: 89 y.o. female  ROOM: [facility]     Subjective   Subjective   Patient is lying on the bed and is chronically ill-appearing.  Today she appears weak and lethargic and mildly confused.  Abdominal pain has improved and is tolerating p.o. diet quite well.  Denies nausea, vomiting, chest pain, shortness of breath.       Objective   Objective   Vital Signs  Temp:  [97.5 °F (36.4 °C)-97.8 °F (36.6 °C)] 97.5 °F (36.4 °C)  Heart Rate:  [59-62] 59  Resp:  [16] 16  BP: (160-176)/(63-77) 160/68  SpO2:  [95 %-97 %] 95 %  on   ;   Device (Oxygen Therapy): room air  Body mass index is 19.48 kg/m².  Physical Exam  HEENT: PERRLA, extraocular movements intact, Scleras no icterus  Neck: Supple, no JVD  Cardiovascular: Regular rate and rhythm with normal S1 and S2  Respiratory: Fairly clear to auscultation anteriorly with no wheezes  GI: Soft, tender in the lower abdomen, bowel sounds are present  Extremities: No edema, palpable pedal pulses  Neurologic: Grossly nonfocal, no facial asymmetry    Results Review     I reviewed the patient's new clinical results.  Results from last 7 days   Lab Units 25  0525  0526 25  1345 05/15/25  0537   WBC 10*3/mm3 10.36 10.28 12.30* 8.78   HEMOGLOBIN g/dL 10.8* 9.6* 10.6* 9.5*   PLATELETS 10*3/mm3 217 196 196 171     Results from last 7 days   Lab Units 25  0521 25  0526 25  1345 05/15/25  1335 05/15/25  0537   SODIUM mmol/L 141 139 134*  --  137   POTASSIUM mmol/L 3.1* 3.7 4.4 4.1 3.6   CHLORIDE mmol/L 113* 110* 104  --  107   CO2 mmol/L 17.2* 17.0* 19.7*  --  21.0*   BUN mg/dL 18 26* 31*  --  32*   CREATININE mg/dL 1.19* 1.25* 1.59*  --  1.56*   GLUCOSE mg/dL 88 62* 429*  --  69   EGFR mL/min/1.73 44.3* 41.8* 31.3*  --  32.0*     Results from last 7 days   Lab Units 25  0521 25  0526 25  1345 05/15/25  0537   ALBUMIN  g/dL 2.5* 2.7* 3.1* 2.8*   BILIRUBIN mg/dL 0.3 0.2 0.2 <0.2   ALK PHOS U/L 95 99 123* 115   AST (SGOT) U/L 16 18 20 31   ALT (SGPT) U/L 13 14 16 13     Results from last 7 days   Lab Units 05/18/25  0521 05/17/25  0526 05/16/25  1345 05/15/25  0537 05/14/25  1127   CALCIUM mg/dL 7.9* 8.1* 8.3* 8.0* 9.3   ALBUMIN g/dL 2.5* 2.7* 3.1* 2.8* 3.6   MAGNESIUM mg/dL  --  1.7 2.0 1.5* 1.6     Results from last 7 days   Lab Units 05/14/25  1433 05/14/25  1127   PROCALCITONIN ng/mL  --  0.17   LACTATE mmol/L 1.3 2.2*     Glucose   Date/Time Value Ref Range Status   05/18/2025 1127 179 (H) 70 - 130 mg/dL Final   05/18/2025 0637 87 70 - 130 mg/dL Final   05/17/2025 2037 151 (H) 70 - 130 mg/dL Final   05/17/2025 1623 224 (H) 70 - 130 mg/dL Final   05/17/2025 1116 190 (H) 70 - 130 mg/dL Final   05/17/2025 0630 71 70 - 130 mg/dL Final   05/16/2025 2025 187 (H) 70 - 130 mg/dL Final       No radiology results for the last day      I have personally reviewed all medications:  Scheduled Medications  aspirin, 81 mg, Oral, Daily  clopidogrel, 75 mg, Oral, Daily  [Held by provider] empagliflozin, 10 mg, Oral, Daily  [Held by provider] glipizide, 5 mg, Oral, QAM AC  hydrALAZINE, 25 mg, Oral, BID  [Held by provider] hydroCHLOROthiazide, 12.5 mg, Oral, Daily  insulin lispro, 2-9 Units, Subcutaneous, 4x Daily AC & at Bedtime  metoprolol tartrate, 100 mg, Oral, BID  oxyCODONE, 10 mg, Oral, Q4H  piperacillin-tazobactam, 3.375 g, Intravenous, Q12H  senna-docusate sodium, 2 tablet, Oral, BID  sodium chloride, 10 mL, Intravenous, Q12H  sodium chloride, 10 mL, Intravenous, Q12H    Infusions     Diet  Diet: Regular/House; Fluid Consistency: Thin (IDDSI 0)    I have personally reviewed:  [x]  Laboratory   [x]  Microbiology   [x]  Radiology   [x]  EKG/Telemetry  [x]  Cardiology/Vascular   []  Pathology    []  Records       Assessment/Plan     Active Hospital Problems    Diagnosis  POA    **Acute diverticulitis [K57.92]  Yes    Type 2 diabetes mellitus  with hypoglycemia, without long-term current use of insulin [E11.649]  Yes    JENNIFER (acute kidney injury) [N17.9]  Yes    Metastatic colon cancer to liver [C18.9, C78.7]  Yes    Pacemaker [Z95.0]  Yes    PUD (peptic ulcer disease) [K27.9]  Yes    CKD (chronic kidney disease) stage 3, GFR 30-59 ml/min [N18.30]  Yes    CAD (coronary artery disease) [I25.10]  Yes      Resolved Hospital Problems   No resolved problems to display.       89 y.o. female admitted with Acute diverticulitis.    1. Acute diverticulitis with abdominal pain, on IV Zosyn and will be continued.  Continue with oral oxycodone and discontinue IV Dilaudid for pain.  IV steroids have been discontinued and GI following along.  Clinically improving and tolerating diet.    2.  Metastatic colon cancer with mets to liver/spine, continue with analgesics and palliative team following along as well.    3.  Acute/CKD stage IIIa, creatinine upon admission 1.85 and has improved to 1.56>1.25>1.19.  Baseline is around 1.1.  Continue to hold ARB and HCTZ.  Continue with fluids.    4.  Diabetes mellitus, home empagliflozin, glipizide are on hold and will continue with corrective dose insulin.      5.  Hypertension, on home metoprolol and hydralazine which will be continued.    6.  History of pacemaker placement and currently in paced rhythm.    7.  History of coronary artery disease, continue with aspirin, Plavix, metoprolol.    8.  Episodes of agitation, improved after steroids have been discontinued.  Received lorazepam last night and today she appears weak lethargic and confused therefore as needed lorazepam will be discontinued and discussed with nursing staff.    9.  On SCDs for DVT prophylaxis.    10.  CODE STATUS is full code.    11.  Expected discharge date, 5/20/2025.    Copy text on this note has been reviewed by me on 5/18/2025    Benja Torres MD  [facility]  05/18/25  16:18 EDT

## 2025-05-19 NOTE — CASE MANAGEMENT/SOCIAL WORK
Continued Stay Note  [facility]     Patient Name: Tamie Peter  MRN: [id number]  Today's Date: 5/19/2025    Admit Date: 5/14/2025    Plan: TBD, palliative care team following: home with home health vs home with hospice vs inpatient palliative, transport via family vs medical needs for transport   Discharge Plan 5/19/2025 1230   Pt discussed in rounds. Clinicals reviewed. Pt not yet medically ready for DC; CCP will follow. DC plan is pending, palliative care team following: home with home health vs home with hospice vs inpatient palliative, transport via family vs medical needs for transport. Denies any needs/equipment. CCP will follow. DC barriers: IVF, IV dexamethasone sodium phosphate, IV zosyn scheduled until 5/21, & OT consulted 5/14. DC barriers: IV zosyn scheduled until 5/21, & OT consulted 5/14. [place]/CCP             Expected Discharge Date and Time       Expected Discharge Date Expected Discharge Time    May 20, 2025    Elizabeth Dawn RN

## 2025-05-19 NOTE — PROGRESS NOTES
"        [facility]   Inpatient Palliative Care Follow up  Date: 5/19/2025   Reason for follow up: Follow-up regarding pain management      Interval History    Information obtained from: Patient and daughter     Follow up Information: Pain continues to be an issue. Reports pain in her shoulder and still in leg some but not as much abdomen. Continues on Oxycodone 10mg every 4 hours scheduled and PRN. In addition has received 4 doses of 0.5mg of IV hydromorphone over the last 24 hours as pain has increased.     I had a long discussion with family today regarding pain control along with patient who participated some. The IV hydromorphone produces dysphoria and also confusion and even the Oxycodone IR does as well. Yet, her pain is still suboptimally controlled. In speaking with family I discussed starting a long acting medication to hopefully help with improved sleep, better pain control, and less dysphoria given the extended nature of this. We first discussed MS contin but given renal function will using oxycodone ER (OxyContin). We also had a long discussion around goals for hospice care. Currently family is not desiring hospice care but is agreeable to that in the future when the time is right but daughter did say \"that could come sooner\" and I agreed. Discussed role of hospice in care in focusing on comfort but also offered to daughter that we absolutely still treat infections with abx especially if the infection is causing distress or discomfort and we discuss each time there is an infection if abx makes sense. Family still worries that hospice will limit options.           Physical Assessment   BP (!) 190/75 (BP Location: Right arm, Patient Position: Lying)   Pulse 67   Temp 98 °F (36.7 °C) (Oral)   Resp 18   Ht 152 cm (59.84\")   SpO2 93%   BMI 19.48 kg/m²    Palliative Performance Scale: Performance 50% based on the following measures: Ambulation: Mainly sit or lie down, Activity and Evidence " of Disease: Unable to do any work, extensive evidence of disease, Self-Care: Considerable assistance required,  Intake: Normal or reduced, LOC: Full or confusion  Physical Exam   Ms Peter is sitting in chair, she is eating lunch, awake and alert, somewhat confused and hard to follow   Breathing comfortably, no distress  Abdomen is without distention   She has no myoclonus or allodynia  She is warm and well perfused.     Data (labs/images reviewed)    WBC   Date Value Ref Range Status   05/19/2025 12.03 (H) 3.40 - 10.80 10*3/mm3 Final     RBC   Date Value Ref Range Status   05/19/2025 3.65 (L) 3.77 - 5.28 10*6/mm3 Final     Hemoglobin   Date Value Ref Range Status   05/19/2025 11.8 (L) 12.0 - 15.9 g/dL Final     Hematocrit   Date Value Ref Range Status   05/19/2025 35.9 34.0 - 46.6 % Final     MCV   Date Value Ref Range Status   05/19/2025 98.4 (H) 79.0 - 97.0 fL Final     MCH   Date Value Ref Range Status   05/19/2025 32.3 26.6 - 33.0 pg Final     MCHC   Date Value Ref Range Status   05/19/2025 32.9 31.5 - 35.7 g/dL Final     RDW   Date Value Ref Range Status   05/19/2025 13.5 12.3 - 15.4 % Final     RDW-SD   Date Value Ref Range Status   05/19/2025 48.7 37.0 - 54.0 fl Final     MPV   Date Value Ref Range Status   05/19/2025 9.8 6.0 - 12.0 fL Final     Platelets   Date Value Ref Range Status   05/19/2025 273 140 - 450 10*3/mm3 Final     Neutrophil %   Date Value Ref Range Status   05/19/2025 67.2 42.7 - 76.0 % Final     Lymphocyte %   Date Value Ref Range Status   05/19/2025 18.2 (L) 19.6 - 45.3 % Final     Monocyte %   Date Value Ref Range Status   05/19/2025 6.8 5.0 - 12.0 % Final     Eosinophil %   Date Value Ref Range Status   05/19/2025 6.8 (H) 0.3 - 6.2 % Final     Basophil %   Date Value Ref Range Status   05/19/2025 0.7 0.0 - 1.5 % Final     Immature Grans %   Date Value Ref Range Status   05/19/2025 0.3 0.0 - 0.5 % Final     Neutrophils, Absolute   Date Value Ref Range Status   05/19/2025 8.08 (H) 1.70 -  7.00 10*3/mm3 Final     Lymphocytes, Absolute   Date Value Ref Range Status   05/19/2025 2.19 0.70 - 3.10 10*3/mm3 Final     Monocytes, Absolute   Date Value Ref Range Status   05/19/2025 0.82 0.10 - 0.90 10*3/mm3 Final     Eosinophils, Absolute   Date Value Ref Range Status   05/19/2025 0.82 (H) 0.00 - 0.40 10*3/mm3 Final     Basophils, Absolute   Date Value Ref Range Status   05/19/2025 0.08 0.00 - 0.20 10*3/mm3 Final     Immature Grans, Absolute   Date Value Ref Range Status   05/19/2025 0.04 0.00 - 0.05 10*3/mm3 Final     nRBC   Date Value Ref Range Status   05/19/2025 0.0 0.0 - 0.2 /100 WBC Final       Lab Results   Component Value Date    GLUCOSE 218 (H) 05/19/2025    BUN 18 05/19/2025    CREATININE 1.51 (H) 05/19/2025     05/19/2025    K 3.6 05/19/2025    K 3.6 05/19/2025     05/19/2025    CALCIUM 9.2 05/19/2025    PROTEINTOT 6.3 05/19/2025    ALBUMIN 3.2 (L) 05/19/2025    ALT 12 05/19/2025    AST 19 05/19/2025    ALKPHOS 122 (H) 05/19/2025    BILITOT 0.3 05/19/2025    GLOB 3.1 05/19/2025    AGRATIO 1.0 05/19/2025    BCR 11.9 05/19/2025    ANIONGAP 13.0 05/19/2025    EGFR 33.3 (L) 05/19/2025       CT Head Without Contrast  Narrative: CT OF THE BRAIN WITHOUT CONTRAST 5/14/2025     HISTORY: Altered mental status.     Axial images were obtained through the brain without intravenous  contrast.     There is mild to moderate diffuse atrophy. There is decreased  attenuation of the periventricular white matter bilaterally consistent  with small vessel white matter ischemic disease. Small somewhat more  confluent area of low density seen near the anterior aspect of the left  external capsule on image 21 likely a small focal area of old infarct.     There is no evidence of acute infarction, hemorrhage, midline shift or  mass effect.     No bony abnormalities are seen.     Impression: 1. Atrophy and small vessel ischemic changes.  2. No acute intracranial process.     Radiation dose reduction techniques were  utilized, including automated  exposure control and exposure modulation based on body size.        This report was finalized on 5/14/2025 1:53 PM by Dr. Johnathan Sandy M.D on Workstation: [id number]            Palliative Care Assessment and Recommendations    Prognosis and Palliative Performance Scale:  Performance 50% based on the following measures: Ambulation: Mainly sit or lie down, Activity and Evidence of Disease: Unable to do any work, extensive evidence of disease, Self-Care: Considerable assistance required,  Intake: Normal or reduced, LOC: Full or confusion  Disease State: Controlled with current treatments  Symptom Management:   Pain: Over the last 24 hours with her scheduled oxycodone 10mg every 4 hours and breakthrough IV hydromorphone 0.5mg x 4 her OME is around ~125mg. She is experiencing dysphoria and suboptimal pain control still. I think a long acting opioid is appropriate. Her thin body habitus precludes us from using TD fentanyl patch. Renal function with GFR in the 30s precludes the use of MS Contin (long acting morphine) so will use Oxycodone ER (OxyContin). Will stop scheduled Oxycodone IR and start OxyContin 30mg every 12 hours scheduled. Continue Oxycodone 10mg every 2 hours Prn for breakthrough pain. Will stop IV hydromorphone as we try to get to a place of homegoing.   Constipation: Senna 2 tabs p.o. twice daily for constipation    Goals of Care Treatment Preferences   Palliative Care Decision Making Capacity: Intact  Healthcare Surrogate: Per healthcare directive  What is Most important to patient/family at this time: to manage her pain  Code Status FULL   Other Considerations regarding life sustaining treatments: NA    Follow up: Family not interested in hospice care at this time. On discharge we will schedule her with [facility] Provider. We will manage her opioid regimen. If above regimen is helpful, tomorrow I will send in rx for fill which will probably require PA  and we will take care of that.   Discussed plan with: patient and family and Dr. Torres     Total time spent today in patient care: 50 minutes           Thank you for consulting palliative care. If you need to reach the provider on call for the palliative care team please call [phone]    Ike Burnner MD  5/19/2025 15:35 EDT

## 2025-05-19 NOTE — PROGRESS NOTES
[facility]   Palliative Care Social Work  Initial Assessment  Tamie Peter   1935   Date: 5/19/2025         Who Provided Information: relative(s)-Patient's son, daughter, and son-in-law at bedside.        Palliative Care Social Work Plan: SW met with patient's daughter (Mandi), son, and a son-in-law at bedside this afternoon. Family had previously med with palliative MD and expressed interest in [facility] for outpatient palliative support. SW provided education on [facility] versus [facility] and what each service would provide at home. Family aware of need for 24/7 care at home for patient, and are in the process of arranging care. Will provide list of local care agencies and additional resources. Advised that [facility] has SW support on the outpatient side for continued assistance as well. Scheduled appointment at the [facility] clinic for June 3rd at 11 AM. Family planning to see how patient does at home and are potentially considering hospice at home as well in the future. Support provided.         Follow Up Needed: Will provide list of caregiver resources in the community and remain available for continued support.      HUNTER Mckeon  5/19/2025 15:13 EDT

## 2025-05-19 NOTE — PLAN OF CARE
Goal Outcome Evaluation:      Pt extremely anxious, restless, tearful throughout shift. Pain managed per MAR. Family member at bedside. Pt did not sleep. Safety maintained.

## 2025-05-19 NOTE — PLAN OF CARE
Goal Outcome Evaluation:  Plan of Care Reviewed With: patient   Pt is being treated for acute diverticulitis. Admitted to floor today from [facility]. Vital signs are stable. Has PRN oxycodone Q2hrs for pain. A/O to self and place. On room air. Assist x 1 with a walker. A-paced on the monitor. NS running at 100mL/hr. Family at bedside. Care plan ongoing.      Progress: no change

## 2025-05-19 NOTE — SIGNIFICANT NOTE
05/19/25 1549   OTHER   Discipline occupational therapist   Rehab Time/Intention   Session Not Performed other (see comments)  (Patient reports she is too fatigued from eariler walk in hallway with Nursing.  OT to follow up next date.)   Therapy Assessment/Plan (PT)   Criteria for Skilled Interventions Met (PT) yes   Recommendation   OT - Next Appointment 05/20/25

## 2025-05-20 PROBLEM — C18.9 MALIGNANT NEOPLASM OF COLON: Status: ACTIVE | Noted: 2025-01-01

## 2025-05-20 NOTE — PLAN OF CARE
Goal Outcome Evaluation:  Plan of Care Reviewed With: patient        Progress: declining     Pt is alert.  She is confused.  Palliative orders started.  PCA pump set up for pain management.  Tele orders and monitor discontinued.  Family at bedside.  Moving to [address] when bed is available.  Will continue to monitor patient.

## 2025-05-20 NOTE — PLAN OF CARE
Goal Outcome Evaluation:                 Pt transitioning to comfort measures and not appropriate for OT intervention. OT to s/o at this time. RN informed.

## 2025-05-20 NOTE — PROGRESS NOTES
"        [facility]   Inpatient Palliative Care Follow up  Date: 5/20/2025   Reason for follow up: Follow-up regarding pain management      Interval History    Information obtained from: Patient and daughter     Follow up Information: Pain still very poorly controlled with severe pain in the right scapula/shoulder region. Had an anxious evening. Required 5 Prn doses of oxycodone 10mg in addition to OxyContin 30mg q12 hours.     Voluntarily had a discussion multiple times with Mandi (daughter and documented POA) and RIN and sister at bedside. They feel like Ms. Peter is declining and feel like her comfort has been suboptimal and I agree. Previously they were hesitant to accept hospice care but they feel differently now. My recommendation was for hospice care. I also offered that I felt like controlling her pain currently with oral medications is difficult and recommend using PCA which they are agreeable to. We discussed GIP hospice care as for patient that require management of symptoms that can't be controlled at a lower level of care. I expressed that right now Ms. Peter meets this criteria but there is a possibility she could stabilize and then we have to look at home going although she could continue to decline as she seems to be changing more rapidly and die here. They are accepting of that. We also discussed trying to keep her awake and alert AND comfortable but if we get to the point where comfort can't be maintained Mandi is accepting of sedation as a side effect of medication. We reviewed code status as she is currently full CODE and in discussion family agreed to DNAR and comfort measures. Advised we will have hospice see her to get her admitted and [place] [facility]          Physical Assessment   BP (!) 190/78 (BP Location: Left arm, Patient Position: Lying)   Pulse 74   Temp 97.6 °F (36.4 °C) (Oral)   Resp 16   Ht 152 cm (59.84\")   SpO2 98%   BMI 19.48 kg/m²    Palliative " Performance Scale: PPS now 40%   Physical Exam   Ms Peter is sitting in up in bed, she appears even more frail and weak even compared to yesterday, seems to be declining   Face and brow is furrowed, she is confused intermittently lucid  Breathing comfortably, no distress  Abdomen is without distention   She has no myoclonus or allodynia  She is warm and well perfused.   Lacks decision making capacity     Data (labs/images reviewed)    WBC   Date Value Ref Range Status   05/20/2025 10.65 3.40 - 10.80 10*3/mm3 Final     RBC   Date Value Ref Range Status   05/20/2025 3.44 (L) 3.77 - 5.28 10*6/mm3 Final     Hemoglobin   Date Value Ref Range Status   05/20/2025 11.0 (L) 12.0 - 15.9 g/dL Final     Hematocrit   Date Value Ref Range Status   05/20/2025 35.5 34.0 - 46.6 % Final     MCV   Date Value Ref Range Status   05/20/2025 103.2 (H) 79.0 - 97.0 fL Final     MCH   Date Value Ref Range Status   05/20/2025 32.0 26.6 - 33.0 pg Final     MCHC   Date Value Ref Range Status   05/20/2025 31.0 (L) 31.5 - 35.7 g/dL Final     RDW   Date Value Ref Range Status   05/20/2025 13.7 12.3 - 15.4 % Final     RDW-SD   Date Value Ref Range Status   05/20/2025 51.6 37.0 - 54.0 fl Final     MPV   Date Value Ref Range Status   05/20/2025 9.6 6.0 - 12.0 fL Final     Platelets   Date Value Ref Range Status   05/20/2025 223 140 - 450 10*3/mm3 Final     Neutrophil %   Date Value Ref Range Status   05/20/2025 69.6 42.7 - 76.0 % Final     Lymphocyte %   Date Value Ref Range Status   05/20/2025 15.0 (L) 19.6 - 45.3 % Final     Monocyte %   Date Value Ref Range Status   05/20/2025 7.4 5.0 - 12.0 % Final     Eosinophil %   Date Value Ref Range Status   05/20/2025 6.9 (H) 0.3 - 6.2 % Final     Basophil %   Date Value Ref Range Status   05/20/2025 0.7 0.0 - 1.5 % Final     Immature Grans %   Date Value Ref Range Status   05/20/2025 0.4 0.0 - 0.5 % Final     Neutrophils, Absolute   Date Value Ref Range Status   05/20/2025 7.41 (H) 1.70 - 7.00 10*3/mm3  Final     Lymphocytes, Absolute   Date Value Ref Range Status   05/20/2025 1.60 0.70 - 3.10 10*3/mm3 Final     Monocytes, Absolute   Date Value Ref Range Status   05/20/2025 0.79 0.10 - 0.90 10*3/mm3 Final     Eosinophils, Absolute   Date Value Ref Range Status   05/20/2025 0.74 (H) 0.00 - 0.40 10*3/mm3 Final     Basophils, Absolute   Date Value Ref Range Status   05/20/2025 0.07 0.00 - 0.20 10*3/mm3 Final     Immature Grans, Absolute   Date Value Ref Range Status   05/20/2025 0.04 0.00 - 0.05 10*3/mm3 Final     nRBC   Date Value Ref Range Status   05/20/2025 0.0 0.0 - 0.2 /100 WBC Final       Lab Results   Component Value Date    GLUCOSE 142 (H) 05/20/2025    BUN 12 05/20/2025    CREATININE 0.99 05/20/2025     05/20/2025    K 3.4 (L) 05/20/2025    K 3.4 (L) 05/20/2025     05/20/2025    CALCIUM 8.9 05/20/2025    PROTEINTOT 6.2 05/20/2025    ALBUMIN 3.0 (L) 05/20/2025    ALT 10 05/20/2025    AST 17 05/20/2025    ALKPHOS 115 05/20/2025    BILITOT 0.4 05/20/2025    GLOB 3.2 05/20/2025    AGRATIO 0.9 05/20/2025    BCR 12.1 05/20/2025    ANIONGAP 12.0 05/20/2025    EGFR 55.3 (L) 05/20/2025       CT Head Without Contrast  Narrative: CT OF THE BRAIN WITHOUT CONTRAST 5/14/2025     HISTORY: Altered mental status.     Axial images were obtained through the brain without intravenous  contrast.     There is mild to moderate diffuse atrophy. There is decreased  attenuation of the periventricular white matter bilaterally consistent  with small vessel white matter ischemic disease. Small somewhat more  confluent area of low density seen near the anterior aspect of the left  external capsule on image 21 likely a small focal area of old infarct.     There is no evidence of acute infarction, hemorrhage, midline shift or  mass effect.     No bony abnormalities are seen.     Impression: 1. Atrophy and small vessel ischemic changes.  2. No acute intracranial process.     Radiation dose reduction techniques were utilized,  including automated  exposure control and exposure modulation based on body size.        This report was finalized on 5/14/2025 1:53 PM by Dr. Johnathan Sandy M.D on Workstation: [id number]            Palliative Care Assessment and Recommendations    Prognosis and Palliative Performance Scale:  PPS 40% and seems to be declining. Suspect days to short weeks  Disease State: Advanced, end stage   Symptom Management:   Pain: Poorly controlled. OME over last 24 hours 165mg. Will stop oral Oxycodone ER and oxycodone IR. Will start Hydromorphone PCA at settings below:  Continuous: 0.2mg/hr  Demand: 0.2mg (patient has been able to request pain medication)  Lockout: 10min  Max Hourly: 1.4mg.   Constipation: Senna 2 tabs p.o. twice daily for constipation    Goals of Care Treatment Preferences   Palliative Care Decision Making Capacity: Intact  Healthcare Surrogate: Per healthcare directive  What is Most important to patient/family at this time: to manage her pain and provide full comfort   Code Status: DNAR. Decision made today   Other Considerations regarding life sustaining treatments: Comfort measures, transition to hospice.     Follow up: Hospice will see her and we will get her admitted to hospice GIP level of care   Discussed plan with: patient and family and Dr. Booker       I spent from 1005am to 1035am and from 1140am to 1200pm in advance care planning discussing the above goals of care (total 50 minutes)         Thank you for consulting palliative care. If you need to reach the provider on call for the palliative care team please call [phone]    Ike Brunner MD  5/20/2025 13:22 EDT

## 2025-05-20 NOTE — PLAN OF CARE
Goal Outcome Evaluation:  Plan of Care Reviewed With: patient        Progress: improving  Outcome Evaluation: Patient had complaints of right shoulder pain through the shift. administered Q2 Oxycodone around the clock with Q12 Oxycodone as ordered. Notified hospitalist of patient's pain. Patient exhibited some signs of anxiety. Administered prn Atarax. Patient got some relief and rested. Ax1 to the bathroom, room air. NS infusing at 100ml/hr. Family at bedside.

## 2025-05-20 NOTE — PROGRESS NOTES
Name: Tamie Peter ADMIT: 2025   : 1935  PCP: Mohinder Barbosa MD    MRN: [id number] LOS: 6 days   AGE/SEX: 89 y.o. female  ROOM: [facility]     Subjective   Subjective   Family at bedside. They stated she was in a lot of pain overnight.     Objective   Objective   Vital Signs  Temp:  [97.6 °F (36.4 °C)-98.1 °F (36.7 °C)] 97.6 °F (36.4 °C)  Heart Rate:  [59-74] 74  Resp:  [16] 16  BP: (153-190)/(65-78) 190/78  SpO2:  [95 %-98 %] 98 %  on   ;   Device (Oxygen Therapy): room air  Body mass index is 19.48 kg/m².    Physical Exam  Constitutional:       General: She is in acute distress.      Appearance: She is ill-appearing. She is not toxic-appearing.   HENT:      Head: Normocephalic and atraumatic.   Cardiovascular:      Rate and Rhythm: Normal rate and regular rhythm.   Pulmonary:      Effort: Pulmonary effort is normal. No respiratory distress.      Breath sounds: Normal breath sounds.   Abdominal:      General: Bowel sounds are normal.      Palpations: Abdomen is soft.      Tenderness: There is no abdominal tenderness.     Results Review  I reviewed the patient's new clinical results.  Results from last 7 days   Lab Units 25  0725  0437 25  0525  0526   WBC 10*3/mm3 10.65 12.03* 10.36 10.28   HEMOGLOBIN g/dL 11.0* 11.8* 10.8* 9.6*   PLATELETS 10*3/mm3 223 273 217 196     Results from last 7 days   Lab Units 25  0727 25  0437 25  0521 25  0526   SODIUM mmol/L 136 138 141 139   POTASSIUM mmol/L 3.4*  3.4* 3.6  3.6 3.1* 3.7   CHLORIDE mmol/L 104 103 113* 110*   CO2 mmol/L 20.0* 22.0 17.2* 17.0*   BUN mg/dL 12 18 18 26*   CREATININE mg/dL 0.99 1.51* 1.19* 1.25*   GLUCOSE mg/dL 142* 218* 88 62*     Lab Results   Component Value Date    ANIONGAP 12.0 2025     Estimated Creatinine Clearance: 28.4 mL/min (by C-G formula based on SCr of 0.99 mg/dL).   Lab Results   Component Value Date    EGFR 55.3 (L) 2025     Results from last 7 days   Lab  Units 05/20/25  0727 05/19/25  0437 05/18/25  0521 05/17/25  0526   ALBUMIN g/dL 3.0* 3.2* 2.5* 2.7*   BILIRUBIN mg/dL 0.4 0.3 0.3 0.2   ALK PHOS U/L 115 122* 95 99   AST (SGOT) U/L 17 19 16 18   ALT (SGPT) U/L 10 12 13 14     Results from last 7 days   Lab Units 05/20/25  0727 05/19/25  0437 05/18/25  0521 05/17/25  0526 05/16/25  1345 05/15/25  0537 05/14/25  1127   CALCIUM mg/dL 8.9 9.2 7.9* 8.1* 8.3* 8.0* 9.3   ALBUMIN g/dL 3.0* 3.2* 2.5* 2.7* 3.1* 2.8* 3.6   MAGNESIUM mg/dL  --   --   --  1.7 2.0 1.5* 1.6     Results from last 7 days   Lab Units 05/14/25  1433 05/14/25  1127   PROCALCITONIN ng/mL  --  0.17   LACTATE mmol/L 1.3 2.2*     Glucose   Date/Time Value Ref Range Status   05/20/2025 1111 130 70 - 130 mg/dL Final   05/20/2025 0640 133 (H) 70 - 130 mg/dL Final   05/19/2025 2125 132 (H) 70 - 130 mg/dL Final   05/19/2025 1618 228 (H) 70 - 130 mg/dL Final   05/19/2025 1106 204 (H) 70 - 130 mg/dL Final   05/19/2025 0644 193 (H) 70 - 130 mg/dL Final   05/18/2025 2047 194 (H) 70 - 130 mg/dL Final       No radiology results for the last day    Scheduled Meds  aspirin, 81 mg, Oral, Daily  clopidogrel, 75 mg, Oral, Daily  [Held by provider] empagliflozin, 10 mg, Oral, Daily  [Held by provider] glipizide, 5 mg, Oral, QAM AC  hydrALAZINE, 25 mg, Oral, BID  [Held by provider] hydroCHLOROthiazide, 12.5 mg, Oral, Daily  insulin lispro, 2-9 Units, Subcutaneous, 4x Daily AC & at Bedtime  metoprolol tartrate, 100 mg, Oral, BID  oxyCODONE, 30 mg, Oral, Q12H  piperacillin-tazobactam, 3.375 g, Intravenous, Q12H  senna-docusate sodium, 2 tablet, Oral, BID  sodium chloride, 10 mL, Intravenous, Q12H  sodium chloride, 10 mL, Intravenous, Q12H    Continuous Infusions  sodium chloride, 100 mL/hr, Last Rate: 100 mL/hr (05/19/25 0765)    PRN Meds    acetaminophen **OR** acetaminophen **OR** acetaminophen    senna-docusate sodium **AND** polyethylene glycol **AND** bisacodyl **AND** bisacodyl    calcium carbonate    Calcium  Replacement - Follow Nurse / BPA Driven Protocol    dextrose    dextrose    glucagon (human recombinant)    heparin    hydrOXYzine    lidocaine    Magnesium Low Dose Replacement - Follow Nurse / BPA Driven Protocol    [DISCONTINUED] HYDROmorphone **AND** naloxone    ondansetron ODT **OR** ondansetron    oxyCODONE    Phosphorus Replacement - Follow Nurse / BPA Driven Protocol    Potassium Replacement - Follow Nurse / BPA Driven Protocol    Insert Peripheral IV **AND** sodium chloride    sodium chloride    sodium chloride    sodium chloride    sodium chloride    sodium chloride    sodium chloride, 100 mL/hr, Last Rate: 100 mL/hr (05/19/25 1733)    Diet  Diet: Regular/House; Fluid Consistency: Thin (IDDSI 0)       Assessment/Plan     Active Hospital Problems    Diagnosis  POA    **Acute diverticulitis [K57.92]  Yes    Type 2 diabetes mellitus with hypoglycemia, without long-term current use of insulin [E11.649]  Yes    JENNIFER (acute kidney injury) [N17.9]  Yes    Metastatic colon cancer to liver [C18.9, C78.7]  Yes    Pacemaker [Z95.0]  Yes    PUD (peptic ulcer disease) [K27.9]  Yes    CKD (chronic kidney disease) stage 3, GFR 30-59 ml/min [N18.30]  Yes    CAD (coronary artery disease) [I25.10]  Yes      Resolved Hospital Problems   No resolved problems to display.     Patient is a 89 y.o. female     Goals of care  Discussed with family (patient's sister and son-in-law, and then later patient's daughter and son-in-law) 17 minutes, separate from MDM and daily care: Goals of care, quality of life, pain management, and advanced cancer. They want to focus on comfort and would like to speak with palliative care again today.    Acute diverticulitis on Zosyn. WBC normal today  Metastatic colon cancer  JENNIFER/CKD 3A  DM2  Hypertension  Pacemaker  CAD      Discharge  TBD  Expected Discharge Date: 5/20/2025; Expected Discharge Time:     Discussed with family and nursing staff and Dr. Brunner, palliative care    Santosh Booker  MD [facility]  05/20/25 11:54 EDT

## 2025-05-20 NOTE — SIGNIFICANT NOTE
05/20/25 1403   OTHER   Discipline physical therapist   Rehab Time/Intention   Session Not Performed other (see comments)  (Pt transferring to hospice care. PT and RN agree that PT should sign off at this time.)   Therapy Assessment/Plan (PT)   Criteria for Skilled Interventions Met (PT) does not meet criteria for skilled intervention        Patient with one or more new problems requiring additional work-up/treatment.

## 2025-05-20 NOTE — PROGRESS NOTES
"[facility]: PCA Consult     Tamie Peter has a pharmacy consult to assess opioid medication per Dr. Frost's request based on the current protocol \"Pharmacist to discontinue PRN opioid pain medications at connection of PCA. If there is no basal rate on PCA, long-acting opioids may be continued. Scheduled opioids for pain are allowed while weaning patient off of PCA but PRN opioids should be limited to breakthrough pain only.\"     The PRN medications were already discontinued.    Paige Valencia [facility]  Clinical Pharmacist     "

## 2025-05-21 NOTE — CONSULTS
Met with daughter-Mandi away from patient's bedside per her request.  Hospice explanation of services given and goals of care discussed.  Twyla interested in hospice scatter bed admission this evening.  She wants to continue current routine medications and does not want her mother to have a liu placed unless she becomes bedbound.  Pt appears comfortable with current PCA settings.  Consents signed.  Facility RN and MD updated.      Thanks kindly,  Reba [facility]  [facility] RAC

## 2025-05-21 NOTE — NURSING NOTE
[facility] Visit Report    Tamie Peter  [id number]  5/21/2025    Admission R/T [facility] Dx: yes    Review of Visit: Patient is an 89 y.o. female with a primary [facility] diagnosis of malignant neoplasm of the colon. Admitted to [facility] for GIP for symptom management of pain, restlessness. No active isolations     PPS: 40%    VS:   Temp:  [95.4 °F (35.2 °C)-97.7 °F (36.5 °C)] 97.7 °F (36.5 °C)  Heart Rate:  [60-65] 62  Resp:  [16-17] 16  BP: (146-188)/(64-76) 146/64  SpO2: 96% on room air    Medications in 24 hours:  Scheduled Meds:  aspirin, 81 mg, Oral, Daily  clopidogrel, 75 mg, Oral, Daily  hydrALAZINE, 25 mg, Oral, BID  insulin lispro, 2-9 Units, Subcutaneous, 4x Daily AC & at Bedtime  metoprolol tartrate, 100 mg, Oral, BID  senna-docusate sodium, 2 tablet, Oral, BID    Continuous Infusions:  HYDROmorphone HCl-NaCl PCA: Loading dose 0.2mg, continuous rate 0.5mg/hr, patient bolus dose discontinued, patient unable to press independently.  sodium chloride, 100 mL/hr, Last Rate: 100 mL/hr (05/20/25 2218)  sodium chloride, 30 mL/hr    PRN Meds:    acetaminophen 650mg tablet PRN x1    Recommendations:  Continue to monitor for signs of decline and provide comfort measures. Contact [facility] providers at [phone] with any medication adjustment needs and at TOD.     Assessment:  Patient was up to the bathroom with assistance earlier today, lethargic and disoriented, PO intake varies, PPS 40%. Respirations are unlabored, lung sounds clear throughout, on room air. Abdomen is soft with active bowel sounds. Extremities are warm to touch, scattered bruising. Laguna catheter placed by [facility] staff during visit with >1000mL returned immediately.     Collaboration:  Spoke with pts family at the bedside with condition update and support provided. Training provided regarding assessment findings, disease progression, symptom management, recommendations and expected length of stay. Family v/u of pts  condition and all questions were answered. Collaborated with [facility] RN, Tameka and Dr. Davis about pts condition and recommendations.    Disposition:  Patient meets GIP criteria, requiring frequent administration and continued titration of parenteral medications to achieve and maintain symptom management. Patient requiring increasing symptom management and frequent RN assessment. If patient were to stabilize she would require LTC placement due to increased daily care needs. Will continue [facility] RN visits to monitor for changes, assess needs and provide support.       Trena Chaudhary RN  [facility] Visit Nurse  Scattered Bed Team

## 2025-05-21 NOTE — PROGRESS NOTES
Pt was unresponsive and appeared comfortable.  Pt's daughter Mandi and sister Nadya were present.  Pt has 4 children.  Pt is [employer] and has received anointing.  Daughter expressed peace and acceptance with pt's prognosis and POC- Mandi eluded to her siblings not being as active in pt's life/care.  [facility]  facilitated life review and provided prayer of closure and blessing at bed-side.

## 2025-05-21 NOTE — PLAN OF CARE
Goal Outcome Evaluation:  Plan of Care Reviewed With: patient, family        Progress: declining  Outcome Evaluation: PPS 10%. Pt has continuous dilaudid PCA infusing at 0.5mg/hr with 2mg/hr max with an extra PRN dose x1 of 0.5mg dilaudid. Laguna placed. Family at bedside. No needs at this time.

## 2025-05-21 NOTE — H&P
[facility] Inpatient Hospice  Admission H&P  Tamie Peter   1935   [facility] Hospice Admission Date: 5/20/2025   Current Date: 5/21/2025   Attending Provider: Justus Ch MD        Chief Complaint: pain and restlessness    Information obtained from: relative(s) and past medical records    History of Present Illness: 89 yo female with metastatic colon cancer admitted to hospice for Metastatic colon cancer. She initially presented to [facility] with stroke like symptoms and had increasing pain over the last 2 weeks. She was having a deep burning and ache that comes and goes, pt was previously only on tylenol prior to this admission. She was on 10mg oxycodone q4hr and prn and was requiring IV hydromorphone for additional break through management. Palliative Care was consulted to assist with pain management and goals of care discussion. Family and patient opted for hospice care on the evening of 5/20. Patient was initiated on low dose IV hydromorphone PCA with basal 0.2mg/hr and 0.2mg q 10 min prn. However, she was needing frequent reminders to press button and she was still complaining of pain. Contacted by RN who informed me that patient was still complaining of pain and was needing frequent reminders of how to use PCA - increased basal rate and bolus dose and added RN bolus dose to see if it would allow for better pain control. We therefore changed to 0.5mg/hr and 0.5mg q 15 min and added a nursing bolus of 0.5mg q1 hr prn.     At time of my visit, patient is sleeping soundly and is unresponsive to voice and gentle stimulation. Daughter shares her concern that Tamie is sleeping a lot since the change. We discussed the possibility of decline and that the patient's body may also be causing more sleep and not just due to medications. We discussed the possibility of either a BSC or a liu and dtr shared her concerns. Patient's sister requested medical opinion and shared I would at a minimum  use BSC and would highly recommend liu for patient safety (getting weaker - requiring 2 person assist with assistive device) and comfort (staying dry).       Review of Systems: Review of Systems - History obtained from chart review, unobtainable from patient due to mental status, and daughter and RN        Past Medical and Surgical History:     Past Medical History:   Diagnosis Date    Anemia     Arthritis of left sacroiliac joint 05/05/2022    CKD (chronic kidney disease), stage III     PATIENT STATES IT IS AT STAGE 4 AT THIS TIME    Colorectal cancer     Coronary artery disease     cath 11/15. CABG x3 and all grafts widely patent. Very small OM and Cfx and that may be the cause of lateral ischemia on stress test. Non bipassable    Diabetes mellitus, type II     GERD (gastroesophageal reflux disease)     Gout     Heart attack 10/2022    MULTIPLE (6 OR MORE)    History of DVT in adulthood     AFTER CHILDBIRTH    Hyperlipidemia     Hypertension     Liver masses     Pacemaker     PUD (peptic ulcer disease)       Past Surgical History:   Procedure Laterality Date    CARDIAC ELECTROPHYSIOLOGY PROCEDURE N/A 02/22/2017    Procedure: Pacemaker DC new;  Surgeon: Juvenal Zhong MD;  Location: [facility] INVASIVE LOCATION;  Service:     CARDIAC SURGERY  10/2022    CATARACT EXTRACTION      COLONOSCOPY  2010    COLONOSCOPY N/A 11/19/2024    Procedure: COLONOSCOPY WITH TATTOO MASS INJECTION 4 ML;  Surgeon: Caden Christian MD;  Location: [facility] OR;  Service: General;  Laterality: N/A;    CORONARY ARTERY BYPASS GRAFT      1992, 2009    EPIDURAL Left 10/17/2022    Procedure: LUMBAR/SACRAL TRANSFORAMINAL EPIDURAL Left L4-5 and left L5-S1 - hold plavix 7 days before;  Surgeon: Yasmine Gaming MD;  [place] [facility]  Service: Pain Management;  Laterality: Left;    PACEMAKER IMPLANTATION      PIRIFORMUS INJECTION Left 07/15/2022    Procedure: PIRIFORMIS INJECTION;  Surgeon: Yasmine Gaming MD;  [place] [facility];  Service: Pain Management;  Laterality: Left;    RADIOFREQUENCY ABLATION Left 02/20/2023    Procedure: RADIOFREQUENCY ABLATION NERVES for the left sacroiliac joint;  Surgeon: Yasmine Gaming MD;  Location: [facility] MAIN OR;  Service: Pain Management;  Laterality: Left;    SACROILIAC JOINT INJECTION Left 05/09/2022    Procedure: SACROILIAC INJECTION - Left;  Surgeon: Yasmine Gaming MD;  Location: [facility] MAIN OR;  Service: Pain Management;  Laterality: Left;    SACROILIAC JOINT INJECTION Left 07/15/2022    Procedure: left Sacroiliac joint injection and left piriformis injection;  Surgeon: Yasmine Gaming MD;  Location: [facility] MAIN OR;  Service: Pain Management;  Laterality: Left;    TUBAL ABDOMINAL LIGATION      VENOUS ACCESS DEVICE (PORT) INSERTION N/A 11/19/2024    Procedure: INSERTION VENOUS ACCESS DEVICE;  Surgeon: Caden Christian MD;  Location: [facility] MAIN OR;  Service: General;  Laterality: N/A;        Past Family and Social History:     Social History     Socioeconomic History    Marital status:    Tobacco Use    Smoking status: Never     Passive exposure: Never    Smokeless tobacco: Never   Vaping Use    Vaping status: Never Used   Substance and Sexual Activity    Alcohol use: Never    Drug use: Never        Family History   Problem Relation Age of Onset    Coronary artery disease Mother     Heart attack Sister     Malig Hyperthermia Neg Hx         Medications:     Current Facility-Administered Medications:     acetaminophen (TYLENOL) tablet 650 mg, 650 mg, Oral, Q4H PRN, Ike Brunner MD, 650 mg at 05/20/25 2234    aspirin EC tablet 81 mg, 81 mg, Oral, Daily, Ike Brunner MD, 81 mg at 05/21/25 0840    sennosides-docusate (PERICOLACE) 8.6-50 MG per tablet 2 tablet, 2 tablet, Oral, BID **AND** bisacodyl (DULCOLAX) suppository 10 mg, 10 mg, Rectal, Daily PRN, Ike Brunner MD    clopidogrel (PLAVIX) tablet 75 mg, 75 mg, Oral, Daily, Ike Brunner MD, 75 mg at 05/21/25  0840    heparin injection 500 Units, 5 mL, Intravenous, PRN, Ike Brunner MD    hydrALAZINE (APRESOLINE) tablet 25 mg, 25 mg, Oral, BID, Ike Brunner MD, 25 mg at 05/21/25 0839    HYDROmorphone (DILAUDID) injection 0.5 mg, 0.5 mg, Intravenous, Q1H PRN, Donna Davis DO    HYDROmorphone (DILAUDID) PCA 0.2 mg/ml 50 mL syringe, , Intravenous, Continuous, Donna Davis,     hydrOXYzine (ATARAX) tablet 25 mg, 25 mg, Oral, Q6H PRN, Ike Brunner MD    insulin lispro (HUMALOG/ADMELOG) injection 2-9 Units, 2-9 Units, Subcutaneous, 4x Daily AC & at Bedtime, Ike Brunner MD    lidocaine (LMX) 4 % cream 1 Application, 1 Application, Topical, PRN, Ike Brunner MD    metoprolol tartrate (LOPRESSOR) tablet 100 mg, 100 mg, Oral, BID, Ike Brunner MD, 100 mg at 05/21/25 0839    ondansetron (ZOFRAN) injection 4 mg, 4 mg, Intravenous, Q6H PRN, Ike Brunner MD    sodium chloride 0.9 % flush 10 mL, 10 mL, Intravenous, Q12H, Ike Brunner MD, 10 mL at 05/21/25 0840    sodium chloride 0.9 % flush 10 mL, 10 mL, Intravenous, Q12H, Ike Brunner MD, 10 mL at 05/21/25 0840    sodium chloride 0.9 % flush 10 mL, 10 mL, Intravenous, PRN, Ike Brunner MD    sodium chloride 0.9 % flush 10 mL, 10 mL, Intravenous, PRN, Ike Brunner MD    [COMPLETED] Insert Peripheral IV, , , Once **AND** sodium chloride 0.9 % flush 10 mL, 10 mL, Intravenous, PRN, Ike Brunner MD    sodium chloride 0.9 % flush 20 mL, 20 mL, Intravenous, PRN, Kannan, Ike W, MD    sodium chloride 0.9 % infusion 40 mL, 40 mL, Intravenous, PRN, KannanIke MD    sodium chloride 0.9 % infusion 40 mL, 40 mL, Intravenous, PRN, KannanIke MD    sodium chloride 0.9 % infusion, 100 mL/hr, Intravenous, Continuous, Ike Brunner MD, Last Rate: 100 mL/hr at 05/20/25 2218, 100 mL/hr at 05/20/25 2218    sodium chloride 0.9 % infusion, 30 mL/hr, Intravenous, Continuous PRN, Ike Brunner MD     Allergies:  "  Allergies   Allergen Reactions    Pneumococcal Vaccines Paresthesia        Physical Assessment:   BP (!) 188/76 (BP Location: Right arm, Patient Position: Lying)   Pulse 65   Temp 95.4 °F (35.2 °C) (Axillary)   Resp 16   Ht 152 cm (59.84\")   Wt 44.9 kg (99 lb)   SpO2 96%   BMI 19.44 kg/m²    Palliative Performance Scale: Performance 10% based on the following measures: Ambulation: Totally bed bound, Activity and Evidence of Disease: Unable to do any work, extensive evidence of disease, Self-Care: Total care required,  Intake: Mouth care only, LOC: Drowsy or comatose at time of my visit   Physical Exam  Constitutional:       General: She is not in acute distress.     Comments: Elderly and frail appearing female lying in hospital bed, snoring softly, no grimacing or brow furrowing   HENT:      Head: Normocephalic and atraumatic.      Right Ear: External ear normal.      Left Ear: External ear normal.      Nose: No rhinorrhea.      Mouth/Throat:      Mouth: Mucous membranes are dry.      Pharynx: Oropharynx is clear.   Eyes:      General: No scleral icterus.     Conjunctiva/sclera: Conjunctivae normal.   Cardiovascular:      Rate and Rhythm: Normal rate.      Pulses: Normal pulses.      Heart sounds: Normal heart sounds.   Pulmonary:      Effort: Pulmonary effort is normal. No respiratory distress.      Comments: Diminished air exchange  Abdominal:      General: There is distension (suprapubic).      Palpations: Abdomen is soft.      Tenderness: There is no abdominal tenderness. There is no guarding.      Comments: Hypoactive BS   Genitourinary:     Comments: deferred  Musculoskeletal:      Cervical back: Neck supple.   Skin:     General: Skin is warm and dry.      Comments: No mottling   Neurological:      Comments: Unresponsive to voice and touch  NO myoclonus   NO hyperalgesia   Psychiatric:      Comments: No psychomotor agitation        Data Reviewed:   Lab Results   Component Value Date    GLUCOSE 142 (H) " 05/20/2025    BUN 12 05/20/2025    CREATININE 0.99 05/20/2025     05/20/2025    K 3.4 (L) 05/20/2025    K 3.4 (L) 05/20/2025     05/20/2025    CALCIUM 8.9 05/20/2025    PROTEINTOT 6.2 05/20/2025    ALBUMIN 3.0 (L) 05/20/2025    ALT 10 05/20/2025    AST 17 05/20/2025    ALKPHOS 115 05/20/2025    BILITOT 0.4 05/20/2025    GLOB 3.2 05/20/2025    AGRATIO 0.9 05/20/2025    BCR 12.1 05/20/2025    ANIONGAP 12.0 05/20/2025    EGFR 55.3 (L) 05/20/2025       WBC   Date Value Ref Range Status   05/20/2025 10.65 3.40 - 10.80 10*3/mm3 Final     RBC   Date Value Ref Range Status   05/20/2025 3.44 (L) 3.77 - 5.28 10*6/mm3 Final     Hemoglobin   Date Value Ref Range Status   05/20/2025 11.0 (L) 12.0 - 15.9 g/dL Final     Hematocrit   Date Value Ref Range Status   05/20/2025 35.5 34.0 - 46.6 % Final     MCV   Date Value Ref Range Status   05/20/2025 103.2 (H) 79.0 - 97.0 fL Final     MCH   Date Value Ref Range Status   05/20/2025 32.0 26.6 - 33.0 pg Final     MCHC   Date Value Ref Range Status   05/20/2025 31.0 (L) 31.5 - 35.7 g/dL Final     RDW   Date Value Ref Range Status   05/20/2025 13.7 12.3 - 15.4 % Final     RDW-SD   Date Value Ref Range Status   05/20/2025 51.6 37.0 - 54.0 fl Final     MPV   Date Value Ref Range Status   05/20/2025 9.6 6.0 - 12.0 fL Final     Platelets   Date Value Ref Range Status   05/20/2025 223 140 - 450 10*3/mm3 Final     Neutrophil %   Date Value Ref Range Status   05/20/2025 69.6 42.7 - 76.0 % Final     Lymphocyte %   Date Value Ref Range Status   05/20/2025 15.0 (L) 19.6 - 45.3 % Final     Monocyte %   Date Value Ref Range Status   05/20/2025 7.4 5.0 - 12.0 % Final     Eosinophil %   Date Value Ref Range Status   05/20/2025 6.9 (H) 0.3 - 6.2 % Final     Basophil %   Date Value Ref Range Status   05/20/2025 0.7 0.0 - 1.5 % Final     Immature Grans %   Date Value Ref Range Status   05/20/2025 0.4 0.0 - 0.5 % Final     Neutrophils, Absolute   Date Value Ref Range Status   05/20/2025 7.41  (H) 1.70 - 7.00 10*3/mm3 Final     Lymphocytes, Absolute   Date Value Ref Range Status   05/20/2025 1.60 0.70 - 3.10 10*3/mm3 Final     Monocytes, Absolute   Date Value Ref Range Status   05/20/2025 0.79 0.10 - 0.90 10*3/mm3 Final     Eosinophils, Absolute   Date Value Ref Range Status   05/20/2025 0.74 (H) 0.00 - 0.40 10*3/mm3 Final     Basophils, Absolute   Date Value Ref Range Status   05/20/2025 0.07 0.00 - 0.20 10*3/mm3 Final     Immature Grans, Absolute   Date Value Ref Range Status   05/20/2025 0.04 0.00 - 0.05 10*3/mm3 Final     nRBC   Date Value Ref Range Status   05/20/2025 0.0 0.0 - 0.2 /100 WBC Final          CT Head Without Contrast  Narrative: CT OF THE BRAIN WITHOUT CONTRAST 5/14/2025     HISTORY: Altered mental status.     Axial images were obtained through the brain without intravenous  contrast.     There is mild to moderate diffuse atrophy. There is decreased  attenuation of the periventricular white matter bilaterally consistent  with small vessel white matter ischemic disease. Small somewhat more  confluent area of low density seen near the anterior aspect of the left  external capsule on image 21 likely a small focal area of old infarct.     There is no evidence of acute infarction, hemorrhage, midline shift or  mass effect.     No bony abnormalities are seen.     Impression: 1. Atrophy and small vessel ischemic changes.  2. No acute intracranial process.     Radiation dose reduction techniques were utilized, including automated  exposure control and exposure modulation based on body size.        This report was finalized on 5/14/2025 1:53 PM by Dr. Johnathan Sandy M.D on Workstation: [id number]           Assessment and Plan: Tamie Peter is a 89 y.o.  female with a primary hospice diagnosis of metastatic colon cancer admitted to [facility] hospice level of care for management of pain and agitation.        Prognosis: 10% likely days  Goals of Care Treatment Preferences   Palliative Care Decision  Making Capacity: unreliable  Healthcare Surrogate: Per health care directive  What is Most important to patient/family at this time:   Code Status DNR  Family prefers to continue po regular medications at this time   Symptom Management:   Pain:   Continue 0.5mg/hr IV hydromorphone and 0.5mg q1hr prn Nursing bolus  Shortness of Breath:as above  Anxiety: hydroxyzine prn   Family declined IV alternatives given poor reaction to IV lorazepam several days ago during hospitalization   Constipation: bisacodyl suppository prn   Nausea/Vomiting:ondansetron IV prn   Patient is at high risk of decline and death   Patient requires [facility] hospice level of care for management of pain and restlessness that can't be controlled or managed at a lower level of care   Disposition: anticipate EOL in 4 Park unless patient stabilizes/improves   Discussed plan with: RN and family          If you need to reach the provider on call for the hospice team please call [phone]    Donna Davis DO  5/21/2025 09:45 EDT

## 2025-05-21 NOTE — PLAN OF CARE
Goal Outcome Evaluation:           Progress: declining  Outcome Evaluation: Patient slept throughout the night.  PPS 10%.  Patient flipped to HSB this shift.  Hydromorphone PCA remains with setting as follows, 0.2 mg continuous/hr, 0.2 mg loading dose, 10 minute lock out, total 1.4 mg/hr.  family remains at bedside.  Nursing will continue plan of care.

## 2025-05-22 NOTE — PLAN OF CARE
Goal Outcome Evaluation:           Progress: declining  Outcome Evaluation: Patient slept throughout the night.  PPS 10%.  Dilaudid PCA infusing at 0.5 mg/hr with 2 mg/hr max.  Laguna remains and draining.  Daughter remains at bedside.  No PRN's required this shift.  Nursing will continue plan of care.

## 2025-05-22 NOTE — PROGRESS NOTES
SB team SW met with patient and family to explain role of SB team SW and assess for needs. Patient was lying in her hospital bed, unresponsive with no signs of pain or discomfort. Two of Patient's  daughters and multiple other family members were at the bedside. Patient is currently GIP at [facility] and we are managing her pain. SW sat with family to provide supportive presence.   Patient is currently a 10% PPS and is unresponsive, therefore SW unable to complete PHQ9. Patient has four adult children that are all involved in patient's care. Patient was living at  home prior to this hospital stay. Family would like for patient to remain in a SB for EOL care due to her imminent prognosis. If patient were to stabilize and need to be discharged, she would return to her  with hospice to follow. SW educated on bereavement services provided by [facility] and family voiced understanding. Discussed  planning and family has selected [facility] . SW will visit on a weekly and PRN basis to offer EOL support and assist with needs.

## 2025-05-22 NOTE — PROGRESS NOTES
[facility] Inpatient Hospice Follow Up Note  Tamie Peter   1935   [facility] Hospice Admission Date: 5/20/2025   Current Date: 5/22/2025   Attending Provider: Justus Ch MD        Information obtained from: relative(s) and nursing staff    Interval History: Patient underwent bladder scan and was found to have urinary retention and therefore a Laguna catheter was placed Yesterday afternoon daughter reports that patient had a good evening last night and interacted with family    She shares that patient slept well and occasionally would wake up and began to pick at the air but then would fall back asleep.  She shares that she has not been complaining of pain and she has been sleeping much more than usual.  The patient is still taking sips of water however is not able to use a straw and the daughter has noticed occasional cough and difficulty swallowing and no longer has an appetite.  We discussed my concern for inconsistent ability for p.o. intake and recommended discontinuation of oral medications not for patient comfort.  We also discussed that as patient is not eating much that I would recommend discontinuing her insulin at his it is much more dangerous for patient to become hypoglycemic  at this time as hyperglycemia is unlikely to cause patient's symptoms or discomfort.  Family was agreeable to this at this time.         Medications:     Current Facility-Administered Medications:     acetaminophen (TYLENOL) tablet 650 mg, 650 mg, Oral, Q4H PRN, Ike Brunner MD, 650 mg at 05/20/25 2234    sennosides-docusate (PERICOLACE) 8.6-50 MG per tablet 2 tablet, 2 tablet, Oral, BID **AND** bisacodyl (DULCOLAX) suppository 10 mg, 10 mg, Rectal, Daily PRN, Ike Brunner MD    heparin injection 500 Units, 5 mL, Intravenous, PRN, Ike Brunner MD    HYDROmorphone (DILAUDID) injection 0.5 mg, 0.5 mg, Intravenous, Q1H PRN, Donna Davis DO, 0.5 mg at 05/21/25 1622     "HYDROmorphone (DILAUDID) PCA 0.2 mg/ml 50 mL syringe, , Intravenous, Continuous, Donna Davis DO, New Syringe at 05/22/25 1146    hydrOXYzine (ATARAX) tablet 25 mg, 25 mg, Oral, Q6H PRN, Ike Brunner MD    lidocaine (LMX) 4 % cream 1 Application, 1 Application, Topical, PRN, Ike Brunner MD    Lidocaine HCl gel (XYLOCAINE) urethral/mucosal syringe, , Topical, PRN, Donna Davis DO    ondansetron (ZOFRAN) injection 4 mg, 4 mg, Intravenous, Q6H PRN, Ike Brunner MD    sodium chloride 0.9 % flush 10 mL, 10 mL, Intravenous, Q12H, KannanIke MD, 10 mL at 05/22/25 0800    sodium chloride 0.9 % flush 10 mL, 10 mL, Intravenous, Q12H, KannanIke anne MD, 10 mL at 05/22/25 0800    sodium chloride 0.9 % flush 10 mL, 10 mL, Intravenous, PRN, Kannan, Ike SMITH MD    sodium chloride 0.9 % flush 10 mL, 10 mL, Intravenous, PRN, Ike Brunner MD    [COMPLETED] Insert Peripheral IV, , , Once **AND** sodium chloride 0.9 % flush 10 mL, 10 mL, Intravenous, PRN, KannanIke anne MD    sodium chloride 0.9 % flush 20 mL, 20 mL, Intravenous, PRN, KannanIke MD    sodium chloride 0.9 % infusion 40 mL, 40 mL, Intravenous, PRN, Kannan, Ike MD LUIS    sodium chloride 0.9 % infusion 40 mL, 40 mL, Intravenous, PRN, KannanIke anne MD     Allergies:   Allergies   Allergen Reactions    Pneumococcal Vaccines Paresthesia        Physical Assessment:   /49 (BP Location: Right arm, Patient Position: Lying)   Pulse 84   Temp 98.9 °F (37.2 °C) (Oral)   Resp 16   Ht 152 cm (59.84\")   Wt 44.9 kg (99 lb)   SpO2 94%   BMI 19.44 kg/m²    Palliative Performance Scale: Performance 20% based on the following measures: Ambulation: Totally bed bound, Activity and Evidence of Disease: Unable to do any work, extensive evidence of disease, Self-Care: Total care required,  Intake: Minimal sips, LOC: Full, drowsy or confusion  Physical Exam   VSS  Elderly and frail-appearing woman who was sleeping and " snoring she does seem to wake up with loud conversation it was in the room and certainly arouses to name and gentle physical stimulation.  Her speech is slow soft and slurred with greatly slowed thought process.  HRRR normal S1 and S2, radial pulses 2+ bilaterally  Normal work of breathing shallow breaths no excess secretions dry mucous membranes        Data Reviewed:   No new labs or imaging    Assessment and Plan: Tamie Peter is a 89 y.o.  female with a primary hospice diagnosis of metastatic colon cancer admitted to [facility] hospice level of care for management of pain and restlessness.        Prognosis: 20% likely hours to short days  Goals of Care Treatment Preferences   Palliative Care Decision Making Capacity: Unableunreliable  Healthcare Surrogate: Per health care directive  What is Most important to patient/family at this time: Comfort however they desire ability to interact with patient as well  Code Status DNR  Symptom Management:   Pain: Continue 0.5 mg/h IV hydromorphone drip along with 0.5 mg q.1 p.r.n. nursing bolus  Shortness of Breath: Opioids are mainstay as above  Anxiety/agitation: P.r.n. hydroxyzine family reports adverse reaction to Ativan with increased confusion, and would prefer to avoid this if possible would therefore recommend 1 mg of Haldol if needed and they were okay with this but did not think it was necessary at this time  Constipation: Bisacodyl suppository  Nausea/Vomiting: Ondansetron p.r.n.  Patient is at high risk of decline and death   Patient requires [facility] hospice level of care for management of pain and restlessness that can't be controlled or managed at a lower level of care   Disposition: A anticipate end-of-life care on this unit unless patient stabilizes and is able to return home with family.  Family reports that if patient is able to consistently take p.o. in the future that they would want her routine home meds restarted -this includes 81 mg of aspirin daily Plavix 75 mg  daily, hydralazine 25 mg daily metoprolol 100 mg daily along with Accu-Cheks 4 times daily and Humalog corrective scale  Discussed plan with: RN, patient, and family        If you need to reach the provider on call for the hospice team please call [phone]    Donna Davis DO  5/22/2025 15:49 EDT

## 2025-05-22 NOTE — NURSING NOTE
[facility] Visit Report    Tamie Peter  [id number]  5/22/2025    Admission R/T [facility] Dx: yes     Review of Visit: Patient is an 89 y.o. female with a primary [facility] diagnosis of malignant neoplasm of the colon. Admitted to [facility] for GIP for symptom management of pain, restlessness. No active isolations      PPS: 20%     VS:   Temp:  [98 °F (36.7 °C)-98.9 °F (37.2 °C)] 98.9 °F (37.2 °C)  Heart Rate:  [71-84] 84  Resp:  [16] 16  BP: (120-130)/(49-54) 120/49  SpO2: 94% on room air     Medications in 24 hours:  Continuous Infusions:  HYDROmorphone HCl-NaCl PCA: Loading dose 0.2mg, continuous rate 0.5mg/hr     PRN Meds:    HYDROmorphone 0.5mg IV PRN x1     Recommendations:  Continue to monitor for signs of decline and provide comfort measures. Contact [facility] providers at [phone] with any medication adjustment needs and at TOD.      Assessment:  Patient is bed bound, sips and bites only, drowsy during visit but was very alert earlier today with family visiting, PPS 20%. Respirations are unlabored, lung sounds clear throughout, on room air. Abdomen is soft with active bowel sounds. Extremities are warm to touch, scattered bruising. Laguna catheter to bedside drainage with dark yellow urine, 1350mL documented output in 24 hours.      Collaboration:  Spoke with pts daughters and son at the bedside with condition update and support provided. Training provided regarding assessment findings, disease progression, symptom management and expected length of stay. Family v/u of pts condition and all questions were answered. Collaborated with [facility] RN, Chitra and Dr. Davis about pts condition.     Disposition:  Patient meets GIP criteria, requiring frequent administration and continued titration of parenteral medications to achieve and maintain symptom management. Patient requiring increasing symptom management and frequent RN assessment. If patient were to stabilize  she would require LTC placement due to increased daily care needs. Will continue [facility] RN visits to monitor for changes, assess needs and provide support.       Trena Chaudhary RN  [facility]  Scattered Bed Team

## 2025-05-23 NOTE — PROGRESS NOTES
[facility] Inpatient Hospice Follow Up Note  Tamie Peter   1935   [facility] Admission Date: 5/20/2025   Current Date: 5/23/2025   Attending Provider: Justus Ch MD        Information obtained from: relative(s) and nursing staff    Interval History:  nursing reports concern for and dose failure of Dilaudid infusion and patient has required 2 PRNs in the last 24 hours nursing is also concerned that patient is restless and would like Ativan for the patient -discussed that patient had a prior poor and paradoxical reaction to Ativan per daughter during this hospital stay and we should try to avoid Ativan at this time shared that I had discussed with the patient's daughter the possibility of using Haldol for any anxiety and restlessness       daughter is not at bedside today and is at home however there is a long-term friend and neighbor of the patient at bedside Who also reports concern for pain    Patient is awake but minimally interactive with staff, however there is groaning and grimacing.  Unable to obtain further history at this time        Medications:     Current Facility-Administered Medications:     acetaminophen (TYLENOL) tablet 650 mg, 650 mg, Oral, Q4H PRN, Ike Brunner MD, 650 mg at 05/20/25 2234    sennosides-docusate (PERICOLACE) 8.6-50 MG per tablet 2 tablet, 2 tablet, Oral, BID **AND** bisacodyl (DULCOLAX) suppository 10 mg, 10 mg, Rectal, Daily PRN, Ike Brunner MD    haloperidol lactate (HALDOL) injection 1 mg, 1 mg, Intravenous, Q1H PRN, Donna Davis DO    heparin injection 500 Units, 5 mL, Intravenous, PRN, Ike Brunner MD    HYDROmorphone (DILAUDID) injection 1 mg, 1 mg, Intravenous, Q1H PRN, Donna Davis DO    HYDROmorphone (DILAUDID) PCA 0.2 mg/ml 50 mL syringe, , Intravenous, Continuous, Donna Davis DO, Rate Change (DUAL SIGN) at 05/23/25 1530    hydrOXYzine (ATARAX) tablet 25 mg, 25 mg, Oral, Q6H PRN, Ike Brunner  "MD LUIS    lidocaine (LMX) 4 % cream 1 Application, 1 Application, Topical, PRN, Ike Brunner MD    Lidocaine HCl gel (XYLOCAINE) urethral/mucosal syringe, , Topical, PRN, Donna Davis DO    ondansetron (ZOFRAN) injection 4 mg, 4 mg, Intravenous, Q6H PRN, Ike Brunner MD    sodium chloride 0.9 % flush 10 mL, 10 mL, Intravenous, Q12H, KannanIke MD, 10 mL at 05/23/25 0830    sodium chloride 0.9 % flush 10 mL, 10 mL, Intravenous, Q12H, KannanIke MD, 10 mL at 05/23/25 0830    sodium chloride 0.9 % flush 10 mL, 10 mL, Intravenous, PRN, KannanTedin MD LUIS    sodium chloride 0.9 % flush 10 mL, 10 mL, Intravenous, PRN, Kannan, Ike SMITH MD    [COMPLETED] Insert Peripheral IV, , , Once **AND** sodium chloride 0.9 % flush 10 mL, 10 mL, Intravenous, PRN, Kannan, Ike MD LUIS    sodium chloride 0.9 % flush 20 mL, 20 mL, Intravenous, PRN, KannanIke MD    sodium chloride 0.9 % infusion 40 mL, 40 mL, Intravenous, PRN, Kannan, Ike MD LUIS    sodium chloride 0.9 % infusion 40 mL, 40 mL, Intravenous, PRN, Kannan, Ike MD LUIS     Allergies:   Allergies   Allergen Reactions    Pneumococcal Vaccines Paresthesia        Physical Assessment:   /64 (BP Location: Right arm, Patient Position: Lying)   Pulse 102   Temp 98.9 °F (37.2 °C) (Oral)   Resp 18   Ht 152 cm (59.84\")   Wt 44.9 kg (99 lb)   SpO2 95%   BMI 19.44 kg/m²    Palliative Performance Scale: Performance 20% based on the following measures: Ambulation: Totally bed bound, Activity and Evidence of Disease: Unable to do any work, extensive evidence of disease, Self-Care: Total care required,  Intake: Minimal sips, LOC: Full, drowsy or confusion  Physical Exam   VSS  Elderly and frail-appearing woman who is moaning and grimacing a few times throughout my visit  HRRR normal S1 and S2, radial pulses 2+ bilaterally  Normal work of breathing shallow breaths     no excess secretions dry mucous membranes    Abdomen is soft and " nontender With hypoactive bowel sounds  There is no myoclonus or hyperalgesia  There is no psychomotor agitation    Data Reviewed:   No new labs or imaging    Assessment and Plan: Tamie Peter is a 89 y.o.  female with a primary hospice diagnosis of metastatic colon cancer admitted to [facility] hospice level of care for management of pain and restlessness.        Prognosis: 20% likely hours to short days  Goals of Care Treatment Preferences   Palliative Care Decision Making Capacity: Unableunreliable  Healthcare Surrogate: Per health care directive  What is Most important to patient/family at this time: Comfort however they desire ability to interact with patient as well  Code Status DNR  Symptom Management:   Pain: Increase IV hydromorphone to 1 mg an hour along with 1 mg Q1 p.r.n. nursing bolus  Shortness of Breath: Opioids are mainstay as above  Anxiety/agitation: family reports adverse reaction to Ativan with increased confusion, and would prefer to avoid this if possible would therefore recommend 1 mg of Haldol if needed and they were okay with this  This was ordered for the patient today 1 mg of IV Haldol q.1 hour p.r.n. for anxiety/agitation/restlessness  Constipation: Bisacodyl suppository  Nausea/Vomiting: Ondansetron p.r.n.  Patient is at high risk of decline and death   Patient requires [facility] hospice level of care for management of pain and restlessness that can't be controlled or managed at a lower level of care   Disposition: A anticipate end-of-life care on this unit unless patient stabilizes and is able to return home with family.  Family reports that if patient is able to consistently take p.o. in the future that they would want her routine home meds restarted -this includes 81 mg of aspirin daily Plavix 75 mg daily, hydralazine 25 mg daily metoprolol 100 mg daily along with Accu-Cheks 4 times daily and Humalog corrective scale  Discussed plan with: RN, patient, and family        If you need to reach the  provider on call for the hospice team please call [phone]    Donna Davis,   5/23/2025 17:17 EDT

## 2025-05-23 NOTE — PLAN OF CARE
Goal Outcome Evaluation:  Plan of Care Reviewed With: child        Progress: declining  Outcome Evaluation: PPS 10%. Dilaudid PCA basal rate of 0.5mg/hr. One dose of Dilaudid PRN given. F/C. Daughter at bedside.

## 2025-05-23 NOTE — NURSING NOTE
[facility] Visit Report    Tamie Peter  [id number]  5/23/2025    Admission R/T [facility] Dx: yes     Review of Visit: Patient is an 89 y.o. female with a primary [facility] diagnosis of malignant neoplasm of the colon. Admitted to [facility] for GIP for symptom management of pain, restlessness. No active isolations      PPS: 20%     VS:   Heart Rate:  [84] 84  Resp:  [16] 16  BP: (112)/(52) 112/52  SpO2: 93% on room air     Medications in 24 hours:  Continuous Infusions:  HYDROmorphone HCl-NaCl PCA: Loading dose 0.2mg, continuous rate 1mg/hr, no demand dose     PRN Meds:    HYDROmorphone 0.5mg IV PRN x2     Recommendations:  Continue to monitor for signs of decline and provide comfort measures. Contact [facility] providers at [phone] with any medication adjustment needs and at TOD.      Assessment:  Patient is bed bound, sips only and unable to use a straw this afternoon, increased lethargy today, able to visit briefly with some family, PPS 20%. Respirations are unlabored, lung sounds clear throughout, on room air. Abdomen is soft with hypoactive bowel sounds. Extremities are warm to touch, scattered bruising. Laguna catheter to bedside drainage with marie urine, 200mL documented output in 24 hours.      Collaboration:  Spoke with pts daughter at the bedside with condition update and support provided. Training provided regarding assessment findings, disease progression, symptom management and expected length of stay. Family v/u of pts condition and all questions were answered. Collaborated with [facility] RNChitra and Dr. Davis about pts condition.     Disposition:  Patient meets GIP criteria, requiring frequent administration and continued titration of parenteral medications to achieve and maintain symptom management. Patient requiring increasing symptom management and frequent RN assessment. If patient were to stabilize she would require LTC placement due to increased  daily care needs. Will continue [facility] RN visits to monitor for changes, assess needs and provide support.       Trena Chaudhary RN  [facility]  Scattered Bed Team

## 2025-05-24 NOTE — PROGRESS NOTES
[facility] Inpatient Hospice Follow Up Note  Tamie Peter   1935   [facility] Hospice Admission Date: 5/20/2025   Current Date: 5/24/2025   Attending Provider: Justus Ch MD        Information obtained from: relative(s) and past medical records    Interval History: Daughters Mandi and Melinda and several other family members at bedside.     Patient resting comfortably in bed in no apparent distress, face relaxed, respirations easy. On room air. Laguna draining. Family reports not much change from yesterday however she was asking for sips yesterday and overnight/this morning largely non communicative. Family notes periods of discomfort with repositioning and blood pressures, patient received  One dose prn hydromorphone as well as prn haldol around 6 am this morning corresponding with repositioning, family felt effective.       Medications:     Current Facility-Administered Medications:     [DISCONTINUED] sennosides-docusate (PERICOLACE) 8.6-50 MG per tablet 2 tablet, 2 tablet, Oral, BID **AND** bisacodyl (DULCOLAX) suppository 10 mg, 10 mg, Rectal, Daily PRN, Ike Brunner MD    haloperidol lactate (HALDOL) injection 1 mg, 1 mg, Intravenous, Q1H PRN, Donna Davis DO, 1 mg at 05/24/25 0610    heparin injection 500 Units, 5 mL, Intravenous, PRN, Ike Brunner MD    HYDROmorphone (DILAUDID) injection 1 mg, 1 mg, Intravenous, Q1H PRN, Donna Davis DO, 1 mg at 05/24/25 0614    HYDROmorphone (DILAUDID) PCA 0.2 mg/ml 50 mL syringe, , Intravenous, Continuous, Donna Davis DO, New Bag at 05/24/25 0657    lidocaine (LMX) 4 % cream 1 Application, 1 Application, Topical, PRN, Ike Brunner MD    Lidocaine HCl gel (XYLOCAINE) urethral/mucosal syringe, , Topical, PRN, Donna Davis DO    ondansetron (ZOFRAN) injection 4 mg, 4 mg, Intravenous, Q6H PRN, Ike Brunner MD    sodium chloride 0.9 % flush 10 mL, 10 mL, Intravenous, Q12H, Ike Brunner MD,  "10 mL at 05/23/25 2319    sodium chloride 0.9 % flush 10 mL, 10 mL, Intravenous, Q12H, Ike Brunner MD, 10 mL at 05/23/25 2319    sodium chloride 0.9 % flush 10 mL, 10 mL, Intravenous, PRN, Ike Brunner MD    sodium chloride 0.9 % flush 10 mL, 10 mL, Intravenous, PRN, Kannan, Ike SMITH MD    [COMPLETED] Insert Peripheral IV, , , Once **AND** sodium chloride 0.9 % flush 10 mL, 10 mL, Intravenous, PRN, Kannan, Ike SMITH MD    sodium chloride 0.9 % flush 20 mL, 20 mL, Intravenous, PRN, KannanIke MD    sodium chloride 0.9 % infusion 40 mL, 40 mL, Intravenous, PRN, Kannan, Ike SMITH MD    sodium chloride 0.9 % infusion 40 mL, 40 mL, Intravenous, PRN, KannanIke marte MD     Allergies:   Allergies   Allergen Reactions    Pneumococcal Vaccines Paresthesia        Physical Assessment:   /67 (BP Location: Right arm, Patient Position: Lying)   Pulse (!) 122   Temp 98.6 °F (37 °C) (Axillary)   Resp 20   Ht 152 cm (59.84\")   Wt 44.9 kg (99 lb)   SpO2 95%   BMI 19.44 kg/m²    Palliative Performance Scale: Performance 10% based on the following measures: Ambulation: Totally bed bound, Activity and Evidence of Disease: Unable to do any work, extensive evidence of disease, Self-Care: Total care required,  Intake: Mouth care only, LOC: Drowsy or comatose  Physical Exam  Constitutional:       General: She is not in acute distress.     Appearance: She is ill-appearing.   Cardiovascular:      Rate and Rhythm: Rhythm irregular.      Pulses:           Radial pulses are 1+ on the right side and 1+ on the left side.        Dorsalis pedis pulses are 1+ on the right side and 1+ on the left side.   Pulmonary:      Effort: Pulmonary effort is normal. No accessory muscle usage or respiratory distress.      Breath sounds: Decreased air movement present. Examination of the right-lower field reveals decreased breath sounds. Examination of the left-lower field reveals decreased breath sounds. Decreased breath sounds " "present. No wheezing, rhonchi or rales.   Abdominal:      General: Bowel sounds are decreased.      Palpations: Abdomen is soft.      Tenderness: There is no abdominal tenderness.   Musculoskeletal:      Right lower le+ Edema present.      Left lower le+ Edema present.   Skin:     General: Skin is warm.      Coloration: Skin is not cyanotic or mottled.   Neurological:      Mental Status: She is unresponsive.              Data Reviewed:   Lab Results   Component Value Date    GLUCOSE 142 (H) 2025    BUN 12 2025    CREATININE 0.99 2025     2025    K 3.4 (L) 2025    K 3.4 (L) 2025     2025    CALCIUM 8.9 2025    PROTEINTOT 6.2 2025    ALBUMIN 3.0 (L) 2025    ALT 10 2025    AST 17 2025    ALKPHOS 115 2025    BILITOT 0.4 2025    GLOB 3.2 2025    AGRATIO 0.9 2025    BCR 12.1 2025    ANIONGAP 12.0 2025    EGFR 55.3 (L) 2025       No results found for: \"WBC\", \"RBC\", \"HGB\", \"HCT\", \"MCV\", \"MCH\", \"MCHC\", \"RDW\", \"RDWSD\", \"MPV\", \"PLT\", \"NEUTRORELPCT\", \"LYMPHORELPCT\", \"MONORELPCT\", \"EOSRELPCT\", \"BASORELPCT\", \"AUTOIGPER\", \"NEUTROABS\", \"LYMPHSABS\", \"MONOSABS\", \"EOSABS\", \"BASOSABS\", \"AUTOIGNUM\", \"NRBC\"       CT Head Without Contrast  Narrative: CT OF THE BRAIN WITHOUT CONTRAST 2025     HISTORY: Altered mental status.     Axial images were obtained through the brain without intravenous  contrast.     There is mild to moderate diffuse atrophy. There is decreased  attenuation of the periventricular white matter bilaterally consistent  with small vessel white matter ischemic disease. Small somewhat more  confluent area of low density seen near the anterior aspect of the left  external capsule on image 21 likely a small focal area of old infarct.     There is no evidence of acute infarction, hemorrhage, midline shift or  mass effect.     No bony abnormalities are seen.     Impression: 1. Atrophy and " small vessel ischemic changes.  2. No acute intracranial process.     Radiation dose reduction techniques were utilized, including automated  exposure control and exposure modulation based on body size.        This report was finalized on 5/14/2025 1:53 PM by Dr. Johnathan Sandy M.D on Workstation: [id number]           Assessment and Plan: Tamie Peter is a 89 y.o.  female with a primary hospice diagnosis of metastatic colon cancer admitted to [facility] level of care for management of pain and restlessness.        Prognosis: 10% hours to  days, reviewed with family  Goals of Care Treatment Preferences   Palliative Care Decision Making Capacity: unreliable  Healthcare Surrogate: Yes- name(s)- talya Raymond  What is Most important to patient/family at this time: Comfort however they desire ability to interact with patient as well - we discussed today patient ability to interact likely reducing  Code Status DNR  Symptom Management:   Pain: Increase IV hydromorphone to 1 mg an hour along with 1 mg Q1 p.r.n. nursing bolus  Plan to monitor comfort with turns and BPs today, can consider pre-medicating patient prior to moving, however for now ok to evaluate patient tolerance after per family  Shortness of Breath: Opioids are mainstay as above  Anxiety/agitation: family reports adverse reaction to Ativan with increased confusion, and would prefer to avoid this if possible would therefore recommend 1 mg of Haldol if needed and they were okay with this  1 mg of IV Haldol q.1 hour p.r.n. for anxiety/agitation/restlessness  Constipation: Bisacodyl suppository  Nausea/Vomiting: Ondansetron p.r.n.  Patient is at high risk of decline and death   Patient requires [facility] hospice level of care for management of pain and restlessness that can't be controlled or managed at a lower level of care   Disposition: likely continue to decline and die on unit  Discussed plan with: RN and family      I spent a total of 60 minutes today caring  for patient including reviewing records, speaking with patient/family and collaborating with care team.       If you need to reach the provider on call for the hospice team please call [phone]    DEIRDRE Jacobo  5/24/2025 09:22 EDT

## 2025-05-24 NOTE — PLAN OF CARE
Goal Outcome Evaluation:  Plan of Care Reviewed With: patient        Progress: declining  Outcome Evaluation: Dilaudid PCA continues infusing.  One dose of Dilaudid PRN and one dose of Haldol PRN given.  Family at bedside. Will continue with plan of care.

## 2025-05-24 NOTE — PLAN OF CARE
Goal Outcome Evaluation:           Progress: declining  Outcome Evaluation: PPS 10%. Pt has dilaudid PCA pump running continuously at 1 ml/hr. Pt also received 1 mg q4h prn prior to turns. Pt seemed to tolerate turns better with prn dose given. Pt mostly appeared to be resting comfortably. Pt family at bedside. Pt assisted with turns, oral care and liu care. will continue comfort care.

## 2025-05-25 NOTE — PLAN OF CARE
Goal Outcome Evaluation:  Plan of Care Reviewed With: patient        Progress: declining  Outcome Evaluation: Dilaudid PCA pump continues infusing.  PRN dilaudid given x2.  Daughter remains at bedside.  Will continue with plan of care.

## 2025-05-25 NOTE — PLAN OF CARE
Goal Outcome Evaluation:  Plan of Care Reviewed With: family        Progress: declining  Outcome Evaluation: PPS 10%, pt receiving dilaudid via continous PCA pump. Dose increased today to 1.2 mg/hr d/t pt still having discomfort with turns. Pt received 1 breakthrough dose of dilaudid prior to bath. Pt assisted with turns, oral care and liu care. Pt family requested pt only be turned once overnight. Pt family educated on end of life signs and progression. Pt family seemed understanding at this time. Will continue comfort care.

## 2025-05-25 NOTE — PROGRESS NOTES
[facility] Inpatient Hospice Follow Up Note  Tamie Peter   1935   [facility] Hospice Admission Date: 5/20/2025   Current Date: 5/25/2025   Attending Provider: Justus Ch MD        Information obtained from: relative(s) and past medical records    Interval History: Patient resting peacefully in bed no apparent distress. Respirations easy, face relaxed. On room air. Received haldol 1 mg IV x 1 last 24 hours. PCA hydromorphone continues at 1 mg/hr. Hydromorphone 1 mg IV given x four doses last 24 hours.     Daughter Mandi and son in law at bedside. Expressed concerns about patient discomfort with turns, personal care. Asking about rationale for periodic repositioning/baths, noting patient moaning with any movement. No periods of alertness in last 24 hours.             Medications:     Current Facility-Administered Medications:     [DISCONTINUED] sennosides-docusate (PERICOLACE) 8.6-50 MG per tablet 2 tablet, 2 tablet, Oral, BID **AND** bisacodyl (DULCOLAX) suppository 10 mg, 10 mg, Rectal, Daily PRN, Ike Brunner MD    haloperidol lactate (HALDOL) injection 1 mg, 1 mg, Intravenous, Q1H PRN, Donna Davis DO, 1 mg at 05/24/25 0610    heparin injection 500 Units, 5 mL, Intravenous, PRN, Ike Brunner MD    HYDROmorphone (DILAUDID) injection 1 mg, 1 mg, Intravenous, Q1H PRN, Donna Davis DO, 1 mg at 05/25/25 0528    HYDROmorphone (DILAUDID) PCA 0.2 mg/ml 50 mL syringe, , Intravenous, Continuous, Donna Davis DO, New Bag at 05/25/25 0245    lidocaine (LMX) 4 % cream 1 Application, 1 Application, Topical, PRN, Ike Brunner MD    Lidocaine HCl gel (XYLOCAINE) urethral/mucosal syringe, , Topical, PRN, Donna Davis DO    ondansetron (ZOFRAN) injection 4 mg, 4 mg, Intravenous, Q6H PRN, Ike Brunner MD    sodium chloride 0.9 % flush 10 mL, 10 mL, Intravenous, Q12H, Ike Brunner MD, 10 mL at 05/24/25 2046    sodium chloride 0.9 % flush 10 mL, 10  "mL, Intravenous, Q12H, Ike Brunner MD, 10 mL at 05/24/25 2046    sodium chloride 0.9 % flush 10 mL, 10 mL, Intravenous, PRN, Ike Brunner MD    sodium chloride 0.9 % flush 10 mL, 10 mL, Intravenous, PRN, Ike Brunner MD    [COMPLETED] Insert Peripheral IV, , , Once **AND** sodium chloride 0.9 % flush 10 mL, 10 mL, Intravenous, PRN, KannanIke anne MD    sodium chloride 0.9 % flush 20 mL, 20 mL, Intravenous, PRN, KannanIke anne MD    sodium chloride 0.9 % infusion 40 mL, 40 mL, Intravenous, PRN, Ike Brunner MD    sodium chloride 0.9 % infusion 40 mL, 40 mL, Intravenous, PRN, KannanIke marte MD     Allergies:   Allergies   Allergen Reactions    Pneumococcal Vaccines Paresthesia        Physical Assessment:   /56 (BP Location: Right arm, Patient Position: Lying)   Pulse 86   Temp 99 °F (37.2 °C) (Axillary)   Resp 24   Ht 152 cm (59.84\")   Wt 44.9 kg (99 lb)   SpO2 93%   BMI 19.44 kg/m²    Palliative Performance Scale: Performance 10% based on the following measures: Ambulation: Totally bed bound, Activity and Evidence of Disease: Unable to do any work, extensive evidence of disease, Self-Care: Total care required,  Intake: Mouth care only, LOC: Drowsy or comatose  Physical Exam  Constitutional:       General: She is not in acute distress.     Appearance: She is ill-appearing.   Cardiovascular:      Rate and Rhythm: Normal rate. Rhythm irregular.      Pulses:           Radial pulses are 1+ on the right side and 1+ on the left side.        Dorsalis pedis pulses are 1+ on the right side and 1+ on the left side.      Comments: Edema to hands left > right  Pulmonary:      Effort: Pulmonary effort is normal. No accessory muscle usage or respiratory distress.      Breath sounds: Examination of the left-upper field reveals wheezing. Examination of the right-lower field reveals decreased breath sounds. Examination of the left-lower field reveals decreased breath sounds. Decreased breath " "sounds and wheezing present. No rhonchi or rales.   Abdominal:      General: Bowel sounds are decreased.      Palpations: Abdomen is soft.      Tenderness: There is no abdominal tenderness.   Musculoskeletal:      Right lower le+ Edema present.      Left lower le+ Edema present.   Skin:     General: Skin is warm and dry.      Coloration: Skin is not cyanotic or mottled.   Neurological:      Mental Status: She is unresponsive.              Data Reviewed:   Lab Results   Component Value Date    GLUCOSE 142 (H) 2025    BUN 12 2025    CREATININE 0.99 2025     2025    K 3.4 (L) 2025    K 3.4 (L) 2025     2025    CALCIUM 8.9 2025    PROTEINTOT 6.2 2025    ALBUMIN 3.0 (L) 2025    ALT 10 2025    AST 17 2025    ALKPHOS 115 2025    BILITOT 0.4 2025    GLOB 3.2 2025    AGRATIO 0.9 2025    BCR 12.1 2025    ANIONGAP 12.0 2025    EGFR 55.3 (L) 2025       No results found for: \"WBC\", \"RBC\", \"HGB\", \"HCT\", \"MCV\", \"MCH\", \"MCHC\", \"RDW\", \"RDWSD\", \"MPV\", \"PLT\", \"NEUTRORELPCT\", \"LYMPHORELPCT\", \"MONORELPCT\", \"EOSRELPCT\", \"BASORELPCT\", \"AUTOIGPER\", \"NEUTROABS\", \"LYMPHSABS\", \"MONOSABS\", \"EOSABS\", \"BASOSABS\", \"AUTOIGNUM\", \"NRBC\"       CT Head Without Contrast  Narrative: CT OF THE BRAIN WITHOUT CONTRAST 2025     HISTORY: Altered mental status.     Axial images were obtained through the brain without intravenous  contrast.     There is mild to moderate diffuse atrophy. There is decreased  attenuation of the periventricular white matter bilaterally consistent  with small vessel white matter ischemic disease. Small somewhat more  confluent area of low density seen near the anterior aspect of the left  external capsule on image 21 likely a small focal area of old infarct.     There is no evidence of acute infarction, hemorrhage, midline shift or  mass effect.     No bony abnormalities are seen.   "   Impression: 1. Atrophy and small vessel ischemic changes.  2. No acute intracranial process.     Radiation dose reduction techniques were utilized, including automated  exposure control and exposure modulation based on body size.        This report was finalized on 5/14/2025 1:53 PM by Dr. Johnathan Sandy M.D on Workstation: [id number]           Assessment and Plan: Tamie Peter is a 89 y.o.  female with a primary hospice diagnosis of metastatic colon cancer admitted to [facility] hospice level of care for management of pain and restlessness.        Prognosis: 10%, hours to days, reviewed with family  Goals of Care Treatment Preferences   Palliative Care Decision Making Capacity: unreliable  Healthcare Surrogate: Yes- name(s)- dtr Mandi  What is Most important to patient/family at this time: interactions with patient as able, ultimately goal is comfort  Code Status DNR  Symptom Management:   Pain: Increase IV hydromorphone to 1.2 mg/hr; continue 1 mg Q1 hour prn - time prn doses around turns, personal care, blood pressure etc  Family requests turns kept to a minimum and extra time/care with repositioning as this are so uncomfortable for the patient, reviewed with nursing.  Provided education to family that some regular repositioning helpful for minimizing stiffness/discomfort/pressure areas  Shortness of Breath:opioids as above  Anxiety/agitation: family reports adverse reaction to ativan with increased confusion, recommend continue haldol 1 mg IV as needed  Constipation: bisacodyl supp prn  Nausea/Vomiting:Ondansetron 4 mg IV prn  Patient is at high risk of decline and death   Patient requires [facility] hospice level of care for management of pain, restlessness that can't be controlled or managed at a lower level of care   Disposition: anticipate continued decline and EOL on 4P  Discussed plan with: RN, family, and Dr Ch      I spent a total of 80 minutes today caring for patient including reviewing records,  speaking with patient/family and collaborating with care team.       If you need to reach the provider on call for the hospice team please call [phone]    DEIRDRE Jacobo  5/25/2025 09:20 EDT

## 2025-05-25 NOTE — NURSING NOTE
Pt is an 89 yr old female admitted to the ED with AMS and weakness. Pt with hx of cancer and continued to decline throughout hospitalization-family chose to pursue comfort care. Primary diagnosis is malignant neoplasm of the colon-hx of DM2 and CKD3. [facility] is managing pain. At time of assessment pt is lying in bed with eyes open-per family she has had them open since her bath- no attempts to verbalize-no PO intake.Lungs diminished-hypo BS and weak pedal pulses. PPS10%. Over the past 24 hours pt has received dilaudid 1mgs x5-she is on a dilaudid drip at 1.2mgs/hr. VS as follows /56, T99ax, P86 RR24 and sats 93% on RA. Family at bedside-discussed s/s of decline-and answered all questions- continue to offer support and encouraged to call [facility] with needs or concerns. Update by nurse meds have been increased today to better manage symptoms. Pt is GIP appropriate-requiring parenteral meds to manage pain as well as close monitoring by skilled nursing-should she stabilize she would require LTC placement. [facility] will evaluate with every visit.

## 2025-05-25 NOTE — NURSING NOTE
Pt is an 89 yr old female admitted to the ED with AMS and weakness. Pt with hx of cancer and continued to decline throughout hospitalization-family chose to pursue comfort care. Primary diagnosis is malignant neoplasm of the colon-hx of DM2 and CKD3. [facility] is managing pain. At time of assessment pt is lying in bed with eyes closed-opens them to voice but makes no attempt to verbalize. Lungs diminished-hypo BS and palpable pulses. PPS10%. Over the past 24 hours pt has received dilaudid 1mgs x3 and haldol 1mgs x1-she is on a dilaudid drip at 1mgs/hr. VS as follows /43, T99ax, P89 RR18 and sats 95% on RA. Family at bedside-discussed s/s of decline-and answered all questions-offered support and encouraged to call [facility] with needs or concerns. Update by nurse-recommend giving breakthrough dose of dilaudid 20-30 mins before repositioning for better pain control. Pt is GIP appropriate-requiring parenteral meds to manage pain as well as close monitoring by skilled nursing-should she stabilize she would require LTC placement. [facility] will evaluate with every visit.

## 2025-05-26 NOTE — PLAN OF CARE
Goal Outcome Evaluation:              Outcome Evaluation: Family at bedside this shift, PRN dilaudid given before turning. No new issues noted. Pt was only turned 1x at approx 2100 per family request. Pt comfort care.

## 2025-05-26 NOTE — PROGRESS NOTES
[facility] Inpatient Hospice Follow Up Note  Tamie Peter   1935   [facility] Hospice Admission Date: 5/20/2025   Current Date: 5/26/2025   Attending Provider: Justus Ch MD        Information obtained from: relative(s), past medical records, and RN    Interval History: Patient in bed, audible congestion, RN at bedside administering prn hydromorphone for non-verbal s/s pain, grimacing. Audible congestion today. No mottling or cyanosis.     Son and dtr at bedside. Son spent the night observed two periods of apnea, significant enough to think death imminent.      PCA hydromorphone continues at 1.2 mg/hr. Two prn doses given last 24 hours. Suspect reduced prn administration related to fewer turns/repositions of the patient at the family's request. No prn haldol last 24 hours      Medications:     Current Facility-Administered Medications:     [DISCONTINUED] sennosides-docusate (PERICOLACE) 8.6-50 MG per tablet 2 tablet, 2 tablet, Oral, BID **AND** bisacodyl (DULCOLAX) suppository 10 mg, 10 mg, Rectal, Daily PRN, Ike Brunner MD    glycopyrrolate (ROBINUL) injection 0.4 mg, 0.4 mg, Intravenous, Q4H, Angelita Bang APRN, 0.4 mg at 05/26/25 0922    haloperidol lactate (HALDOL) injection 1 mg, 1 mg, Intravenous, Q1H PRN, Donna Davis DO, 1 mg at 05/24/25 0610    heparin injection 500 Units, 5 mL, Intravenous, PRN, Ike Brunner MD    HYDROmorphone (DILAUDID) injection 1 mg, 1 mg, Intravenous, Q1H PRN, Justus Ch MD, 1 mg at 05/26/25 0852    HYDROmorphone (DILAUDID) PCA 0.2 mg/ml 50 mL syringe, , Intravenous, Continuous, Justus Ch MD, New Bag at 05/26/25 0454    lidocaine (LMX) 4 % cream 1 Application, 1 Application, Topical, PRN, Ike Brunner MD    Lidocaine HCl gel (XYLOCAINE) urethral/mucosal syringe, , Topical, PRN, Donna Davis DO    ondansetron (ZOFRAN) injection 4 mg, 4 mg, Intravenous, Q6H PRN, Ike Brunner MD    sodium chloride  "0.9 % flush 10 mL, 10 mL, Intravenous, Q12H, KannanIke anne MD, 10 mL at 05/26/25 0852    sodium chloride 0.9 % flush 10 mL, 10 mL, Intravenous, Q12H, KannanIke MD, 10 mL at 05/26/25 0852    sodium chloride 0.9 % flush 10 mL, 10 mL, Intravenous, PRN, KannanIke MD    sodium chloride 0.9 % flush 10 mL, 10 mL, Intravenous, PRN, Kannan, Ike SMITH MD    [COMPLETED] Insert Peripheral IV, , , Once **AND** sodium chloride 0.9 % flush 10 mL, 10 mL, Intravenous, PRN, Kannan, Ike MD LUIS    sodium chloride 0.9 % flush 20 mL, 20 mL, Intravenous, PRN, Kannan, Ike MD LUIS    sodium chloride 0.9 % infusion 40 mL, 40 mL, Intravenous, PRN, Kannan, Ike SMITH MD    sodium chloride 0.9 % infusion 40 mL, 40 mL, Intravenous, PRN, Kannan, Ike MD LUIS     Allergies:   Allergies   Allergen Reactions    Pneumococcal Vaccines Paresthesia        Physical Assessment:   /78 (BP Location: Right arm, Patient Position: Lying)   Pulse 107   Temp 98.4 °F (36.9 °C) (Axillary)   Resp 18   Ht 152 cm (59.84\")   Wt 44.9 kg (99 lb)   SpO2 95%   BMI 19.44 kg/m²    Palliative Performance Scale: Performance 10% based on the following measures: Ambulation: Totally bed bound, Activity and Evidence of Disease: Unable to do any work, extensive evidence of disease, Self-Care: Total care required,  Intake: Mouth care only, LOC: Drowsy or comatose  Physical Exam  Constitutional:       General: She is not in acute distress.     Appearance: She is ill-appearing.   Cardiovascular:      Rate and Rhythm: Normal rate. Rhythm irregular.      Pulses:           Radial pulses are 1+ on the right side and 1+ on the left side.        Dorsalis pedis pulses are 1+ on the right side and 1+ on the left side.      Comments: Edema bilateral hands right > left  Pulmonary:      Effort: No accessory muscle usage or respiratory distress.      Breath sounds: Decreased air movement present. Decreased breath sounds and rhonchi present.      Comments: " "Audible congestion, coarse rhonchi throughout  Abdominal:      General: Bowel sounds are absent.      Palpations: Abdomen is soft.      Tenderness: There is no abdominal tenderness.   Musculoskeletal:      Right lower le+ Edema present.      Left lower le+ Edema present.   Skin:     General: Skin is warm and dry.      Coloration: Skin is not cyanotic or mottled.   Neurological:      Mental Status: She is unresponsive.              Data Reviewed:   Lab Results   Component Value Date    GLUCOSE 142 (H) 2025    BUN 12 2025    CREATININE 0.99 2025     2025    K 3.4 (L) 2025    K 3.4 (L) 2025     2025    CALCIUM 8.9 2025    PROTEINTOT 6.2 2025    ALBUMIN 3.0 (L) 2025    ALT 10 2025    AST 17 2025    ALKPHOS 115 2025    BILITOT 0.4 2025    GLOB 3.2 2025    AGRATIO 0.9 2025    BCR 12.1 2025    ANIONGAP 12.0 2025    EGFR 55.3 (L) 2025       No results found for: \"WBC\", \"RBC\", \"HGB\", \"HCT\", \"MCV\", \"MCH\", \"MCHC\", \"RDW\", \"RDWSD\", \"MPV\", \"PLT\", \"NEUTRORELPCT\", \"LYMPHORELPCT\", \"MONORELPCT\", \"EOSRELPCT\", \"BASORELPCT\", \"AUTOIGPER\", \"NEUTROABS\", \"LYMPHSABS\", \"MONOSABS\", \"EOSABS\", \"BASOSABS\", \"AUTOIGNUM\", \"NRBC\"       CT Head Without Contrast  Narrative: CT OF THE BRAIN WITHOUT CONTRAST 2025     HISTORY: Altered mental status.     Axial images were obtained through the brain without intravenous  contrast.     There is mild to moderate diffuse atrophy. There is decreased  attenuation of the periventricular white matter bilaterally consistent  with small vessel white matter ischemic disease. Small somewhat more  confluent area of low density seen near the anterior aspect of the left  external capsule on image 21 likely a small focal area of old infarct.     There is no evidence of acute infarction, hemorrhage, midline shift or  mass effect.     No bony abnormalities are seen.     Impression: 1. " Atrophy and small vessel ischemic changes.  2. No acute intracranial process.     Radiation dose reduction techniques were utilized, including automated  exposure control and exposure modulation based on body size.        This report was finalized on 5/14/2025 1:53 PM by Dr. Johnathan Sandy M.D on Workstation: [id number]           Assessment and Plan: Tamie Peter is a 89 y.o.  female with a primary hospice diagnosis of metastatic colon cancer admitted to [facility] level of care for management of pain, restlessness.        Prognosis: PPS 10%, likely hours to days, reviewed s/s approaching death with family  Goals of Care Treatment Preferences   Palliative Care Decision Making Capacity: unreliable  Healthcare Surrogate: Per health care directive  What is Most important to patient/family at this time: comfort  Code Status DNR  Symptom Management:   Pain: Continue IV hydromorphone at 1.2 mg/hr along with 1 mg Q1 p.r.n. nursing bolus  Shortness of Breath: Opioids are mainstay as above, reviewed opioids for dyspnea management with family today  Congestion: Begin glycopyrrolate 0.4 mg IV every four hours  Anxiety/agitation: family reports adverse reaction to Ativan with increased confusion, and would prefer to avoid this if possible would therefore recommend 1 mg of Haldol if needed and they were okay with this  1 mg of IV Haldol q.1 hour p.r.n. for anxiety/agitation/restlessness  Constipation: Bisacodyl suppository  Nausea/Vomiting: Ondansetron p.r.n.  Patient is at high risk of decline and death   Patient requires [facility] hospice level of care for management of pain, restlessness that can't be controlled or managed at a lower level of care   Disposition: anticipate continued decline and EOL on 4P  Discussed plan with: RN, patient, and family      I spent a total of 60 minutes today caring for patient including reviewing records, speaking with patient/family and collaborating with care team.       If you need to reach  the provider on call for the hospice team please call [phone]    DEIRDRE Jacobo  5/26/2025 09:29 EDT

## 2025-05-26 NOTE — DISCHARGE SUMMARY
[facility] Inpatient Hospice Death Summary  Tamie Peter   1935   [facility] Hospice Admission Date: 2025   Date of Death: 25  Attending Provider: Justus Ch MD      Cause of Death:     Metastatic colon cancer    Secondary Diagnosis:  Past Medical History:   Diagnosis Date    Anemia     Arthritis of left sacroiliac joint 2022    CKD (chronic kidney disease), stage III     PATIENT STATES IT IS AT STAGE 4 AT THIS TIME    Colorectal cancer     Coronary artery disease     cath 11/15. CABG x3 and all grafts widely patent. Very small OM and Cfx and that may be the cause of lateral ischemia on stress test. Non bipassable    Diabetes mellitus, type II     GERD (gastroesophageal reflux disease)     Gout     Heart attack 10/2022    MULTIPLE (6 OR MORE)    History of DVT in adulthood     AFTER CHILDBIRTH    Hyperlipidemia     Hypertension     Liver masses     Pacemaker     PUD (peptic ulcer disease)         Procedures/Consulting Services:           Hospital Course:     Tamie Peter is a 89 y.o. female with a primary hospice diagnosis of metastatic colon cancer who was admitted to [facility] level of Hospice Care at [facility]. Patient required parenteral meds for management of pain, restlessness and dyspnea. She  on 4P on 25      DEIRDRE Jacobo  2025 13:47 EDT

## 2025-05-27 NOTE — PROGRESS NOTES
Case Management Discharge Note      Final Note: The patient  on 25 @ 13:18. KIAN Akhtar RN CCP.         Selected Continued Care - Discharged on 2025 Admission date: 2025 - Discharge disposition:       Destination Coordination complete.      Service Provider Services Address Phone Fax Patient Preferred    [place] [id number] [facility] [id number] [phone] --              Durable Medical Equipment    No services have been selected for the patient.                Dialysis/Infusion    No services have been selected for the patient.                Home Medical Care    No services have been selected for the patient.                Therapy    No services have been selected for the patient.                Community Resources    No services have been selected for the patient.                Community & DME    No services have been selected for the patient.                         Final Discharge Disposition Code: 20 -

## 2025-06-02 DIAGNOSIS — E78.2 MIXED HYPERLIPIDEMIA: Chronic | ICD-10-CM

## 2025-06-02 RX ORDER — ROSUVASTATIN CALCIUM 40 MG/1
40 TABLET, COATED ORAL DAILY
Qty: 90 TABLET | Refills: 3 | OUTPATIENT
Start: 2025-06-02

## (undated) DEVICE — PAD GRND E/S NT200IX RF/GEN W/CABL DISP

## (undated) DEVICE — Device

## (undated) DEVICE — SYR LL TP 10ML STRL

## (undated) DEVICE — COVER,C-ARM,41X74: Brand: MEDLINE

## (undated) DEVICE — APPL CHLORAPREP 3ML CLR

## (undated) DEVICE — SUT MNCRYL PLS ANTIB UD 4/0 PS2 18IN

## (undated) DEVICE — GLV SURG TRIUMPH PF LTX 7.5 STRL

## (undated) DEVICE — LIMB HOLDER, WRIST/ANKLE: Brand: DEROYAL

## (undated) DEVICE — EPIDURAL TRAY: Brand: MEDLINE INDUSTRIES, INC.

## (undated) DEVICE — NEEDLE, QUINCKE 22GX3.5": Brand: MEDLINE INDUSTRIES, INC.

## (undated) DEVICE — CATH GUIDE RIGHTSITE C315HIS-02

## (undated) DEVICE — TOWEL,OR,DSP,ST,BLUE,STD,4/PK,20PK/CS: Brand: MEDLINE

## (undated) DEVICE — SOL IRR NACL 0.9PCT BT 1000ML

## (undated) DEVICE — THE SINGLE USE ETRAP – POLYP TRAP IS USED FOR SUCTION RETRIEVAL OF ENDOSCOPICALLY REMOVED POLYPS.: Brand: ETRAP

## (undated) DEVICE — INTRO TEAR AWAY/LVD W/SD PRT 8F 13CM

## (undated) DEVICE — INTENDED FOR TISSUE SEPARATION, AND OTHER PROCEDURES THAT REQUIRE A SHARP SURGICAL BLADE TO PUNCTURE OR CUT.: Brand: BARD-PARKER ® CARBON RIB-BACK BLADES

## (undated) DEVICE — Device: Brand: BLACK EYE

## (undated) DEVICE — GLV SURG TRIUMPH PF LTX 7 STRL

## (undated) DEVICE — ANTIBACTERIAL UNDYED BRAIDED (POLYGLACTIN 910), SYNTHETIC ABSORBABLE SUTURE: Brand: COATED VICRYL

## (undated) DEVICE — INTRO TEAR AWAY/LVD W/SD PRT 7F 13CM

## (undated) DEVICE — NDL SPINE 22G 5IN BLK

## (undated) DEVICE — SLITTER CATH GUIDE ATTAIN ADJ

## (undated) DEVICE — NDL SPINE 22G 31/2IN BLK

## (undated) DEVICE — APPL CHLORAPREP HI/LITE 26ML ORNG

## (undated) DEVICE — NDL HYPO PRECISIONGLIDE REG 25G 1 1/2

## (undated) DEVICE — THE CARR-LOCKE INJECTION NEEDLE IS A SINGLE USE, DISPOSABLE, FLEXIBLE SHEATH INJECTION NEEDLE USED FOR THE INJECTION OF VARIOUS TYPES OF MEDIA THROUGH FLEXIBLE ENDOSCOPES.

## (undated) DEVICE — SINGLE-USE BIOPSY FORCEPS: Brand: RADIAL JAW 4

## (undated) DEVICE — LOU PACE DEFIB: Brand: MEDLINE INDUSTRIES, INC.

## (undated) DEVICE — Device: Brand: PORTEX

## (undated) DEVICE — ADHS SKIN SURG TISS VISC PREMIERPRO EXOFIN HI/VISC FAST/DRY

## (undated) DEVICE — GLV SURG PREMIERPRO ORTHO LTX PF SZ7 BRN

## (undated) DEVICE — CVR PROB GEN PURP W ISOSILK 6X48

## (undated) DEVICE — INTRO SHEATH PRELUDE SNAP .038 7F 13CM W/SDPRT

## (undated) DEVICE — LOU MINOR PROCEDURE: Brand: MEDLINE INDUSTRIES, INC.

## (undated) DEVICE — SKIN AFFIX SURG ADHESIVE 72/CS 0.55ML: Brand: MEDLINE

## (undated) DEVICE — SUT PDS 2/0 SH 27IN Z317H

## (undated) DEVICE — ELECTRD ECG CARBON/SNP RL FM A/ 5PK

## (undated) DEVICE — INTRO SHEATH PRELUDE SNAP .038 9.5F 13CM W/SDPRT GRY

## (undated) DEVICE — INTRO SHEATH PRELUDE SNAP .038 6F 13CM W/SDPRT

## (undated) DEVICE — PATIENT RETURN ELECTRODE, SINGLE-USE, CONTACT QUALITY MONITORING, ADULT, WITH 9FT CORD, FOR PATIENTS WEIGING OVER 33LBS. (15KG): Brand: MEGADYNE